# Patient Record
Sex: FEMALE | Race: WHITE | NOT HISPANIC OR LATINO | Employment: OTHER | URBAN - METROPOLITAN AREA
[De-identification: names, ages, dates, MRNs, and addresses within clinical notes are randomized per-mention and may not be internally consistent; named-entity substitution may affect disease eponyms.]

---

## 2017-04-11 ENCOUNTER — OFFICE VISIT (OUTPATIENT)
Dept: INTERNAL MEDICINE | Facility: CLINIC | Age: 72
End: 2017-04-11
Payer: COMMERCIAL

## 2017-04-11 VITALS
HEIGHT: 66 IN | RESPIRATION RATE: 16 BRPM | BODY MASS INDEX: 21 KG/M2 | OXYGEN SATURATION: 92 % | WEIGHT: 130.7 LBS | TEMPERATURE: 98.7 F | HEART RATE: 58 BPM | SYSTOLIC BLOOD PRESSURE: 122 MMHG | DIASTOLIC BLOOD PRESSURE: 92 MMHG

## 2017-04-11 DIAGNOSIS — M79.672 LEFT FOOT PAIN: Primary | ICD-10-CM

## 2017-04-11 PROCEDURE — 99213 OFFICE O/P EST LOW 20 MIN: CPT | Performed by: INTERNAL MEDICINE

## 2017-04-11 NOTE — NURSING NOTE
"Chief Complaint   Patient presents with     Musculoskeletal Problem     Bilateral feet c/o's- started with left foot. Arthrtis? shooting across toes, numbness,        Initial BP (!) 122/92  Pulse 58  Temp 98.7  F (37.1  C) (Oral)  Resp 16  Ht 5' 6\" (1.676 m)  Wt 130 lb 11.2 oz (59.3 kg)  SpO2 92%  BMI 21.1 kg/m2 Estimated body mass index is 21.1 kg/(m^2) as calculated from the following:    Height as of this encounter: 5' 6\" (1.676 m).    Weight as of this encounter: 130 lb 11.2 oz (59.3 kg).  Medication Reconciliation: complete      Adolph Urbina CMA      "

## 2017-04-11 NOTE — MR AVS SNAPSHOT
After Visit Summary   4/11/2017    Taty Aguila    MRN: [id number]           Patient Information     Date Of Birth          1945        Visit Information        Provider Department      4/11/2017 12:40 PM Anyi Olmos MD [facility]        Today's Diagnoses     Left foot pain    -  1       Follow-ups after your visit        Additional Services     ORTHO  REFERRAL       [facility] is referring you to the Orthopedic  Services at [facility].       The  Representative will assist you in the coordination of your Orthopedic and Musculoskeletal Care as prescribed by your physician.    The  Representative will call you within 1 business day to help schedule your appointment, or you may contact the  Representative at:    All areas ~ [phone]     Type of Referral : [facility]       Timeframe requested: 3 - 5 days    Coverage of these services is subject to the terms and limitations of your health insurance plan.  Please call member services at your health plan with any benefit or coverage questions.      If X-rays, CT or MRI's have been performed, please contact the facility where they were done to arrange for , prior to your scheduled appointment.  Please bring this referral request to your appointment and present it to your specialist.                  Your next 10 appointments already scheduled     Apr 25, 2017 10:00 AM CDT   Ech Complete with [email or web address]   [facility] ([facility])    [address] [address] [address] [phone]           1.  Please bring or wear a comfortable two-piece outfit. 2.  You may eat, drink and take your normal medicines. 3.  For any questions that cannot be answered, please contact the ordering physician ***Please check-in at the [facility] located  "in [address] in the [facility] building. When you are finished registering, please go to [address] and have a seat. The technician will call your name for the test.            May 02, 2017 10:15 AM CDT   Return Visit with Jesus Stone MD   [facility] [place] ([facility])    [address] [place] [address] [address]  [place] [id number] [phone]              Who to contact     If you have questions or need follow up information about today's clinic visit or your schedule please contact [facility] directly at [phone].  Normal or non-critical lab and imaging results will be communicated to you by [employer], letter or phone within 4 business days after the clinic has received the results. If you do not hear from us within 7 days, please contact the clinic through [employer] or phone. If you have a critical or abnormal lab result, we will notify you by phone as soon as possible.  Submit refill requests through [employer] or call your pharmacy and they will forward the refill request to us. Please allow 3 business days for your refill to be completed.          Additional Information About Your Visit        [employer] Information     [employer] gives you secure access to your electronic health record. If you see a primary care provider, you can also send messages to your care team and make appointments. If you have questions, please call your primary care clinic.  If you do not have a primary care provider, please call [phone] and they will assist you.        Care EveryWhere ID     This is your Care EveryWhere ID. This could be used by other organizations to access your [place] medical records  [id number]        Your Vitals Were     Pulse Temperature Respirations Height Pulse Oximetry BMI (Body Mass Index)    58 98.7  F (37.1  C) (Oral) 16 5' 6\" (1.676 m) 92% 21.1 kg/m2       Blood Pressure from Last 3 Encounters:   04/11/17 (!) 122/92   10/14/16 " 158/72   10/05/16 110/77    Weight from Last 3 Encounters:   04/11/17 130 lb 11.2 oz (59.3 kg)   10/14/16 128 lb (58.1 kg)   10/05/16 126 lb (57.2 kg)              We Performed the Following     ORTHO  REFERRAL        Primary Care Provider Office Phone # Fax #    Anyi Olmos [phone]       [facility] [address] [facility] [address]  [facility] [address]        Thank you!     Thank you for choosing [facility]  for your care. Our goal is always to provide you with excellent care. Hearing back from our patients is one way we can continue to improve our services. Please take a few minutes to complete the written survey that you may receive in the mail after your visit with us. Thank you!             Your Updated Medication List - Protect others around you: Learn how to safely use, store and throw away your medicines at www.disposemymeds.org.          This list is accurate as of: 4/11/17  1:22 PM.  Always use your most recent med list.                   Brand Name Dispense Instructions for use    aspirin 81 MG tablet     100    1 tab po QD (Once per day)       CALTRATE 600 + D 600-200 MG-IU Tabs      2 TABLETs DAILY       Glucosamine HCl--750 MG Tabs      Take 3,000 mg by mouth daily       loratadine 10 MG tablet    CLARITIN     Take 10 mg by mouth daily       MELATONIN PO          MULTIVITAMIN TABS   OR      daily       RANITIDINE 75 MG Tabs      2 tabs at hs prn

## 2017-04-11 NOTE — PROGRESS NOTES
SUBJECTIVE:                                                    Taty Aguila is a 72 year old female who presents to clinic today for the following health issues:    Left foot problems:   She first noted decreased sensation of the 4th toe about 6 months ago. The toe is deformed (not new). The numbness is stable. No pain.     More recently she has been having pain at the 2nd and 3rd toes. It is painful plantar foot with walking but also gets some very brief very sharp pains at rest that shoot across the distal foot. She notes tenderness at the plantar foot and has had a lump near the base of 2nd and 3rd toes that comes and goes. It is more noticeable at rest, decreased after walking on the foot.         She has had some decreased sensation 4th toe right foot also, seems milder. She has been having some soreness of metatarsals right foot lately.       Patient Active Problem List   Diagnosis     Allergic rhinitis     Mitral valve disorder     CARDIOVASCULAR SCREENING; LDL GOAL LESS THAN 160     Colitis     Mitral valve regurgitation 3+ per echo 8-2016     Current Outpatient Prescriptions   Medication Sig Dispense Refill     MELATONIN PO        Glucosamine HCl--750 MG TABS Take 3,000 mg by mouth daily       loratadine (CLARITIN) 10 MG tablet Take 10 mg by mouth daily       CALTRATE 600 + D 600-200 MG-IU OR TABS 2 TABLETs DAILY       RANITIDINE 75 MG OR TABS 2 tabs at hs prn       MULTIVITAMIN TABS   OR daily  0     ASPIRIN 81 MG OR TABS 1 tab po QD (Once per day) 100 3      Social History   Substance Use Topics     Smoking status: Never Smoker     Smokeless tobacco: Never Used     Alcohol use Yes      Comment: beer with dinner        Reviewed and updated as needed this visit by clinical staff  Tobacco  Allergies  Meds  Med Hx  Surg Hx  Fam Hx  Soc Hx      Reviewed and updated as needed this visit by Provider         ROS:  negative    OBJECTIVE:                                                    BP (!)  "122/92  Pulse 58  Temp 98.7  F (37.1  C) (Oral)  Resp 16  Ht 5' 6\" (1.676 m)  Wt 130 lb 11.2 oz (59.3 kg)  SpO2 92%  BMI 21.1 kg/m2  Body mass index is 21.1 kg/(m^2).    Left foot:   Mild decrease in light touch and pinprick plantar 4th toe.   There is significant tenderness of the 2nd metatarsal head. Minimal tenderness 3rd metatarsal head. There is tenderness with pressure on dorsal foot between 2nd and 3rd toes. No lump appreciated.   Right foot: normal sensation, minimal tenderness of the 2nd metatarsal head.          ASSESSMENT/PLAN:                                                            1. Left foot pain  May have neuroma and/or metatarsalgia. The lump could be a cyst. Recommend podiatry, for now ice foot, avoid shoes that bother her.   - ORTHO  REFERRAL        Anyi Olmos MD  [facility]    "

## 2017-04-18 ENCOUNTER — OFFICE VISIT (OUTPATIENT)
Dept: PODIATRY | Facility: CLINIC | Age: 72
End: 2017-04-18
Payer: COMMERCIAL

## 2017-04-18 VITALS — HEIGHT: 66 IN | HEART RATE: 68 BPM | BODY MASS INDEX: 20.89 KG/M2 | WEIGHT: 130 LBS

## 2017-04-18 DIAGNOSIS — M77.8 CAPSULITIS OF LEFT FOOT: Primary | ICD-10-CM

## 2017-04-18 DIAGNOSIS — M79.672 LEFT FOOT PAIN: ICD-10-CM

## 2017-04-18 PROCEDURE — 99203 OFFICE O/P NEW LOW 30 MIN: CPT | Performed by: PODIATRIST

## 2017-04-18 NOTE — PROGRESS NOTES
"Foot & Ankle Surgery  April 18, 2017    CC: L foot pain    I was asked to see Taty Aguila regarding the chief complaint by:  Dr. VICTOR M Olmos    HPI:  Pt is a 72 year old female who presents with above complaint.  6 mo history of L foot pain without injury.  Describes feeling a lump on the bottom of her foot.  Pain 6/10 daily, es with \"sitting to standing and moving\".  No treatment other than gel inserts.  Worse with hardwood floor ambulation, doesn't do much barefoot walking any more.  Thinks this may have started after increasing her aerobics exercises    ROS:   Pos for CC.  The patient denies current nausea, vomiting, chills, fevers, belly pain, calf pain, chest pain or SOB.  Complete remainder of ROS is otherwise neg.    VITALS:    Vitals:    04/18/17 0936   Pulse: 68   Weight: 130 lb (59 kg)   Height: 5' 6\" (1.676 m)       PMH:    Past Medical History:   Diagnosis Date     Allergic rhinitis, cause unspecified     dust mites, ragweed     Colitis      Mitral valve disorders 1984    myxomatous valve, mod MR, prolapse       SXHX:    Past Surgical History:   Procedure Laterality Date     COLONOSCOPY N/A 9/15/2015    Procedure: COLONOSCOPY;  Surgeon: Anson Llanos MD;  Location:  GI        MEDS:    Current Outpatient Prescriptions   Medication     MELATONIN PO     Glucosamine HCl--750 MG TABS     loratadine (CLARITIN) 10 MG tablet     CALTRATE 600 + D 600-200 MG-IU OR TABS     RANITIDINE 75 MG OR TABS     MULTIVITAMIN TABS   OR     ASPIRIN 81 MG OR TABS     No current facility-administered medications for this visit.        ALL:     Allergies   Allergen Reactions     No Known Drug Allergies        FMH:    Family History   Problem Relation Age of Onset     OSTEOPOROSIS Mother      Alzheimer Disease Mother      Family History Negative Father      HEART DISEASE Maternal Grandfather      Family History Negative Paternal Grandmother      Family History Negative Paternal Grandfather        SocHx:    Social " History     Social History     Marital status:      Spouse name: N/A     Number of children: 3     Years of education: N/A     Occupational History     Not on file.     Social History Main Topics     Smoking status: Never Smoker     Smokeless tobacco: Never Used     Alcohol use Yes      Comment: beer with dinner     Drug use: No     Sexual activity: Not Currently     Other Topics Concern     Caffeine Concern No     1 cup of coffee and 1 can diet coke per day     Sleep Concern Yes     takes Doxylamine succinate 1/2 tab every night     Stress Concern No     Weight Concern No     Special Diet No     healthy      Exercise Yes     walking 45min x7 a wk. Very active, [employer], Golf, Bowling, Cardio     Seat Belt Yes     Self-Exams Yes     Social History Narrative           EXAMINATION:  Gen:   No apparent distress  Neuro:   A&Ox3, no deficits  Psych:    Answering questions appropriately for age and situation with normal affect  Head:    NCAT  Eye:    Visual scanning without deficit  Ear:    Response to auditory stimuli wnl  Lung:    Non-labored breathing on RA noted  Abd:    NTND per patient report  Lymph:    Neg for pitting/non-pitting edema BLE  Vasc:    Pulses palpable, CFT minimally delayed  Neuro:    Light touch sensation intact to all sensory nerve distributions without paresthesias  Derm:    Neg for nodules, lesions or ulcerations  MSK:    Point tender plantar 2nd MPJ, aylin at base of proximal phalanx.  Limited MPJ plantarflexion but no tendon pain noted.  Lachman shows pain at base of phalanx  Calf:    Neg for redness, swelling or tenderness    Assessment:  72 year old female with L 2nd MPJ capsulitis      Plan:  Discussed etiologies and options  1.  L 2nd MPJ capsulitis  -supportive accommodative shoe gear; minimize shoeless ambulation  -OTC inserts, recommend metatarsal pad  -RICE/NSAID prn    Follow up:  3 weeks      Patient's medical history was reviewed today    Body mass index is 20.98 kg/(m^2).  Weight  management plan Patient was referred to their PCP to discuss a diet and exercise plan.        Terell Robert DPM   Podiatric Foot & Ankle Surgeon  [facility]  [phone]

## 2017-04-18 NOTE — NURSING NOTE
"Chief Complaint   Patient presents with     Foot Problems     Left forefoot pain x 6 months and is getting worse       Initial Pulse 68  Ht 5' 6\" (1.676 m)  Wt 130 lb (59 kg)  BMI 20.98 kg/m2 Estimated body mass index is 20.98 kg/(m^2) as calculated from the following:    Height as of this encounter: 5' 6\" (1.676 m).    Weight as of this encounter: 130 lb (59 kg).  Medication Reconciliation: complete    "

## 2017-04-18 NOTE — MR AVS SNAPSHOT
After Visit Summary   4/18/2017    Taty Aguila    MRN: [id number]           Patient Information     Date Of Birth          1945        Visit Information        Provider Department      4/18/2017 9:45 AM Terell Gonzalez DPM [facility] [place] [facility]        Care Instructions      DR. GONZALEZ'S CLINIC LOCATIONS:       MONDAY - [place] TUESDAY - [place]   [address] [facility] [address] [facility] [address]   [place] [address] [place], [place] [address] [phone]       WEDNESDAY - SURGERY THURSDAY AM - [place]   [phone] [address] [facility] [address]    [place], [place] [address] [phone]       THURSDAY PM - [place] FRIDAY AM - [place]   [address] [place] [address] [place] [address]   [phone]       APPT SCHEDULING: SEND FAXES TO:   [phone]     Follow Up: 3 wks    Over the Counter Inserts    Super Feet are the most common and easiest to find.    Locations include any Sarah iSoftStonees Store, [employer] Spinn[employer] in [place] on [place] [address] and in [place] on [address], Eat Your Kimchi in [facility] on [facility], [place] Running Room in [facility] on [facility], [facility] Running Room in [place] on [facility] [address], Recreational Equipment Inc in [place] on [place] [address] [address] and Aff[employer] Sport Shop in [facility] on [address] and in [place] on [facility].    Sofsole Fit can be found at [employer] in [place].  You can also find them online at Sofsole.com    Spenco can be found online and at RawFlow Shoe Shop in [facility] on [address], Run N' Fun in [place] on [address] [place], Gear Running Store in [place] on [facility], Eat Your Kimchi in [place] on [address] and South [employer] in [place] on [address].    Power Step can be a little harder to find.  Locations include Run N' Fun in [place] on [address] [place], [place] in [place], Stop-over Store in [place] on [address] and online    Walk-Fit - Target      [facility]ohan [facility]    **  A good high quality over the counter insert can cost around $40-$50.            Capsulitis/Metatarsalgia    All joints in the body are surrounded by a capsule, or a covering of soft tissue and ligaments. The capsule holds bones together and secretes joint fluid to help lubricate the joint.  If a joint capsule is exposed to excessive force, it can develop microscopic tears and become inflamed. This commonly occurs in the foot due to mild variation in anatomy. Hammertoes, bunions, irregular bone length, joint immobility, etc. can all lead to excessive force on the joint. Capsule injury can also occur due to repetitive stress from exercise, insufficient support from shoes, excessive bare foot walking and excessive weight.      Conservative treatments include ice, rest from the aggravating activity, weight loss, orthotic inserts, improving shoes and shoe modifications. Appropriate shoes will protect the inflamed tissue improving the chances of healing. Avoidance of standing or walking barefoot, including around the house, is necessary to allow healing. Casts are sometimes used for more aggressive protection.  NSAIDs such as Advil are also used to help with pain and decreasing inflammation. If pain continues over a period of weeks with continuous rest and icing, Corticosteroid injections can be a treatment option to try and help decrease inflammation.    Surgery is often necessary to correct the underlying structural problem. Surgery might include shortening an excessively long bone, repairing bunion or hammertoe, lengthening a tight Achilles  tendon, etc. These are same day surgeries that might be pursued if more conservative measures fail to provide relief.      The inflamed joint capsule has the potential to completely tear. This will allow the toe to drift off the ground, curving toward the other toes. The involved toe may under or overlap the adjacent toes  as drift continues. The pain may improve after the joint tears or this new position will be permanent. Surgery can address the toe alignment. Your goal of treating capsulitis is to avoid this scenario.            PRICE THERAPY    Many aches and pains throughout the foot and ankle can be helped with many simple treatments. This is usually described as PRICE Therapy.      P - Protection - often times, inflammation/pain in the lower extremity is not able to improve simply because the areas involved are never allowed to rest. Every step we take can bother the problematic area. Protecting those areas is an important step in the healing process. This may involve a walking cast boot, a special insert/orthotic device, an ankle brace, or simply avoiding barefoot walking.    R - Rest - in addition to protecting the foot/ankle, resting is an important, but often times difficult, treatment option. Getting off your feet when they bother you, and specifically avoiding activities that cause pain/discomfort, are very beneficial to prevent, and treat, foot/ankle pain.      I - Ice - icing regularly can help to decrease inflammation and swelling in the foot, thus decreasing pain. Using an ice pack or a bag of frozen veggies works very well. Ice for 20 minutes multiple times per day as needed.  Do not place the ice directly on the skin as this can cause tissue damage.    C - Compression - using a compression wrap or an ACE wrap can help to decrease swelling, which can help to decrease pain. Wearing the wraps is generally not needed at night, but they should be worn on a regular basis when you are going to be on your feet for prolonged periods as gravity tends to pull fluids down to your feet/ankles.    E - Elevation - elevating your lower extremities multiple times daily for 15-20 minutes can help to decrease swelling, which works well in decreasing pain levels.    NSAID/Tylenol - Anti-inflammatories like Aleve or ibuprofen, and/or a  pain medication, such as Tylenol, can help to improve pain levels and get the issue resolved sooner rather than later. Anyone with liver issues should be careful with Tylenol, and anyone with high blood pressure or heart, stomach or kidney issues should be careful with anti-inflammatories. Please ask if you have questions about these medications, including dosage.          BODY MASS INDEX (BMI)  Many things can cause foot and ankle problems. Foot structure, activity level, foot mechanics and injuries are common causes of pain.    One very important issue that often goes unmentioned, is body weight.  Extra weight can cause increased stress on muscles, ligaments, bones and tendons. Sometimes just a few extra pounds is all it takes to put one over her/his threshold. Without reducing that stress, it can be difficult to alleviate pain.      Some people are uncomfortable addressing this issue, but we feel it is important for you to think about it. As Foot & Ankle specialists, our job is addressing the lower extremity problem and possible causes.     Regarding extra body weight, we encourage patients to discuss diet and weight management plans with their primary care doctors. It is this team approach that gives you the best opportunity for pain relief and getting you back on your feet.                Follow-ups after your visit        Your next 10 appointments already scheduled     Apr 25, 2017 10:00 AM CDT   Ech Complete with [id number] ([facility])    [address] [facility] [address]  [facility] [address] [phone]           1.  Please bring or wear a comfortable two-piece outfit. 2.  You may eat, drink and take your normal medicines. 3.  For any questions that cannot be answered, please contact the ordering physician ***Please check-in at the [place] Registration Office located in [address] in the [facility] [place] building. When you are finished  "registering, please go to [address] and have a seat. The technician will call your name for the test.            May 02, 2017 10:15 AM CDT   Return Visit with Jesus Stone MD   [facility] ([facility])    [address] [place] [address] [address]  [place] [address] [phone]              Who to contact     If you have questions or need follow up information about today's clinic visit or your schedule please contact [facility] directly at [phone].  Normal or non-critical lab and imaging results will be communicated to you by Missionlyhart, letter or phone within 4 business days after the clinic has received the results. If you do not hear from us within 7 days, please contact the clinic through Ofelia Felizt or phone. If you have a critical or abnormal lab result, we will notify you by phone as soon as possible.  Submit refill requests through Quality Systems or call your pharmacy and they will forward the refill request to us. Please allow 3 business days for your refill to be completed.          Additional Information About Your Visit        MyChart Information     [employer] Systems gives you secure access to your electronic health record. If you see a primary care provider, you can also send messages to your care team and make appointments. If you have questions, please call your primary care clinic.  If you do not have a primary care provider, please call [phone] and they will assist you.        Care EveryWhere ID     This is your Care EveryWhere ID. This could be used by other organizations to access your [place] medical records  [id number]        Your Vitals Were     Pulse Height BMI (Body Mass Index)             68 5' 6\" (1.676 m) 20.98 kg/m2          Blood Pressure from Last 3 Encounters:   04/11/17 (!) 122/92   10/14/16 158/72   10/05/16 110/77    Weight from Last 3 Encounters:   04/18/17 130 lb (59 kg)   04/11/17 130 lb 11.2 oz (59.3 kg)   10/14/16 128 lb " (58.1 kg)              Today, you had the following     No orders found for display       Primary Care Provider Office Phone # Fax #    Anyi Olmos [phone]       [facility] [address] [facility] [address]  [facility] [address]        Thank you!     Thank you for choosing [facility]  for your care. Our goal is always to provide you with excellent care. Hearing back from our patients is one way we can continue to improve our services. Please take a few minutes to complete the written survey that you may receive in the mail after your visit with us. Thank you!             Your Updated Medication List - Protect others around you: Learn how to safely use, store and throw away your medicines at www.disposemymeds.org.          This list is accurate as of: 4/18/17  9:53 AM.  Always use your most recent med list.                   Brand Name Dispense Instructions for use    aspirin 81 MG tablet     100    1 tab po QD (Once per day)       CALTRATE 600 + D 600-200 MG-IU Tabs      2 TABLETs DAILY       Glucosamine HCl--750 MG Tabs      Take 3,000 mg by mouth daily       loratadine 10 MG tablet    CLARITIN     Take 10 mg by mouth daily       MELATONIN PO          MULTIVITAMIN TABS   OR      daily       RANITIDINE 75 MG Tabs      2 tabs at hs prn

## 2017-04-18 NOTE — Clinical Note
Good morning  I saw Taty today for L foot pain, consistent with 2nd MPJ capsulitis.  We started tier 1 therapies, and will see her back in 3 weeks for a recheck.  Thanks    Terell

## 2017-04-18 NOTE — PATIENT INSTRUCTIONS
DR. GONZALEZ'S CLINIC LOCATIONS:       MONDAY - [place] TUESDAY - [place]   [address]   [place] [address] [place] [address]   [phone]       WEDNESDAY - SURGERY THURSDAY AM - [place]   [phone] [address]    [place], [place] [address] [phone]       THURSDAY PM - [place] FRIDAY AM - [place]   [address] [place] [address] [place] [address]   [phone]       APPT SCHEDULING: SEND FAXES TO:   [phone]     Follow Up: 3 wks    Over the Counter Inserts    Super Feet are the most common and easiest to find.    Locations include any [employer] Store, Enlighted in [place] on [place] [address] and in [place] on [address], Reframe It in [facility] on [facility], [facility] Running Room in [facility] on [facility], [facility] Running Room in [place] on [facility] [address], Recreational Equipment [email or web address] in [place] on [address] and Access Point Sport Shop in [facility] on [place] and in [place] on [facility].    Sofsole Fit can be found at Enlighted in [place].  You can also find them online at Sofsole.com    Spenco can be found online and at "[employer]" Shop in [facility] on [address], Run N' Fun in [facility] on [place], Gear Running Store in [place] on [facility], Reframe It in [place] on [address] and South [employer] in [place] on [address].    Power Step can be a little harder to find.  Locations include Run N' Fun in [place] on [place], [place] in [facility], Stop-over Store in [place] on [place] and online    Walk-Fit - Target     [facility]    **  A good high quality over the counter insert can cost around $40-$50.            Capsulitis/Metatarsalgia    All joints in the body are surrounded by a capsule, or a covering of soft tissue and ligaments. The capsule holds bones together and  secretes joint fluid to help lubricate the joint.  If a joint capsule is exposed to excessive force, it can develop microscopic tears and become inflamed. This commonly occurs in the foot due to mild variation in anatomy. Hammertoes, bunions, irregular bone length, joint immobility, etc. can all lead to excessive force on the joint. Capsule injury can also occur due to repetitive stress from exercise, insufficient support from shoes, excessive bare foot walking and excessive weight.      Conservative treatments include ice, rest from the aggravating activity, weight loss, orthotic inserts, improving shoes and shoe modifications. Appropriate shoes will protect the inflamed tissue improving the chances of healing. Avoidance of standing or walking barefoot, including around the house, is necessary to allow healing. Casts are sometimes used for more aggressive protection.  NSAIDs such as Advil are also used to help with pain and decreasing inflammation. If pain continues over a period of weeks with continuous rest and icing, Corticosteroid injections can be a treatment option to try and help decrease inflammation.    Surgery is often necessary to correct the underlying structural problem. Surgery might include shortening an excessively long bone, repairing bunion or hammertoe, lengthening a tight Achilles  tendon, etc. These are same day surgeries that might be pursued if more conservative measures fail to provide relief.      The inflamed joint capsule has the potential to completely tear. This will allow the toe to drift off the ground, curving toward the other toes. The involved toe may under or overlap the adjacent toes as drift continues. The pain may improve after the joint tears or this new position will be permanent. Surgery can address the toe alignment. Your goal of treating capsulitis is to avoid this scenario.            PRICE THERAPY    Many aches and pains throughout the foot and ankle can be helped with  many simple treatments. This is usually described as PRICE Therapy.      P - Protection - often times, inflammation/pain in the lower extremity is not able to improve simply because the areas involved are never allowed to rest. Every step we take can bother the problematic area. Protecting those areas is an important step in the healing process. This may involve a walking cast boot, a special insert/orthotic device, an ankle brace, or simply avoiding barefoot walking.    R - Rest - in addition to protecting the foot/ankle, resting is an important, but often times difficult, treatment option. Getting off your feet when they bother you, and specifically avoiding activities that cause pain/discomfort, are very beneficial to prevent, and treat, foot/ankle pain.      I - Ice - icing regularly can help to decrease inflammation and swelling in the foot, thus decreasing pain. Using an ice pack or a bag of frozen veggies works very well. Ice for 20 minutes multiple times per day as needed.  Do not place the ice directly on the skin as this can cause tissue damage.    C - Compression - using a compression wrap or an ACE wrap can help to decrease swelling, which can help to decrease pain. Wearing the wraps is generally not needed at night, but they should be worn on a regular basis when you are going to be on your feet for prolonged periods as gravity tends to pull fluids down to your feet/ankles.    E - Elevation - elevating your lower extremities multiple times daily for 15-20 minutes can help to decrease swelling, which works well in decreasing pain levels.    NSAID/Tylenol - Anti-inflammatories like Aleve or ibuprofen, and/or a pain medication, such as Tylenol, can help to improve pain levels and get the issue resolved sooner rather than later. Anyone with liver issues should be careful with Tylenol, and anyone with high blood pressure or heart, stomach or kidney issues should be careful with anti-inflammatories. Please  ask if you have questions about these medications, including dosage.          BODY MASS INDEX (BMI)  Many things can cause foot and ankle problems. Foot structure, activity level, foot mechanics and injuries are common causes of pain.    One very important issue that often goes unmentioned, is body weight.  Extra weight can cause increased stress on muscles, ligaments, bones and tendons. Sometimes just a few extra pounds is all it takes to put one over her/his threshold. Without reducing that stress, it can be difficult to alleviate pain.      Some people are uncomfortable addressing this issue, but we feel it is important for you to think about it. As Foot & Ankle specialists, our job is addressing the lower extremity problem and possible causes.     Regarding extra body weight, we encourage patients to discuss diet and weight management plans with their primary care doctors. It is this team approach that gives you the best opportunity for pain relief and getting you back on your feet.

## 2017-04-25 ENCOUNTER — HOSPITAL ENCOUNTER (OUTPATIENT)
Dept: CARDIOLOGY | Facility: CLINIC | Age: 72
Discharge: HOME | End: 2017-04-25
Attending: INTERNAL MEDICINE | Admitting: INTERNAL MEDICINE
Payer: MEDICARE

## 2017-04-25 DIAGNOSIS — I34.0 MITRAL VALVE INSUFFICIENCY, UNSPECIFIED ETIOLOGY: ICD-10-CM

## 2017-04-25 DIAGNOSIS — R00.2 PALPITATIONS: ICD-10-CM

## 2017-04-25 PROCEDURE — 93306 TTE W/DOPPLER COMPLETE: CPT

## 2017-04-25 PROCEDURE — 93306 TTE W/DOPPLER COMPLETE: CPT | Mod: 26 | Performed by: INTERNAL MEDICINE

## 2017-05-02 ENCOUNTER — OFFICE VISIT (OUTPATIENT)
Dept: CARDIOLOGY | Facility: CLINIC | Age: 72
End: 2017-05-02
Attending: INTERNAL MEDICINE
Payer: COMMERCIAL

## 2017-05-02 VITALS
HEIGHT: 66 IN | SYSTOLIC BLOOD PRESSURE: 131 MMHG | WEIGHT: 133.1 LBS | DIASTOLIC BLOOD PRESSURE: 75 MMHG | BODY MASS INDEX: 21.39 KG/M2 | HEART RATE: 64 BPM

## 2017-05-02 DIAGNOSIS — R00.2 PALPITATIONS: ICD-10-CM

## 2017-05-02 DIAGNOSIS — I34.0 MITRAL VALVE INSUFFICIENCY, UNSPECIFIED ETIOLOGY: ICD-10-CM

## 2017-05-02 PROCEDURE — 99213 OFFICE O/P EST LOW 20 MIN: CPT | Performed by: INTERNAL MEDICINE

## 2017-05-02 NOTE — PROGRESS NOTES
HPI and Plan:   See dictation    Orders Placed This Encounter   Procedures     Follow-Up with Cardiologist     Echocardiogram     No orders of the defined types were placed in this encounter.    There are no discontinued medications.      Encounter Diagnoses   Name Primary?     Mitral valve insufficiency, unspecified etiology      Palpitations        CURRENT MEDICATIONS:  Current Outpatient Prescriptions   Medication Sig Dispense Refill     MELATONIN PO        Glucosamine HCl--750 MG TABS Take 3,000 mg by mouth daily       loratadine (CLARITIN) 10 MG tablet Take 10 mg by mouth daily       CALTRATE 600 + D 600-200 MG-IU OR TABS 2 TABLETs DAILY       RANITIDINE 75 MG OR TABS 2 tabs at hs prn       MULTIVITAMIN TABS   OR daily  0     ASPIRIN 81 MG OR TABS 1 tab po QD (Once per day) 100 3       ALLERGIES     Allergies   Allergen Reactions     No Known Drug Allergies        PAST MEDICAL HISTORY:  Past Medical History:   Diagnosis Date     Allergic rhinitis, cause unspecified     dust mites, ragweed     Colitis      Mitral regurgitation      Mitral valve disorders 1984    myxomatous valve, 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, prolapse     Mitral valve prolapse        PAST SURGICAL HISTORY:  Past Surgical History:   Procedure Laterality Date     COLONOSCOPY N/A 9/15/2015    Procedure: COLONOSCOPY;  Surgeon: Anson Llanos MD;  Location:  GI       FAMILY HISTORY:  Family History   Problem Relation Age of Onset     OSTEOPOROSIS Mother      Alzheimer Disease Mother      Family History Negative Father      HEART DISEASE Maternal Grandfather      Family History Negative Paternal Grandmother      Family History Negative Paternal Grandfather        SOCIAL HISTORY:  Social History     Social History     Marital status:      Spouse name: N/A     Number of children: 3     Years of education: N/A     Social History Main Topics     Smoking status: Never Smoker     Smokeless tobacco: Never Used     Alcohol use Yes      Comment: beer with dinner  "    Drug use: No     Sexual activity: Not Currently     Other Topics Concern     Caffeine Concern No     1 cup of coffee and 1 can diet coke per day     Sleep Concern Yes     takes Doxylamine succinate 1/2 tab every night     Stress Concern No     Weight Concern No     Special Diet No     healthy      Exercise Yes     walking 45min x7 a wk. Very active, [employer], Golf, Bowling, Cardio     Seat Belt Yes     Self-Exams Yes     Social History Narrative       Review of Systems:  Skin:  Negative     Eyes:  Positive for glasses  ENT:  Negative    Respiratory:  Positive for dyspnea on exertion  Cardiovascular:    Positive for;chest pain;lightheadedness;dizziness  Gastroenterology: Negative    Genitourinary:  Negative    Musculoskeletal:  Negative    Neurologic:  Negative    Psychiatric:  Negative    Heme/Lymph/Imm:  Negative    Endocrine:  Negative      Physical Exam:  Vitals: /75  Pulse 64  Ht 1.676 m (5' 6\")  Wt 60.4 kg (133 lb 1.6 oz)  BMI 21.48 kg/m2    Constitutional:           Skin:           Head:           Eyes:           ENT:           Neck:           Chest:           Cardiac:                    Abdomen:           Vascular:                                        Extremities and Back:           Neurological:             Recent Lab Results:  LIPID RESULTS:  Lab Results   Component Value Date    CHOL 202 (H) 07/23/2015    HDL 96 07/23/2015    LDL 83 07/23/2015    TRIG 116 07/23/2015    CHOLHDLRATIO 2.1 07/23/2015       LIVER ENZYME RESULTS:  Lab Results   Component Value Date    AST 25 10/05/2016    ALT 26 10/05/2016       CBC RESULTS:  Lab Results   Component Value Date    WBC 4.1 10/05/2016    RBC 4.25 10/05/2016    HGB 11.2 (L) 10/05/2016    HCT 40.1 10/05/2016    MCV 94 10/05/2016    MCH 32.0 10/05/2016    MCHC 33.9 10/05/2016    RDW 13.2 10/05/2016     10/05/2016       BMP RESULTS:  Lab Results   Component Value Date     10/05/2016    POTASSIUM 3.4 10/05/2016    CHLORIDE 106 10/05/2016    CO2 " 28 10/05/2016    ANIONGAP 5 10/05/2016    GLC 90 10/05/2016    BUN 12 10/05/2016    CR 0.78 10/05/2016    GFRESTIMATED 73 10/05/2016    GFRESTBLACK 88 10/05/2016    ARIELLE 9.2 10/05/2016        A1C RESULTS:  Lab Results   Component Value Date    A1C 5.5 09/30/2011       INR RESULTS:  Lab Results   Component Value Date    INR 0.97 10/05/2016           CC  Jesus Stone MD   [facility]  [address]  [place] [address]

## 2017-05-02 NOTE — LETTER
"5/2/2017    Anyi Olmos MD  [facility]   [address]  [facility] [address]    RE: Taty Aguila       Dear Colleague,    I had the pleasure of seeing Taty Aguila in the [facility].    Taty is a very delightful 72-year-old female who comes in with her  today.  She has known mitral valve prolapse and mitral regurgitation ranging from mildly severe to mild on echocardiograms.  She has multiple jets.  We are considering mitral valve surgery and she saw Dr. Andrea Hanna given her pulmonary pressure elevations.  However, at heart catheterization pulmonary pressures were normal.  Of course, this is under some anesthesia.  Her MR also did not appear severe.  Dr. Hernandez called it as being mild; however, I think it is probably more in the moderate range.      Taty is doing well.  She states that she may be \"slowing down a little bit\", but really has no shortness of breath, dyspnea, heart racing, palpitations, chest pain or edema.      Review of her echocardiogram most recently from 04/2017 shows actually mild mitral regurgitation, normal LV size and function and normal pulmonary pressures.     Outpatient Encounter Prescriptions as of 5/2/2017   Medication Sig Dispense Refill     MELATONIN PO        Glucosamine HCl--750 MG TABS Take 3,000 mg by mouth daily       loratadine (CLARITIN) 10 MG tablet Take 10 mg by mouth daily       CALTRATE 600 + D 600-200 MG-IU OR TABS 2 TABLETs DAILY       RANITIDINE 75 MG OR TABS 2 tabs at hs prn       MULTIVITAMIN TABS   OR daily  0     ASPIRIN 81 MG OR TABS 1 tab po QD (Once per day) 100 3     No facility-administered encounter medications on file as of 5/2/2017.       ASSESSMENT AND PLAN:  We had a long discussion regarding Taty's mitral regurgitation.  It certainly seems to vary quite a bit based on the modality measuring the MR as well as her blood pressure at the time.  Her blood pressure is borderline elevated " today.  I have asked her to call us back in 2 weeks' time after recording blood pressures at home and I would treat to achieve a blood pressure of less than 125/75.  We will follow up with her in 6 months' time with a repeat echocardiogram.  Probably the best assessment of her mitral regurgitation is her MR which appears moderately severe and we possibly may need to follow it by that modality as well.     Again, thank you for allowing me to participate in the care of your patient.      Sincerely,    GILLIAN CARDENAS MD     [facility]

## 2017-05-02 NOTE — MR AVS SNAPSHOT
After Visit Summary   5/2/2017    Taty Aguila    MRN: [id number]           Patient Information     Date Of Birth          1945        Visit Information        Provider Department      5/2/2017 9:15 AM Jesus Stone MD [facility] [place]        Today's Diagnoses     Mitral valve insufficiency, unspecified etiology        Palpitations           Follow-ups after your visit        Additional Services     Follow-Up with Cardiologist                 Your next 10 appointments already scheduled     May 16, 2017  2:15 PM CDT   Return Visit with Terell Robert DPM   [facility] ([facility])    [address] [facility] [address]  [address]  [facility] [address]   [phone]              Future tests that were ordered for you today     Open Future Orders        Priority Expected Expires Ordered    MRI Cardiac w/contrast Routine 5/3/2017 11/2/2018 5/2/2017    Follow-Up with Cardiologist Routine 10/29/2017 5/2/2018 5/2/2017            Who to contact     If you have questions or need follow up information about today's clinic visit or your schedule please contact [facility] [place] directly at [phone].  Normal or non-critical lab and imaging results will be communicated to you by MyChart, letter or phone within 4 business days after the clinic has received the results. If you do not hear from us within 7 days, please contact the clinic through Avenidahart or phone. If you have a critical or abnormal lab result, we will notify you by phone as soon as possible.  Submit refill requests through RIGID or call your pharmacy and they will forward the refill request to us. Please allow 3 business days for your refill to be completed.          Additional Information About Your Visit        MyChart Information     RIGID gives you secure access to your electronic health record. If you see a primary care provider,  "you can also send messages to your care team and make appointments. If you have questions, please call your primary care clinic.  If you do not have a primary care provider, please call [phone] and they will assist you.        Care EveryWhere ID     This is your Care EveryWhere ID. This could be used by other organizations to access your [place] medical records  [id number]        Your Vitals Were     Pulse Height BMI (Body Mass Index)             64 1.676 m (5' 6\") 21.48 kg/m2          Blood Pressure from Last 3 Encounters:   05/02/17 131/75   04/11/17 (!) 122/92   10/14/16 158/72    Weight from Last 3 Encounters:   05/02/17 60.4 kg (133 lb 1.6 oz)   04/18/17 59 kg (130 lb)   04/11/17 59.3 kg (130 lb 11.2 oz)              We Performed the Following     Follow-Up with Cardiologist        Primary Care Provider Office Phone # Fax #    Anyi Olmos [phone]       [facility] [address]  [facility] [address]        Thank you!     Thank you for choosing [facility] [place]  for your care. Our goal is always to provide you with excellent care. Hearing back from our patients is one way we can continue to improve our services. Please take a few minutes to complete the written survey that you may receive in the mail after your visit with us. Thank you!             Your Updated Medication List - Protect others around you: Learn how to safely use, store and throw away your medicines at www.disposemymeds.org.          This list is accurate as of: 5/2/17  9:59 AM.  Always use your most recent med list.                   Brand Name Dispense Instructions for use    aspirin 81 MG tablet     100    1 tab po QD (Once per day)       CALTRATE 600 + D 600-200 MG-IU Tabs      2 TABLETs DAILY       Glucosamine HCl--750 MG Tabs      Take 3,000 mg by mouth daily       loratadine 10 MG tablet    CLARITIN     Take 10 mg by mouth daily       MELATONIN PO "          MULTIVITAMIN TABS   OR      daily       RANITIDINE 75 MG Tabs      2 tabs at hs prn

## 2017-05-02 NOTE — PROGRESS NOTES
"HISTORY OF PRESENT ILLNESS:  Taty is a very delightful 72-year-old female who comes in with her  today.  She has known mitral valve prolapse and mitral regurgitation ranging from mildly severe to mild on echocardiograms.  She has multiple jets.  We are considering mitral valve surgery and she saw Dr. Andrea Hanna given her pulmonary pressure elevations.  However, at heart catheterization pulmonary pressures were normal.  Of course, this is under some anesthesia.  Her MR also did not appear severe.  Dr. Hernandez called it as being mild; however, I think it is probably more in the moderate range.      Taty is doing well.  She states that she may be \"slowing down a little bit\", but really has no shortness of breath, dyspnea, heart racing, palpitations, chest pain or edema.      Review of her echocardiogram most recently from 2017 shows actually mild mitral regurgitation, normal LV size and function and normal pulmonary pressures.      ASSESSMENT AND PLAN:  We had a long discussion regarding Taty's mitral regurgitation.  It certainly seems to vary quite a bit based on the modality measuring the MR as well as her blood pressure at the time.  Her blood pressure is borderline elevated today.  I have asked her to call us back in 2 weeks' time after recording blood pressures at home and I would treat to achieve a blood pressure of less than 125/75.  We will follow up with her in 6 months' time with a repeat echocardiogram.  Probably the best assessment of her mitral regurgitation is her MR which appears moderately severe and we possibly may need to follow it by that modality as well.         GILLIAN CARDENAS MD [facility]             D: 2017 09:57   T: 2017 12:53   MT: LOLIE      Name:     TATY PAYTON   MRN:      [id number]        Account:      [id number]   :      1945           Service Date: 2017      Document: [id number]    "

## 2017-05-16 ENCOUNTER — OFFICE VISIT (OUTPATIENT)
Dept: PODIATRY | Facility: CLINIC | Age: 72
End: 2017-05-16
Payer: COMMERCIAL

## 2017-05-16 VITALS — HEIGHT: 66 IN | WEIGHT: 133 LBS | BODY MASS INDEX: 21.38 KG/M2 | HEART RATE: 66 BPM | RESPIRATION RATE: 14 BRPM

## 2017-05-16 DIAGNOSIS — M77.52 CAPSULITIS OF METATARSOPHALANGEAL (MTP) JOINT OF LEFT FOOT: Primary | ICD-10-CM

## 2017-05-16 PROCEDURE — 99213 OFFICE O/P EST LOW 20 MIN: CPT | Performed by: PODIATRIST

## 2017-05-16 NOTE — PROGRESS NOTES
"Foot & Ankle Surgery   May 16, 2017    S:  Pt is seen today for evaluation of L 2nd MPJ pain. Shoes, inserts, RICE/NSAID, some improvement noted.    Vitals:    05/16/17 1401   Pulse: 66   Resp: 14   Weight: 133 lb (60.3 kg)   Height: 5' 6\" (1.676 m)   '      ROS - Pos for CC.  Patient denies current nausea, vomiting, chills, fevers, belly pain, calf pain, chest pain or SOB.  Complete remainder of ROS it otherwise neg.      PE:  Gen:   No apparent distress  Neuro:   A&Ox3, no deficits  Psych:    Answering questions appropriately for age and situation with normal affect  Head:    NCAT  Eye:    Visual scanning without deficit  Ear:    Response to auditory stimuli wnl  Lung:    Non-labored breathing on RA noted  Abd:    NTND per patient report  Lymph:    Neg for pitting/non-pitting edema BLE  Vasc:    Pulses palpable, CFT minimally delayed  Neuro:    Light touch sensation intact to all sensory nerve distributions without paresthesias  Derm:    Neg for nodules, lesions or ulcerations  MSK:    Plantar L 2nd MPJ and plantar base of toe tender but improved from last visit  Calf:    Neg for redness, swelling or tenderness      Assessment:  72 year old female with 2dn MPJ capsulitis left lower extremity       Plan:  Discussed etiologies/options  1.  L 2nd MPJ capsulitis  -reviewed initial therapies; conitnue shoes, inserts, no barefoot, and RICE/NSAID prn  -discussed orthotics, CAM, PO steroid, PT as next options; deferred today  -will call if she would like to proceed with any of the next options    Follow up:  Prn     Billing today is based on time > 15 minutes, more than 50% spent counseling and coordinating cares         Body mass index is 21.47 kg/(m^2).  Weight management plan Patient was referred to their PCP to discuss a diet and exercise plan.         Terell Robert DPM   Podiatric Foot & Ankle Surgeon  [facility]  [phone]  "

## 2017-05-16 NOTE — MR AVS SNAPSHOT
"              After Visit Summary   5/16/2017    Taty Aguila    MRN: [id number]           Patient Information     Date Of Birth          1945        Visit Information        Provider Department      5/16/2017 2:15 PM Terell Robert DPM [facility]        Today's Diagnoses     Capsulitis of metatarsophalangeal (MTP) joint of left foot    -  1       Follow-ups after your visit        Follow-up notes from your care team     Return if symptoms worsen or fail to improve.      Who to contact     If you have questions or need follow up information about today's clinic visit or your schedule please contact [facility] directly at [phone].  Normal or non-critical lab and imaging results will be communicated to you by Optics 1hart, letter or phone within 4 business days after the clinic has received the results. If you do not hear from us within 7 days, please contact the clinic through Optics 1hart or phone. If you have a critical or abnormal lab result, we will notify you by phone as soon as possible.  Submit refill requests through AIRTAME or call your pharmacy and they will forward the refill request to us. Please allow 3 business days for your refill to be completed.          Additional Information About Your Visit        MyChart Information     AIRTAME gives you secure access to your electronic health record. If you see a primary care provider, you can also send messages to your care team and make appointments. If you have questions, please call your primary care clinic.  If you do not have a primary care provider, please call [phone] and they will assist you.        Care EveryWhere ID     This is your Care EveryWhere ID. This could be used by other organizations to access your [place] medical records  [id number]        Your Vitals Were     Pulse Respirations Height BMI (Body Mass Index)          66 14 5' 6\" (1.676 m) 21.47 kg/m2         Blood Pressure from Last 3 Encounters: "   05/02/17 131/75   04/11/17 (!) 122/92   10/14/16 158/72    Weight from Last 3 Encounters:   05/16/17 133 lb (60.3 kg)   05/02/17 133 lb 1.6 oz (60.4 kg)   04/18/17 130 lb (59 kg)              Today, you had the following     No orders found for display       Primary Care Provider Office Phone # Fax #    Anyi Olmos [phone]       [facility] [address] [facility] [address]  [facility] [address]        Thank you!     Thank you for choosing [facility]  for your care. Our goal is always to provide you with excellent care. Hearing back from our patients is one way we can continue to improve our services. Please take a few minutes to complete the written survey that you may receive in the mail after your visit with us. Thank you!             Your Updated Medication List - Protect others around you: Learn how to safely use, store and throw away your medicines at www.disposemymeds.org.          This list is accurate as of: 5/16/17  3:02 PM.  Always use your most recent med list.                   Brand Name Dispense Instructions for use    aspirin 81 MG tablet     100    1 tab po QD (Once per day)       CALTRATE 600 + D 600-200 MG-IU Tabs      2 TABLETs DAILY       Glucosamine HCl--750 MG Tabs      Take 3,000 mg by mouth daily       loratadine 10 MG tablet    CLARITIN     Take 10 mg by mouth daily       MELATONIN PO          MULTIVITAMIN TABS   OR      daily       RANITIDINE 75 MG Tabs      2 tabs at hs prn

## 2017-05-16 NOTE — Clinical Note
Good afternoon  I saw Taty today for follow up 2nd MPJ pain.  She has seen improvement.  We discussed more aggressive options, and she has elected to continue with current therapies.  She'll follow up prn.  Thanks  Terell

## 2017-08-31 ENCOUNTER — HOSPITAL ENCOUNTER (OUTPATIENT)
Dept: MAMMOGRAPHY | Facility: CLINIC | Age: 72
Discharge: HOME | End: 2017-08-31
Attending: INTERNAL MEDICINE | Admitting: INTERNAL MEDICINE
Payer: MEDICARE

## 2017-08-31 DIAGNOSIS — Z12.31 VISIT FOR SCREENING MAMMOGRAM: ICD-10-CM

## 2017-08-31 PROCEDURE — G0202 SCR MAMMO BI INCL CAD: HCPCS

## 2017-10-05 DIAGNOSIS — I34.0 MITRAL REGURGITATION: Primary | ICD-10-CM

## 2017-10-12 ENCOUNTER — APPOINTMENT (OUTPATIENT)
Dept: CARDIOLOGY | Facility: CLINIC | Age: 72
End: 2017-10-12
Attending: PHYSICIAN ASSISTANT
Payer: MEDICARE

## 2017-10-12 ENCOUNTER — APPOINTMENT (OUTPATIENT)
Dept: GENERAL RADIOLOGY | Facility: CLINIC | Age: 72
End: 2017-10-12
Attending: EMERGENCY MEDICINE
Payer: MEDICARE

## 2017-10-12 ENCOUNTER — HOSPITAL ENCOUNTER (OUTPATIENT)
Facility: CLINIC | Age: 72
Setting detail: OBSERVATION
Discharge: HOME | End: 2017-10-13
Attending: EMERGENCY MEDICINE | Admitting: HOSPITALIST
Payer: MEDICARE

## 2017-10-12 DIAGNOSIS — R55 SYNCOPE, UNSPECIFIED SYNCOPE TYPE: ICD-10-CM

## 2017-10-12 LAB
ALBUMIN UR-MCNC: NEGATIVE MG/DL
ANION GAP SERPL CALCULATED.3IONS-SCNC: 5 MMOL/L (ref 3–14)
APPEARANCE UR: CLEAR
BASOPHILS # BLD AUTO: 0 10E9/L (ref 0–0.2)
BASOPHILS NFR BLD AUTO: 0.6 %
BILIRUB UR QL STRIP: NEGATIVE
BUN SERPL-MCNC: 9 MG/DL (ref 7–30)
CALCIUM SERPL-MCNC: 8.3 MG/DL (ref 8.5–10.1)
CHLORIDE SERPL-SCNC: 106 MMOL/L (ref 94–109)
CO2 SERPL-SCNC: 27 MMOL/L (ref 20–32)
COLOR UR AUTO: YELLOW
CREAT SERPL-MCNC: 0.9 MG/DL (ref 0.52–1.04)
DIFFERENTIAL METHOD BLD: ABNORMAL
EOSINOPHIL # BLD AUTO: 0.1 10E9/L (ref 0–0.7)
EOSINOPHIL NFR BLD AUTO: 1.9 %
ERYTHROCYTE [DISTWIDTH] IN BLOOD BY AUTOMATED COUNT: 13 % (ref 10–15)
FLUAV+FLUBV RNA SPEC QL NAA+PROBE: NEGATIVE
FLUAV+FLUBV RNA SPEC QL NAA+PROBE: NEGATIVE
GFR SERPL CREATININE-BSD FRML MDRD: 62 ML/MIN/1.7M2
GLUCOSE SERPL-MCNC: 109 MG/DL (ref 70–99)
GLUCOSE UR STRIP-MCNC: NEGATIVE MG/DL
HCT VFR BLD AUTO: 37.8 % (ref 35–47)
HGB BLD-MCNC: 12.2 G/DL (ref 11.7–15.7)
HGB UR QL STRIP: NEGATIVE
IMM GRANULOCYTES # BLD: 0 10E9/L (ref 0–0.4)
IMM GRANULOCYTES NFR BLD: 0 %
INTERPRETATION ECG - MUSE: NORMAL
KETONES UR STRIP-MCNC: 5 MG/DL
LEUKOCYTE ESTERASE UR QL STRIP: NEGATIVE
LYMPHOCYTES # BLD AUTO: 0.9 10E9/L (ref 0.8–5.3)
LYMPHOCYTES NFR BLD AUTO: 28.4 %
MCH RBC QN AUTO: 30.9 PG (ref 26.5–33)
MCHC RBC AUTO-ENTMCNC: 32.3 G/DL (ref 31.5–36.5)
MCV RBC AUTO: 96 FL (ref 78–100)
MONOCYTES # BLD AUTO: 0.5 10E9/L (ref 0–1.3)
MONOCYTES NFR BLD AUTO: 14.8 %
NEUTROPHILS # BLD AUTO: 1.7 10E9/L (ref 1.6–8.3)
NEUTROPHILS NFR BLD AUTO: 54.3 %
NITRATE UR QL: NEGATIVE
NRBC # BLD AUTO: 0 10*3/UL
NRBC BLD AUTO-RTO: 0 /100
PH UR STRIP: 7 PH (ref 5–7)
PLATELET # BLD AUTO: 201 10E9/L (ref 150–450)
POTASSIUM SERPL-SCNC: 4.1 MMOL/L (ref 3.4–5.3)
RBC # BLD AUTO: 3.95 10E12/L (ref 3.8–5.2)
RSV RNA SPEC NAA+PROBE: NEGATIVE
SODIUM SERPL-SCNC: 138 MMOL/L (ref 133–144)
SOURCE: ABNORMAL
SP GR UR STRIP: 1.01 (ref 1–1.03)
SPECIMEN SOURCE: NORMAL
TROPONIN I SERPL-MCNC: <0.015 UG/L (ref 0–0.04)
UROBILINOGEN UR STRIP-MCNC: 0 MG/DL (ref 0–2)
WBC # BLD AUTO: 3.2 10E9/L (ref 4–11)

## 2017-10-12 PROCEDURE — 80048 BASIC METABOLIC PNL TOTAL CA: CPT | Performed by: EMERGENCY MEDICINE

## 2017-10-12 PROCEDURE — 71020 XR CHEST 2 VW: CPT

## 2017-10-12 PROCEDURE — 96361 HYDRATE IV INFUSION ADD-ON: CPT

## 2017-10-12 PROCEDURE — 36415 COLL VENOUS BLD VENIPUNCTURE: CPT | Performed by: PHYSICIAN ASSISTANT

## 2017-10-12 PROCEDURE — 99285 EMERGENCY DEPT VISIT HI MDM: CPT | Mod: 25

## 2017-10-12 PROCEDURE — 99220 ZZC INITIAL OBSERVATION CARE,LEVL III: CPT | Performed by: PHYSICIAN ASSISTANT

## 2017-10-12 PROCEDURE — 84484 ASSAY OF TROPONIN QUANT: CPT | Performed by: EMERGENCY MEDICINE

## 2017-10-12 PROCEDURE — 84484 ASSAY OF TROPONIN QUANT: CPT | Mod: 91 | Performed by: PHYSICIAN ASSISTANT

## 2017-10-12 PROCEDURE — 93306 TTE W/DOPPLER COMPLETE: CPT | Mod: 26 | Performed by: INTERNAL MEDICINE

## 2017-10-12 PROCEDURE — 93306 TTE W/DOPPLER COMPLETE: CPT

## 2017-10-12 PROCEDURE — 85025 COMPLETE CBC W/AUTO DIFF WBC: CPT | Performed by: EMERGENCY MEDICINE

## 2017-10-12 PROCEDURE — 87631 RESP VIRUS 3-5 TARGETS: CPT | Performed by: HOSPITALIST

## 2017-10-12 PROCEDURE — 25000128 H RX IP 250 OP 636: Performed by: PHYSICIAN ASSISTANT

## 2017-10-12 PROCEDURE — 25000128 H RX IP 250 OP 636: Performed by: EMERGENCY MEDICINE

## 2017-10-12 PROCEDURE — G0378 HOSPITAL OBSERVATION PER HR: HCPCS

## 2017-10-12 PROCEDURE — 96360 HYDRATION IV INFUSION INIT: CPT

## 2017-10-12 PROCEDURE — 93005 ELECTROCARDIOGRAM TRACING: CPT

## 2017-10-12 PROCEDURE — 81003 URINALYSIS AUTO W/O SCOPE: CPT | Performed by: PHYSICIAN ASSISTANT

## 2017-10-12 PROCEDURE — 36415 COLL VENOUS BLD VENIPUNCTURE: CPT | Performed by: EMERGENCY MEDICINE

## 2017-10-12 RX ORDER — PROCHLORPERAZINE MALEATE 5 MG
5 TABLET ORAL EVERY 6 HOURS PRN
Status: DISCONTINUED | OUTPATIENT
Start: 2017-10-12 | End: 2017-10-13 | Stop reason: HOSPADM

## 2017-10-12 RX ORDER — SODIUM CHLORIDE 9 MG/ML
1000 INJECTION, SOLUTION INTRAVENOUS CONTINUOUS
Status: DISCONTINUED | OUTPATIENT
Start: 2017-10-12 | End: 2017-10-12

## 2017-10-12 RX ORDER — LIDOCAINE 40 MG/G
CREAM TOPICAL
Status: DISCONTINUED | OUTPATIENT
Start: 2017-10-12 | End: 2017-10-13 | Stop reason: HOSPADM

## 2017-10-12 RX ORDER — SODIUM CHLORIDE 9 MG/ML
INJECTION, SOLUTION INTRAVENOUS CONTINUOUS
Status: DISCONTINUED | OUTPATIENT
Start: 2017-10-12 | End: 2017-10-13 | Stop reason: HOSPADM

## 2017-10-12 RX ORDER — NITROGLYCERIN 0.4 MG/1
0.4 TABLET SUBLINGUAL EVERY 5 MIN PRN
Status: DISCONTINUED | OUTPATIENT
Start: 2017-10-12 | End: 2017-10-12

## 2017-10-12 RX ORDER — PROCHLORPERAZINE 25 MG
12.5 SUPPOSITORY, RECTAL RECTAL EVERY 12 HOURS PRN
Status: DISCONTINUED | OUTPATIENT
Start: 2017-10-12 | End: 2017-10-13 | Stop reason: HOSPADM

## 2017-10-12 RX ORDER — ONDANSETRON 2 MG/ML
4 INJECTION INTRAMUSCULAR; INTRAVENOUS EVERY 6 HOURS PRN
Status: DISCONTINUED | OUTPATIENT
Start: 2017-10-12 | End: 2017-10-13 | Stop reason: HOSPADM

## 2017-10-12 RX ORDER — LIDOCAINE 40 MG/G
CREAM TOPICAL
Status: DISCONTINUED | OUTPATIENT
Start: 2017-10-12 | End: 2017-10-12

## 2017-10-12 RX ORDER — ONDANSETRON 4 MG/1
4 TABLET, ORALLY DISINTEGRATING ORAL EVERY 6 HOURS PRN
Status: DISCONTINUED | OUTPATIENT
Start: 2017-10-12 | End: 2017-10-13 | Stop reason: HOSPADM

## 2017-10-12 RX ORDER — ACETAMINOPHEN 325 MG/1
650 TABLET ORAL EVERY 4 HOURS PRN
Status: DISCONTINUED | OUTPATIENT
Start: 2017-10-12 | End: 2017-10-13 | Stop reason: HOSPADM

## 2017-10-12 RX ORDER — NITROGLYCERIN 0.4 MG/1
0.4 TABLET SUBLINGUAL EVERY 5 MIN PRN
Status: DISCONTINUED | OUTPATIENT
Start: 2017-10-12 | End: 2017-10-13 | Stop reason: HOSPADM

## 2017-10-12 RX ORDER — MULTIVITAMIN,THERAPEUTIC
1 TABLET ORAL DAILY
COMMUNITY
End: 2017-10-31

## 2017-10-12 RX ADMIN — SODIUM CHLORIDE: 9 INJECTION, SOLUTION INTRAVENOUS at 13:23

## 2017-10-12 RX ADMIN — SODIUM CHLORIDE 1000 ML: 9 INJECTION, SOLUTION INTRAVENOUS at 09:58

## 2017-10-12 ASSESSMENT — ENCOUNTER SYMPTOMS
NECK PAIN: 0
APPETITE CHANGE: 0
BACK PAIN: 0
VOMITING: 0
HEADACHES: 0
LIGHT-HEADEDNESS: 1
COUGH: 0
NAUSEA: 0
ACTIVITY CHANGE: 0
WEAKNESS: 0
FEVER: 0
SHORTNESS OF BREATH: 0
DIARRHEA: 0
NUMBNESS: 0
CHILLS: 0

## 2017-10-12 NOTE — IP AVS SNAPSHOT
MRN:[id number]                      After Visit Summary   10/12/2017    Taty Aguila    MRN: [id number]           Thank you!     Thank you for choosing [facility] for your care. Our goal is always to provide you with excellent care. Hearing back from our patients is one way we can continue to improve our services. Please take a few minutes to complete the written survey that you may receive in the mail after you visit. If you would like to speak to someone directly about your visit please contact Patient Relations at [phone]. Thank you!          Patient Information     Date Of Birth          1945        Designated Caregiver       Most Recent Value    Caregiver    Will someone help with your care after discharge? yes    Name of designated caregiver Tiago Aguila -     Phone number of caregiver [phone]    Caregiver address Same as pt.       About your hospital stay     You were admitted on:  October 12, 2017 You last received care in the:  [facility] Observation Department    You were discharged on:  October 13, 2017        Reason for your hospital stay       Syncope episode in the setting of known mitral valve regurgitation and prolapse                  Who to Call     For medical emergencies, please call 911.  For non-urgent questions about your medical care, please call your primary care provider or clinic, [phone]          Attending Provider     Provider Specialty    Alessandra Mcnair MD Emergency Medicine    Hudson, Germán Contreras MD Internal Medicine       Primary Care Provider Office Phone # Fax #    Anyi Olmos [phone]      After Care Instructions     Activity           Diet                 Follow-up Appointments     Follow-up and recommended labs and tests        Follow up with primary Cardiologist as soon as possible for post hospitalization review.                             Pending Results     No orders found for last  "3 day(s).            Statement of Approval     Ordered          10/13/17 1017  I have reviewed and agree with all the recommendations and orders detailed in this document.  EFFECTIVE NOW     Approved and electronically signed by:  Alan Gama PA-C             Admission Information     Date & Time Provider Department Dept. Phone    10/12/2017 Germán Mckay MD [facility] Observation Department [phone]      Your Vitals Were     Blood Pressure Pulse Temperature Respirations Height Weight    129/73 (BP Location: Right arm) 82 98  F (36.7  C) (Oral) 20 1.676 m (5' 6\") 53.6 kg (118 lb 1.6 oz)    Pulse Oximetry BMI (Body Mass Index)                96% 19.06 kg/m2          MyChart Information     [employer] gives you secure access to your electronic health record. If you see a primary care provider, you can also send messages to your care team and make appointments. If you have questions, please call your primary care clinic.  If you do not have a primary care provider, please call [phone] and they will assist you.        Care EveryWhere ID     This is your Care EveryWhere ID. This could be used by other organizations to access your [place] medical records  [id number]        Equal Access to Services     JAYASHREE PASCAL AH: Janette Robertson, waelise rawls, qaqueenie kaalmada celina, radha colby. So [facility] [phone].    ATENCIÓN: Si habla español, tiene a lara disposición servicios gratuitos de asistencia lingüística. Llame al [phone].    We comply with applicable federal civil rights laws and [place] laws. We do not discriminate on the basis of race, color, national origin, age, disability, sex, sexual orientation, or gender identity.               Review of your medicines      CONTINUE these medicines which have NOT CHANGED        Dose / Directions    aspirin 81 MG tablet        1 tab po QD (Once per day)   Quantity:  100   Refills:  3       " CALCIUM 600+D 600-200 MG-UNIT Tabs   Generic drug:  calcium carbonate-vitamin D        Dose:  1 tablet   Take 1 tablet by mouth 2 times daily   Refills:  0       Glucosamine HCl--750 MG Tabs        Dose:  3000 mg   Take 3,000 mg by mouth daily   Refills:  0       loratadine 10 MG tablet   Commonly known as:  CLARITIN        Dose:  10 mg   Take 10 mg by mouth daily   Refills:  0       MELATONIN PO        Dose:  5 mg   Take 5 mg by mouth nightly as needed   Refills:  0       * multivitamin, therapeutic Tabs tablet        Dose:  1 tablet   Take 1 tablet by mouth daily   Refills:  0       * MULTIVITAMIN TABS   OR        daily   Refills:  0       NONFORMULARY        Dose:  1 tablet   Take 1 tablet by mouth once OTC med called CPM per patient.   Patient states that it is a decongest and antihistamine combo.   Refills:  0       RANITIDINE HCL PO        Dose:  150 mg   Take 150 mg by mouth nightly as needed for heartburn   Refills:  0       * Notice:  This list has 2 medication(s) that are the same as other medications prescribed for you. Read the directions carefully, and ask your doctor or other care provider to review them with you.             Protect others around you: Learn how to safely use, store and throw away your medicines at www.disposemymeds.org.             Medication List: This is a list of all your medications and when to take them. Check marks below indicate your daily home schedule. Keep this list as a reference.      Medications           Morning Afternoon Evening Bedtime As Needed    aspirin 81 MG tablet   1 tab po QD (Once per day)                                CALCIUM 600+D 600-200 MG-UNIT Tabs   Take 1 tablet by mouth 2 times daily   Generic drug:  calcium carbonate-vitamin D                                Glucosamine HCl--750 MG Tabs   Take 3,000 mg by mouth daily                                loratadine 10 MG tablet   Commonly known as:  CLARITIN   Take 10 mg by mouth daily                                 MELATONIN PO   Take 5 mg by mouth nightly as needed                                * multivitamin, therapeutic Tabs tablet   Take 1 tablet by mouth daily                                * MULTIVITAMIN TABS   OR   daily                                NONFORMULARY   Take 1 tablet by mouth once OTC med called CPM per patient.   Patient states that it is a decongest and antihistamine combo.                                RANITIDINE HCL PO   Take 150 mg by mouth nightly as needed for heartburn                                * Notice:  This list has 2 medication(s) that are the same as other medications prescribed for you. Read the directions carefully, and ask your doctor or other care provider to review them with you.

## 2017-10-12 NOTE — PLAN OF CARE
Problem: Patient Care Overview  Goal: Plan of Care/Patient Progress Review  Outcome: Improving  ROOM # 224        Living Situation (if not independent, order SW consult): Independent with .   Facility name: [facility]  : Tiago Aguila      Activity level at baseline: Independent  Activity level on admit: Independent.         Patient registered to observation; given Patient Bill of Rights; given the opportunity to ask questions about observation status and their plan of care.  Patient has been oriented to the observation room, bathroom and call light is in place.     Discussed discharge goals and expectations with patient/family.

## 2017-10-12 NOTE — H&P
[facility] Outpatient / Observation Unit  History and Physical Exam     Taty Aguila MRN# [id number]   YOB: 1945 Age: 72 year old      Date of Admission:  10/12/2017    Primary care provider: Anyi Olmos   Taty Aguila is a 72 year old female with a PMH significant for MVP with (mild to severe - varying degrees noted on ECHO vs cath, followed by Cardiology), who presented to the Emergency Room with an episode of syncope.     Work up in the ED reveals: stable vitals with BP of 120//66, pulse 80-85, oxygen sat % on RA, temp 97.6. BMP within normal limits. CBC also normal with hgb of 12.2. Troponin <0.015. 12 lead showed SR with VR of 64. CXR negative. The patient was given 1L IVF bolus and remained stable throughout her ER stay.     Patient will be registered to Observation for further work-up and evaluation.     1. Syncope - patient has had a cold (head congestion, cough, fatigue, chills) for the past 2-3 days. She woke this AM feeling weak and diaphoretic. She woke and went down to the kitchen for coffee and had a syncopal episode, witnessed by her . She felt lightheaded and like her heart was beating slow prior to the syncope. Was confused for a few minutes following this episode. No seizure activity noted by . She checked her BP after passing out and noted it was 90/40, this remained low even when EMS arrived and she was given 1L bolus with improvement if her BP, which has been stable since. Currently feels at baseline.   -- syncope possibly related to recent viral process. Will check a flu swab and UA. Continue gentle IVF.   -- in light of her MVP/MR will monitor on telemetry, obtain ECHO (she was due for her repeat ECHO last week). Consider Cardiology consult pending her course.     2. MVP/MR - monitored by Cardiology. Currently not affecting her activity - she exercises 3-5 days a week. BP controlled with diet and exercise, goal to keep BP </= 125/75.           Plan     1. Registered to Observation  2. Continue telemetry monitoring. UA and influenza still pending.   3. Orthostatic vitals on admission (if not performed already).   4. IV hydration with Normal saline, @, 50 ml/hr   5. Obtain ECHO to re-evaluate for valves and LV function  6. Consider physical therapy for gait and mobility assessment  7. Consider Cardiology consult - she is normally followed by Dr. Stone and Cyndi for her mitral valve  8. DVT prophylaxis: pt at low risk, encourage ambulation   9. FULL CODE  10. Expect discharge in 1-2 days pending course                Chief Complaint:   Near syncopal/syncopal episode         History of Present Illness:     Taty Aguila is a 72 year old female with a PMH significant for MVP with (mild to severe - varying degrees noted on ECHO vs cath, followed by Cardiology), who presented to the Emergency Room with an episode of syncope. Patient has had a cold (head congestion, cough, fatigue, chills) for the past 2-3 days. She woke this AM feeling weak and diaphoretic. After she woke she got up and went down to the kitchen for coffee and had a syncopal episode, witnessed by her . She felt lightheaded and like her heart was beating slow prior to the syncope. Was confused for a few minutes following this episode. No seizure activity noted by . She checked her BP after passing out and noted it was 90/40, this remained low even when EMS arrived and she was given 1L bolus with improvement if her BP, which has been stable since. Currently feels at baseline.     Work up in the ED reveals: stable vitals with BP of 120//66, pulse 80-85, oxygen sat % on RA, temp 97.6. BMP within normal limits. CBC also normal with hgb of 12.2. Troponin <0.015. 12 lead showed SR with VR of 64. CXR negative. The patient was given 1L IVF bolus and remained stable throughout her ER stay.              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 Showed normal coronary arteries and mild MR.           Social History:     Social History     Social History     Marital status:      Spouse name: N/A     Number of children: 3     Years of education: N/A     Occupational History     Not on file.     Social History Main Topics     Smoking status: Never Smoker     Smokeless tobacco: Never Used     Alcohol use Yes      Comment: beer with dinner     Drug use: No     Sexual activity: Not Currently     Other Topics Concern     Caffeine Concern No     1 cup of coffee and 1 can diet coke per day     Sleep Concern Yes     takes Doxylamine succinate 1/2 tab every night     Stress Concern No     Weight Concern No     Special Diet No     healthy      Exercise Yes     walking 45min x7 a wk. Very active, [employer], Golf, Bowling, Cardio     Seat Belt Yes     Self-Exams Yes     Social History Narrative               Family History:     Family History   Problem Relation Age of Onset     OSTEOPOROSIS Mother      Alzheimer Disease Mother      Family History Negative Father      HEART DISEASE Maternal Grandfather      Family History Negative Paternal Grandmother      Family History Negative Paternal Grandfather               Allergies:      Allergies   Allergen Reactions     No Known Drug Allergies                Medications:     Prior to Admission medications    Medication Sig Last Dose Taking? Auth Provider   MELATONIN PO   Yes Reported, Patient   Glucosamine HCl--750 MG TABS Take 3,000 mg by mouth daily  Yes Reported, Patient   loratadine  (CLARITIN) 10 MG tablet Take 10 mg by mouth daily  Yes Reported, Patient   CALTRATE 600 + D 600-200 MG-IU OR TABS 2 TABLETs DAILY  Yes Reported, Patient   RANITIDINE 75 MG OR TABS 2 tabs at hs prn  Yes Reported, Patient   MULTIVITAMIN TABS   OR daily  Yes Sarai Roy MD   ASPIRIN 81 MG OR TABS 1 tab po QD (Once per day)  Yes               Review of Systems:   A Comprehensive greater than 10 system review of systems was carried out.  Pertinent positives and negatives are noted above.  Otherwise negative for contributory information.     C: NEGATIVE for change in weight, has had chills and fatigue the past few days  E/M: NEGATIVE for mouth and throat problems, has had nasal congestion  R: NEGATIVE for significant SHORTNESS OF BREATH, has had a cough for the past 2-3 days  CV: NEGATIVE for chest pain, palpitations or peripheral edema, positive for syncope  GI: NEGATIVE for nausea, abdominal pain, heartburn, or change in bowel habits  MUSCULOSKELETAL: NEGATIVE for significant arthralgias, has had myalgias  NEURO: NEGATIVE for weakness, dizziness or paresthesias  ENDOCRINE: NEGATIVE for temperature intolerance, skin/hair changes  HEME/ALLERGY/IMMUNE: NEGATIVE for bleeding problems             Physical Exam:   Blood pressure 120/73, pulse 71, temperature 97.6  F (36.4  C), temperature source Oral, resp. rate 16, weight 54.4 kg (120 lb), SpO2 98 %.    GENERAL: healthy, alert and no distress  EYES: Eyes grossly normal to inspection, PERRL, conjunctivae and sclerae normal and lids and lashes normal  HENT: ear canals- normal; TMs- normal; Nose- normal; Mouth- no ulcers, no lesions. ORAL MUCOSA: mildly dry  NECK: no tenderness, no adenopathy, no asymmetry, no masses, no stiffness; thyroid- normal to palpation  RESP: lungs clear to auscultation - no rales, no rhonchi, no wheezes  CV: regular rates and rhythm, normal S1 S2, no S3 or S4 and peripheral pulses strong  ABDOMEN: soft, no tenderness, no  hepatosplenomegaly,  no masses, normal bowel sounds  MS: extremities- no gross deformities noted, no peripheral edema  SKIN: no suspicious lesions, no rashes  NEURO: Normal strength and tone, sensory exam grossly normal, mentation intact and speech normal  PSYCH: Alert and oriented times 3; coherent speech, normal rate and volume, able to articulate logical thoughts, able to abstract reason, no tangential thoughts, no hallucinations or delusions. Affect is normal.           Data:     EKG demonstrates:  appears normal, NSR, normal axis, normal intervals, no acute ST/T changes c/w ischemia, no LVH by voltage criteria, unchanged from previous tracings.      Recent Labs  Lab 10/12/17  1005   WBC 3.2*   HGB 12.2   HCT 37.8   MCV 96             Lab Results   Component Value Date     10/12/2017     10/05/2016     10/05/2016    Lab Results   Component Value Date    CHLORIDE 106 10/12/2017    CHLORIDE 106 10/05/2016    CHLORIDE 105 08/15/2016    Lab Results   Component Value Date    BUN 9 10/12/2017    BUN 12 10/05/2016    BUN 11 08/15/2016      Lab Results   Component Value Date    POTASSIUM 4.1 10/12/2017    POTASSIUM 3.4 10/05/2016    POTASSIUM 3.5 10/05/2016    Lab Results   Component Value Date    CO2 27 10/12/2017    CO2 28 10/05/2016    CO2 25 08/15/2016    Lab Results   Component Value Date    CR 0.90 10/12/2017    CR 0.78 10/05/2016    CR 0.75 08/15/2016        No results for input(s): CULT in the last 168 hours.    Recent Labs  Lab 10/12/17  1005      POTASSIUM 4.1   CHLORIDE 106   CO2 27   ANIONGAP 5   *   BUN 9   CR 0.90   GFRESTIMATED 62   GFRESTBLACK 75   ARIELLE 8.3*     No results for input(s): NTBNPI, NTBNP in the last 168 hours.  No results for input(s): DD in the last 168 hours.  No results for input(s): INR in the last 168 hours.  No results for input(s): LACT in the last 168 hours.  No results for input(s): LIPASE in the last 168 hours.  No results for input(s): TSH in the last 168  hours.    Recent Labs  Lab 10/12/17  1005   TROPI <0.015     No results for input(s): COLOR, APPEARANCE, URINEGLC, URINEBILI, URINEKETONE, SG, UBLD, URINEPH, PROTEIN, UROBILINOGEN, NITRITE, LEUKEST, RBCU, WBCU in the last 168 hours.      Recent Results (from the past 48 hour(s))   XR Chest 2 Views    Narrative    XR CHEST 2 VW 10/12/2017 10:46 AM    HISTORY: Symptoms of upper respiratory infection.    COMPARISON: 10/5/2016    FINDINGS: No airspace consolidation, pleural effusion or pneumothorax.  Stable heart size.      Impression    IMPRESSION: No evidence of pneumonia.    MD Lexie ACUNA PA-C

## 2017-10-12 NOTE — ED NOTES
Pt c/o feeling dizzy.  Pt was walking from kitchen to living room and had a syncopal episode.  Pt did hit head, (but not hard-per pt).   was there and called 911.pt still feels dizzy at this time.  Blood pressures in the 80's per EMS, however came up with fluids to 100's systolic.

## 2017-10-12 NOTE — ED PROVIDER NOTES
"  History     Chief Complaint:  Syncope    HPI   Taty Aguila is a 72 year old female who presents with loss of consciousness. This morning, she woke up and felt a little more tired and weak than normal, but was still getting ready to walk the dog, as she does every morning, when she suddenly felt lightheaded and felt like her heart \"was beating slow.\" She then experienced a syncopal episode. The patient believes she lost consciousness because she did not know where she was when she came to. She states she hit her head on a soft rug, but it did not hit hard and she does not have a headache following the episode. Her  reports she seemed confused after the fall, and called EMS who arrived on the scene and brought the patient to the ED for evaluation. He notes she was still confused when EMS arrived. Her blood pressure was low (80 systolic) in the ambulance, but is currently 120 systolic. The only other time she has experienced syncope was about 30 years ago, and a doctor told her it may have resulted from hypoglycemia. The patient denies any fever, chest pain, dyspnea, numbness, weakness, neck pain, back pain, headache, ear pain, sore throat, or diarrhea. She denies traveling in the past 3 months or change in activity. The patient reports having a cold the last few days with sore throat, cough and some congestion, but yesterday she ate three meals, drank plenty of fluids, and was feeling better. The patient sees Dr. Olmos for primary care at [facility]. She states she had an angiogram performed last October prior to a mitral valve replacement surgery. The surgery ended up being canceled since she is active with no symptoms.    CARDIAC RISK FACTORS:  Sex:    Female  Tobacco:   No  Hypertension:   No  Hyperlipidemia:  No  Diabetes:   No  Family History:  No    PE/DVT RISK FACTORS:  Sex:    Female  Hormones:   No  Tobacco:   No  Cancer:   No  Travel:   No  Surgery:   No  Other immobilization: No  Personal " history:  No  Family history:  No    Allergies:  No known drug allergies    Medications:    Melatonin  Glucosamine  Claritin  Caltrate  Ranitidine  Multivitamin  Aspirin    Past Medical History:    Allergic rhinitis  Colitis  Mitral regurgitation  Mitral valve prolapse    Past Surgical History:    Angiogram    Family History:    Osteoporosis  Alzheimer disease    Social History:  Smoking status: No  Alcohol use: No  Presents to ED via EMS  Marital Status:   [2]    Review of Systems   Constitutional: Negative for activity change, appetite change, chills and fever.   HENT: Negative for congestion.    Respiratory: Negative for cough and shortness of breath.    Cardiovascular: Negative for chest pain.   Gastrointestinal: Negative for diarrhea, nausea and vomiting.   Musculoskeletal: Negative for back pain and neck pain.   Neurological: Positive for syncope and light-headedness. Negative for weakness, numbness and headaches.   All other systems reviewed and are negative.      Physical Exam     Patient Vitals for the past 24 hrs:   BP Temp Temp src Pulse Heart Rate Resp SpO2 Height Weight   10/12/17 1206 - - - - - - 98 % - -   10/12/17 1205 - - - - - - 97 % - -   10/12/17 1058 120/73 - - - - - 99 % - -   10/12/17 1015 - - - - 80 - 96 % - -   10/12/17 1014 - - - - 85 - 97 % - -   10/12/17 0940 122/66 97.6  F (36.4  C) Oral 71 - 16 100 % - -   10/12/17 0937 - - - - - - 98 % - -   10/12/17 0936 122/66 - - - - - - - -   10/12/17 0934 - - - - - - - - 54.4 kg (120 lb)     Physical Exam  Constitutional:  Well developed, Well nourished, elderly female, comfortable appearing in no acute distress   HENT:  Bilateral external ears normal, Nose normal. Neck- Normal range of motion, Supple, dry mucous membranes, posterior pharynx is unremarkable, no contusions or tenderness over the scalp  Eyes: PERRL, EOMI, conjunctivae unremarkable  Respiratory:  Normal breath sounds, No respiratory distress, No wheezing,  Cardiovascular:   Normal heart rate, Normal rhythm, No murmurs,    GI:  Bowel sounds normal, Soft, No tenderness,   Musculoskeletal:  Intact distal pulses, No edema, grossly unremarkable range of motion, no calf tenderness, no C-spine tenderness or pain with range of motion, meets NEXUS criteria  Integument:  Warm, Dry   Neurological: Alert, attentive and oriented to person, place and time  Cranial nerves 2-12 intact;   5/5 strength throughout the upper and lower extremities;   Sensation intact to light touch throughout the upper and lower extremities;   Psychiatric:  Mood and affect normal.     Emergency Department Course   ECG (09:39:33):  Rate 64 bpm. MN interval 138. QRS duration 88. QT/QTc 432/445. P-R-T axes 27 82 19. Normal sinus rhythm. Normal ECG. Interpreted at 1015 by Alessandra Mcnair MD.    Imaging:  Radiographic findings were communicated with the patient who voiced understanding of the findings.    XR Chest 2 Views:  No evidence of pneumonia.  As read by Radiology.    Laboratory:  CBC: WBC 3.2 (L), o/w WNL (HGB 12.2, )   BMP: Glucose 109 (H), Calcium 8.3 (L), o/w WNL (Creatinine 0.90)  Troponin: <0.015    Interventions:  0958: NS 1L IV Bolus    Emergency Department Course:  The patient arrived in the emergency department via EMS.  Past medical records, nursing notes, and vitals reviewed.  0939: I performed an exam of the patient and obtained history, as documented above.  ECG performed, results above.  IV inserted and blood drawn.  The patient was sent for a chest x-ray while in the emergency department, findings above.    1156: I spoke to CHRISTIANO London for Dr. Mckay of the hospitalist service who accepts the patient for admission.     1201: I rechecked the patient. Explained findings to the patient and her .    Findings and plan explained to the patient who consents to admission.   Discussed the patient with CHRISTIANO London for Dr. Mckay, who will admit the patient to an observation bed for further monitoring,  evaluation, and treatment.     Impression & Plan      Medical Decision Making:  Taty Aguila is a 72 year old female who presents with a history and clinical exam consistent with syncope. There is not a clear etiology given the history of this patient, so I considered a broad differential for their syncope today including cardiac arrhythmia, ACS, aortic stenosis or other acute valvular dysfunction, HOCM, PE, orthostatic hypotension, drugs, medication side effect, situational, carotid hypersensitivity, seizure, TIA, stroke, cerebral insufficiency, vasovagal, etc. Patient has no signs at this point of an acute stroke, PE, dissection, acute coronary syndrome.  Given lack of a clear etiology, patients age, risk factors and subjective bradycardia would admit patient for further evaluation on telemetry to the medicine team.  Patient and her  agree with plan.    Diagnosis:    ICD-10-CM   1. Syncope, unspecified syncope type R55     Disposition: Admitted to observation    Sharda Maldonado  10/12/2017   [facility] EMERGENCY DEPARTMENT    I, Sharda Maldonado, am serving as a scribe at 9:39 AM on 10/12/2017 to document services personally performed by Alessandra Mcnair MD based on my observations and the provider's statements to me.        Alessandra Mcnair MD  10/12/17 1433

## 2017-10-12 NOTE — ED NOTES
Bed: ED24  Expected date: 10/12/17  Expected time: 9:23 AM  Means of arrival: Ambulance  Comments:  BV- 73y, syncope

## 2017-10-12 NOTE — PLAN OF CARE
Problem: Patient Care Overview  Goal: Plan of Care/Patient Progress Review  Outcome: No Change  PRIMARY DIAGNOSIS: SYNCOPE  OUTPATIENT/OBSERVATION GOALS TO BE MET BEFORE DISCHARGE:  1. Orthostatic performed: No, will do.     2. Diagnostic testing complete & at baseline neurologic testing: Yes, echo completed. Awaiting results.      3. Cleared by consultants (if involved): N/A     4. Interpretation of cardiac rhythm per telemetry tech: SR     5. Tolerating adequate PO diet and medications: Yes     6. Return to near baseline physical activity or neurologic status: Yes     VSS. Will complete orthos, Awaiting results of influenza swab. Pt states feels back to baseline.   Discharge Planner Nurse   Safe discharge environment identified: Yes  Barriers to discharge: No       Entered by: Vandana Johnson 10/12/2017 4:52 PM     Please review provider order for any additional goals.   Nurse to notify provider when observation goals have been met and patient is ready for discharge.

## 2017-10-12 NOTE — ED NOTES
[facility]  ED Nurse Handoff Report    Taty Aguila is a 72 year old female   ED Chief complaint: Loss of Consciousness  . ED Diagnosis:   Final diagnoses:   Syncope, unspecified syncope type     Allergies:   Allergies   Allergen Reactions     No Known Drug Allergies        Code Status: Full Code  Activity level - Baseline/Home:  Independent. Activity Level - Current:   Independent. Lift room needed: No. Bariatric: No   Needed: No   Isolation: No. Infection: Not Applicable.     Vital Signs:   Vitals:    10/12/17 1015 10/12/17 1058 10/12/17 1205 10/12/17 1206   BP:  120/73     Pulse:       Resp:       Temp:       TempSrc:       SpO2: 96% 99% 97% 98%   Weight:           Cardiac Rhythm:  ,   Cardiac  Cardiac Rhythm: Normal sinus rhythm  Pain level: 0-10 Pain Scale: 0  Patient confused: No. Patient Falls Risk: Yes.   Elimination Status: Unable to void   Patient Report - Initial Complaint: syncope Focused Assessment: This morning, she woke up and felt a little more tired and weak than normal, but was still getting ready to walk the dog, as she does every morning, when she suddenly felt lightheaded and felt like her heart was beating slow. She then experienced a syncopal episode. The patient believes she lost consciousness because she did not know where she was when she came to. She states she hit her head on a soft rug, but it did not hit hard and she does not have a headache following the episode. Her  reports she seemed confused after the fall, and called EMS who arrived on the scene and brought the patient to the ED for evaluation. He notes she was still confused when EMS arrived. Her blood pressure was low (80 systolic) in the ambulance, but is currently 120 systolic. The only other time she has experienced syncope was about 30 years ago, and a doctor told her it may have resulted from hypoglycemia. The patient denies any fever, chest pain, dyspnea, numbness, weakness, neck pain,  back pain, headache, ear pain, sore throat, or diarrhea. She denies traveling in the past 3 months or change in activity. The patient reports having a cold the last few days with sore throat, cough and some congestion, but yesterday she ate three meals, drank plenty of fluids, and was feeling better. The patient sees Dr. Olmos for primary care at [facility]. She states she had an angiogram performed last October prior to a mitral valve replacement surgery. The surgery ended up being canceled since she is active with no symptoms.  Cardiac Chest Pain Assessment - Rating (0-10): 0  Cardiac Monitoring - EKG Monitoring: Yes Cardiac Regularity: Regular Cardiac Rhythm: NSR     Neurological Cognitive/Perceptual/Neuro - LUE Sensation: no tingling; no numbness RUE Sensation: no numbness; no tingling LLE Sensation: no numbness; no tingling RLE Sensation: no numbness; no tingling  Evelyn Coma Scale - Best Eye Response: 4-->(E4) spontaneous Best Motor Response: 6-->(M6) obeys commands Best Verbal Response: 5-->(V5) oriented Evelyn Coma Scale Score: 15     Respiratory Respiratory - Respiratory WDL: WDL; all Lung Fields: All Fields Throughout All Lung Fields: Anterior:; clear     Tests Performed: labs, xray. Abnormal Results:    Labs Ordered and Resulted from Time of ED Arrival Up to the Time of Departure from the ED   BASIC METABOLIC PANEL - Abnormal; Notable for the following:        Result Value    Glucose 109 (*)     Calcium 8.3 (*)     All other components within normal limits   CBC WITH PLATELETS DIFFERENTIAL - Abnormal; Notable for the following:     WBC 3.2 (*)     All other components within normal limits   TROPONIN I   UA MACROSCOPIC WITH REFLEX TO MICRO AND CULTURE   CARDIAC CONTINUOUS MONITORING       Treatments provided: fluids Family Comments:  went home  OBS brochure/video discussed/provided to patient:  Yes  ED Medications:   Medications   0.9% sodium chloride BOLUS (0 mLs Intravenous Stopped 10/12/17  1206)     Followed by   0.9% sodium chloride infusion (not administered)     Drips infusing:  No  For the majority of the shift, the patient's behavior Green. Interventions performed were na.     Severe Sepsis OR Septic Shock Diagnosis Present: No      ED Nurse Name/Phone Number: Vianca Lopez,   12:07 PM    RECEIVING UNIT ED HANDOFF REVIEW    Above ED Nurse Handoff Report was reviewed: Yes  Reviewed by: Marisol Monae on October 12, 2017 at 12:41 PM

## 2017-10-12 NOTE — PHARMACY-ADMISSION MEDICATION HISTORY
Admission medication history interview status for this patient is complete. See [facility] admission navigator for allergy information, prior to admission medications and immunization status.     Medication history interview source(s):Patient  Medication history resources (including written lists, pill bottles, clinic record):None  Primary pharmacy:[facility]    Changes made to PTA medication list:  Added: CPM  Deleted:   Changed: Calcium, ranitidine, melatonin    Actions taken by pharmacist (provider contacted, etc):sticky note for MD     Additional medication history information:None    Medication reconciliation/reorder completed by provider prior to medication history? No    Do you take OTC medications (eg tylenol, ibuprofen, fish oil, eye/ear drops, etc)? Y(Y/N)    For patients on insulin therapy: N (Y/N)        Prior to Admission medications    Medication Sig Last Dose Taking? Auth Provider   calcium carbonate-vitamin D (CALCIUM 600+D) 600-200 MG-UNIT TABS Take 1 tablet by mouth 2 times daily 10/11/2017 at Unknown time Yes Unknown, Entered By History   multivitamin, therapeutic (THERA-VIT) TABS tablet Take 1 tablet by mouth daily 10/11/2017 at Unknown time Yes Unknown, Entered By History   RANITIDINE HCL PO Take 150 mg by mouth nightly as needed for heartburn Past Week at Unknown time Yes Unknown, Entered By History   NONFORMULARY Take 1 tablet by mouth once OTC med called CPM per patient.   Patient states that it is a decongest and antihistamine combo. 10/12/2017 at Unknown time Yes Unknown, Entered By History   MELATONIN PO Take 5 mg by mouth nightly as needed  10/11/2017 at Unknown time Yes Reported, Patient   Glucosamine HCl--750 MG TABS Take 3,000 mg by mouth daily 10/11/2017 at Unknown time Yes Reported, Patient   loratadine (CLARITIN) 10 MG tablet Take 10 mg by mouth daily 10/11/2017 at Unknown time Yes Reported, Patient   MULTIVITAMIN TABS   OR daily  Yes Sarai Roy MD   ASPIRIN 81 MG OR TABS 1  tab po QD (Once per day) 10/11/2017 at Unknown time Yes

## 2017-10-12 NOTE — IP AVS SNAPSHOT
[facility] Observation Department    [address]    [facility] [address]    Phone:  [phone]                                       After Visit Summary   10/12/2017    Taty Aguila    MRN: [id number]           After Visit Summary Signature Page     I have received my discharge instructions, and my questions have been answered. I have discussed any challenges I see with this plan with the nurse or doctor.    ..........................................................................................................................................  Patient/Patient Representative Signature      ..........................................................................................................................................  Patient Representative Print Name and Relationship to Patient    ..................................................               ................................................  Date                                            Time    ..........................................................................................................................................  Reviewed by Signature/Title    ...................................................              ..............................................  Date                                                            Time

## 2017-10-13 ENCOUNTER — CARE COORDINATION (OUTPATIENT)
Dept: CARDIOLOGY | Facility: CLINIC | Age: 72
End: 2017-10-13

## 2017-10-13 VITALS
HEART RATE: 82 BPM | DIASTOLIC BLOOD PRESSURE: 73 MMHG | OXYGEN SATURATION: 96 % | TEMPERATURE: 98 F | RESPIRATION RATE: 20 BRPM | SYSTOLIC BLOOD PRESSURE: 129 MMHG | HEIGHT: 66 IN | BODY MASS INDEX: 18.98 KG/M2 | WEIGHT: 118.1 LBS

## 2017-10-13 LAB — LACTATE BLD-SCNC: 0.6 MMOL/L (ref 0.7–2)

## 2017-10-13 PROCEDURE — 96361 HYDRATE IV INFUSION ADD-ON: CPT

## 2017-10-13 PROCEDURE — 36415 COLL VENOUS BLD VENIPUNCTURE: CPT | Performed by: HOSPITALIST

## 2017-10-13 PROCEDURE — 83605 ASSAY OF LACTIC ACID: CPT | Performed by: HOSPITALIST

## 2017-10-13 PROCEDURE — 99217 ZZC OBSERVATION CARE DISCHARGE: CPT | Performed by: PHYSICIAN ASSISTANT

## 2017-10-13 PROCEDURE — G0378 HOSPITAL OBSERVATION PER HR: HCPCS

## 2017-10-13 NOTE — PROGRESS NOTES
Pt asked if IVF could be stopped during night. Orthos negative, PT tolerating PO intake. PA stated okay to stop fluids.

## 2017-10-13 NOTE — PLAN OF CARE
Problem: Patient Care Overview  Goal: Plan of Care/Patient Progress Review  Outcome: Improving  PRIMARY DIAGNOSIS: SYNCOPE  OUTPATIENT/OBSERVATION GOALS TO BE MET BEFORE DISCHARGE:  1. Orthostatic performed: Yes, negative      2. Diagnostic testing complete & at baseline neurologic testing: Yes, echo completed. Awaiting results.       3. Cleared by consultants (if involved): N/A      4. Interpretation of cardiac rhythm per telemetry tech: SR (sinus tach while ambulating on floor)      5. Tolerating adequate PO diet and medications: Yes      6. Return to near baseline physical activity or neurologic status: Yes      VSS. Orthos negative, Influenza swab negative.  Echo resulted, some changes noted. PA aware. Pt states feels back to baseline.   Discharge Planner Nurse   Safe discharge environment identified: Yes  Barriers to discharge: No       Entered by: Vandana Johnson 10/12/2017 4:52 PM     Please review provider order for any additional goals.   Nurse to notify provider when observation goals have been met and patient is ready for discharge.

## 2017-10-13 NOTE — PROGRESS NOTES
Ambulated w/ Pt in hallway.  Pt is independent.  Pt had no complaints of SOB, lightheadedness, or dizziness.

## 2017-10-13 NOTE — DISCHARGE SUMMARY
[facility] Observation Unit Discharge Summary          Taty Aguila MRN# [id number]   Age: 72 year old YOB: 1945     Date of Admission:  10/12/2017  Date of Discharge::  10/13/2017  Admitting Physician:  Germán Mckay MD  Discharge Physician:  Alan Gama PA-C  Primary Physician: Anyi Olmos     Primary Discharge Diagnoses:   Syncope     Secondary Discharge Diagnoses:   Mitral valve prolapse and mitral valve regurgitation     Hospital Course:   For detail history, please refer to H & P from 10/12/2017. In brief, Mrs Aguila is a 72 year old woman with a PMH significant for MVP with (mild to severe - varying degrees noted on ECHO vs cath, followed by Cardiology), who presented to the Emergency Room with an episode of syncope.  Blood laboratory work ups were unremarkable and Chest X-ray was within normal limits.      An echocardiogram was performed and showed LV EF=55-60%,moderate to severe mitral regurgitation, and small pericardial effusion without cardiac tamponade physiology.  Telemetry monitor showed sinus rhythm.  Orthostatic test was negative.  She remained hemodynamically stable and oxygenating well on room air prior to dismissal.    She is to follow up with her Primary Cardiologist as soon as possible to review post hospitalization care plan.     Procedures/Imaging:     Results for orders placed or performed during the hospital encounter of 10/12/17   XR Chest 2 Views    Narrative    XR CHEST 2 VW 10/12/2017 10:46 AM    HISTORY: Symptoms of upper respiratory infection.    COMPARISON: 10/5/2016    FINDINGS: No airspace consolidation, pleural effusion or pneumothorax.  Stable heart size.      Impression    IMPRESSION: No evidence of pneumonia.    DARRIUS AMADOR MD         Consultations:   None    Code Status:   Full Code    Allergies:     Allergies   Allergen Reactions     No Known Drug Allergies         Subjective:   Feeling much better today.  No recurrent symptoms and no  "acute event overnight.  Telemetry monitor was unrevealing to her presenting symptoms.      Physical Exam:   /73 (BP Location: Left arm)  Pulse 82  Temp 95.7  F (35.4  C) (Oral)  Resp 16  Ht 1.676 m (5' 6\")  Wt 53.6 kg (118 lb 1.6 oz)  SpO2 95%  BMI 19.06 kg/m2  Temp (24hrs), Av.6  F (36.4  C), Min:95.7  F (35.4  C), Max:99  F (37.2  C)    Blood pressure 115/73, pulse 82, temperature 95.7  F (35.4  C), temperature source Oral, resp. rate 16, height 1.676 m (5' 6\"), weight 53.6 kg (118 lb 1.6 oz), SpO2 95 %.  General: Alert, interactive, NAD  HEENT: AT/NC, sclera anicteric, PERRL, EOMI, OP clear with MMM  Neck: Supple, no JVD or cervical LAD  Resp: clear to auscultation bilaterally, no crackles or wheezes  Cardiac: regular rate and rhythm. Systolic murmurs appreciated at the left sternal border and pronounced at apex.  Abdomen: Soft, nontender, nondistended. +BS.  No HSM or masses, no rebound or guarding.  Extremities: No LE edema  Skin: Warm and dry, no jaundice or rash  Neuro: Alert & oriented x 3, Cns 2-12 intact, moves all extremities equally     Discharge Medicatios:        Current Discharge Medication List      CONTINUE these medications which have NOT CHANGED    Details   calcium carbonate-vitamin D (CALCIUM 600+D) 600-200 MG-UNIT TABS Take 1 tablet by mouth 2 times daily      !! multivitamin, therapeutic (THERA-VIT) TABS tablet Take 1 tablet by mouth daily      RANITIDINE HCL PO Take 150 mg by mouth nightly as needed for heartburn      NONFORMULARY Take 1 tablet by mouth once OTC med called CPM per patient.   Patient states that it is a decongest and antihistamine combo.      MELATONIN PO Take 5 mg by mouth nightly as needed       Glucosamine HCl--750 MG TABS Take 3,000 mg by mouth daily      loratadine (CLARITIN) 10 MG tablet Take 10 mg by mouth daily      !! MULTIVITAMIN TABS   OR daily  Refills: 0      ASPIRIN 81 MG OR TABS 1 tab po QD (Once per day)  Qty: 100, Refills: 3       !! - " Potential duplicate medications found. Please discuss with provider.          Instructions Given to Patient as Discharge:   No discharge procedures on file.    Pending Tests at Discharge:   None    Discharge Disposition:   Discharge to home.     Alan aGma PA-C  Hospitalist  Pager: [phone]      45 minutes was spent in discharge planning, care coordination, physical examination and medication reconciliation on the date of discharge.

## 2017-10-13 NOTE — PROGRESS NOTES
Infection Prevention:    Droplet precautions discontinued, standard precautions sufficient. Please contact Infection Prevention with any questions/concerns at *[phone].    Jeanne Tracey, ICP

## 2017-10-13 NOTE — PLAN OF CARE
Problem: Patient Care Overview  Goal: Plan of Care/Patient Progress Review  Patient's After Visit Summary was reviewed with patient and    Patient verbalized understanding of After Visit Summary, recommended follow up and was given an opportunity to ask questions.   Discharge medications sent home with patient/family: Not applicable   Discharged with spouse     OBSERVATION patient END time: 1127

## 2017-10-13 NOTE — PLAN OF CARE
Problem: Patient Care Overview  Goal: Plan of Care/Patient Progress Review  OUTPATIENT/OBSERVATION GOALS TO BE MET BEFORE DISCHARGE:  1. Orthostatic performed: Yes, negative      2. Diagnostic testing complete & at baseline neurologic testing: Yes, echo completed. PA to go over results with pt in the morning      3. Cleared by consultants (if involved): N/A      4. Interpretation of cardiac rhythm per telemetry tech: SR w/ freq PVCs (HR 83) Trigem/Bigem/couplet      5. Tolerating adequate PO diet and medications: Yes      6. Return to near baseline physical activity or neurologic status: Yes   cares.  Discharge Planner Nurse   Safe discharge environment identified: Yes  Barriers to discharge: No          Please review provider order for any additional goals.         Nurse to notify provider when observation goals have been met and patient is ready for discharge.     Patient has denied dizziness overnight. Will do orthos and walk. Likely discharge today.

## 2017-10-13 NOTE — PLAN OF CARE
Problem: Patient Care Overview  Goal: Plan of Care/Patient Progress Review  Outcome: No Change  OUTPATIENT/OBSERVATION GOALS TO BE MET BEFORE DISCHARGE:  1. Orthostatic performed: Yes, negative      2. Diagnostic testing complete & at baseline neurologic testing: Yes, echo completed. PA to go over results with pt in the morning      3. Cleared by consultants (if involved): N/A      4. Interpretation of cardiac rhythm per telemetry tech: SR w/ freq PVCs (HR 83) Trigem/Bigem/couplet      5. Tolerating adequate PO diet and medications: Yes      6. Return to near baseline physical activity or neurologic status: Yes    VSS. Orthos negative, Influenza swab negative.  Echo resulted, some changes noted. PA aware and to go over w/ pt in a.m. Pt states feels back to baseline. Will continue to monitor and provide supportive cares.  Discharge Planner Nurse   Safe discharge environment identified: Yes  Barriers to discharge: No         Please review provider order for any additional goals.   Nurse to notify provider when observation goals have been met and patient is ready for discharge.

## 2017-10-13 NOTE — PROGRESS NOTES
patient advised to follow up with Dr. Stone regarding recent Echo findings - patient stayed overnight in the observation unit and had an echo completed - new changes noted on echo and patient was advised to follow up with Dr. Stone.. Able to get the patient in with Dr. Stone 10/31/2017 - patient satisfied with this date and agreed to contact our clinic in the interim if new symptoms or concerns arise. No other tasks to be done at this time. Goldie Jimenez

## 2017-10-13 NOTE — PROGRESS NOTES
OUTPATIENT/OBSERVATION GOALS TO BE MET BEFORE DISCHARGE:  1. Orthostatic performed: Yes, negative      2. Diagnostic testing complete & at baseline neurologic testing: Yes, echo completed. PA to go over results with pt in the morning      3. Cleared by consultants (if involved): N/A      4. Interpretation of cardiac rhythm per telemetry tech: SR w/ freq PVCs (HR 83) Trigem/Bigem/couplet      5. Tolerating adequate PO diet and medications: Yes      6. Return to near baseline physical activity or neurologic status: Yes      Temp 99.0, otherwise vitals stable. Orthos negative, Influenza swab negative.  Echo resulted, some changes noted. PA aware and to go over w/ pt in a.m. Pt states feels back to baseline.   Discharge Planner Nurse   Safe discharge environment identified: Yes  Barriers to discharge: No             Please review provider order for any additional goals.   Nurse to notify provider when observation goals have been met and patient is ready for discharge.

## 2017-10-25 ENCOUNTER — PRE VISIT (OUTPATIENT)
Dept: CARDIOLOGY | Facility: CLINIC | Age: 72
End: 2017-10-25

## 2017-10-31 ENCOUNTER — OFFICE VISIT (OUTPATIENT)
Dept: CARDIOLOGY | Facility: CLINIC | Age: 72
End: 2017-10-31
Payer: COMMERCIAL

## 2017-10-31 VITALS
SYSTOLIC BLOOD PRESSURE: 132 MMHG | DIASTOLIC BLOOD PRESSURE: 80 MMHG | HEIGHT: 66 IN | WEIGHT: 119.6 LBS | HEART RATE: 89 BPM | BODY MASS INDEX: 19.22 KG/M2

## 2017-10-31 DIAGNOSIS — I34.0 MITRAL VALVE INSUFFICIENCY, UNSPECIFIED ETIOLOGY: Primary | ICD-10-CM

## 2017-10-31 DIAGNOSIS — R00.2 PALPITATIONS: ICD-10-CM

## 2017-10-31 DIAGNOSIS — I34.0 MITRAL REGURGITATION: ICD-10-CM

## 2017-10-31 DIAGNOSIS — R55 SYNCOPE, UNSPECIFIED SYNCOPE TYPE: ICD-10-CM

## 2017-10-31 PROCEDURE — 99213 OFFICE O/P EST LOW 20 MIN: CPT | Performed by: INTERNAL MEDICINE

## 2017-10-31 NOTE — PROGRESS NOTES
HISTORY OF PRESENT ILLNESS:  Geronimo is a delightful 72-year-old who comes in with her  following a syncopal episode.  She was in observation overnight with no etiology found.  She had no dysrhythmia.  She did have a cold at the time and was on cold medications and is wondering if it is due to that.  She had an echocardiogram which showed severe left atrial enlargement, 1 to 2+ pulmonic and 2+ tricuspid regurgitation.  There was 2-3+ mitral regurgitation.  She has been known to have myxomatous degeneration of the mitral valve.  We have talked about possible mitral valve surgery in the past; however, she has wanted to wait.  She has been amazingly active, exercising, working out, playing golf, etc. with really no difficulty.  He has had no heart failure admissions.  She has had no PND, orthopnea or pedal edema.      ASSESSMENT:  Reviewing of her echocardiogram shows really no change.  Her mitral regurgitation has varied from 3-4+ to 2-3+ and even as low as 1+ which I think was an error this spring.  She has on any account probably a 3+ mitral regurgitation, but is really asymptomatic.  It is hard to believe that her syncopal episode was due to mitral regurgitation.  It is probably a combination of dehydration, cold and cold medications.  Because of this, and she has been so incredibly active with no difficulty, we are going to watch her for 6 more months and repeat an echocardiogram.  She is to notify us with any changes in her symptoms.  She and her  are in agreement with this and totally understand.         GILLIAN CARDENAS MD [facility]             D: 10/31/2017 13:21   T: 10/31/2017 14:29   MT: OLLIE      Name:     GERONIMO PAYTON   MRN:      [id number]        Account:      [id number]   :      1945           Service Date: 10/31/2017      Document: [id number]

## 2017-10-31 NOTE — PROGRESS NOTES
HPI and Plan:   See dictation    No orders of the defined types were placed in this encounter.    No orders of the defined types were placed in this encounter.    Medications Discontinued During This Encounter   Medication Reason     multivitamin, therapeutic (THERA-VIT) TABS tablet Medication Reconciliation Clean Up         No diagnosis found.    CURRENT MEDICATIONS:  Current Outpatient Prescriptions   Medication Sig Dispense Refill     calcium carbonate-vitamin D (CALCIUM 600+D) 600-200 MG-UNIT TABS Take 1 tablet by mouth 2 times daily       RANITIDINE HCL PO Take 150 mg by mouth nightly as needed for heartburn       NONFORMULARY Take 1 tablet by mouth once OTC med called CPM per patient.   Patient states that it is a decongest and antihistamine combo.       MELATONIN PO Take 5 mg by mouth nightly as needed        Glucosamine HCl--750 MG TABS Take 3,000 mg by mouth daily       loratadine (CLARITIN) 10 MG tablet Take 10 mg by mouth daily       MULTIVITAMIN TABS   OR daily  0     ASPIRIN 81 MG OR TABS 1 tab po QD (Once per day) 100 3       ALLERGIES     Allergies   Allergen Reactions     No Known Drug Allergies        PAST MEDICAL HISTORY:  Past Medical History:   Diagnosis Date     Allergic rhinitis, cause unspecified     dust mites, ragweed     Colitis      Mitral regurgitation      Mitral valve disorders(424.0) 1984    myxomatous valve, mod MR, prolapse     Mitral valve prolapse        PAST SURGICAL HISTORY:  Past Surgical History:   Procedure Laterality Date     COLONOSCOPY N/A 9/15/2015    Procedure: COLONOSCOPY;  Surgeon: Anson Llanos MD;  Location:  GI       FAMILY HISTORY:  Family History   Problem Relation Age of Onset     OSTEOPOROSIS Mother      Alzheimer Disease Mother      Family History Negative Father      HEART DISEASE Maternal Grandfather      Family History Negative Paternal Grandmother      Family History Negative Paternal Grandfather        SOCIAL HISTORY:  Social History  "    Social History     Marital status:      Spouse name: N/A     Number of children: 3     Years of education: N/A     Social History Main Topics     Smoking status: Never Smoker     Smokeless tobacco: Never Used     Alcohol use Yes      Comment: beer with dinner     Drug use: No     Sexual activity: Not Currently     Other Topics Concern     Caffeine Concern No     1 cup of coffee and 1 can diet coke per day     Sleep Concern Yes     takes Doxylamine succinate 1/2 tab every night     Stress Concern No     Weight Concern No     Special Diet No     healthy      Exercise Yes     walking 45min x7 a wk. Very active, [employer], Golf, Bowling, Cardio     Seat Belt Yes     Self-Exams Yes     Parent/Sibling W/ Cabg, Mi Or Angioplasty Before 65f 55m? No     Social History Narrative       Review of Systems:  Skin:  Negative     Eyes:  Positive for glasses  ENT:  Positive for nasal congestion;postnasal drainage  Respiratory:  Positive for dyspnea on exertion  Cardiovascular:    Positive for;chest pain;lightheadedness;dizziness  Gastroenterology: Negative    Genitourinary:  Negative    Musculoskeletal:  Positive for joint pain  Neurologic:  Negative    Psychiatric:  Negative    Heme/Lymph/Imm:  Positive for allergies  Endocrine:  Negative      Physical Exam:  Vitals: /80  Pulse 89  Ht 1.676 m (5' 6\")  Wt 54.3 kg (119 lb 9.6 oz)  BMI 19.3 kg/m2    Constitutional:           Skin:             Head:           Eyes:           Lymph:      ENT:           Neck:           Respiratory:            Cardiac:                                                           GI:           Extremities and Muscular Skeletal:                 Neurological:           Psych:           Recent Lab Results:  LIPID RESULTS:  Lab Results   Component Value Date    CHOL 202 (H) 07/23/2015    HDL 96 07/23/2015    LDL 83 07/23/2015    TRIG 116 07/23/2015    CHOLHDLRATIO 2.1 07/23/2015       LIVER ENZYME RESULTS:  Lab Results   Component Value Date    " AST 25 10/05/2016    ALT 26 10/05/2016       CBC RESULTS:  Lab Results   Component Value Date    WBC 3.2 (L) 10/12/2017    RBC 3.95 10/12/2017    HGB 12.2 10/12/2017    HCT 37.8 10/12/2017    MCV 96 10/12/2017    MCH 30.9 10/12/2017    MCHC 32.3 10/12/2017    RDW 13.0 10/12/2017     10/12/2017       BMP RESULTS:  Lab Results   Component Value Date     10/12/2017    POTASSIUM 4.1 10/12/2017    CHLORIDE 106 10/12/2017    CO2 27 10/12/2017    ANIONGAP 5 10/12/2017     (H) 10/12/2017    BUN 9 10/12/2017    CR 0.90 10/12/2017    GFRESTIMATED 62 10/12/2017    GFRESTBLACK 75 10/12/2017    ARIELLE 8.3 (L) 10/12/2017        A1C RESULTS:  Lab Results   Component Value Date    A1C 5.5 09/30/2011       INR RESULTS:  Lab Results   Component Value Date    INR 0.97 10/05/2016           CC  Anyi Olmos MD  [address] [facility] [address]  [place] [address]

## 2017-10-31 NOTE — MR AVS SNAPSHOT
After Visit Summary   10/31/2017    Taty Aguila    MRN: [id number]           Patient Information     Date Of Birth          1945        Visit Information        Provider Department      10/31/2017 12:45 PM Jesus Stone MD [facility]        Today's Diagnoses     Mitral valve insufficiency, unspecified etiology    -  1    Palpitations        Syncope, unspecified syncope type        Mitral regurgitation           Follow-ups after your visit        Additional Services     Follow-Up with Cardiologist                 Who to contact     If you have questions or need follow up information about today's clinic visit or your schedule please contact [facility] directly at [phone].  Normal or non-critical lab and imaging results will be communicated to you by MyChart, letter or phone within 4 business days after the clinic has received the results. If you do not hear from us within 7 days, please contact the clinic through Filter Squadhart or phone. If you have a critical or abnormal lab result, we will notify you by phone as soon as possible.  Submit refill requests through Iizuu or call your pharmacy and they will forward the refill request to us. Please allow 3 business days for your refill to be completed.          Additional Information About Your Visit        MyChart Information     Iizuu gives you secure access to your electronic health record. If you see a primary care provider, you can also send messages to your care team and make appointments. If you have questions, please call your primary care clinic.  If you do not have a primary care provider, please call [phone] and they will assist you.        Care EveryWhere ID     This is your Care EveryWhere ID. This could be used by other organizations to access your [place] medical records  [id number]        Your Vitals Were     Pulse Height BMI (Body Mass  "Index)             89 1.676 m (5' 6\") 19.3 kg/m2          Blood Pressure from Last 3 Encounters:   No data found for BP    Weight from Last 3 Encounters:   No data found for Wt              Today, you had the following     No orders found for display       Primary Care Provider Office Phone # Fax #    Anyi Olmos [phone]       [address] [facility] [address]  [facility] [address]        Equal Access to Services     ESEQUIEL PASCAL : Hadii aad ku hadasho Soomaali, waaxda luqadaha, qaybta kaalmada adeegyada, waxay idiin hayaan adeeg kharash lahoward . So [facility] [phone].    ATENCIÓN: Si alicela espmally, tiene a lara disposición servicios gratuitos de asistencia lingüística. Llame al [phone].    We comply with applicable federal civil rights laws and [place] laws. We do not discriminate on the basis of race, color, national origin, age, disability, sex, sexual orientation, or gender identity.            Thank you!     Thank you for choosing [facility]  for your care. Our goal is always to provide you with excellent care. Hearing back from our patients is one way we can continue to improve our services. Please take a few minutes to complete the written survey that you may receive in the mail after your visit with us. Thank you!             Your Updated Medication List - Protect others around you: Learn how to safely use, store and throw away your medicines at www.disposemymeds.org.          This list is accurate as of: 10/31/17 11:59 PM.  Always use your most recent med list.                   Brand Name Dispense Instructions for use Diagnosis    aspirin 81 MG tablet     100    1 tab po QD (Once per day)        CALCIUM 600+D 600-200 MG-UNIT Tabs   Generic drug:  calcium carbonate-vitamin D      Take 1 tablet by mouth 2 times daily        Glucosamine HCl--750 MG Tabs      Take 3,000 mg by mouth daily        loratadine 10 MG tablet    CLARITIN     Take 10 mg by " mouth daily        MELATONIN PO      Take 5 mg by mouth nightly as needed        MULTIVITAMIN TABS   OR      daily        NONFORMULARY      Take 1 tablet by mouth once OTC med called CPM per patient.   Patient states that it is a decongest and antihistamine combo.        RANITIDINE HCL PO      Take 150 mg by mouth nightly as needed for heartburn

## 2017-10-31 NOTE — LETTER
10/31/2017    Anyi Olmos MD  [address] [place] [address]    RE: Taty Aguila       Dear Colleague,    I had the pleasure of seeing Taty Aguila in the [facility].    HISTORY OF PRESENT ILLNESS:  Taty is a delightful 72-year-old who comes in with her  following a syncopal episode.  She was in observation overnight with no etiology found.  She had no dysrhythmia.  She did have a cold at the time and was on cold medications and is wondering if it is due to that.  She had an echocardiogram which showed severe left atrial enlargement, 1 to 2+ pulmonic and 2+ tricuspid regurgitation.  There was 2-3+ mitral regurgitation.  She has been known to have myxomatous degeneration of the mitral valve.  We have talked about possible mitral valve surgery in the past; however, she has wanted to wait.  She has been amazingly active, exercising, working out, playing golf, etc. with really no difficulty.  He has had no heart failure admissions.  She has had no PND, orthopnea or pedal edema.     Outpatient Encounter Prescriptions as of 10/31/2017   Medication Sig Dispense Refill     calcium carbonate-vitamin D (CALCIUM 600+D) 600-200 MG-UNIT TABS Take 1 tablet by mouth 2 times daily       RANITIDINE HCL PO Take 150 mg by mouth nightly as needed for heartburn       NONFORMULARY Take 1 tablet by mouth once OTC med called CPM per patient.   Patient states that it is a decongest and antihistamine combo.       MELATONIN PO Take 5 mg by mouth nightly as needed        Glucosamine HCl--750 MG TABS Take 3,000 mg by mouth daily       loratadine (CLARITIN) 10 MG tablet Take 10 mg by mouth daily       MULTIVITAMIN TABS   OR daily  0     ASPIRIN 81 MG OR TABS 1 tab po QD (Once per day) 100 3     [DISCONTINUED] multivitamin, therapeutic (THERA-VIT) TABS tablet Take 1 tablet by mouth daily       No facility-administered encounter medications on file as of 10/31/2017.       ASSESSMENT:   Reviewing of her echocardiogram shows really no change.  Her mitral regurgitation has varied from 3-4+ to 2-3+ and even as low as 1+ which I think was an error this spring.  She has on any account probably a 3+ mitral regurgitation, but is really asymptomatic.  It is hard to believe that her syncopal episode was due to mitral regurgitation.  It is probably a combination of dehydration, cold and cold medications.  Because of this, and she has been so incredibly active with no difficulty, we are going to watch her for 6 more months and repeat an echocardiogram.  She is to notify us with any changes in her symptoms.  She and her  are in agreement with this and totally understand.     Again, thank you for allowing me to participate in the care of your patient.      Sincerely,    GILLIAN CARDENAS MD     [facility]

## 2018-08-06 ENCOUNTER — HOSPITAL ENCOUNTER (OUTPATIENT)
Dept: CARDIOLOGY | Facility: CLINIC | Age: 73
Discharge: HOME | End: 2018-08-06
Attending: INTERNAL MEDICINE | Admitting: INTERNAL MEDICINE
Payer: MEDICARE

## 2018-08-06 DIAGNOSIS — R55 SYNCOPE, UNSPECIFIED SYNCOPE TYPE: ICD-10-CM

## 2018-08-06 DIAGNOSIS — R00.2 PALPITATIONS: ICD-10-CM

## 2018-08-06 DIAGNOSIS — I34.0 MITRAL VALVE INSUFFICIENCY, UNSPECIFIED ETIOLOGY: ICD-10-CM

## 2018-08-06 PROCEDURE — 93306 TTE W/DOPPLER COMPLETE: CPT

## 2018-08-06 PROCEDURE — 93306 TTE W/DOPPLER COMPLETE: CPT | Mod: 26 | Performed by: INTERNAL MEDICINE

## 2018-08-08 ENCOUNTER — OFFICE VISIT (OUTPATIENT)
Dept: CARDIOLOGY | Facility: CLINIC | Age: 73
End: 2018-08-08
Attending: INTERNAL MEDICINE
Payer: COMMERCIAL

## 2018-08-08 VITALS
BODY MASS INDEX: 20.09 KG/M2 | DIASTOLIC BLOOD PRESSURE: 77 MMHG | WEIGHT: 125 LBS | HEART RATE: 72 BPM | SYSTOLIC BLOOD PRESSURE: 131 MMHG | HEIGHT: 66 IN

## 2018-08-08 DIAGNOSIS — R00.2 PALPITATIONS: ICD-10-CM

## 2018-08-08 DIAGNOSIS — I34.0 MITRAL VALVE INSUFFICIENCY, UNSPECIFIED ETIOLOGY: ICD-10-CM

## 2018-08-08 PROCEDURE — 99214 OFFICE O/P EST MOD 30 MIN: CPT | Performed by: INTERNAL MEDICINE

## 2018-08-08 NOTE — LETTER
2018      Anyi Olmos MD  [address] [facility] [address]  [facility] [address]      RE: Taty Payton       Dear Colleague,    I had the pleasure of seeing Taty Payton in the [facility].    Service Date: 2018      HISTORY OF PRESENT ILLNESS:  Taty is a delightful 73-year-old who comes in the clinic for followup with her  today.  She has known mitral valve prolapse, mitral regurgitation.  The severity of the mitral valve regurgitation has varied anywhere from 3-4+ to 1+.  She did have a syncopal episode last year that was felt likely due to cold medications.  Currently, she continues to be active, exercising, going for walks, doing her 10,000 steps per day with really no difficulties or limitations.  She denies any shortness of breath, PND, orthopnea, pedal edema, recurrent syncope, chest pain.        Review of her echocardiogram from 2018 shows normal ejection fraction at 60-65%.  Normal right ventricular function.  Her left atrium is severely dilated and this was noted previously.  She does have myxomatous thickening of the mitral valve leaflets and Dr. Coombs read as 1-2+ mitral regurgitation with pulmonary pressures within normal limits at 19+ CVP.      ASSESSMENT AND PLAN:  Overall, Taty appears to be stable from a symptomatic standpoint.  She continues to be active, exercising without limitations.  My personal review of the echocardiogram is consistent with probably 2+ moderate mitral regurgitation.  Given normal LV size and function, normal pulmonary pressures and stable symptoms we will continue to monitor this.  I will follow up with Taty in 1 year's time with a repeat echocardiogram at that time unless symptoms require sooner intervention.         GILLIAN CARDENAS MD [facility]             D: 2018   T: 2018   MT: OLLIE      Name:     TATY PAYTON   MRN:      [id number]        Account:      [id number]   :      1945           Service Date:  08/08/2018      Document: [id number]         Outpatient Encounter Prescriptions as of 8/8/2018   Medication Sig Dispense Refill     ASPIRIN 81 MG OR TABS 1 tab po QD (Once per day) 100 3     calcium carbonate-vitamin D (CALCIUM 600+D) 600-200 MG-UNIT TABS Take 1 tablet by mouth 2 times daily       Glucosamine HCl--750 MG TABS Take 3,000 mg by mouth daily       loratadine (CLARITIN) 10 MG tablet Take 10 mg by mouth daily       MELATONIN PO Take 5 mg by mouth nightly as needed        MULTIVITAMIN TABS   OR daily  0     NONFORMULARY Take 1 tablet by mouth once OTC med called CPM per patient.   Patient states that it is a decongest and antihistamine combo.       RANITIDINE HCL PO Take 150 mg by mouth nightly as needed for heartburn       No facility-administered encounter medications on file as of 8/8/2018.        Again, thank you for allowing me to participate in the care of your patient.      Sincerely,    GILLIAN CARDENAS MD     [facility]

## 2018-08-08 NOTE — PROGRESS NOTES
Service Date: 2018      HISTORY OF PRESENT ILLNESS:  Geronimo is a delightful 73-year-old who comes in the clinic for followup with her  today.  She has known mitral valve prolapse, mitral regurgitation.  The severity of the mitral valve regurgitation has varied anywhere from 3-4+ to 1+.  She did have a syncopal episode last year that was felt likely due to cold medications.  Currently, she continues to be active, exercising, going for walks, doing her 10,000 steps per day with really no difficulties or limitations.  She denies any shortness of breath, PND, orthopnea, pedal edema, recurrent syncope, chest pain.        Review of her echocardiogram from 2018 shows normal ejection fraction at 60-65%.  Normal right ventricular function.  Her left atrium is severely dilated and this was noted previously.  She does have myxomatous thickening of the mitral valve leaflets and Dr. Coombs read as 1-2+ mitral regurgitation with pulmonary pressures within normal limits at 19+ CVP.      ASSESSMENT AND PLAN:  Overall, Geronimo appears to be stable from a symptomatic standpoint.  She continues to be active, exercising without limitations.  My personal review of the echocardiogram is consistent with probably 2+ moderate mitral regurgitation.  Given normal LV size and function, normal pulmonary pressures and stable symptoms we will continue to monitor this.  I will follow up with Geronimo in 1 year's time with a repeat echocardiogram at that time unless symptoms require sooner intervention.         GILLIAN CARDENAS MD [facility]             D: 2018   T: 2018   MT: OLLIE      Name:     GERONIMO PAYTON   MRN:      [id number]        Account:      [id number]   :      1945           Service Date: 2018      Document: [id number]

## 2018-08-08 NOTE — MR AVS SNAPSHOT
After Visit Summary   8/8/2018    Taty Aguila    MRN: [id number]           Patient Information     Date Of Birth          1945        Visit Information        Provider Department      8/8/2018 10:45 AM Jesus Stone MD [facility]        Today's Diagnoses     Mitral valve insufficiency, unspecified etiology        Palpitations           Follow-ups after your visit        Additional Services     Follow-Up with Cardiologist                 Future tests that were ordered for you today     Open Future Orders        Priority Expected Expires Ordered    Follow-Up with Cardiologist Routine 8/8/2019 8/9/2019 8/8/2018    Echocardiogram Routine 8/8/2019 8/9/2019 8/8/2018            Who to contact     If you have questions or need follow up information about today's clinic visit or your schedule please contact [facility] directly at [phone].  Normal or non-critical lab and imaging results will be communicated to you by MyChart, letter or phone within 4 business days after the clinic has received the results. If you do not hear from us within 7 days, please contact the clinic through Mama[employer]ear Apphart or phone. If you have a critical or abnormal lab result, we will notify you by phone as soon as possible.  Submit refill requests through ThoughtBuzz or call your pharmacy and they will forward the refill request to us. Please allow 3 business days for your refill to be completed.          Additional Information About Your Visit        MyChart Information     ThoughtBuzz gives you secure access to your electronic health record. If you see a primary care provider, you can also send messages to your care team and make appointments. If you have questions, please call your primary care clinic.  If you do not have a primary care provider, please call [phone] and they will assist you.        Care EveryWhere ID     This is your Care  "EveryWhere ID. This could be used by other organizations to access your [place] medical records  [id number]        Your Vitals Were     Pulse Height BMI (Body Mass Index)             72 1.676 m (5' 6\") 20.18 kg/m2          Blood Pressure from Last 3 Encounters:   08/08/18 131/77   10/31/17 132/80   10/13/17 129/73    Weight from Last 3 Encounters:   08/08/18 56.7 kg (125 lb)   10/31/17 54.3 kg (119 lb 9.6 oz)   10/13/17 53.6 kg (118 lb 1.6 oz)              We Performed the Following     Follow-Up with Cardiologist        Primary Care Provider Office Phone # Fax #    Anyi Olmos [phone]       [address] [place] [id number]        Equal Access to Services     ESEQUIEL [facility] : Hadii aad ku hadasho Soomaali, waaxda luqadaha, qaybta kaalmada adeegyada, radha roper haysidney stuatr . So [facility] [phone].    ATENCIÓN: Si habla español, tiene a lara disposición servicios gratuitos de asistencia lingüística. Brandin al [phone].    We comply with applicable federal civil rights laws and [place] laws. We do not discriminate on the basis of race, color, national origin, age, disability, sex, sexual orientation, or gender identity.            Thank you!     Thank you for choosing [facility]  for your care. Our goal is always to provide you with excellent care. Hearing back from our patients is one way we can continue to improve our services. Please take a few minutes to complete the written survey that you may receive in the mail after your visit with us. Thank you!             Your Updated Medication List - Protect others around you: Learn how to safely use, store and throw away your medicines at www.disposemymeds.org.          This list is accurate as of 8/8/18 10:55 AM.  Always use your most recent med list.                   Brand Name Dispense Instructions for use Diagnosis    aspirin 81 MG tablet     100    1 tab po QD (Once per " day)        CALCIUM 600+D 600-200 MG-UNIT Tabs   Generic drug:  calcium carbonate-vitamin D      Take 1 tablet by mouth 2 times daily        Glucosamine HCl--750 MG Tabs      Take 3,000 mg by mouth daily        loratadine 10 MG tablet    CLARITIN     Take 10 mg by mouth daily        MELATONIN PO      Take 5 mg by mouth nightly as needed        MULTIVITAMIN TABS   OR      daily        NONFORMULARY      Take 1 tablet by mouth once OTC med called CPM per patient.   Patient states that it is a decongest and antihistamine combo.        RANITIDINE HCL PO      Take 150 mg by mouth nightly as needed for heartburn

## 2018-08-08 NOTE — PROGRESS NOTES
HPI and Plan:   See dictation    No orders of the defined types were placed in this encounter.    No orders of the defined types were placed in this encounter.    There are no discontinued medications.      Encounter Diagnoses   Name Primary?     Mitral valve insufficiency, unspecified etiology      Palpitations        CURRENT MEDICATIONS:  Current Outpatient Prescriptions   Medication Sig Dispense Refill     ASPIRIN 81 MG OR TABS 1 tab po QD (Once per day) 100 3     calcium carbonate-vitamin D (CALCIUM 600+D) 600-200 MG-UNIT TABS Take 1 tablet by mouth 2 times daily       Glucosamine HCl--750 MG TABS Take 3,000 mg by mouth daily       loratadine (CLARITIN) 10 MG tablet Take 10 mg by mouth daily       MELATONIN PO Take 5 mg by mouth nightly as needed        MULTIVITAMIN TABS   OR daily  0     NONFORMULARY Take 1 tablet by mouth once OTC med called CPM per patient.   Patient states that it is a decongest and antihistamine combo.       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, prolapse     Mitral valve prolapse        PAST SURGICAL HISTORY:  Past Surgical History:   Procedure Laterality Date     COLONOSCOPY N/A 9/15/2015    Procedure: COLONOSCOPY;  Surgeon: Anson Llanos MD;  Location:  GI       FAMILY HISTORY:  Family History   Problem Relation Age of Onset     Osteoperosis Mother      Alzheimer Disease Mother      Family History Negative Father      HEART DISEASE Maternal Grandfather      Family History Negative Paternal Grandmother      Family History Negative Paternal Grandfather        SOCIAL HISTORY:  Social History     Social History     Marital status:  "     Spouse name: N/A     Number of children: 3     Years of education: N/A     Social History Main Topics     Smoking status: Never Smoker     Smokeless tobacco: Never Used     Alcohol use Yes      Comment: beer with dinner     Drug use: No     Sexual activity: Not Currently     Other Topics Concern     Caffeine Concern No     1 cup of coffee and 1 can diet coke per day     Sleep Concern Yes     takes Doxylamine succinate 1/2 tab every night     Stress Concern No     Weight Concern No     Special Diet No     healthy      Exercise Yes     walking 45min x7 a wk. Very active, [employer], Golf, Bowling, Cardio     Seat Belt Yes     Self-Exams Yes     Parent/Sibling W/ Cabg, Mi Or Angioplasty Before 65f 55m? No     Social History Narrative       Review of Systems:  Skin:  Negative     Eyes:  Positive for glasses  ENT:  Positive for nasal congestion;postnasal drainage  Respiratory:  Positive for dyspnea on exertion  Cardiovascular:    Positive for;chest pain;lightheadedness;dizziness  Gastroenterology: Negative    Genitourinary:  Negative    Musculoskeletal:  Positive for joint pain  Neurologic:  Negative    Psychiatric:  Negative    Heme/Lymph/Imm:  Positive for allergies  Endocrine:  Negative      Physical Exam:  Vitals: /77  Pulse 72  Ht 1.676 m (5' 6\")  Wt 56.7 kg (125 lb)  BMI 20.18 kg/m2    Constitutional:           Skin:             Head:           Eyes:           Lymph:      ENT:           Neck:           Respiratory:            Cardiac:                                                           GI:           Extremities and Muscular Skeletal:                 Neurological:           Psych:           Recent Lab Results:  LIPID RESULTS:  Lab Results   Component Value Date    CHOL 202 (H) 07/23/2015    HDL 96 07/23/2015    LDL 83 07/23/2015    TRIG 116 07/23/2015    CHOLHDLRATIO 2.1 07/23/2015       LIVER ENZYME RESULTS:  Lab Results   Component Value Date    AST 25 10/05/2016    ALT 26 10/05/2016 "       CBC RESULTS:  Lab Results   Component Value Date    WBC 3.2 (L) 10/12/2017    RBC 3.95 10/12/2017    HGB 12.2 10/12/2017    HCT 37.8 10/12/2017    MCV 96 10/12/2017    MCH 30.9 10/12/2017    MCHC 32.3 10/12/2017    RDW 13.0 10/12/2017     10/12/2017       BMP RESULTS:  Lab Results   Component Value Date     10/12/2017    POTASSIUM 4.1 10/12/2017    CHLORIDE 106 10/12/2017    CO2 27 10/12/2017    ANIONGAP 5 10/12/2017     (H) 10/12/2017    BUN 9 10/12/2017    CR 0.90 10/12/2017    GFRESTIMATED 62 10/12/2017    GFRESTBLACK 75 10/12/2017    ARIELLE 8.3 (L) 10/12/2017        A1C RESULTS:  Lab Results   Component Value Date    A1C 5.5 09/30/2011       INR RESULTS:  Lab Results   Component Value Date    INR 0.97 10/05/2016           CC  Jesus Stone MD  [address]  [place] [id number]

## 2018-08-08 NOTE — LETTER
8/8/2018    Anyi Olmos MD  [address]  [facility] [address]    RE: Taty Aguila       Dear Colleague,    I had the pleasure of seeing Taty Aguila in the [facility].    HPI and Plan:   See dictation    No orders of the defined types were placed in this encounter.    No orders of the defined types were placed in this encounter.    There are no discontinued medications.      Encounter Diagnoses   Name Primary?     Mitral valve insufficiency, unspecified etiology      Palpitations        CURRENT MEDICATIONS:  Current Outpatient Prescriptions   Medication Sig Dispense Refill     ASPIRIN 81 MG OR TABS 1 tab po QD (Once per day) 100 3     calcium carbonate-vitamin D (CALCIUM 600+D) 600-200 MG-UNIT TABS Take 1 tablet by mouth 2 times daily       Glucosamine HCl--750 MG TABS Take 3,000 mg by mouth daily       loratadine (CLARITIN) 10 MG tablet Take 10 mg by mouth daily       MELATONIN PO Take 5 mg by mouth nightly as needed        MULTIVITAMIN TABS   OR daily  0     NONFORMULARY Take 1 tablet by mouth once OTC med called CPM per patient.   Patient states that it is a decongest and antihistamine combo.       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, prolapse     Mitral valve prolapse        PAST SURGICAL HISTORY:  Past Surgical History:   Procedure Laterality Date     COLONOSCOPY N/A 9/15/2015    Procedure: COLONOSCOPY;  Surgeon: Anson Llanos MD;  Location:  GI       FAMILY HISTORY:  Family History   Problem Relation Age of Onset     Osteoperosis Mother      Alzheimer Disease Mother      Family History Negative  "Father      HEART DISEASE Maternal Grandfather      Family History Negative Paternal Grandmother      Family History Negative Paternal Grandfather        SOCIAL HISTORY:  Social History     Social History     Marital status:      Spouse name: N/A     Number of children: 3     Years of education: N/A     Social History Main Topics     Smoking status: Never Smoker     Smokeless tobacco: Never Used     Alcohol use Yes      Comment: beer with dinner     Drug use: No     Sexual activity: Not Currently     Other Topics Concern     Caffeine Concern No     1 cup of coffee and 1 can diet coke per day     Sleep Concern Yes     takes Doxylamine succinate 1/2 tab every night     Stress Concern No     Weight Concern No     Special Diet No     healthy      Exercise Yes     walking 45min x7 a wk. Very active, [employer], Golf, Bowling, Cardio     Seat Belt Yes     Self-Exams Yes     Parent/Sibling W/ Cabg, Mi Or Angioplasty Before 65f 55m? No     Social History Narrative       Review of Systems:  Skin:  Negative     Eyes:  Positive for glasses  ENT:  Positive for nasal congestion;postnasal drainage  Respiratory:  Positive for dyspnea on exertion  Cardiovascular:    Positive for;chest pain;lightheadedness;dizziness  Gastroenterology: Negative    Genitourinary:  Negative    Musculoskeletal:  Positive for joint pain  Neurologic:  Negative    Psychiatric:  Negative    Heme/Lymph/Imm:  Positive for allergies  Endocrine:  Negative      Physical Exam:  Vitals: /77  Pulse 72  Ht 1.676 m (5' 6\")  Wt 56.7 kg (125 lb)  BMI 20.18 kg/m2    Constitutional:           Skin:             Head:           Eyes:           Lymph:      ENT:           Neck:           Respiratory:            Cardiac:                                                           GI:           Extremities and Muscular Skeletal:                 Neurological:           Psych:           Recent Lab Results:  LIPID RESULTS:  Lab Results   Component Value Date    CHOL 202 " (H) 07/23/2015    HDL 96 07/23/2015    LDL 83 07/23/2015    TRIG 116 07/23/2015    CHOLHDLRATIO 2.1 07/23/2015       LIVER ENZYME RESULTS:  Lab Results   Component Value Date    AST 25 10/05/2016    ALT 26 10/05/2016       CBC RESULTS:  Lab Results   Component Value Date    WBC 3.2 (L) 10/12/2017    RBC 3.95 10/12/2017    HGB 12.2 10/12/2017    HCT 37.8 10/12/2017    MCV 96 10/12/2017    MCH 30.9 10/12/2017    MCHC 32.3 10/12/2017    RDW 13.0 10/12/2017     10/12/2017       BMP RESULTS:  Lab Results   Component Value Date     10/12/2017    POTASSIUM 4.1 10/12/2017    CHLORIDE 106 10/12/2017    CO2 27 10/12/2017    ANIONGAP 5 10/12/2017     (H) 10/12/2017    BUN 9 10/12/2017    CR 0.90 10/12/2017    GFRESTIMATED 62 10/12/2017    GFRESTBLACK 75 10/12/2017    ARIELLE 8.3 (L) 10/12/2017        A1C RESULTS:  Lab Results   Component Value Date    A1C 5.5 09/30/2011       INR RESULTS:  Lab Results   Component Value Date    INR 0.97 10/05/2016           CC  Jesus Cardenas MD  [address]  [place] [address]                  Thank you for allowing me to participate in the care of your patient.      Sincerely,     JESUS CARDENAS MD     [facility]    cc:   Jesus Cardenas MD  [address]  [place] [address]

## 2018-10-16 ENCOUNTER — HOSPITAL ENCOUNTER (INPATIENT)
Facility: CLINIC | Age: 73
LOS: 2 days | Discharge: HOME | DRG: 310 | End: 2018-10-18
Attending: EMERGENCY MEDICINE | Admitting: INTERNAL MEDICINE
Payer: MEDICARE

## 2018-10-16 ENCOUNTER — APPOINTMENT (OUTPATIENT)
Dept: GENERAL RADIOLOGY | Facility: CLINIC | Age: 73
DRG: 310 | End: 2018-10-16
Attending: EMERGENCY MEDICINE
Payer: MEDICARE

## 2018-10-16 DIAGNOSIS — Z79.01 CHRONIC ANTICOAGULATION: Primary | ICD-10-CM

## 2018-10-16 DIAGNOSIS — I48.91 ATRIAL FIBRILLATION WITH RVR (H): ICD-10-CM

## 2018-10-16 LAB
ANION GAP SERPL CALCULATED.3IONS-SCNC: 10 MMOL/L (ref 3–14)
BASOPHILS # BLD AUTO: 0 10E9/L (ref 0–0.2)
BASOPHILS NFR BLD AUTO: 0.4 %
BUN SERPL-MCNC: 11 MG/DL (ref 7–30)
CALCIUM SERPL-MCNC: 9.3 MG/DL (ref 8.5–10.1)
CHLORIDE SERPL-SCNC: 101 MMOL/L (ref 94–109)
CO2 SERPL-SCNC: 24 MMOL/L (ref 20–32)
CREAT SERPL-MCNC: 0.84 MG/DL (ref 0.52–1.04)
DIFFERENTIAL METHOD BLD: NORMAL
EOSINOPHIL # BLD AUTO: 0.1 10E9/L (ref 0–0.7)
EOSINOPHIL NFR BLD AUTO: 1.4 %
ERYTHROCYTE [DISTWIDTH] IN BLOOD BY AUTOMATED COUNT: 13.1 % (ref 10–15)
ERYTHROCYTE [DISTWIDTH] IN BLOOD BY AUTOMATED COUNT: 13.1 % (ref 10–15)
GFR SERPL CREATININE-BSD FRML MDRD: 66 ML/MIN/1.7M2
GLUCOSE SERPL-MCNC: 156 MG/DL (ref 70–99)
HCT VFR BLD AUTO: 41.2 % (ref 35–47)
HCT VFR BLD AUTO: 42.6 % (ref 35–47)
HGB BLD-MCNC: 13.8 G/DL (ref 11.7–15.7)
HGB BLD-MCNC: 14.1 G/DL (ref 11.7–15.7)
IMM GRANULOCYTES # BLD: 0 10E9/L (ref 0–0.4)
IMM GRANULOCYTES NFR BLD: 0.3 %
INR PPP: 0.94 (ref 0.86–1.14)
INTERPRETATION ECG - MUSE: NORMAL
LYMPHOCYTES # BLD AUTO: 1.4 10E9/L (ref 0.8–5.3)
LYMPHOCYTES NFR BLD AUTO: 20.1 %
MCH RBC QN AUTO: 31.9 PG (ref 26.5–33)
MCH RBC QN AUTO: 32.6 PG (ref 26.5–33)
MCHC RBC AUTO-ENTMCNC: 33.1 G/DL (ref 31.5–36.5)
MCHC RBC AUTO-ENTMCNC: 33.5 G/DL (ref 31.5–36.5)
MCV RBC AUTO: 96 FL (ref 78–100)
MCV RBC AUTO: 97 FL (ref 78–100)
MONOCYTES # BLD AUTO: 0.6 10E9/L (ref 0–1.3)
MONOCYTES NFR BLD AUTO: 8.3 %
NEUTROPHILS # BLD AUTO: 4.8 10E9/L (ref 1.6–8.3)
NEUTROPHILS NFR BLD AUTO: 69.5 %
NRBC # BLD AUTO: 0 10*3/UL
NRBC BLD AUTO-RTO: 0 /100
PLATELET # BLD AUTO: 248 10E9/L (ref 150–450)
PLATELET # BLD AUTO: 260 10E9/L (ref 150–450)
POTASSIUM SERPL-SCNC: 3.4 MMOL/L (ref 3.4–5.3)
RBC # BLD AUTO: 4.23 10E12/L (ref 3.8–5.2)
RBC # BLD AUTO: 4.42 10E12/L (ref 3.8–5.2)
SODIUM SERPL-SCNC: 135 MMOL/L (ref 133–144)
TROPONIN I BLD-MCNC: 0.01 UG/L (ref 0–0.08)
TSH SERPL DL<=0.005 MIU/L-ACNC: 1.96 MU/L (ref 0.4–4)
WBC # BLD AUTO: 6.9 10E9/L (ref 4–11)
WBC # BLD AUTO: 8.9 10E9/L (ref 4–11)

## 2018-10-16 PROCEDURE — 96366 THER/PROPH/DIAG IV INF ADDON: CPT

## 2018-10-16 PROCEDURE — 96368 THER/DIAG CONCURRENT INF: CPT

## 2018-10-16 PROCEDURE — 25000132 ZZH RX MED GY IP 250 OP 250 PS 637: Mod: GY | Performed by: EMERGENCY MEDICINE

## 2018-10-16 PROCEDURE — 84443 ASSAY THYROID STIM HORMONE: CPT | Performed by: EMERGENCY MEDICINE

## 2018-10-16 PROCEDURE — 12000007 ZZH R&B INTERMEDIATE

## 2018-10-16 PROCEDURE — 36415 COLL VENOUS BLD VENIPUNCTURE: CPT | Performed by: INTERNAL MEDICINE

## 2018-10-16 PROCEDURE — 85610 PROTHROMBIN TIME: CPT | Performed by: EMERGENCY MEDICINE

## 2018-10-16 PROCEDURE — 96365 THER/PROPH/DIAG IV INF INIT: CPT

## 2018-10-16 PROCEDURE — 99223 1ST HOSP IP/OBS HIGH 75: CPT | Mod: AI | Performed by: INTERNAL MEDICINE

## 2018-10-16 PROCEDURE — 25000128 H RX IP 250 OP 636: Performed by: INTERNAL MEDICINE

## 2018-10-16 PROCEDURE — 71046 X-RAY EXAM CHEST 2 VIEWS: CPT

## 2018-10-16 PROCEDURE — 84484 ASSAY OF TROPONIN QUANT: CPT

## 2018-10-16 PROCEDURE — A9270 NON-COVERED ITEM OR SERVICE: HCPCS | Mod: GY | Performed by: EMERGENCY MEDICINE

## 2018-10-16 PROCEDURE — 96376 TX/PRO/DX INJ SAME DRUG ADON: CPT

## 2018-10-16 PROCEDURE — 80048 BASIC METABOLIC PNL TOTAL CA: CPT | Performed by: EMERGENCY MEDICINE

## 2018-10-16 PROCEDURE — 93005 ELECTROCARDIOGRAM TRACING: CPT | Mod: 76

## 2018-10-16 PROCEDURE — 25000125 ZZHC RX 250: Performed by: EMERGENCY MEDICINE

## 2018-10-16 PROCEDURE — 99285 EMERGENCY DEPT VISIT HI MDM: CPT | Mod: 25

## 2018-10-16 PROCEDURE — 85025 COMPLETE CBC W/AUTO DIFF WBC: CPT | Performed by: EMERGENCY MEDICINE

## 2018-10-16 PROCEDURE — 25000128 H RX IP 250 OP 636: Performed by: EMERGENCY MEDICINE

## 2018-10-16 PROCEDURE — 85027 COMPLETE CBC AUTOMATED: CPT | Performed by: INTERNAL MEDICINE

## 2018-10-16 PROCEDURE — 93005 ELECTROCARDIOGRAM TRACING: CPT

## 2018-10-16 RX ORDER — NALOXONE HYDROCHLORIDE 0.4 MG/ML
.1-.4 INJECTION, SOLUTION INTRAMUSCULAR; INTRAVENOUS; SUBCUTANEOUS
Status: DISCONTINUED | OUTPATIENT
Start: 2018-10-16 | End: 2018-10-18 | Stop reason: HOSPADM

## 2018-10-16 RX ORDER — BISACODYL 5 MG
5 TABLET, DELAYED RELEASE (ENTERIC COATED) ORAL DAILY PRN
Status: DISCONTINUED | OUTPATIENT
Start: 2018-10-16 | End: 2018-10-18 | Stop reason: HOSPADM

## 2018-10-16 RX ORDER — LIDOCAINE 40 MG/G
CREAM TOPICAL
Status: DISCONTINUED | OUTPATIENT
Start: 2018-10-16 | End: 2018-10-18 | Stop reason: HOSPADM

## 2018-10-16 RX ORDER — ONDANSETRON 2 MG/ML
4 INJECTION INTRAMUSCULAR; INTRAVENOUS EVERY 6 HOURS PRN
Status: DISCONTINUED | OUTPATIENT
Start: 2018-10-16 | End: 2018-10-18 | Stop reason: HOSPADM

## 2018-10-16 RX ORDER — ACETAMINOPHEN 325 MG/1
650 TABLET ORAL EVERY 4 HOURS PRN
Status: DISCONTINUED | OUTPATIENT
Start: 2018-10-16 | End: 2018-10-18 | Stop reason: HOSPADM

## 2018-10-16 RX ORDER — BISACODYL 5 MG
10 TABLET, DELAYED RELEASE (ENTERIC COATED) ORAL DAILY PRN
Status: DISCONTINUED | OUTPATIENT
Start: 2018-10-16 | End: 2018-10-18 | Stop reason: HOSPADM

## 2018-10-16 RX ORDER — BISACODYL 10 MG
10 SUPPOSITORY, RECTAL RECTAL DAILY PRN
Status: DISCONTINUED | OUTPATIENT
Start: 2018-10-16 | End: 2018-10-18 | Stop reason: HOSPADM

## 2018-10-16 RX ORDER — DILTIAZEM HYDROCHLORIDE 5 MG/ML
10 INJECTION INTRAVENOUS ONCE
Status: COMPLETED | OUTPATIENT
Start: 2018-10-16 | End: 2018-10-16

## 2018-10-16 RX ORDER — ONDANSETRON 4 MG/1
4 TABLET, ORALLY DISINTEGRATING ORAL EVERY 6 HOURS PRN
Status: DISCONTINUED | OUTPATIENT
Start: 2018-10-16 | End: 2018-10-18 | Stop reason: HOSPADM

## 2018-10-16 RX ORDER — ASPIRIN 81 MG/1
243 TABLET, CHEWABLE ORAL ONCE
Status: COMPLETED | OUTPATIENT
Start: 2018-10-16 | End: 2018-10-16

## 2018-10-16 RX ORDER — ELECTROLYTES/DEXTROSE
1 SOLUTION, ORAL ORAL DAILY
COMMUNITY

## 2018-10-16 RX ORDER — BISACODYL 5 MG
15 TABLET, DELAYED RELEASE (ENTERIC COATED) ORAL DAILY PRN
Status: DISCONTINUED | OUTPATIENT
Start: 2018-10-16 | End: 2018-10-18 | Stop reason: HOSPADM

## 2018-10-16 RX ORDER — SODIUM CHLORIDE 9 MG/ML
1000 INJECTION, SOLUTION INTRAVENOUS CONTINUOUS
Status: DISCONTINUED | OUTPATIENT
Start: 2018-10-16 | End: 2018-10-16

## 2018-10-16 RX ADMIN — SODIUM CHLORIDE 1000 ML: 9 INJECTION, SOLUTION INTRAVENOUS at 13:24

## 2018-10-16 RX ADMIN — DILTIAZEM HYDROCHLORIDE 10 MG: 5 INJECTION INTRAVENOUS at 13:24

## 2018-10-16 RX ADMIN — Medication 3400 UNITS: at 18:48

## 2018-10-16 RX ADMIN — Medication 2 G: at 13:42

## 2018-10-16 RX ADMIN — HEPARIN SODIUM 700 UNITS/HR: 10000 INJECTION, SOLUTION INTRAVENOUS at 18:02

## 2018-10-16 RX ADMIN — DILTIAZEM HYDROCHLORIDE 15 MG/HR: 5 INJECTION INTRAVENOUS at 17:23

## 2018-10-16 RX ADMIN — DILTIAZEM HYDROCHLORIDE 5 MG/HR: 5 INJECTION INTRAVENOUS at 14:27

## 2018-10-16 RX ADMIN — ASPIRIN 81 MG 243 MG: 81 TABLET ORAL at 15:07

## 2018-10-16 ASSESSMENT — ENCOUNTER SYMPTOMS
DIZZINESS: 0
SHORTNESS OF BREATH: 0
LIGHT-HEADEDNESS: 0
PALPITATIONS: 1

## 2018-10-16 ASSESSMENT — ACTIVITIES OF DAILY LIVING (ADL): ADLS_ACUITY_SCORE: 9

## 2018-10-16 NOTE — ED TRIAGE NOTES
Pt was working out at the [facility] and was feeling palpitations and like her heart was beating fast. EMS called, found to be in Afib, which is new for the patient. Denies CP, dizziness or lightheadedness. Rate 120-160s for EMS.

## 2018-10-16 NOTE — ED NOTES
[facility]  ED Nurse Handoff Report    Taty Aguila is a 73 year old female   ED Chief complaint: Atrial Fib  . ED Diagnosis:   Final diagnoses:   Atrial fibrillation with RVR (H)     Allergies:   Allergies   Allergen Reactions     No Known Drug Allergies        Code Status: Full Code  Activity level - Baseline/Home:  Independent. Activity Level - Current:   Stand with Assist. Lift room needed: No. Bariatric: No   Needed: No   Isolation: No. Infection: Not Applicable.     Vital Signs:   Vitals:    10/16/18 1327 10/16/18 1330 10/16/18 1345 10/16/18 1440   BP: 130/73 (!) 121/104 (!) 120/103 129/87   Pulse:       Resp: 14   13   Temp:       TempSrc:       SpO2: 96% 94% 93% 95%   Weight:       Height:           Cardiac Rhythm:  ,   Cardiac  Cardiac Rhythm: Atrial fibrillation  Pain level:    Patient confused: No. Patient Falls Risk: Yes.   Elimination Status: Has voided   Patient Report - Initial Complaint: Palpitations. Focused Assessment: A&O. Up with sba. C/O palpitations/erratic heart beat while exercising today. Palpiations improved after Cardizem. Placed on Cardizem drip. Initally denied, chest pain, SOB or dizziness. Began c/o Chest tightness around 1430, EKG repeated and trop drawn (0.01)  Tests Performed: CXR & labs. Abnormal Results:   XR Chest 2 Views   Final Result   IMPRESSION: No radiographic evidence of acute chest abnormality.       NESS MADERA MD        Labs Ordered and Resulted from Time of ED Arrival Up to the Time of Departure from the ED   BASIC METABOLIC PANEL - Abnormal; Notable for the following:        Result Value    Glucose 156 (*)     All other components within normal limits   CBC WITH PLATELETS DIFFERENTIAL   INR   TSH   ISTAT TROPONIN NURSING POCT     .   Treatments provided: Magnesium, IVF, Cardizem drip,   Family Comments:  at bedside  OBS brochure/video discussed/provided to patient:  N/A  ED Medications:   Medications   0.9% sodium chloride BOLUS  (1,000 mLs Intravenous New Bag 10/16/18 1324)     Followed by   sodium chloride 0.9% infusion (not administered)   diltiazem (CARDIZEM) 125 mg in sodium chloride 0.9 % 125 mL infusion (5 mg/hr Intravenous New Bag 10/16/18 1427)   magnesium sulfate 2 g in NS intermittent infusion (PharMEDium or [facility]V Cmpd) (2 g Intravenous New Bag 10/16/18 1342)   diltiazem (CARDIZEM) injection 10 mg (10 mg Intravenous Given 10/16/18 1324)     Drips infusing:  No  For the majority of the shift, the patient's behavior Green. Interventions performed were NA.     Severe Sepsis OR Septic Shock Diagnosis Present: No      ED Nurse Name/Phone Number: Salud James,   2:43 PM  RECEIVING UNIT ED HANDOFF REVIEW    Above ED Nurse Handoff Report was reviewed: Yes  Reviewed by: Jovana Meng on October 16, 2018 at 3:31 PM

## 2018-10-16 NOTE — ED PROVIDER NOTES
History     Chief Complaint:  Palpitations      HPI   Taty Aguila is a 73 year old female with a history of mitral valve prolapse and regurgitation who presents to the ED for the evaluation of palpitations. The patient reports that she was jogging in place today at the [facility], when she suddenly developed the onset of palpitations. She sat down, but her symptoms continued to persist, and so EMS was called. EMS found the patient to be in atrial fibrillation with a heart rate of 120-160, which is reportedly new for her. Here, the patient states that her palpitations have persisted, but she denies any chest pain, shortness of breath, dizziness, lightheadedness, or any other symptoms. The patient reports that she follows up with her cardiologist, Dr. Stone, and receives an echocardiogram every 6 months. She last had an echocardiogram on 8/6, which showed a normal ejection fraction at 60-65%. The patient reports that her cardiologist told her at her last visit on 8/8 that her mitral valve regurgitation was improving, and that she did not need to follow up with him for another year. She denies any history of hypertension, diabetes, CHF, MI, peripheral artery disease, or stroke. She has never been diagnosed with atrial fibrillation and is not anticoagulated aside from daily aspirin.    Allergies:  NKDA    Medications:    Allegra  Aspirin  Melatonin  Glucosamine  Ranitidine    Past Medical History:    Allergic rhinitis  Colitis  Mitral regurgitation 3+  Mitral valve prolapse  Syncope    Past Surgical History:    Colonoscopy    Family History:    Osteoporosis  Alzheimer disease    Social History:  Negative for tobacco use.  Alcohol use: yes  Marital Status:       Review of Systems   Respiratory: Negative for shortness of breath.    Cardiovascular: Positive for palpitations. Negative for chest pain.   Neurological: Negative for dizziness and light-headedness.   All other systems reviewed and are  "negative.      Physical Exam     Patient Vitals for the past 24 hrs:   BP Temp Temp src Pulse Heart Rate Resp SpO2 Height Weight   10/16/18 1540 144/87 - - - - - - - -   10/16/18 1526 - - - - 120 16 95 % - -   10/16/18 1520 125/90 - - - - - - - -   10/16/18 1511 - - - - 134 11 96 % - -   10/16/18 1510 131/85 - - - - - - - -   10/16/18 1500 129/82 - - - 117 13 94 % - -   10/16/18 1450 (!) 142/100 - - - 114 13 95 % - -   10/16/18 1440 129/87 - - - 110 13 95 % - -   10/16/18 1400 (!) 137/97 - - - 121 - 94 % - -   10/16/18 1345 (!) 120/103 - - - 129 - 93 % - -   10/16/18 1330 (!) 121/104 - - - 146 - 94 % - -   10/16/18 1327 130/73 - - - 151 14 96 % - -   10/16/18 1315 (!) 141/94 - - - 149 15 98 % - -   10/16/18 1308 172/87 97.9  F (36.6  C) Oral 135 135 18 98 % 1.676 m (5' 6\") 56.7 kg (125 lb)       Physical Exam  VS: Reviewed per above  HENT: Mucous membranes moist  EYES: sclera anicteric  CV: Rate as noted, irregular rhythm.   RESP: Effort normal. Breath sounds are normal bilaterally.  GI: no tenderness, not distended.  NEURO: Alert, moving all extremities  MSK: No deformity of the extremities  SKIN: Warm and dry      Emergency Department Course   ECG:  Indication: Palpitations  Time: 1310  Vent. Rate 148 bpm. OK interval *. QRS duration 90. QT/QTc 304/477. P-R-T axis * 82 -52.  Atrial fibrillation with rapid ventricular response with premature ventricular or aberrantly conducted complexes. ST and T wave abnormality, consider inferior ischemia. Atrial fibrillation as compared to EKG dated 6/12/14. Read time: 1320    Time: 1434  Vent. Rate 110 bpm. OK interval *. QRS duration 90. QT/QTc 338/457. P-R-T axis * 70 -33.  Atrial fibrillation with rapid ventricular response with premature ventricular or aberrantly conducted complexes. Nonspecific ST and T wave abnormality. Abnormal ECG. Read time: 1438    Imaging:  Radiographic findings were communicated with the patient who voiced understanding of the findings.  XR Chest 2 " Views:   No radiographic evidence of acute chest abnormality, as per radiology.     Laboratory:  CBC: WBC: 6.9, HGB: 13.8, PLT: 260  BMP: Glucose 156 (H), o/w WNL (Creatinine: 0.84)    INR: 0.94    TSH: 1.96    1439 Troponin POCT: 0.01     Interventions:  1324 NS 1L IV   Cardizem 10 mg IV  1342 Pharmedium 2 g IV  1427 Cardizem infusion 5 mg/hr  1507 Aspirin 243 mg PO  1508 Cardizem infusion 10 mg/hr  1520 NS infusion IV  Please see MAR for full list of medications administered in the ED.     Emergency Department Course:  Nursing notes and vitals reviewed. (1313) I performed an exam of the patient as documented above.     IV inserted. Medicine administered as documented above. Blood drawn. This was sent to the lab for further testing, results above.    (1325) I consulted with Dr. Coombs, Cardiology, regarding the patient's history and presentation here in the emergency department. We discussed the potential for a cardioversion at this time.     (1334) I rechecked the patient and discussed the results of her workup thus far. We also discussed the possible options available for anticoagulants.    (1358) I reevaluated the patient and provided an update in regards to her ED course.      The patient was sent for a chest x-ray while in the emergency department, findings above.     (1431) Upon returning from x-ray, the patient developed some medial chest tightness.    Repeat EKG obtained in the ED, see results above.      (1446)  I consulted with Dr. Escamilla of the hospitalist services. They are in agreement to accept the patient for admission.    Findings and plan explained to the Patient who consents to admission. Discussed the patient with Dr. Escamilla, who will admit the patient [facility] [place] [facility] [place] for further monitoring, evaluation, and treatment.    Impression & Plan    Medical Decision Making:  Patient presents the ER with palpitations.  Initial vital signs notable for heart rate vacillating between 130s and 170s beats per  minute.  EKG revealed A. fib with RVR without specific signs of ischemia.  A single troponin is negative.  Blood pressure was within normal limits and oxygen saturation was within normal limits.  She had no evidence of angina or clinical signs of heart failure.  There are no indications for emergent cardioversion.  She was given IV fluids and magnesium.  After discussing with the patient, it sounds as though she had an acute onset of palpitations just prior to arrival but does endorse intermittent irregular heartbeats from time to time.  I discussed the case with on-call cardiology and given my concern that the patient may have had occult A. fib without RVR prior to this, I was hesitant to perform cardioversion in this setting.  The patient was rate controlled with diltiazem bolus and infusion.  She was admitted to the hospital for further management.    Diagnosis:    ICD-10-CM    1. Atrial fibrillation with RVR (H) I48.91 Basic metabolic panel     TSH     TSH     Troponin POCT     Troponin POCT     CANCELED: TSH       Disposition:  Admitted to Dr. Escamilla.    Scribe Disclosure:  I, Salud Huang, am serving as a scribe on 10/16/2018 at 1:13 PM to personally document services performed by Tang Solorzano MD based on my observations and the provider's statements to me.      Salud Huang  10/16/2018   [facility] EMERGENCY DEPARTMENT       Tang Solorzano MD  10/16/18 1090

## 2018-10-16 NOTE — IP AVS SNAPSHOT
[place] [facility] [address] [facility]    [address]    [facility] [place] [address]    Phone:  [phone]    Fax:  [phone]                                       After Visit Summary   10/16/2018    Taty Aguila    MRN: [id number]           After Visit Summary Signature Page     I have received my discharge instructions, and my questions have been answered. I have discussed any challenges I see with this plan with the nurse or doctor.    ..........................................................................................................................................  Patient/Patient Representative Signature      ..........................................................................................................................................  Patient Representative Print Name and Relationship to Patient    ..................................................               ................................................  Date                                   Time    ..........................................................................................................................................  Reviewed by Signature/Title    ...................................................              ..............................................  Date                                               Time          22EPIC Rev 08/18

## 2018-10-16 NOTE — ED NOTES
Bed: ED33  Expected date: 10/16/18  Expected time: 12:51 PM  Means of arrival: Ambulance  Comments:  BV3  74yo F afib 180's

## 2018-10-16 NOTE — PHARMACY-ADMISSION MEDICATION HISTORY
Admission medication history interview status for this patient is complete. See [facility] admission navigator for allergy information, prior to admission medications and immunization status.     Medication history interview source(s):Patient  Medication history resources (including written lists, pill bottles, clinic record):None    Changes made to PTA medication list:  Added: none  Deleted: none  Changed: glucosamine dose to 2 tabs after lunch, sig to fexofenadine (OTC dose unknown)    Actions taken by pharmacist (provider contacted, etc):None     Additional medication history information:None    Medication reconciliation/reorder completed by provider prior to medication history? No    Prior to Admission medications    Medication Sig Last Dose Taking? Auth Provider   ASPIRIN 81 MG OR TABS 1 tab po QD (Once per day) 10/16/2018 Yes    calcium carbonate-vitamin D (CALCIUM 600+D) 600-200 MG-UNIT TABS Take 1 tablet by mouth 2 times daily 10/16/2018 at am Yes Unknown, Entered By History   Fexofenadine HCl (ALLEGRA PO) Take 1 tablet by mouth daily Dose unknown 10/16/2018 Yes Reported, Patient   Glucosamine HCl--750 MG TABS Take 2 tablets by mouth daily After lunch 10/16/2018 Yes Reported, Patient   MELATONIN PO Take 5 mg by mouth nightly as needed   Yes Reported, Patient   Multiple Vitamins-Minerals (MULTIVITAMIN ADULT) TABS Take 1 tablet by mouth daily 10/16/2018 Yes Reported, Patient   RANITIDINE HCL PO Take 150 mg by mouth nightly as needed for heartburn  Yes Unknown, Entered By History   NONFORMULARY Take 1 tablet by mouth once OTC med called CPM per patient.   Patient states that it is a decongest and antihistamine combo.   Unknown, Entered By History

## 2018-10-16 NOTE — IP AVS SNAPSHOT
MRN:[id number]                      After Visit Summary   10/16/2018    Taty Aguila    MRN: [id number]           Thank you!     Thank you for choosing [facility] for your care. Our goal is always to provide you with excellent care. Hearing back from our patients is one way we can continue to improve our services. Please take a few minutes to complete the written survey that you may receive in the mail after you visit. If you would like to speak to someone directly about your visit please contact Patient Relations at [phone]. Thank you!          Patient Information     Date Of Birth          1945        Designated Caregiver       Most Recent Value    Caregiver    Will someone help with your care after discharge? yes    Name of designated caregiver Satish ()    Phone number of caregiver see facesheet    Caregiver address same as pts      About your hospital stay     You were admitted on:  October 16, 2018 You last received care in the:  [facility]    You were discharged on:  October 18, 2018        Reason for your hospital stay       Atrial fibrillation                  Who to Call     For medical emergencies, please call 911.  For non-urgent questions about your medical care, please call your primary care provider or clinic, [phone]          Attending Provider     Provider Specialty    Tang Solorzano MD Emergency Medicine    AndiNoa iverson MD Internal Medicine       Primary Care Provider Office Phone # Fax #    Anyi Olmos [phone]      After Care Instructions     Activity       Your activity upon discharge: activity as tolerated            Diet       Follow this diet upon discharge: Orders Placed This Encounter      Combination Diet Regular Diet Adult                  Follow-up Appointments     Follow-up and recommended labs and tests        Follow up with primary care provider, Anyi Olmos, within 7 days for  "hospital follow- up.  No follow up labs or test are needed.  Follow up with cardiology in a few weeks  Out patient sleep evaluation for DONNELL                  Additional Services     Follow-Up with Cardiac Advanced Practice Provider                 Pending Results     Date and Time Order Name Status Description    10/17/2018 0027 EKG 12-lead, complete Preliminary             Statement of Approval     Ordered          10/18/18 0915  I have reviewed and agree with all the recommendations and orders detailed in this document.  EFFECTIVE NOW     Approved and electronically signed by:  Noam Lancaster MD             Admission Information     Date & Time Provider Department Dept. Phone    10/16/2018 Noa Escamilla MD [place] [address] Medical Surgical [phone]      Your Vitals Were     Blood Pressure Pulse Temperature Respirations Height Weight    115/63 (BP Location: Left arm) 135 98.1  F (36.7  C) (Oral) 16 1.689 m (5' 6.5\") 56.5 kg (124 lb 9.6 oz)    Pulse Oximetry BMI (Body Mass Index)                95% 19.81 kg/m2          [employer] Information     [employer] gives you secure access to your electronic health record. If you see a primary care provider, you can also send messages to your care team and make appointments. If you have questions, please call your primary care clinic.  If you do not have a primary care provider, please call [phone] and they will assist you.        Care EveryWhere ID     This is your Care EveryWhere ID. This could be used by other organizations to access your [facility] medical records  [id number]        Equal Access to Services     JAYASHREE PASCAL AH: Hadii gaby Robertson, waaxda luqadaha, qaybta kaalmada ozielyada, radha colby. [facility] [phone].    ATENCIÓN: Si habla español, tiene a lara disposición servicios gratuitos de asistencia lingüística. Llame al [phone].    We comply with applicable federal civil rights laws and [place] " laws. We do not discriminate on the basis of race, color, national origin, age, disability, sex, sexual orientation, or gender identity.               Review of your medicines      START taking        Dose / Directions    metoprolol succinate 25 MG 24 hr tablet   Commonly known as:  TOPROL-XL        Dose:  12.5 mg   Take 0.5 tablets (12.5 mg) by mouth daily   Quantity:  30 tablet   Refills:  3       omeprazole 10 MG CR capsule   Commonly known as:  priLOSEC   Used for:  Chronic anticoagulation        Dose:  10 mg   Take 1 capsule (10 mg) by mouth every morning (before breakfast)   Quantity:  60 capsule   Refills:  3       rivaroxaban ANTICOAGULANT 20 MG Tabs tablet   Commonly known as:  XARELTO        Dose:  20 mg   Take 1 tablet (20 mg) by mouth daily (with dinner)   Quantity:  30 tablet   Refills:  3         CONTINUE these medicines which have NOT CHANGED        Dose / Directions    ALLEGRA PO        Take by mouth daily Dose unknown   Refills:  0       CALCIUM 600+D 600-200 MG-UNIT Tabs   Generic drug:  calcium carbonate-vitamin D        Dose:  1 tablet   Take 1 tablet by mouth 2 times daily   Refills:  0       Glucosamine HCl--750 MG Tabs        Dose:  2 tablet   Take 2 tablets by mouth daily After lunch   Refills:  0       MELATONIN PO        Dose:  5 mg   Take 5 mg by mouth nightly as needed   Refills:  0       MULTIVITAMIN ADULT Tabs        Dose:  1 tablet   Take 1 tablet by mouth daily   Refills:  0       NONFORMULARY        Dose:  1 tablet   Take 1 tablet by mouth once OTC med called CPM per patient.   Patient states that it is a decongest and antihistamine combo.   Refills:  0         STOP taking     aspirin 81 MG tablet           RANITIDINE HCL PO                Where to get your medicines      These medications were sent to [id number] - [place] - [address] [address]  [address] [facility] [address]     Phone:  [phone]     metoprolol succinate 25 MG 24 hr tablet     omeprazole 10 MG CR capsule    rivaroxaban ANTICOAGULANT 20 MG Tabs tablet                Protect others around you: Learn how to safely use, store and throw away your medicines at www.disposemymeds.org.             Medication List: This is a list of all your medications and when to take them. Check marks below indicate your daily home schedule. Keep this list as a reference.      Medications           Morning Afternoon Evening Bedtime As Needed    ALLEGRA PO   Take by mouth daily Dose unknown   Next Dose Due:  DUE TOMORROW AM (10/19)                                   CALCIUM 600+D 600-200 MG-UNIT Tabs   Take 1 tablet by mouth 2 times daily   Generic drug:  calcium carbonate-vitamin D   Next Dose Due:  DUE THIS EVENING (10/18)                                      Glucosamine HCl--750 MG Tabs   Take 2 tablets by mouth daily After lunch   Next Dose Due:  DUE TODAY AFTER LUNCH (10/18)                                   MELATONIN PO   Take 5 mg by mouth nightly as needed   Next Dose Due:  AT BEDTIME IF NEEDED                                     metoprolol succinate 25 MG 24 hr tablet   Commonly known as:  TOPROL-XL   Take 0.5 tablets (12.5 mg) by mouth daily   Last time this was given:  12.5 mg on 10/18/2018  9:23 AM   Next Dose Due:  DUE TOMORROW AM (10/19)                                   MULTIVITAMIN ADULT Tabs   Take 1 tablet by mouth daily   Next Dose Due:  DUE TOMORROW AM (10/19)                                   NONFORMULARY   Take 1 tablet by mouth once OTC med called CPM per patient.   Patient states that it is a decongest and antihistamine combo.                                omeprazole 10 MG CR capsule   Commonly known as:  priLOSEC   Take 1 capsule (10 mg) by mouth every morning (before breakfast)   Last time this was given:  10 mg on 10/18/2018  9:23 AM   Next Dose Due:  DUE TOMORROW AM (10/19)                                   rivaroxaban ANTICOAGULANT 20 MG Tabs tablet   Commonly known as:   XARELTO   Take 1 tablet (20 mg) by mouth daily (with dinner)   Last time this was given:  20 mg on 10/17/2018  4:37 PM   Next Dose Due:  DUE THIS EVENING WITH SUPPER (10/18)                                             More Information        Metoprolol extended-release tablets  Brand Names: toprol, Toprol XL  What is this medicine?  METOPROLOL (me TOE proe lole) is a beta-blocker. Beta-blockers reduce the workload on the heart and help it to beat more regularly. This medicine is used to treat high blood pressure and to prevent chest pain. It is also used to after a heart attack and to prevent an additional heart attack from occurring.  How should I use this medicine?  Take this medicine by mouth with a glass of water. Follow the directions on the prescription label. Do not crush or chew. Take this medicine with or immediately after meals. Take your doses at regular intervals. Do not take more medicine than directed. Do not stop taking this medicine suddenly. This could lead to serious heart-related effects.  Talk to your pediatrician regarding the use of this medicine in children. While this drug may be prescribed for children as young as 6 years for selected conditions, precautions do apply.  What side effects may I notice from receiving this medicine?  Side effects that you should report to your doctor or health care professional as soon as possible:    allergic reactions like skin rash, itching or hives    cold or numb hands or feet    depression    difficulty breathing    faint    fever with sore throat    irregular heartbeat, chest pain    rapid weight gain    swollen legs or ankles  Side effects that usually do not require medical attention (report to your doctor or health care professional if they continue or are bothersome):    anxiety or nervousness    change in sex drive or performance    dry skin    headache    nightmares or trouble sleeping    short term memory loss    stomach upset or  diarrhea    unusually tired  What may interact with this medicine?  This medicine may interact with the following medications:    certain medicines for blood pressure, heart disease, irregular heart beat    certain medicines for depression, like monoamine oxidase (MAO) inhibitors, fluoxetine, or paroxetine    clonidine    dobutamine    epinephrine    isoproterenol    reserpine  What if I miss a dose?  If you miss a dose, take it as soon as you can. If it is almost time for your next dose, take only that dose. Do not take double or extra doses.  Where should I keep my medicine?  Keep out of the reach of children.  Store at room temperature between 15 and 30 degrees C (59 and 86 degrees F). Throw away any unused medicine after the expiration date.  What should I tell my health care provider before I take this medicine?  They need to know if you have any of these conditions:    diabetes    heart or vessel disease like slow heart rate, worsening heart failure, heart block, sick sinus syndrome or Raynaud's disease    kidney disease    liver disease    lung or breathing disease, like asthma or emphysema    pheochromocytoma    thyroid disease    an unusual or allergic reaction to metoprolol, other beta-blockers, medicines, foods, dyes, or preservatives    pregnant or trying to get pregnant    breast-feeding  What should I watch for while using this medicine?  Visit your doctor or health care professional for regular check ups. Contact your doctor right away if your symptoms worsen. Check your blood pressure and pulse rate regularly. Ask your health care professional what your blood pressure and pulse rate should be, and when you should contact them.  You may get drowsy or dizzy. Do not drive, use machinery, or do anything that needs mental alertness until you know how this medicine affects you. Do not sit or stand up quickly, especially if you are an older patient. This reduces the risk of dizzy or fainting spells. Contact  your doctor if these symptoms continue. Alcohol may interfere with the effect of this medicine. Avoid alcoholic drinks.  NOTE:This sheet is a summary. It may not cover all possible information. If you have questions about this medicine, talk to your doctor, pharmacist, or health care provider. Copyright  2018 [employer]                Patient Education    Rivaroxaban Oral tablet    Rivaroxaban Oral tablet, Rivaroxaban Oral tablet  Rivaroxaban Oral tablet  What is this medicine?  RIVAROXABAN (ri va CONNIE a ban) is an anticoagulant (blood thinner). It is used to treat blood clots in the lungs or in the veins. It is also used after knee or hip surgeries to prevent blood clots. It is also used to lower the chance of stroke in people with a medical condition called atrial fibrillation.  This medicine may be used for other purposes; ask your health care provider or pharmacist if you have questions.  What should I tell my health care provider before I take this medicine?  They need to know if you have any of these conditions:    bleeding disorders    bleeding in the brain    blood in your stools (black or tarry stools) or if you have blood in your vomit    history of stomach bleeding    kidney disease    liver disease    low blood counts, like low white cell, platelet, or red cell counts    recent or planned spinal or epidural procedure    take medicines that treat or prevent blood clots    an unusual or allergic reaction to rivaroxaban, other medicines, foods, dyes, or preservatives    pregnant or trying to get pregnant    breast-feeding  How should I use this medicine?  Take this medicine by mouth with a glass of water. Follow the directions on the prescription label. Take your medicine at regular intervals. Do not take it more often than directed. Do not stop taking except on your doctor's advice. Stopping this medicine may increase your risk of a blood clot. Be sure to refill your prescription before you run out of  medicine.  If you are taking this medicine after hip or knee replacement surgery, take it with or without food. If you are taking this medicine for atrial fibrillation, take it with your evening meal. If you are taking this medicine to treat blood clots, take it with food at the same time each day. If you are unable to swallow your tablet, you may crush the tablet and mix it in applesauce. Then, immediately eat the applesauce. You should eat more food right after you eat the applesauce containing the crushed tablet.  Talk to your pediatrician regarding the use of this medicine in children. Special care may be needed.  Overdosage: If you think you have taken too much of this medicine contact a poison control center or emergency room at once.  NOTE: This medicine is only for you. Do not share this medicine with others.  What if I miss a dose?  If you take your medicine once a day and miss a dose, take the missed dose as soon as you remember. If you take your medicine twice a day and miss a dose, take the missed dose immediately. In this instance, 2 tablets may be taken at the same time. The next day you should take 1 tablet twice a day as directed.  What may interact with this medicine?    aspirin and aspirin-like medicines    certain antibiotics like erythromycin, azithromycin, and clarithromycin    certain medicines for fungal infections like ketoconazole and itraconazole    certain medicines for irregular heart beat like amiodarone, quinidine, dronedarone    certain medicines for seizures like carbamazepine, phenytoin    certain medicines that treat or prevent blood clots like warfarin, enoxaparin, and dalteparin    conivaptan    diltiazem    felodipine    indinavir    lopinavir; ritonavir    NSAIDS, medicines for pain and inflammation, like ibuprofen or naproxen    ranolazine    rifampin    ritonavir    [place]'s wort    verapamil  This list may not describe all possible interactions. Give your health care  provider a list of all the medicines, herbs, non-prescription drugs, or dietary supplements you use. Also tell them if you smoke, drink alcohol, or use illegal drugs. Some items may interact with your medicine.  What should I watch for while using this medicine?  Visit your doctor or health care professional for regular checks on your progress. Your condition will be monitored carefully while you are receiving this medicine.  Notify your doctor or health care professional and seek emergency treatment if you develop breathing problems; changes in vision; chest pain; severe, sudden headache; pain, swelling, warmth in the leg; trouble speaking; sudden numbness or weakness of the face, arm, or leg. These can be signs that your condition has gotten worse.  If you are going to have surgery, tell your doctor or health care professional that you are taking this medicine.  Tell your health care professional that you use this medicine before you have a spinal or epidural procedure. Sometimes people who take this medicine have bleeding problems around the spine when they have a spinal or epidural procedure. This bleeding is very rare. If you have a spinal or epidural procedure while on this medicine, call your health care professional immediately if you have back pain, numbness or tingling (especially in your legs and feet), muscle weakness, paralysis, or loss of bladder or bowel control.  Avoid sports and activities that might cause injury while you are using this medicine. Severe falls or injuries can cause unseen bleeding. Be careful when using sharp tools or knives. Consider using an electric razor. Take special care brushing or flossing your teeth. Report any injuries, bruising, or red spots on the skin to your doctor or health care professional.  What side effects may I notice from receiving this medicine?  Side effects that you should report to your doctor or health care professional as soon as possible:    allergic  reactions like skin rash, itching or hives, swelling of the face, lips, or tongue    back pain    redness, blistering, peeling or loosening of the skin, including inside the mouth    signs and symptoms of bleeding such as bloody or black, tarry stools; red or dark-brown urine; spitting up blood or brown material that looks like coffee grounds; red spots on the skin; unusual bruising or bleeding from the eye, gums, or nose  Side effects that usually do not require medical attention (Report these to your doctor or health care professional if they continue or are bothersome.):    dizziness    muscle pain  This list may not describe all possible side effects. Call your doctor for medical advice about side effects. You may report side effects to FDA at 1-733-FDA-1759.  Where should I keep my medicine?  Keep out of the reach of children.  Store at room temperature between 15 and 30 degrees C (59 and 86 degrees F). Throw away any unused medicine after the expiration date.  NOTE: This sheet is a summary. It may not cover all possible information. If you have questions about this medicine, talk to your doctor, pharmacist, or health care provider.  NOTE:This sheet is a summary. It may not cover all possible information. If you have questions about this medicine, talk to your doctor, pharmacist, or health care provider. Copyright  2016 [employer]

## 2018-10-16 NOTE — H&P
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  Afib with RVR:  She has severe LAE by echo, and what sounds like hx of periodic palpitations.  She has No e/o CAD by cath in 2016.  I suspect she has at least PAF.  She has a [id number] score of 2 so warrants anticoagulation.  Will place on heparin for now, and will defer to cards re: DOAC vs warfarin.    Of note, TSH wnl.  I've asked the pharmacy liaison to check on insurance coverage for DOAC.  Cont dilt overnight-then would transition to beta blocker if converted or controlled. Discussed the case with Dr Coombs who agreed with this plan.  She should also get zio patch or other OP monitor to determine frequency of AF    2.  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  She sat down to rest but it persisted, one of the workers at the  was called and stated that yes her HR was fast, EMS came by and documented that her HR was in the 180's and so brought her into the ER for evaluation.     During the episode she denied any cp or sob, no dizziness or lightheadedness.  She states that she will occasionally note that her heart skips a beat for < 30 seconds but states this has never happened  to her before    On arrival to the ER she was at about 150 bpm.  ER course notable for stable BPs, normal oxygenation.  BMP wnl, TSH wnl, cbc wnl.  Glucose is moderately elevated in the non-fasting state at 156.    She was placed on diltiazem gtt with HR now in the 100-110 range and states she currently is without symptoms.  Makes me quite suspicious for PAF especially in setting of MR with LAE.     We discussed the role of AC in one with a [id number]  Of two and she would prefer DOAC if covered by her insurance      The history is obtained in discussion with the ER provider Dr. Solorzano, and the patient with excellent reliability.        Past Medical History:     Past Medical History:   Diagnosis Date     Allergic rhinitis, cause unspecified     dust mites, ragweed     Colitis      Mitral regurgitation      Mitral valve disorders(424.0) 1984    myxomatous valve, mod MR, prolapse     Mitral valve prolapse              Past Surgical History:     Past Surgical History:   Procedure Laterality Date     COLONOSCOPY N/A 9/15/2015    Procedure: COLONOSCOPY;  Surgeon: Anson Llanos MD;  Location:  GI             Social History:     Social History   Substance Use Topics     Smoking status: Never Smoker     Smokeless tobacco: Never Used     Alcohol use Yes      Comment: beer with dinner   l[facility] with her .  [facility] and [facility] 5 miles per day.  Code status is Full but whe would not want to be hooked up to life sustaining treatment without a chance of a meaningful recovery.  She reports she's discussed this at length with her dtr who knows her preferences          Family History:     Family History   Problem Relation Age of Onset     Osteoporosis Mother      Alzheimer Disease Mother      Family History Negative Father      HEART DISEASE Maternal Grandfather      Family History Negative Paternal Grandmother      Family History Negative Paternal Grandfather             Allergies:     Allergies  "  Allergen Reactions     No Known Drug Allergies              Medications:     Prior to Admission medications    Medication Sig Last Dose Taking? Auth Provider   ASPIRIN 81 MG OR TABS 1 tab po QD (Once per day) 10/16/2018 Yes    calcium carbonate-vitamin D (CALCIUM 600+D) 600-200 MG-UNIT TABS Take 1 tablet by mouth 2 times daily 10/16/2018 at am Yes Unknown, Entered By History   Fexofenadine HCl (ALLEGRA PO) Take by mouth daily Dose unknown 10/16/2018 Yes Reported, Patient   Glucosamine HCl--750 MG TABS Take 2 tablets by mouth daily After lunch 10/16/2018 Yes Reported, Patient   MELATONIN PO Take 5 mg by mouth nightly as needed   Yes Reported, Patient   Multiple Vitamins-Minerals (MULTIVITAMIN ADULT) TABS Take 1 tablet by mouth daily 10/16/2018 Yes Reported, Patient   RANITIDINE HCL PO Take 150 mg by mouth nightly as needed for heartburn  Yes Unknown, Entered By History   NONFORMULARY Take 1 tablet by mouth once OTC med called CPM per patient.   Patient states that it is a decongest and antihistamine combo.   Unknown, Entered By History                 Review of Systems:   A Comprehensive greater than 10 system review of systems was carried out.  Pertinent positives and negatives are noted above.  Otherwise negative for contributory information.           Physical Exam:   Blood pressure 129/87, pulse 135, temperature 97.9  F (36.6  C), temperature source Oral, resp. rate 13, height 1.676 m (5' 6\"), weight 56.7 kg (125 lb), SpO2 95 %.  Exam:    General:  Pleasant nad looks  < stated age  HEENT:  Head nc/at sclera clear PERRL O/P:  Moist mucus membranes no posterior pharyngeal erythema or exudate.  Neck is supple  Lungs: cta b nl effort   CV:  IIR no m/r/g no le edema  Abd:  S/nt/nd no r/g  Neuro:  Cn 2-12 grossly intact and strength is intact in b ue and le  Alert and oriented affect appropriate   Skin:  W/d no c/c               Data:          Lab Results   Component Value Date     10/16/2018    Lab " Results   Component Value Date    CHLORIDE 101 10/16/2018    Lab Results   Component Value Date    BUN 11 10/16/2018      Lab Results   Component Value Date    POTASSIUM 3.4 10/16/2018    Lab Results   Component Value Date    CO2 24 10/16/2018    Lab Results   Component Value Date    CR 0.84 10/16/2018        Lab Results   Component Value Date    WBC 6.9 10/16/2018    HGB 13.8 10/16/2018    HCT 41.2 10/16/2018    MCV 97 10/16/2018     10/16/2018     Lab Results   Component Value Date     (H) 10/16/2018     Lab Results   Component Value Date    INR 0.94 10/16/2018     Lab Results   Component Value Date    TSH 1.96 10/16/2018            Imaging:     Recent Results (from the past 24 hour(s))   XR Chest 2 Views    Narrative    CHEST TWO VIEWS  10/16/2018 2:15 PM     HISTORY: A-fib.     COMPARISON: 10/12/2017    FINDINGS: Heart size upper normal but similar to prior. Pulmonary  vascularity within normal limits. The lungs are clear. No pneumothorax  or pleural effusion.       Impression    IMPRESSION: No radiographic evidence of acute chest abnormality.     NESS MADERA MD   CXR to my personal read:  wnl    EKGs:  To my personal read:      EKG:  AF with rvr at 150 - with PVCs and nonspecific ST-T changes diffusely, also with what looks like an incomplete bbb but QRS measured only at 90    EKG:  Repear:  AF with  range, with PVCs, diffuse ST-T changes mostly resolved although still with what looks like an incomplete bbb    Repeat EKG

## 2018-10-17 LAB
ANION GAP SERPL CALCULATED.3IONS-SCNC: 8 MMOL/L (ref 3–14)
BUN SERPL-MCNC: 10 MG/DL (ref 7–30)
CALCIUM SERPL-MCNC: 8.5 MG/DL (ref 8.5–10.1)
CHLORIDE SERPL-SCNC: 109 MMOL/L (ref 94–109)
CO2 SERPL-SCNC: 24 MMOL/L (ref 20–32)
CREAT SERPL-MCNC: 0.73 MG/DL (ref 0.52–1.04)
ERYTHROCYTE [DISTWIDTH] IN BLOOD BY AUTOMATED COUNT: 13.5 % (ref 10–15)
GFR SERPL CREATININE-BSD FRML MDRD: 79 ML/MIN/1.7M2
GLUCOSE SERPL-MCNC: 102 MG/DL (ref 70–99)
HCT VFR BLD AUTO: 40.4 % (ref 35–47)
HGB BLD-MCNC: 13.3 G/DL (ref 11.7–15.7)
INTERPRETATION ECG - MUSE: NORMAL
LMWH PPP CHRO-ACNC: 0.2 IU/ML
LMWH PPP CHRO-ACNC: 0.43 IU/ML
MCH RBC QN AUTO: 32 PG (ref 26.5–33)
MCHC RBC AUTO-ENTMCNC: 32.9 G/DL (ref 31.5–36.5)
MCV RBC AUTO: 97 FL (ref 78–100)
PLATELET # BLD AUTO: 227 10E9/L (ref 150–450)
POTASSIUM SERPL-SCNC: 3.8 MMOL/L (ref 3.4–5.3)
RBC # BLD AUTO: 4.16 10E12/L (ref 3.8–5.2)
SODIUM SERPL-SCNC: 141 MMOL/L (ref 133–144)
WBC # BLD AUTO: 5.3 10E9/L (ref 4–11)

## 2018-10-17 PROCEDURE — A9270 NON-COVERED ITEM OR SERVICE: HCPCS | Mod: GY | Performed by: INTERNAL MEDICINE

## 2018-10-17 PROCEDURE — 85027 COMPLETE CBC AUTOMATED: CPT | Performed by: INTERNAL MEDICINE

## 2018-10-17 PROCEDURE — 25000128 H RX IP 250 OP 636: Performed by: INTERNAL MEDICINE

## 2018-10-17 PROCEDURE — 85520 HEPARIN ASSAY: CPT | Performed by: INTERNAL MEDICINE

## 2018-10-17 PROCEDURE — 90662 IIV NO PRSV INCREASED AG IM: CPT | Performed by: INTERNAL MEDICINE

## 2018-10-17 PROCEDURE — 25000132 ZZH RX MED GY IP 250 OP 250 PS 637: Mod: GY | Performed by: INTERNAL MEDICINE

## 2018-10-17 PROCEDURE — 25000125 ZZHC RX 250: Performed by: EMERGENCY MEDICINE

## 2018-10-17 PROCEDURE — 93010 ELECTROCARDIOGRAM REPORT: CPT | Performed by: INTERNAL MEDICINE

## 2018-10-17 PROCEDURE — 99232 SBSQ HOSP IP/OBS MODERATE 35: CPT | Performed by: INTERNAL MEDICINE

## 2018-10-17 PROCEDURE — 25000128 H RX IP 250 OP 636: Performed by: EMERGENCY MEDICINE

## 2018-10-17 PROCEDURE — 93005 ELECTROCARDIOGRAM TRACING: CPT

## 2018-10-17 PROCEDURE — 12000007 ZZH R&B INTERMEDIATE

## 2018-10-17 PROCEDURE — 80048 BASIC METABOLIC PNL TOTAL CA: CPT | Performed by: INTERNAL MEDICINE

## 2018-10-17 PROCEDURE — 36415 COLL VENOUS BLD VENIPUNCTURE: CPT | Performed by: INTERNAL MEDICINE

## 2018-10-17 PROCEDURE — 99221 1ST HOSP IP/OBS SF/LOW 40: CPT | Performed by: INTERNAL MEDICINE

## 2018-10-17 RX ORDER — NITROGLYCERIN 0.4 MG/1
0.4 TABLET SUBLINGUAL EVERY 5 MIN PRN
Status: DISCONTINUED | OUTPATIENT
Start: 2018-10-17 | End: 2018-10-17 | Stop reason: CLARIF

## 2018-10-17 RX ORDER — OMEPRAZOLE 10 MG/1
10 CAPSULE, DELAYED RELEASE ORAL
Status: DISCONTINUED | OUTPATIENT
Start: 2018-10-18 | End: 2018-10-18 | Stop reason: HOSPADM

## 2018-10-17 RX ORDER — LIDOCAINE 40 MG/G
CREAM TOPICAL
Status: DISCONTINUED | OUTPATIENT
Start: 2018-10-17 | End: 2018-10-17 | Stop reason: CLARIF

## 2018-10-17 RX ADMIN — DILTIAZEM HYDROCHLORIDE 5 MG/HR: 5 INJECTION INTRAVENOUS at 06:49

## 2018-10-17 RX ADMIN — RIVAROXABAN 20 MG: 20 TABLET, FILM COATED ORAL at 16:37

## 2018-10-17 RX ADMIN — INFLUENZA A VIRUS A/MICHIGAN/45/2015 X-275 (H1N1) ANTIGEN (FORMALDEHYDE INACTIVATED), INFLUENZA A VIRUS A/SINGAPORE/INFIMH-16-0019/2016 IVR-186 (H3N2) ANTIGEN (FORMALDEHYDE INACTIVATED), AND INFLUENZA B VIRUS B/MARYLAND/15/2016 BX-69A (A B/COLORADO/6/2017-LIKE VIRUS) ANTIGEN (FORMALDEHYDE INACTIVATED) 0.5 ML: 60; 60; 60 INJECTION, SUSPENSION INTRAMUSCULAR at 12:40

## 2018-10-17 RX ADMIN — Medication 12.5 MG: at 11:09

## 2018-10-17 ASSESSMENT — ACTIVITIES OF DAILY LIVING (ADL)
ADLS_ACUITY_SCORE: 9

## 2018-10-17 NOTE — PROGRESS NOTES
SPIRITUAL HEALTH SERVICES Progress Note  [facility]    Met with pt to provide information regarding health care directive.   Pt is attending a AHD workshop next week and requested resources for preparation.   Provided education and appropriate documents.    Plan: Spiritual Health Services remains available for additional emotional/spiritual support.    Ken Wiggins MA  Staff   Pager: [phone]  Phone: [phone]

## 2018-10-17 NOTE — PHARMACY
Anticoagulation coverage check.  Patient has Medicare D through [employer] with $260 (of $260) unmet deductible.    The patient's plan will not be available in 2019 so she will have to pick a new Medicare D provider.  As such, I am unable to provide pricing beyond Dec 2018.      Xarelto/Eliquis  October:  Upon receipt of RX, [facility] can provide 1 month free.  November:  $287  December: $27    Pradaxa is non-formulary and more expensive.    Jantoven  $2/mo      -CATHERINE Longoria, Pharmacy Technician/Liaison, [facility] [phone]

## 2018-10-17 NOTE — PLAN OF CARE
Problem: Patient Care Overview  Goal: Plan of Care/Patient Progress Review  Outcome: Improving  VSS except for low blood pressure denies chest pain alert/orientes X4 ambulates independently. Continues on the dilt gtt at 5ml/hr and heparin gtt at 600. Converted to sinus rhythm from afib at 0030 EKG to confirm MD jennings. Cardiology consulting tele SR.

## 2018-10-17 NOTE — PROGRESS NOTES
[facility]    Hospitalist Progress Note    Assessment & Plan   Taty Aguila is a 73 year old female who was admitted on 10/16/2018. 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  She has No e/o CAD by cath in 2016.  I suspect she has at least PAF.  She has a [id number] score of 2 so warrants anticoagulation.       heparin for now and plan to transition to DOAC today   Of note, TSH wnl.     pharmacy liaison to check on insurance coverage for DOAC.      Will get her off Diltiazem and transition to beta blocker low dose and adjust today as needed based on HR and BP    She should also get zio patch or other OP monitor to determine frequency of AF and follow up with cardiology     2.  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     DVT Prophylaxis: oral anticoagulant today  Code Status: Full Code    Disposition: Expected discharge tomorow.    Noam Lancaster MD  [phone] (P)  Text Page  (7am to 6pm)      Interval History   Back in sinus. No palpation or SOB    -Data reviewed today: I reviewed all new labs and imaging results over the last 24 hours. I personally reviewed the EKG tracing showing sinus rythm.    Physical Exam   Temp: 97.6  F (36.4  C) Temp src: Oral BP: 115/62 Pulse: 135 Heart Rate: 74 Resp: 20 SpO2: 97 % O2 Device: None (Room air)    Vitals:    10/16/18 1308 10/16/18 1700   Weight: 56.7 kg (125 lb) 56.5 kg (124 lb 9.6 oz)     Vital Signs with Ranges  Temp:  [97.6  F (36.4  C)-98.4  F (36.9  C)] 97.6  F (36.4  C)  Pulse:  [135] 135  Heart Rate:  [] 74  Resp:  [11-20] 20  BP: ()/() 115/62  SpO2:  [93 %-98 %] 97 %  I/O last 3 completed shifts:  In: 409 [P.O.:200; I.V.:209]  Out: -        Constitutional: Nutritional status: normal. No acute distress   Neuro:   Alert:: Yes   Grossly non focal: Yes   HENT:   Pupils equal and reactive to light : Yes   Neck No obvious mass or swelling  CV:   Neck vein: non distended  RRR, S1S2 no gallop,  No rub, 2/6 Systolic murmur  LE Edema: Nlo  Pulm:  No respiratory distress  Bilateral chest symmetrical chest expansion on inspection  Auscultation: clear   Abdomen:   Soft, non tender, Bowel sound present   Uro-genital:   Dugan present: No  Extremities:   Joints: No gross deformities or swelling  Skin:   Warm, normal color and capillary refill.  Other:      Medications     diltiazem (CARDIZEM) infusion ADULT 5 mg/hr (10/17/18 0800)     HEParin 600 Units/hr (10/17/18 0800)       influenza Vac Split High-Dose  0.5 mL Intramuscular Prior to discharge     sodium chloride (PF)  3 mL Intracatheter Q8H     sodium chloride (PF)  3 mL Intracatheter Q8H       Data     Recent Labs  Lab 10/17/18  0736 10/16/18  1723 10/16/18  1439 10/16/18  1315   WBC 5.3 8.9  --  6.9   HGB 13.3 14.1  --  13.8   MCV 97 96  --  97    248  --  260   INR  --   --   --  0.94     --   --  135   POTASSIUM 3.8  --   --  3.4   CHLORIDE 109  --   --  101   CO2 24  --   --  24   BUN 10  --   --  11   CR 0.73  --   --  0.84   ANIONGAP 8  --   --  10   ARIELLE 8.5  --   --  9.3   *  --   --  156*   TROPONIN  --   --  0.01  --        Imaging:  Recent Results (from the past 24 hour(s))   XR Chest 2 Views    Narrative    CHEST TWO VIEWS  10/16/2018 2:15 PM     HISTORY: A-fib.     COMPARISON: 10/12/2017    FINDINGS: Heart size upper normal but similar to prior. Pulmonary  vascularity within normal limits. The lungs are clear. No pneumothorax  or pleural effusion.       Impression    IMPRESSION: No radiographic evidence of acute chest abnormality.     NESS MADERA MD

## 2018-10-17 NOTE — PLAN OF CARE
Problem: Patient Care Overview  Goal: Plan of Care/Patient Progress Review  Outcome: Improving  VSS. IMC status. Denies chest pain/discomfort. Denies SOB. Pt converted from Afib to SR with PVC's approx 00:30-dilt gtt continued at 5mg/hr until cardiology consult performed. Consult performed-pt started on low dose Metoprolol, dilt gtt dc'd. IMC status continued until 15:00. Continues on heparin gtt at 600 units/hr-Heparin gtt to be dc'd at 17:00 at which time pt will start on Xarelto. Per MD-monitor pt and telemetry overnight with anticipation of discharge tomorrow. Tele: SR with PVC's. Up ind in room.

## 2018-10-17 NOTE — CONSULTS
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  The patient has reverted back to sinus rhythm.  She has a chads vasc score of 2 suggesting that she should be anticoagulated.  At this time I will start Xarelto 20 mg daily and add low-dose Prilosec to avoid the increased risk of a GI bleed.  The patient's blood pressure makes it difficult to add a higher dose of an AV gabrielle blocking agent however I will start metoprolol succinate 12.5 mg daily.  The patient will be scheduled with 1 of our advanced practice providers in the next several weeks.  She can be discharged from a cardiovascular standpoint at this time.    Norberto Coombs MD    Primary Care Physician   Anyi Olmos    Reason for Consult   Reason for consult: I was asked by internal medicine to evaluate this patient for atrial fibrillation.    History of Present Illness   Taty Aguila is a 73 year old female who presents with a less than 1 day history of palpitations.  She presented to the emergency department and was found to be in new onset atrial fibrillation with a rapid ventricular response.  The patient has subsequently converted back to sinus rhythm early this morning and is back to her baseline health.  She denies any other cardiovascular symptoms such as chest pain, chest pressure, shortness of breath, orthopnea, or paroxysmal nocturnal dyspnea.  The patient does carry a history of a myxomatous mitral valve with at least moderate mitral regurgitation and a resulting dilated left atrium.  Her most recent transthoracic echocardiogram shows 1-2+ mitral regurgitation with a moderately dilated left atrium but preserved systolic function.    Patient Active Problem List   Diagnosis      Allergic rhinitis     Mitral valve disorder     CARDIOVASCULAR SCREENING; LDL GOAL LESS THAN 160     Colitis     Mitral valve regurgitation 3+ per echo      Syncope     Atrial fibrillation with RVR (H)       Past Medical History   I have reviewed this patient's medical history and updated it with pertinent information if needed.   Past Medical History:   Diagnosis Date     Allergic rhinitis, cause unspecified     dust mites, ragweed     Colitis      Mitral regurgitation      Mitral valve disorders(424.0) 1984    myxomatous valve, mod MR, prolapse     Mitral valve prolapse        Past Surgical History   I have reviewed this patient's surgical history and updated it with pertinent information if needed.  Past Surgical History:   Procedure Laterality Date     COLONOSCOPY N/A 9/15/2015    Procedure: COLONOSCOPY;  Surgeon: Anson Llanos MD;  Location:  GI       Prior to Admission Medications   Prior to Admission Medications   Prescriptions Last Dose Informant Patient Reported? Taking?   ASPIRIN 81 MG OR TABS 10/16/2018  No Yes   Si tab po QD (Once per day)   Fexofenadine HCl (ALLEGRA PO) 10/16/2018  Yes Yes   Sig: Take by mouth daily Dose unknown   Glucosamine HCl--750 MG TABS 10/16/2018  Yes Yes   Sig: Take 2 tablets by mouth daily After lunch   MELATONIN PO   Yes Yes   Sig: Take 5 mg by mouth nightly as needed    Multiple Vitamins-Minerals (MULTIVITAMIN ADULT) TABS 10/16/2018  Yes Yes   Sig: Take 1 tablet by mouth daily   NONFORMULARY   Yes No   Sig: Take 1 tablet by mouth once OTC med called CPM per patient.   Patient states that it is a decongest and antihistamine combo.   RANITIDINE HCL PO   Yes Yes   Sig: Take 150 mg by mouth nightly as needed for heartburn   calcium carbonate-vitamin D (CALCIUM 600+D) 600-200 MG-UNIT TABS 10/16/2018 at am  Yes Yes   Sig: Take 1 tablet by mouth 2 times daily      Facility-Administered Medications: None     Current Facility-Administered Medications  "  Medication Dose Route Frequency     influenza Vac Split High-Dose  0.5 mL Intramuscular Prior to discharge     metoprolol succinate  12.5 mg Oral Daily     [START ON 10/18/2018] omeprazole  10 mg Oral QAM AC     rivaroxaban ANTICOAGULANT  20 mg Oral Daily with supper     sodium chloride (PF)  3 mL Intracatheter Q8H     sodium chloride (PF)  3 mL Intracatheter Q8H     Current Facility-Administered Medications   Medication Last Rate     Allergies   Allergies   Allergen Reactions     No Known Drug Allergies        Social History    reports that she has never smoked. She has never used smokeless tobacco. She reports that she drinks alcohol. She reports that she does not use illicit drugs.    Family History   Family History   Problem Relation Age of Onset     Osteoporosis Mother      Alzheimer Disease Mother      Family History Negative Father      HEART DISEASE Maternal Grandfather      Family History Negative Paternal Grandmother      Family History Negative Paternal Grandfather        Review of Systems   The comprehensive 10 point Review of Systems is negative other than noted in the HPI or here.     Physical Exam   Vital Signs with Ranges  Temp:  [97.6  F (36.4  C)-98.4  F (36.9  C)] 97.6  F (36.4  C)  Pulse:  [135] 135  Heart Rate:  [] 74  Resp:  [11-20] 20  BP: ()/() 115/62  SpO2:  [93 %-98 %] 97 %  Wt Readings from Last 4 Encounters:   10/16/18 56.5 kg (124 lb 9.6 oz)   08/08/18 56.7 kg (125 lb)   10/31/17 54.3 kg (119 lb 9.6 oz)   10/13/17 53.6 kg (118 lb 1.6 oz)     I/O last 3 completed shifts:  In: 409 [P.O.:200; I.V.:209]  Out: -       Vitals: /62 (BP Location: Left arm)  Pulse 135  Temp 97.6  F (36.4  C) (Oral)  Resp 20  Ht 1.689 m (5' 6.5\")  Wt 56.5 kg (124 lb 9.6 oz)  SpO2 97%  BMI 19.81 kg/m2    No acute distress  No JVD  Irregular heart rate with a systolic murmur  Lungs are clear  Abdomen is soft and nontender  Lower extremities show no edema  She is alert and oriented " x3  Her affect is normal  Musculoskeletal exam does not reveal any gross abnormalities of her major joints  Dermatologic exam does not reveal any rashes or ecchymoses on exposed areas of skin    No lab results found in last 7 days.    Invalid input(s): TROPONINIES      Recent Labs  Lab 10/17/18  0736 10/16/18  1723 10/16/18  1315   WBC 5.3 8.9 6.9   HGB 13.3 14.1 13.8   MCV 97 96 97    248 260   INR  --   --  0.94     --  135   POTASSIUM 3.8  --  3.4   CHLORIDE 109  --  101   CO2 24  --  24   BUN 10  --  11   CR 0.73  --  0.84   GFRESTIMATED 79  --  66   GFRESTBLACK >90  --  80   ANIONGAP 8  --  10   ARIELLE 8.5  --  9.3   *  --  156*     Recent Labs   Lab Test  07/23/15   1024  07/21/14   1020   CHOL  202*  225*   HDL  96  97   LDL  83  96   TRIG  116  155*   CHOLHDLRATIO  2.1  2.3       Recent Labs  Lab 10/17/18  0736 10/16/18  1723 10/16/18  1315   WBC 5.3 8.9 6.9   HGB 13.3 14.1 13.8   HCT 40.4 42.6 41.2   MCV 97 96 97    248 260     No results for input(s): PH, PHV, PO2, PO2V, SAT, PCO2, PCO2V, HCO3, HCO3V in the last 168 hours.  No results for input(s): NTBNPI, NTBNP in the last 168 hours.  No results for input(s): DD in the last 168 hours.  No results for input(s): SED, CRP in the last 168 hours.    Recent Labs  Lab 10/17/18  0736 10/16/18  1723 10/16/18  1315    248 260       Recent Labs  Lab 10/16/18  1315   TSH 1.96     No results for input(s): COLOR, APPEARANCE, URINEGLC, URINEBILI, URINEKETONE, SG, UBLD, URINEPH, PROTEIN, UROBILINOGEN, NITRITE, LEUKEST, RBCU, WBCU in the last 168 hours.    Imaging:  Recent Results (from the past 48 hour(s))   XR Chest 2 Views    Narrative    CHEST TWO VIEWS  10/16/2018 2:15 PM     HISTORY: A-fib.     COMPARISON: 10/12/2017    FINDINGS: Heart size upper normal but similar to prior. Pulmonary  vascularity within normal limits. The lungs are clear. No pneumothorax  or pleural effusion.       Impression    IMPRESSION: No radiographic evidence of  acute chest abnormality.     NESS MADERA MD       Echo:  Recent Results (from the past 4320 hour(s))   Echocardiogram    Narrative    [id number]  [id number]  [id number]  [id number]^RONALD^GILLIAN^AKHIL           [facility]  Echocardiography Laboratory  [address] [place] [address]        Name: GERONIMO PAYTON  MRN: [id number]  : 1945  Study Date: 2018 11:01 AM  Age: 73 yrs  Gender: Female  [place] [facility] [place]  Reason For Study: , Mitral valve insufficiency, unspecified etiology,  Palpitations  Ordering Physician: GILLIAN CARDENAS  Referring Physician: GILLIAN CARDENAS  Performed By: Aj Motley RDCS     BSA: 1.6 m2  Height: 66 in  Weight: 125 lb  HR: 102  BP: 129/80 mmHg  _____________________________________________________________________________  __        Procedure  Complete Echo Adult.  _____________________________________________________________________________  __        Interpretation Summary     The visual ejection fraction is estimated at 60-65%.  The right ventricle is normal in size and function.  The left atrium is severely dilated.  The mitral valve leaflets appear myxomatous.  There is mild to moderate (1-2+) mitral regurgitation.  in comparison with the previous study the degree of MR is improved.  _____________________________________________________________________________  __        Left Ventricle  The left ventricle is normal in size. There is normal left ventricular wall  thickness. Grade II or moderate diastolic dysfunction. The visual ejection  fraction is estimated at 60-65%. No regional wall motion abnormalities noted.     Right Ventricle  The right ventricle is normal in size and function.     Atria  The left atrium is severely dilated. The right atrium is moderately dilated.  There is no atrial shunt seen.     Mitral Valve  The mitral valve leaflets appear myxomatous. There is mild to moderate (1-2+)  mitral regurgitation.        Tricuspid  Valve  The tricuspid valve is normal in structure and function. The right ventricular  systolic pressure is approximated at 19.0 mmHg plus the right atrial pressure.  There is mild to moderate (1-2+) tricuspid regurgitation.     Aortic Valve  The aortic valve is trileaflet. No aortic regurgitation is present. No  hemodynamically significant valvular aortic stenosis.     Pulmonic Valve  The pulmonic valve is not well seen, but is grossly normal. There is trace  pulmonic valvular regurgitation.     Vessels  Borderline aortic root dilatation. The ascending aorta is Borderline dilated.  The inferior vena cava is not dilated.     Pericardium  Trivial pericardial effusion.        Rhythm  Sinus rhythm was noted.  _____________________________________________________________________________  __  MMode/2D Measurements & Calculations  IVSd: 0.88 cm     LVIDd: 5.2 cm  LVIDs: 3.4 cm  LVPWd: 0.85 cm  FS: 35.4 %  LV mass(C)d: 161.0 grams  LV mass(C)dI: 98.3 grams/m2  Ao root diam: 3.7 cm  LA dimension: 5.3 cm  asc Aorta Diam: 3.7 cm  LA/Ao: 1.4  LVOT diam: 2.0 cm  LVOT area: 3.0 cm2  LA Volume (BP): 107.0 ml  LA Volume Index (BP): 65.2 ml/m2  RWT: 0.33           Doppler Measurements & Calculations  MV E max simi: 92.2 cm/sec  MV A max simi: 52.1 cm/sec  MV E/A: 1.8  MV max P.0 mmHg  MV mean P.1 mmHg  MV V2 VTI: 24.5 cm  MVA(VTI): 1.7 cm2  MV dec time: 0.15 sec  LV V1 max P.4 mmHg  LV V1 max: 77.5 cm/sec  LV V1 VTI: 14.1 cm  SV(LVOT): 42.6 ml  SI(LVOT): 26.0 ml/m2  PA acc time: 0.09 sec  PI end-d simi: 133.0 cm/sec  TR max simi: 218.0 cm/sec  TR max P.0 mmHg  E/E' av.1  Lateral E/e': 11.4  Medial E/e': 24.9              _____________________________________________________________________________  __        Report approved by: Norberto Pathak 2018 12:45 PM

## 2018-10-17 NOTE — PROVIDER NOTIFICATION
"MD paged: \"Pt admitted with palpitations-no telemetry orders and also no IMC orders. Can you please order both. Thank you.\"    Addendum: telemetry order received. Verbal given to place IMC order set per MD.   "

## 2018-10-17 NOTE — PLAN OF CARE
Problem: Patient Care Overview  Goal: Plan of Care/Patient Progress Review  Outcome: Improving  Patient admitted with afib on Dilt gtt 10 mg/h. Denies chest pain or discomfort, BP stable. At 2005 Dilt gtt decreased to 5 mg/h due to HR  . Patient on Heparin gtt. Up to BR independently. Continue to monitor.

## 2018-10-18 VITALS
RESPIRATION RATE: 16 BRPM | OXYGEN SATURATION: 95 % | WEIGHT: 124.6 LBS | HEART RATE: 135 BPM | BODY MASS INDEX: 19.56 KG/M2 | HEIGHT: 67 IN | DIASTOLIC BLOOD PRESSURE: 63 MMHG | SYSTOLIC BLOOD PRESSURE: 115 MMHG | TEMPERATURE: 98.1 F

## 2018-10-18 LAB — INTERPRETATION ECG - MUSE: NORMAL

## 2018-10-18 PROCEDURE — 25000132 ZZH RX MED GY IP 250 OP 250 PS 637: Mod: GY | Performed by: INTERNAL MEDICINE

## 2018-10-18 PROCEDURE — 99239 HOSP IP/OBS DSCHRG MGMT >30: CPT | Performed by: INTERNAL MEDICINE

## 2018-10-18 PROCEDURE — A9270 NON-COVERED ITEM OR SERVICE: HCPCS | Mod: GY | Performed by: INTERNAL MEDICINE

## 2018-10-18 RX ORDER — METOPROLOL SUCCINATE 25 MG/1
12.5 TABLET, EXTENDED RELEASE ORAL DAILY
Qty: 30 TABLET | Refills: 3 | Status: SHIPPED | OUTPATIENT
Start: 2018-10-18 | End: 2018-11-08

## 2018-10-18 RX ORDER — OMEPRAZOLE 10 MG/1
10 CAPSULE, DELAYED RELEASE ORAL
Qty: 60 CAPSULE | Refills: 3 | Status: SHIPPED | OUTPATIENT
Start: 2018-10-18 | End: 2018-11-08

## 2018-10-18 RX ADMIN — OMEPRAZOLE 10 MG: 10 CAPSULE, DELAYED RELEASE ORAL at 09:23

## 2018-10-18 RX ADMIN — Medication 12.5 MG: at 09:23

## 2018-10-18 ASSESSMENT — ACTIVITIES OF DAILY LIVING (ADL)
ADLS_ACUITY_SCORE: 9

## 2018-10-18 NOTE — DISCHARGE SUMMARY
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  The following problems were addressed during her hospitalization:    Active Problems:    Atrial fibrillation with RVR (H)    The patient was anticoagulated with heparin and her rate was controlled with iv diltiazem and she spontaneously converted to sinus rhythm. She was taken off the iv drip of          diltiazem and switched to po low of metoprolol.  Low-dose was dictated by low baseline   blood pressure.  She was seen by cardiology opted for Xarelto anticoagulate her for       atrial fibrillation.  He will follow-up with a cardiology NP in a few weeks.  The time of      discharge she has no sign of current atrial fibrillation or heart failure.  PPI was added by   cardiology to decrease the risk of GI bleed on the Xarelto.  Her TSH was checked was found to be normal.  Her cholesterol is elevated and lifestyle modification is advised that we will defer to cardiology and her primary to determine if she would benefit from statin as an outpatient. Given her atrial fibrillation and history of snoring she should be evaluated for DONNELL as an outpatient.         Noam Lancaster MD    Significant Results and Procedures   See below    Pending Results   These results will be followed up by not applicable  Unresulted Labs Ordered in the Past 30 Days of this Admission     No orders found  from 8/17/2018 to 10/17/2018.          Code Status   Full Code       Primary Care Physician   Anyi Olmos    Physical Exam   Temp: 98.1  F (36.7  C) Temp src: Oral BP: 115/63   Heart Rate: 82 Resp: 16 SpO2: 95 % O2 Device: None (Room air)    Vitals:    10/16/18 1308 10/16/18 1700   Weight: 56.7 kg (125 lb) 56.5 kg (124 lb 9.6 oz)     Vital Signs with Ranges  Temp:  [96.2  F (35.7  C)-98.9  F (37.2  C)] 98.1  F (36.7  C)  Heart Rate:  [63-87] 82  Resp:  [16-20] 16  BP: (115-123)/(60-71) 115/63  SpO2:  [91 %-98 %] 95 %  I/O last 3 completed shifts:  In: 483 [P.O.:480; I.V.:3]  Out: -     Constitutional: Nutritional status: normal. No acute distress  Neuro:   Alert:: Yes   Grossly non focal: Yes   HENT:   Pupils equal and reactive to light : Yes   Neck No obvious mass or swelling  CV:   RRR:   Neck vein: non distended  S1S2 no gallop,  No rub, 2/6 MR murmur  LE Edema: NO  Pulm:  No respiratory distress  Bilateral chest symmetrical chest expansion on inspection  Auscultation: clear  Abdomen:   Soft, non tender, Bowel sound present   Extremities:   Joints: No gross deformities or swelling  Skin:   Warm, normal color and capillary refill.        Discharge Disposition   Discharged to home  Condition at discharge: Good    Consultations This Hospital Stay   CARDIOLOGY IP CONSULT  PHARMACY TO DOSE HEPARIN  PHARMACY LIAISON FOR MEDICATION COVERAGE CONSULT  ADVANCE DIRECTIVE IP CONSULT    Time Spent on this Encounter   INoam, personally saw the patient today and spent greater than 30 minutes discharging this patient.    Discharge Orders     Follow-Up with Cardiac Advanced Practice Provider       Discharge Medications   Current Discharge Medication List      START taking these medications    Details   metoprolol succinate (TOPROL-XL) 25 MG 24 hr tablet Take 0.5 tablets (12.5 mg) by mouth daily  Qty: 30 tablet, Refills: 3    Associated Diagnoses: Atrial fibrillation with RVR (H)      omeprazole (PRILOSEC) 10  MG CR capsule Take 1 capsule (10 mg) by mouth every morning (before breakfast)  Qty: 60 capsule, Refills: 3    Associated Diagnoses: Chronic anticoagulation      rivaroxaban ANTICOAGULANT (XARELTO) 20 MG TABS tablet Take 1 tablet (20 mg) by mouth daily (with dinner)  Qty: 30 tablet, Refills: 3    Associated Diagnoses: Atrial fibrillation with RVR (H)         CONTINUE these medications which have NOT CHANGED    Details   calcium carbonate-vitamin D (CALCIUM 600+D) 600-200 MG-UNIT TABS Take 1 tablet by mouth 2 times daily      Fexofenadine HCl (ALLEGRA PO) Take by mouth daily Dose unknown      Glucosamine HCl--750 MG TABS Take 2 tablets by mouth daily After lunch      MELATONIN PO Take 5 mg by mouth nightly as needed       Multiple Vitamins-Minerals (MULTIVITAMIN ADULT) TABS Take 1 tablet by mouth daily      NONFORMULARY Take 1 tablet by mouth once OTC med called CPM per patient.   Patient states that it is a decongest and antihistamine combo.         STOP taking these medications       ASPIRIN 81 MG OR TABS Comments:   Reason for Stopping:         RANITIDINE HCL PO Comments:   Reason for Stopping:             Allergies   Allergies   Allergen Reactions     No Known Drug Allergies      Data   Most Recent 3 CBC's:  Recent Labs   Lab Test  10/17/18   0736  10/16/18   1723  10/16/18   1315   WBC  5.3  8.9  6.9   HGB  13.3  14.1  13.8   MCV  97  96  97   PLT  227  248  260      Most Recent 3 BMP's:  Recent Labs   Lab Test  10/17/18   0736  10/16/18   1315  10/12/17   1005   NA  141  135  138   POTASSIUM  3.8  3.4  4.1   CHLORIDE  109  101  106   CO2  24  24  27   BUN  10  11  9   CR  0.73  0.84  0.90   ANIONGAP  8  10  5   ARIELLE  8.5  9.3  8.3*   GLC  102*  156*  109*     Most Recent 2 LFT's:  Recent Labs   Lab Test  10/05/16   0758  07/21/14   1021   AST  25  30   ALT  26  30   ALKPHOS  46  49   BILITOTAL  0.7  0.6     Most Recent INR's and Anticoagulation Dosing History:  Anticoagulation Dose History     Recent  Dosing and Labs Latest Ref Rng & Units 10/5/2016 10/16/2018    INR 0.86 - 1.14 0.97 0.94        Most Recent 3 Troponin's:  Recent Labs   Lab Test  10/16/18   1439  10/12/17   1900  10/12/17   1435  10/12/17   1005   TROPI   --   <0.015  <0.015  <0.015   TROPONIN  0.01   --    --    --      Most Recent Cholesterol Panel:  Recent Labs   Lab Test  07/23/15   1024   CHOL  202*   LDL  83   HDL  96   TRIG  116     Most Recent 6 Bacteria Isolates From Any Culture (See EPIC Reports for Culture Details):  Recent Labs   Lab Test  10/05/16   1539   CULT  Canceled, Test credited  Canceled:  Specimen improperly labeled.  SWAB WAS . CALLED TO RN IN CARE SUITES       Most Recent TSH, T4 and A1c Labs:  Recent Labs   Lab Test  10/16/18   1315  11   0907   TSH  1.96   --    A1C   --   5.5     Results for orders placed or performed during the hospital encounter of 10/16/18   XR Chest 2 Views    Narrative    CHEST TWO VIEWS  10/16/2018 2:15 PM     HISTORY: A-fib.     COMPARISON: 10/12/2017    FINDINGS: Heart size upper normal but similar to prior. Pulmonary  vascularity within normal limits. The lungs are clear. No pneumothorax  or pleural effusion.       Impression    IMPRESSION: No radiographic evidence of acute chest abnormality.     NESS MADERA MD       Status:  Edited Result - FINAL   Visible to patient:  Yes (MyChart) Next appt:  None Dx:  Palpitations; Syncope, unspecified sy... Order: [id number]     Notes Recorded by Brenda Vinson RN on 2018 at 5:54 AM  Results were reviewed with patient at recent office visit.       Details      Reading Physician Reading Date Result Priority     Norberto Coombs MD 2018          Narrative           [id number]  [id number]  [id number]  [id number]^RONALD^GILLIAN^[facility]  Echocardiography Laboratory  [address] [place] [address]        Name: GERONIMO PAYTON  MRN: [id number]  : 1945  Study Date: 2018 11:01 AM  Age:  73 yrs  Gender: Female  Patient Location: [facility] [place]  Reason For Study: , Mitral valve insufficiency, unspecified etiology,  Palpitations  Ordering Physician: GILLIAN CARDENAS  Referring Physician: GILLIAN CARDENAS  Performed By: Aj Motley RDCS     BSA: 1.6 m2  Height: 66 in  Weight: 125 lb  HR: 102  BP: 129/80 mmHg  _____________________________________________________________________________  __        Procedure  Complete Echo Adult.  _____________________________________________________________________________  __        Interpretation Summary     The visual ejection fraction is estimated at 60-65%.  The right ventricle is normal in size and function.  The left atrium is severely dilated.  The mitral valve leaflets appear myxomatous.  There is mild to moderate (1-2+) mitral regurgitation.  in comparison with the previous study the degree of MR is improved.  _____________________________________________________________________________  __        Left Ventricle  The left ventricle is normal in size. There is normal left ventricular wall  thickness. Grade II or moderate diastolic dysfunction. The visual ejection  fraction is estimated at 60-65%. No regional wall motion abnormalities noted.     Right Ventricle  The right ventricle is normal in size and function.     Atria  The left atrium is severely dilated. The right atrium is moderately dilated.  There is no atrial shunt seen.     Mitral Valve  The mitral valve leaflets appear myxomatous. There is mild to moderate (1-2+)  mitral regurgitation.        Tricuspid Valve  The tricuspid valve is normal in structure and function. The right ventricular  systolic pressure is approximated at 19.0 mmHg plus the right atrial pressure.  There is mild to moderate (1-2+) tricuspid regurgitation.     Aortic Valve  The aortic valve is trileaflet. No aortic regurgitation is present. No  hemodynamically significant valvular aortic stenosis.     Pulmonic Valve  The pulmonic valve  is not well seen, but is grossly normal. There is trace  pulmonic valvular regurgitation.     Vessels  Borderline aortic root dilatation. The ascending aorta is Borderline dilated.  The inferior vena cava is not dilated.     Pericardium  Trivial pericardial effusion.        Rhythm  Sinus rhythm was noted.  _____________________________________________________________________________  __  MMode/2D Measurements & Calculations  IVSd: 0.88 cm     LVIDd: 5.2 cm  LVIDs: 3.4 cm  LVPWd: 0.85 cm  FS: 35.4 %  LV mass(C)d: 161.0 grams  LV mass(C)dI: 98.3 grams/m2  Ao root diam: 3.7 cm  LA dimension: 5.3 cm  asc Aorta Diam: 3.7 cm  LA/Ao: 1.4  LVOT diam: 2.0 cm  LVOT area: 3.0 cm2  LA Volume (BP): 107.0 ml  LA Volume Index (BP): 65.2 ml/m2  RWT: 0.33           Doppler Measurements & Calculations  MV E max simi: 92.2 cm/sec  MV A max simi: 52.1 cm/sec  MV E/A: 1.8  MV max P.0 mmHg  MV mean P.1 mmHg  MV V2 VTI: 24.5 cm  MVA(VTI): 1.7 cm2  MV dec time: 0.15 sec  LV V1 max P.4 mmHg  LV V1 max: 77.5 cm/sec  LV V1 VTI: 14.1 cm  SV(LVOT): 42.6 ml  SI(LVOT): 26.0 ml/m2  PA acc time: 0.09 sec  PI end-d simi: 133.0 cm/sec  TR max simi: 218.0 cm/sec  TR max P.0 mmHg  E/E' av.1  Lateral E/e': 11.4  Medial E/e': 24.9              _____________________________________________________________________________  __        Report approved by: Norberto Pathak 2018 12:45 PM

## 2018-10-18 NOTE — PROGRESS NOTES
Patient is ready to discharge to home. Discharge orders for OP Sleep Study. CM met with pt to schedule her initial appointment. She would like to schedule this herself when she has her calendar available. Provided her with information about [facility]. Updated her bedside RN.     CM will continue to follow patient until discharge for any additional needs.     Ester Schuster RN, BSN, CTS  [facility]  [phone]

## 2018-10-18 NOTE — PLAN OF CARE
Problem: Patient Care Overview  Goal: Plan of Care/Patient Progress Review  Outcome: Improving  VS stable, up independently. Denies pain or discomfort. SR. Continue to monitor.

## 2018-10-18 NOTE — PROGRESS NOTES
Cardiology chart check    Remains in SR with rare ectopy  Continue Toprol XL 12.5 mg  Continue Xarelto 20 mg  PPI started    Follow up order is placed. Our office will contact her to schedule appt    Ata James PA-C 10/18/2018 9:01 AM

## 2018-10-18 NOTE — PROGRESS NOTES
Pt discharged to home with spouse as transport. Discharge instructions reviewed with pt-no questions or concerns-copy given. Discharge meds filled by pt's pharmacy for pt to  upon discharge.

## 2018-10-19 ENCOUNTER — TELEPHONE (OUTPATIENT)
Dept: CARDIOLOGY | Facility: CLINIC | Age: 73
End: 2018-10-19

## 2018-10-19 NOTE — TELEPHONE ENCOUNTER
"Called pt - she is home feeling well.  Aware of need for OV in the \"next few weeks\" per Dr. Coombs for recent afib w/RVR  Hx of MR - pt of Dr. Stone.  New metoprolol - 12.5mg daily  Pt feeling tired but well.  Call prn  "

## 2018-11-06 ENCOUNTER — OFFICE VISIT (OUTPATIENT)
Dept: INTERNAL MEDICINE | Facility: CLINIC | Age: 73
End: 2018-11-06
Payer: COMMERCIAL

## 2018-11-06 VITALS
SYSTOLIC BLOOD PRESSURE: 118 MMHG | OXYGEN SATURATION: 98 % | RESPIRATION RATE: 24 BRPM | TEMPERATURE: 97.9 F | BODY MASS INDEX: 19.89 KG/M2 | WEIGHT: 126.7 LBS | DIASTOLIC BLOOD PRESSURE: 78 MMHG | HEIGHT: 67 IN | HEART RATE: 68 BPM

## 2018-11-06 DIAGNOSIS — I48.0 PAROXYSMAL ATRIAL FIBRILLATION (H): Primary | ICD-10-CM

## 2018-11-06 DIAGNOSIS — I34.0 MITRAL VALVE INSUFFICIENCY, UNSPECIFIED ETIOLOGY: ICD-10-CM

## 2018-11-06 PROCEDURE — 99214 OFFICE O/P EST MOD 30 MIN: CPT | Performed by: INTERNAL MEDICINE

## 2018-11-06 NOTE — MR AVS SNAPSHOT
"              After Visit Summary   11/6/2018    Taty Aguila    MRN: [id number]           Patient Information     Date Of Birth          1945        Visit Information        Provider Department      11/6/2018 4:00 PM Anyi Olmos MD [facility]        Today's Diagnoses     Paroxysmal atrial fibrillation (H)    -  1    Mitral valve insufficiency, unspecified etiology           Follow-ups after your visit        Who to contact     If you have questions or need follow up information about today's clinic visit or your schedule please contact [facility] directly at [phone].  Normal or non-critical lab and imaging results will be communicated to you by [employer], letter or phone within 4 business days after the clinic has received the results. If you do not hear from us within 7 days, please contact the clinic through [employer] or phone. If you have a critical or abnormal lab result, we will notify you by phone as soon as possible.  Submit refill requests through [employer] or call your pharmacy and they will forward the refill request to us. Please allow 3 business days for your refill to be completed.          Additional Information About Your Visit        MyChart Information     [employer] gives you secure access to your electronic health record. If you see a primary care provider, you can also send messages to your care team and make appointments. If you have questions, please call your primary care clinic.  If you do not have a primary care provider, please call [phone] and they will assist you.        Care EveryWhere ID     This is your Care EveryWhere ID. This could be used by other organizations to access your [place] medical records  [id number]        Your Vitals Were     Pulse Temperature Respirations Height Pulse Oximetry BMI (Body Mass Index)    68 97.9  F (36.6  C) (Oral) 24 5' 6.5\" (1.689 m) 98% 20.14 kg/m2       Blood Pressure from Last 3 Encounters:   11/08/18 " 130/78   11/06/18 118/78   10/18/18 115/63    Weight from Last 3 Encounters:   11/08/18 127 lb 6.4 oz (57.8 kg)   11/06/18 126 lb 11.2 oz (57.5 kg)   10/16/18 124 lb 9.6 oz (56.5 kg)              Today, you had the following     No orders found for display         Today's Medication Changes          These changes are accurate as of 11/6/18 11:59 PM.  If you have any questions, ask your nurse or doctor.               Stop taking these medicines if you haven't already. Please contact your care team if you have questions.     NONFORMULARY   Stopped by:  Anyi Olmos MD                    Primary Care Provider Office Phone # Fax #    Anyi Olmos [phone]       [address] [place] [id number]        Equal Access to Services     [facility]: Hadii gaby manjarrezo Sodmitry, waaxda luqadaha, qaybta kaalmada ozielyaleodan, radha stuart . So [facility] [phone].    ATENCIÓN: Si habla español, tiene a lara disposición servicios gratuitos de asistencia lingüística. Llame al [phone].    We comply with applicable federal civil rights laws and [place] laws. We do not discriminate on the basis of race, color, national origin, age, disability, sex, sexual orientation, or gender identity.            Thank you!     Thank you for choosing [facility]  for your care. Our goal is always to provide you with excellent care. Hearing back from our patients is one way we can continue to improve our services. Please take a few minutes to complete the written survey that you may receive in the mail after your visit with us. Thank you!             Your Updated Medication List - Protect others around you: Learn how to safely use, store and throw away your medicines at www.disposemymeds.org.          This list is accurate as of 11/6/18 11:59 PM.  Always use your most recent med list.                   Brand Name Dispense Instructions for use Diagnosis    ALLEGRA PO       Take by mouth daily Dose unknown        CALCIUM 600+D 600-200 MG-UNIT Tabs   Generic drug:  calcium carbonate-vitamin D      Take 1 tablet by mouth 2 times daily        Glucosamine HCl--750 MG Tabs      Take 2 tablets by mouth daily After lunch        MELATONIN PO      Take 5 mg by mouth nightly as needed        MULTIVITAMIN ADULT Tabs      Take 1 tablet by mouth daily

## 2018-11-06 NOTE — PROGRESS NOTES
SUBJECTIVE:   Taty Aguila is a 73 year old female who presents to clinic today for the following health issues:      Hospital Follow-up Visit:    Hospital/Nursing Home/IP Rehab Facility: [facility]  Date of Admission: 10/16/2018  Date of Discharge: 10/18/2018  Reason(s) for Admission: Afib with RVR           Problems taking medications regularly:  None       Medication changes since discharge: None       Problems adhering to non-medication therapy:  None    Summary of hospitalization:  [facility] discharge summary reviewed  Diagnostic Tests/Treatments reviewed.  Follow up needed: none  Other Healthcare Providers Involved in Patient s Care:         Specialist appointment - cardiology  Update since discharge: improved.   She reports she gets some episodes of feeling like her heart is beating a little harder but it does not seem to be rapid, no associated lightheadedness or shortness of breath with it.    She has some questions about whether this is related to her valve issue, likelihood of continued atrial fibrillation. she has some questions about her echocardiogram.  She was put on Xarelto and is comfortable with this.  She reports that the hospitalist who discharged her was concerned about her cholesterol total level.  He was pushing her to start medication and she did not wish to start something without discussing with me since we had talked about her good LDL in the past and she understood her HDL is very good.    She reports no side effects of medication.    Patient Active Problem List   Diagnosis     Allergic rhinitis     CARDIOVASCULAR SCREENING; LDL GOAL LESS THAN 160     Colitis     Mitral valve regurgitation     Paroxysmal atrial fibrillation (H)     Current Outpatient Prescriptions   Medication Sig Dispense Refill     calcium carbonate-vitamin D (CALCIUM 600+D) 600-200 MG-UNIT TABS Take 1 tablet by mouth 2 times daily       Fexofenadine HCl (ALLEGRA PO) Take by mouth daily Dose  "unknown       Glucosamine HCl--750 MG TABS Take 2 tablets by mouth daily After lunch       MELATONIN PO Take 5 mg by mouth nightly as needed        Multiple Vitamins-Minerals (MULTIVITAMIN ADULT) TABS Take 1 tablet by mouth daily       metoprolol succinate (TOPROL-XL) 25 MG 24 hr tablet Take 0.5 tablets (12.5 mg) by mouth daily 45 tablet 3     omeprazole (PRILOSEC) 10 MG CR capsule Take 1 capsule (10 mg) by mouth every morning (before breakfast) 90 capsule 3     rivaroxaban ANTICOAGULANT (XARELTO) 20 MG TABS tablet Take 1 tablet (20 mg) by mouth daily (with dinner) 90 tablet 3      ROS:   No chest pain, syncope, dyspnea    Objective:  Patient alert in NAD  /78  Pulse 68  Temp 97.9  F (36.6  C) (Oral)  Resp 24  Ht 5' 6.5\" (1.689 m)  Wt 126 lb 11.2 oz (57.5 kg)  SpO2 98%  BMI 20.14 kg/m2     CV: Regular S1, S2 with holosystolic murmur    Lab Results   Component Value Date    HDL 96 07/23/2015    LDL 83 07/23/2015    CHOL 202 07/23/2015    TRIG 116 07/23/2015        ASSESSMENT:   (I48.0) Paroxysmal atrial fibrillation (H)  (primary encounter diagnosis)  Comment: Currently seems to be in normal rhythm, may be in and out of A. fib the rate seems to be better controlled  Plan: She sees cardiology in a few days, will defer to them whether to order monitoring    (I34.0) Mitral valve insufficiency, unspecified etiology  Comment: I reviewed her echo results with her, advised on how atrial enlargement may be associated with atrial fibrillation and likely this will be chronic, recurring issue at this time.    Plan: Follow-up cardiology as planned.     Lipid issues: She does not have very high risk factors for coronary artery disease but will defer to cardiology to review this, if consideration is going to be made for a valve replacement in the future, they may be doing evaluation of her arteries and that may give more definitive information whether to start a statin or not.  She is comfortable with waiting " plan.      Post Discharge Medication Reconciliation: discharge medications reconciled, continue medications without change.  Plan of care communicated with patient     Coding guidelines for this visit:  Type of Medical   Decision Making Face-to-Face Visit       within 7 Days of discharge Face-to-Face Visit        within 14 days of discharge   Moderate Complexity [id number]   High Complexity [id number]        Anyi Olmos MD  [facility]

## 2018-11-06 NOTE — NURSING NOTE
"/78  Pulse 68  Temp 97.9  F (36.6  C) (Oral)  Resp 24  Ht 5' 6.5\" (1.689 m)  Wt 126 lb 11.2 oz (57.5 kg)  SpO2 98%  BMI 20.14 kg/m2    Reviewed health maintenance- pt due for Mammogram.     Patient will discuss with provider.    "

## 2018-11-08 ENCOUNTER — OFFICE VISIT (OUTPATIENT)
Dept: CARDIOLOGY | Facility: CLINIC | Age: 73
End: 2018-11-08
Attending: INTERNAL MEDICINE
Payer: COMMERCIAL

## 2018-11-08 VITALS
SYSTOLIC BLOOD PRESSURE: 130 MMHG | DIASTOLIC BLOOD PRESSURE: 78 MMHG | BODY MASS INDEX: 20.48 KG/M2 | HEIGHT: 66 IN | WEIGHT: 127.4 LBS | HEART RATE: 64 BPM

## 2018-11-08 DIAGNOSIS — I48.0 PAROXYSMAL ATRIAL FIBRILLATION (H): Primary | ICD-10-CM

## 2018-11-08 DIAGNOSIS — I34.0 MITRAL VALVE INSUFFICIENCY, UNSPECIFIED ETIOLOGY: ICD-10-CM

## 2018-11-08 PROCEDURE — 99214 OFFICE O/P EST MOD 30 MIN: CPT | Performed by: PHYSICIAN ASSISTANT

## 2018-11-08 RX ORDER — METOPROLOL SUCCINATE 25 MG/1
12.5 TABLET, EXTENDED RELEASE ORAL DAILY
Qty: 45 TABLET | Refills: 3 | Status: SHIPPED | OUTPATIENT
Start: 2018-11-08 | End: 2018-12-13

## 2018-11-08 RX ORDER — OMEPRAZOLE 10 MG/1
10 CAPSULE, DELAYED RELEASE ORAL
Qty: 90 CAPSULE | Refills: 3 | Status: SHIPPED | OUTPATIENT
Start: 2018-11-08 | End: 2019-11-13

## 2018-11-08 NOTE — PROGRESS NOTES
Please see separate dictation for HPI, PHYSICAL EXAM AND IMPRESSION/PLAN.    CURRENT MEDICATIONS:  Current Outpatient Prescriptions   Medication Sig Dispense Refill     calcium carbonate-vitamin D (CALCIUM 600+D) 600-200 MG-UNIT TABS Take 1 tablet by mouth 2 times daily       Fexofenadine HCl (ALLEGRA PO) Take by mouth daily Dose unknown       Glucosamine HCl--750 MG TABS Take 2 tablets by mouth daily After lunch       MELATONIN PO Take 5 mg by mouth nightly as needed        metoprolol succinate (TOPROL-XL) 25 MG 24 hr tablet Take 0.5 tablets (12.5 mg) by mouth daily 30 tablet 3     Multiple Vitamins-Minerals (MULTIVITAMIN ADULT) TABS Take 1 tablet by mouth daily       omeprazole (PRILOSEC) 10 MG CR capsule Take 1 capsule (10 mg) by mouth every morning (before breakfast) 60 capsule 3     rivaroxaban ANTICOAGULANT (XARELTO) 20 MG TABS tablet Take 1 tablet (20 mg) by mouth daily (with dinner) 30 tablet 3       ALLERGIES:     Allergies   Allergen Reactions     No Known Drug Allergies        PAST MEDICAL HISTORY:  Past Medical History:   Diagnosis Date     Allergic rhinitis, cause unspecified     dust mites, ragweed     Colitis      Mitral regurgitation      Mitral valve disorders(424.0) 1984    myxomatous valve, mod MR, prolapse     Mitral valve prolapse        PAST SURGICAL HISTORY:  Past Surgical History:   Procedure Laterality Date     COLONOSCOPY N/A 9/15/2015    Procedure: COLONOSCOPY;  Surgeon: Anson Llanos MD;  Location:  GI       SOCIAL HISTORY:  Social History     Social History     Marital status:      Spouse name: N/A     Number of children: 3     Years of education: N/A     Social History Main Topics     Smoking status: Never Smoker     Smokeless tobacco: Never Used     Alcohol use No     Drug use: No     Sexual activity: Not Currently     Other Topics Concern     Caffeine Concern No     1 cup of coffee and 1 can diet coke per day     Sleep Concern Yes     takes Doxylamine succinate  1/2 tab every night     Stress Concern No     Weight Concern No     Special Diet No     healthy      Exercise Yes     walking 45min x7 a wk. Very active, [employer], Golf, Bowling, Cardio     Seat Belt Yes     Self-Exams Yes     Parent/Sibling W/ Cabg, Mi Or Angioplasty Before 65f 55m? No     Social History Narrative       FAMILY HISTORY:  Family History   Problem Relation Age of Onset     Osteoporosis Mother      Alzheimer Disease Mother      Family History Negative Father      HEART DISEASE Maternal Grandfather      Family History Negative Paternal Grandmother      Family History Negative Paternal Grandfather        Review of Systems:  Skin:  Negative       Eyes:  Positive for glasses    ENT:  Positive for nasal congestion;postnasal drainage pt reports seasonal allergies; all year long  Respiratory:  Positive for dyspnea on exertion     Cardiovascular:    Positive for;palpitations;exercise intolerance    Gastroenterology: Negative      Genitourinary:  Negative      Musculoskeletal:  Positive for joint pain in the hands  Neurologic:  Negative      Psychiatric:  Positive for sleep disturbances    Heme/Lymph/Imm:  Positive for allergies seasonal  Endocrine:  Negative         Reviewed. Remainder of the note dictated.    Marifer Mohamud PA-C

## 2018-11-08 NOTE — LETTER
11/8/2018    Anyi Olmos MD  [address]  [facility] [address]    RE: Taty Aguila       Dear Colleague,    I had the pleasure of seeing Taty Aguila in the [facility].    Please see separate dictation for HPI, PHYSICAL EXAM AND IMPRESSION/PLAN.    CURRENT MEDICATIONS:  Current Outpatient Prescriptions   Medication Sig Dispense Refill     calcium carbonate-vitamin D (CALCIUM 600+D) 600-200 MG-UNIT TABS Take 1 tablet by mouth 2 times daily       Fexofenadine HCl (ALLEGRA PO) Take by mouth daily Dose unknown       Glucosamine HCl--750 MG TABS Take 2 tablets by mouth daily After lunch       MELATONIN PO Take 5 mg by mouth nightly as needed        metoprolol succinate (TOPROL-XL) 25 MG 24 hr tablet Take 0.5 tablets (12.5 mg) by mouth daily 30 tablet 3     Multiple Vitamins-Minerals (MULTIVITAMIN ADULT) TABS Take 1 tablet by mouth daily       omeprazole (PRILOSEC) 10 MG CR capsule Take 1 capsule (10 mg) by mouth every morning (before breakfast) 60 capsule 3     rivaroxaban ANTICOAGULANT (XARELTO) 20 MG TABS tablet Take 1 tablet (20 mg) by mouth daily (with dinner) 30 tablet 3       ALLERGIES:     Allergies   Allergen Reactions     No Known Drug Allergies        PAST MEDICAL HISTORY:  Past Medical History:   Diagnosis Date     Allergic rhinitis, cause unspecified     dust mites, ragweed     Colitis      Mitral regurgitation      Mitral valve disorders(424.0) 1984    myxomatous valve, mod MR, prolapse     Mitral valve prolapse        PAST SURGICAL HISTORY:  Past Surgical History:   Procedure Laterality Date     COLONOSCOPY N/A 9/15/2015    Procedure: COLONOSCOPY;  Surgeon: Anson Llanos MD;  Location:  GI       SOCIAL HISTORY:  Social History     Social History     Marital status:      Spouse name: N/A     Number of children: 3     Years of education: N/A     Social History Main Topics     Smoking status: Never Smoker     Smokeless tobacco: Never Used      Alcohol use No     Drug use: No     Sexual activity: Not Currently     Other Topics Concern     Caffeine Concern No     1 cup of coffee and 1 can diet coke per day     Sleep Concern Yes     takes Doxylamine succinate 1/2 tab every night     Stress Concern No     Weight Concern No     Special Diet No     healthy      Exercise Yes     walking 45min x7 a wk. Very active, [employer], Golf, Bowling, Cardio     Seat Belt Yes     Self-Exams Yes     Parent/Sibling W/ Cabg, Mi Or Angioplasty Before 65f 55m? No     Social History Narrative       FAMILY HISTORY:  Family History   Problem Relation Age of Onset     Osteoporosis Mother      Alzheimer Disease Mother      Family History Negative Father      HEART DISEASE Maternal Grandfather      Family History Negative Paternal Grandmother      Family History Negative Paternal Grandfather        Review of Systems:  Skin:  Negative       Eyes:  Positive for glasses    ENT:  Positive for nasal congestion;postnasal drainage pt reports seasonal allergies; all year long  Respiratory:  Positive for dyspnea on exertion     Cardiovascular:    Positive for;palpitations;exercise intolerance    Gastroenterology: Negative      Genitourinary:  Negative      Musculoskeletal:  Positive for joint pain in the hands  Neurologic:  Negative      Psychiatric:  Positive for sleep disturbances    Heme/Lymph/Imm:  Positive for allergies seasonal  Endocrine:  Negative         Reviewed. Remainder of the note dictated.    Marifer Mohamud PA-C        Service Date: 11/08/2018      HISTORY OF PRESENT ILLNESS:  Taty is a pleasant 73-year-old female who presents to the office today for a recent hospitalization at [facility] secondary to AFib with RVR.      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  She recently was participating in an exercise class at the [facility] when she noted that her heart was pounding.  She sat to rest, but the symptoms  continued.  Her instructor noted that her pulse was quite elevated and therefore she was transported to the Emergency Department by EMS.  She was found to be in AFib with RVR.  I believe she was treated with a diltiazem drip.  She converted back to sinus on her own.  Due to her elevated CHADS-VASc score and in the setting of mitral valve disease, it was recommended that she be anticoagulated.  She was started on metoprolol and Xarelto 20 mg daily.  She was also started on a low dose of Prilosec to avoid the increased risk of GI bleed.      At this point, she states she is overall doing okay.  She has a longstanding history of palpitations and has continued to experience these intermittently.  She thinks there may have been a couple of times where her symptoms were a bit more prolonged, but she has not had any recurrent symptoms similar to what she experienced at the time of her presentation to the hospital.  She is tolerating her medication without difficulty.      CURRENT CARDIAC MEDICATIONS:   1.  Toprol-XL 25 mg half tablet daily.   2.  Xarelto 20 mg daily.      The remainder of her medications, allergies and review of systems were reviewed and as are documented separately.      PHYSICAL EXAMINATION:   GENERAL:  The patient is a pleasant 73-year-old female who appears her stated age.  She is in no apparent distress.   VITAL SIGNS:  Her blood pressure is 130/78, pulse is 64, weight is 127 pounds which is stable.  Breathing is nonlabored.   LUNGS:  Clear to auscultation.   CARDIAC:  Reveals a regular rate and rhythm.  She does have a 1/6 systolic murmur heard best at the apex.   EXTREMITIES:  Lower extremities show no evidence of edema.   NEUROLOGIC:  Alert and oriented.      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  Overall, she is doing  well.  She has longstanding history of palpitations and she continues to have some palpitations, but does not have any prolonged episodes or symptoms similar to what she felt with atrial fibrillation.  For the time being, I recommend that we continue her current  medication regimen unchanged.  I did provide refills of the metoprolol and Xarelto as well as her omeprazole.      She will follow up in the Cardiology Clinic as previously directed with Dr. Stone in 2019 with a repeat echocardiogram prior to that.  Of course, I encouraged her to contact us sooner with questions, concerns or recurrent symptoms of atrial fibrillation.         MARIFER AMADOR PA-C             D: 2018   T: 2018   MT: OLLIE      Name:     GERONIMO PAYTON   MRN:      [id number]        Account:      [id number]   :      1945           Service Date: 2018      Document: [id number]        Thank you for allowing me to participate in the care of your patient.      Sincerely,     Marifer Amador PA-C     [facility]    cc:   Norberto Coombs MD  [address]  [place] [address]

## 2018-11-08 NOTE — PATIENT INSTRUCTIONS
Thank you for your [facility] visit today. Your provider has recommended the following:  Medication Changes:  No changes at this time  I sent in refills  Recommendations:  Nothing new at this time  Follow-up:  See Dr Stone for cardiology follow up in August 2019.    Reminder:  Please bring in your current medication list or your medication, over the counter supplements and vitamin bottles as we will review these at each office visit.

## 2018-11-08 NOTE — LETTER
11/8/2018      Anyi Olmos MD  [address] [facility] [address]  [facility] [address]      RE: Taty MCALLISTER Beatrizlaratalon       Dear Colleague,    I had the pleasure of seeing Taty Aguila in the [facility].    Service Date: 11/08/2018      HISTORY OF PRESENT ILLNESS:  Taty is a pleasant 73-year-old female who presents to the office today for a recent hospitalization at [facility] secondary to AFib with RVR.      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  She recently was participating in an exercise class at the [facility] when she noted that her heart was pounding.  She sat to rest, but the symptoms continued.  Her instructor noted that her pulse was quite elevated and therefore she was transported to the Emergency Department by EMS.  She was found to be in AFib with RVR.  I believe she was treated with a diltiazem drip.  She converted back to sinus on her own.  Due to her elevated CHADS-VASc score and in the setting of mitral valve disease, it was recommended that she be anticoagulated.  She was started on metoprolol and Xarelto 20 mg daily.  She was also started on a low dose of Prilosec to avoid the increased risk of GI bleed.      At this point, she states she is overall doing okay.  She has a longstanding history of palpitations and has continued to experience these intermittently.  She thinks there may have been a couple of times where her symptoms were a bit more prolonged, but she has not had any recurrent symptoms similar to what she experienced at the time of her presentation to the hospital.  She is tolerating her medication without difficulty.      CURRENT CARDIAC MEDICATIONS:   1.  Toprol-XL 25 mg half tablet daily.   2.  Xarelto 20 mg daily.      The remainder of her medications, allergies and review of systems were reviewed and as are documented separately.      PHYSICAL EXAMINATION:   GENERAL:  The patient is a pleasant  73-year-old female who appears her stated age.  She is in no apparent distress.   VITAL SIGNS:  Her blood pressure is 130/78, pulse is 64, weight is 127 pounds which is stable.  Breathing is nonlabored.   LUNGS:  Clear to auscultation.   CARDIAC:  Reveals a regular rate and rhythm.  She does have a 1/6 systolic murmur heard best at the apex.   EXTREMITIES:  Lower extremities show no evidence of edema.   NEUROLOGIC:  Alert and oriented.      ASSESSMENT AND PLAN:  Geronimo is a pleasant 73-year-old female with a history of myxomatous mitral valve with moderate mitral regurgitation and a recent diagnosis of paroxysmal atrial fibrillation who returns for followup.  Overall, she is doing well.  She has longstanding history of palpitations and she continues to have some palpitations, but does not have any prolonged episodes or symptoms similar to what she felt with atrial fibrillation.  For the time being, I recommend that we continue her current  medication regimen unchanged.  I did provide refills of the metoprolol and Xarelto as well as her omeprazole.      She will follow up in the Cardiology Clinic as previously directed with Dr. Stone in 2019 with a repeat echocardiogram prior to that.  Of course, I encouraged her to contact us sooner with questions, concerns or recurrent symptoms of atrial fibrillation.         SANDRA AMADOR PA-C             D: 2018   T: 2018   MT: OLLIE      Name:     GERONIMO PAYTON   MRN:      [id number]        Account:      [id number]   :      1945           Service Date: 2018      Document: [id number]           Outpatient Encounter Prescriptions as of 2018   Medication Sig Dispense Refill     calcium carbonate-vitamin D (CALCIUM 600+D) 600-200 MG-UNIT TABS Take 1 tablet by mouth 2 times daily       Fexofenadine HCl (ALLEGRA PO) Take by mouth daily Dose unknown       Glucosamine HCl--750 MG TABS Take 2 tablets by mouth daily After lunch        MELATONIN PO Take 5 mg by mouth nightly as needed        metoprolol succinate (TOPROL-XL) 25 MG 24 hr tablet Take 0.5 tablets (12.5 mg) by mouth daily 45 tablet 3     Multiple Vitamins-Minerals (MULTIVITAMIN ADULT) TABS Take 1 tablet by mouth daily       omeprazole (PRILOSEC) 10 MG CR capsule Take 1 capsule (10 mg) by mouth every morning (before breakfast) 90 capsule 3     rivaroxaban ANTICOAGULANT (XARELTO) 20 MG TABS tablet Take 1 tablet (20 mg) by mouth daily (with dinner) 90 tablet 3     [DISCONTINUED] metoprolol succinate (TOPROL-XL) 25 MG 24 hr tablet Take 0.5 tablets (12.5 mg) by mouth daily 30 tablet 3     [DISCONTINUED] omeprazole (PRILOSEC) 10 MG CR capsule Take 1 capsule (10 mg) by mouth every morning (before breakfast) 60 capsule 3     [DISCONTINUED] rivaroxaban ANTICOAGULANT (XARELTO) 20 MG TABS tablet Take 1 tablet (20 mg) by mouth daily (with dinner) 30 tablet 3     No facility-administered encounter medications on file as of 11/8/2018.        Again, thank you for allowing me to participate in the care of your patient.      Sincerely,    Marifer Mohamud PA-C     [facility]

## 2018-11-08 NOTE — PROGRESS NOTES
Service Date: 11/08/2018      HISTORY OF PRESENT ILLNESS:  Taty is a pleasant 73-year-old female who presents to the office today for a recent hospitalization at [facility] secondary to AFib with RVR.      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  She recently was participating in an exercise class at the [facility] when she noted that her heart was pounding.  She sat to rest, but the symptoms continued.  Her instructor noted that her pulse was quite elevated and therefore she was transported to the Emergency Department by EMS.  She was found to be in AFib with RVR.  I believe she was treated with a diltiazem drip.  She converted back to sinus on her own.  Due to her elevated CHADS-VASc score and in the setting of mitral valve disease, it was recommended that she be anticoagulated.  She was started on metoprolol and Xarelto 20 mg daily.  She was also started on a low dose of Prilosec to avoid the increased risk of GI bleed.      At this point, she states she is overall doing okay.  She has a longstanding history of palpitations and has continued to experience these intermittently.  She thinks there may have been a couple of times where her symptoms were a bit more prolonged, but she has not had any recurrent symptoms similar to what she experienced at the time of her presentation to the hospital.  She is tolerating her medication without difficulty.      CURRENT CARDIAC MEDICATIONS:   1.  Toprol-XL 25 mg half tablet daily.   2.  Xarelto 20 mg daily.      The remainder of her medications, allergies and review of systems were reviewed and as are documented separately.      PHYSICAL EXAMINATION:   GENERAL:  The patient is a pleasant 73-year-old female who appears her stated age.  She is in no apparent distress.   VITAL SIGNS:  Her blood pressure is 130/78, pulse is 64, weight is 127 pounds which is stable.  Breathing is nonlabored.   LUNGS:  Clear to  auscultation.   CARDIAC:  Reveals a regular rate and rhythm.  She does have a 1/6 systolic murmur heard best at the apex.   EXTREMITIES:  Lower extremities show no evidence of edema.   NEUROLOGIC:  Alert and oriented.      ASSESSMENT AND PLAN:  Geronimo is a pleasant 73-year-old female with a history of myxomatous mitral valve with moderate mitral regurgitation and a recent diagnosis of paroxysmal atrial fibrillation who returns for followup.  Overall, she is doing well.  She has longstanding history of palpitations and she continues to have some palpitations, but does not have any prolonged episodes or symptoms similar to what she felt with atrial fibrillation.  For the time being, I recommend that we continue her current  medication regimen unchanged.  I did provide refills of the metoprolol and Xarelto as well as her omeprazole.      She will follow up in the Cardiology Clinic as previously directed with Dr. Stone in 2019 with a repeat echocardiogram prior to that.  Of course, I encouraged her to contact us sooner with questions, concerns or recurrent symptoms of atrial fibrillation.         SANDRA AMADOR PA-C             D: 2018   T: 2018   MT: OLLIE      Name:     GERONIMO PAYTON   MRN:      [id number]        Account:      [id number]   :      1945           Service Date: 2018      Document: [id number]

## 2018-11-08 NOTE — MR AVS SNAPSHOT
After Visit Summary   11/8/2018    Taty Aguila    MRN: [id number]           Patient Information     Date Of Birth          1945        Visit Information        Provider Department      11/8/2018 1:50 PM Marifer Mohamud PA-C [facility]        Today's Diagnoses     Paroxysmal atrial fibrillation (H)    -  1      Care Instructions    Thank you for your  Heart Care visit today. Your provider has recommended the following:  Medication Changes:  No changes at this time  I sent in refills  Recommendations:  Nothing new at this time  Follow-up:  See Dr Stone for cardiology follow up in August 2019.    Reminder:  Please bring in your current medication list or your medication, over the counter supplements and vitamin bottles as we will review these at each office visit.                  Follow-ups after your visit        Who to contact     If you have questions or need follow up information about today's clinic visit or your schedule please contact [facility] directly at [phone].  Normal or non-critical lab and imaging results will be communicated to you by [employer], letter or phone within 4 business days after the clinic has received the results. If you do not hear from us within 7 days, please contact the clinic through [employer] or phone. If you have a critical or abnormal lab result, we will notify you by phone as soon as possible.  Submit refill requests through [employer] or call your pharmacy and they will forward the refill request to us. Please allow 3 business days for your refill to be completed.          Additional Information About Your Visit        MyChart Information     [employer] gives you secure access to your electronic health record. If you see a primary care provider, you can also send messages to your care team and make appointments. If you have questions, please call your primary care  "clinic.  If you do not have a primary care provider, please call [phone] and they will assist you.        Care EveryWhere ID     This is your Care EveryWhere ID. This could be used by other organizations to access your [place] medical records  [id number]        Your Vitals Were     Pulse Height BMI (Body Mass Index)             64 1.676 m (5' 6\") 20.56 kg/m2          Blood Pressure from Last 3 Encounters:   11/08/18 130/78   11/06/18 118/78   10/18/18 115/63    Weight from Last 3 Encounters:   11/08/18 57.8 kg (127 lb 6.4 oz)   11/06/18 57.5 kg (126 lb 11.2 oz)   10/16/18 56.5 kg (124 lb 9.6 oz)              We Performed the Following     Follow-Up with Cardiac Advanced Practice Provider          Where to get your medicines      These medications were sent to [facility] [address] AT [address] [place] [address] [facility] [address]     Phone:  [phone]     metoprolol succinate 25 MG 24 hr tablet    omeprazole 10 MG CR capsule    rivaroxaban ANTICOAGULANT 20 MG Tabs tablet          Primary Care Provider Office Phone # Fax #    Anyi Olmos [phone]       [address] [place] [id number]        Equal Access to Services     [facility]: Hadii gaby zepeda hadasho Sodmitry, waaxda luqadaha, qaybta kaalmaleodan patrick, radha stuart . So [facility] [phone].    ATENCIÓN: Si habla español, tiene a lara disposición servicios gratuitos de asistencia lingüística. Llame al [phone].    We comply with applicable federal civil rights laws and [place] laws. We do not discriminate on the basis of race, color, national origin, age, disability, sex, sexual orientation, or gender identity.            Thank you!     Thank you for choosing [facility]  for your care. Our goal is always to provide you with excellent care. Hearing back from our patients " is one way we can continue to improve our services. Please take a few minutes to complete the written survey that you may receive in the mail after your visit with us. Thank you!             Your Updated Medication List - Protect others around you: Learn how to safely use, store and throw away your medicines at www.disposemymeds.org.          This list is accurate as of 11/8/18  2:26 PM.  Always use your most recent med list.                   Brand Name Dispense Instructions for use Diagnosis    ALLEGRA PO      Take by mouth daily Dose unknown        CALCIUM 600+D 600-200 MG-UNIT Tabs   Generic drug:  calcium carbonate-vitamin D      Take 1 tablet by mouth 2 times daily        Glucosamine HCl--750 MG Tabs      Take 2 tablets by mouth daily After lunch        MELATONIN PO      Take 5 mg by mouth nightly as needed        metoprolol succinate 25 MG 24 hr tablet    TOPROL-XL    45 tablet    Take 0.5 tablets (12.5 mg) by mouth daily    Paroxysmal atrial fibrillation (H)       MULTIVITAMIN ADULT Tabs      Take 1 tablet by mouth daily        omeprazole 10 MG CR capsule    priLOSEC    90 capsule    Take 1 capsule (10 mg) by mouth every morning (before breakfast)        rivaroxaban ANTICOAGULANT 20 MG Tabs tablet    XARELTO    90 tablet    Take 1 tablet (20 mg) by mouth daily (with dinner)    Paroxysmal atrial fibrillation (H)

## 2018-11-10 PROBLEM — I48.91 ATRIAL FIBRILLATION WITH RVR (H): Status: RESOLVED | Noted: 2018-10-16 | Resolved: 2018-11-10

## 2018-11-10 PROBLEM — R55 SYNCOPE: Status: RESOLVED | Noted: 2017-10-12 | Resolved: 2018-11-10

## 2018-11-11 ENCOUNTER — HEALTH MAINTENANCE LETTER (OUTPATIENT)
Age: 73
End: 2018-11-11

## 2018-12-11 ENCOUNTER — TELEPHONE (OUTPATIENT)
Dept: CARDIOLOGY | Facility: CLINIC | Age: 73
End: 2018-12-11

## 2018-12-11 ENCOUNTER — OFFICE VISIT (OUTPATIENT)
Dept: CARDIOLOGY | Facility: CLINIC | Age: 73
End: 2018-12-11
Payer: COMMERCIAL

## 2018-12-11 VITALS
WEIGHT: 128 LBS | HEART RATE: 98 BPM | HEIGHT: 66 IN | BODY MASS INDEX: 20.57 KG/M2 | SYSTOLIC BLOOD PRESSURE: 152 MMHG | DIASTOLIC BLOOD PRESSURE: 70 MMHG

## 2018-12-11 DIAGNOSIS — I48.91 A-FIB (H): Primary | ICD-10-CM

## 2018-12-11 DIAGNOSIS — I48.0 PAROXYSMAL ATRIAL FIBRILLATION (H): ICD-10-CM

## 2018-12-11 PROCEDURE — 99214 OFFICE O/P EST MOD 30 MIN: CPT | Performed by: PHYSICIAN ASSISTANT

## 2018-12-11 PROCEDURE — 93000 ELECTROCARDIOGRAM COMPLETE: CPT | Performed by: PHYSICIAN ASSISTANT

## 2018-12-11 ASSESSMENT — MIFFLIN-ST. JEOR: SCORE: 1112.35

## 2018-12-11 NOTE — PROGRESS NOTES
Please see separate dictation for HPI, PHYSICAL EXAM AND IMPRESSION/PLAN.    CURRENT MEDICATIONS:  Current Outpatient Medications   Medication Sig Dispense Refill     calcium carbonate-vitamin D (CALCIUM 600+D) 600-200 MG-UNIT TABS Take 1 tablet by mouth 2 times daily       Fexofenadine HCl (ALLEGRA PO) Take by mouth daily Dose unknown       Glucosamine HCl--750 MG TABS Take 2 tablets by mouth daily After lunch       MELATONIN PO Take 5 mg by mouth nightly as needed        metoprolol succinate (TOPROL-XL) 25 MG 24 hr tablet Take 0.5 tablets (12.5 mg) by mouth daily 45 tablet 3     Multiple Vitamins-Minerals (MULTIVITAMIN ADULT) TABS Take 1 tablet by mouth daily       omeprazole (PRILOSEC) 10 MG CR capsule Take 1 capsule (10 mg) by mouth every morning (before breakfast) 90 capsule 3     rivaroxaban ANTICOAGULANT (XARELTO) 20 MG TABS tablet Take 1 tablet (20 mg) by mouth daily (with dinner) 90 tablet 3       ALLERGIES:     Allergies   Allergen Reactions     No Known Drug Allergies        PAST MEDICAL HISTORY:  Past Medical History:   Diagnosis Date     Allergic rhinitis, cause unspecified     dust mites, ragweed     Colitis      Mitral regurgitation      Mitral valve disorders(424.0) 1984    myxomatous valve, mod MR, prolapse     Mitral valve prolapse        PAST SURGICAL HISTORY:  Past Surgical History:   Procedure Laterality Date     COLONOSCOPY N/A 9/15/2015    Procedure: COLONOSCOPY;  Surgeon: Anson Llanos MD;  Location:  GI       SOCIAL HISTORY:  Social History     Socioeconomic History     Marital status:      Spouse name: None     Number of children: 3     Years of education: None     Highest education level: None   Social Needs     Financial resource strain: None     Food insecurity - worry: None     Food insecurity - inability: None     Transportation needs - medical: None     Transportation needs - non-medical: None   Occupational History     None   Tobacco Use     Smoking status: Never  Smoker     Smokeless tobacco: Never Used   Substance and Sexual Activity     Alcohol use: No     Drug use: No     Sexual activity: Not Currently   Other Topics Concern      Service Not Asked     Blood Transfusions Not Asked     Caffeine Concern No     Comment: 1 cup of coffee and 1 can diet coke per day     Occupational Exposure Not Asked     Hobby Hazards Not Asked     Sleep Concern Yes     Comment: takes Doxylamine succinate 1/2 tab every night     Stress Concern No     Weight Concern No     Special Diet No     Comment: healthy      Back Care Not Asked     Exercise Yes     Comment: walking 45min x7 a wk. Very active, [employer], Golf, Bowling, Cardio     Bike Helmet Not Asked     Seat Belt Yes     Self-Exams Yes     Parent/sibling w/ CABG, MI or angioplasty before 65F 55M? No   Social History Narrative     None       FAMILY HISTORY:  Family History   Problem Relation Age of Onset     Osteoporosis Mother      Alzheimer Disease Mother      Family History Negative Father      Heart Disease Maternal Grandfather      Family History Negative Paternal Grandmother      Family History Negative Paternal Grandfather        Review of Systems:  Skin:  Negative       Eyes:  Positive for glasses    ENT:  Positive for nasal congestion;postnasal drainage pt reports seasonal allergies; all year long  Respiratory:  Positive for       Cardiovascular:    Positive for;palpitations;lightheadedness;fatigue    Gastroenterology: Negative      Genitourinary:  Negative      Musculoskeletal:  Positive for joint pain;arthritis in the hands  Neurologic:  Positive for      Psychiatric:  Positive for sleep disturbances periodically  Heme/Lymph/Imm:  Positive for allergies seasonal  Endocrine:  Negative         Reviewed. Remainder of the note dictated.    Marifer Mohamud PA-C

## 2018-12-11 NOTE — LETTER
12/11/2018    Anyi Olmos MD  [address]  [facility] [address]    RE: Taty Aguila       Dear Colleague,    I had the pleasure of seeing Taty Aguila in the [facility].    Please see separate dictation for HPI, PHYSICAL EXAM AND IMPRESSION/PLAN.    CURRENT MEDICATIONS:  Current Outpatient Medications   Medication Sig Dispense Refill     calcium carbonate-vitamin D (CALCIUM 600+D) 600-200 MG-UNIT TABS Take 1 tablet by mouth 2 times daily       Fexofenadine HCl (ALLEGRA PO) Take by mouth daily Dose unknown       Glucosamine HCl--750 MG TABS Take 2 tablets by mouth daily After lunch       MELATONIN PO Take 5 mg by mouth nightly as needed        metoprolol succinate (TOPROL-XL) 25 MG 24 hr tablet Take 0.5 tablets (12.5 mg) by mouth daily 45 tablet 3     Multiple Vitamins-Minerals (MULTIVITAMIN ADULT) TABS Take 1 tablet by mouth daily       omeprazole (PRILOSEC) 10 MG CR capsule Take 1 capsule (10 mg) by mouth every morning (before breakfast) 90 capsule 3     rivaroxaban ANTICOAGULANT (XARELTO) 20 MG TABS tablet Take 1 tablet (20 mg) by mouth daily (with dinner) 90 tablet 3       ALLERGIES:     Allergies   Allergen Reactions     No Known Drug Allergies        PAST MEDICAL HISTORY:  Past Medical History:   Diagnosis Date     Allergic rhinitis, cause unspecified     dust mites, ragweed     Colitis      Mitral regurgitation      Mitral valve disorders(424.0) 1984    myxomatous valve, mod MR, prolapse     Mitral valve prolapse        PAST SURGICAL HISTORY:  Past Surgical History:   Procedure Laterality Date     COLONOSCOPY N/A 9/15/2015    Procedure: COLONOSCOPY;  Surgeon: Anson Llanos MD;  Location:  GI       SOCIAL HISTORY:  Social History     Socioeconomic History     Marital status:      Spouse name: None     Number of children: 3     Years of education: None     Highest education level: None   Social Needs     Financial resource strain: None     Food  insecurity - worry: None     Food insecurity - inability: None     Transportation needs - medical: None     Transportation needs - non-medical: None   Occupational History     None   Tobacco Use     Smoking status: Never Smoker     Smokeless tobacco: Never Used   Substance and Sexual Activity     Alcohol use: No     Drug use: No     Sexual activity: Not Currently   Other Topics Concern      Service Not Asked     Blood Transfusions Not Asked     Caffeine Concern No     Comment: 1 cup of coffee and 1 can diet coke per day     Occupational Exposure Not Asked     Hobby Hazards Not Asked     Sleep Concern Yes     Comment: takes Doxylamine succinate 1/2 tab every night     Stress Concern No     Weight Concern No     Special Diet No     Comment: healthy      Back Care Not Asked     Exercise Yes     Comment: walking 45min x7 a wk. Very active, [employer], Golf, Bowling, Cardio     Bike Helmet Not Asked     Seat Belt Yes     Self-Exams Yes     Parent/sibling w/ CABG, MI or angioplasty before 65F 55M? No   Social History Narrative     None       FAMILY HISTORY:  Family History   Problem Relation Age of Onset     Osteoporosis Mother      Alzheimer Disease Mother      Family History Negative Father      Heart Disease Maternal Grandfather      Family History Negative Paternal Grandmother      Family History Negative Paternal Grandfather        Review of Systems:  Skin:  Negative       Eyes:  Positive for glasses    ENT:  Positive for nasal congestion;postnasal drainage pt reports seasonal allergies; all year long  Respiratory:  Positive for       Cardiovascular:    Positive for;palpitations;lightheadedness;fatigue    Gastroenterology: Negative      Genitourinary:  Negative      Musculoskeletal:  Positive for joint pain;arthritis in the hands  Neurologic:  Positive for      Psychiatric:  Positive for sleep disturbances periodically  Heme/Lymph/Imm:  Positive for allergies seasonal  Endocrine:  Negative         Reviewed.  "Remainder of the note dictated.    Marifer Mohamud PA-C        Service Date: 12/11/2018      REASON FOR VISIT:  Taty is a pleasant 73-year-old female who called into the office today, with symptoms of recurrent atrial fibrillation with RVR.      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  About 5-6 weeks ago she was participating in an exercise class at the [facility] when she noted that her heart was pounding.  Despite rest, this persisted.  Eventually EMS was called and recommended transport to the Emergency Department here at [facility].  She was found to be in atrial fibrillation with RVR.  She was started on a diltiazem drip and converted back to sinus on her own.  Due to her elevated CHADS-VASc score and in the setting of mitral valve disease, it was recommended that she be anticoagulated.  She was started on Xarelto 20 mg daily.  After being back in sinus rhythm, the diltiazem was discontinued and she was started on a low dose of metoprolol.        She followed up in my office about a month ago and at that time overall was doing okay.  She does have a longstanding history of palpitations and she did continue to experience these intermittently but she did not have any sustained elevated heart rates.      Today, she was sitting eating lunch when she developed rapid heartbeat.  Again, this was more sustained.  She states she felt a bit \"jittery.\"  She did not have any lightheadedness, presyncope or syncope.  She is not having any shortness of breath or chest discomfort.  She called in to the office to find out what to do and was added on to my schedule today.      Her EKG in the office does confirm atrial fibrillation.  Her ventricular response is mildly elevated with a rate of 118.  She is noted to have occasional PVCs.  She has nonspecific T-wave abnormality in the inferior leads and V5 and V6 which is not new " for her.      CURRENT CARDIAC MEDICATIONS:     1.  Toprol-XL 12.5 mg a day.   2.  Xarelto 20 mg daily.      The remainder of her medications, allergies and review of systems are reviewed and as are documented separately.      PHYSICAL EXAMINATION:     GENERAL:  The patient is a pleasant 73-year-old female.  She is a bit anxious today.     VITAL SIGNS:  Her blood pressure is 152/70, pulse is 98, weight is 128 pounds, which is stable.     PULMONOLOGY:  Breathing is nonlabored.  Lungs are clear to auscultation.     CARDIOVASCULAR:  Cardiac examination reveals an irregular rate and rhythm.  She does have a 1/6 holosystolic murmur heard best at the apex.   EXTREMITIES:  Show no evidence of edema.   NEUROLOGIC:  Alert and oriented.      ASSESSMENT AND PLAN:  Geronimo is a pleasant 73-year-old female with a history of myxomatous mitral valve and resultant moderate mitral regurgitation and a recent diagnosis of paroxysmal atrial fibrillation who presents to the office today with recurrent atrial fibrillation.  At this point, she notices that her heartbeat is irregular and a bit fast, but otherwise is not having any other associated symptoms.  Her EKG today does confirm as slight elevation in her heart rate.  For the time being, I have recommended that she take an extra 25 mg of Toprol XL today.  She will then increase her daily dose to 25 mg.  I plan to see her back in the office in 48 hours with a repeat EKG at that time.  She will remain on Xarelto for anticoagulation.  Of course, I encouraged the patient to seek immediate evaluation in the Emergency Department should she develop any presyncope, syncope, shortness of breath or chest discomfort.      Thank you for allowing me to participate in the care of this pleasant patient.         SANDRA AMADOR PA-C             D: 2018   T: 2018   MT: CASSIE      Name:     GERONIMO PAYTON   MRN:      [id number]        Account:      [id number]   :      1945            Service Date: 12/11/2018      Document: [id number]       Thank you for allowing me to participate in the care of your patient.      Sincerely,     Marifer Mohamud PA-C     [facility]    cc:   No referring provider defined for this encounter.

## 2018-12-11 NOTE — LETTER
"12/11/2018      Anyi Olmos MD  [address] [facility] [address]  [facility] [address]      RE: Taty MCALLISTER Ayla       Dear Colleague,    I had the pleasure of seeing Taty Aguila in the [facility].    Service Date: 12/11/2018      REASON FOR VISIT:  Taty is a pleasant 73-year-old female who called into the office today, with symptoms of recurrent atrial fibrillation with RVR.      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  About 5-6 weeks ago she was participating in an exercise class at the [facility] when she noted that her heart was pounding.  Despite rest, this persisted.  Eventually EMS was called and recommended transport to the Emergency Department here at [facility].  She was found to be in atrial fibrillation with RVR.  She was started on a diltiazem drip and converted back to sinus on her own.  Due to her elevated CHADS-VASc score and in the setting of mitral valve disease, it was recommended that she be anticoagulated.  She was started on Xarelto 20 mg daily.  After being back in sinus rhythm, the diltiazem was discontinued and she was started on a low dose of metoprolol.        She followed up in my office about a month ago and at that time overall was doing okay.  She does have a longstanding history of palpitations and she did continue to experience these intermittently but she did not have any sustained elevated heart rates.      Today, she was sitting eating lunch when she developed rapid heartbeat.  Again, this was more sustained.  She states she felt a bit \"jittery.\"  She did not have any lightheadedness, presyncope or syncope.  She is not having any shortness of breath or chest discomfort.  She called in to the office to find out what to do and was added on to my schedule today.      Her EKG in the office does confirm atrial fibrillation.  Her ventricular response is mildly " elevated with a rate of 118.  She is noted to have occasional PVCs.  She has nonspecific T-wave abnormality in the inferior leads and V5 and V6 which is not new for her.      CURRENT CARDIAC MEDICATIONS:     1.  Toprol-XL 12.5 mg a day.   2.  Xarelto 20 mg daily.      The remainder of her medications, allergies and review of systems are reviewed and as are documented separately.      PHYSICAL EXAMINATION:     GENERAL:  The patient is a pleasant 73-year-old female.  She is a bit anxious today.     VITAL SIGNS:  Her blood pressure is 152/70, pulse is 98, weight is 128 pounds, which is stable.     PULMONOLOGY:  Breathing is nonlabored.  Lungs are clear to auscultation.     CARDIOVASCULAR:  Cardiac examination reveals an irregular rate and rhythm.  She does have a 1/6 holosystolic murmur heard best at the apex.   EXTREMITIES:  Show no evidence of edema.   NEUROLOGIC:  Alert and oriented.      ASSESSMENT AND PLAN:  Taty is a pleasant 73-year-old female with a history of myxomatous mitral valve and resultant moderate mitral regurgitation and a recent diagnosis of paroxysmal atrial fibrillation who presents to the office today with recurrent atrial fibrillation.  At this point, she notices that her heartbeat is irregular and a bit fast, but otherwise is not having any other associated symptoms.  Her EKG today does confirm as slight elevation in her heart rate.  For the time being, I have recommended that she take an extra 25 mg of Toprol XL today.  She will then increase her daily dose to 25 mg.  I plan to see her back in the office in 48 hours with a repeat EKG at that time.  She will remain on Xarelto for anticoagulation.  Of course, I encouraged the patient to seek immediate evaluation in the Emergency Department should she develop any presyncope, syncope, shortness of breath or chest discomfort.      Thank you for allowing me to participate in the care of this pleasant patient.         SANDRA AMADOR PA-C              D: 2018   T: 2018   MT: CASSIE      Name:     GERONIMO PAYTON   MRN:      [id number]        Account:      [id number]   :      1945           Service Date: 2018      Document: [id number]         Outpatient Encounter Medications as of 2018   Medication Sig Dispense Refill     calcium carbonate-vitamin D (CALCIUM 600+D) 600-200 MG-UNIT TABS Take 1 tablet by mouth 2 times daily       Fexofenadine HCl (ALLEGRA PO) Take by mouth daily Dose unknown       Glucosamine HCl--750 MG TABS Take 2 tablets by mouth daily After lunch       MELATONIN PO Take 5 mg by mouth nightly as needed        Multiple Vitamins-Minerals (MULTIVITAMIN ADULT) TABS Take 1 tablet by mouth daily       omeprazole (PRILOSEC) 10 MG CR capsule Take 1 capsule (10 mg) by mouth every morning (before breakfast) 90 capsule 3     rivaroxaban ANTICOAGULANT (XARELTO) 20 MG TABS tablet Take 1 tablet (20 mg) by mouth daily (with dinner) 90 tablet 3     [DISCONTINUED] metoprolol succinate (TOPROL-XL) 25 MG 24 hr tablet Take 0.5 tablets (12.5 mg) by mouth daily 45 tablet 3     No facility-administered encounter medications on file as of 2018.        Again, thank you for allowing me to participate in the care of your patient.      Sincerely,    SAMMI TafoyaC     [facility]

## 2018-12-11 NOTE — PROGRESS NOTES
"Service Date: 12/11/2018      REASON FOR VISIT:  Taty is a pleasant 73-year-old female who called into the office today, with symptoms of recurrent atrial fibrillation with RVR.      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  About 5-6 weeks ago she was participating in an exercise class at the [facility] when she noted that her heart was pounding.  Despite rest, this persisted.  Eventually EMS was called and recommended transport to the Emergency Department here at [facility].  She was found to be in atrial fibrillation with RVR.  She was started on a diltiazem drip and converted back to sinus on her own.  Due to her elevated CHADS-VASc score and in the setting of mitral valve disease, it was recommended that she be anticoagulated.  She was started on Xarelto 20 mg daily.  After being back in sinus rhythm, the diltiazem was discontinued and she was started on a low dose of metoprolol.        She followed up in my office about a month ago and at that time overall was doing okay.  She does have a longstanding history of palpitations and she did continue to experience these intermittently but she did not have any sustained elevated heart rates.      Today, she was sitting eating lunch when she developed rapid heartbeat.  Again, this was more sustained.  She states she felt a bit \"jittery.\"  She did not have any lightheadedness, presyncope or syncope.  She is not having any shortness of breath or chest discomfort.  She called in to the office to find out what to do and was added on to my schedule today.      Her EKG in the office does confirm atrial fibrillation.  Her ventricular response is mildly elevated with a rate of 118.  She is noted to have occasional PVCs.  She has nonspecific T-wave abnormality in the inferior leads and V5 and V6 which is not new for her.      CURRENT CARDIAC MEDICATIONS:     1.  Toprol-XL 12.5 mg a " day.   2.  Xarelto 20 mg daily.      The remainder of her medications, allergies and review of systems are reviewed and as are documented separately.      PHYSICAL EXAMINATION:     GENERAL:  The patient is a pleasant 73-year-old female.  She is a bit anxious today.     VITAL SIGNS:  Her blood pressure is 152/70, pulse is 98, weight is 128 pounds, which is stable.     PULMONOLOGY:  Breathing is nonlabored.  Lungs are clear to auscultation.     CARDIOVASCULAR:  Cardiac examination reveals an irregular rate and rhythm.  She does have a 1/6 holosystolic murmur heard best at the apex.   EXTREMITIES:  Show no evidence of edema.   NEUROLOGIC:  Alert and oriented.      ASSESSMENT AND PLAN:  Geronimo is a pleasant 73-year-old female with a history of myxomatous mitral valve and resultant moderate mitral regurgitation and a recent diagnosis of paroxysmal atrial fibrillation who presents to the office today with recurrent atrial fibrillation.  At this point, she notices that her heartbeat is irregular and a bit fast, but otherwise is not having any other associated symptoms.  Her EKG today does confirm as slight elevation in her heart rate.  For the time being, I have recommended that she take an extra 25 mg of Toprol XL today.  She will then increase her daily dose to 25 mg.  I plan to see her back in the office in 48 hours with a repeat EKG at that time.  She will remain on Xarelto for anticoagulation.  Of course, I encouraged the patient to seek immediate evaluation in the Emergency Department should she develop any presyncope, syncope, shortness of breath or chest discomfort.      Thank you for allowing me to participate in the care of this pleasant patient.         SANDRA AMADOR PA-C             D: 2018   T: 2018   MT: CASSIE      Name:     GERONIMO PAYTON   MRN:      [id number]        Account:      [id number]   :      1945           Service Date: 2018      Document: [id number]

## 2018-12-11 NOTE — PATIENT INSTRUCTIONS
"Thank you for your [facility] visit today. Your provider has recommended the following:  Medication Changes:  INCREASE the metoprolol to 25mg once a day. Go ahead and take another dose today-1 full tab, then start 1 full tab daily tomorrow.   Recommendations:  Go to the ER if things get \"bad\"  Follow-up:  See Marifer ULRICH for cardiology follow up in 2 days with an EKG prior.    Reminder:  Please bring in your current medication list or your medication, over the counter supplements and vitamin bottles as we will review these at each office visit.          "

## 2018-12-11 NOTE — TELEPHONE ENCOUNTER
Patient called in today feeling she is back in afib. Pt is extremely anxious. Is on Xarelto 20 mg daily and Metoprolol 12.5 mg daily. Discussed situation with Marifer ALVARADO. Will add pt today and get OV w/ EKG. Orders placed. CHADD Felix

## 2018-12-13 ENCOUNTER — OFFICE VISIT (OUTPATIENT)
Dept: CARDIOLOGY | Facility: CLINIC | Age: 73
End: 2018-12-13
Attending: PHYSICIAN ASSISTANT
Payer: COMMERCIAL

## 2018-12-13 VITALS
DIASTOLIC BLOOD PRESSURE: 78 MMHG | SYSTOLIC BLOOD PRESSURE: 112 MMHG | BODY MASS INDEX: 20.6 KG/M2 | HEART RATE: 102 BPM | HEIGHT: 66 IN | WEIGHT: 128.2 LBS

## 2018-12-13 DIAGNOSIS — I48.19 PERSISTENT ATRIAL FIBRILLATION (H): Primary | ICD-10-CM

## 2018-12-13 DIAGNOSIS — I34.0 MITRAL VALVE INSUFFICIENCY, UNSPECIFIED ETIOLOGY: ICD-10-CM

## 2018-12-13 DIAGNOSIS — I48.0 PAROXYSMAL ATRIAL FIBRILLATION (H): ICD-10-CM

## 2018-12-13 LAB
ALBUMIN SERPL-MCNC: 3.9 G/DL (ref 3.4–5)
ALP SERPL-CCNC: 74 U/L (ref 40–150)
ALT SERPL W P-5'-P-CCNC: 107 U/L (ref 0–50)
ANION GAP SERPL CALCULATED.3IONS-SCNC: 5 MMOL/L (ref 3–14)
AST SERPL W P-5'-P-CCNC: 69 U/L (ref 0–45)
BILIRUB SERPL-MCNC: 0.4 MG/DL (ref 0.2–1.3)
BUN SERPL-MCNC: 16 MG/DL (ref 7–30)
CALCIUM SERPL-MCNC: 8.9 MG/DL (ref 8.5–10.1)
CHLORIDE SERPL-SCNC: 107 MMOL/L (ref 94–109)
CO2 SERPL-SCNC: 27 MMOL/L (ref 20–32)
CREAT SERPL-MCNC: 0.74 MG/DL (ref 0.52–1.04)
GFR SERPL CREATININE-BSD FRML MDRD: 77 ML/MIN/1.7M2
GLUCOSE SERPL-MCNC: 96 MG/DL (ref 70–99)
POTASSIUM SERPL-SCNC: 4.2 MMOL/L (ref 3.4–5.3)
PROT SERPL-MCNC: 7.4 G/DL (ref 6.8–8.8)
SODIUM SERPL-SCNC: 139 MMOL/L (ref 133–144)

## 2018-12-13 PROCEDURE — 36415 COLL VENOUS BLD VENIPUNCTURE: CPT | Performed by: PHYSICIAN ASSISTANT

## 2018-12-13 PROCEDURE — 93000 ELECTROCARDIOGRAM COMPLETE: CPT | Performed by: PHYSICIAN ASSISTANT

## 2018-12-13 PROCEDURE — 99214 OFFICE O/P EST MOD 30 MIN: CPT | Performed by: PHYSICIAN ASSISTANT

## 2018-12-13 PROCEDURE — 80053 COMPREHEN METABOLIC PANEL: CPT | Performed by: PHYSICIAN ASSISTANT

## 2018-12-13 RX ORDER — AMIODARONE HYDROCHLORIDE 200 MG/1
TABLET ORAL
Qty: 90 TABLET | Refills: 3 | Status: ON HOLD | OUTPATIENT
Start: 2018-12-13 | End: 2019-02-23

## 2018-12-13 RX ORDER — METOPROLOL SUCCINATE 25 MG/1
25 TABLET, EXTENDED RELEASE ORAL DAILY
Qty: 90 TABLET | Refills: 3 | Status: ON HOLD | OUTPATIENT
Start: 2018-12-13 | End: 2019-02-23

## 2018-12-13 ASSESSMENT — MIFFLIN-ST. JEOR: SCORE: 1113.01

## 2018-12-13 NOTE — LETTER
12/13/2018      Anyi Olmos MD  [address] [facility] [address]  [facility] [address]      RE: Taty Montgomerytalon       Dear Colleague,    I had the pleasure of seeing Taty Aguila in the [facility].    Service Date: 12/13/2018      HISTORY OF PRESENT ILLNESS:  Taty is an incredibly pleasant 73-year-old female who presents today for a 2-day followup after recently developing a recurrent episode of atrial fibrillation with RVR.      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  She has been evaluated by Dr. Stone and Dr. Hanna, mitral valve surgery was considered at one point, but after an additional workup, it was decided to continue to monitor her.  She was last in to see Dr. Stone in August of this year.  Just prior to that visit, she had a repeat echocardiogram which showed normal LV size with an EF of 60%-65%.  Mitral regurgitation was felt to be 1 to 2+.  It was noted that she had a severely dilated left atrium.  Her RVSP was 19 mmHg plus right atrial pressure.  The plan was to have her have an echocardiogram and follow up in a year.      Subsequently, in mid October she was participating in an exercise class at the [facility] when she noted that her heart was pounding.  Despite rest, this persisted and eventually EMS was called and she was transported to the Emergency Department at [facility].  She was noted to be in atrial fibrillation with RVR.  She was started on a diltiazem drip and converted back to sinus.  Due to her elevated CHADS-VASc score and in the setting of mitral valve disease, it was recommended that she be anticoagulated.  She was started on Xarelto 20 mg daily.  After converting back to sinus rhythm, the diltiazem was discontinued and she was started on a low dose of metoprolol.      Taty did well until earlier  "this week on 12/11 when she was sitting eating lunch.  She again noted the sudden onset of \"pounding\" of her heartbeat.  This made her feel quite anxious; however, she did not have any other symptoms such as shortness of breath, lightheadedness, dizziness, presyncope or syncope.  She was not having any chest discomfort.  She called our office, and I was able to see her that afternoon.  We did an EKG which confirmed she was in atrial fibrillation with RVR.  I asked her to take an extra 25mg of metoprolol XL that afternoon, then begin 25 mg daily.  We set up today's office visit for followup.      Today Taty states that she has noted a bit of irregularity to her heartbeat, but what she notices more is that she has developed dyspnea on exertion.  She denies any shortness of breath at rest.  She is not having any orthopnea, PND or lower extremity edema.  She states that when trying to participate in her typical bowling league last night, she was quite short of breath even carrying the bowling ball around.  She also did some walking earlier today and had to take a break a few times, which is unusual for her.  She is tolerating the increased dose of metoprolol without difficulty.      We did again repeat an EKG in the office today.  This again shows atrial fibrillation.  Her ventricular response rate was 111 beats per minute.      CURRENT CARDIAC MEDICATIONS:   1.  Toprol-XL.  I have currently been having her take 25 mg once a day.   2.  Xarelto 20 mg daily.      The remainder of her medications, allergies and review of systems were reviewed and as are documented separately.      PHYSICAL EXAMINATION:   GENERAL:  Taty is a pleasant 73-year-old female who appears her stated age.  She is in no apparent distress.   VITAL SIGNS:  Her blood pressure is 112/78, pulse is 102, weight is 128 pounds, which is stable.   PULMONARY:  Breathing is nonlabored.   LUNGS:  Clear to auscultation.   CARDIAC:  Examination reveals an irregular " rapid heartbeat.  She does have a 1/6 holosystolic murmur heard best at the apex.   EXTREMITIES:  Lower extremities show no evidence of edema.   NEUROLOGIC:  Alert and oriented.      ASSESSMENT AND PLAN:  I did call and discuss her case with Dr. Stone.      Again, Taty is a pleasant 73-year-old female with a history of a myxomatous mitral valve resulting in mitral regurgitation.  Various imaging modalities have shown the degree of regurgitation to be anywhere from mild to moderately severe.  Fortunately, her EF has remained >60%.  Earlier this fall she did have a very brief episode of atrial fibrillation RVR that reverted back to SR; however, this week she has developed recurrent atrial fibrillation.  Fortunately, she is already anticoagulated.  Unfortunately, she does appear to be quite symptomatic with this.  Due to this, Dr. Stone and I have recommended that we make an attempt at restoration of sinus rhythm.  Since she already has severe left atrial dilatation, we did recommend that she start amiodarone as an antiarrhythmic to increase our chances of successful restoration and maintenance of sinus rhythm.  The indications for, along with the possible side effects of amiodarone were discussed with the patient.  Her TSH was recently checked and was WNL. I will get a CMP today. She does tell me that she has had an intermittent idiopathic elevations in her AST/ALT in the past.  We discussed that this likely would be a somewhat temporary medication, not something that we will be using for years.  She was agreeable to starting the amiodarone as well as undergoing a cardioversion.  The risks and the benefits of the procedure were discussed with the patient.      Additionally, Dr. Stone recommended that Taty meet with Dr. Hanna again, since she has developed atrial fibrillation in the context of chronic mitral regurgitation, to reevaluate the possible need for valve intervention.  Taty was agreeable to this.  She  did mention that she would be highly interested in minimally invasive surgery if this were an option for her.      Again, at this time she will start amiodarone.  I recommended that she take 200 mg twice a day for the first 10 days, then she can transition to 200 mg once a day.  I am not going to increase her beta blocker, as the amiodarone should provide some rate control.  Additionally, if she converts back to sinus rhythm, she typically does have a lower heart rate and I don't want to risk making her too bradycardic.  We will plan to have her get a cardioversion in the next week or so.  I did place a referral for her to follow up with Dr. Hanna at his next available as well.      Of course, I encouraged the patient to contact me with any questions or concerns.  She is aware that she should present to the Emergency Department for any significant symptoms that would be related to atrial fibrillation or heart failure, and we reviewed what these may be.      Thank you for allowing me to participate in the care of this very pleasant patient.         SANDRA AMADOR PA-C             D: 2018   T: 2018   MT: CHRISTOPH      Name:     GERONIMO PAYTON   MRN:      [id number]        Account:      [id number]   :      1945           Service Date: 2018      Document: [id number]           Outpatient Encounter Medications as of 2018   Medication Sig Dispense Refill     amiodarone (PACERONE/CODARONE) 200 MG tablet Take 1 tab twice a day for 10 days, then decrease the dose to 1 tab once a day 90 tablet 3     metoprolol succinate ER (TOPROL-XL) 25 MG 24 hr tablet Take 1 tablet (25 mg) by mouth daily 90 tablet 3     calcium carbonate-vitamin D (CALCIUM 600+D) 600-200 MG-UNIT TABS Take 1 tablet by mouth 2 times daily       Fexofenadine HCl (ALLEGRA PO) Take by mouth daily Dose unknown       Glucosamine HCl--750 MG TABS Take 2 tablets by mouth daily After lunch       MELATONIN PO Take 5 mg by  mouth nightly as needed        Multiple Vitamins-Minerals (MULTIVITAMIN ADULT) TABS Take 1 tablet by mouth daily       omeprazole (PRILOSEC) 10 MG CR capsule Take 1 capsule (10 mg) by mouth every morning (before breakfast) 90 capsule 3     rivaroxaban ANTICOAGULANT (XARELTO) 20 MG TABS tablet Take 1 tablet (20 mg) by mouth daily (with dinner) 90 tablet 3     [DISCONTINUED] metoprolol succinate (TOPROL-XL) 25 MG 24 hr tablet Take 0.5 tablets (12.5 mg) by mouth daily 45 tablet 3     No facility-administered encounter medications on file as of 12/13/2018.        Again, thank you for allowing me to participate in the care of your patient.      Sincerely,    Marifer Mohamud PA-C     [facility]

## 2018-12-13 NOTE — PATIENT INSTRUCTIONS
Thank you for your [facility] visit today. Your provider has recommended the following:  Medication Changes:  Start amiodarone 200mg TWICE a day for 10 days, then decrease the dose to 200mg once a day  CONTINUE the Xarelto  Recommendations:  Cardioversion next week  Revisit with Dr Hanna  Follow-up:  See Marifer ULRICH for cardiology follow up after the cardioversion.      Reminder:  Please bring in your current medication list or your medication, over the counter supplements and vitamin bottles as we will review these at each office visit.

## 2018-12-13 NOTE — PROGRESS NOTES
Please see separate dictation for HPI, PHYSICAL EXAM AND IMPRESSION/PLAN.    CURRENT MEDICATIONS:  Current Outpatient Medications   Medication Sig Dispense Refill     calcium carbonate-vitamin D (CALCIUM 600+D) 600-200 MG-UNIT TABS Take 1 tablet by mouth 2 times daily       Fexofenadine HCl (ALLEGRA PO) Take by mouth daily Dose unknown       Glucosamine HCl--750 MG TABS Take 2 tablets by mouth daily After lunch       MELATONIN PO Take 5 mg by mouth nightly as needed        metoprolol succinate (TOPROL-XL) 25 MG 24 hr tablet Take 0.5 tablets (12.5 mg) by mouth daily 45 tablet 3     Multiple Vitamins-Minerals (MULTIVITAMIN ADULT) TABS Take 1 tablet by mouth daily       omeprazole (PRILOSEC) 10 MG CR capsule Take 1 capsule (10 mg) by mouth every morning (before breakfast) 90 capsule 3     rivaroxaban ANTICOAGULANT (XARELTO) 20 MG TABS tablet Take 1 tablet (20 mg) by mouth daily (with dinner) 90 tablet 3       ALLERGIES:     Allergies   Allergen Reactions     No Known Drug Allergies        PAST MEDICAL HISTORY:  Past Medical History:   Diagnosis Date     Allergic rhinitis, cause unspecified     dust mites, ragweed     Colitis      Mitral regurgitation      Mitral valve disorders(424.0) 1984    myxomatous valve, mod MR, prolapse     Mitral valve prolapse        PAST SURGICAL HISTORY:  Past Surgical History:   Procedure Laterality Date     COLONOSCOPY N/A 9/15/2015    Procedure: COLONOSCOPY;  Surgeon: Anson Llanos MD;  Location:  GI       SOCIAL HISTORY:  Social History     Socioeconomic History     Marital status:      Spouse name: None     Number of children: 3     Years of education: None     Highest education level: None   Social Needs     Financial resource strain: None     Food insecurity - worry: None     Food insecurity - inability: None     Transportation needs - medical: None     Transportation needs - non-medical: None   Occupational History     None   Tobacco Use     Smoking status: Never  Smoker     Smokeless tobacco: Never Used   Substance and Sexual Activity     Alcohol use: No     Drug use: No     Sexual activity: Not Currently   Other Topics Concern      Service Not Asked     Blood Transfusions Not Asked     Caffeine Concern No     Comment: 1 cup of coffee and 1 can diet coke per day     Occupational Exposure Not Asked     Hobby Hazards Not Asked     Sleep Concern Yes     Comment: takes Doxylamine succinate 1/2 tab every night     Stress Concern No     Weight Concern No     Special Diet No     Comment: healthy      Back Care Not Asked     Exercise Yes     Comment: walking 45min x7 a wk. Very active, [employer], Golf, Bowling, Cardio     Bike Helmet Not Asked     Seat Belt Yes     Self-Exams Yes     Parent/sibling w/ CABG, MI or angioplasty before 65F 55M? No   Social History Narrative     None       FAMILY HISTORY:  Family History   Problem Relation Age of Onset     Osteoporosis Mother      Alzheimer Disease Mother      Family History Negative Father      Heart Disease Maternal Grandfather      Family History Negative Paternal Grandmother      Family History Negative Paternal Grandfather        Review of Systems:  Skin:          Eyes:  Positive for glasses    ENT:  Negative      Respiratory:  Positive for shortness of breath     Cardiovascular:  chest pain;lightheadedness;dizziness palpitations;fatigue    Gastroenterology: Negative      Genitourinary:  Negative      Musculoskeletal:  Positive for arthritis hands   Neurologic:  Negative      Psychiatric:  Negative      Heme/Lymph/Imm:  Positive for allergies    Endocrine:  Negative         Reviewed. Remainder of the note dictated.    Marifer Mohamud PA-C

## 2018-12-13 NOTE — LETTER
12/13/2018    Anyi Olmos MD  [address]  [facility] [address]    RE: Taty Aguila       Dear Colleague,    I had the pleasure of seeing Taty Aguila in the [facility].    Please see separate dictation for HPI, PHYSICAL EXAM AND IMPRESSION/PLAN.    CURRENT MEDICATIONS:  Current Outpatient Medications   Medication Sig Dispense Refill     calcium carbonate-vitamin D (CALCIUM 600+D) 600-200 MG-UNIT TABS Take 1 tablet by mouth 2 times daily       Fexofenadine HCl (ALLEGRA PO) Take by mouth daily Dose unknown       Glucosamine HCl--750 MG TABS Take 2 tablets by mouth daily After lunch       MELATONIN PO Take 5 mg by mouth nightly as needed        metoprolol succinate (TOPROL-XL) 25 MG 24 hr tablet Take 0.5 tablets (12.5 mg) by mouth daily 45 tablet 3     Multiple Vitamins-Minerals (MULTIVITAMIN ADULT) TABS Take 1 tablet by mouth daily       omeprazole (PRILOSEC) 10 MG CR capsule Take 1 capsule (10 mg) by mouth every morning (before breakfast) 90 capsule 3     rivaroxaban ANTICOAGULANT (XARELTO) 20 MG TABS tablet Take 1 tablet (20 mg) by mouth daily (with dinner) 90 tablet 3       ALLERGIES:     Allergies   Allergen Reactions     No Known Drug Allergies        PAST MEDICAL HISTORY:  Past Medical History:   Diagnosis Date     Allergic rhinitis, cause unspecified     dust mites, ragweed     Colitis      Mitral regurgitation      Mitral valve disorders(424.0) 1984    myxomatous valve, mod MR, prolapse     Mitral valve prolapse        PAST SURGICAL HISTORY:  Past Surgical History:   Procedure Laterality Date     COLONOSCOPY N/A 9/15/2015    Procedure: COLONOSCOPY;  Surgeon: Anson Llanos MD;  Location:  GI       SOCIAL HISTORY:  Social History     Socioeconomic History     Marital status:      Spouse name: None     Number of children: 3     Years of education: None     Highest education level: None   Social Needs     Financial resource strain: None     Food  insecurity - worry: None     Food insecurity - inability: None     Transportation needs - medical: None     Transportation needs - non-medical: None   Occupational History     None   Tobacco Use     Smoking status: Never Smoker     Smokeless tobacco: Never Used   Substance and Sexual Activity     Alcohol use: No     Drug use: No     Sexual activity: Not Currently   Other Topics Concern      Service Not Asked     Blood Transfusions Not Asked     Caffeine Concern No     Comment: 1 cup of coffee and 1 can diet coke per day     Occupational Exposure Not Asked     Hobby Hazards Not Asked     Sleep Concern Yes     Comment: takes Doxylamine succinate 1/2 tab every night     Stress Concern No     Weight Concern No     Special Diet No     Comment: healthy      Back Care Not Asked     Exercise Yes     Comment: walking 45min x7 a wk. Very active, [employer], Golf, Bowling, Cardio     Bike Helmet Not Asked     Seat Belt Yes     Self-Exams Yes     Parent/sibling w/ CABG, MI or angioplasty before 65F 55M? No   Social History Narrative     None       FAMILY HISTORY:  Family History   Problem Relation Age of Onset     Osteoporosis Mother      Alzheimer Disease Mother      Family History Negative Father      Heart Disease Maternal Grandfather      Family History Negative Paternal Grandmother      Family History Negative Paternal Grandfather        Review of Systems:  Skin:          Eyes:  Positive for glasses    ENT:  Negative      Respiratory:  Positive for shortness of breath     Cardiovascular:  chest pain;lightheadedness;dizziness palpitations;fatigue    Gastroenterology: Negative      Genitourinary:  Negative      Musculoskeletal:  Positive for arthritis hands   Neurologic:  Negative      Psychiatric:  Negative      Heme/Lymph/Imm:  Positive for allergies    Endocrine:  Negative         Reviewed. Remainder of the note dictated.    Marifer Mohamud PA-C        Service Date: 12/13/2018      HISTORY OF PRESENT ILLNESS:   "Taty is an incredibly pleasant 73-year-old female who presents today for a 2-day followup after recently developing a recurrent episode of atrial fibrillation with RVR.      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  She has been evaluated by Dr. Stone and Dr. Hanna, mitral valve surgery was considered at one point, but after an additional workup, it was decided to continue to monitor her.  She was last in to see Dr. Stone in August of this year.  Just prior to that visit, she had a repeat echocardiogram which showed normal LV size with an EF of 60%-65%.  Mitral regurgitation was felt to be 1 to 2+.  It was noted that she had a severely dilated left atrium.  Her RVSP was 19 mmHg plus right atrial pressure.  The plan was to have her have an echocardiogram and follow up in a year.      Subsequently, in mid October she was participating in an exercise class at the [facility] when she noted that her heart was pounding.  Despite rest, this persisted and eventually EMS was called and she was transported to the Emergency Department at [facility].  She was noted to be in atrial fibrillation with RVR.  She was started on a diltiazem drip and converted back to sinus.  Due to her elevated CHADS-VASc score and in the setting of mitral valve disease, it was recommended that she be anticoagulated.  She was started on Xarelto 20 mg daily.  After converting back to sinus rhythm, the diltiazem was discontinued and she was started on a low dose of metoprolol.      Taty did well until earlier this week on 12/11 when she was sitting eating lunch.  She again noted the sudden onset of \"pounding\" of her heartbeat.  This made her feel quite anxious; however, she did not have any other symptoms such as shortness of breath, lightheadedness, dizziness, presyncope or syncope.  She " was not having any chest discomfort.  She called our office, and I was able to see her that afternoon.  We did an EKG which confirmed she was in atrial fibrillation with RVR.  I asked her to take an extra 25mg of metoprolol XL that afternoon, then begin 25 mg daily.  We set up today's office visit for followup.      Today Taty states that she has noted a bit of irregularity to her heartbeat, but what she notices more is that she has developed dyspnea on exertion.  She denies any shortness of breath at rest.  She is not having any orthopnea, PND or lower extremity edema.  She states that when trying to participate in her typical bowling league last night, she was quite short of breath even carrying the bowling ball around.  She also did some walking earlier today and had to take a break a few times, which is unusual for her.  She is tolerating the increased dose of metoprolol without difficulty.      We did again repeat an EKG in the office today.  This again shows atrial fibrillation.  Her ventricular response rate was 111 beats per minute.      CURRENT CARDIAC MEDICATIONS:   1.  Toprol-XL.  I have currently been having her take 25 mg once a day.   2.  Xarelto 20 mg daily.      The remainder of her medications, allergies and review of systems were reviewed and as are documented separately.      PHYSICAL EXAMINATION:   GENERAL:  Taty is a pleasant 73-year-old female who appears her stated age.  She is in no apparent distress.   VITAL SIGNS:  Her blood pressure is 112/78, pulse is 102, weight is 128 pounds, which is stable.   PULMONARY:  Breathing is nonlabored.   LUNGS:  Clear to auscultation.   CARDIAC:  Examination reveals an irregular rapid heartbeat.  She does have a 1/6 holosystolic murmur heard best at the apex.   EXTREMITIES:  Lower extremities show no evidence of edema.   NEUROLOGIC:  Alert and oriented.      ASSESSMENT AND PLAN:  I did call and discuss her case with Dr. Stone.      Again, Taty is a pleasant  73-year-old female with a history of a myxomatous mitral valve resulting in mitral regurgitation.  Various imaging modalities have shown the degree of regurgitation to be anywhere from mild to moderately severe.  Fortunately, her EF has remained >60%.  Earlier this fall she did have a very brief episode of atrial fibrillation RVR that reverted back to SR; however, this week she has developed recurrent atrial fibrillation.  Fortunately, she is already anticoagulated.  Unfortunately, she does appear to be quite symptomatic with this.  Due to this, Dr. Stone and I have recommended that we make an attempt at restoration of sinus rhythm.  Since she already has severe left atrial dilatation, we did recommend that she start amiodarone as an antiarrhythmic to increase our chances of successful restoration and maintenance of sinus rhythm.  The indications for, along with the possible side effects of amiodarone were discussed with the patient.  Her TSH was recently checked and was WNL. I will get a CMP today. She does tell me that she has had an intermittent idiopathic elevations in her AST/ALT in the past.  We discussed that this likely would be a somewhat temporary medication, not something that we will be using for years.  She was agreeable to starting the amiodarone as well as undergoing a cardioversion.  The risks and the benefits of the procedure were discussed with the patient.      Additionally, Dr. Stone recommended that Taty meet with Dr. Hanna again, since she has developed atrial fibrillation in the context of chronic mitral regurgitation, to reevaluate the possible need for valve intervention.  Taty was agreeable to this.  She did mention that she would be highly interested in minimally invasive surgery if this were an option for her.      Again, at this time she will start amiodarone.  I recommended that she take 200 mg twice a day for the first 10 days, then she can transition to 200 mg once a day.  I am  not going to increase her beta blocker, as the amiodarone should provide some rate control.  Additionally, if she converts back to sinus rhythm, she typically does have a lower heart rate and I don't want to risk making her too bradycardic.  We will plan to have her get a cardioversion in the next week or so.  I did place a referral for her to follow up with Dr. Hanna at his next available as well.      Of course, I encouraged the patient to contact me with any questions or concerns.  She is aware that she should present to the Emergency Department for any significant symptoms that would be related to atrial fibrillation or heart failure, and we reviewed what these may be.      Thank you for allowing me to participate in the care of this very pleasant patient.         SANDRA AMADOR PA-C             D: 2018   T: 2018   MT: CHRISTOPH      Name:     GERONIMO PAYTON   MRN:      [id number]        Account:      [id number]   :      1945           Service Date: 2018      Document: [id number]        Thank you for allowing me to participate in the care of your patient.      Sincerely,     Sandra Amador PA-C     [facility]    cc:   Sandra Amador PA-C  [address] [place] [address]

## 2018-12-14 NOTE — PROGRESS NOTES
"Service Date: 12/13/2018      HISTORY OF PRESENT ILLNESS:  Taty is an incredibly pleasant 73-year-old female who presents today for a 2-day followup after recently developing a recurrent episode of atrial fibrillation with RVR.      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  She has been evaluated by Dr. Stone and Dr. Hanna, mitral valve surgery was considered at one point, but after an additional workup, it was decided to continue to monitor her.  She was last in to see Dr. Stone in August of this year.  Just prior to that visit, she had a repeat echocardiogram which showed normal LV size with an EF of 60%-65%.  Mitral regurgitation was felt to be 1 to 2+.  It was noted that she had a severely dilated left atrium.  Her RVSP was 19 mmHg plus right atrial pressure.  The plan was to have her have an echocardiogram and follow up in a year.      Subsequently, in mid October she was participating in an exercise class at the [facility] when she noted that her heart was pounding.  Despite rest, this persisted and eventually EMS was called and she was transported to the Emergency Department at [facility].  She was noted to be in atrial fibrillation with RVR.  She was started on a diltiazem drip and converted back to sinus.  Due to her elevated CHADS-VASc score and in the setting of mitral valve disease, it was recommended that she be anticoagulated.  She was started on Xarelto 20 mg daily.  After converting back to sinus rhythm, the diltiazem was discontinued and she was started on a low dose of metoprolol.      Taty did well until earlier this week on 12/11 when she was sitting eating lunch.  She again noted the sudden onset of \"pounding\" of her heartbeat.  This made her feel quite anxious; however, she did not have any other symptoms such as shortness of breath, " lightheadedness, dizziness, presyncope or syncope.  She was not having any chest discomfort.  She called our office, and I was able to see her that afternoon.  We did an EKG which confirmed she was in atrial fibrillation with RVR.  I asked her to take an extra 25mg of metoprolol XL that afternoon, then begin 25 mg daily.  We set up today's office visit for followup.      Today Taty states that she has noted a bit of irregularity to her heartbeat, but what she notices more is that she has developed dyspnea on exertion.  She denies any shortness of breath at rest.  She is not having any orthopnea, PND or lower extremity edema.  She states that when trying to participate in her typical bowling league last night, she was quite short of breath even carrying the bowling ball around.  She also did some walking earlier today and had to take a break a few times, which is unusual for her.  She is tolerating the increased dose of metoprolol without difficulty.      We did again repeat an EKG in the office today.  This again shows atrial fibrillation.  Her ventricular response rate was 111 beats per minute.      CURRENT CARDIAC MEDICATIONS:   1.  Toprol-XL.  I have currently been having her take 25 mg once a day.   2.  Xarelto 20 mg daily.      The remainder of her medications, allergies and review of systems were reviewed and as are documented separately.      PHYSICAL EXAMINATION:   GENERAL:  Taty is a pleasant 73-year-old female who appears her stated age.  She is in no apparent distress.   VITAL SIGNS:  Her blood pressure is 112/78, pulse is 102, weight is 128 pounds, which is stable.   PULMONARY:  Breathing is nonlabored.   LUNGS:  Clear to auscultation.   CARDIAC:  Examination reveals an irregular rapid heartbeat.  She does have a 1/6 holosystolic murmur heard best at the apex.   EXTREMITIES:  Lower extremities show no evidence of edema.   NEUROLOGIC:  Alert and oriented.      ASSESSMENT AND PLAN:  I did call and discuss  her case with Dr. Stone.      Again, Taty is a pleasant 73-year-old female with a history of a myxomatous mitral valve resulting in mitral regurgitation.  Various imaging modalities have shown the degree of regurgitation to be anywhere from mild to moderately severe.  Fortunately, her EF has remained >60%.  Earlier this fall she did have a very brief episode of atrial fibrillation RVR that reverted back to SR; however, this week she has developed recurrent atrial fibrillation.  Fortunately, she is already anticoagulated.  Unfortunately, she does appear to be quite symptomatic with this.  Due to this, Dr. Stone and I have recommended that we make an attempt at restoration of sinus rhythm.  Since she already has severe left atrial dilatation, we did recommend that she start amiodarone as an antiarrhythmic to increase our chances of successful restoration and maintenance of sinus rhythm.  The indications for, along with the possible side effects of amiodarone were discussed with the patient.  Her TSH was recently checked and was WNL. I will get a CMP today. She does tell me that she has had an intermittent idiopathic elevations in her AST/ALT in the past.  We discussed that this likely would be a somewhat temporary medication, not something that we will be using for years.  She was agreeable to starting the amiodarone as well as undergoing a cardioversion.  The risks and the benefits of the procedure were discussed with the patient.      Additionally, Dr. Stone recommended that Taty meet with Dr. Hanna again, since she has developed atrial fibrillation in the context of chronic mitral regurgitation, to reevaluate the possible need for valve intervention.  Taty was agreeable to this.  She did mention that she would be highly interested in minimally invasive surgery if this were an option for her.      Again, at this time she will start amiodarone.  I recommended that she take 200 mg twice a day for the first 10  days, then she can transition to 200 mg once a day.  I am not going to increase her beta blocker, as the amiodarone should provide some rate control.  Additionally, if she converts back to sinus rhythm, she typically does have a lower heart rate and I don't want to risk making her too bradycardic.  We will plan to have her get a cardioversion in the next week or so.  I did place a referral for her to follow up with Dr. Hanna at his next available as well.      Of course, I encouraged the patient to contact me with any questions or concerns.  She is aware that she should present to the Emergency Department for any significant symptoms that would be related to atrial fibrillation or heart failure, and we reviewed what these may be.      Thank you for allowing me to participate in the care of this very pleasant patient.         SANDRA AMADOR PA-C             D: 2018   T: 2018   MT: CHRISTOPH      Name:     GERONIMO PAYTON   MRN:      [id number]        Account:      [id number]   :      1945           Service Date: 2018      Document: [id number]

## 2018-12-18 ENCOUNTER — TELEPHONE (OUTPATIENT)
Dept: CARDIOLOGY | Facility: CLINIC | Age: 73
End: 2018-12-18

## 2018-12-18 NOTE — TELEPHONE ENCOUNTER
Cardioversion Instructions    Patient is [facility], check-in time 0930, procedure time 1130.     Patient should have no food or drinks after midnight the night before the procedure.     Patient is not taking a diuretic, digoxin or insulin/diabetic medication.      Patient is taking Xarelto and has been taking daily uninterrupted for at least 4 weeks.      Patient can take her daily medications the morning of the procedure with small sips of water.     Patient has someone to drive them home after the procedure and stay with them for 24 hours after the procedure.     Confirmed follow-up appt scheduled 1/3/2019 w/ Marifer Mohamud PA-C.    Pt asked what happens if she goes into procedure and her rhythm is not Afib and is normal. Advised the cardiologist doing the procedure would provide some recommendations. Pt understood, will call back if she has any other recommendations.       Milvia FLORENTINO  [facility]

## 2018-12-20 ENCOUNTER — HOSPITAL ENCOUNTER (OUTPATIENT)
Facility: CLINIC | Age: 73
Discharge: HOME | End: 2018-12-20
Attending: INTERNAL MEDICINE | Admitting: INTERNAL MEDICINE
Payer: COMMERCIAL

## 2018-12-20 PROCEDURE — 93010 ELECTROCARDIOGRAM REPORT: CPT | Performed by: INTERNAL MEDICINE

## 2018-12-20 NOTE — TELEPHONE ENCOUNTER
Pt w/ elevated AST and ALT 12/13/18. Pt needs repeat labs on 1/3/19 when she sees Marifer ALVARADO. Orders placed. Called pt to review lab results and recommendations. No answer. Left  for callback. CHADD Felix

## 2018-12-27 ENCOUNTER — TELEPHONE (OUTPATIENT)
Dept: CARDIOLOGY | Facility: CLINIC | Age: 73
End: 2018-12-27

## 2018-12-27 NOTE — TELEPHONE ENCOUNTER
Called pt - she was scheduled for DCCV 12/20/2018 - when presented was in sinus junaid and procedure cancelled.  Pt is on amiodarone - call pt that she had decreased to 200mg once a day as recommended - she has decreased it.  Pt is seeing Dr. Hanna to discuss possible minimally invasive Mitral Valve repair- Pt has questions regarding this.  Called Jhon FLORENTINO for Dr. Hanna and left message asking if she could please call pt to discuss.

## 2018-12-28 ENCOUNTER — TELEPHONE (OUTPATIENT)
Dept: OTHER | Facility: CLINIC | Age: 73
End: 2018-12-28

## 2018-12-28 NOTE — TELEPHONE ENCOUNTER
Patient called with numerous questions regarding MVR/R consult on 12/31. Addressed what I could and will refer the rest to Dr. Hanna at consultation.

## 2018-12-31 ENCOUNTER — OFFICE VISIT (OUTPATIENT)
Dept: CARDIOLOGY | Facility: CLINIC | Age: 73
End: 2018-12-31
Attending: PHYSICIAN ASSISTANT
Payer: COMMERCIAL

## 2018-12-31 VITALS
HEIGHT: 66 IN | HEART RATE: 80 BPM | BODY MASS INDEX: 20.25 KG/M2 | DIASTOLIC BLOOD PRESSURE: 74 MMHG | WEIGHT: 126 LBS | SYSTOLIC BLOOD PRESSURE: 152 MMHG

## 2018-12-31 DIAGNOSIS — I34.0 MITRAL VALVE INSUFFICIENCY, UNSPECIFIED ETIOLOGY: ICD-10-CM

## 2018-12-31 DIAGNOSIS — I34.0 MITRAL VALVE INSUFFICIENCY, UNSPECIFIED ETIOLOGY: Primary | ICD-10-CM

## 2018-12-31 ASSESSMENT — MIFFLIN-ST. JEOR: SCORE: 1102.96

## 2019-01-02 ENCOUNTER — HOSPITAL ENCOUNTER (INPATIENT)
Facility: CLINIC | Age: 74
Setting detail: SURGERY ADMIT
End: 2019-01-02
Attending: SURGERY | Admitting: SURGERY
Payer: COMMERCIAL

## 2019-01-02 ENCOUNTER — TELEPHONE (OUTPATIENT)
Dept: CARDIOLOGY | Facility: CLINIC | Age: 74
End: 2019-01-02

## 2019-01-02 NOTE — PROGRESS NOTES
Service Date: 12/31/2018      REFERRING CARDIOLOGIST:  Jesus Stone MD       REASON FOR CONSULTATION:  Evaluation for severe mitral valve regurgitation and new onset paroxysmal atrial fibrillation.      HISTORY OF PRESENT ILLNESS:  Ms. Aguila is a very pleasant 73-year-old female, overall healthy, with known mitral valve regurgitation.  In fact, I met her 2 years ago for evaluation for mitral valve repair then.  Given the fact that she was asymptomatic at that time with preserved LV function, the decision was made to medically treat her at that time.  She has done well over the last 2 years.  However, this fall she developed new onset paroxysmal atrial fibrillation and was placed on amiodarone and Xarelto.  For this reason, she was referred back to me for evaluation for mitral valve surgery.  Of note, besides when she went into rapid AFib with RVR, she is still virtually asymptomatic and is fairly active, walking up to a couple miles a day with her dog without any problems.  She denies any chest pain, chest tightness or any swelling in her legs.      PAST MEDICAL HISTORY:     1.  Significant for a history of colitis   2.  Mitral valve prolapse/regurgitation   3.  Allergic rhinitis.      PAST SURGICAL HISTORY:  Unremarkable.      ALLERGIES:  No known drug allergies.      CURRENT MEDICATIONS:  Calcium and vitamin D supplements, Allegra, glucosamine, melatonin, metoprolol XL 12.5 mg p.o. daily, multivitamin, Prilosec and Xarelto 20 mg p.o. daily.      FAMILY HISTORY:  Noncontributory.      SOCIAL HISTORY:  She is , retired, has 3 children.  She has never been a smoker.  She does not drink alcohol.      REVIEW OF SYSTEMS:  As per HPI.  All other 10-point review of systems is completed and was otherwise negative unless stated above.      PHYSICAL EXAMINATION:   VITAL SIGNS:  Her blood pressure today is 152/74, pulse is 80.  She is 57 kg, 5 feet 6 inches.   GENERAL:  She appears well, in no acute distress.    HEENT:  Within normal limits.   NECK:  Supple, no lymphadenopathy.   CARDIOVASCULAR:  Regular rate and rhythm, normal S1, S2.  Grade 3/6 systolic murmur at the apex.   LUNGS:  Clear bilaterally.   ABDOMEN:  Soft, nontender, nondistended.   EXTREMITIES:  Negative for edema, cyanosis or clubbing.   NEUROLOGIC:  She is A&O x3, no focal deficits.      LABORATORY DATA:  Most recent transthoracic echo from 08/06/2018 demonstrated a normal LV systolic size and function with EF of 60%-65%, mild to moderate mitral valve regurgitation.  Myxomatous appearing valve, severely dilated left atrium, mild to moderate tricuspid valve regurgitation.  Normal aortic valve.      IMPRESSION AND PLAN:  Ms. Aguila is a very pleasant 73-year-old previously healthy lady with known mitral valve prolapse and mitral valve regurgitation who I saw 2 years ago for evaluation for possible mitral valve surgery, but at that time, the decision was made to medically manage her.  This fall she developed new onset paroxysmal atrial fibrillation.  For this reason, the patient was referred back to us for evaluation for mitral valve surgery.  Besides her atrial fibrillation episodes, she is virtually still asymptomatic.  Her most recent transthoracic echo from August demonstrates only mild to moderate mitral valve regurgitation.  I do think that she probably does have severe mitral valve regurgitation, and in the setting of new onset AFib, we should consider her for mitral valve repair.  I will definitely like to get a transesophageal echo at this point, however, to further look at her mitral valve and also the tricuspid valve which had moderate regurgitation 2 years ago as well.  We also entertained the idea of adding pulmonary vein isolation to the procedure, although her left atrium is severely dilated.  We will start off by getting a TIA and then we will obtain a left heart catheterization and a right heart catheterization and then I will discuss with  her our definitive surgical plan, which would most likely include a mitral valve repair.  I will wait for the TIA and the left and right heart catheterization to decide whether she would be an appropriate minimally invasive mitral valve surgery candidate versus a standard sternotomy approach.  I think, overall, she is an excellent surgical candidate and I quoted an operative mortality risk of under 1%, similar to 2 years ago.  I also went over the risks and benefits of the operation and potential complications associated with surgery.  It was a pleasure to see Ms. Payton again today and we will follow up with her after all the preoperative studies are completed.         LEE STANLEY MD             D: 2018   T: 2019   MT: diallo      Name:     GERONIMO PAYTON   MRN:      [id number]        Account:      [id number]   :      1945           Service Date: 2018      Document: [id number]

## 2019-01-03 ENCOUNTER — OFFICE VISIT (OUTPATIENT)
Dept: CARDIOLOGY | Facility: CLINIC | Age: 74
End: 2019-01-03
Attending: PHYSICIAN ASSISTANT
Payer: COMMERCIAL

## 2019-01-03 VITALS
HEIGHT: 66 IN | WEIGHT: 126.8 LBS | BODY MASS INDEX: 20.38 KG/M2 | DIASTOLIC BLOOD PRESSURE: 68 MMHG | SYSTOLIC BLOOD PRESSURE: 112 MMHG | HEART RATE: 60 BPM

## 2019-01-03 DIAGNOSIS — I34.0 MITRAL VALVE INSUFFICIENCY, UNSPECIFIED ETIOLOGY: Primary | ICD-10-CM

## 2019-01-03 DIAGNOSIS — I48.0 PAROXYSMAL ATRIAL FIBRILLATION (H): ICD-10-CM

## 2019-01-03 DIAGNOSIS — I48.91 A-FIB (H): ICD-10-CM

## 2019-01-03 LAB
ALT SERPL W P-5'-P-CCNC: 38 U/L (ref 0–50)
AST SERPL W P-5'-P-CCNC: 22 U/L (ref 0–45)

## 2019-01-03 PROCEDURE — 99214 OFFICE O/P EST MOD 30 MIN: CPT | Performed by: PHYSICIAN ASSISTANT

## 2019-01-03 PROCEDURE — 84450 TRANSFERASE (AST) (SGOT): CPT | Performed by: PHYSICIAN ASSISTANT

## 2019-01-03 PROCEDURE — 36415 COLL VENOUS BLD VENIPUNCTURE: CPT | Performed by: PHYSICIAN ASSISTANT

## 2019-01-03 PROCEDURE — 84460 ALANINE AMINO (ALT) (SGPT): CPT | Performed by: PHYSICIAN ASSISTANT

## 2019-01-03 ASSESSMENT — MIFFLIN-ST. JEOR: SCORE: 1106.91

## 2019-01-03 NOTE — PROGRESS NOTES
Please see separate dictation for HPI, PHYSICAL EXAM AND IMPRESSION/PLAN.    CURRENT MEDICATIONS:  Current Outpatient Medications   Medication Sig Dispense Refill     amiodarone (PACERONE/CODARONE) 200 MG tablet Take 1 tab twice a day for 10 days, then decrease the dose to 1 tab once a day (Patient taking differently: Take 200 mg by mouth daily ) 90 tablet 3     calcium carbonate-vitamin D (CALCIUM 600+D) 600-200 MG-UNIT TABS Take 1 tablet by mouth 2 times daily       Fexofenadine HCl (ALLEGRA PO) Take by mouth daily Dose unknown       Glucosamine HCl--750 MG TABS Take 2 tablets by mouth daily After lunch       MELATONIN PO Take 5 mg by mouth nightly as needed        metoprolol succinate ER (TOPROL-XL) 25 MG 24 hr tablet Take 1 tablet (25 mg) by mouth daily 90 tablet 3     Multiple Vitamins-Minerals (MULTIVITAMIN ADULT) TABS Take 1 tablet by mouth daily       omeprazole (PRILOSEC) 10 MG CR capsule Take 1 capsule (10 mg) by mouth every morning (before breakfast) 90 capsule 3     rivaroxaban ANTICOAGULANT (XARELTO) 20 MG TABS tablet Take 1 tablet (20 mg) by mouth daily (with dinner) 90 tablet 3       ALLERGIES:     Allergies   Allergen Reactions     No Known Drug Allergies        PAST MEDICAL HISTORY:  Past Medical History:   Diagnosis Date     Allergic rhinitis, cause unspecified     dust mites, ragweed     Colitis      Mitral regurgitation      Mitral valve disorders(424.0) 1984    myxomatous valve, mod MR, prolapse     Mitral valve prolapse        PAST SURGICAL HISTORY:  Past Surgical History:   Procedure Laterality Date     COLONOSCOPY N/A 9/15/2015    Procedure: COLONOSCOPY;  Surgeon: Anson Llanos MD;  Location:  GI       SOCIAL HISTORY:  Social History     Socioeconomic History     Marital status:      Spouse name: None     Number of children: 3     Years of education: None     Highest education level: None   Social Needs     Financial resource strain: None     Food insecurity - worry: None      Food insecurity - inability: None     Transportation needs - medical: None     Transportation needs - non-medical: None   Occupational History     None   Tobacco Use     Smoking status: Never Smoker     Smokeless tobacco: Never Used   Substance and Sexual Activity     Alcohol use: No     Drug use: No     Sexual activity: Not Currently   Other Topics Concern      Service Not Asked     Blood Transfusions Not Asked     Caffeine Concern No     Comment: 1 cup of coffee and 1 can diet coke per day     Occupational Exposure Not Asked     Hobby Hazards Not Asked     Sleep Concern Yes     Comment: takes Doxylamine succinate 1/2 tab every night     Stress Concern No     Weight Concern No     Special Diet No     Comment: healthy      Back Care Not Asked     Exercise Yes     Comment: walking 45min x7 a wk. Very active, [employer], Golf, Bowling, Cardio     Bike Helmet Not Asked     Seat Belt Yes     Self-Exams Yes     Parent/sibling w/ CABG, MI or angioplasty before 65F 55M? No   Social History Narrative     None       FAMILY HISTORY:  Family History   Problem Relation Age of Onset     Osteoporosis Mother      Alzheimer Disease Mother      Family History Negative Father      Heart Disease Maternal Grandfather      Family History Negative Paternal Grandmother      Family History Negative Paternal Grandfather        Review of Systems:  Skin:  Negative       Eyes:  Positive for glasses    ENT:  Positive for sinus trouble;nasal congestion due to allergies per pt  Respiratory:  Negative       Cardiovascular:  Negative      Gastroenterology: Negative      Genitourinary:  Negative      Musculoskeletal:  Positive for arthritis hands   Neurologic:  Negative      Psychiatric:  Negative      Heme/Lymph/Imm:  Positive for allergies seasonal  Endocrine:  Positive for   cold hands and feet     Reviewed. Remainder of the note dictated.    Marifer Mohamud PA-C

## 2019-01-03 NOTE — LETTER
1/3/2019      Anyi Olmos MD  [address] [facility] at   [facility] [address]      RE: Taty Aguila       Dear Colleague,    I had the pleasure of seeing Taty Aguila in the [facility].    Service Date: 01/03/2019      HISTORY OF PRESENT ILLNESS:  Taty is a very pleasant 73-year-old female who presents to the office today for followup on her mitral valve regurgitation and atrial fibrillation with RVR.      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  She previously has been evaluated by Dr. Stone and Dr. Hanna.  Mitral valve surgery was considered, but after an additional workup, it was decided to just continue to monitor her.  In mid October she developed an episode of pounding in her chest.  Upon presentation to the [facility] Emergency Department, she was noted to be in atrial fibrillation with RVR.  She was started on a diltiazem drip and converted back to sinus.  Due to her elevated CHADS-VASc score in the setting of mitral valve disease, she was started on Xarelto for anticoagulation.  She did well until 12/11 when she again noted pounding in her chest.  She called my office, and I was able to see her that day.  We did an EKG which confirmed atrial fibrillation with RVR.  I asked her to increase her metoprolol XL and we set up a followup 2 days later.  At that point she continued to be in atrial fibrillation.  Her ventricular response rate was still somewhat elevated.  I did discuss her case with her primary cardiologist, Dr. Stone.  We recommended that she start amiodarone, and I arranged a cardioversion.  Additionally, due to the development of atrial fibrillation, Dr. Stone recommended that she see Dr. Hanna again to consider intervention on her valve.      The patient tolerated the addition of amiodarone.  She  presented for her cardioversion but was noted to be in sinus rhythm that day.  She did undergo consultation with Dr. Hanna earlier this week.  He felt that she does indeed have severe mitral regurgitation, and in the setting of new onset atrial fibrillation he did feel she is a candidate for mitral valve repair.  Additionally, he noted that she had some tricuspid regurgitation.  He wanted to evaluate this as well as her mitral regurgitation further with a TIA.  Additionally, he recommended that she undergo a right and left heart catheterization.  Both of these tests have been set up for next week.  Due to the atrial fibrillation she will also be considered for a pulmonary vein isolation, but again he will see what her upcoming tests show before making a definitive surgical plan.      At this point, Taty states that she is overall doing well.  She does have some anxiety about the upcoming surgery but is not having any cardiovascular symptoms or concerns today.      CURRENT CARDIAC MEDICATIONS:   1.  Amiodarone 200 mg daily.   2.  Toprol-XL 25 mg daily.   3.  Xarelto 20 mg daily.      The remainder of her medications, allergies and review of systems were reviewed and as are documented separately.      PHYSICAL EXAMINATION:    GENERAL:  Taty is a pleasant 73-year-old female who appears her stated age.  She is in no apparent distress.   VITAL SIGNS:  Her blood pressure is 112/68, pulse 60, weight is 126 pounds, which is stable.    RESPIRATORY:  Breathing is nonlabored.  Lungs are clear to auscultation.   CARDIAC:  Examination reveals a regular rate and rhythm.  She does have a 2/6 holosystolic murmur heard best at the apex.   EXTREMITIES:  Lower extremities show no evidence of edema.   NEUROLOGIC:  Alert and oriented.      We did repeat an ALT and AST today, as these were mildly elevated before starting the amiodarone (the patient has a history of mild idiopathic elevated transaminases).  Her ALT and AST today were  within normal limits.      ASSESSMENT AND PLAN:  Geronimo is a pleasant 73-year-old female with a history of myxomatous mitral valve resulting in mitral regurgitation.  She recently has developed a couple of episodes of atrial fibrillation with RVR.  Fortunately with the initiation of amiodarone she reverted back to sinus rhythm and has maintained sinus rhythm.  I recommended that she continue her amiodarone, Toprol-XL and Xarelto unchanged for the time being.  She was recently evaluated by Dr. Hanna and will be undergoing additional workup for likely mitral valve repair later this month.  Part of her workup will include a right and left heart catheterization.  I did give her her recommendations to discontinue her Xarelto 2 days prior to her cardiac catheterization. We did review the procedure and the risks and benefits/rationale.     Thank you for allowing me to participate in the care of this very pleasant patient.  I will plan to see her back in the Cardiology Clinic as directed by the CV Surgery team after their evaluation is complete.         SANDRA AMADOR PA-C             D: 2019   T: 2019   MT: CHRISTOPH      Name:     GERONIMO PAYTON   MRN:      [id number]        Account:      [id number]   :      1945           Service Date: 2019      Document: [id number]           Outpatient Encounter Medications as of 1/3/2019   Medication Sig Dispense Refill     amiodarone (PACERONE/CODARONE) 200 MG tablet Take 1 tab twice a day for 10 days, then decrease the dose to 1 tab once a day (Patient taking differently: Take 200 mg by mouth daily ) 90 tablet 3     calcium carbonate-vitamin D (CALCIUM 600+D) 600-200 MG-UNIT TABS Take 1 tablet by mouth 2 times daily       Fexofenadine HCl (ALLEGRA PO) Take by mouth daily Dose unknown       Glucosamine HCl--750 MG TABS Take 2 tablets by mouth daily After lunch       MELATONIN PO Take 5 mg by mouth nightly as needed        metoprolol succinate ER  (TOPROL-XL) 25 MG 24 hr tablet Take 1 tablet (25 mg) by mouth daily 90 tablet 3     Multiple Vitamins-Minerals (MULTIVITAMIN ADULT) TABS Take 1 tablet by mouth daily       omeprazole (PRILOSEC) 10 MG CR capsule Take 1 capsule (10 mg) by mouth every morning (before breakfast) 90 capsule 3     rivaroxaban ANTICOAGULANT (XARELTO) 20 MG TABS tablet Take 1 tablet (20 mg) by mouth daily (with dinner) 90 tablet 3     No facility-administered encounter medications on file as of 1/3/2019.        Again, thank you for allowing me to participate in the care of your patient.      Sincerely,    Marifer Mohamud PA-C     [facility]

## 2019-01-03 NOTE — LETTER
1/3/2019    Anyi Olmos MD  [address]  [facility] [address]    RE: Taty Aguila       Dear Colleague,    I had the pleasure of seeing Taty Aguila in the [facility].    Please see separate dictation for HPI, PHYSICAL EXAM AND IMPRESSION/PLAN.    CURRENT MEDICATIONS:  Current Outpatient Medications   Medication Sig Dispense Refill     amiodarone (PACERONE/CODARONE) 200 MG tablet Take 1 tab twice a day for 10 days, then decrease the dose to 1 tab once a day (Patient taking differently: Take 200 mg by mouth daily ) 90 tablet 3     calcium carbonate-vitamin D (CALCIUM 600+D) 600-200 MG-UNIT TABS Take 1 tablet by mouth 2 times daily       Fexofenadine HCl (ALLEGRA PO) Take by mouth daily Dose unknown       Glucosamine HCl--750 MG TABS Take 2 tablets by mouth daily After lunch       MELATONIN PO Take 5 mg by mouth nightly as needed        metoprolol succinate ER (TOPROL-XL) 25 MG 24 hr tablet Take 1 tablet (25 mg) by mouth daily 90 tablet 3     Multiple Vitamins-Minerals (MULTIVITAMIN ADULT) TABS Take 1 tablet by mouth daily       omeprazole (PRILOSEC) 10 MG CR capsule Take 1 capsule (10 mg) by mouth every morning (before breakfast) 90 capsule 3     rivaroxaban ANTICOAGULANT (XARELTO) 20 MG TABS tablet Take 1 tablet (20 mg) by mouth daily (with dinner) 90 tablet 3       ALLERGIES:     Allergies   Allergen Reactions     No Known Drug Allergies        PAST MEDICAL HISTORY:  Past Medical History:   Diagnosis Date     Allergic rhinitis, cause unspecified     dust mites, ragweed     Colitis      Mitral regurgitation      Mitral valve disorders(424.0) 1984    myxomatous valve, mod MR, prolapse     Mitral valve prolapse        PAST SURGICAL HISTORY:  Past Surgical History:   Procedure Laterality Date     COLONOSCOPY N/A 9/15/2015    Procedure: COLONOSCOPY;  Surgeon: Anson Llanos MD;  Location:  GI       SOCIAL HISTORY:  Social History     Socioeconomic History      Marital status:      Spouse name: None     Number of children: 3     Years of education: None     Highest education level: None   Social Needs     Financial resource strain: None     Food insecurity - worry: None     Food insecurity - inability: None     Transportation needs - medical: None     Transportation needs - non-medical: None   Occupational History     None   Tobacco Use     Smoking status: Never Smoker     Smokeless tobacco: Never Used   Substance and Sexual Activity     Alcohol use: No     Drug use: No     Sexual activity: Not Currently   Other Topics Concern      Service Not Asked     Blood Transfusions Not Asked     Caffeine Concern No     Comment: 1 cup of coffee and 1 can diet coke per day     Occupational Exposure Not Asked     Hobby Hazards Not Asked     Sleep Concern Yes     Comment: takes Doxylamine succinate 1/2 tab every night     Stress Concern No     Weight Concern No     Special Diet No     Comment: healthy      Back Care Not Asked     Exercise Yes     Comment: walking 45min x7 a wk. Very active, [employer], Golf, Bowling, Cardio     Bike Helmet Not Asked     Seat Belt Yes     Self-Exams Yes     Parent/sibling w/ CABG, MI or angioplasty before 65F 55M? No   Social History Narrative     None       FAMILY HISTORY:  Family History   Problem Relation Age of Onset     Osteoporosis Mother      Alzheimer Disease Mother      Family History Negative Father      Heart Disease Maternal Grandfather      Family History Negative Paternal Grandmother      Family History Negative Paternal Grandfather        Review of Systems:  Skin:  Negative       Eyes:  Positive for glasses    ENT:  Positive for sinus trouble;nasal congestion due to allergies per pt  Respiratory:  Negative       Cardiovascular:  Negative      Gastroenterology: Negative      Genitourinary:  Negative      Musculoskeletal:  Positive for arthritis hands   Neurologic:  Negative      Psychiatric:  Negative      Heme/Lymph/Imm:  Positive  for allergies seasonal  Endocrine:  Positive for   cold hands and feet     Reviewed. Remainder of the note dictated.    Marifer Moahmud PA-C        Service Date: 01/03/2019      HISTORY OF PRESENT ILLNESS:  Taty is a very pleasant 73-year-old female who presents to the office today for followup on her mitral valve regurgitation and atrial fibrillation with RVR.      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  She previously has been evaluated by Dr. Stone and Dr. Hanna.  Mitral valve surgery was considered, but after an additional workup, it was decided to just continue to monitor her.  In mid October she developed an episode of pounding in her chest.  Upon presentation to the [facility] Emergency Department, she was noted to be in atrial fibrillation with RVR.  She was started on a diltiazem drip and converted back to sinus.  Due to her elevated CHADS-VASc score in the setting of mitral valve disease, she was started on Xarelto for anticoagulation.  She did well until 12/11 when she again noted pounding in her chest.  She called my office, and I was able to see her that day.  We did an EKG which confirmed atrial fibrillation with RVR.  I asked her to increase her metoprolol XL and we set up a followup 2 days later.  At that point she continued to be in atrial fibrillation.  Her ventricular response rate was still somewhat elevated.  I did discuss her case with her primary cardiologist, Dr. Stone.  We recommended that she start amiodarone, and I arranged a cardioversion.  Additionally, due to the development of atrial fibrillation, Dr. Stone recommended that she see Dr. Hanna again to consider intervention on her valve.      The patient tolerated the addition of amiodarone.  She presented for her cardioversion but was noted to be in sinus rhythm that  day.  She did undergo consultation with Dr. Hanna earlier this week.  He felt that she does indeed have severe mitral regurgitation, and in the setting of new onset atrial fibrillation he did feel she is a candidate for mitral valve repair.  Additionally, he noted that she had some tricuspid regurgitation.  He wanted to evaluate this as well as her mitral regurgitation further with a TIA.  Additionally, he recommended that she undergo a right and left heart catheterization.  Both of these tests have been set up for next week.  Due to the atrial fibrillation she will also be considered for a pulmonary vein isolation, but again he will see what her upcoming tests show before making a definitive surgical plan.      At this point, Taty states that she is overall doing well.  She does have some anxiety about the upcoming surgery but is not having any cardiovascular symptoms or concerns today.      CURRENT CARDIAC MEDICATIONS:   1.  Amiodarone 200 mg daily.   2.  Toprol-XL 25 mg daily.   3.  Xarelto 20 mg daily.      The remainder of her medications, allergies and review of systems were reviewed and as are documented separately.      PHYSICAL EXAMINATION:    GENERAL:  Taty is a pleasant 73-year-old female who appears her stated age.  She is in no apparent distress.   VITAL SIGNS:  Her blood pressure is 112/68, pulse 60, weight is 126 pounds, which is stable.    RESPIRATORY:  Breathing is nonlabored.  Lungs are clear to auscultation.   CARDIAC:  Examination reveals a regular rate and rhythm.  She does have a 2/6 holosystolic murmur heard best at the apex.   EXTREMITIES:  Lower extremities show no evidence of edema.   NEUROLOGIC:  Alert and oriented.      We did repeat an ALT and AST today, as these were mildly elevated before starting the amiodarone (the patient has a history of mild idiopathic elevated transaminases).  Her ALT and AST today were within normal limits.      ASSESSMENT AND PLAN:  Taty is a pleasant  73-year-old female with a history of myxomatous mitral valve resulting in mitral regurgitation.  She recently has developed a couple of episodes of atrial fibrillation with RVR.  Fortunately with the initiation of amiodarone she reverted back to sinus rhythm and has maintained sinus rhythm.  I recommended that she continue her amiodarone, Toprol-XL and Xarelto unchanged for the time being.  She was recently evaluated by Dr. Hanna and will be undergoing additional workup for likely mitral valve repair later this month.  Part of her workup will include a right and left heart catheterization.  I did give her her recommendations to discontinue her Xarelto 2 days prior to her cardiac catheterization. We did review the procedure and the risks and benefits/rationale.     Thank you for allowing me to participate in the care of this very pleasant patient.  I will plan to see her back in the Cardiology Clinic as directed by the CV Surgery team after their evaluation is complete.         SANDRA AMADOR PA-C             D: 2019   T: 2019   MT: CHRISTOPH      Name:     GERONIMO PAYTON   MRN:      [id number]        Account:      [id number]   :      1945           Service Date: 2019      Document: [id number]        Thank you for allowing me to participate in the care of your patient.      Sincerely,     Sandra Amador PA-C     [facility]    cc:   Sandra Amador PA-C  [address] [place] [address]

## 2019-01-03 NOTE — PATIENT INSTRUCTIONS
Thank you for your [facility] visit today. Your provider has recommended the following:  Medication Changes:  No changes  Recommendations:  As planned  Follow-up:  As directed by CV surgery    Reminder:  Please bring in your current medication list or your medication, over the counter supplements and vitamin bottles as we will review these at each office visit.

## 2019-01-04 NOTE — PROGRESS NOTES
Service Date: 01/03/2019      HISTORY OF PRESENT ILLNESS:  Taty is a very pleasant 73-year-old female who presents to the office today for followup on her mitral valve regurgitation and atrial fibrillation with RVR.      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  She previously has been evaluated by Dr. Stone and Dr. Hanna.  Mitral valve surgery was considered, but after an additional workup, it was decided to just continue to monitor her.  In mid October she developed an episode of pounding in her chest.  Upon presentation to the [facility] Emergency Department, she was noted to be in atrial fibrillation with RVR.  She was started on a diltiazem drip and converted back to sinus.  Due to her elevated CHADS-VASc score in the setting of mitral valve disease, she was started on Xarelto for anticoagulation.  She did well until 12/11 when she again noted pounding in her chest.  She called my office, and I was able to see her that day.  We did an EKG which confirmed atrial fibrillation with RVR.  I asked her to increase her metoprolol XL and we set up a followup 2 days later.  At that point she continued to be in atrial fibrillation.  Her ventricular response rate was still somewhat elevated.  I did discuss her case with her primary cardiologist, Dr. Stone.  We recommended that she start amiodarone, and I arranged a cardioversion.  Additionally, due to the development of atrial fibrillation, Dr. Stone recommended that she see Dr. Hanna again to consider intervention on her valve.      The patient tolerated the addition of amiodarone.  She presented for her cardioversion but was noted to be in sinus rhythm that day.  She did undergo consultation with Dr. Hanna earlier this week.  He felt that she does indeed have severe mitral regurgitation, and in the setting of  new onset atrial fibrillation he did feel she is a candidate for mitral valve repair.  Additionally, he noted that she had some tricuspid regurgitation.  He wanted to evaluate this as well as her mitral regurgitation further with a TIA.  Additionally, he recommended that she undergo a right and left heart catheterization.  Both of these tests have been set up for next week.  Due to the atrial fibrillation she will also be considered for a pulmonary vein isolation, but again he will see what her upcoming tests show before making a definitive surgical plan.      At this point, Taty states that she is overall doing well.  She does have some anxiety about the upcoming surgery but is not having any cardiovascular symptoms or concerns today.      CURRENT CARDIAC MEDICATIONS:   1.  Amiodarone 200 mg daily.   2.  Toprol-XL 25 mg daily.   3.  Xarelto 20 mg daily.      The remainder of her medications, allergies and review of systems were reviewed and as are documented separately.      PHYSICAL EXAMINATION:    GENERAL:  Taty is a pleasant 73-year-old female who appears her stated age.  She is in no apparent distress.   VITAL SIGNS:  Her blood pressure is 112/68, pulse 60, weight is 126 pounds, which is stable.    RESPIRATORY:  Breathing is nonlabored.  Lungs are clear to auscultation.   CARDIAC:  Examination reveals a regular rate and rhythm.  She does have a 2/6 holosystolic murmur heard best at the apex.   EXTREMITIES:  Lower extremities show no evidence of edema.   NEUROLOGIC:  Alert and oriented.      We did repeat an ALT and AST today, as these were mildly elevated before starting the amiodarone (the patient has a history of mild idiopathic elevated transaminases).  Her ALT and AST today were within normal limits.      ASSESSMENT AND PLAN:  Taty is a pleasant 73-year-old female with a history of myxomatous mitral valve resulting in mitral regurgitation.  She recently has developed a couple of episodes of atrial  fibrillation with RVR.  Fortunately with the initiation of amiodarone she reverted back to sinus rhythm and has maintained sinus rhythm.  I recommended that she continue her amiodarone, Toprol-XL and Xarelto unchanged for the time being.  She was recently evaluated by Dr. Hanna and will be undergoing additional workup for likely mitral valve repair later this month.  Part of her workup will include a right and left heart catheterization.  I did give her her recommendations to discontinue her Xarelto 2 days prior to her cardiac catheterization. We did review the procedure and the risks and benefits/rationale.     Thank you for allowing me to participate in the care of this very pleasant patient.  I will plan to see her back in the Cardiology Clinic as directed by the CV Surgery team after their evaluation is complete.         SANDRA AMADOR PA-C             D: 2019   T: 2019   MT: CHRISTOPH      Name:     GERONIMO PAYTON   MRN:      [id number]        Account:      [id number]   :      1945           Service Date: 2019      Document: [id number]

## 2019-01-07 RX ORDER — SODIUM CHLORIDE 9 MG/ML
INJECTION, SOLUTION INTRAVENOUS CONTINUOUS
Status: CANCELLED | OUTPATIENT
Start: 2019-01-07

## 2019-01-07 RX ORDER — LIDOCAINE 40 MG/G
CREAM TOPICAL
Status: CANCELLED | OUTPATIENT
Start: 2019-01-07

## 2019-01-07 RX ORDER — POTASSIUM CHLORIDE 1500 MG/1
20 TABLET, EXTENDED RELEASE ORAL
Status: CANCELLED | OUTPATIENT
Start: 2019-01-07

## 2019-01-07 NOTE — PROGRESS NOTES
Called patient with Pre cath instructions:      Contrast allergy: no  Anticoagulation: yes, Xarelto - (hold  3 days prior to procedure)   Metformin: no  Oral DM meds: no  Insulin: no   Diuretic: no  Use of phosphodiesterase type 5 inhibitor: no  Aspirin: yes, started 81 mg after stopping Xarelto. will give 325mg morning of.  Pt informed to be NPO at midnight  Renal issues: no   Pt has transportation and 24 hours post procedure monitoring set up.   Pt aware of no driving for 24 hours post procedure.      Pt aware of arrival time and location. Pt verbalized understanding of instructions.

## 2019-01-09 ENCOUNTER — APPOINTMENT (OUTPATIENT)
Dept: GENERAL RADIOLOGY | Facility: CLINIC | Age: 74
End: 2019-01-09
Payer: COMMERCIAL

## 2019-01-09 ENCOUNTER — HOSPITAL ENCOUNTER (OUTPATIENT)
Dept: CARDIOLOGY | Facility: CLINIC | Age: 74
End: 2019-01-09
Attending: REGISTERED NURSE
Payer: COMMERCIAL

## 2019-01-09 ENCOUNTER — HOSPITAL ENCOUNTER (OUTPATIENT)
Facility: CLINIC | Age: 74
Discharge: HOME | End: 2019-01-09
Admitting: INTERNAL MEDICINE
Payer: COMMERCIAL

## 2019-01-09 VITALS
BODY MASS INDEX: 19.86 KG/M2 | HEIGHT: 66 IN | OXYGEN SATURATION: 97 % | RESPIRATION RATE: 16 BRPM | WEIGHT: 123.6 LBS | SYSTOLIC BLOOD PRESSURE: 121 MMHG | TEMPERATURE: 98.2 F | DIASTOLIC BLOOD PRESSURE: 67 MMHG | HEART RATE: 65 BPM

## 2019-01-09 DIAGNOSIS — Z98.890 STATUS POST CORONARY ANGIOGRAM: Primary | ICD-10-CM

## 2019-01-09 DIAGNOSIS — I34.0 MITRAL VALVE INSUFFICIENCY, UNSPECIFIED ETIOLOGY: Primary | ICD-10-CM

## 2019-01-09 DIAGNOSIS — I34.0 MITRAL VALVE INSUFFICIENCY, UNSPECIFIED ETIOLOGY: ICD-10-CM

## 2019-01-09 LAB
ALBUMIN SERPL-MCNC: 3.7 G/DL (ref 3.4–5)
ALBUMIN UR-MCNC: NEGATIVE MG/DL
ALP SERPL-CCNC: 49 U/L (ref 40–150)
ALT SERPL W P-5'-P-CCNC: 33 U/L (ref 0–50)
ANION GAP SERPL CALCULATED.3IONS-SCNC: 5 MMOL/L (ref 3–14)
APPEARANCE UR: CLEAR
APTT PPP: 27 SEC (ref 22–37)
AST SERPL W P-5'-P-CCNC: 18 U/L (ref 0–45)
BILIRUB DIRECT SERPL-MCNC: 0.1 MG/DL (ref 0–0.2)
BILIRUB SERPL-MCNC: 0.4 MG/DL (ref 0.2–1.3)
BILIRUB UR QL STRIP: NEGATIVE
BUN SERPL-MCNC: 11 MG/DL (ref 7–30)
CALCIUM SERPL-MCNC: 9.1 MG/DL (ref 8.5–10.1)
CHLORIDE SERPL-SCNC: 107 MMOL/L (ref 94–109)
CO2 SERPL-SCNC: 28 MMOL/L (ref 20–32)
COLOR UR AUTO: ABNORMAL
CREAT SERPL-MCNC: 0.82 MG/DL (ref 0.52–1.04)
ERYTHROCYTE [DISTWIDTH] IN BLOOD BY AUTOMATED COUNT: 13.2 % (ref 10–15)
GFR SERPL CREATININE-BSD FRML MDRD: 71 ML/MIN/{1.73_M2}
GLUCOSE SERPL-MCNC: 94 MG/DL (ref 70–99)
GLUCOSE UR STRIP-MCNC: NEGATIVE MG/DL
HBA1C MFR BLD: 5.9 % (ref 0–5.6)
HCT VFR BLD AUTO: 38.4 % (ref 35–47)
HGB BLD-MCNC: 12.7 G/DL (ref 11.7–15.7)
HGB UR QL STRIP: ABNORMAL
INR PPP: 0.96 (ref 0.86–1.14)
KETONES UR STRIP-MCNC: NEGATIVE MG/DL
LEUKOCYTE ESTERASE UR QL STRIP: NEGATIVE
MCH RBC QN AUTO: 31.4 PG (ref 26.5–33)
MCHC RBC AUTO-ENTMCNC: 33.1 G/DL (ref 31.5–36.5)
MCV RBC AUTO: 95 FL (ref 78–100)
NITRATE UR QL: NEGATIVE
PH UR STRIP: 7.5 PH (ref 5–7)
PLATELET # BLD AUTO: 215 10E9/L (ref 150–450)
POTASSIUM SERPL-SCNC: 4 MMOL/L (ref 3.4–5.3)
PROT SERPL-MCNC: 7.3 G/DL (ref 6.8–8.8)
RBC # BLD AUTO: 4.05 10E12/L (ref 3.8–5.2)
RBC #/AREA URNS AUTO: <1 /HPF (ref 0–2)
SODIUM SERPL-SCNC: 140 MMOL/L (ref 133–144)
SOURCE: ABNORMAL
SP GR UR STRIP: 1.05 (ref 1–1.03)
UROBILINOGEN UR STRIP-MCNC: NORMAL MG/DL (ref 0–2)
WBC # BLD AUTO: 6.5 10E9/L (ref 4–11)
WBC #/AREA URNS AUTO: <1 /HPF (ref 0–5)

## 2019-01-09 PROCEDURE — 25000125 ZZHC RX 250

## 2019-01-09 PROCEDURE — 93312 ECHO TRANSESOPHAGEAL: CPT | Mod: 26 | Performed by: INTERNAL MEDICINE

## 2019-01-09 PROCEDURE — 36415 COLL VENOUS BLD VENIPUNCTURE: CPT | Performed by: SURGERY

## 2019-01-09 PROCEDURE — 99153 MOD SED SAME PHYS/QHP EA: CPT | Performed by: INTERNAL MEDICINE

## 2019-01-09 PROCEDURE — 25000128 H RX IP 250 OP 636

## 2019-01-09 PROCEDURE — 99152 MOD SED SAME PHYS/QHP 5/>YRS: CPT | Performed by: INTERNAL MEDICINE

## 2019-01-09 PROCEDURE — 93005 ELECTROCARDIOGRAM TRACING: CPT

## 2019-01-09 PROCEDURE — 25000132 ZZH RX MED GY IP 250 OP 250 PS 637: Performed by: INTERNAL MEDICINE

## 2019-01-09 PROCEDURE — 86850 RBC ANTIBODY SCREEN: CPT | Performed by: SURGERY

## 2019-01-09 PROCEDURE — 81001 URINALYSIS AUTO W/SCOPE: CPT | Performed by: SURGERY

## 2019-01-09 PROCEDURE — 40000852 ZZH STATISTIC HEART CATH LAB OR EP LAB

## 2019-01-09 PROCEDURE — 40000065 ZZH STATISTIC EKG NON-CHARGEABLE

## 2019-01-09 PROCEDURE — 27210794 ZZH OR GENERAL SUPPLY STERILE: Performed by: INTERNAL MEDICINE

## 2019-01-09 PROCEDURE — 83036 HEMOGLOBIN GLYCOSYLATED A1C: CPT

## 2019-01-09 PROCEDURE — 40000857 ZZH STATISTIC TEE INCLUDES SEDATION

## 2019-01-09 PROCEDURE — 25000128 H RX IP 250 OP 636: Performed by: INTERNAL MEDICINE

## 2019-01-09 PROCEDURE — 71046 X-RAY EXAM CHEST 2 VIEWS: CPT

## 2019-01-09 PROCEDURE — 76376 3D RENDER W/INTRP POSTPROCES: CPT

## 2019-01-09 PROCEDURE — 80048 BASIC METABOLIC PNL TOTAL CA: CPT

## 2019-01-09 PROCEDURE — 93010 ELECTROCARDIOGRAM REPORT: CPT | Performed by: INTERNAL MEDICINE

## 2019-01-09 PROCEDURE — 93460 R&L HRT ART/VENTRICLE ANGIO: CPT | Performed by: INTERNAL MEDICINE

## 2019-01-09 PROCEDURE — 93325 DOPPLER ECHO COLOR FLOW MAPG: CPT | Mod: 26 | Performed by: INTERNAL MEDICINE

## 2019-01-09 PROCEDURE — 86900 BLOOD TYPING SEROLOGIC ABO: CPT | Performed by: SURGERY

## 2019-01-09 PROCEDURE — 85610 PROTHROMBIN TIME: CPT

## 2019-01-09 PROCEDURE — 40000235 ZZH STATISTIC TELEMETRY

## 2019-01-09 PROCEDURE — 25000125 ZZHC RX 250: Performed by: INTERNAL MEDICINE

## 2019-01-09 PROCEDURE — 86901 BLOOD TYPING SEROLOGIC RH(D): CPT | Performed by: SURGERY

## 2019-01-09 PROCEDURE — 93320 DOPPLER ECHO COMPLETE: CPT | Mod: 26 | Performed by: INTERNAL MEDICINE

## 2019-01-09 PROCEDURE — 27210946 ZZH KIT HC TOTES DISP CR8: Performed by: INTERNAL MEDICINE

## 2019-01-09 PROCEDURE — 85027 COMPLETE CBC AUTOMATED: CPT

## 2019-01-09 PROCEDURE — 85730 THROMBOPLASTIN TIME PARTIAL: CPT

## 2019-01-09 PROCEDURE — 80076 HEPATIC FUNCTION PANEL: CPT

## 2019-01-09 RX ORDER — HYDROCODONE BITARTRATE AND ACETAMINOPHEN 5; 325 MG/1; MG/1
1-2 TABLET ORAL EVERY 4 HOURS PRN
Status: DISCONTINUED | OUTPATIENT
Start: 2019-01-09 | End: 2019-01-09 | Stop reason: HOSPADM

## 2019-01-09 RX ORDER — PROTAMINE SULFATE 10 MG/ML
1-5 INJECTION, SOLUTION INTRAVENOUS
Status: DISCONTINUED | OUTPATIENT
Start: 2019-01-09 | End: 2019-01-09 | Stop reason: HOSPADM

## 2019-01-09 RX ORDER — SODIUM CHLORIDE 9 MG/ML
INJECTION, SOLUTION INTRAVENOUS CONTINUOUS
Status: DISCONTINUED | OUTPATIENT
Start: 2019-01-09 | End: 2019-01-09 | Stop reason: HOSPADM

## 2019-01-09 RX ORDER — PROPOFOL 10 MG/ML
10-20 INJECTION, EMULSION INTRAVENOUS EVERY 30 MIN PRN
Status: DISCONTINUED | OUTPATIENT
Start: 2019-01-09 | End: 2019-01-09 | Stop reason: HOSPADM

## 2019-01-09 RX ORDER — DIPHENHYDRAMINE HYDROCHLORIDE 50 MG/ML
25-50 INJECTION INTRAMUSCULAR; INTRAVENOUS
Status: DISCONTINUED | OUTPATIENT
Start: 2019-01-09 | End: 2019-01-09 | Stop reason: HOSPADM

## 2019-01-09 RX ORDER — NITROGLYCERIN 5 MG/ML
100-200 VIAL (ML) INTRAVENOUS
Status: DISCONTINUED | OUTPATIENT
Start: 2019-01-09 | End: 2019-01-09 | Stop reason: HOSPADM

## 2019-01-09 RX ORDER — LIDOCAINE HYDROCHLORIDE 10 MG/ML
30 INJECTION, SOLUTION EPIDURAL; INFILTRATION; INTRACAUDAL; PERINEURAL
Status: DISCONTINUED | OUTPATIENT
Start: 2019-01-09 | End: 2019-01-09 | Stop reason: HOSPADM

## 2019-01-09 RX ORDER — FLUMAZENIL 0.1 MG/ML
0.2 INJECTION, SOLUTION INTRAVENOUS
Status: DISCONTINUED | OUTPATIENT
Start: 2019-01-09 | End: 2019-01-09 | Stop reason: HOSPADM

## 2019-01-09 RX ORDER — NITROGLYCERIN 0.4 MG/1
0.4 TABLET SUBLINGUAL EVERY 5 MIN PRN
Status: DISCONTINUED | OUTPATIENT
Start: 2019-01-09 | End: 2019-01-09 | Stop reason: HOSPADM

## 2019-01-09 RX ORDER — NALOXONE HYDROCHLORIDE 0.4 MG/ML
.2-.4 INJECTION, SOLUTION INTRAMUSCULAR; INTRAVENOUS; SUBCUTANEOUS
Status: DISCONTINUED | OUTPATIENT
Start: 2019-01-09 | End: 2019-01-09 | Stop reason: HOSPADM

## 2019-01-09 RX ORDER — NITROGLYCERIN 5 MG/ML
100-500 VIAL (ML) INTRAVENOUS
Status: DISCONTINUED | OUTPATIENT
Start: 2019-01-09 | End: 2019-01-09 | Stop reason: HOSPADM

## 2019-01-09 RX ORDER — GLYCOPYRROLATE 0.2 MG/ML
0.1 INJECTION, SOLUTION INTRAMUSCULAR; INTRAVENOUS ONCE
Status: COMPLETED | OUTPATIENT
Start: 2019-01-09 | End: 2019-01-09

## 2019-01-09 RX ORDER — MORPHINE SULFATE 2 MG/ML
1-2 INJECTION, SOLUTION INTRAMUSCULAR; INTRAVENOUS EVERY 5 MIN PRN
Status: DISCONTINUED | OUTPATIENT
Start: 2019-01-09 | End: 2019-01-09 | Stop reason: HOSPADM

## 2019-01-09 RX ORDER — LIDOCAINE HYDROCHLORIDE 10 MG/ML
1-10 INJECTION, SOLUTION EPIDURAL; INFILTRATION; INTRACAUDAL; PERINEURAL
Status: DISCONTINUED | OUTPATIENT
Start: 2019-01-09 | End: 2019-01-09 | Stop reason: HOSPADM

## 2019-01-09 RX ORDER — METOPROLOL TARTRATE 1 MG/ML
5 INJECTION, SOLUTION INTRAVENOUS EVERY 5 MIN PRN
Status: DISCONTINUED | OUTPATIENT
Start: 2019-01-09 | End: 2019-01-09 | Stop reason: HOSPADM

## 2019-01-09 RX ORDER — LIDOCAINE HYDROCHLORIDE 40 MG/ML
1.5 SOLUTION TOPICAL ONCE
Status: COMPLETED | OUTPATIENT
Start: 2019-01-09 | End: 2019-01-09

## 2019-01-09 RX ORDER — VANCOMYCIN HYDROCHLORIDE 1 G/200ML
1000 INJECTION, SOLUTION INTRAVENOUS
Status: CANCELLED | OUTPATIENT
Start: 2019-01-09

## 2019-01-09 RX ORDER — ONDANSETRON 2 MG/ML
4 INJECTION INTRAMUSCULAR; INTRAVENOUS EVERY 4 HOURS PRN
Status: DISCONTINUED | OUTPATIENT
Start: 2019-01-09 | End: 2019-01-09 | Stop reason: HOSPADM

## 2019-01-09 RX ORDER — CLOPIDOGREL BISULFATE 75 MG/1
75 TABLET ORAL
Status: DISCONTINUED | OUTPATIENT
Start: 2019-01-09 | End: 2019-01-09 | Stop reason: HOSPADM

## 2019-01-09 RX ORDER — PROTAMINE SULFATE 10 MG/ML
25-100 INJECTION, SOLUTION INTRAVENOUS EVERY 5 MIN PRN
Status: DISCONTINUED | OUTPATIENT
Start: 2019-01-09 | End: 2019-01-09 | Stop reason: HOSPADM

## 2019-01-09 RX ORDER — NALOXONE HYDROCHLORIDE 0.4 MG/ML
.1-.4 INJECTION, SOLUTION INTRAMUSCULAR; INTRAVENOUS; SUBCUTANEOUS
Status: DISCONTINUED | OUTPATIENT
Start: 2019-01-09 | End: 2019-01-09 | Stop reason: HOSPADM

## 2019-01-09 RX ORDER — BUPIVACAINE HYDROCHLORIDE 2.5 MG/ML
1-10 INJECTION, SOLUTION EPIDURAL; INFILTRATION; INTRACAUDAL
Status: DISCONTINUED | OUTPATIENT
Start: 2019-01-09 | End: 2019-01-09 | Stop reason: HOSPADM

## 2019-01-09 RX ORDER — ASPIRIN 325 MG
325 TABLET ORAL
Status: DISCONTINUED | OUTPATIENT
Start: 2019-01-09 | End: 2019-01-09 | Stop reason: HOSPADM

## 2019-01-09 RX ORDER — EPINEPHRINE 1 MG/ML
0.3 INJECTION, SOLUTION, CONCENTRATE INTRAVENOUS
Status: DISCONTINUED | OUTPATIENT
Start: 2019-01-09 | End: 2019-01-09 | Stop reason: HOSPADM

## 2019-01-09 RX ORDER — ATROPINE SULFATE 0.1 MG/ML
.5-1 INJECTION INTRAVENOUS
Status: DISCONTINUED | OUTPATIENT
Start: 2019-01-09 | End: 2019-01-09 | Stop reason: HOSPADM

## 2019-01-09 RX ORDER — FENTANYL CITRATE 50 UG/ML
25-50 INJECTION, SOLUTION INTRAMUSCULAR; INTRAVENOUS
Status: DISCONTINUED | OUTPATIENT
Start: 2019-01-09 | End: 2019-01-09 | Stop reason: HOSPADM

## 2019-01-09 RX ORDER — NICARDIPINE HYDROCHLORIDE 2.5 MG/ML
100 INJECTION INTRAVENOUS
Status: DISCONTINUED | OUTPATIENT
Start: 2019-01-09 | End: 2019-01-09 | Stop reason: HOSPADM

## 2019-01-09 RX ORDER — SODIUM NITROPRUSSIDE 25 MG/ML
100-200 INJECTION INTRAVENOUS
Status: DISCONTINUED | OUTPATIENT
Start: 2019-01-09 | End: 2019-01-09 | Stop reason: HOSPADM

## 2019-01-09 RX ORDER — HYDRALAZINE HYDROCHLORIDE 20 MG/ML
10-20 INJECTION INTRAMUSCULAR; INTRAVENOUS
Status: DISCONTINUED | OUTPATIENT
Start: 2019-01-09 | End: 2019-01-09 | Stop reason: HOSPADM

## 2019-01-09 RX ORDER — METHYLPREDNISOLONE SODIUM SUCCINATE 125 MG/2ML
125 INJECTION, POWDER, LYOPHILIZED, FOR SOLUTION INTRAMUSCULAR; INTRAVENOUS
Status: DISCONTINUED | OUTPATIENT
Start: 2019-01-09 | End: 2019-01-09 | Stop reason: HOSPADM

## 2019-01-09 RX ORDER — LIDOCAINE 40 MG/G
CREAM TOPICAL
Status: DISCONTINUED | OUTPATIENT
Start: 2019-01-09 | End: 2019-01-09 | Stop reason: HOSPADM

## 2019-01-09 RX ORDER — VERAPAMIL HYDROCHLORIDE 2.5 MG/ML
1-2.5 INJECTION, SOLUTION INTRAVENOUS
Status: DISCONTINUED | OUTPATIENT
Start: 2019-01-09 | End: 2019-01-09 | Stop reason: HOSPADM

## 2019-01-09 RX ORDER — POTASSIUM CHLORIDE 29.8 MG/ML
20 INJECTION INTRAVENOUS
Status: DISCONTINUED | OUTPATIENT
Start: 2019-01-09 | End: 2019-01-09 | Stop reason: HOSPADM

## 2019-01-09 RX ORDER — NITROGLYCERIN 20 MG/100ML
.07-2 INJECTION INTRAVENOUS CONTINUOUS PRN
Status: DISCONTINUED | OUTPATIENT
Start: 2019-01-09 | End: 2019-01-09 | Stop reason: HOSPADM

## 2019-01-09 RX ORDER — POTASSIUM CHLORIDE 7.45 MG/ML
10 INJECTION INTRAVENOUS
Status: DISCONTINUED | OUTPATIENT
Start: 2019-01-09 | End: 2019-01-09 | Stop reason: HOSPADM

## 2019-01-09 RX ORDER — CEFAZOLIN SODIUM 1 G/3ML
1 INJECTION, POWDER, FOR SOLUTION INTRAMUSCULAR; INTRAVENOUS SEE ADMIN INSTRUCTIONS
Status: CANCELLED | OUTPATIENT
Start: 2019-01-09

## 2019-01-09 RX ORDER — ENALAPRILAT 1.25 MG/ML
1.25-2.5 INJECTION INTRAVENOUS
Status: DISCONTINUED | OUTPATIENT
Start: 2019-01-09 | End: 2019-01-09 | Stop reason: HOSPADM

## 2019-01-09 RX ORDER — DOBUTAMINE HYDROCHLORIDE 200 MG/100ML
2-20 INJECTION INTRAVENOUS CONTINUOUS PRN
Status: DISCONTINUED | OUTPATIENT
Start: 2019-01-09 | End: 2019-01-09 | Stop reason: HOSPADM

## 2019-01-09 RX ORDER — PHENYLEPHRINE HCL IN 0.9% NACL 1 MG/10 ML
20-100 SYRINGE (ML) INTRAVENOUS
Status: DISCONTINUED | OUTPATIENT
Start: 2019-01-09 | End: 2019-01-09 | Stop reason: HOSPADM

## 2019-01-09 RX ORDER — ATROPINE SULFATE 0.1 MG/ML
0.5 INJECTION INTRAVENOUS EVERY 5 MIN PRN
Status: DISCONTINUED | OUTPATIENT
Start: 2019-01-09 | End: 2019-01-09 | Stop reason: HOSPADM

## 2019-01-09 RX ORDER — DOPAMINE HYDROCHLORIDE 160 MG/100ML
2-20 INJECTION, SOLUTION INTRAVENOUS CONTINUOUS PRN
Status: DISCONTINUED | OUTPATIENT
Start: 2019-01-09 | End: 2019-01-09 | Stop reason: HOSPADM

## 2019-01-09 RX ORDER — CLOPIDOGREL 300 MG/1
300-600 TABLET, FILM COATED ORAL
Status: DISCONTINUED | OUTPATIENT
Start: 2019-01-09 | End: 2019-01-09 | Stop reason: HOSPADM

## 2019-01-09 RX ORDER — ADENOSINE 3 MG/ML
12-12000 INJECTION, SOLUTION INTRAVENOUS
Status: DISCONTINUED | OUTPATIENT
Start: 2019-01-09 | End: 2019-01-09 | Stop reason: HOSPADM

## 2019-01-09 RX ORDER — POTASSIUM CHLORIDE 1500 MG/1
20 TABLET, EXTENDED RELEASE ORAL
Status: DISCONTINUED | OUTPATIENT
Start: 2019-01-09 | End: 2019-01-09 | Stop reason: HOSPADM

## 2019-01-09 RX ORDER — ASPIRIN 81 MG/1
81-324 TABLET, CHEWABLE ORAL
Status: DISCONTINUED | OUTPATIENT
Start: 2019-01-09 | End: 2019-01-09 | Stop reason: HOSPADM

## 2019-01-09 RX ORDER — LIDOCAINE HYDROCHLORIDE 10 MG/ML
1-10 INJECTION, SOLUTION EPIDURAL; INFILTRATION; INTRACAUDAL; PERINEURAL
Status: COMPLETED | OUTPATIENT
Start: 2019-01-09 | End: 2019-01-09

## 2019-01-09 RX ORDER — CEFAZOLIN SODIUM 2 G/100ML
2 INJECTION, SOLUTION INTRAVENOUS
Status: CANCELLED | OUTPATIENT
Start: 2019-01-09

## 2019-01-09 RX ORDER — HEPARIN SODIUM 1000 [USP'U]/ML
1000-10000 INJECTION, SOLUTION INTRAVENOUS; SUBCUTANEOUS EVERY 5 MIN PRN
Status: DISCONTINUED | OUTPATIENT
Start: 2019-01-09 | End: 2019-01-09 | Stop reason: HOSPADM

## 2019-01-09 RX ORDER — DEXTROSE MONOHYDRATE 25 G/50ML
9.5 INJECTION, SOLUTION INTRAVENOUS
Status: DISCONTINUED | OUTPATIENT
Start: 2019-01-09 | End: 2019-01-09 | Stop reason: HOSPADM

## 2019-01-09 RX ORDER — FUROSEMIDE 10 MG/ML
20-100 INJECTION INTRAMUSCULAR; INTRAVENOUS
Status: DISCONTINUED | OUTPATIENT
Start: 2019-01-09 | End: 2019-01-09 | Stop reason: HOSPADM

## 2019-01-09 RX ORDER — PRASUGREL 10 MG/1
10-60 TABLET, FILM COATED ORAL
Status: DISCONTINUED | OUTPATIENT
Start: 2019-01-09 | End: 2019-01-09 | Stop reason: HOSPADM

## 2019-01-09 RX ORDER — FENTANYL CITRATE 50 UG/ML
25-50 INJECTION, SOLUTION INTRAMUSCULAR; INTRAVENOUS
Status: COMPLETED | OUTPATIENT
Start: 2019-01-09 | End: 2019-01-09

## 2019-01-09 RX ORDER — PROPOFOL 10 MG/ML
5-75 INJECTION, EMULSION INTRAVENOUS CONTINUOUS
Status: DISCONTINUED | OUTPATIENT
Start: 2019-01-09 | End: 2019-01-09 | Stop reason: HOSPADM

## 2019-01-09 RX ORDER — DEXTROSE MONOHYDRATE 25 G/50ML
12.5-5 INJECTION, SOLUTION INTRAVENOUS EVERY 30 MIN PRN
Status: DISCONTINUED | OUTPATIENT
Start: 2019-01-09 | End: 2019-01-09 | Stop reason: HOSPADM

## 2019-01-09 RX ORDER — VANCOMYCIN HYDROCHLORIDE 1 G/200ML
1000 INJECTION, SOLUTION INTRAVENOUS SEE ADMIN INSTRUCTIONS
Status: CANCELLED | OUTPATIENT
Start: 2019-01-09

## 2019-01-09 RX ORDER — MUPIROCIN 20 MG/G
OINTMENT TOPICAL 2 TIMES DAILY
Status: CANCELLED | OUTPATIENT
Start: 2019-01-09 | End: 2019-01-10

## 2019-01-09 RX ORDER — SODIUM CHLORIDE 9 MG/ML
INJECTION, SOLUTION INTRAVENOUS CONTINUOUS PRN
Status: DISCONTINUED | OUTPATIENT
Start: 2019-01-09 | End: 2019-01-09 | Stop reason: HOSPADM

## 2019-01-09 RX ORDER — LORAZEPAM 2 MG/ML
.5-2 INJECTION INTRAMUSCULAR EVERY 4 HOURS PRN
Status: DISCONTINUED | OUTPATIENT
Start: 2019-01-09 | End: 2019-01-09 | Stop reason: HOSPADM

## 2019-01-09 RX ORDER — NIFEDIPINE 10 MG/1
10 CAPSULE ORAL
Status: DISCONTINUED | OUTPATIENT
Start: 2019-01-09 | End: 2019-01-09 | Stop reason: HOSPADM

## 2019-01-09 RX ORDER — NALOXONE HYDROCHLORIDE 0.4 MG/ML
0.4 INJECTION, SOLUTION INTRAMUSCULAR; INTRAVENOUS; SUBCUTANEOUS EVERY 5 MIN PRN
Status: DISCONTINUED | OUTPATIENT
Start: 2019-01-09 | End: 2019-01-09 | Stop reason: HOSPADM

## 2019-01-09 RX ORDER — ACETAMINOPHEN 325 MG/1
325-650 TABLET ORAL EVERY 4 HOURS PRN
Status: DISCONTINUED | OUTPATIENT
Start: 2019-01-09 | End: 2019-01-09 | Stop reason: HOSPADM

## 2019-01-09 RX ORDER — FENTANYL CITRATE 50 UG/ML
25 INJECTION, SOLUTION INTRAMUSCULAR; INTRAVENOUS
Status: DISCONTINUED | OUTPATIENT
Start: 2019-01-09 | End: 2019-01-09 | Stop reason: HOSPADM

## 2019-01-09 RX ORDER — METOPROLOL TARTRATE 25 MG/1
25 TABLET, FILM COATED ORAL
Status: CANCELLED | OUTPATIENT
Start: 2019-01-09

## 2019-01-09 RX ADMIN — ASPIRIN 325 MG: 325 TABLET, DELAYED RELEASE ORAL at 08:05

## 2019-01-09 RX ADMIN — FENTANYL CITRATE 50 MCG: 50 INJECTION, SOLUTION INTRAMUSCULAR; INTRAVENOUS at 08:44

## 2019-01-09 RX ADMIN — MIDAZOLAM 0.5 MG: 1 INJECTION INTRAMUSCULAR; INTRAVENOUS at 09:56

## 2019-01-09 RX ADMIN — SODIUM CHLORIDE: 9 INJECTION, SOLUTION INTRAVENOUS at 07:41

## 2019-01-09 RX ADMIN — ACETAMINOPHEN 650 MG: 325 TABLET, FILM COATED ORAL at 12:49

## 2019-01-09 RX ADMIN — LIDOCAINE HYDROCHLORIDE 1.5 ML: 40 SOLUTION TOPICAL at 08:18

## 2019-01-09 RX ADMIN — MIDAZOLAM HYDROCHLORIDE 1 MG: 1 INJECTION, SOLUTION INTRAMUSCULAR; INTRAVENOUS at 08:47

## 2019-01-09 RX ADMIN — MIDAZOLAM HYDROCHLORIDE 1 MG: 1 INJECTION, SOLUTION INTRAMUSCULAR; INTRAVENOUS at 08:49

## 2019-01-09 RX ADMIN — FENTANYL CITRATE 25 MCG: 50 INJECTION, SOLUTION INTRAMUSCULAR; INTRAVENOUS at 08:46

## 2019-01-09 RX ADMIN — MIDAZOLAM HYDROCHLORIDE 2 MG: 1 INJECTION, SOLUTION INTRAMUSCULAR; INTRAVENOUS at 08:42

## 2019-01-09 RX ADMIN — TOPICAL ANESTHETIC 0.5 ML: 200 SPRAY DENTAL; PERIODONTAL at 08:41

## 2019-01-09 RX ADMIN — LIDOCAINE HYDROCHLORIDE 8 ML: 10 INJECTION, SOLUTION EPIDURAL; INFILTRATION; INTRACAUDAL; PERINEURAL at 09:56

## 2019-01-09 RX ADMIN — GLYCOPYRROLATE 0.1 MG: 0.2 INJECTION, SOLUTION INTRAMUSCULAR; INTRAVENOUS at 08:17

## 2019-01-09 ASSESSMENT — MIFFLIN-ST. JEOR: SCORE: 1092.4

## 2019-01-09 NOTE — PROGRESS NOTES
Pre procedure plan of care reviewed with pt and spouse prior to sedation.  All questions answered and pt appears to accept and understand.

## 2019-01-09 NOTE — PROGRESS NOTES
Met with patient and her . Pre op imaging and labs ordered. Will review. All questions addressed. Pre op instructions reviewed. Plan MVR/R 1/28.

## 2019-01-09 NOTE — PROCEDURES
TIA Note    Indication: MR    Informed consent was signed by the patient.  A timeout was taken.    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    Corona Burks MD  Cardiology - [facility]  Pager: [phone]  Text Page  January 9, 2019

## 2019-01-09 NOTE — IP AVS SNAPSHOT
MRN:[id number]                      After Visit Summary   1/9/2019    Taty Aguila    MRN: [id number]           Visit Information        Department      1/9/2019  6:24 AM [facility]          Review of your medicines      CONTINUE these medicines which may have CHANGED, or have new prescriptions. If we are uncertain of the size of tablets/capsules you have at home, strength may be listed as something that might have changed.       Dose / Directions   amiodarone 200 MG tablet  Commonly known as:  PACERONE/CODARONE  This may have changed:    how much to take  how to take this  when to take this  additional instructions  Used for:  Persistent atrial fibrillation (H)      Take 1 tab twice a day for 10 days, then decrease the dose to 1 tab once a day  Quantity:  90 tablet  Refills:  3        CONTINUE these medicines which have NOT CHANGED       Dose / Directions   ALLEGRA PO      Take by mouth daily Dose unknown  Refills:  0     CALCIUM 600+D 600-200 MG-UNIT Tabs  Generic drug:  calcium carbonate-vitamin D      Dose:  1 tablet  Take 1 tablet by mouth 2 times daily  Refills:  0     Glucosamine HCl--750 MG Tabs      Dose:  2 tablet  Take 2 tablets by mouth daily After lunch  Refills:  0     MELATONIN PO      Dose:  5 mg  Take 5 mg by mouth nightly as needed  Refills:  0     metoprolol succinate ER 25 MG 24 hr tablet  Commonly known as:  TOPROL-XL  Used for:  Persistent atrial fibrillation (H)      Dose:  25 mg  Take 1 tablet (25 mg) by mouth daily  Quantity:  90 tablet  Refills:  3     MULTIVITAMIN ADULT Tabs      Dose:  1 tablet  Take 1 tablet by mouth daily  Refills:  0     omeprazole 10 MG DR capsule  Commonly known as:  priLOSEC      Dose:  10 mg  Take 1 capsule (10 mg) by mouth every morning (before breakfast)  Quantity:  90 capsule  Refills:  3     rivaroxaban ANTICOAGULANT 20 MG Tabs tablet  Commonly known as:  XARELTO  Used for:  Paroxysmal atrial fibrillation (H)       Dose:  20 mg  Take 1 tablet (20 mg) by mouth daily (with dinner)  Quantity:  90 tablet  Refills:  3              Protect others around you: Learn how to safely use, store and throw away your medicines at www.disposemymeds.org.       Follow-ups after your visit       Your next 10 appointments already scheduled    Jan 28, 2019  Procedure with Andrea Hanna MD  [facility] (--) [address]  [facility] [id number] [phone]      Care Instructions       After Care Instructions     Discharge Instructions - IF on Metformin (Glucophage or Glucovance) or Metformin containing medications      IF on Metformin (Glucophage or Glucovance) or Metformin containing medications , schedule a Basic Metabolic Panel at [facility] or Primary Clinic in 48 - 72 hours post procedure and PRIOR TO resuming the Metformin or Metformin containing medications.  Hold Metformin (Glucophage or Glucovance) or Metformin containing medications until after the Basic Metabolic Panel on the 2nd or 3rd day following the procedure.  May resume after blood draw is complete.         Discharge Instructions - IF on Metformin (Glucophage or Glucovance) or Metformin containing medications      IF on Metformin (Glucophage or Glucovance) or Metformin containing medications , schedule a Basic Metabolic Panel at [facility] or Primary Clinic in 48 - 72 hours post procedure and PRIOR TO resuming the Metformin or Metformin containing medications.  Hold Metformin (Glucophage or Glucovance) or Metformin containing medications until after the Basic Metabolic Panel on the 2nd or 3rd day following the procedure.  May resume after blood draw is complete.           Further instructions from your care team       TIA  (Transesophageal Echocardiogram)  Discharge Instructions    After you go home:      Have an adult stay with you for 6 hours.       For 24 hours - due to the sedation you received:    Relax and take it easy.    Do NOT  make any important or legal decisions.    Do NOT drive or operate machines at home or at work.    Do NOT drink alcohol.    Diet:    You may resume your normal diet, but no scratchy foods for two days.    If your throat is sore, eat cold, bland or soft foods.    You may have heartburn if the tube used in the exam entered your stomach.  If so:   - Do not eat acidic and spicy foods.   - Do not eat three hours before bedtime. Clear liquids are okay.   - When lying down, use two pillows to raise your head.    Medicines:      Take your medications, including blood thinners, unless your provider tells you not to.    If you have stopped any medicines, check with your provider about when to restart them.    You may take Tylenol (Acetaminophen) if your throat is sore.    You may take antacids if you have heartburn.      Follow Up Appointments:      Follow up with your cardiologist at [facility] of patient preference as instructed.    Follow up with your primary care provider as needed.    Call the clinic if:      You have heartburn that is severe or lasts more than 72 hours.    You have a sore throat that feels worse after 72 hours.    You have shortness of breath, neck pain, chest pain, fever, chills, coughing up blood, or other unusual signs.    Questions or concerns      [facility] [place]:    [phone] [facility] (7 days a week)  Cardiac Angiogram Discharge Instructions - Femoral    After you go home:      Have an adult stay with you until tomorrow.    Drink extra fluids for 2 days.    You may resume your normal diet.    No smoking       For 24 hours - due to the sedation you received:    Relax and take it easy.    Do NOT make any important or legal decisions.    Do NOT drive or operate machines at home or at work.    Do NOT drink alcohol.    Care of Groin Puncture Site:      For the first 24 hrs - check the puncture site every 1-2 hours while awake.    For 2 days, when you cough,  sneeze, laugh or move your bowels, hold your hand over the puncture site and press firmly.    Remove the bandaid after 24 hours. If there is minor oozing, apply another bandaid and remove it after 12 hours.    It is normal to have a small bruise or pea size lump at the site.    You may shower tomorrow. Do NOT take a bath, or use a hot tub or pool for at least 3 days. Do NOT scrub the site. Do not use lotion or powder near the puncture site.    Activity:            For 2 days:    No stooping or squatting    Do NOT do any heavy activity such as exercise, lifting, or straining.     No housework, yard work or any activity that make you sweat    Do NOT lift more than 10 pounds    Bleeding:      If you start bleeding from the site in your groin, lie down flat and press firmly on/above the site for 10 minutes.     Once bleeding stops, lay flat for 2 hours.     Call [facility] as soon as you can.       Call 911 right away if you have heavy bleeding or bleeding that does not stop.      Medicines:      If you are taking an antiplatelet medication such as Plavix, Brilinta or Effient, do not stop taking it until you talk to your cardiologist.      If you are on Metformin (Glucophage), do not restart it until you have blood tests (within 2 to 3 days after discharge).  After you have your blood drawn, you may restart the Metformin.     Take your medications, including blood thinners, unless your provider tells you not to.  If you take Coumadin (Warfarin), have your INR checked by your provider in  3-5 days. Call your clinic to schedule this.    If you have stopped any medicines, check with your provider about when to restart them.    Follow Up Appointments:      Follow up with [facility] Nurse Practitioner at [facility] Clinic of patient preference in 7-10 days.    Call the clinic if:      You have increased pain or a large or growing hard lump around the site.    The site is red, swollen, hot or tender.    Blood or fluid is  "draining from the site.    You have chills or a fever greater than 101 F (38 C).    Your leg feels numb, cool or changes color.    You have hives, a rash or unusual itching.    New pain in the back or belly that you cannot control with Tylenol.    Any questions or concerns.          [facility]:    [phone] [facility] (7 days a week)            Additional Information About Your Visit       [facility] Information    [employer] gives you secure access to your electronic health record. If you see a primary care provider, you can also send messages to your care team and make appointments. If you have questions, please call your primary care clinic.  If you do not have a primary care provider, please call [phone] and they will assist you.       Care EveryWhere ID    This is your Care EveryWhere ID. This could be used by other organizations to access your [facility] medical records  [id number]       Your Vitals Were     Blood Pressure   119/65          Pulse   65          Temperature   98.2  F (36.8  C) (Oral)          Respirations   16          Height   1.676 m (5' 6\")             Weight   56.1 kg (123 lb 9.6 oz)    Pulse Oximetry   97%    BMI (Body Mass Index)   19.95 kg/m           Primary Care Provider Office Phone # Fax #    Anyi Olmos [phone]      Equal Access to Services    JAYASHREE PASCAL : Hadii aad ku hadasho Soomaali, waaxda luqadaha, qaybta kaalmada adeegyada, waxay idiin haysidney stuart . So [facility] [phone].    ATENCIÓN: Si habla español, tiene a lara disposición servicios gratuitos de asistencia lingüística. Llame al [phone].    We comply with applicable federal civil rights laws and [place] laws. We do not discriminate on the basis of race, color, national origin, age, disability, sex, sexual orientation, or gender identity.           Thank you!    Thank you for choosing [facility] for your care. Our goal is always to provide you " with excellent care. Hearing back from our patients is one way we can continue to improve our services. Please take a few minutes to complete the written survey that you may receive in the mail after you visit with us. Thank you!            Medication List      Medications       Morning Afternoon Evening Bedtime As Needed   ALLEGRA PO  INSTRUCTIONS:  Take by mouth daily Dose unknown                    amiodarone 200 MG tablet  Also known as:  PACERONE/CODARONE  INSTRUCTIONS:  Take 1 tab twice a day for 10 days, then decrease the dose to 1 tab once a day                    CALCIUM 600+D 600-200 MG-UNIT Tabs  INSTRUCTIONS:  Take 1 tablet by mouth 2 times daily  Generic drug:  calcium carbonate-vitamin D                    Glucosamine HCl--750 MG Tabs  INSTRUCTIONS:  Take 2 tablets by mouth daily After lunch                    MELATONIN PO  INSTRUCTIONS:  Take 5 mg by mouth nightly as needed                    metoprolol succinate ER 25 MG 24 hr tablet  Also known as:  TOPROL-XL  INSTRUCTIONS:  Take 1 tablet (25 mg) by mouth daily  Doctor's comments:  Increased dose. Hold on file.                    MULTIVITAMIN ADULT Tabs  INSTRUCTIONS:  Take 1 tablet by mouth daily                    omeprazole 10 MG DR capsule  Also known as:  priLOSEC  INSTRUCTIONS:  Take 1 capsule (10 mg) by mouth every morning (before breakfast)                    rivaroxaban ANTICOAGULANT 20 MG Tabs tablet  Also known as:  XARELTO  INSTRUCTIONS:  Take 1 tablet (20 mg) by mouth daily (with dinner)

## 2019-01-09 NOTE — PROGRESS NOTES
Tylenol given for sore throat. VSS SB. Right groin remains d/i. Up to bathroom to void. Tolerated light lunch. To CXR.    1330Back from xray. Discharge instructions done with pt and . States understanding. IV DCD. Right groin remains D/I.

## 2019-01-09 NOTE — DISCHARGE INSTRUCTIONS
TIA  (Transesophageal Echocardiogram)  Discharge Instructions    After you go home:      Have an adult stay with you for 6 hours.       For 24 hours - due to the sedation you received:    Relax and take it easy.    Do NOT make any important or legal decisions.    Do NOT drive or operate machines at home or at work.    Do NOT drink alcohol.    Diet:    You may resume your normal diet, but no scratchy foods for two days.    If your throat is sore, eat cold, bland or soft foods.    You may have heartburn if the tube used in the exam entered your stomach.  If so:   - Do not eat acidic and spicy foods.   - Do not eat three hours before bedtime. Clear liquids are okay.   - When lying down, use two pillows to raise your head.    Medicines:      Take your medications, including blood thinners, unless your provider tells you not to.    If you have stopped any medicines, check with your provider about when to restart them.    You may take Tylenol (Acetaminophen) if your throat is sore.    You may take antacids if you have heartburn.      Follow Up Appointments:      Follow up with your cardiologist at [facility] of patient preference as instructed.    Follow up with your primary care provider as needed.    Call the clinic if:      You have heartburn that is severe or lasts more than 72 hours.    You have a sore throat that feels worse after 72 hours.    You have shortness of breath, neck pain, chest pain, fever, chills, coughing up blood, or other unusual signs.    Questions or concerns      [facility] at [place]:    [phone] [facility] (7 days a week)  Cardiac Angiogram Discharge Instructions - Femoral    After you go home:      Have an adult stay with you until tomorrow.    Drink extra fluids for 2 days.    You may resume your normal diet.    No smoking       For 24 hours - due to the sedation you received:    Relax and take it easy.    Do NOT make any important or legal decisions.    Do  NOT drive or operate machines at home or at work.    Do NOT drink alcohol.    Care of Groin Puncture Site:      For the first 24 hrs - check the puncture site every 1-2 hours while awake.    For 2 days, when you cough, sneeze, laugh or move your bowels, hold your hand over the puncture site and press firmly.    Remove the bandaid after 24 hours. If there is minor oozing, apply another bandaid and remove it after 12 hours.    It is normal to have a small bruise or pea size lump at the site.    You may shower tomorrow. Do NOT take a bath, or use a hot tub or pool for at least 3 days. Do NOT scrub the site. Do not use lotion or powder near the puncture site.    Activity:            For 2 days:    No stooping or squatting    Do NOT do any heavy activity such as exercise, lifting, or straining.     No housework, yard work or any activity that make you sweat    Do NOT lift more than 10 pounds    Bleeding:      If you start bleeding from the site in your groin, lie down flat and press firmly on/above the site for 10 minutes.     Once bleeding stops, lay flat for 2 hours.     Call [facility] as soon as you can.       Call 911 right away if you have heavy bleeding or bleeding that does not stop.      Medicines:      If you are taking an antiplatelet medication such as Plavix, Brilinta or Effient, do not stop taking it until you talk to your cardiologist.      If you are on Metformin (Glucophage), do not restart it until you have blood tests (within 2 to 3 days after discharge).  After you have your blood drawn, you may restart the Metformin.     Take your medications, including blood thinners, unless your provider tells you not to.  If you take Coumadin (Warfarin), have your INR checked by your provider in  3-5 days. Call your clinic to schedule this.    If you have stopped any medicines, check with your provider about when to restart them.    Follow Up Appointments:      Follow up with [facility] Nurse Practitioner at [facility]  [facility] Clinic of patient preference in 7-10 days.    Call the clinic if:      You have increased pain or a large or growing hard lump around the site.    The site is red, swollen, hot or tender.    Blood or fluid is draining from the site.    You have chills or a fever greater than 101 F (38 C).    Your leg feels numb, cool or changes color.    You have hives, a rash or unusual itching.    New pain in the back or belly that you cannot control with Tylenol.    Any questions or concerns.          [facility] at [place]:    [phone] [facility] (7 days a week)

## 2019-01-09 NOTE — PROGRESS NOTES
Back to room post procedure VSS. Right groin D/I. No bleeding/swelling at site. Instructed on activity restrictions.  at bedside. Denies c/o.

## 2019-01-09 NOTE — PROGRESS NOTES
TIA and Right and left heart cath results reviewed with Dr Hanna. CT non contrast ordered and scheduled. Plan Mitral Valve Repair/Replacement, Tricuspid Valve Repair. Left Atrial Appendage clipping. Patient updated. All questions addressed. patient states understanding.

## 2019-01-09 NOTE — IP AVS SNAPSHOT
[address] [place] [address]  Phone:  [phone]                                    After Visit Summary   1/9/2019    Taty Aguila    MRN: [id number]           After Visit Summary Signature Page    I have received my discharge instructions, and my questions have been answered. I have discussed any challenges I see with this plan with the nurse or doctor.    ..........................................................................................................................................  Patient/Patient Representative Signature      ..........................................................................................................................................  Patient Representative Print Name and Relationship to Patient    ..................................................               ................................................  Date                                   Time    ..........................................................................................................................................  Reviewed by Signature/Title    ...................................................              ..............................................  Date                                               Time          22EPIC Rev 08/18

## 2019-01-11 LAB — INTERPRETATION ECG - MUSE: NORMAL

## 2019-01-15 ENCOUNTER — HOSPITAL ENCOUNTER (OUTPATIENT)
Dept: CT IMAGING | Facility: CLINIC | Age: 74
Discharge: HOME | End: 2019-01-15
Attending: SURGERY | Admitting: SURGERY
Payer: COMMERCIAL

## 2019-01-15 DIAGNOSIS — I34.0 MITRAL VALVE INSUFFICIENCY, UNSPECIFIED ETIOLOGY: ICD-10-CM

## 2019-01-15 PROCEDURE — 71250 CT THORAX DX C-: CPT

## 2019-01-16 LAB
ABO + RH BLD: NORMAL
ABO + RH BLD: NORMAL
BLD GP AB SCN SERPL QL: NORMAL
BLOOD BANK CMNT PATIENT-IMP: NORMAL
BLOOD BANK CMNT PATIENT-IMP: NORMAL
SPECIMEN EXP DATE BLD: NORMAL

## 2019-01-30 NOTE — TELEPHONE ENCOUNTER
Per task, pt's surgery was cancelled due to pt having a cold. Pt ok to r/s. Talked to pt to r/s surgery. Offered pt 2/6 or 2/8. Pt declined and requested something later. Offered her 2/13 or 2/15. Pt picked 2/15. Scheduled surgery. Will call if anything changes

## 2019-02-07 ENCOUNTER — OFFICE VISIT (OUTPATIENT)
Dept: INTERNAL MEDICINE | Facility: CLINIC | Age: 74
End: 2019-02-07
Payer: COMMERCIAL

## 2019-02-07 VITALS
DIASTOLIC BLOOD PRESSURE: 60 MMHG | RESPIRATION RATE: 16 BRPM | TEMPERATURE: 98 F | HEIGHT: 66 IN | OXYGEN SATURATION: 98 % | HEART RATE: 66 BPM | WEIGHT: 128.5 LBS | SYSTOLIC BLOOD PRESSURE: 92 MMHG | BODY MASS INDEX: 20.65 KG/M2

## 2019-02-07 DIAGNOSIS — I34.0 MITRAL VALVE INSUFFICIENCY, UNSPECIFIED ETIOLOGY: ICD-10-CM

## 2019-02-07 DIAGNOSIS — I77.810 THORACIC AORTIC ECTASIA (H): ICD-10-CM

## 2019-02-07 DIAGNOSIS — Z01.818 PREOP GENERAL PHYSICAL EXAM: Primary | ICD-10-CM

## 2019-02-07 DIAGNOSIS — I36.1 NON-RHEUMATIC TRICUSPID VALVE INSUFFICIENCY: ICD-10-CM

## 2019-02-07 PROCEDURE — 99214 OFFICE O/P EST MOD 30 MIN: CPT | Performed by: INTERNAL MEDICINE

## 2019-02-07 ASSESSMENT — MIFFLIN-ST. JEOR: SCORE: 1110.65

## 2019-02-07 NOTE — PROGRESS NOTES
[place] [address]  [facility] [address]  [phone]  Dept: [phone]    PRE-OP EVALUATION:  Today's date: 2019    Taty Aguila (: 1945) presents for pre-operative evaluation assessment as requested by Dr. Andrea Arora.  She requires evaluation and anesthesia risk assessment prior to undergoing surgery/procedure for treatment of mitral valve regurgitation, tricuspid regurgitation.    Proposed Surgery/ Procedure: Noninvasive mitral valve replacement, possible tricuspid placement  Date of Surgery/ Procedure: 02/15/2019  Time of Surgery/ Procedure: 7:20 am  Hospital/Surgical Facility: [facility]  Fax number for surgical facility:   Primary Physician: Anyi Olmos  Type of Anesthesia Anticipated: to be determined    Patient has a Health Care Directive or Living Will:  NO    1. NO - Do you have a history of heart attack, stroke, stent, bypass or surgery on an artery in the head, neck, heart or legs?  2. NO - Do you ever have any pain or discomfort in your chest?  3. NO - Do you have a history of  Heart Failure?  4. YES - ARE YOUR TROUBLED BY SHORTNESS OF BREATH WHEN WALKING ON THE LEVEL, UP A SLIGHT HILL OR AT NIGHT?  She has dyspnea on exertion with hills  5. NO - Do you currently have a cold, bronchitis or other respiratory infection?  6. NO - Do you have a cough, shortness of breath or wheezing?  7. NO - Do you sometimes get pains in the calves of your legs when you walk?  8. NO - Do you or anyone in your family have previous history of blood clots?  9. NO - Do you or does anyone in your family have a serious bleeding problem such as prolonged bleeding following surgeries or cuts?  10. NO - Have you ever had problems with anemia or been told to take iron pills?  11. NO - Have you had any abnormal blood loss such as black, tarry or bloody stools, or abnormal vaginal bleeding?  12. NO - Have you ever had a blood transfusion?  13. NO - Have you or any of your  relatives ever had problems with anesthesia?  14. NO - Do you have sleep apnea, excessive snoring or daytime drowsiness?  15. NO - Do you have any prosthetic heart valves?  16. NO - Do you have prosthetic joints?  17. NO - Is there any chance that you may be pregnant?            HPI:     HPI related to upcoming procedure: She has known mitral valve regurgitation, present for a number of years.  She was going to undergo replacement 2.5 years ago but catheterization showed no significant pulmonary pressure elevation, the degree of regurgitation seemed less than noted on echo so the replacement was delayed.  She has been having gradually worsening regurgitation on echo and some symptoms including new onset atrial fibrillation so will undergo replacement.  She has noted tricuspid regurgitation and consideration may be made to replacing that at the procedure.  She has preserved left ejection fraction though has some thoracic aortic      See problem list for active medical problems.  Problems all longstanding and stable, except as noted/documented.  See ROS for pertinent symptoms related to these conditions.                                                                                                                                                          .    MEDICAL HISTORY:     Patient Active Problem List    Diagnosis Date Noted     Status post coronary angiogram 01/09/2019     Priority: Medium     Mitral valve insufficiency, unspecified etiology 12/31/2018     Priority: Medium     Added automatically from request for surgery [id number]       Paroxysmal atrial fibrillation (H) 11/10/2018     Priority: Medium     Mitral valve regurgitation 09/14/2016     Priority: Medium     3+       Colitis      Priority: Medium     CARDIOVASCULAR SCREENING; LDL GOAL LESS THAN 160 02/10/2010     Priority: Medium     Allergic rhinitis 01/23/2004     Priority: Medium     Problem list name updated by automated process. Provider to review         Past Medical History:   Diagnosis Date     Allergic rhinitis, cause unspecified     dust mites, ragweed     Colitis      Mitral regurgitation      Mitral valve disorders(424.0) 1984    myxomatous valve, mod MR, prolapse     Mitral valve prolapse      Past Surgical History:   Procedure Laterality Date     COLONOSCOPY N/A 9/15/2015    Procedure: COLONOSCOPY;  Surgeon: Anson Llanos MD;  Location:  GI     CV HEART CATHETERIZATION WITH POSSIBLE INTERVENTION N/A 1/9/2019    Procedure: Heart Catheterization with Possible Intervention;  Surgeon: Ritesh Whittaker MD;  Location:  HEART CARDIAC CATH LAB     CV RIGHT AND LEFT HEART CATH N/A 1/9/2019    Procedure: Right and Left Heart Catherization;  Surgeon: Ritesh Whittaker MD;  Location:  HEART CARDIAC CATH LAB     Current Outpatient Medications   Medication Sig Dispense Refill     amiodarone (PACERONE/CODARONE) 200 MG tablet Take 1 tab twice a day for 10 days, then decrease the dose to 1 tab once a day (Patient taking differently: Take 200 mg by mouth daily ) 90 tablet 3     calcium carbonate-vitamin D (CALCIUM 600+D) 600-200 MG-UNIT TABS Take 1 tablet by mouth 2 times daily       Fexofenadine HCl (ALLEGRA PO) Take by mouth daily Dose unknown       Glucosamine HCl--750 MG TABS Take 2 tablets by mouth daily After lunch       MELATONIN PO Take 5 mg by mouth nightly as needed        metoprolol succinate ER (TOPROL-XL) 25 MG 24 hr tablet Take 1 tablet (25 mg) by mouth daily 90 tablet 3     Multiple Vitamins-Minerals (MULTIVITAMIN ADULT) TABS Take 1 tablet by mouth daily       omeprazole (PRILOSEC) 10 MG CR capsule Take 1 capsule (10 mg) by mouth every morning (before breakfast) 90 capsule 3     rivaroxaban ANTICOAGULANT (XARELTO) 20 MG TABS tablet Take 1 tablet (20 mg) by mouth daily (with dinner) 90 tablet 3     OTC products:   None  Xarelto to be stopped prior to procedure per cardiologist recommendation    Allergies   Allergen Reactions      "Chlorpheniramine Other (See Comments)     LOC and fainting      No Known Drug Allergies      Phenylephrine Other (See Comments)     fainting      Latex Allergy: NO    Social History     Tobacco Use     Smoking status: Never Smoker     Smokeless tobacco: Never Used   Substance Use Topics     Alcohol use: No     History   Drug Use No       REVIEW OF SYSTEMS:   GENERAL: negative for, fever, chills, weight loss, weight gain  EYES: negative  ENT: negative  RESPIRATORY: Dyspnea on exertion  CARDIOVASCULAR: negative for, palpitations, tachycardia, irregular heart beat and chest pain, she has not had an episode of atrial fibrillation in several weeks.  GI: negative for, nausea, vomiting, abdominal pain, melena and hematochezia  : negative  MUSCULOSKELETAL: negative  NEUROLOGIC: negative for, headaches, seizures, local weakness, numbness or tingling of hands and numbness or tingling of feet  SKIN: negative  ENDOCRINE: negative       EXAM:     Patient is alert, oriented, cooperative in no acute distress.  BP 92/60   Pulse 66   Temp 98  F (36.7  C) (Oral)   Resp 16   Ht 1.67 m (5' 5.75\")   Wt 58.3 kg (128 lb 8 oz)   SpO2 98%   BMI 20.90 kg/m      HEENT: PERRL, EOMI, TM's are normal. Oropharynx is clear.  NECK: No lymphadenopathy or thyromegaly. Carotid pulses full without bruits.  LUNGS: clear  CV: normal S1, S2 with 2/6 holosystolic murmur, no S3 or S4 present. Pulses are 2/2 throughout. No JVD.  ABDOMEN: Bowel sounds present, nontender without hepatosplenomegaly. Liver is normal size to percussion.  EXTREMITIES: no edema present, unremarkable joints  NEUROLOGIC: Cranial nerves II-XII intact, reflexes 2/4 throughout, strength 5/5, sensation grossly intact, gait normal.  SKIN: without rashes or significant lesions     DIAGNOSTICS:   EK19:  sinus bradycardia, LVH, normal axis, normal intervals, no acute ST/T changes c/w ischemia, unchanged from previous tracings    Recent Labs   Lab Test 19  0720 " 12/13/18  1619 10/17/18  0736  10/16/18  1315  09/30/11  0907   HGB 12.7  --  13.3   < > 13.8   < >  --      --  227   < > 260   < >  --    INR 0.96  --   --   --  0.94   < >  --     139 141  --  135   < >  --    POTASSIUM 4.0 4.2 3.8  --  3.4   < >  --    CR 0.82 0.74 0.73  --  0.84   < >  --    A1C 5.9*  --   --   --   --   --  5.5    < > = values in this interval not displayed.        IMPRESSION:   Reason for surgery/procedure: Mitral valve regurgitation, tricuspid regurgitation  Diagnosis/reason for consult: Preop    The proposed surgical procedure is considered INTERMEDIATE risk.    REVISED CARDIAC RISK INDEX  The patient has the following serious cardiovascular risks for perioperative complications such as (MI, PE, VFib and 3  AV Block):  No serious cardiac risks  INTERPRETATION: 0 risks: Class I (very low risk - 0.4% complication rate)    The patient has the following additional risks for perioperative complications:  No identified additional risks      ICD-10-CM    1. Preop general physical exam Z01.818    2. Mitral valve insufficiency, unspecified etiology I34.0    3. Thoracic aortic ectasia (H) I77.810    4. Non-rheumatic tricuspid valve insufficiency I36.1        RECOMMENDATIONS:         --Patient is to take all scheduled medications on the day of surgery EXCEPT for modifications listed below.    Anticoagulant or Antiplatelet Medication Use  XARELTO: Medication to be discontinued prior to the procedure per the cardiologist's recommendation    Advised to take a beta-blocker the morning of surgery with sips of water.  She normally takes her amiodarone later in the day so take the day before as scheduled.        APPROVAL GIVEN to proceed with proposed procedure, without further diagnostic evaluation       Signed Electronically by: Anyi Olmos MD    Copy of this evaluation report is provided to requesting physician.    [facility] Preop Guidelines    Revised Cardiac Risk Index

## 2019-02-07 NOTE — H&P (VIEW-ONLY)
[place] [address]  [facility] [address]  [phone]  Dept: [phone]    PRE-OP EVALUATION:  Today's date: 2019    Taty Aguila (: 1945) presents for pre-operative evaluation assessment as requested by Dr. Andrea Arora.  She requires evaluation and anesthesia risk assessment prior to undergoing surgery/procedure for treatment of mitral valve regurgitation, tricuspid regurgitation.    Proposed Surgery/ Procedure: Noninvasive mitral valve replacement, possible tricuspid placement  Date of Surgery/ Procedure: 02/15/2019  Time of Surgery/ Procedure: 7:20 am  Hospital/Surgical Facility: [facility]  Fax number for surgical facility:   Primary Physician: Anyi Olmos  Type of Anesthesia Anticipated: to be determined    Patient has a Health Care Directive or Living Will:  NO    1. NO - Do you have a history of heart attack, stroke, stent, bypass or surgery on an artery in the head, neck, heart or legs?  2. NO - Do you ever have any pain or discomfort in your chest?  3. NO - Do you have a history of  Heart Failure?  4. YES - ARE YOUR TROUBLED BY SHORTNESS OF BREATH WHEN WALKING ON THE LEVEL, UP A SLIGHT HILL OR AT NIGHT?  She has dyspnea on exertion with hills  5. NO - Do you currently have a cold, bronchitis or other respiratory infection?  6. NO - Do you have a cough, shortness of breath or wheezing?  7. NO - Do you sometimes get pains in the calves of your legs when you walk?  8. NO - Do you or anyone in your family have previous history of blood clots?  9. NO - Do you or does anyone in your family have a serious bleeding problem such as prolonged bleeding following surgeries or cuts?  10. NO - Have you ever had problems with anemia or been told to take iron pills?  11. NO - Have you had any abnormal blood loss such as black, tarry or bloody stools, or abnormal vaginal bleeding?  12. NO - Have you ever had a blood transfusion?  13. NO - Have you or any of your  relatives ever had problems with anesthesia?  14. NO - Do you have sleep apnea, excessive snoring or daytime drowsiness?  15. NO - Do you have any prosthetic heart valves?  16. NO - Do you have prosthetic joints?  17. NO - Is there any chance that you may be pregnant?            HPI:     HPI related to upcoming procedure: She has known mitral valve regurgitation, present for a number of years.  She was going to undergo replacement 2.5 years ago but catheterization showed no significant pulmonary pressure elevation, the degree of regurgitation seemed less than noted on echo so the replacement was delayed.  She has been having gradually worsening regurgitation on echo and some symptoms including new onset atrial fibrillation so will undergo replacement.  She has noted tricuspid regurgitation and consideration may be made to replacing that at the procedure.  She has preserved left ejection fraction though has some thoracic aortic      See problem list for active medical problems.  Problems all longstanding and stable, except as noted/documented.  See ROS for pertinent symptoms related to these conditions.                                                                                                                                                          .    MEDICAL HISTORY:     Patient Active Problem List    Diagnosis Date Noted     Status post coronary angiogram 01/09/2019     Priority: Medium     Mitral valve insufficiency, unspecified etiology 12/31/2018     Priority: Medium     Added automatically from request for surgery [id number]       Paroxysmal atrial fibrillation (H) 11/10/2018     Priority: Medium     Mitral valve regurgitation 09/14/2016     Priority: Medium     3+       Colitis      Priority: Medium     CARDIOVASCULAR SCREENING; LDL GOAL LESS THAN 160 02/10/2010     Priority: Medium     Allergic rhinitis 01/23/2004     Priority: Medium     Problem list name updated by automated process. Provider to review         Past Medical History:   Diagnosis Date     Allergic rhinitis, cause unspecified     dust mites, ragweed     Colitis      Mitral regurgitation      Mitral valve disorders(424.0) 1984    myxomatous valve, mod MR, prolapse     Mitral valve prolapse      Past Surgical History:   Procedure Laterality Date     COLONOSCOPY N/A 9/15/2015    Procedure: COLONOSCOPY;  Surgeon: Anson Llanos MD;  Location:  GI     CV HEART CATHETERIZATION WITH POSSIBLE INTERVENTION N/A 1/9/2019    Procedure: Heart Catheterization with Possible Intervention;  Surgeon: Ritesh Whittaker MD;  Location:  HEART CARDIAC CATH LAB     CV RIGHT AND LEFT HEART CATH N/A 1/9/2019    Procedure: Right and Left Heart Catherization;  Surgeon: Ritesh Whittaker MD;  Location:  HEART CARDIAC CATH LAB     Current Outpatient Medications   Medication Sig Dispense Refill     amiodarone (PACERONE/CODARONE) 200 MG tablet Take 1 tab twice a day for 10 days, then decrease the dose to 1 tab once a day (Patient taking differently: Take 200 mg by mouth daily ) 90 tablet 3     calcium carbonate-vitamin D (CALCIUM 600+D) 600-200 MG-UNIT TABS Take 1 tablet by mouth 2 times daily       Fexofenadine HCl (ALLEGRA PO) Take by mouth daily Dose unknown       Glucosamine HCl--750 MG TABS Take 2 tablets by mouth daily After lunch       MELATONIN PO Take 5 mg by mouth nightly as needed        metoprolol succinate ER (TOPROL-XL) 25 MG 24 hr tablet Take 1 tablet (25 mg) by mouth daily 90 tablet 3     Multiple Vitamins-Minerals (MULTIVITAMIN ADULT) TABS Take 1 tablet by mouth daily       omeprazole (PRILOSEC) 10 MG CR capsule Take 1 capsule (10 mg) by mouth every morning (before breakfast) 90 capsule 3     rivaroxaban ANTICOAGULANT (XARELTO) 20 MG TABS tablet Take 1 tablet (20 mg) by mouth daily (with dinner) 90 tablet 3     OTC products:   None  Xarelto to be stopped prior to procedure per cardiologist recommendation    Allergies   Allergen Reactions      "Chlorpheniramine Other (See Comments)     LOC and fainting      No Known Drug Allergies      Phenylephrine Other (See Comments)     fainting      Latex Allergy: NO    Social History     Tobacco Use     Smoking status: Never Smoker     Smokeless tobacco: Never Used   Substance Use Topics     Alcohol use: No     History   Drug Use No       REVIEW OF SYSTEMS:   GENERAL: negative for, fever, chills, weight loss, weight gain  EYES: negative  ENT: negative  RESPIRATORY: Dyspnea on exertion  CARDIOVASCULAR: negative for, palpitations, tachycardia, irregular heart beat and chest pain, she has not had an episode of atrial fibrillation in several weeks.  GI: negative for, nausea, vomiting, abdominal pain, melena and hematochezia  : negative  MUSCULOSKELETAL: negative  NEUROLOGIC: negative for, headaches, seizures, local weakness, numbness or tingling of hands and numbness or tingling of feet  SKIN: negative  ENDOCRINE: negative       EXAM:     Patient is alert, oriented, cooperative in no acute distress.  BP 92/60   Pulse 66   Temp 98  F (36.7  C) (Oral)   Resp 16   Ht 1.67 m (5' 5.75\")   Wt 58.3 kg (128 lb 8 oz)   SpO2 98%   BMI 20.90 kg/m      HEENT: PERRL, EOMI, TM's are normal. Oropharynx is clear.  NECK: No lymphadenopathy or thyromegaly. Carotid pulses full without bruits.  LUNGS: clear  CV: normal S1, S2 with 2/6 holosystolic murmur, no S3 or S4 present. Pulses are 2/2 throughout. No JVD.  ABDOMEN: Bowel sounds present, nontender without hepatosplenomegaly. Liver is normal size to percussion.  EXTREMITIES: no edema present, unremarkable joints  NEUROLOGIC: Cranial nerves II-XII intact, reflexes 2/4 throughout, strength 5/5, sensation grossly intact, gait normal.  SKIN: without rashes or significant lesions     DIAGNOSTICS:   EK19:  sinus bradycardia, LVH, normal axis, normal intervals, no acute ST/T changes c/w ischemia, unchanged from previous tracings    Recent Labs   Lab Test 19  0720 " 12/13/18  1619 10/17/18  0736  10/16/18  1315  09/30/11  0907   HGB 12.7  --  13.3   < > 13.8   < >  --      --  227   < > 260   < >  --    INR 0.96  --   --   --  0.94   < >  --     139 141  --  135   < >  --    POTASSIUM 4.0 4.2 3.8  --  3.4   < >  --    CR 0.82 0.74 0.73  --  0.84   < >  --    A1C 5.9*  --   --   --   --   --  5.5    < > = values in this interval not displayed.        IMPRESSION:   Reason for surgery/procedure: Mitral valve regurgitation, tricuspid regurgitation  Diagnosis/reason for consult: Preop    The proposed surgical procedure is considered INTERMEDIATE risk.    REVISED CARDIAC RISK INDEX  The patient has the following serious cardiovascular risks for perioperative complications such as (MI, PE, VFib and 3  AV Block):  No serious cardiac risks  INTERPRETATION: 0 risks: Class I (very low risk - 0.4% complication rate)    The patient has the following additional risks for perioperative complications:  No identified additional risks      ICD-10-CM    1. Preop general physical exam Z01.818    2. Mitral valve insufficiency, unspecified etiology I34.0    3. Thoracic aortic ectasia (H) I77.810    4. Non-rheumatic tricuspid valve insufficiency I36.1        RECOMMENDATIONS:         --Patient is to take all scheduled medications on the day of surgery EXCEPT for modifications listed below.    Anticoagulant or Antiplatelet Medication Use  XARELTO: Medication to be discontinued prior to the procedure per the cardiologist's recommendation    Advised to take a beta-blocker the morning of surgery with sips of water.  She normally takes her amiodarone later in the day so take the day before as scheduled.        APPROVAL GIVEN to proceed with proposed procedure, without further diagnostic evaluation       Signed Electronically by: Anyi Olmos MD    Copy of this evaluation report is provided to requesting physician.    [facility] Preop Guidelines    Revised Cardiac Risk Index

## 2019-02-07 NOTE — NURSING NOTE
"BP 92/60   Pulse 66   Temp 98  F (36.7  C) (Oral)   Resp 16   Ht 1.67 m (5' 5.75\")   Wt 58.3 kg (128 lb 8 oz)   SpO2 98%   BMI 20.90 kg/m        Reviewed health maintenance- pt due for Mammogram.    Patient agreed to schedule mammogram this year. Order have been place and information given to patient.       " Area M Indication Text: Tumors in this location are included in Area M (cheek, forehead, scalp, neck, jawline and pretibial skin).  Mohs surgery is indicated for tumors in these anatomic locations.

## 2019-02-08 PROBLEM — Z98.890 STATUS POST CORONARY ANGIOGRAM: Status: RESOLVED | Noted: 2019-01-09 | Resolved: 2019-02-08

## 2019-02-08 PROBLEM — I34.0 MITRAL VALVE INSUFFICIENCY, UNSPECIFIED ETIOLOGY: Status: RESOLVED | Noted: 2018-12-31 | Resolved: 2019-02-08

## 2019-02-08 PROBLEM — I36.1 NON-RHEUMATIC TRICUSPID VALVE INSUFFICIENCY: Status: ACTIVE | Noted: 2019-02-08

## 2019-02-08 PROBLEM — I77.810 THORACIC AORTIC ECTASIA (H): Status: ACTIVE | Noted: 2019-02-08

## 2019-02-13 ENCOUNTER — ANESTHESIA EVENT (OUTPATIENT)
Dept: SURGERY | Facility: CLINIC | Age: 74
DRG: 219 | End: 2019-02-13
Payer: COMMERCIAL

## 2019-02-15 ENCOUNTER — APPOINTMENT (OUTPATIENT)
Dept: CT IMAGING | Facility: CLINIC | Age: 74
DRG: 219 | End: 2019-02-15
Attending: STUDENT IN AN ORGANIZED HEALTH CARE EDUCATION/TRAINING PROGRAM
Payer: COMMERCIAL

## 2019-02-15 ENCOUNTER — ANESTHESIA (OUTPATIENT)
Dept: SURGERY | Facility: CLINIC | Age: 74
DRG: 219 | End: 2019-02-15
Payer: COMMERCIAL

## 2019-02-15 ENCOUNTER — HOSPITAL ENCOUNTER (INPATIENT)
Facility: CLINIC | Age: 74
LOS: 8 days | Discharge: HOME | DRG: 219 | End: 2019-02-23
Attending: SURGERY | Admitting: SURGERY
Payer: COMMERCIAL

## 2019-02-15 ENCOUNTER — APPOINTMENT (OUTPATIENT)
Dept: CARDIOLOGY | Facility: CLINIC | Age: 74
DRG: 219 | End: 2019-02-15
Attending: SURGERY
Payer: COMMERCIAL

## 2019-02-15 ENCOUNTER — APPOINTMENT (OUTPATIENT)
Dept: GENERAL RADIOLOGY | Facility: CLINIC | Age: 74
DRG: 219 | End: 2019-02-15
Attending: STUDENT IN AN ORGANIZED HEALTH CARE EDUCATION/TRAINING PROGRAM
Payer: COMMERCIAL

## 2019-02-15 DIAGNOSIS — I34.0 MITRAL VALVE INSUFFICIENCY, UNSPECIFIED ETIOLOGY: ICD-10-CM

## 2019-02-15 DIAGNOSIS — Z95.2 S/P MVR (MITRAL VALVE REPLACEMENT): Primary | ICD-10-CM

## 2019-02-15 PROBLEM — I34.1 MITRAL VALVE PROLAPSE: Status: ACTIVE | Noted: 2019-02-15

## 2019-02-15 LAB
ABO + RH BLD: NORMAL
ABO + RH BLD: NORMAL
ALBUMIN SERPL-MCNC: 2.3 G/DL (ref 3.4–5)
ALBUMIN SERPL-MCNC: 2.6 G/DL (ref 3.4–5)
ALBUMIN SERPL-MCNC: 3.6 G/DL (ref 3.4–5)
ALP SERPL-CCNC: 29 U/L (ref 40–150)
ALP SERPL-CCNC: 32 U/L (ref 40–150)
ALP SERPL-CCNC: 47 U/L (ref 40–150)
ALT SERPL W P-5'-P-CCNC: 20 U/L (ref 0–50)
ALT SERPL W P-5'-P-CCNC: 27 U/L (ref 0–50)
ALT SERPL W P-5'-P-CCNC: 43 U/L (ref 0–50)
ANION GAP SERPL CALCULATED.3IONS-SCNC: 6 MMOL/L (ref 3–14)
ANION GAP SERPL CALCULATED.3IONS-SCNC: 8 MMOL/L (ref 3–14)
ANION GAP SERPL CALCULATED.3IONS-SCNC: 9 MMOL/L (ref 3–14)
APTT PPP: 40 SEC (ref 22–37)
APTT PPP: 57 SEC (ref 22–37)
AST SERPL W P-5'-P-CCNC: 151 U/L (ref 0–45)
AST SERPL W P-5'-P-CCNC: 16 U/L (ref 0–45)
AST SERPL W P-5'-P-CCNC: 271 U/L (ref 0–45)
BASE DEFICIT BLDA-SCNC: 3.4 MMOL/L
BASOPHILS # BLD AUTO: 0 10E9/L (ref 0–0.2)
BASOPHILS NFR BLD AUTO: 0.1 %
BILIRUB SERPL-MCNC: 0.2 MG/DL (ref 0.2–1.3)
BILIRUB SERPL-MCNC: 0.4 MG/DL (ref 0.2–1.3)
BILIRUB SERPL-MCNC: 0.4 MG/DL (ref 0.2–1.3)
BLD GP AB SCN SERPL QL: NORMAL
BLD PROD TYP BPU: NORMAL
BLD UNIT ID BPU: 0
BLD UNIT ID BPU: 0
BLOOD BANK CMNT PATIENT-IMP: NORMAL
BLOOD PRODUCT CODE: NORMAL
BLOOD PRODUCT CODE: NORMAL
BPU ID: NORMAL
BPU ID: NORMAL
BUN SERPL-MCNC: 10 MG/DL (ref 7–30)
BUN SERPL-MCNC: 10 MG/DL (ref 7–30)
BUN SERPL-MCNC: 14 MG/DL (ref 7–30)
CA-I BLD-MCNC: 4.6 MG/DL (ref 4.4–5.2)
CALCIUM SERPL-MCNC: 7.9 MG/DL (ref 8.5–10.1)
CALCIUM SERPL-MCNC: 8.5 MG/DL (ref 8.5–10.1)
CALCIUM SERPL-MCNC: 9 MG/DL (ref 8.5–10.1)
CHLORIDE SERPL-SCNC: 103 MMOL/L (ref 94–109)
CHLORIDE SERPL-SCNC: 109 MMOL/L (ref 94–109)
CHLORIDE SERPL-SCNC: 111 MMOL/L (ref 94–109)
CO2 SERPL-SCNC: 23 MMOL/L (ref 20–32)
CO2 SERPL-SCNC: 25 MMOL/L (ref 20–32)
CO2 SERPL-SCNC: 28 MMOL/L (ref 20–32)
CREAT SERPL-MCNC: 0.85 MG/DL (ref 0.52–1.04)
CREAT SERPL-MCNC: 0.87 MG/DL (ref 0.52–1.04)
CREAT SERPL-MCNC: 0.93 MG/DL (ref 0.52–1.04)
DIFFERENTIAL METHOD BLD: ABNORMAL
EOSINOPHIL # BLD AUTO: 0 10E9/L (ref 0–0.7)
EOSINOPHIL NFR BLD AUTO: 0.3 %
ERYTHROCYTE [DISTWIDTH] IN BLOOD BY AUTOMATED COUNT: 13.3 % (ref 10–15)
ERYTHROCYTE [DISTWIDTH] IN BLOOD BY AUTOMATED COUNT: 13.3 % (ref 10–15)
ERYTHROCYTE [DISTWIDTH] IN BLOOD BY AUTOMATED COUNT: 13.4 % (ref 10–15)
FIBRINOGEN PPP-MCNC: 177 MG/DL (ref 200–420)
GFR SERPL CREATININE-BSD FRML MDRD: 61 ML/MIN/{1.73_M2}
GFR SERPL CREATININE-BSD FRML MDRD: 66 ML/MIN/{1.73_M2}
GFR SERPL CREATININE-BSD FRML MDRD: 68 ML/MIN/{1.73_M2}
GLUCOSE BLDC GLUCOMTR-MCNC: 120 MG/DL (ref 70–99)
GLUCOSE BLDC GLUCOMTR-MCNC: 152 MG/DL (ref 70–99)
GLUCOSE BLDC GLUCOMTR-MCNC: 154 MG/DL (ref 70–99)
GLUCOSE BLDC GLUCOMTR-MCNC: 163 MG/DL (ref 70–99)
GLUCOSE BLDC GLUCOMTR-MCNC: 188 MG/DL (ref 70–99)
GLUCOSE BLDC GLUCOMTR-MCNC: 189 MG/DL (ref 70–99)
GLUCOSE SERPL-MCNC: 100 MG/DL (ref 70–99)
GLUCOSE SERPL-MCNC: 144 MG/DL (ref 70–99)
GLUCOSE SERPL-MCNC: 186 MG/DL (ref 70–99)
HCO3 BLD-SCNC: 23 MMOL/L (ref 21–28)
HCT VFR BLD AUTO: 24.8 % (ref 35–47)
HCT VFR BLD AUTO: 30.2 % (ref 35–47)
HCT VFR BLD AUTO: 35.8 % (ref 35–47)
HGB BLD-MCNC: 10.4 G/DL (ref 11.7–15.7)
HGB BLD-MCNC: 12.2 G/DL (ref 11.7–15.7)
HGB BLD-MCNC: 8.4 G/DL (ref 11.7–15.7)
IMM GRANULOCYTES # BLD: 0.1 10E9/L (ref 0–0.4)
IMM GRANULOCYTES NFR BLD: 0.4 %
INR PPP: 1.35 (ref 0.86–1.14)
INR PPP: 1.74 (ref 0.86–1.14)
LACTATE BLD-SCNC: 1.1 MMOL/L (ref 0.7–2)
LYMPHOCYTES # BLD AUTO: 1.1 10E9/L (ref 0.8–5.3)
LYMPHOCYTES NFR BLD AUTO: 8.4 %
MAGNESIUM SERPL-MCNC: 2.9 MG/DL (ref 1.6–2.3)
MCH RBC QN AUTO: 31.4 PG (ref 26.5–33)
MCH RBC QN AUTO: 31.7 PG (ref 26.5–33)
MCH RBC QN AUTO: 32 PG (ref 26.5–33)
MCHC RBC AUTO-ENTMCNC: 33.9 G/DL (ref 31.5–36.5)
MCHC RBC AUTO-ENTMCNC: 34.1 G/DL (ref 31.5–36.5)
MCHC RBC AUTO-ENTMCNC: 34.4 G/DL (ref 31.5–36.5)
MCV RBC AUTO: 92 FL (ref 78–100)
MCV RBC AUTO: 93 FL (ref 78–100)
MCV RBC AUTO: 94 FL (ref 78–100)
MONOCYTES # BLD AUTO: 0.4 10E9/L (ref 0–1.3)
MONOCYTES NFR BLD AUTO: 3.2 %
MRSA DNA SPEC QL NAA+PROBE: NEGATIVE
NEUTROPHILS # BLD AUTO: 11.6 10E9/L (ref 1.6–8.3)
NEUTROPHILS NFR BLD AUTO: 87.6 %
NRBC # BLD AUTO: 0 10*3/UL
NRBC BLD AUTO-RTO: 0 /100
NUM BPU REQUESTED: 1
NUM BPU REQUESTED: 2
NUM BPU REQUESTED: 4
OXYHGB MFR BLD: 98 % (ref 92–100)
PCO2 BLD: 43 MM HG (ref 35–45)
PH BLD: 7.33 PH (ref 7.35–7.45)
PHOSPHATE SERPL-MCNC: 4.1 MG/DL (ref 2.5–4.5)
PLATELET # BLD AUTO: 120 10E9/L (ref 150–450)
PLATELET # BLD AUTO: 125 10E9/L (ref 150–450)
PLATELET # BLD AUTO: 211 10E9/L (ref 150–450)
PO2 BLD: 165 MM HG (ref 80–105)
POTASSIUM SERPL-SCNC: 3.9 MMOL/L (ref 3.4–5.3)
POTASSIUM SERPL-SCNC: 3.9 MMOL/L (ref 3.4–5.3)
POTASSIUM SERPL-SCNC: 4.3 MMOL/L (ref 3.4–5.3)
PROT SERPL-MCNC: 4.5 G/DL (ref 6.8–8.8)
PROT SERPL-MCNC: 4.9 G/DL (ref 6.8–8.8)
PROT SERPL-MCNC: 7.1 G/DL (ref 6.8–8.8)
RBC # BLD AUTO: 2.65 10E12/L (ref 3.8–5.2)
RBC # BLD AUTO: 3.25 10E12/L (ref 3.8–5.2)
RBC # BLD AUTO: 3.88 10E12/L (ref 3.8–5.2)
SODIUM SERPL-SCNC: 137 MMOL/L (ref 133–144)
SODIUM SERPL-SCNC: 142 MMOL/L (ref 133–144)
SODIUM SERPL-SCNC: 143 MMOL/L (ref 133–144)
SPECIMEN EXP DATE BLD: NORMAL
SPECIMEN SOURCE: NORMAL
TRANSFUSION STATUS PATIENT QL: NORMAL
WBC # BLD AUTO: 13.2 10E9/L (ref 4–11)
WBC # BLD AUTO: 13.9 10E9/L (ref 4–11)
WBC # BLD AUTO: 7.6 10E9/L (ref 4–11)

## 2019-02-15 PROCEDURE — 80053 COMPREHEN METABOLIC PANEL: CPT | Performed by: STUDENT IN AN ORGANIZED HEALTH CARE EDUCATION/TRAINING PROGRAM

## 2019-02-15 PROCEDURE — 37000009 ZZH ANESTHESIA TECHNICAL FEE, EACH ADDTL 15 MIN: Performed by: SURGERY

## 2019-02-15 PROCEDURE — 88305 TISSUE EXAM BY PATHOLOGIST: CPT | Mod: 26 | Performed by: SURGERY

## 2019-02-15 PROCEDURE — 0042T CT HEAD PERFUSION WITH CONTRAST: CPT

## 2019-02-15 PROCEDURE — 84132 ASSAY OF SERUM POTASSIUM: CPT

## 2019-02-15 PROCEDURE — 40000275 ZZH STATISTIC RCP TIME EA 10 MIN

## 2019-02-15 PROCEDURE — P9041 ALBUMIN (HUMAN),5%, 50ML: HCPCS

## 2019-02-15 PROCEDURE — 99291 CRITICAL CARE FIRST HOUR: CPT | Performed by: NURSE PRACTITIONER

## 2019-02-15 PROCEDURE — 40000344 ZZHCL STATISTIC THAWING COMPONENT: Performed by: SURGERY

## 2019-02-15 PROCEDURE — 25000128 H RX IP 250 OP 636

## 2019-02-15 PROCEDURE — 86923 COMPATIBILITY TEST ELECTRIC: CPT | Performed by: SURGERY

## 2019-02-15 PROCEDURE — 85027 COMPLETE CBC AUTOMATED: CPT | Performed by: SURGERY

## 2019-02-15 PROCEDURE — 87641 MR-STAPH DNA AMP PROBE: CPT | Performed by: SURGERY

## 2019-02-15 PROCEDURE — 02UJ0JZ SUPPLEMENT TRICUSPID VALVE WITH SYNTHETIC SUBSTITUTE, OPEN APPROACH: ICD-10-PCS | Performed by: SURGERY

## 2019-02-15 PROCEDURE — 25000128 H RX IP 250 OP 636: Performed by: SURGERY

## 2019-02-15 PROCEDURE — 86900 BLOOD TYPING SEROLOGIC ABO: CPT | Performed by: SURGERY

## 2019-02-15 PROCEDURE — 02RG08Z REPLACEMENT OF MITRAL VALVE WITH ZOOPLASTIC TISSUE, OPEN APPROACH: ICD-10-PCS | Performed by: SURGERY

## 2019-02-15 PROCEDURE — P9012 CRYOPRECIPITATE EACH UNIT: HCPCS | Performed by: SURGERY

## 2019-02-15 PROCEDURE — 41000022 ZZH PER-PERFUSION, SH ONLY,  1ST 30 MIN: Performed by: SURGERY

## 2019-02-15 PROCEDURE — 25800030 ZZH RX IP 258 OP 636: Performed by: NURSE ANESTHETIST, CERTIFIED REGISTERED

## 2019-02-15 PROCEDURE — 86850 RBC ANTIBODY SCREEN: CPT | Performed by: SURGERY

## 2019-02-15 PROCEDURE — 25800030 ZZH RX IP 258 OP 636: Performed by: STUDENT IN AN ORGANIZED HEALTH CARE EDUCATION/TRAINING PROGRAM

## 2019-02-15 PROCEDURE — 82805 BLOOD GASES W/O2 SATURATION: CPT | Performed by: STUDENT IN AN ORGANIZED HEALTH CARE EDUCATION/TRAINING PROGRAM

## 2019-02-15 PROCEDURE — 85027 COMPLETE CBC AUTOMATED: CPT | Performed by: STUDENT IN AN ORGANIZED HEALTH CARE EDUCATION/TRAINING PROGRAM

## 2019-02-15 PROCEDURE — 83735 ASSAY OF MAGNESIUM: CPT | Performed by: STUDENT IN AN ORGANIZED HEALTH CARE EDUCATION/TRAINING PROGRAM

## 2019-02-15 PROCEDURE — 83605 ASSAY OF LACTIC ACID: CPT

## 2019-02-15 PROCEDURE — 5A1935Z RESPIRATORY VENTILATION, LESS THAN 24 CONSECUTIVE HOURS: ICD-10-PCS | Performed by: SURGERY

## 2019-02-15 PROCEDURE — 20000003 ZZH R&B ICU

## 2019-02-15 PROCEDURE — 25000125 ZZHC RX 250: Performed by: ANESTHESIOLOGY

## 2019-02-15 PROCEDURE — 88305 TISSUE EXAM BY PATHOLOGIST: CPT | Performed by: SURGERY

## 2019-02-15 PROCEDURE — 5A1221Z PERFORMANCE OF CARDIAC OUTPUT, CONTINUOUS: ICD-10-PCS | Performed by: SURGERY

## 2019-02-15 PROCEDURE — 80053 COMPREHEN METABOLIC PANEL: CPT | Performed by: SURGERY

## 2019-02-15 PROCEDURE — 25800030 ZZH RX IP 258 OP 636: Performed by: SURGERY

## 2019-02-15 PROCEDURE — 85014 HEMATOCRIT: CPT

## 2019-02-15 PROCEDURE — 85610 PROTHROMBIN TIME: CPT | Performed by: SURGERY

## 2019-02-15 PROCEDURE — 25000128 H RX IP 250 OP 636: Performed by: NURSE ANESTHETIST, CERTIFIED REGISTERED

## 2019-02-15 PROCEDURE — 85730 THROMBOPLASTIN TIME PARTIAL: CPT | Performed by: STUDENT IN AN ORGANIZED HEALTH CARE EDUCATION/TRAINING PROGRAM

## 2019-02-15 PROCEDURE — 70498 CT ANGIOGRAPHY NECK: CPT

## 2019-02-15 PROCEDURE — 25000125 ZZHC RX 250: Performed by: STUDENT IN AN ORGANIZED HEALTH CARE EDUCATION/TRAINING PROGRAM

## 2019-02-15 PROCEDURE — 25000565 ZZH ISOFLURANE, EA 15 MIN: Performed by: SURGERY

## 2019-02-15 PROCEDURE — 36000075 ZZH SURGERY LEVEL 6 EA 15 ADDTL MIN: Performed by: SURGERY

## 2019-02-15 PROCEDURE — 70450 CT HEAD/BRAIN W/O DYE: CPT

## 2019-02-15 PROCEDURE — 25000128 H RX IP 250 OP 636: Performed by: STUDENT IN AN ORGANIZED HEALTH CARE EDUCATION/TRAINING PROGRAM

## 2019-02-15 PROCEDURE — 93005 ELECTROCARDIOGRAM TRACING: CPT

## 2019-02-15 PROCEDURE — 36000073 ZZH SURGERY LEVEL 6 1ST 30 MIN: Performed by: SURGERY

## 2019-02-15 PROCEDURE — 82803 BLOOD GASES ANY COMBINATION: CPT

## 2019-02-15 PROCEDURE — 27810169 ZZH OR IMPLANT GENERAL: Performed by: SURGERY

## 2019-02-15 PROCEDURE — 82330 ASSAY OF CALCIUM: CPT | Performed by: STUDENT IN AN ORGANIZED HEALTH CARE EDUCATION/TRAINING PROGRAM

## 2019-02-15 PROCEDURE — 99207 ZZC NON-BILLABLE SERV PER CHARTING: CPT | Performed by: NURSE PRACTITIONER

## 2019-02-15 PROCEDURE — C1894 INTRO/SHEATH, NON-LASER: HCPCS | Performed by: SURGERY

## 2019-02-15 PROCEDURE — 25800030 ZZH RX IP 258 OP 636: Performed by: ANESTHESIOLOGY

## 2019-02-15 PROCEDURE — 85610 PROTHROMBIN TIME: CPT | Performed by: STUDENT IN AN ORGANIZED HEALTH CARE EDUCATION/TRAINING PROGRAM

## 2019-02-15 PROCEDURE — 86901 BLOOD TYPING SEROLOGIC RH(D): CPT | Performed by: SURGERY

## 2019-02-15 PROCEDURE — 85025 COMPLETE CBC W/AUTO DIFF WBC: CPT | Performed by: SURGERY

## 2019-02-15 PROCEDURE — 25000125 ZZHC RX 250: Performed by: SURGERY

## 2019-02-15 PROCEDURE — 83605 ASSAY OF LACTIC ACID: CPT | Performed by: STUDENT IN AN ORGANIZED HEALTH CARE EDUCATION/TRAINING PROGRAM

## 2019-02-15 PROCEDURE — 25000128 H RX IP 250 OP 636: Performed by: ANESTHESIOLOGY

## 2019-02-15 PROCEDURE — 85384 FIBRINOGEN ACTIVITY: CPT | Performed by: SURGERY

## 2019-02-15 PROCEDURE — 40000171 ZZH STATISTIC PRE-PROCEDURE ASSESSMENT III: Performed by: SURGERY

## 2019-02-15 PROCEDURE — 85730 THROMBOPLASTIN TIME PARTIAL: CPT | Performed by: SURGERY

## 2019-02-15 PROCEDURE — 25000125 ZZHC RX 250: Performed by: NURSE ANESTHETIST, CERTIFIED REGISTERED

## 2019-02-15 PROCEDURE — 27210794 ZZH OR GENERAL SUPPLY STERILE: Performed by: SURGERY

## 2019-02-15 PROCEDURE — 37000008 ZZH ANESTHESIA TECHNICAL FEE, 1ST 30 MIN: Performed by: SURGERY

## 2019-02-15 PROCEDURE — 00000146 ZZHCL STATISTIC GLUCOSE BY METER IP

## 2019-02-15 PROCEDURE — 41000023 ZZH PERA-PERFUSION, SH ONLY,  EACH ADDTL 15 MIN: Performed by: SURGERY

## 2019-02-15 PROCEDURE — 25000131 ZZH RX MED GY IP 250 OP 636 PS 637: Performed by: STUDENT IN AN ORGANIZED HEALTH CARE EDUCATION/TRAINING PROGRAM

## 2019-02-15 PROCEDURE — 36415 COLL VENOUS BLD VENIPUNCTURE: CPT | Performed by: SURGERY

## 2019-02-15 PROCEDURE — 84295 ASSAY OF SERUM SODIUM: CPT

## 2019-02-15 PROCEDURE — P9059 PLASMA, FRZ BETWEEN 8-24HOUR: HCPCS | Performed by: SURGERY

## 2019-02-15 PROCEDURE — 93010 ELECTROCARDIOGRAM REPORT: CPT | Performed by: INTERNAL MEDICINE

## 2019-02-15 PROCEDURE — 40000986 XR CHEST PORT 1 VW

## 2019-02-15 PROCEDURE — C9113 INJ PANTOPRAZOLE SODIUM, VIA: HCPCS | Performed by: STUDENT IN AN ORGANIZED HEALTH CARE EDUCATION/TRAINING PROGRAM

## 2019-02-15 PROCEDURE — 87640 STAPH A DNA AMP PROBE: CPT | Performed by: SURGERY

## 2019-02-15 PROCEDURE — P9016 RBC LEUKOCYTES REDUCED: HCPCS | Performed by: SURGERY

## 2019-02-15 PROCEDURE — 82330 ASSAY OF CALCIUM: CPT

## 2019-02-15 PROCEDURE — 25000132 ZZH RX MED GY IP 250 OP 250 PS 637: Performed by: STUDENT IN AN ORGANIZED HEALTH CARE EDUCATION/TRAINING PROGRAM

## 2019-02-15 PROCEDURE — 27211024 ZZHC OR SUPPLY OTHER OPNP: Performed by: SURGERY

## 2019-02-15 PROCEDURE — 84100 ASSAY OF PHOSPHORUS: CPT | Performed by: STUDENT IN AN ORGANIZED HEALTH CARE EDUCATION/TRAINING PROGRAM

## 2019-02-15 DEVICE — VALVE MITRAL EPIC STENTED PORCINE 31MM E100-31M-00: Type: IMPLANTABLE DEVICE | Site: HEART | Status: FUNCTIONAL

## 2019-02-15 DEVICE — ANNULOPLASTY RING MC3 TRICUSPID 32MM 4900T32: Type: IMPLANTABLE DEVICE | Site: HEART | Status: FUNCTIONAL

## 2019-02-15 RX ORDER — MUPIROCIN 20 MG/G
0.5 OINTMENT TOPICAL 2 TIMES DAILY
Status: DISCONTINUED | OUTPATIENT
Start: 2019-02-15 | End: 2019-02-15 | Stop reason: CLARIF

## 2019-02-15 RX ORDER — POTASSIUM CL/LIDO/0.9 % NACL 10MEQ/0.1L
10 INTRAVENOUS SOLUTION, PIGGYBACK (ML) INTRAVENOUS
Status: DISCONTINUED | OUTPATIENT
Start: 2019-02-15 | End: 2019-02-23 | Stop reason: HOSPADM

## 2019-02-15 RX ORDER — CEFAZOLIN SODIUM 1 G/3ML
INJECTION, POWDER, FOR SOLUTION INTRAMUSCULAR; INTRAVENOUS PRN
Status: DISCONTINUED | OUTPATIENT
Start: 2019-02-15 | End: 2019-02-15

## 2019-02-15 RX ORDER — ACETAMINOPHEN 500 MG
1000 TABLET ORAL DAILY PRN
Status: ON HOLD | COMMUNITY
End: 2019-02-23

## 2019-02-15 RX ORDER — FENTANYL CITRATE 50 UG/ML
INJECTION, SOLUTION INTRAMUSCULAR; INTRAVENOUS PRN
Status: DISCONTINUED | OUTPATIENT
Start: 2019-02-15 | End: 2019-02-15

## 2019-02-15 RX ORDER — MEPERIDINE HYDROCHLORIDE 25 MG/ML
12.5-25 INJECTION INTRAMUSCULAR; INTRAVENOUS; SUBCUTANEOUS
Status: DISCONTINUED | OUTPATIENT
Start: 2019-02-15 | End: 2019-02-18

## 2019-02-15 RX ORDER — MAGNESIUM SULFATE HEPTAHYDRATE 40 MG/ML
2 INJECTION, SOLUTION INTRAVENOUS DAILY PRN
Status: DISCONTINUED | OUTPATIENT
Start: 2019-02-15 | End: 2019-02-23 | Stop reason: HOSPADM

## 2019-02-15 RX ORDER — VECURONIUM BROMIDE 1 MG/ML
INJECTION, POWDER, LYOPHILIZED, FOR SOLUTION INTRAVENOUS PRN
Status: DISCONTINUED | OUTPATIENT
Start: 2019-02-15 | End: 2019-02-15

## 2019-02-15 RX ORDER — POTASSIUM CHLORIDE 1.5 G/1.58G
20-40 POWDER, FOR SOLUTION ORAL
Status: DISCONTINUED | OUTPATIENT
Start: 2019-02-15 | End: 2019-02-23 | Stop reason: HOSPADM

## 2019-02-15 RX ORDER — POTASSIUM CHLORIDE 29.8 MG/ML
20 INJECTION INTRAVENOUS
Status: DISCONTINUED | OUTPATIENT
Start: 2019-02-15 | End: 2019-02-23 | Stop reason: HOSPADM

## 2019-02-15 RX ORDER — HEPARIN SODIUM 1000 [USP'U]/ML
INJECTION, SOLUTION INTRAVENOUS; SUBCUTANEOUS PRN
Status: DISCONTINUED | OUTPATIENT
Start: 2019-02-15 | End: 2019-02-15

## 2019-02-15 RX ORDER — FENTANYL CITRATE 50 UG/ML
50-100 INJECTION, SOLUTION INTRAMUSCULAR; INTRAVENOUS
Status: DISCONTINUED | OUTPATIENT
Start: 2019-02-15 | End: 2019-02-16

## 2019-02-15 RX ORDER — LIDOCAINE HYDROCHLORIDE 20 MG/ML
INJECTION, SOLUTION INFILTRATION; PERINEURAL PRN
Status: DISCONTINUED | OUTPATIENT
Start: 2019-02-15 | End: 2019-02-15

## 2019-02-15 RX ORDER — ONDANSETRON 2 MG/ML
4 INJECTION INTRAMUSCULAR; INTRAVENOUS EVERY 6 HOURS PRN
Status: DISCONTINUED | OUTPATIENT
Start: 2019-02-15 | End: 2019-02-23 | Stop reason: HOSPADM

## 2019-02-15 RX ORDER — NALOXONE HYDROCHLORIDE 0.4 MG/ML
.1-.4 INJECTION, SOLUTION INTRAMUSCULAR; INTRAVENOUS; SUBCUTANEOUS
Status: DISCONTINUED | OUTPATIENT
Start: 2019-02-15 | End: 2019-02-15

## 2019-02-15 RX ORDER — ALBUMIN, HUMAN INJ 5% 5 %
SOLUTION INTRAVENOUS
Status: DISCONTINUED
Start: 2019-02-15 | End: 2019-02-16 | Stop reason: HOSPADM

## 2019-02-15 RX ORDER — ALBUMIN, HUMAN INJ 5% 5 %
SOLUTION INTRAVENOUS
Status: COMPLETED
Start: 2019-02-15 | End: 2019-02-15

## 2019-02-15 RX ORDER — VANCOMYCIN HYDROCHLORIDE 1 G/200ML
1000 INJECTION, SOLUTION INTRAVENOUS SEE ADMIN INSTRUCTIONS
Status: DISCONTINUED | OUTPATIENT
Start: 2019-02-15 | End: 2019-02-15 | Stop reason: HOSPADM

## 2019-02-15 RX ORDER — VANCOMYCIN HYDROCHLORIDE 1 G/200ML
1000 INJECTION, SOLUTION INTRAVENOUS
Status: COMPLETED | OUTPATIENT
Start: 2019-02-15 | End: 2019-02-15

## 2019-02-15 RX ORDER — EPHEDRINE SULFATE 50 MG/ML
INJECTION, SOLUTION INTRAMUSCULAR; INTRAVENOUS; SUBCUTANEOUS PRN
Status: DISCONTINUED | OUTPATIENT
Start: 2019-02-15 | End: 2019-02-15

## 2019-02-15 RX ORDER — POTASSIUM CHLORIDE 1500 MG/1
20-40 TABLET, EXTENDED RELEASE ORAL
Status: DISCONTINUED | OUTPATIENT
Start: 2019-02-15 | End: 2019-02-23 | Stop reason: HOSPADM

## 2019-02-15 RX ORDER — ONDANSETRON 4 MG/1
4 TABLET, ORALLY DISINTEGRATING ORAL EVERY 6 HOURS PRN
Status: DISCONTINUED | OUTPATIENT
Start: 2019-02-15 | End: 2019-02-23 | Stop reason: HOSPADM

## 2019-02-15 RX ORDER — OXYCODONE HYDROCHLORIDE 5 MG/1
5-10 TABLET ORAL EVERY 4 HOURS PRN
Status: DISCONTINUED | OUTPATIENT
Start: 2019-02-15 | End: 2019-02-23 | Stop reason: HOSPADM

## 2019-02-15 RX ORDER — MAGNESIUM SULFATE HEPTAHYDRATE 40 MG/ML
4 INJECTION, SOLUTION INTRAVENOUS EVERY 4 HOURS PRN
Status: DISCONTINUED | OUTPATIENT
Start: 2019-02-15 | End: 2019-02-23 | Stop reason: HOSPADM

## 2019-02-15 RX ORDER — VANCOMYCIN HYDROCHLORIDE 1 G/200ML
1000 INJECTION, SOLUTION INTRAVENOUS EVERY 12 HOURS
Status: COMPLETED | OUTPATIENT
Start: 2019-02-15 | End: 2019-02-16

## 2019-02-15 RX ORDER — MUPIROCIN 20 MG/G
OINTMENT TOPICAL 2 TIMES DAILY
Status: DISCONTINUED | OUTPATIENT
Start: 2019-02-15 | End: 2019-02-15 | Stop reason: HOSPADM

## 2019-02-15 RX ORDER — CEFAZOLIN SODIUM 2 G/100ML
2 INJECTION, SOLUTION INTRAVENOUS
Status: COMPLETED | OUTPATIENT
Start: 2019-02-15 | End: 2019-02-15

## 2019-02-15 RX ORDER — FEXOFENADINE HCL 180 MG/1
180 TABLET ORAL DAILY
COMMUNITY
End: 2019-03-07

## 2019-02-15 RX ORDER — POTASSIUM CHLORIDE 7.45 MG/ML
10 INJECTION INTRAVENOUS
Status: DISCONTINUED | OUTPATIENT
Start: 2019-02-15 | End: 2019-02-23 | Stop reason: HOSPADM

## 2019-02-15 RX ORDER — AMOXICILLIN 250 MG
2 CAPSULE ORAL 2 TIMES DAILY
Status: DISCONTINUED | OUTPATIENT
Start: 2019-02-15 | End: 2019-02-23 | Stop reason: HOSPADM

## 2019-02-15 RX ORDER — ASPIRIN 81 MG/1
81 TABLET ORAL EVERY EVENING
COMMUNITY

## 2019-02-15 RX ORDER — ACETAMINOPHEN 325 MG/1
975 TABLET ORAL EVERY 8 HOURS
Status: DISCONTINUED | OUTPATIENT
Start: 2019-02-15 | End: 2019-02-17 | Stop reason: CLARIF

## 2019-02-15 RX ORDER — METOPROLOL TARTRATE 25 MG/1
25 TABLET, FILM COATED ORAL EVERY 12 HOURS
Status: DISCONTINUED | OUTPATIENT
Start: 2019-02-16 | End: 2019-02-16

## 2019-02-15 RX ORDER — HYDRALAZINE HYDROCHLORIDE 20 MG/ML
10 INJECTION INTRAMUSCULAR; INTRAVENOUS EVERY 30 MIN PRN
Status: DISCONTINUED | OUTPATIENT
Start: 2019-02-15 | End: 2019-02-23 | Stop reason: HOSPADM

## 2019-02-15 RX ORDER — METOPROLOL TARTRATE 25 MG/1
25 TABLET, FILM COATED ORAL
Status: DISCONTINUED | OUTPATIENT
Start: 2019-02-15 | End: 2019-02-15 | Stop reason: HOSPADM

## 2019-02-15 RX ORDER — NALOXONE HYDROCHLORIDE 0.4 MG/ML
.1-.4 INJECTION, SOLUTION INTRAMUSCULAR; INTRAVENOUS; SUBCUTANEOUS
Status: DISCONTINUED | OUTPATIENT
Start: 2019-02-15 | End: 2019-02-18

## 2019-02-15 RX ORDER — CEFAZOLIN SODIUM 1 G/3ML
1 INJECTION, POWDER, FOR SOLUTION INTRAMUSCULAR; INTRAVENOUS EVERY 8 HOURS
Status: COMPLETED | OUTPATIENT
Start: 2019-02-15 | End: 2019-02-16

## 2019-02-15 RX ORDER — LIDOCAINE 40 MG/G
CREAM TOPICAL
Status: DISCONTINUED | OUTPATIENT
Start: 2019-02-15 | End: 2019-02-18

## 2019-02-15 RX ORDER — ATORVASTATIN CALCIUM 40 MG/1
40 TABLET, FILM COATED ORAL AT BEDTIME
Status: DISCONTINUED | OUTPATIENT
Start: 2019-02-15 | End: 2019-02-23 | Stop reason: HOSPADM

## 2019-02-15 RX ORDER — HEPARIN SODIUM 5000 [USP'U]/.5ML
5000 INJECTION, SOLUTION INTRAVENOUS; SUBCUTANEOUS EVERY 8 HOURS
Status: DISCONTINUED | OUTPATIENT
Start: 2019-02-16 | End: 2019-02-16

## 2019-02-15 RX ORDER — PROPOFOL 10 MG/ML
INJECTION, EMULSION INTRAVENOUS PRN
Status: DISCONTINUED | OUTPATIENT
Start: 2019-02-15 | End: 2019-02-15

## 2019-02-15 RX ORDER — CEFAZOLIN SODIUM 1 G/3ML
1 INJECTION, POWDER, FOR SOLUTION INTRAMUSCULAR; INTRAVENOUS SEE ADMIN INSTRUCTIONS
Status: DISCONTINUED | OUTPATIENT
Start: 2019-02-15 | End: 2019-02-15 | Stop reason: HOSPADM

## 2019-02-15 RX ORDER — PROTAMINE SULFATE 10 MG/ML
INJECTION, SOLUTION INTRAVENOUS PRN
Status: DISCONTINUED | OUTPATIENT
Start: 2019-02-15 | End: 2019-02-15

## 2019-02-15 RX ORDER — SODIUM CHLORIDE, SODIUM LACTATE, POTASSIUM CHLORIDE, CALCIUM CHLORIDE 600; 310; 30; 20 MG/100ML; MG/100ML; MG/100ML; MG/100ML
INJECTION, SOLUTION INTRAVENOUS CONTINUOUS
Status: DISCONTINUED | OUTPATIENT
Start: 2019-02-15 | End: 2019-02-15 | Stop reason: HOSPADM

## 2019-02-15 RX ORDER — ACETAMINOPHEN 325 MG/1
650 TABLET ORAL EVERY 4 HOURS PRN
Status: DISCONTINUED | OUTPATIENT
Start: 2019-02-18 | End: 2019-02-17

## 2019-02-15 RX ORDER — ALBUMIN, HUMAN INJ 5% 5 %
500-1000 SOLUTION INTRAVENOUS
Status: COMPLETED | OUTPATIENT
Start: 2019-02-15 | End: 2019-02-16

## 2019-02-15 RX ORDER — DEXTROSE MONOHYDRATE 25 G/50ML
25-50 INJECTION, SOLUTION INTRAVENOUS
Status: DISCONTINUED | OUTPATIENT
Start: 2019-02-15 | End: 2019-02-23 | Stop reason: HOSPADM

## 2019-02-15 RX ORDER — SODIUM CHLORIDE, SODIUM LACTATE, POTASSIUM CHLORIDE, CALCIUM CHLORIDE 600; 310; 30; 20 MG/100ML; MG/100ML; MG/100ML; MG/100ML
INJECTION, SOLUTION INTRAVENOUS CONTINUOUS PRN
Status: DISCONTINUED | OUTPATIENT
Start: 2019-02-15 | End: 2019-02-15

## 2019-02-15 RX ORDER — AMOXICILLIN 250 MG
1 CAPSULE ORAL 2 TIMES DAILY
Status: DISCONTINUED | OUTPATIENT
Start: 2019-02-15 | End: 2019-02-23 | Stop reason: HOSPADM

## 2019-02-15 RX ORDER — SODIUM CHLORIDE 9 MG/ML
INJECTION, SOLUTION INTRAVENOUS CONTINUOUS PRN
Status: DISCONTINUED | OUTPATIENT
Start: 2019-02-15 | End: 2019-02-15

## 2019-02-15 RX ORDER — IOPAMIDOL 755 MG/ML
120 INJECTION, SOLUTION INTRAVASCULAR ONCE
Status: COMPLETED | OUTPATIENT
Start: 2019-02-15 | End: 2019-02-15

## 2019-02-15 RX ORDER — NICOTINE POLACRILEX 4 MG
15-30 LOZENGE BUCCAL
Status: DISCONTINUED | OUTPATIENT
Start: 2019-02-15 | End: 2019-02-23 | Stop reason: HOSPADM

## 2019-02-15 RX ORDER — CHLORHEXIDINE GLUCONATE ORAL RINSE 1.2 MG/ML
15 SOLUTION DENTAL EVERY 12 HOURS
Status: DISCONTINUED | OUTPATIENT
Start: 2019-02-15 | End: 2019-02-15 | Stop reason: CLARIF

## 2019-02-15 RX ORDER — NITROGLYCERIN 20 MG/100ML
.07-2 INJECTION INTRAVENOUS CONTINUOUS
Status: DISCONTINUED | OUTPATIENT
Start: 2019-02-15 | End: 2019-02-18

## 2019-02-15 RX ORDER — GABAPENTIN 100 MG/1
100 CAPSULE ORAL 3 TIMES DAILY
Status: COMPLETED | OUTPATIENT
Start: 2019-02-15 | End: 2019-02-18

## 2019-02-15 RX ORDER — METHOCARBAMOL 500 MG/1
500 TABLET, FILM COATED ORAL 4 TIMES DAILY PRN
Status: DISCONTINUED | OUTPATIENT
Start: 2019-02-15 | End: 2019-02-23 | Stop reason: HOSPADM

## 2019-02-15 RX ORDER — DEXTROSE MONOHYDRATE, SODIUM CHLORIDE, AND POTASSIUM CHLORIDE 50; 1.49; 4.5 G/1000ML; G/1000ML; G/1000ML
INJECTION, SOLUTION INTRAVENOUS CONTINUOUS
Status: DISCONTINUED | OUTPATIENT
Start: 2019-02-15 | End: 2019-02-18 | Stop reason: CLARIF

## 2019-02-15 RX ORDER — ASPIRIN 81 MG/1
81 TABLET, CHEWABLE ORAL DAILY
Status: DISCONTINUED | OUTPATIENT
Start: 2019-02-16 | End: 2019-02-23 | Stop reason: HOSPADM

## 2019-02-15 RX ORDER — MAGNESIUM HYDROXIDE 1200 MG/15ML
LIQUID ORAL PRN
Status: DISCONTINUED | OUTPATIENT
Start: 2019-02-15 | End: 2019-02-15 | Stop reason: HOSPADM

## 2019-02-15 RX ADMIN — HEPARIN SODIUM 23000 UNITS: 1000 INJECTION, SOLUTION INTRAVENOUS; SUBCUTANEOUS at 08:34

## 2019-02-15 RX ADMIN — VECURONIUM BROMIDE 5 MG: 1 INJECTION, POWDER, LYOPHILIZED, FOR SOLUTION INTRAVENOUS at 10:32

## 2019-02-15 RX ADMIN — PHENYLEPHRINE HYDROCHLORIDE 100 MCG: 10 INJECTION, SOLUTION INTRAMUSCULAR; INTRAVENOUS; SUBCUTANEOUS at 13:08

## 2019-02-15 RX ADMIN — ATORVASTATIN CALCIUM 40 MG: 40 TABLET, FILM COATED ORAL at 21:07

## 2019-02-15 RX ADMIN — LIDOCAINE HYDROCHLORIDE 2 ML: 10 INJECTION, SOLUTION EPIDURAL; INFILTRATION; INTRACAUDAL; PERINEURAL at 06:35

## 2019-02-15 RX ADMIN — SENNOSIDES AND DOCUSATE SODIUM 1 TABLET: 8.6; 5 TABLET ORAL at 21:07

## 2019-02-15 RX ADMIN — Medication 5 MG: at 08:07

## 2019-02-15 RX ADMIN — DEXMEDETOMIDINE 0.5 MCG/KG/HR: 100 INJECTION, SOLUTION, CONCENTRATE INTRAVENOUS at 09:22

## 2019-02-15 RX ADMIN — SODIUM CHLORIDE, POTASSIUM CHLORIDE, SODIUM LACTATE AND CALCIUM CHLORIDE: 600; 310; 30; 20 INJECTION, SOLUTION INTRAVENOUS at 07:36

## 2019-02-15 RX ADMIN — Medication 5 MG: at 08:06

## 2019-02-15 RX ADMIN — EPINEPHRINE 0.03 MCG/KG/MIN: 1 INJECTION INTRAMUSCULAR; INTRAVENOUS; SUBCUTANEOUS at 11:28

## 2019-02-15 RX ADMIN — IOPAMIDOL 120 ML: 755 INJECTION, SOLUTION INTRAVENOUS at 18:36

## 2019-02-15 RX ADMIN — PHENYLEPHRINE HYDROCHLORIDE 200 MCG: 10 INJECTION, SOLUTION INTRAMUSCULAR; INTRAVENOUS; SUBCUTANEOUS at 12:04

## 2019-02-15 RX ADMIN — PHENYLEPHRINE HYDROCHLORIDE 100 MCG: 10 INJECTION, SOLUTION INTRAMUSCULAR; INTRAVENOUS; SUBCUTANEOUS at 13:42

## 2019-02-15 RX ADMIN — ACETAMINOPHEN 975 MG: 325 TABLET, FILM COATED ORAL at 14:57

## 2019-02-15 RX ADMIN — OXYCODONE HYDROCHLORIDE 5 MG: 5 TABLET ORAL at 14:56

## 2019-02-15 RX ADMIN — VANCOMYCIN HYDROCHLORIDE 1000 MG: 1 INJECTION, SOLUTION INTRAVENOUS at 19:44

## 2019-02-15 RX ADMIN — MIDAZOLAM HYDROCHLORIDE 2 MG: 1 INJECTION, SOLUTION INTRAMUSCULAR; INTRAVENOUS at 12:20

## 2019-02-15 RX ADMIN — ALBUMIN HUMAN 250 ML: 0.05 INJECTION, SOLUTION INTRAVENOUS at 14:00

## 2019-02-15 RX ADMIN — Medication 5 MG: at 08:18

## 2019-02-15 RX ADMIN — AMINOCAPROIC ACID 5 G/HR: 250 INJECTION, SOLUTION INTRAVENOUS at 07:48

## 2019-02-15 RX ADMIN — FENTANYL CITRATE 400 MCG: 50 INJECTION, SOLUTION INTRAMUSCULAR; INTRAVENOUS at 07:40

## 2019-02-15 RX ADMIN — Medication 5 MG: at 08:54

## 2019-02-15 RX ADMIN — SODIUM CHLORIDE 100 ML: 9 INJECTION, SOLUTION INTRAVENOUS at 18:36

## 2019-02-15 RX ADMIN — ROCURONIUM BROMIDE 40 MG: 10 INJECTION INTRAVENOUS at 07:40

## 2019-02-15 RX ADMIN — VECURONIUM BROMIDE 5 MG: 1 INJECTION, POWDER, LYOPHILIZED, FOR SOLUTION INTRAVENOUS at 09:29

## 2019-02-15 RX ADMIN — FENTANYL CITRATE 100 MCG: 50 INJECTION, SOLUTION INTRAMUSCULAR; INTRAVENOUS at 08:27

## 2019-02-15 RX ADMIN — Medication 5 MG: at 07:46

## 2019-02-15 RX ADMIN — MIDAZOLAM HYDROCHLORIDE 2 MG: 1 INJECTION, SOLUTION INTRAMUSCULAR; INTRAVENOUS at 07:27

## 2019-02-15 RX ADMIN — ACETAMINOPHEN 975 MG: 325 TABLET, FILM COATED ORAL at 21:09

## 2019-02-15 RX ADMIN — MIDAZOLAM HYDROCHLORIDE 4 MG: 1 INJECTION, SOLUTION INTRAMUSCULAR; INTRAVENOUS at 07:40

## 2019-02-15 RX ADMIN — SODIUM CHLORIDE: 9 INJECTION, SOLUTION INTRAVENOUS at 07:36

## 2019-02-15 RX ADMIN — ALBUMIN HUMAN 250 ML: 50 SOLUTION INTRAVENOUS at 14:00

## 2019-02-15 RX ADMIN — Medication 5 MG: at 08:56

## 2019-02-15 RX ADMIN — PROTAMINE SULFATE 240 MG: 10 INJECTION, SOLUTION INTRAVENOUS at 12:10

## 2019-02-15 RX ADMIN — VECURONIUM BROMIDE 3 MG: 1 INJECTION, POWDER, LYOPHILIZED, FOR SOLUTION INTRAVENOUS at 12:00

## 2019-02-15 RX ADMIN — PANTOPRAZOLE SODIUM 40 MG: 40 INJECTION, POWDER, FOR SOLUTION INTRAVENOUS at 14:57

## 2019-02-15 RX ADMIN — SODIUM CHLORIDE, POTASSIUM CHLORIDE, SODIUM LACTATE AND CALCIUM CHLORIDE: 600; 310; 30; 20 INJECTION, SOLUTION INTRAVENOUS at 11:17

## 2019-02-15 RX ADMIN — FENTANYL CITRATE 100 MCG: 50 INJECTION, SOLUTION INTRAMUSCULAR; INTRAVENOUS at 13:19

## 2019-02-15 RX ADMIN — VANCOMYCIN HYDROCHLORIDE 1000 MG: 1 INJECTION, SOLUTION INTRAVENOUS at 07:26

## 2019-02-15 RX ADMIN — PHENYLEPHRINE HYDROCHLORIDE 100 MCG: 10 INJECTION, SOLUTION INTRAMUSCULAR; INTRAVENOUS; SUBCUTANEOUS at 12:17

## 2019-02-15 RX ADMIN — PHENYLEPHRINE HYDROCHLORIDE 0.3 MCG/KG/MIN: 10 INJECTION, SOLUTION INTRAMUSCULAR; INTRAVENOUS; SUBCUTANEOUS at 13:07

## 2019-02-15 RX ADMIN — ONDANSETRON 4 MG: 2 INJECTION INTRAMUSCULAR; INTRAVENOUS at 18:07

## 2019-02-15 RX ADMIN — CEFAZOLIN 1 G: 1 INJECTION, POWDER, FOR SOLUTION INTRAMUSCULAR; INTRAVENOUS at 12:01

## 2019-02-15 RX ADMIN — Medication 2.5 MG: at 08:37

## 2019-02-15 RX ADMIN — MUPIROCIN: 20 OINTMENT TOPICAL at 06:00

## 2019-02-15 RX ADMIN — Medication 5 MG: at 08:35

## 2019-02-15 RX ADMIN — GABAPENTIN 100 MG: 100 CAPSULE ORAL at 21:07

## 2019-02-15 RX ADMIN — FENTANYL CITRATE 50 MCG: 50 INJECTION, SOLUTION INTRAMUSCULAR; INTRAVENOUS at 19:45

## 2019-02-15 RX ADMIN — VECURONIUM BROMIDE 5 MG: 1 INJECTION, POWDER, LYOPHILIZED, FOR SOLUTION INTRAVENOUS at 08:23

## 2019-02-15 RX ADMIN — FENTANYL CITRATE 150 MCG: 50 INJECTION, SOLUTION INTRAMUSCULAR; INTRAVENOUS at 08:25

## 2019-02-15 RX ADMIN — POTASSIUM CHLORIDE, DEXTROSE MONOHYDRATE AND SODIUM CHLORIDE 30 ML: 150; 5; 450 INJECTION, SOLUTION INTRAVENOUS at 15:37

## 2019-02-15 RX ADMIN — Medication 5 MG: at 07:57

## 2019-02-15 RX ADMIN — CEFAZOLIN 1 G: 1 INJECTION, POWDER, FOR SOLUTION INTRAMUSCULAR; INTRAVENOUS at 09:55

## 2019-02-15 RX ADMIN — CEFAZOLIN 1 G: 1 INJECTION, POWDER, FOR SOLUTION INTRAMUSCULAR; INTRAVENOUS at 21:09

## 2019-02-15 RX ADMIN — PROPOFOL 40 MG: 10 INJECTION, EMULSION INTRAVENOUS at 07:40

## 2019-02-15 RX ADMIN — FENTANYL CITRATE 100 MCG: 50 INJECTION, SOLUTION INTRAMUSCULAR; INTRAVENOUS at 12:43

## 2019-02-15 RX ADMIN — Medication 5 MG: at 07:44

## 2019-02-15 RX ADMIN — SODIUM CHLORIDE: 9 INJECTION, SOLUTION INTRAVENOUS at 12:50

## 2019-02-15 RX ADMIN — GABAPENTIN 100 MG: 100 CAPSULE ORAL at 15:42

## 2019-02-15 RX ADMIN — VECURONIUM BROMIDE 5 MG: 1 INJECTION, POWDER, LYOPHILIZED, FOR SOLUTION INTRAVENOUS at 07:57

## 2019-02-15 RX ADMIN — SODIUM CHLORIDE 1.5 UNITS/HR: 9 INJECTION, SOLUTION INTRAVENOUS at 15:48

## 2019-02-15 RX ADMIN — SODIUM CHLORIDE, POTASSIUM CHLORIDE, SODIUM LACTATE AND CALCIUM CHLORIDE: 600; 310; 30; 20 INJECTION, SOLUTION INTRAVENOUS at 06:36

## 2019-02-15 RX ADMIN — MIDAZOLAM HYDROCHLORIDE 2 MG: 1 INJECTION, SOLUTION INTRAMUSCULAR; INTRAVENOUS at 08:49

## 2019-02-15 RX ADMIN — CEFAZOLIN SODIUM 2 G: 2 INJECTION, SOLUTION INTRAVENOUS at 07:58

## 2019-02-15 RX ADMIN — PHENYLEPHRINE HYDROCHLORIDE 100 MCG: 10 INJECTION, SOLUTION INTRAMUSCULAR; INTRAVENOUS; SUBCUTANEOUS at 12:20

## 2019-02-15 RX ADMIN — LIDOCAINE HYDROCHLORIDE 60 MG: 20 INJECTION, SOLUTION INFILTRATION; PERINEURAL at 07:40

## 2019-02-15 ASSESSMENT — ACTIVITIES OF DAILY LIVING (ADL)
ADLS_ACUITY_SCORE: 13
ADLS_ACUITY_SCORE: 14

## 2019-02-15 ASSESSMENT — MIFFLIN-ST. JEOR: SCORE: 1077.88

## 2019-02-15 ASSESSMENT — ENCOUNTER SYMPTOMS: DYSRHYTHMIAS: 1

## 2019-02-15 NOTE — ANESTHESIA POSTPROCEDURE EVALUATION
Patient: Taty [place]    Procedure(s):  MINIMALLY INVASIVE MITRAL VALVE REPLACEMENT WITH EPIC ST KEYUR 31MM VALVE; ON PUMP WITH TIA.  CARDIOLOGIST DR CARDENAS  MINIMALLY INVASIVE TRICUSPID VALVE REPAIR WITH 32MM SULLIVAN RING    Diagnosis:MITRAL VALVE PROLAPSE AND TRICUSPID VALVE REGURGITATION  Diagnosis Additional Information: No value filed.    Anesthesia Type:  General, ETT    Note:  Anesthesia Post Evaluation    Patient location during evaluation: ICU  Patient participation: Unable to evaluate secondary to administered sedation  Airway patency: patent  Cardiovascular status: acceptable  Respiratory status: acceptable  Hydration status: acceptable       Comments: Taken directly to ICU; intubated and sedated; phenylephrine gtt for BP management; precedex gtt for sedation; VSS;         Last vitals:  Vitals:    02/15/19 0603 02/15/19 0704   BP:  135/85   Resp:  16   Temp:  36.7  C (98.1  F)   SpO2: 98% 98%         Electronically Signed By: PRAVEENA HUA MD  February 15, 2019  3:35 PM

## 2019-02-15 NOTE — PROGRESS NOTES
Admission medication history interview status for the 2/15/2019  admission is complete. See EPIC admission navigator for prior to admission medications     Medication history source reliability:Moderate    Medication history interview source(s):Patient    Medication history resources (including written lists, pill bottles, clinic record):None    Primary pharmacy.[facility]    Additional medication history information not noted on PTA med list :  Verified with patient- taking Amiodarone 200 mg - 1 tablet daily.    Patient began taking Aspirin 81 mg every day when she put her Xarelto on hold 5 days ago.        Time spent in this activity: 45 minutes    Prior to Admission medications    Medication Sig Last Dose Taking? Auth Provider   acetaminophen (TYLENOL) 500 MG tablet Take 1,000 mg by mouth daily as needed for mild pain Past Week at PRN Yes Reported, Patient   amiodarone (PACERONE/CODARONE) 200 MG tablet Take 1 tab twice a day for 10 days, then decrease the dose to 1 tab once a day  Patient taking differently: Take 200 mg by mouth daily  2/14/2019 at Noon Yes Marifer Mohamud PA-C   aspirin 81 MG EC tablet Take 81 mg by mouth every evening 2/14/2019 at PM Yes Reported, Patient   calcium carbonate-vitamin D (CALCIUM 600+D) 600-200 MG-UNIT TABS Take 1 tablet by mouth 2 times daily 2/14/2019 at AM Yes Unknown, Entered By History   fexofenadine (ALLEGRA) 180 MG tablet Take 180 mg by mouth daily 2/14/2019 at AM Yes Reported, Patient   metoprolol succinate ER (TOPROL-XL) 25 MG 24 hr tablet Take 1 tablet (25 mg) by mouth daily 2/15/2019 at 0400 Yes Marifer Mohamud PA-C   Multiple Vitamins-Minerals (MULTIVITAMIN ADULT) TABS Take 1 tablet by mouth daily 2/5/2019 at AM Yes Reported, Patient   omeprazole (PRILOSEC) 10 MG CR capsule Take 1 capsule (10 mg) by mouth every morning (before breakfast) 2/15/2019 at 0400 Yes Marifer Mohamud PA-C   rivaroxaban ANTICOAGULANT (XARELTO) 20 MG TABS tablet  Take 1 tablet (20 mg) by mouth daily (with dinner) 2/10/2019 at PM Yes Marifer Mohamud PA-C

## 2019-02-15 NOTE — PROGRESS NOTES
Patient suctioned and electively extubated at 17:00 per physician order. Placed on 40% 10 LPM aerosol mask  Breath sounds: No stridor noted.  Patient tolerated procedure well without any immediate complications.  Rt will continue to monitor and follow.    Annette Thorne, RT  2/15/2019 5:05 PM

## 2019-02-15 NOTE — ANESTHESIA PROCEDURE NOTES
CENTRAL LINE INSERTION PROCEDURE NOTE:   Pre-Procedure  Performed by Frank Aguirre MD  Location: pre-op      Pre-Anesthestic Checklist: patient identified, IV checked, risks and benefits discussed, informed consent, monitors and equipment checked and pre-op evaluation    Timeout  Correct Patient: Yes   Correct Procedure: Yes   Correct Site: Yes   Correct Laterality: N/A   Correct Position: Yes   Site Marked: N/A   .   Procedure Documentation   Procedure: central line  Position: Trendelenburg  Patient Prep;all elements of maximal sterile barrier technique followed, mask, hat, sterile gown, sterile gloves, draped, hand hygiene, chlorhexidine gluconate and isopropyl alcohol, patient draped      Insertion Site:right, internal jugular  Using U/S with sterile probe cover and sterile gel, vein evaluated for patency/adequacy of cortis insertion is adequate, and using realtime U/S imaging the ben was punctured, and needle was observed entering vein on U/S. A permanent image is entered into the patient's record.   Skin infiltrated with mL of 1% lidocaine.  Catheter: 9 Namibian, 10 cm (sheath introducer), PA Catheter  Assessment/Narrative         Secured by suture  Tegaderm and Biopatch dressing used.  blood aspirated from all lumens  All lumens flushed: Yes    Comments:  PA catheter placed in sterile manner to approximately 45cm with good wave form.  All ports flushed.  No complications.

## 2019-02-15 NOTE — ANESTHESIA CARE TRANSFER NOTE
Patient: Taty Aguila    Procedure(s):  MINIMALLY INVASIVE MITRAL VALVE REPLACEMENT WITH EPIC ST KEYUR 31MM VALVE; ON PUMP WITH TIA.  CARDIOLOGIST DR CARDENAS  MINIMALLY INVASIVE TRICUSPID VALVE REPAIR WITH 32MM SULLIVAN RING    Diagnosis: MITRAL VALVE PROLAPSE AND TRICUSPID VALVE REGURGITATION  Diagnosis Additional Information: No value filed.    Anesthesia Type:   General, ETT     Note:  Airway :ETT  Patient transferred to:ICU  Comments: Patient connected to SpO2, EKG, and arterial blood pressure transport monitors and accompanied by CRNA, anesthesiologist, circulating RN, surgeon (fellow) to ICU room. Patient ventilated by anesthesiologist with ambu via ETT with O2 at 15 liters per minute during transport.     On arrival to ICU, endotracheal tube position unchanged, equal, bilateral breath sounds auscultated in ICU room, patient placed on ICU ventilator by respiratory therapist, teeth and oral mucosa intact and unchanged at handoff of care. At anesthesia handoff of care, clinical monitors and alarms on and functioning, report on patient's clinical status given to ICU RN, report on patient's clinical status given to intensivist, ICU staff questions answered.  VSS throughout.  RN comfortable at bedside.ICU Handoff: Call for PAUSE to initiate/utilize ICU HANDOFF, Identified Patient, Identified Responsible Provider, Reviewed the Pertinent Medical History, Discussed Surgical Course, Reviewed Intra-OP Anesthesia Management and Issues during Anesthesia, Set Expectations for Post Procedure Period and Allowed Opportunity for Questions and Acknowledgement of Understanding      Vitals: (Last set prior to Anesthesia Care Transfer)    CRNA VITALS  2/15/2019 1305 - 2/15/2019 1343      2/15/2019             Resp Rate (set):  10                Electronically Signed By: ROBINA Galeano CRNA  February 15, 2019  1:43 PM

## 2019-02-15 NOTE — BRIEF OP NOTE
[facility]    Brief Operative Note    Pre-operative diagnosis: MITRAL VALVE PROLAPSE AND TRICUSPID VALVE REGURGITATION  Post-operative diagnosis Same    Procedure: Procedure(s):  MINIMALLY INVASIVE MITRAL VALVE REPLACEMENT WITH EPIC ST KEYUR 31MM VALVE; ON PUMP WITH TIA.  CARDIOLOGIST DR CARDENAS  MINIMALLY INVASIVE TRICUSPID VALVE REPAIR WITH 32MM SULLIVAN RING     Surgeon: Surgeon(s) and Role:     * Andrea Hanna MD - Primary     * Sivakumar Monteiro MD - Assisting  Anesthesia: General     Estimated blood loss: 785 mL  Drains: R chest tubes x 2    Specimens:   ID Type Source Tests Collected by Time Destination   A : MITRAL VALVE AND LEAFLETS Tissue Heart SURGICAL PATHOLOGY EXAM Andrea Hanna MD 2/15/2019  9:59 AM      Findings: Markedly calcified mitral valve not amenable to repair.    Complications: None.  Implants:31 mm Epic valve in mitral position, 32 mm sullivan ring in tricuspid position

## 2019-02-15 NOTE — ANESTHESIA PROCEDURE NOTES
ARTERIAL LINE PROCEDURE NOTE:   Pre-Procedure  Performed by Frank Aguirre MD  Location: pre-op      Pre-Anesthestic Checklist: patient identified, IV checked, risks and benefits discussed, informed consent, monitors and equipment checked and pre-op evaluation    Timeout  Correct Patient: Yes   Correct Procedure: Yes   Correct Site: Yes   Correct Laterality: N/A   Correct Position: Yes   Site Marked: N/A   .   Procedure Documentation  Procedure: arterial line    Supine  Insertion Site:left, radial.Skin infiltrated with mL of 1% lidocaine. Injection technique: Seldinger Technique  .  .  Patient Prep;chlorhexidine gluconate and isopropyl alcohol, patient draped    Assessment/Narrative    Catheter: 20 gauge, 12 cm     Secured by suture  Tegaderm dressing used.        Comments:  No complications.

## 2019-02-15 NOTE — CONSULTS
[facility]  History and Physical/ Consult  Critical Care Service  Date of Admission: 2/15/2019  Date of Service (when I saw the patient): 02/15/19  Assessment & Plan   Taty Aguila is a 74 year old female with PMH mitral valve prolapse and regurgitation who presents to the ICU after a minimally invasive mitral valve replacement (St Rno 31mm valve) and tricuspid valve repair (32mm Oquendo ring).     Neuro  Dx: Post-operative pain and sedation needs  - Dexmedetomidine infusion to achieve RASS goal 0 to -1  - Hydromorphone and oxycodone available prn  - Scheduled acetaminophen and gapabentin    CV  Dx: Mitral valve prolapse and regurgitation s/p MVR  Dx: Tricuspid valve regurgitation s/p valve repair   Dx: Paroxysmal atrial fibrillation  Dx: Vasoplegia post  - Wean vasopressors and inotropes as able per CV surgery  - Continue ASA and statin  - Epicardial V pacing wire in place, currently not paced  - Hold home metoprolol    Resp:  Dx: Post-operative ventilator management  - Assist control 500/16/+5/50%  - SBT when awake, extubate this afternoon    GI/Nutrition  Dx: Nutrition  - NPO until extubated    Renal  Dx: No acute issues, baseline creatinine 0.8-0.9  - Monitor function and electrolytes as needed with replacement per ICU protocols.   - Avoid nephrotoxic agents such as NSAID, IV contrast unless specifically required  - Adjust medications as needed for renal clearance  - Dugan for strict I/O in post-operative period    ID  Dx: No acute issues  - Complete filiberto-operative antibiotics (ancef and vancomycin)  - Monitor fevers and leukocytosis consistent with stress response from procedure, no cultures for fevers in POD 1-2    Endocrine  Dx: Stress induced hyperglycemia  - POC glucose every 2 hours for the first 4 hours and then 4 hours for 48 hours, goal to maintain less than 150  - Insulin drip to maintain blood glucose <150    Heme:  Dx: Acute blood loss anemia, stable  Dx: Thrombocytopenia  related to bypass, stable  - Monitor chest tube output  - Transfuse for hemoglobin <7    General cares:  DVT Prophylaxis: Defer to primary service  GI Prophylaxis: PPI per primary service  Restraints: Restraints for medical healing needed: NO  Family update by me today: No  Current lines are required for patient management  Access:  - Right internal jugular  - Arterial line  - ETT  - OG  - Dugan  - Chest tube x2    Pat NICK Silva    Time Spent on this Encounter   Billing:  I spent 35 minutes bedside and on the inpatient unit today managing the critical care of Taty Aguila in relation to the issues listed in this note.    Code Status   Full Code    Primary Care Physician   Anyi Olmos    Chief Complaint   Post-op MVR and tricuspid valve repair  History is obtained from the chart. The patient is unable to participate in interview secondary to intubation and sedation.     History of Present Illness   Taty Aguila is a 74 year old female with PMH mitral valve regurgitation, new onset atrial fibrillation who presents to the ICU after a minimally invasive MVR and tricuspid valve repair.     The procedure was uncomplicated. CPB was 187 minutes. Cross clamp time was 55 minutes. She received 2.7 L crystalloid and 324 cell saver. EBL was 785 and . She arrived to the ICU on phenylephrine infusion. CV surgery had ordered cryoprecipitate and FFP.     She had previously been evaluated by CV surgery for mitral valve regurgitation, but after additional work-up it was decided to continue monitoring instead of pursuing surgery. In October of last year she presented to the ED having noticed her heart pounding. She was found to be in atrial fibrillation with RVR. She converted back to NSR after starting on a diltiazem drip and xarelto for anticoagulation given an elevated CHADS-VASC score. She was discharged on low-dose metoprolol. She had another episode of RVR in early December with associated RAYO, and her  metoprolol dose was increased. She was placed on amiodarone in anticipation of cardioversion, but she had converted to NSR prior to her appointment. She was referred to CV surgery. As part of pre-operative planning she had a TIA (showing severe MR and 2-3+ TR) and left and right heart cath (showing normal coronary arteries, LVEF 45-50%, and normal right heart pressures).    Past Medical History    PMH is obtained from the chart. The patient is unable to participate in interview secondary to intubation and sedation.  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 The patient is unable to participate in interview secondary to intubation and sedation.  Past Surgical History:   Procedure Laterality Date     COLONOSCOPY N/A 9/15/2015    Procedure: COLONOSCOPY;  Surgeon: Anson Llanos MD;  Location:  GI     CV HEART CATHETERIZATION WITH POSSIBLE INTERVENTION N/A 1/9/2019    Procedure: Heart Catheterization with Possible Intervention;  Surgeon: Ritesh Whittaker MD;  Location:  HEART CARDIAC CATH LAB     CV RIGHT AND LEFT HEART CATH N/A 1/9/2019    Procedure: Right and Left Heart Catherization;  Surgeon: Ritesh Whittaker MD;  Location:  HEART CARDIAC CATH LAB     ENT SURGERY      T&A       Prior to Admission Medications   Prior to Admission Medications   Prescriptions Last Dose Informant Patient Reported? Taking?   Multiple Vitamins-Minerals (MULTIVITAMIN ADULT) TABS 2/5/2019 at AM Self Yes Yes   Sig: Take 1 tablet by mouth daily   acetaminophen (TYLENOL) 500 MG tablet Past Week at PRN Self Yes Yes   Sig: Take 1,000 mg by mouth daily as needed for mild pain   amiodarone (PACERONE/CODARONE) 200 MG tablet 2/14/2019 at Noon Self No Yes   Sig:  Take 1 tab twice a day for 10 days, then decrease the dose to 1 tab once a day   Patient taking differently: Take 200 mg by mouth daily    aspirin 81 MG EC tablet 2/14/2019 at PM Self Yes Yes   Sig: Take 81 mg by mouth every evening   calcium carbonate-vitamin D (CALCIUM 600+D) 600-200 MG-UNIT TABS 2/14/2019 at AM Self Yes Yes   Sig: Take 1 tablet by mouth 2 times daily   fexofenadine (ALLEGRA) 180 MG tablet 2/14/2019 at AM Self Yes Yes   Sig: Take 180 mg by mouth daily   metoprolol succinate ER (TOPROL-XL) 25 MG 24 hr tablet 2/15/2019 at 0400 Self No Yes   Sig: Take 1 tablet (25 mg) by mouth daily   omeprazole (PRILOSEC) 10 MG CR capsule 2/15/2019 at 0400 Self No Yes   Sig: Take 1 capsule (10 mg) by mouth every morning (before breakfast)   rivaroxaban ANTICOAGULANT (XARELTO) 20 MG TABS tablet 2/10/2019 at PM Self No Yes   Sig: Take 1 tablet (20 mg) by mouth daily (with dinner)      Facility-Administered Medications: None     Allergies   Allergies   Allergen Reactions     Chlorpheniramine Other (See Comments)     LOC and fainting      No Known Drug Allergies      Phenylephrine Other (See Comments)     fainting       Social History   Social history is obtained from the chart. The patient is unable to participate in interview secondary to intubation and sedation.   reports that  has never smoked. she has never used smokeless tobacco. She reports that she does not drink alcohol or use drugs.    Family History   Family history is obtained from the chart. The patient is unable to participate in interview secondary to intubation and sedation.  Family History   Problem Relation Age of Onset     Osteoporosis Mother      Alzheimer Disease Mother      Family History Negative Father      Heart Disease Maternal Grandfather      Family History Negative Paternal Grandmother      Family History Negative Paternal Grandfather        Review of Systems   Unable to perform complete review of systems secondary to intubation and  sedation.    Physical Exam   Temp: 98.1  F (36.7  C) Temp src: Temporal Temp  Min: 98.1  F (36.7  C)  Max: 98.1  F (36.7  C) BP: 135/85   Heart Rate: 63 Resp: 16 SpO2: 98 % O2 Device: None (Room air)    Vital Signs with Ranges  Temp:  [98.1  F (36.7  C)] 98.1  F (36.7  C)  Heart Rate:  [63] 63  Resp:  [16] 16  BP: (135)/(85) 135/85  SpO2:  [98 %] 98 %  125 lbs 0 oz    GEN: Sedated  EYES: No icterus  HEENT:  Normocephalic, trachea midline, ETT secure  CV: RRR, no gallops, rubs, or murmurs; pedal pulses 1+; nopedal edema  PULM/CHEST: Clear breath sounds bilaterally without rhonchi, crackles or wheeze, symmetric chest rise  GI: Bowel sounds present, abdomen soft and non-tender, no masses  : Dugan catheter in place, urine yellow and clear  NEURO: Unable to assess due to sedation and NMB. Pupils 3 mm.  SKIN: No rashes noted. Incision covered with dressing, clean/dry/intact. Chest tube insertion sites without drainage.    Imaging personally reviewed: ECG (sinus rhythm) and CXR (possible trace apical pneumothorax - await radiology, no oral access)    Data   Results for orders placed or performed during the hospital encounter of 02/15/19 (from the past 24 hour(s))   Methicillin Resistant Staph Aureus PCR   Result Value Ref Range    Specimen Description Nares     Methicillin Resist/Sens S. aureus PCR Negative NEG^Negative   CBC with platelets   Result Value Ref Range    WBC 7.6 4.0 - 11.0 10e9/L    RBC Count 3.88 3.8 - 5.2 10e12/L    Hemoglobin 12.2 11.7 - 15.7 g/dL    Hematocrit 35.8 35.0 - 47.0 %    MCV 92 78 - 100 fl    MCH 31.4 26.5 - 33.0 pg    MCHC 34.1 31.5 - 36.5 g/dL    RDW 13.3 10.0 - 15.0 %    Platelet Count 211 150 - 450 10e9/L   Comprehensive metabolic panel   Result Value Ref Range    Sodium 137 133 - 144 mmol/L    Potassium 3.9 3.4 - 5.3 mmol/L    Chloride 103 94 - 109 mmol/L    Carbon Dioxide 28 20 - 32 mmol/L    Anion Gap 6 3 - 14 mmol/L    Glucose 100 (H) 70 - 99 mg/dL    Urea Nitrogen 14 7 - 30 mg/dL     Creatinine 0.87 0.52 - 1.04 mg/dL    GFR Estimate 66 >60 mL/min/[1.73_m2]    GFR Estimate If Black 77 >60 mL/min/[1.73_m2]    Calcium 9.0 8.5 - 10.1 mg/dL    Bilirubin Total 0.4 0.2 - 1.3 mg/dL    Albumin 3.6 3.4 - 5.0 g/dL    Protein Total 7.1 6.8 - 8.8 g/dL    Alkaline Phosphatase 47 40 - 150 U/L    ALT 20 0 - 50 U/L    AST 16 0 - 45 U/L   ABO/Rh type and screen   Result Value Ref Range    Units Ordered 4     ABO A     RH(D) Pos     Antibody Screen Neg     Test Valid Only At [facility]        Specimen Expires 02/18/2019     Crossmatch Red Blood Cells    Blood component   Result Value Ref Range    Unit Number [id number]     Blood Component Type Red Blood Cells Leukocyte Reduced     Division Number 00     Status of Unit Ready for patient 02/15/2019 0647     Blood Product Code [id number]     Unit Status SANDIP    Blood component   Result Value Ref Range    Unit Number [id number]     Blood Component Type Red Blood Cells Leukocyte Reduced     Division Number 00     Status of Unit Ready for patient 02/15/2019 0647     Blood Product Code L0860Q89     Unit Status SANDIP    Blood component   Result Value Ref Range    Unit Number [id number]     Blood Component Type Red Blood Cells Leukocyte Reduced     Division Number 00     Status of Unit Released to care unit 02/15/2019 0719     Blood Product Code [id number]     Unit Status ISS    Blood component   Result Value Ref Range    Unit Number [id number]     Blood Component Type Red Blood Cells Leukocyte Reduced     Division Number 00     Status of Unit Released to care unit 02/15/2019 0719     Blood Product Code [id number]     Unit Status ISS    Glucose by meter   Result Value Ref Range    Glucose 163 (H) 70 - 99 mg/dL

## 2019-02-15 NOTE — CONSULTS
CARDIAC SURGERY NUTRITION CONSULT    Received standing order to assess and educate patient.    Will follow and complete assessment once patient is extubated and/or is transferred to medical unit.    Patient will receive nutrition education during the Outpatient Cardiac Rehab Program (nutrition classes/dietitian counseling).

## 2019-02-15 NOTE — PROGRESS NOTES
Wean trial x2, patient follows commands, off sedation however still too drowsy. Will re attempt when appropriate.

## 2019-02-15 NOTE — ANESTHESIA PREPROCEDURE EVALUATION
Anesthesia Pre-Procedure Evaluation    Patient: Taty Aguila   MRN: [id number] : 1945          Preoperative Diagnosis: MITRAL VALVE PROLAPSE AND TRICUSPID VALVE REGURGITATION    Procedure(s):  MINIMALLY INVASIVE MITRAL VALVE REPAIR, POSSIBLE REPLACE MITRAL VALVE AND POSSIBLE REPAIR VALVE TRICUSPID (ON PUMP , WITH TIA, NO BALLOOM ) RONALD  MINIMALLY INVASIVE REPAIR VALVE TRICUSPID ; LEFT ATRIAL APPENDAGE CLIPPING    Past Medical History:   Diagnosis Date     Allergic rhinitis, cause unspecified     dust mites, ragweed     Colitis      Mitral regurgitation      Mitral valve disorders(424.0) 1984    myxomatous valve, mod MR, prolapse     Mitral valve prolapse      Past Surgical History:   Procedure Laterality Date     COLONOSCOPY N/A 9/15/2015    Procedure: COLONOSCOPY;  Surgeon: Anson Llanos MD;  Location:  GI     CV HEART CATHETERIZATION WITH POSSIBLE INTERVENTION N/A 2019    Procedure: Heart Catheterization with Possible Intervention;  Surgeon: Ritesh Whittaker MD;  Location:  HEART CARDIAC CATH LAB     CV RIGHT AND LEFT HEART CATH N/A 2019    Procedure: Right and Left Heart Catherization;  Surgeon: Ritesh Whittaker MD;  Location:  HEART CARDIAC CATH LAB       Anesthesia Evaluation     .             ROS/MED HX    ENT/Pulmonary:     (+)allergic rhinitis, , . .   (-) sleep apnea   Neurologic:       Cardiovascular:     (+) ----. : . . RAYO, . :. dysrhythmias a-fib, valvular problems/murmurs type: MR . Previous cardiac testing Echodate:2019results:Interpretation Summary     1. The left ventricle is normal in structure, function and size. The visual  ejection fraction is estimated at 65%.  2. The right ventricle is normal in structure, function and size.  3. There is mod-severe to severe (3-4+) mitral regurgitation. 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 Moderate  bileaflet prolapse, hard to localized segments, likely most of each  leaflet is  prolapsing to some degree. One large and one smaller MR jet. RV of 36ml and  ERO 0.28 cm2. Pulmonary veins have S blunting, but no flow reversal.  4. There is moderately severe (3+) tricuspid regurgitation. TV appears mildly  myxomatous and hypermobile.     Echo from 8/2018 showed 1-2+ MR, 1-2+ TR; the degree of MR was probably  underestimated.     TIA from 9/2016 showed 3-4+ MR; when directly compared there has been some  progression of the myxomatous changes.date: results: date: results:Cath date: 1/2019 results:1. Normal coronary arteries.   2. Rotated left ventricle with abnormal LV shape suggesting some  remodeling. The ejection fraction is reduced at approximately 45-50%.  Given what we are told is severe MR, this would represent significant  systolic dysfunction.   3. Clearly marked mitral valve prolapse with the peculiar  calcification as I noted above.  4. Although on our LV gram the dye is not dramatic going from the LV  to LA, I believe it is directed mostly anterior and is hidden behind  the aortic root and swirling in the left atrium, probably because of  large left atrial enlargement. This degree of mitral insufficiency  should be confirmed on the final report of the transesophageal echo.  5. Normal right heart pressures.  6. This report was texted to Dr. Andrea Hanna to alert him about the  calcification in the mitral valve since it may affect repair versus  replacement.          METS/Exercise Tolerance:     Hematologic:         Musculoskeletal:         GI/Hepatic:     (+) GERD Asymptomatic on medication, Other GI/Hepatic Hx colitis;      Renal/Genitourinary:         Endo:         Psychiatric:         Infectious Disease:         Malignancy:         Other:                          Physical Exam      Airway   Mallampati: II  TM distance: >3 FB    Dental   (+) lower retainer and upper retainer    Cardiovascular   Rhythm and rate: regular and normal      Pulmonary    breath sounds clear to  "auscultation            Lab Results   Component Value Date    WBC 7.6 02/15/2019    HGB 12.2 02/15/2019    HCT 35.8 02/15/2019     02/15/2019    SED 8 04/12/2005     01/09/2019    POTASSIUM 4.0 01/09/2019    CHLORIDE 107 01/09/2019    CO2 28 01/09/2019    BUN 11 01/09/2019    CR 0.82 01/09/2019    GLC 94 01/09/2019    ARIELLE 9.1 01/09/2019    ALBUMIN 3.7 01/09/2019    PROTTOTAL 7.3 01/09/2019    ALT 33 01/09/2019    AST 18 01/09/2019    ALKPHOS 49 01/09/2019    BILITOTAL 0.4 01/09/2019    PTT 27 01/09/2019    INR 0.96 01/09/2019    TSH 1.96 10/16/2018       Preop Vitals  BP Readings from Last 3 Encounters:   02/07/19 92/60   01/09/19 121/67   01/03/19 112/68    Pulse Readings from Last 3 Encounters:   02/07/19 66   01/09/19 65   01/03/19 60      Resp Readings from Last 3 Encounters:   02/07/19 16   01/09/19 16   11/06/18 24    SpO2 Readings from Last 3 Encounters:   02/07/19 98%   01/09/19 97%   11/06/18 98%      Temp Readings from Last 1 Encounters:   02/07/19 36.7  C (98  F) (Oral)    Ht Readings from Last 1 Encounters:   02/15/19 1.651 m (5' 5\")      Wt Readings from Last 1 Encounters:   02/15/19 56.7 kg (125 lb)    Estimated body mass index is 20.8 kg/m  as calculated from the following:    Height as of this encounter: 1.651 m (5' 5\").    Weight as of this encounter: 56.7 kg (125 lb).       Anesthesia Plan      History & Physical Review  History and physical reviewed and following examination; no interval change.    ASA Status:  3 .    NPO Status:  > 8 hours    Plan for General and ETT with Intravenous induction. Maintenance will be Balanced.      Additional equipment: Arterial Line, Central Line, CVP, PA Catheter and TIA Precedex gtt;      Postoperative Care      Consents  Anesthetic plan, risks, benefits and alternatives discussed with:  Patient..                 PRAVEENA HUA MD  "

## 2019-02-16 ENCOUNTER — APPOINTMENT (OUTPATIENT)
Dept: GENERAL RADIOLOGY | Facility: CLINIC | Age: 74
DRG: 219 | End: 2019-02-16
Attending: STUDENT IN AN ORGANIZED HEALTH CARE EDUCATION/TRAINING PROGRAM
Payer: COMMERCIAL

## 2019-02-16 ENCOUNTER — APPOINTMENT (OUTPATIENT)
Dept: PHYSICAL THERAPY | Facility: CLINIC | Age: 74
DRG: 219 | End: 2019-02-16
Attending: STUDENT IN AN ORGANIZED HEALTH CARE EDUCATION/TRAINING PROGRAM
Payer: COMMERCIAL

## 2019-02-16 LAB
ALBUMIN SERPL-MCNC: 3.5 G/DL (ref 3.4–5)
ALP SERPL-CCNC: 31 U/L (ref 40–150)
ALT SERPL W P-5'-P-CCNC: 40 U/L (ref 0–50)
ANION GAP SERPL CALCULATED.3IONS-SCNC: 8 MMOL/L (ref 3–14)
APTT PPP: 32 SEC (ref 22–37)
AST SERPL W P-5'-P-CCNC: 211 U/L (ref 0–45)
BILIRUB SERPL-MCNC: 0.6 MG/DL (ref 0.2–1.3)
BUN SERPL-MCNC: 13 MG/DL (ref 7–30)
CA-I BLD-MCNC: 4.5 MG/DL (ref 4.4–5.2)
CALCIUM SERPL-MCNC: 7.7 MG/DL (ref 8.5–10.1)
CHLORIDE SERPL-SCNC: 110 MMOL/L (ref 94–109)
CO2 SERPL-SCNC: 23 MMOL/L (ref 20–32)
CREAT SERPL-MCNC: 0.95 MG/DL (ref 0.52–1.04)
ERYTHROCYTE [DISTWIDTH] IN BLOOD BY AUTOMATED COUNT: 13.9 % (ref 10–15)
GFR SERPL CREATININE-BSD FRML MDRD: 60 ML/MIN/{1.73_M2}
GLUCOSE BLDC GLUCOMTR-MCNC: 104 MG/DL (ref 70–99)
GLUCOSE BLDC GLUCOMTR-MCNC: 107 MG/DL (ref 70–99)
GLUCOSE BLDC GLUCOMTR-MCNC: 115 MG/DL (ref 70–99)
GLUCOSE BLDC GLUCOMTR-MCNC: 117 MG/DL (ref 70–99)
GLUCOSE BLDC GLUCOMTR-MCNC: 130 MG/DL (ref 70–99)
GLUCOSE BLDC GLUCOMTR-MCNC: 136 MG/DL (ref 70–99)
GLUCOSE BLDC GLUCOMTR-MCNC: 143 MG/DL (ref 70–99)
GLUCOSE BLDC GLUCOMTR-MCNC: 144 MG/DL (ref 70–99)
GLUCOSE BLDC GLUCOMTR-MCNC: 144 MG/DL (ref 70–99)
GLUCOSE SERPL-MCNC: 110 MG/DL (ref 70–99)
HCT VFR BLD AUTO: 18.3 % (ref 35–47)
HGB BLD-MCNC: 6.3 G/DL (ref 11.7–15.7)
HGB BLD-MCNC: 9.4 G/DL (ref 11.7–15.7)
INR PPP: 1.24 (ref 0.86–1.14)
MAGNESIUM SERPL-MCNC: 2.5 MG/DL (ref 1.6–2.3)
MCH RBC QN AUTO: 31.7 PG (ref 26.5–33)
MCHC RBC AUTO-ENTMCNC: 34.4 G/DL (ref 31.5–36.5)
MCV RBC AUTO: 92 FL (ref 78–100)
PHOSPHATE SERPL-MCNC: 4.1 MG/DL (ref 2.5–4.5)
PLATELET # BLD AUTO: 117 10E9/L (ref 150–450)
POTASSIUM SERPL-SCNC: 3.7 MMOL/L (ref 3.4–5.3)
POTASSIUM SERPL-SCNC: 3.8 MMOL/L (ref 3.4–5.3)
PROT SERPL-MCNC: 5.6 G/DL (ref 6.8–8.8)
RBC # BLD AUTO: 1.99 10E12/L (ref 3.8–5.2)
SODIUM SERPL-SCNC: 141 MMOL/L (ref 133–144)
WBC # BLD AUTO: 11 10E9/L (ref 4–11)

## 2019-02-16 PROCEDURE — 25000128 H RX IP 250 OP 636: Performed by: PHYSICIAN ASSISTANT

## 2019-02-16 PROCEDURE — 93010 ELECTROCARDIOGRAM REPORT: CPT | Performed by: INTERNAL MEDICINE

## 2019-02-16 PROCEDURE — P9016 RBC LEUKOCYTES REDUCED: HCPCS | Performed by: SURGERY

## 2019-02-16 PROCEDURE — 83735 ASSAY OF MAGNESIUM: CPT | Performed by: STUDENT IN AN ORGANIZED HEALTH CARE EDUCATION/TRAINING PROGRAM

## 2019-02-16 PROCEDURE — 40000275 ZZH STATISTIC RCP TIME EA 10 MIN

## 2019-02-16 PROCEDURE — 94660 CPAP INITIATION&MGMT: CPT

## 2019-02-16 PROCEDURE — P9041 ALBUMIN (HUMAN),5%, 50ML: HCPCS

## 2019-02-16 PROCEDURE — 97161 PT EVAL LOW COMPLEX 20 MIN: CPT | Mod: GP

## 2019-02-16 PROCEDURE — 25000128 H RX IP 250 OP 636

## 2019-02-16 PROCEDURE — 99291 CRITICAL CARE FIRST HOUR: CPT | Performed by: NURSE PRACTITIONER

## 2019-02-16 PROCEDURE — 84132 ASSAY OF SERUM POTASSIUM: CPT | Performed by: STUDENT IN AN ORGANIZED HEALTH CARE EDUCATION/TRAINING PROGRAM

## 2019-02-16 PROCEDURE — 25000128 H RX IP 250 OP 636: Performed by: NURSE PRACTITIONER

## 2019-02-16 PROCEDURE — 99222 1ST HOSP IP/OBS MODERATE 55: CPT | Performed by: PSYCHIATRY & NEUROLOGY

## 2019-02-16 PROCEDURE — 25000131 ZZH RX MED GY IP 250 OP 636 PS 637: Performed by: PHYSICIAN ASSISTANT

## 2019-02-16 PROCEDURE — 85018 HEMOGLOBIN: CPT | Performed by: STUDENT IN AN ORGANIZED HEALTH CARE EDUCATION/TRAINING PROGRAM

## 2019-02-16 PROCEDURE — 71045 X-RAY EXAM CHEST 1 VIEW: CPT

## 2019-02-16 PROCEDURE — 80053 COMPREHEN METABOLIC PANEL: CPT | Performed by: STUDENT IN AN ORGANIZED HEALTH CARE EDUCATION/TRAINING PROGRAM

## 2019-02-16 PROCEDURE — 99222 1ST HOSP IP/OBS MODERATE 55: CPT | Performed by: INTERNAL MEDICINE

## 2019-02-16 PROCEDURE — 25000132 ZZH RX MED GY IP 250 OP 250 PS 637: Performed by: STUDENT IN AN ORGANIZED HEALTH CARE EDUCATION/TRAINING PROGRAM

## 2019-02-16 PROCEDURE — 97530 THERAPEUTIC ACTIVITIES: CPT | Mod: GP

## 2019-02-16 PROCEDURE — 93005 ELECTROCARDIOGRAM TRACING: CPT

## 2019-02-16 PROCEDURE — 00000146 ZZHCL STATISTIC GLUCOSE BY METER IP

## 2019-02-16 PROCEDURE — 85610 PROTHROMBIN TIME: CPT | Performed by: STUDENT IN AN ORGANIZED HEALTH CARE EDUCATION/TRAINING PROGRAM

## 2019-02-16 PROCEDURE — 85027 COMPLETE CBC AUTOMATED: CPT | Performed by: STUDENT IN AN ORGANIZED HEALTH CARE EDUCATION/TRAINING PROGRAM

## 2019-02-16 PROCEDURE — 20000003 ZZH R&B ICU

## 2019-02-16 PROCEDURE — 3E033XZ INTRODUCTION OF VASOPRESSOR INTO PERIPHERAL VEIN, PERCUTANEOUS APPROACH: ICD-10-PCS | Performed by: NURSE PRACTITIONER

## 2019-02-16 PROCEDURE — 25000128 H RX IP 250 OP 636: Performed by: STUDENT IN AN ORGANIZED HEALTH CARE EDUCATION/TRAINING PROGRAM

## 2019-02-16 PROCEDURE — 25000132 ZZH RX MED GY IP 250 OP 250 PS 637: Performed by: PHYSICIAN ASSISTANT

## 2019-02-16 PROCEDURE — 84100 ASSAY OF PHOSPHORUS: CPT | Performed by: STUDENT IN AN ORGANIZED HEALTH CARE EDUCATION/TRAINING PROGRAM

## 2019-02-16 PROCEDURE — 85730 THROMBOPLASTIN TIME PARTIAL: CPT | Performed by: STUDENT IN AN ORGANIZED HEALTH CARE EDUCATION/TRAINING PROGRAM

## 2019-02-16 PROCEDURE — 82330 ASSAY OF CALCIUM: CPT | Performed by: STUDENT IN AN ORGANIZED HEALTH CARE EDUCATION/TRAINING PROGRAM

## 2019-02-16 RX ORDER — HEPARIN SODIUM 5000 [USP'U]/.5ML
5000 INJECTION, SOLUTION INTRAVENOUS; SUBCUTANEOUS EVERY 8 HOURS
Status: DISCONTINUED | OUTPATIENT
Start: 2019-02-17 | End: 2019-02-18

## 2019-02-16 RX ORDER — PANTOPRAZOLE SODIUM 40 MG/1
40 TABLET, DELAYED RELEASE ORAL
Status: DISCONTINUED | OUTPATIENT
Start: 2019-02-16 | End: 2019-02-20

## 2019-02-16 RX ORDER — DOPAMINE HYDROCHLORIDE 160 MG/100ML
2-20 INJECTION, SOLUTION INTRAVENOUS CONTINUOUS
Status: DISCONTINUED | OUTPATIENT
Start: 2019-02-16 | End: 2019-02-18

## 2019-02-16 RX ORDER — ALBUMIN, HUMAN INJ 5% 5 %
SOLUTION INTRAVENOUS
Status: COMPLETED
Start: 2019-02-16 | End: 2019-02-16

## 2019-02-16 RX ORDER — SODIUM CHLORIDE 9 MG/ML
INJECTION, SOLUTION INTRAVENOUS CONTINUOUS
Status: DISCONTINUED | OUTPATIENT
Start: 2019-02-16 | End: 2019-02-18 | Stop reason: CLARIF

## 2019-02-16 RX ORDER — FENTANYL CITRATE 50 UG/ML
25-50 INJECTION, SOLUTION INTRAMUSCULAR; INTRAVENOUS
Status: DISCONTINUED | OUTPATIENT
Start: 2019-02-16 | End: 2019-02-18

## 2019-02-16 RX ORDER — ATROPINE SULFATE 0.1 MG/ML
INJECTION INTRAVENOUS
Status: DISCONTINUED
Start: 2019-02-16 | End: 2019-02-16 | Stop reason: HOSPADM

## 2019-02-16 RX ORDER — POLYETHYLENE GLYCOL 3350 17 G/17G
17 POWDER, FOR SOLUTION ORAL DAILY
Status: DISCONTINUED | OUTPATIENT
Start: 2019-02-16 | End: 2019-02-23 | Stop reason: HOSPADM

## 2019-02-16 RX ORDER — DOPAMINE HYDROCHLORIDE 160 MG/100ML
INJECTION, SOLUTION INTRAVENOUS
Status: COMPLETED
Start: 2019-02-16 | End: 2019-02-16

## 2019-02-16 RX ADMIN — INSULIN ASPART 1 UNITS: 100 INJECTION, SOLUTION INTRAVENOUS; SUBCUTANEOUS at 12:20

## 2019-02-16 RX ADMIN — GABAPENTIN 100 MG: 100 CAPSULE ORAL at 08:21

## 2019-02-16 RX ADMIN — DOPAMINE HYDROCHLORIDE IN DEXTROSE 2 MCG: 1.6 INJECTION, SOLUTION INTRAVENOUS at 09:45

## 2019-02-16 RX ADMIN — VANCOMYCIN HYDROCHLORIDE 1000 MG: 1 INJECTION, SOLUTION INTRAVENOUS at 08:11

## 2019-02-16 RX ADMIN — ACETAMINOPHEN 975 MG: 325 TABLET, FILM COATED ORAL at 05:08

## 2019-02-16 RX ADMIN — ACETAMINOPHEN 975 MG: 325 TABLET, FILM COATED ORAL at 13:05

## 2019-02-16 RX ADMIN — SENNOSIDES AND DOCUSATE SODIUM 1 TABLET: 8.6; 5 TABLET ORAL at 08:21

## 2019-02-16 RX ADMIN — ASPIRIN 81 MG 81 MG: 81 TABLET ORAL at 08:21

## 2019-02-16 RX ADMIN — SENNOSIDES AND DOCUSATE SODIUM 2 TABLET: 8.6; 5 TABLET ORAL at 20:00

## 2019-02-16 RX ADMIN — ACETAMINOPHEN 975 MG: 325 TABLET, FILM COATED ORAL at 21:36

## 2019-02-16 RX ADMIN — POTASSIUM CHLORIDE 20 MEQ: 29.8 INJECTION, SOLUTION INTRAVENOUS at 13:36

## 2019-02-16 RX ADMIN — ATORVASTATIN CALCIUM 40 MG: 40 TABLET, FILM COATED ORAL at 21:36

## 2019-02-16 RX ADMIN — PANTOPRAZOLE SODIUM 40 MG: 40 TABLET, DELAYED RELEASE ORAL at 08:21

## 2019-02-16 RX ADMIN — ALBUMIN HUMAN 500 ML: 50 SOLUTION INTRAVENOUS at 02:16

## 2019-02-16 RX ADMIN — FENTANYL CITRATE 25 MCG: 50 INJECTION, SOLUTION INTRAMUSCULAR; INTRAVENOUS at 13:32

## 2019-02-16 RX ADMIN — GABAPENTIN 100 MG: 100 CAPSULE ORAL at 21:36

## 2019-02-16 RX ADMIN — GABAPENTIN 100 MG: 100 CAPSULE ORAL at 16:41

## 2019-02-16 RX ADMIN — CEFAZOLIN 1 G: 1 INJECTION, POWDER, FOR SOLUTION INTRAMUSCULAR; INTRAVENOUS at 12:23

## 2019-02-16 RX ADMIN — ALBUMIN HUMAN 500 ML: 0.05 INJECTION, SOLUTION INTRAVENOUS at 02:16

## 2019-02-16 RX ADMIN — CEFAZOLIN 1 G: 1 INJECTION, POWDER, FOR SOLUTION INTRAMUSCULAR; INTRAVENOUS at 04:51

## 2019-02-16 RX ADMIN — POTASSIUM CHLORIDE 20 MEQ: 1.5 POWDER, FOR SOLUTION ORAL at 06:09

## 2019-02-16 RX ADMIN — HEPARIN SODIUM 5000 UNITS: 5000 INJECTION, SOLUTION INTRAVENOUS; SUBCUTANEOUS at 23:27

## 2019-02-16 ASSESSMENT — ACTIVITIES OF DAILY LIVING (ADL)
ADLS_ACUITY_SCORE: 13
ADLS_ACUITY_SCORE: 12
ADLS_ACUITY_SCORE: 12
ADLS_ACUITY_SCORE: 13

## 2019-02-16 ASSESSMENT — MIFFLIN-ST. JEOR: SCORE: 1111.88

## 2019-02-16 NOTE — PROGRESS NOTES
[facility]  Critical Care Service  Progress Note  Date of Service (when I saw the patient): 02/16/2019     Main Plans for Today    CV surgery to transition off insuln drip  Keep michelle drip today  Presently bradycardic-CV surgery adding epi drip      Assessment & Plan   Taty Aguila is a 74 year old female with PMH mitral valve prolapse and regurgitation who presents to the ICU after a minimally invasive mitral valve replacement (St Ron 31mm valve) and tricuspid valve repair (32mm Oquendo ring) done on 2/15/19    1. Acute postoperative hypoxic respiratory failure  Extubated yesterday   2. Severe symptomatic mitral valve regurgitation and mod-severe TV regurg    POD# 1 mini right thoracotomy, MV replacement, TV repair  3. Postop Bradycardia, symptomatic     4. Chronic afib   5. Stress Hyperglycemia     Neuro  1. Encephalopathy, drug induced  2. Now extubated, sedation off  Plan:  -- Scheduled acetaminophen,   -- Delirium prevention as able    CV  1. S/p MV Replcement , tricuspid repair  2. Symptomatic postop bradycardia  3. Chronic afib  Plan:  -- CV surgery adding epi to michelle  -- Cardiac meds per CV surgery    Resp:  1. Acute hypoxic respiratory failure  Plan:  -- now extubated  -- titrate fio2 to keep sats>92%  --pulm toilet-IS CDB    GI/Nutrition  1. No prior hx  Plan:  -- NPO  Until junaid resolved    Renal  1. No prior hx.  Baseline creatinine appears to be 0.9  Plan:  --monitor function and electrolytes as needed with replacement per ICU protocols. - generally avoid nephrotoxic agents such as NSAID, IV contrast unless specifically required  -- adjust medications as needed for renal clearance  -- follow I/O's as appropriate.  -- maintain euvolemia    ID  Dx: No acute issues  - Complete filiberto-operative antibiotics (ancef and vancomycin)  - Monitor fevers and leukocytosis consistent with stress response from procedure, no cultures for fevers in POD 1-2    Endocrine  1. Stress  Hyperglycemia  Plan:  --  Insulin gtt per protocol, transition to SSI  -- Keep BG  <180 for optimal healing    Heme:  1. Acute blood loss anemia  2. Coagulopathy   Plan:  -- Monitor hemoglobin.  -- Transfuse to keep > 7.0    General cares:  DVT Prophylaxis: Pneumatic Compression Devices  GI Prophylaxis: PPI  Restraints: Restraints for medical healing needed: NO  Family update by me today: No  Current lines are required for patient management  Access:  Catie Gonzalez  Time Spent on this Encounter   Billing:  I spent 40 minutes bedside and on the inpatient unit today managing the critical care of Taty Aguila in relation to the issues listed in this note.    Interval History   Extubated yesterday, now with symptomatic junaid    Physical Exam   Temp: 98.8  F (37.1  C) Temp src: Bladder Temp  Min: 90.32  F (32.4  C)  Max: 98.8  F (37.1  C) BP: 100/59 Pulse: 71 Heart Rate: 70 Resp: 19 SpO2: 95 % O2 Device: None (Room air) Oxygen Delivery: 2 LPM  Vitals:    02/15/19 0611 02/16/19 0455   Weight: 56.7 kg (125 lb) 60.1 kg (132 lb 7.9 oz)     I/O last 3 completed shifts:  In: 4476.62 [I.V.:3227.62; Other:324]  Out: 4040 [Urine:1905; Emesis/NG output:200; Blood:785; Chest Tube:1150]    GEN: in bed, laying flat, pale  C/o light headed   EYES: PERRL, Anicteric sclera.   HEENT:  Normocephalic, atraumatic, trachea midline  CV: RRR-HR 42  PULM/CHEST: CTA non labored  GI: normal bowel sounds, soft, non-tender, no rebound tenderness or guarding, no masses  : peña catheter in place, urine yellow and clear  EXTREMITIES: no peripheral edema, moving all extremities, peripheral pulses intact  NEURO: Cranial nerves II-XII grossly intact, no motor-sensory deficits noted  SKIN: No rashes, sores or ulcerations  PSYCH:  Appropriate   Imaging personally reviewed:  ECG  CXR    Data   Recent Labs   Lab 02/16/19  0400 02/15/19  1353 02/15/19  1225   WBC 11.0 13.9* 13.2*   HGB 6.3* 10.4* 8.4*   MCV 92 93 94   * 125* 120*   INR 1.24*  1.35* 1.74*    143 142   POTASSIUM 3.7 3.9 4.3   CHLORIDE 110* 111* 109   CO2 23 23 25   BUN 13 10 10   CR 0.95 0.93 0.85   ANIONGAP 8 9 8   ARIELLE 7.7* 7.9* 8.5   * 144* 186*   ALBUMIN 3.5 2.6* 2.3*   PROTTOTAL 5.6* 4.9* 4.5*   BILITOTAL 0.6 0.4 0.2   ALKPHOS 31* 32* 29*   ALT 40 43 27   * 271* 151*

## 2019-02-16 NOTE — PROGRESS NOTES
1800- Call to Sivakumar Monteiro to discuss immobility of R hand. Patient has no tashia deficits ex is unable to extend fingers or make fist. Dr. Monteiro asks for a code stroke to be called. CT ordered and Dr. Salas (neuro) updated via phone.     1845- Dr. Salas states code stroke can be deescalated, nothing seen on CT, he will round on patient in am.     Writer updated Dr. Monteiro with results- potentially a surgical positioning issue.      also updated reguarding events.

## 2019-02-16 NOTE — PROGRESS NOTES
[facility]  Cardiovascular and Thoracic Surgery Daily Note          Assessment and Plan:   POD#1 s/p Mini right thoracotomy, mitral valve replacement with a 31 mm St. Ron Epic (porcine bioprosthetic) valve, tricuspid valve repair with a 32 mm Oquendo MC3 annuloplasty ring, right femoral artery and venous cannulation for cardiopulmonary bypass, intraoperative transesophageal echocardiogram by Dr. Andrea Hanna  -CVS: HR: 70s-80s. SBP: 80s-110s. Remains on low dose michelle. Wean as able. ASA. BB on hold d/t pressor need. Hx of PAF. Amiodarone and xarelto on hold for now. Will resume close to discharge/as needed. Chest tubes to suction. Pacer wires capped.   -Resp: Extubated within protocol. Saturating well on 2L. Continue to encourage IS, cough, deep breathing, ambulation.   -Neuro: Grossly intact. Pain controlled. Concern of neuro deficits in right hand yesterday. Code stroke called. CT head negative. Neurology to see this am.   -Renal: good UO, up about 4kg from preoperative weight. Will hold off on diuresis today.   Creatinine   Date Value Ref Range Status   2019 0.95 0.52 - 1.04 mg/dL Final   ]  -GI:  Tolerating clears. No BM. Continue bowel regimen  -:  Dugan in place d/t limited mobility and need for accurate I&Os.   -Endo: pre op A1c: 5.9. Minimal insulin gtt requirements. Will transition to sliding scale insulin.  -FEN: replete lytes as needed, ADAT  -ID: Temp (24hrs), Av.6  F (35.9  C), Min:90.32  F (32.4  C), Max:98.4  F (36.9  C)   Completing perioperative abx.   WBC   Date Value Ref Range Status   2019 11.0 4.0 - 11.0 10e9/L Final   ]  -Heme: plt 117. Receiving 2u PRBCs this am. Hold further sub q heparin today  Hemoglobin   Date Value Ref Range Status   2019 6.3 (LL) 11.7 - 15.7 g/dL Final     Comment:     This result has been called to ANEESH ALEX IN ICU by Malia PULIDO on 2019 at   6922, and has been read back.      ],   -Proph: PCD, ASA, PPI  -Dispo: Continue  "ICU cares today. Wean Femi as able. Initiate therapies. Continue to encourage IS, cough, deep breathing, ambulation          Interval History:   Extubated. Remains on low dose Femi. Saturating well on 2L. Pain controlled. Tolerating diet. No BM.          Medications:       acetaminophen  975 mg Oral Q8H     aspirin  81 mg Oral or NG Tube Daily     atorvastatin  40 mg Oral At Bedtime     ceFAZolin  1 g Intravenous Q8H     gabapentin  100 mg Oral TID     heparin  5,000 Units Subcutaneous Q8H     insulin aspart   Subcutaneous TID w/meals     metoprolol tartrate  25 mg Oral Q12H    Or     metoprolol  25 mg Per NG tube Q12H     pantoprazole (PROTONIX) IV  40 mg Intravenous Daily     senna-docusate  1 tablet Oral BID    Or     senna-docusate  2 tablet Oral BID     sodium chloride (PF)  3 mL Intracatheter Q8H     vancomycin (VANCOCIN) IV  1,000 mg Intravenous Q12H     [START ON 2/18/2019] acetaminophen, IV fluid REPLACEMENT ONLY, glucose **OR** dextrose **OR** glucagon, fentaNYL, hydrALAZINE, insulin aspart, lidocaine 4%, lidocaine (buffered or not buffered), magnesium sulfate, magnesium sulfate, meperidine, methocarbamol, naloxone, ondansetron **OR** ondansetron, oxyCODONE IR, potassium chloride, potassium chloride with lidocaine, potassium chloride, potassium chloride, potassium chloride, sodium chloride (PF), sodium phosphate, sodium phosphate, sodium phosphate, sodium phosphate          Physical Exam:   Vitals were reviewed  Blood pressure 102/73, pulse 75, temperature 98.2  F (36.8  C), resp. rate 18, height 1.651 m (5' 5\"), weight 60.1 kg (132 lb 7.9 oz), SpO2 98 %.  Rhythm: NSR    Lungs: diminished bases    Cardiovascular: RRR normal s1 and s2    Abdomen: soft NTND    Extremeties: minimal edema    Incision: CDI    CT: to suction    Weight:   Vitals:    02/15/19 0611 02/16/19 0455   Weight: 56.7 kg (125 lb) 60.1 kg (132 lb 7.9 oz)            Data:   Labs:   Lab Results   Component Value Date    WBC 11.0 02/16/2019 "     Lab Results   Component Value Date    RBC 1.99 02/16/2019     Lab Results   Component Value Date    HGB 6.3 02/16/2019     Lab Results   Component Value Date    HCT 18.3 02/16/2019     No components found for: MCT  Lab Results   Component Value Date    MCV 92 02/16/2019     Lab Results   Component Value Date    MCH 31.7 02/16/2019     Lab Results   Component Value Date    MCHC 34.4 02/16/2019     Lab Results   Component Value Date    RDW 13.9 02/16/2019     Lab Results   Component Value Date     02/16/2019       Last Basic Metabolic Panel:  Lab Results   Component Value Date     02/16/2019      Lab Results   Component Value Date    POTASSIUM 3.7 02/16/2019     Lab Results   Component Value Date    CHLORIDE 110 02/16/2019     Lab Results   Component Value Date    ARIELLE 7.7 02/16/2019     Lab Results   Component Value Date    CO2 23 02/16/2019     Lab Results   Component Value Date    BUN 13 02/16/2019     Lab Results   Component Value Date    CR 0.95 02/16/2019     Lab Results   Component Value Date     02/16/2019       CXR: final read pending  Clinical read: stable post operative changes, possible small left pleural effusion vs. Atelectasis.     Loli Schuster PA-C  CV Surgery  Pager #[phone]

## 2019-02-16 NOTE — CONSULTS
[facility]    Cardiology Consultation     Date of Admission:  2/15/2019    Assessment & Plan   Taty Aguila is a 74 year old female who was admitted on 2/15/2019. She is status post 31 mm St. Ron Epic porcine mitral valve replacement and 32 mm Oquendo MC3 tricuspid annuloplasty on 2/15/19.  I was asked to see the patient for bradycardia 40s and hypotension with systolics in the 80s to 90s.  with systolics in the 80s-90s. Epicardial pacing wires failed to capture this episode.     1. Symptomatic bradycardia  - In the setting of recent mitral valve replacement and tricuspid annuloplasty. Denies significant pain or other reason for vagotonia during the episode.  - Rhythm strips appear to show AV dissociation with ventricular rate in the 40s but P waves are very low amplitude and therefore difficult to fully appreciate. Does not appear to be a post conversion pause.   - Her most recent ECG from 10 AM today shows a ventricular rate of 49 BMP with what appears to be intermittent complete heart block and narrow complex junctional escape rhythm.  - Epicardial pacing wires not capturing during event and at bedside with me this afternoon  - HR and BP have since improved on dopamine  - LVEF 65% on TIA from 1/9    Plan:  - Continue on dopamine tonight, continue monitoring on telemetry  - Leave trans-cutaneous pacing pads in situ  - Have atropine at bedside, can give 0.5 mg IV once if further episodes of bradycardia not responsive to dopamine up-titration.  - Obtain repeat ECG tonight  - Attempt to wean off dobutamine in the AM to see what her underlying heart rhythm is  - If required, will plan to proceed with transvenous pacing  - Please keep patient NPO for possible transvenous pacemaker insertion tomorrow    2. Status post 31 mm St. Ron Epic porcine mitral valve replacement and 32 mm Oquendo MC3 tricuspid annuloplasty on 2/15/19.  - Will require pre-dismissal TTE    Jose C Dasilva MD    Code  Status    Full Code    Reason for Consult   Reason for consult: I was asked by the ICU team to evaluate this patient for bradycardia and hypotension.    Primary Care Physician   Anyi Olmos    Chief Complaint   Bradycardia and hypotension    History of Present Illness   Taty Aguila is a 74 year old female with a history of  bi-leaflet mitral valve prolapse, mitral regurgitation, tricuspid regurgitation, and atrial fibrillation who was admitted on 1/15 for valve surgery. She presented to the ICU after a minimally invasive MVR and tricuspid valve repair. She is now status post 31 mm St. Ron Epic porcine mitral valve replacement and 32 mm Oquendo MC3 tricuspid annuloplasty on 2/15/19.  I was asked to see the patient for bradycardia in the 40s and hypotension with systolics in the 80s to 90s. Epicardial pacing wires failed to capture during this episode. An ECG from 2/15 also shows pacemaker spikes without corresponding QRS complexes. Her most recent ECG from 10 AM today shows a ventricular rate of 49 BMP with what appears to be intermittent complete heart block and narrow complex junctional escape rhythm. P waves are low amplitude and difficult to fully appreciate.     Past Medical History   I have reviewed this patient's medical history and updated it with pertinent information if needed.   Past Medical History:   Diagnosis Date     Allergic rhinitis, cause unspecified     dust mites, ragweed     Colitis      Mitral regurgitation      Mitral valve disorders(424.0) 1984    myxomatous valve, mod MR, prolapse     Mitral valve prolapse        Past Surgical History   I have reviewed this patient's surgical history and updated it with pertinent information if needed.  Past Surgical History:   Procedure Laterality Date     COLONOSCOPY N/A 9/15/2015    Procedure: COLONOSCOPY;  Surgeon: Anson Llanos MD;  Location:  GI     CV HEART CATHETERIZATION WITH POSSIBLE INTERVENTION N/A 1/9/2019    Procedure: Heart  Catheterization with Possible Intervention;  Surgeon: Ritesh Whittaker MD;  Location:  HEART CARDIAC CATH LAB     CV RIGHT AND LEFT HEART CATH N/A 1/9/2019    Procedure: Right and Left Heart Catherization;  Surgeon: Ritesh Whittaker MD;  Location:  HEART CARDIAC CATH LAB     ENT SURGERY      T&A       Prior to Admission Medications   Prior to Admission Medications   Prescriptions Last Dose Informant Patient Reported? Taking?   Multiple Vitamins-Minerals (MULTIVITAMIN ADULT) TABS 2/5/2019 at AM Self Yes Yes   Sig: Take 1 tablet by mouth daily   acetaminophen (TYLENOL) 500 MG tablet Past Week at PRN Self Yes Yes   Sig: Take 1,000 mg by mouth daily as needed for mild pain   amiodarone (PACERONE/CODARONE) 200 MG tablet 2/14/2019 at Noon Self No Yes   Sig: Take 1 tab twice a day for 10 days, then decrease the dose to 1 tab once a day   Patient taking differently: Take 200 mg by mouth daily    aspirin 81 MG EC tablet 2/14/2019 at PM Self Yes Yes   Sig: Take 81 mg by mouth every evening   calcium carbonate-vitamin D (CALCIUM 600+D) 600-200 MG-UNIT TABS 2/14/2019 at AM Self Yes Yes   Sig: Take 1 tablet by mouth 2 times daily   fexofenadine (ALLEGRA) 180 MG tablet 2/14/2019 at AM Self Yes Yes   Sig: Take 180 mg by mouth daily   metoprolol succinate ER (TOPROL-XL) 25 MG 24 hr tablet 2/15/2019 at 0400 Self No Yes   Sig: Take 1 tablet (25 mg) by mouth daily   omeprazole (PRILOSEC) 10 MG CR capsule 2/15/2019 at 0400 Self No Yes   Sig: Take 1 capsule (10 mg) by mouth every morning (before breakfast)   rivaroxaban ANTICOAGULANT (XARELTO) 20 MG TABS tablet 2/10/2019 at PM Self No Yes   Sig: Take 1 tablet (20 mg) by mouth daily (with dinner)      Facility-Administered Medications: None     Allergies   Allergies   Allergen Reactions     Chlorpheniramine Other (See Comments)     LOC and fainting      No Known Drug Allergies      Phenylephrine Other (See Comments)     fainting       Social History   I have reviewed this  patient's social history and updated it with pertinent information if needed. Taty Aguila  reports that  has never smoked. she has never used smokeless tobacco. She reports that she does not drink alcohol or use drugs.    Family History   I have reviewed this patient's family history and updated it with pertinent information if needed.   Family History   Problem Relation Age of Onset     Osteoporosis Mother      Alzheimer Disease Mother      Family History Negative Father      Heart Disease Maternal Grandfather      Family History Negative Paternal Grandmother      Family History Negative Paternal Grandfather        Review of Systems   The 10 point Review of Systems is negative other than noted in the HPI or here.     Physical Exam   Temp: 99.5  F (37.5  C) Temp src: Bladder BP: 118/76 Pulse: 80 Heart Rate: 82 Resp: 14 SpO2: 99 % O2 Device: BiPAP/CPAP Oxygen Delivery: 15 LPM  Vital Signs with Ranges  Temp:  [95.2  F (35.1  C)-99.9  F (37.7  C)] 99.5  F (37.5  C)  Pulse:  [44-99] 80  Heart Rate:  [42-99] 82  Resp:  [0-122] 14  BP: ()/(33-83) 118/76  MAP:  [56 mmHg-88 mmHg] 67 mmHg  Arterial Line BP: ()/(39-71) 100/50  FiO2 (%):  [30 %-60 %] 60 %  SpO2:  [85 %-100 %] 99 %  132 lbs 7.94 oz    Constitutional: No apparent distress, wearing CPAP  Eyes: No xanthelasma or conjunctivitis  HEENT: Moist oral mucosa  Respiratory: Clear to auscultation bilaterally. No crackles or wheezes.  Cardiovascular: Regular rate and rhythm. Normal S1 and S2. 3/6 systolic murmur at the apex. No extra heart sounds. No JVD.   Lymph/Hematologic: No purpura or petechiae.  Skin: No stasis dermatitis. No major rashes.  Extremities: No peripheral edema.  Neurologic: Moving all extremities. No facial assymmetry.  Psychiatric: Alert and oriented. Answers questions appropriately.    Data   Results for orders placed or performed during the hospital encounter of 02/15/19 (from the past 24 hour(s))   Glucose by meter   Result Value  Ref Range    Glucose 189 (H) 70 - 99 mg/dL   CT Head w/o Contrast    Narrative    CT SCAN OF THE HEAD WITHOUT CONTRAST February 15, 2019 6:30 PM     HISTORY: Code stroke.    TECHNIQUE: Axial images of the head and coronal reformations without  IV contrast material. Radiation dose for this scan was reduced using  automated exposure control, adjustment of the mA and/or kV according  to patient size, or iterative reconstruction technique.    COMPARISON: None.    FINDINGS: There is no evidence of intracranial hemorrhage, mass, or  anomaly. The ventricles are normal in size, shape and configuration.  The brain parenchyma and subarachnoid spaces are normal.     The visualized portions of the sinuses and mastoids appear normal. The  bony calvarium and bones of the skull base appear intact.       Impression    IMPRESSION: No evidence of acute intracranial hemorrhage, mass, or  herniation.    RAMONA HILTON MD   CTA Head Neck with Contrast    Narrative    CT ANGIOGRAM OF THE HEAD AND NECK WITH CONTRAST  CT HEAD PERFUSION WITH CONTRAST February 15, 2019 6:36 PM     HISTORY: Code stroke     TECHNIQUE: CT angiography with an injection of 70 mL Isovue-370  (accession [id number]), 50 mL Isovue-370 (accession [id number]) IV with  scans through the head and neck. Images were transferred to a separate  3-D workstation where multiplanar reformations and 3-D images were  created. Estimates of carotid stenoses are made relative to the distal  internal carotid artery diameters except as noted. Radiation dose for  this scan was reduced using automated exposure control, adjustment of  the mA and/or kV according to patient size, or iterative  reconstruction technique.     Perfusion scans were performed at three levels with injection of  additional IV nonionic contrast and saline flush.  These images were  processed on a separate 3-D workstation.     COMPARISON: None.     CT HEAD FINDINGS: No contrast enhancing lesions. CT perfusion images  of  the head are unremarkable.     CT ANGIOGRAM HEAD FINDINGS: The major intracranial arteries including  the proximal branches of the anterior cerebral, middle cerebral, and  posterior cerebral arteries appear patent without vascular cutoff. No  aneurysm identified. No significant stenosis. Venous circulation is  unremarkable.     CT ANGIOGRAM NECK FINDINGS:   Normal origin of the great vessels from the aortic arch.     Right carotid artery: The right common and internal carotid arteries  are patent. Mild atherosclerotic disease at the carotid bifurcation  without stenosis.     Left carotid artery: The left common and internal carotid arteries are  patent. No significant stenosis or atherosclerotic disease in the  carotid artery.     Vertebral arteries: Vertebral arteries are patent without evidence of  dissection. No significant stenosis.     Other findings: Partially visualized right pleural effusion with right  apical chest tube. There is subcutaneous emphysema over the right  chest wall that is partially visualized. Right IJ central venous  catheter partially visualized.      Impression    IMPRESSION:   1. Patent arteries in the head and neck without vascular cutoff. No  evidence of dissection. No aneurysm identified. No significant  stenosis.  2. Unremarkable CT perfusion images of the head.    Results discussed with Sivakumar Monteiro at 6:51 PM on 2/15/2018.     RAMONA HILTON MD   Glucose by meter   Result Value Ref Range    Glucose 120 (H) 70 - 99 mg/dL   CT Head Perfusion w Contrast    Narrative    CT ANGIOGRAM OF THE HEAD AND NECK WITH CONTRAST  CT HEAD PERFUSION WITH CONTRAST February 15, 2019 6:36 PM     HISTORY: Code stroke     TECHNIQUE: CT angiography with an injection of 70 mL Isovue-370  (accession [id number]), 50 mL Isovue-370 (accession [id number]) IV with  scans through the head and neck. Images were transferred to a separate  3-D workstation where multiplanar reformations and 3-D images were  created.  Estimates of carotid stenoses are made relative to the distal  internal carotid artery diameters except as noted. Radiation dose for  this scan was reduced using automated exposure control, adjustment of  the mA and/or kV according to patient size, or iterative  reconstruction technique.     Perfusion scans were performed at three levels with injection of  additional IV nonionic contrast and saline flush.  These images were  processed on a separate 3-D workstation.     COMPARISON: None.     CT HEAD FINDINGS: No contrast enhancing lesions. CT perfusion images  of the head are unremarkable.     CT ANGIOGRAM HEAD FINDINGS: The major intracranial arteries including  the proximal branches of the anterior cerebral, middle cerebral, and  posterior cerebral arteries appear patent without vascular cutoff. No  aneurysm identified. No significant stenosis. Venous circulation is  unremarkable.     CT ANGIOGRAM NECK FINDINGS:   Normal origin of the great vessels from the aortic arch.     Right carotid artery: The right common and internal carotid arteries  are patent. Mild atherosclerotic disease at the carotid bifurcation  without stenosis.     Left carotid artery: The left common and internal carotid arteries are  patent. No significant stenosis or atherosclerotic disease in the  carotid artery.     Vertebral arteries: Vertebral arteries are patent without evidence of  dissection. No significant stenosis.     Other findings: Partially visualized right pleural effusion with right  apical chest tube. There is subcutaneous emphysema over the right  chest wall that is partially visualized. Right IJ central venous  catheter partially visualized.      Impression    IMPRESSION:   1. Patent arteries in the head and neck without vascular cutoff. No  evidence of dissection. No aneurysm identified. No significant  stenosis.  2. Unremarkable CT perfusion images of the head.    Results discussed with Sivakumar Monteiro at 6:51 PM on 2/15/2018.      RAMONA HILTON MD   Glucose by meter   Result Value Ref Range    Glucose 188 (H) 70 - 99 mg/dL   Glucose by meter   Result Value Ref Range    Glucose 154 (H) 70 - 99 mg/dL   Glucose by meter   Result Value Ref Range    Glucose 107 (H) 70 - 99 mg/dL   CBC with platelets   Result Value Ref Range    WBC 11.0 4.0 - 11.0 10e9/L    RBC Count 1.99 (L) 3.8 - 5.2 10e12/L    Hemoglobin 6.3 (LL) 11.7 - 15.7 g/dL    Hematocrit 18.3 (L) 35.0 - 47.0 %    MCV 92 78 - 100 fl    MCH 31.7 26.5 - 33.0 pg    MCHC 34.4 31.5 - 36.5 g/dL    RDW 13.9 10.0 - 15.0 %    Platelet Count 117 (L) 150 - 450 10e9/L   Comprehensive metabolic panel   Result Value Ref Range    Sodium 141 133 - 144 mmol/L    Potassium 3.7 3.4 - 5.3 mmol/L    Chloride 110 (H) 94 - 109 mmol/L    Carbon Dioxide 23 20 - 32 mmol/L    Anion Gap 8 3 - 14 mmol/L    Glucose 110 (H) 70 - 99 mg/dL    Urea Nitrogen 13 7 - 30 mg/dL    Creatinine 0.95 0.52 - 1.04 mg/dL    GFR Estimate 60 (L) >60 mL/min/[1.73_m2]    GFR Estimate If Black 70 >60 mL/min/[1.73_m2]    Calcium 7.7 (L) 8.5 - 10.1 mg/dL    Bilirubin Total 0.6 0.2 - 1.3 mg/dL    Albumin 3.5 3.4 - 5.0 g/dL    Protein Total 5.6 (L) 6.8 - 8.8 g/dL    Alkaline Phosphatase 31 (L) 40 - 150 U/L    ALT 40 0 - 50 U/L     (H) 0 - 45 U/L   Magnesium   Result Value Ref Range    Magnesium 2.5 (H) 1.6 - 2.3 mg/dL   Calcium ionized whole blood   Result Value Ref Range    Calcium Ionized Whole Blood 4.5 4.4 - 5.2 mg/dL   Phosphorus   Result Value Ref Range    Phosphorus 4.1 2.5 - 4.5 mg/dL   INR   Result Value Ref Range    INR 1.24 (H) 0.86 - 1.14   PTT (AM Draw)   Result Value Ref Range    PTT 32 22 - 37 sec   Glucose by meter   Result Value Ref Range    Glucose 115 (H) 70 - 99 mg/dL   XR Chest Port 1 View    Narrative    XR CHEST PORT 1 VW  2/16/2019 5:20 AM     HISTORY:  post op MVR TVR    COMPARISON: Film dated 2/15/2019    FINDINGS:  The ET tube and NG tube have been removed. Patient is  status post cardiac surgery. Right  chest tubes are in place. No  pneumothorax is identified. Left basilar opacity compatible with  infiltrate or atelectasis has increased slightly. There is also been  an increase in the right basilar opacity compatible with infiltrate or  atelectasis.      Impression    IMPRESSION:  1. ET tube and NG tube removed.  2. Increased basilar opacities compatible with infiltrate or  atelectasis.  3. No pneumothorax is identified.    DEWAYNE ROSAS MD   Glucose by meter   Result Value Ref Range    Glucose 104 (H) 70 - 99 mg/dL   EKG 12-lead, tracing only   Result Value Ref Range    Interpretation ECG Click View Image link to view waveform and result    Glucose by meter   Result Value Ref Range    Glucose 117 (H) 70 - 99 mg/dL   EKG 12-lead, tracing only   Result Value Ref Range    Interpretation ECG Click View Image link to view waveform and result    EKG 12-lead, tracing only   Result Value Ref Range    Interpretation ECG Click View Image link to view waveform and result    Glucose by meter   Result Value Ref Range    Glucose 136 (H) 70 - 99 mg/dL   Neurology IP Consult: Stroke (potential or actual); Patient to be seen: Routine - within 24 hours; tongue deviation?; Consultant may enter orders: Yes    Mai Perdomo MD     2/16/2019  3:39 PM  [facility], [facility]    Vascular Neurology Consult    Name: Taty Aguila  YOB: 1945  MRN: [id number]  Today's date: 2/16/2019  Source of information: Patient and chart review    Reason for consult:  New tongue deviation, possible stroke    Chief Complaint:  Admitted on 2/15/19 for mitral valve replacement    History of Present Illness:    Taty Aguila is a 74 year old female with a PMH significant   for myxomatous mitral valve with moderate mitral regurgitation,   paroxysmal atrial fibrillation, allergic rhinitis who is admitted   for elective MV replacement.     Patient has known MV regurgitation and on  recent ECHO was also   noted to have tricuspid regurgitation. She was admitted on   2/15/19 and underwent minimally invasive mitral valve replacement   with St Ron valve and tricuspid valve repair. Post procedure on   2/15/19 she had an episode of right hand numbness, weakness and   ataxia and stroke code was called. CTH did not show any evidence   of acute abnormality, CT angiogram head and neck showed patent   arteries in head and neck without significant stenosis and CT   perfusion was unremarkable.     On 2/16/2019, around 11 am was noted to have tongue deviation to   right with asymmetric swelling of tongue with L>R. Denies any   aphasia or focal neurological symptoms. Prior to this she was   anemic with H&H 6.3 and transfused blood and also had an episode   of bradycardia and started on dopamine infusion. Right chest tube   in place.    The patient's medical, surgical, social, and family history were   personally reviewed with the patient.  Past Medical History:   Diagnosis Date     Allergic rhinitis, cause unspecified     dust mites, ragweed     Colitis      Mitral regurgitation      Mitral valve disorders(424.0) 1984    myxomatous valve, mod MR, prolapse     Mitral valve prolapse       Past Surgical History:   Procedure Laterality Date     COLONOSCOPY N/A 9/15/2015    Procedure: COLONOSCOPY;  Surgeon: Anson Llanos MD;    Location:  GI     CV HEART CATHETERIZATION WITH POSSIBLE INTERVENTION N/A   1/9/2019    Procedure: Heart Catheterization with Possible Intervention;    Surgeon: Ritesh Whittaker MD;  Location:  HEART CARDIAC CATH   LAB     CV RIGHT AND LEFT HEART CATH N/A 1/9/2019    Procedure: Right and Left Heart Catherization;  Surgeon:   Ritesh Whittaker MD;  Location:  HEART CARDIAC CATH LAB     ENT SURGERY      T&A     Social History     Socioeconomic History     Marital status:      Spouse name: Not on file     Number of children: 3     Years of education: Not on file      Highest education level: Not on file   Social Needs     Financial resource strain: Not on file     Food insecurity - worry: Not on file     Food insecurity - inability: Not on file     Transportation needs - medical: Not on file     Transportation needs - non-medical: Not on file   Occupational History     Not on file   Tobacco Use     Smoking status: Never Smoker     Smokeless tobacco: Never Used   Substance and Sexual Activity     Alcohol use: No     Drug use: No     Sexual activity: Not Currently   Other Topics Concern      Service Not Asked     Blood Transfusions Not Asked     Caffeine Concern No     Comment: 1 cup of coffee and 1 can diet coke per day     Occupational Exposure Not Asked     Hobby Hazards Not Asked     Sleep Concern Yes     Comment: takes Doxylamine succinate 1/2 tab every night     Stress Concern No     Weight Concern No     Special Diet No     Comment: healthy      Back Care Not Asked     Exercise Yes     Comment: walking 45min x7 a wk. Very active, [employer], Golf,   [employer], Cardio     Bike Helmet Not Asked     Seat Belt Yes     Self-Exams Yes     Parent/sibling w/ CABG, MI or angioplasty before 65F 55M? No   Social History Narrative     Not on file     Family History   Problem Relation Age of Onset     Osteoporosis Mother      Alzheimer Disease Mother      Family History Negative Father      Heart Disease Maternal Grandfather      Family History Negative Paternal Grandmother      Family History Negative Paternal Grandfather      Current Facility-Administered Medications   Medication     [START ON 2/18/2019] acetaminophen (TYLENOL) tablet 650 mg     acetaminophen (TYLENOL) tablet 975 mg     aspirin (ASA) chewable tablet 81 mg     atorvastatin (LIPITOR) tablet 40 mg     atropine 1 MG/10ML injection     dextrose 10 % 1,000 mL infusion     dextrose 5% and 0.45% NaCl + KCl 20 mEq/L infusion     glucose gel 15-30 g    Or     dextrose 50 % injection 25-50 mL    Or     glucagon injection 1 mg      DOPamine 400 mg in dextrose 5% 250 mL (adult std) - premix     EPINEPHrine (ADRENALIN) 5 mg in sodium chloride 0.9 % 250 mL   infusion     EPINEPHrine PF (ADRENALIN) 1 MG/10ML injection     fentaNYL (PF) (SUBLIMAZE) injection 25-50 mcg     gabapentin (NEURONTIN) capsule 100 mg     [START ON 2/17/2019] heparin sodium injection 5,000 Units     hydrALAZINE (APRESOLINE) injection 10 mg     insulin aspart (NovoLOG) inj (RAPID ACTING)     insulin aspart (NovoLOG) inj (RAPID ACTING)     lidocaine (LMX4) cream     lidocaine 1 % 0.1-1 mL     magnesium sulfate 2 g in water intermittent infusion     magnesium sulfate 4 g in 100 mL sterile water (premade)     meperidine (DEMEROL) injection 12.5-25 mg     methocarbamol (ROBAXIN) tablet 500 mg     naloxone (NARCAN) injection 0.1-0.4 mg     nitroGLYcerin 50 mg in D5W 250 mL (adult std) infusion     ondansetron (ZOFRAN-ODT) ODT tab 4 mg    Or     ondansetron (ZOFRAN) injection 4 mg     oxyCODONE (ROXICODONE) tablet 5-10 mg     pantoprazole (PROTONIX) EC tablet 40 mg     phenylephrine (BASILIA-SYNEPHRINE) 50 mg in sodium chloride 0.9 %   250 mL infusion     polyethylene glycol (MIRALAX/GLYCOLAX) Packet 17 g     potassium chloride (KLOR-CON) Packet 20-40 mEq     potassium chloride 10 mEq in 100 mL intermittent infusion with   10 mg lidocaine     potassium chloride 10 mEq in 100 mL sterile water intermittent   infusion (premix)     potassium chloride 20 mEq in 50 mL intermittent infusion     potassium chloride ER (K-DUR/KLOR-CON M) CR tablet 20-40 mEq     senna-docusate (SENOKOT-S/PERICOLACE) 8.6-50 MG per tablet 1   tablet    Or     senna-docusate (SENOKOT-S/PERICOLACE) 8.6-50 MG per tablet 2   tablet     sodium chloride (PF) 0.9% PF flush 3 mL     sodium chloride (PF) 0.9% PF flush 3 mL     sodium chloride 0.9% infusion     sodium phosphate 10 mmol in D5W intermittent infusion     sodium phosphate 15 mmol in D5W intermittent infusion     sodium phosphate 20 mmol in D5W intermittent  "infusion     sodium phosphate 25 mmol in D5W intermittent infusion     Allergies   Allergen Reactions     Chlorpheniramine Other (See Comments)     LOC and fainting      No Known Drug Allergies      Phenylephrine Other (See Comments)     fainting     Review of Systems:  14-point review of systems was completed. The pertinent positives   and negatives are in the HPI, and the remainder was negative   unless otherwise mentioned.    Physical Exam:  /68   Pulse 80   Temp 98.8  F (37.1  C)   Resp 30   Ht   1.651 m (5' 5\")   Wt 60.1 kg (132 lb 7.9 oz)   SpO2 96%   BMI   22.05 kg/m     General: Pleasant. Resting comfortably in bed.   Head: Normocephalic, atraumatic  Eyes: No conjunctival injection, no scleral icterus.   Respiratory: Non-labored breathing. Right chest tube in place.  Cardiovascular: Peripheral pulses intact.  Abdomen: Soft  Extremities: Warm, dry without peripheral edema  Neurologic:    Mental Status Exam: Alert, awake. Fully oriented. Provides a   thorough history. Speech of normal fluency and no aphasia noted   however speech was thick and muffled  Cranial Nerves: EOMs intact, no nystagmus, facial movements   symmetric, facial sensation intact to light touch, hearing intact   to conversation, palate and uvula rise symmetrically, on tongue   protrusion mild deviation to the right noted with asymmetric   swelling of tongue with L>R, symmetric shoulder shrug, tongue   midline and fully mobile.   Motor:  Normal tone in all four extremities, no atrophy or   fasciculations were seen. 5/5 strength bilaterally in shoulder   abduction, elbow extensors and flexors, wrist extension, finger   abduction, hip flexors, knee extensors and flexors. No tremors.  Sensory: Sensation intact to light touch on arms and legs   bilaterally.  Coordination: Finger-nose-finger without dysmetria bilaterally.   Finger tapping regular and rhythmic bilaterally.   Reflexes: Not tested  Gait: Deferred    NIHSS:  Exam Interval: " Baseline   Score    Level of consciousness: (0)   Alert, keenly responsive    LOC questions: (0)   Answers both questions correctly    LOC commands: (0)   Performs both tasks correctly    Best gaze: (0)   Normal    Visual: (0)   No visual loss    Facial palsy: (0)   Normal symmetrical movements    Motor arm (left): (0)   No drift    Motor arm (right): (0)   No drift    Motor leg (left): (0)   No drift    Motor leg (right): (0)   No drift    Limb ataxia: (0)   Absent    Sensory: (0)   Normal- no sensory loss    Best language: (0)   Normal- no aphasia    Dysarthria: (0)   Normal    Extinction and inattention: (0)   No abnormality        Total Score:  0     Laboratory:  All laboratory data reviewed  Lab Results   Component Value Date    WBC 11.0 02/16/2019    HGB 9.4 (L) 02/16/2019    HCT 18.3 (L) 02/16/2019     (L) 02/16/2019     02/16/2019    POTASSIUM 3.8 02/16/2019    CHLORIDE 110 (H) 02/16/2019    CO2 23 02/16/2019    BUN 13 02/16/2019    CR 0.95 02/16/2019     (H) 02/16/2019    SED 8 04/12/2005    TROPONIN 0.01 10/16/2018    TROPI <0.015 10/12/2017     (H) 02/16/2019    ALT 40 02/16/2019    ALKPHOS 31 (L) 02/16/2019    BILITOTAL 0.6 02/16/2019    INR 1.24 (H) 02/16/2019     Imaging:  CTH: no acute abnormality  CTA head and neck: no vascular occlusion or stenosis  CTP: unremarkable    Assessment/Recommendations:  Taty Aguila is a 74 year old female with a PMH significant   for 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   Post op she had an episode of R sided weakness which resolved and   a stroke code was called with negative imaging. On 2/16/19 she   was noted to have new tongue deviation and swelling. Etiology is   possible small punctuate stroke 2/2 cardiac surgery    #Episode of R sided weakness and tongue deviation/ swelling  -  Possible secondary to small stroke  - Neurochecks Q 4 hours  - Goal normotension   - Euthermia, Euglycemia  - Continue Aspirin 81 mg daily  - Continue Lipitor 40 mg daily  - MRI brain - stroke limited when stable from a surgery   standpoint  - Please check stat CT head if she develops any new neurological   symptoms  - PT/OT/SLP  - PM&R  - Stroke Education    DVT ppx: Heparin sub cu  Diet: Regular  Code status: Full code  Disposition: Pending evals    We will continue to follow along, please call us with any   questions or concerns, case was seen and discussed with Dr. Maritza Yanez MD  Vascular Neurology fellow  Pager # [phone]     Hemoglobin   Result Value Ref Range    Hemoglobin 9.4 (L) 11.7 - 15.7 g/dL   Potassium   Result Value Ref Range    Potassium 3.8 3.4 - 5.3 mmol/L   Glucose by meter   Result Value Ref Range    Glucose 143 (H) 70 - 99 mg/dL

## 2019-02-16 NOTE — SIGNIFICANT EVENT
Bradycardia    Background:  Patient up to chair for 45 mins when she asked to get back into bed because she was so sleepy (also complaining of some nausea and just generally not feeling well while up in chair but HR 60's-70's and BP's good on 0.5-1 of michelle).     0930 Patient sleeping on left side and junaid'd down as low as 34 but sustained in the 40's.  V-wires hooked up to pacer box (despite reports that they weren't working last night) and unable to get wires to sense or capture on max Ma's and lowest sensitivity.  SBP's in 70's with bradycardia and patient reports feeling awful.  CHRISTIANO Ennis paged to bedside and she and Dr. Monteiro at bedside shortly.  Dopamine hung, pacing pads placed and hooked up to Zoll at bedside.  Multiple 12 lead EKG's obtained during incident.  Also of note, patient went from room air to 4L NC during this period and patient sleepy during whole incident but able to answer questions, etc.     0955  HR up to 80's/90's.  SBP's in 130's.   EP consult placed per Loli before they left unit.

## 2019-02-16 NOTE — PROGRESS NOTES
CV surgery update    Called to bedside for symptomatic bradycardia to high 30s- mid 40s with SBP down to 70s  Temporary pacer wires do not capture despite multiple adjustments  EKGs obtained demonstrating slow atrial fibrillation, junctional rhythm etc.   Added dopamine with improvement of HR and pressure improved  Continue dopamine  Consult placed to EP   Dr. Monteiro present  Discussed plan with Dr. Christen Schuster PA-C  CV surgery   Pager [phone]

## 2019-02-16 NOTE — PLAN OF CARE
PT:  Discharge Planner PT   Patient plan for discharge: Return home with OP CR  Current status: Orders received, eval completed, treatment initiated. Pt admitted for MVR, mini R thoracotomy, TV repair with bradycardia and acute hypoxic respiratory failure post-op. Pt currently in ICU on bipap. Prior to admit pt was living with spouse in a 4 story house, no AD use and is very active at baseline. Currently requires SBA for bed mobility, CGA sit to stand and transfer bed to chair with assist for lines/tubes only needed. Provided heart surgery packet of information and went over it with pt.  Pt demonstrates dec activity tolerance, pain, SOB and difficulty ambulating and transferring and would benefit from skilled PT services in order to improve this.  Barriers to return to prior living situation: Needs assist with mobility, falls risk  Recommendations for discharge: Return home with OP phase 2 CR and spouse  Rationale for recommendations: Pt would benefit from continued PT/CR for return to PLOF, increased knowledge of risk factors, aerobic exercise and lifestyle modifications.       Entered by: Oma Eid 02/16/2019 2:29 PM

## 2019-02-16 NOTE — PROGRESS NOTES
Addendum    Called by nursing  Pt with left sided tongue swelling, ? Deviation  Seen by neurology-they feel no stroke code needed- I will update CV surgery service    o2 needs  increased to 5  Liters oxi cannula-will add CPAP to aide with WOB

## 2019-02-16 NOTE — PROVIDER NOTIFICATION
MD Notification    Notified Person: MD    Notified Person Name: Sivakumar Monteiro    Notification Date/Time: 2/16/19 0415    Purpose of Notification: Hgb 6.3    Orders Received: 2 units    Comments:

## 2019-02-16 NOTE — PROGRESS NOTES
[facility] brief note:    Was contacted by ICU staff as Dr. Monteiro requesting to activate CODE STROKE for reported R hand numbness/weakness/ataxia; no other focal neuro deficits noted.  Last known normal pre-operatively.  Transferred with pt to stat imaging; while present, stroke de-escalation noted.  Pt to transfer back to ICU with flying squad to continue with current plan of care.      Travis Rey, ROBINA, CNP  [facility]    No charge.

## 2019-02-16 NOTE — PROGRESS NOTES
02/16/19 1340   Quick Adds   Type of Visit Initial PT Evaluation   Living Environment   Lives With spouse   Living Arrangements house  (4 story house)   Home Accessibility stairs to enter home;stairs within home   Number of Stairs, Within Home, Primary ten   Stair Railings, Within Home, Primary railing on right side (ascending)   Self-Care   Usual Activity Tolerance excellent   Current Activity Tolerance good   Regular Exercise Yes   Activity/Exercise Type walking;other (see comments);strength training  (deidre chi)   Exercise Amount/Frequency 3-5 times/wk   Equipment Currently Used at Home none   Functional Level Prior   Ambulation 0-->independent   Transferring 0-->independent   Fall history within last six months no   Which of the above functional risks had a recent onset or change? none   General Information   Onset of Illness/Injury or Date of Surgery - Date 02/15/19   Referring Physician Sivakumar Monteiro MD   Patient/Family Goals Statement Return home with OP CR   Pertinent History of Current Problem (include personal factors and/or comorbidities that impact the POC) Admitted for MVR, mini R thoracotomy, TV repair with bradycardia and acute hypoxic respiratory failure post-op. PMH: afib   Precautions/Limitations fall precautions   Weight-Bearing Status - LLE full weight-bearing   Weight-Bearing Status - RLE full weight-bearing   Cognitive Status Examination   Orientation orientation to person, place and time   Level of Consciousness alert   Follows Commands and Answers Questions 100% of the time;able to follow multistep instructions   Personal Safety and Judgment intact   Memory intact   Pain Assessment   Patient Currently in Pain Yes, see Vital Sign flowsheet  (site for thoracotomy)   Posture    Posture Forward head position;Protracted shoulders   Range of Motion (ROM)   ROM Quick Adds No deficits were identified   ROM Comment B LEs   Strength   Strength Comments B hip flex NT due to incision in groin on R, knee  "ext 5/5, DF 5/5   Bed Mobility   Bed Mobility Comments Supine to sit with SBA   Transfer Skills   Transfer Comments Sit to stand with CGA   Gait   Gait Comments Pt amb 1 ft bed to chair with CGA   Balance   Balance Comments Balance steady   Sensory Examination   Sensory Perception Comments Denies numbness or tingling   General Therapy Interventions   Planned Therapy Interventions bed mobility training;strengthening;transfer training;risk factor education;home program guidelines;progressive activity/exercise   Clinical Impression   Criteria for Skilled Therapeutic Intervention yes, treatment indicated   PT Diagnosis Dec activity tolerance   Influenced by the following impairments Dec activity tolerance, pain   Functional limitations due to impairments diffiuculty ambulating and transferring   Clinical Presentation Evolving/Changing   Clinical Presentation Rationale medically decompensated   Clinical Decision Making (Complexity) Low complexity   Therapy Frequency` daily   Predicted Duration of Therapy Intervention (days/wks) 7 days   Anticipated Discharge Disposition Home with Outpatient Therapy   Risk & Benefits of therapy have been explained Yes   Patient, Family & other staff in agreement with plan of care Yes   [facility] [employer] TM \"6 Clicks\"   2016, Trustees of [facility], under license to [employer].  All rights reserved.   6 Clicks Short Forms Basic Mobility Inpatient Short Form   [facility] AM-PAC  \"6 Clicks\" V.2 Basic Mobility Inpatient Short Form   1. Turning from your back to your side while in a flat bed without using bedrails? 3 - A Little   2. Moving from lying on your back to sitting on the side of a flat bed without using bedrails? 3 - A Little   3. Moving to and from a bed to a chair (including a wheelchair)? 3 - A Little   4. Standing up from a chair using your arms (e.g., wheelchair, or bedside chair)? 3 - A Little   5. To walk in hospital room? 3 - A Little   6. Climbing 3-5 " steps with a railing? 3 - A Little   Basic Mobility Raw Score (Score out of 24.Lower scores equate to lower levels of function) 18   Total Evaluation Time   Total Evaluation Time (Minutes) 10

## 2019-02-16 NOTE — PROVIDER NOTIFICATION
Pt was noted to have slurred speech tounge deviation.  Loli Schuster PA-c called and updated with pt condition neuro consult was requested and Dr Carter contacted.      Update:  Neuro down to bedside to assess patient.  They feel as though speech is a bit thick but tongue is swollen which makes the tongue extension not midline but feels as though it's not deviated.  No other neuro deficits noted.  Family at bedside.

## 2019-02-16 NOTE — OP NOTE
Procedure Date: 02/15/2019      REFERRING CARDIOLOGIST:   Jesus Stone MD [facility] and Marifer Mohamud PA-C      PREOPERATIVE DIAGNOSES:  Severe symptomatic mitral valve regurgitation, moderate-to-severe tricuspid valve regurgitation, chronic atrial fibrillation.      POSTOPERATIVE DIAGNOSES:  Severe symptomatic mitral valve regurgitation, moderate-to-severe tricuspid valve regurgitation, chronic atrial fibrillation.      SURGEON:  Andrea Hanna MD       ASSISTANT:  Salud Daniels MD        NAME OF OPERATION:  Mini right thoracotomy, mitral valve replacement with a 31 mm [place] Epic (porcine bioprosthetic) valve, tricuspid valve repair with a 32 mm [place] MC3 annuloplasty ring, right femoral artery and venous cannulation for cardiopulmonary bypass, intraoperative transesophageal echocardiogram.        ANESTHESIA:  General orotracheal.      INDICATIONS FOR PROCEDURE:  Ms. Aguila a very pleasant 74-year-old female who was referred to me in the past for mitral valve insufficiency.  My first encounter with her was about 2 years ago but at that time, the decision was made to manage her medically as there is some question of the degree of her insufficiency and that she was fairly asymptomatic at that time.  I saw her again in clinic recently for followup at the end of December.  She had developed atrial fibrillation along with some symptoms.  She was also noted to have moderate the moderate to severe tricuspid valve insufficiency on a recent echocardiogram.  She was placed on Eliquis for atrial fibrillation.  For these reasons, the patient was taken to the operating today for possible mitral valve repair/replacement and tricuspid valve repair.  Based on her preoperative TIA and also her LV gram and a coronary angiogram, it was noted that the posterior leaflet was quite significantly calcified, particularly the P2 segment, which would make a repair extremely difficult.  This was discussed with the patient told  that most likely the valve would have to be replaced if indeed the patient had significant calcification of her posterior leaflet, especially if it encroaches the base and the annulus.  She understood.      OPERATIVE FINDINGS:  The patient had a low normal LV systolic function.  She had a very large mitral valve annulus.  The anterior leaflet was very thickened but was not calcified and it only prolapsed mildly.  The posterior leaflet, however, it was heavily calcified, particularly the P2 segment, very bulky and the calcification went all the way down to its base and part of the posterior annulus was also calcified.  This precluded us from attempting to repair this valve.  In fact, we needed to debride part of the posterior annulus to put the annular sutures to ensure good seating of the valve.  Part of the posterior leaflets that were not calcified were also significantly thickened.  Her tricuspid annulus was also significantly dilated and her RV systolic function was mildly diminished.  The tricuspid valve leaflets themselves appeared normal and this was purely functional regurgitation.  For this reason, we performed the reduction annuloplasty to repair the tricuspid valve.      DESCRIPTION OF PROCEDURE:  After informed consent was obtained, the patient was brought down to the operating room, was placed on the OR table in a supine position.  Intravenous and intra-arterial lines were begun.  While monitoring her blood pressure and EKG tracing, she was anesthetized and intubated using a single lumen endotracheal tube.  Her entire chest, abdomen, both groins and legs were prepped down to the knees using multiple layers of DuraPrep.  She was draped in a sterile field.  A right mini thoracotomy incision was made, and the fourth intercostal space was entered.  We then made a 1-1/2-inch incision 1 cm above and parallel to the right inguinal crease and the right common femoral artery and vein were dissected out.  The  patient was fully heparinized.  Then, 5-0 pursestring sutures were used to cannulate the right common femoral artery and femoral vein using Seldinger technique under direct TIA guidance.  A 15-Salvadorean femoral arterial cannula and a 25-[place]vadorean multistage venous cannulae were used to go on cardiopulmonary bypass after appropriate ACT level was achieved.  We had excellent drainage of the heart.        Attention was turned back to the right mini thoracotomy incision.  An Gurwinder soft tissue wound retractor and the right mini thoracotomy retractor was used to gain access to the mediastinum.  The phrenic nerve was identified and protected.  The pericardium was opened anteriorly and parallel and away from the phrenic nerve.  The SVC and IVC were encircled using umbilical tapes so we could snare the cavae down when the right atrium was opened.  An antegrade needle/aortic root vent was placed in the ascending aorta.  Carbon dioxide was flooded into the right pleural space to prevent air embolism.  The aorta was then crossclamped and a liter of antegrade [place] cardioplegia was given to fully arrest the heart.  The patient went into good diastolic arrest without any LV distention.      The interatrial groove was dissected out and a standard left atriotomy was made.  Using the USB left atrial lift system, the left atrium was exposed and the mitral valve apparatus was examined.  We had excellent exposure.  Findings are noted above.  The posterior leaflet was heavily calcified and we began by resecting this portion of the posterior leaflet, but it was quite evident that this was not a repairable valve as the calcification went all the way down to the base of the leaflet and actually onto the annulus as well.  We therefore had to resect a good portion of the posterior leaflet by preserving the tertiary cords and the calcified posterior leaflet annulus was also debrided just enough so we could easily see a valve.  The anterior  leaflet was excised.  The annulus was then sized to a 31 mm Epic valve.  Annular sutures were placed using horizontal mattress sutures of 2-0 Ethibond with supra-annular pledgets.  The valve was then brought to the field and all sutures were brought to the sewing ring and the valve was seated down without difficulty.  All sutures were tied down securely using the Cor-Knot device.  The left atrium was then closed after de-airing it in 2 layers of running 4-0 Prolene.        Next, the SVC and IVC tapes were snared down to go on total cardiopulmonary bypass and a right vertical atriotomy was made.  Tricuspid valve was exposed.  Findings are noted above.  We decided to perform a reduction annuloplasty with a 32 mm MC3 ring.  Annular sutures were placed using interrupted 2-0 Ethibond sutures along the annulus but preserving the septal annulus and the AV node.  The ring was then brought to the field and all sutures were brought through the sewing ring and the ring was seated down without difficulty.  All sutures were tied down securely using the Cor-Knot device.  The right atriotomy was then closed in 2 layers of running 4-0 Prolene.  A liter of warm blood hot shot was given an antegrade fashion and the aortic crossclamp was removed.  Aortic crossclamp time was 115 minutes.  The ascending aorta was continuously vented to deair the heart.  She was eventually weaned off cardiopulmonary bypass with low dose epinephrine support.  Total cardiopulmonary bypass time was 187 minutes.        Once the patient remained stable, the femoral venous cannula was removed and protamine was administered.  The femoral arterial cannula was subsequently removed as well.  Both arteriotomy and venotomy sites were reinforced using interrupted 5-0 Prolene suture.  The right groin incision was irrigated out and hemostasis was confirmed.  The incision was closed in layers of running Vicryl suture.  Skin was closed using 3-0 Vicryl and was sealed  using Dermabond.  Next, attention was turned back toward the mini thoracotomy incision.  Temporary ventricular pacing wires placed in the RV muscle.  A 24-[place] Mo drain was placed in the right pleural space.  A 28-[place] straight chest tube was placed in mediastinum as well.  These were all brought out percutaneously below the mini thoracotomy incision and secured to the skin using 2-0 Ethibond.  We confirmed hemostasis by checking all cardiotomy sites.  The right lung was reinflated nicely.  The mediastinum was irrigated out.  The rib spaces were reapproximated using two interrupted #5 Ethibond sutures.  The mini thoracotomy incision was then closed in layers of running Vicryl suture.  Skin was closed using 3-0 Vicryl and was sealed using Dermabond.      There were no intraoperative complications and the patient tolerated the operation well.  No blood products given intraoperatively.  All sponge counts, needle counts, and instrument counts were correct x2 at the end the operation.      ESTIMATED BLOOD LOSS:  Unknown.      SPECIMEN REMOVED:  Mitral valve leaflets.      The patient was brought to the ICU and medical stable condition and remained intubated.         LEE STANLEY MD             D: 02/15/2019   T: 02/15/2019   MT: CHRISTOPH      Name:     GERONIMO PAYTON   MRN:      [id number]        Account:        [id number]   :      1945           Procedure Date: 02/15/2019      Document: [id number]

## 2019-02-16 NOTE — CONSULTS
[facility], 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     Patient has known MV regurgitation and on recent ECHO was also noted to have tricuspid regurgitation. She was admitted on 2/15/19 and underwent minimally invasive mitral valve replacement with St Ron valve and tricuspid valve repair. Post procedure on 2/15/19 she had an episode of right hand numbness, weakness and ataxia and stroke code was called. CTH did not show any evidence of acute abnormality, CT angiogram head and neck showed patent arteries in head and neck without significant stenosis and CT perfusion was unremarkable.     On 2/16/2019, around 11 am was noted to have tongue deviation to right with asymmetric swelling of tongue with L>R. Denies any aphasia or focal neurological symptoms. Prior to this she was anemic with H&H 6.3 and transfused blood and also had an episode of bradycardia and started on dopamine infusion. Right chest tube in place.    The patient's medical, surgical, social, and family history were personally reviewed with the patient.  Past Medical History:   Diagnosis Date     Allergic rhinitis, cause unspecified     dust mites, ragweed     Colitis      Mitral regurgitation      Mitral valve disorders(424.0) 1984    myxomatous valve, mod MR, prolapse     Mitral valve prolapse       Past Surgical History:   Procedure Laterality Date     COLONOSCOPY N/A 9/15/2015     Procedure: COLONOSCOPY;  Surgeon: Anson Llanos MD;  Location:  GI     CV HEART CATHETERIZATION WITH POSSIBLE INTERVENTION N/A 1/9/2019    Procedure: Heart Catheterization with Possible Intervention;  Surgeon: Ritesh Whittaker MD;  Location:  HEART CARDIAC CATH LAB     CV RIGHT AND LEFT HEART CATH N/A 1/9/2019    Procedure: Right and Left Heart Catherization;  Surgeon: Ritesh Whittaker MD;  Location:  HEART CARDIAC CATH LAB     ENT SURGERY      T&A     Social History     Socioeconomic History     Marital status:      Spouse name: Not on file     Number of children: 3     Years of education: Not on file     Highest education level: Not on file   Social Needs     Financial resource strain: Not on file     Food insecurity - worry: Not on file     Food insecurity - inability: Not on file     Transportation needs - medical: Not on file     Transportation needs - non-medical: Not on file   Occupational History     Not on file   Tobacco Use     Smoking status: Never Smoker     Smokeless tobacco: Never Used   Substance and Sexual Activity     Alcohol use: No     Drug use: No     Sexual activity: Not Currently   Other Topics Concern      Service Not Asked     Blood Transfusions Not Asked     Caffeine Concern No     Comment: 1 cup of coffee and 1 can diet coke per day     Occupational Exposure Not Asked     Hobby Hazards Not Asked     Sleep Concern Yes     Comment: takes Doxylamine succinate 1/2 tab every night     Stress Concern No     Weight Concern No     Special Diet No     Comment: healthy      Back Care Not Asked     Exercise Yes     Comment: walking 45min x7 a wk. Very active, [employer], Golf, Bowling, Cardio     Bike Helmet Not Asked     Seat Belt Yes     Self-Exams Yes     Parent/sibling w/ CABG, MI or angioplasty before 65F 55M? No   Social History Narrative     Not on file     Family History   Problem Relation Age of Onset     Osteoporosis Mother      Alzheimer Disease Mother      Family  History Negative Father      Heart Disease Maternal Grandfather      Family History Negative Paternal Grandmother      Family History Negative Paternal Grandfather      Current Facility-Administered Medications   Medication     [START ON 2/18/2019] acetaminophen (TYLENOL) tablet 650 mg     acetaminophen (TYLENOL) tablet 975 mg     aspirin (ASA) chewable tablet 81 mg     atorvastatin (LIPITOR) tablet 40 mg     atropine 1 MG/10ML injection     dextrose 10 % 1,000 mL infusion     dextrose 5% and 0.45% NaCl + KCl 20 mEq/L infusion     glucose gel 15-30 g    Or     dextrose 50 % injection 25-50 mL    Or     glucagon injection 1 mg     DOPamine 400 mg in dextrose 5% 250 mL (adult std) - premix     EPINEPHrine (ADRENALIN) 5 mg in sodium chloride 0.9 % 250 mL infusion     EPINEPHrine PF (ADRENALIN) 1 MG/10ML injection     fentaNYL (PF) (SUBLIMAZE) injection 25-50 mcg     gabapentin (NEURONTIN) capsule 100 mg     [START ON 2/17/2019] heparin sodium injection 5,000 Units     hydrALAZINE (APRESOLINE) injection 10 mg     insulin aspart (NovoLOG) inj (RAPID ACTING)     insulin aspart (NovoLOG) inj (RAPID ACTING)     lidocaine (LMX4) cream     lidocaine 1 % 0.1-1 mL     magnesium sulfate 2 g in water intermittent infusion     magnesium sulfate 4 g in 100 mL sterile water (premade)     meperidine (DEMEROL) injection 12.5-25 mg     methocarbamol (ROBAXIN) tablet 500 mg     naloxone (NARCAN) injection 0.1-0.4 mg     nitroGLYcerin 50 mg in D5W 250 mL (adult std) infusion     ondansetron (ZOFRAN-ODT) ODT tab 4 mg    Or     ondansetron (ZOFRAN) injection 4 mg     oxyCODONE (ROXICODONE) tablet 5-10 mg     pantoprazole (PROTONIX) EC tablet 40 mg     phenylephrine (BASILIA-SYNEPHRINE) 50 mg in sodium chloride 0.9 % 250 mL infusion     polyethylene glycol (MIRALAX/GLYCOLAX) Packet 17 g     potassium chloride (KLOR-CON) Packet 20-40 mEq     potassium chloride 10 mEq in 100 mL intermittent infusion with 10 mg lidocaine     potassium chloride 10  "mEq in 100 mL sterile water intermittent infusion (premix)     potassium chloride 20 mEq in 50 mL intermittent infusion     potassium chloride ER (K-DUR/KLOR-CON M) CR tablet 20-40 mEq     senna-docusate (SENOKOT-S/PERICOLACE) 8.6-50 MG per tablet 1 tablet    Or     senna-docusate (SENOKOT-S/PERICOLACE) 8.6-50 MG per tablet 2 tablet     sodium chloride (PF) 0.9% PF flush 3 mL     sodium chloride (PF) 0.9% PF flush 3 mL     sodium chloride 0.9% infusion     sodium phosphate 10 mmol in D5W intermittent infusion     sodium phosphate 15 mmol in D5W intermittent infusion     sodium phosphate 20 mmol in D5W intermittent infusion     sodium phosphate 25 mmol in D5W intermittent infusion     Allergies   Allergen Reactions     Chlorpheniramine Other (See Comments)     LOC and fainting      No Known Drug Allergies      Phenylephrine Other (See Comments)     fainting     Review of Systems:  14-point review of systems was completed. The pertinent positives and negatives are in the HPI, and the remainder was negative unless otherwise mentioned.    Physical Exam:  /68   Pulse 80   Temp 98.8  F (37.1  C)   Resp 30   Ht 1.651 m (5' 5\")   Wt 60.1 kg (132 lb 7.9 oz)   SpO2 96%   BMI 22.05 kg/m     General: Pleasant. Resting comfortably in bed.   Head: Normocephalic, atraumatic  Eyes: No conjunctival injection, no scleral icterus.   Respiratory: Non-labored breathing. Right chest tube in place.  Cardiovascular: Peripheral pulses intact.  Abdomen: Soft  Extremities: Warm, dry without peripheral edema  Neurologic:    Mental Status Exam: Alert, awake. Fully oriented. Provides a thorough history. Speech of normal fluency and no aphasia noted however speech was thick and muffled  Cranial Nerves: EOMs intact, no nystagmus, facial movements symmetric, facial sensation intact to light touch, hearing intact to conversation, palate and uvula rise symmetrically, on tongue protrusion mild deviation to the right noted with asymmetric " swelling of tongue with L>R, symmetric shoulder shrug, tongue midline and fully mobile.   Motor:  Normal tone in all four extremities, no atrophy or fasciculations were seen. 5/5 strength bilaterally in shoulder abduction, elbow extensors and flexors, wrist extension, finger abduction, hip flexors, knee extensors and flexors. No tremors.  Sensory: Sensation intact to light touch on arms and legs bilaterally.  Coordination: Finger-nose-finger without dysmetria bilaterally. Finger tapping regular and rhythmic bilaterally.   Reflexes: Not tested  Gait: Deferred    NIHSS:  Exam Interval: Baseline   Score    Level of consciousness: (0)   Alert, keenly responsive    LOC questions: (0)   Answers both questions correctly    LOC commands: (0)   Performs both tasks correctly    Best gaze: (0)   Normal    Visual: (0)   No visual loss    Facial palsy: (0)   Normal symmetrical movements    Motor arm (left): (0)   No drift    Motor arm (right): (0)   No drift    Motor leg (left): (0)   No drift    Motor leg (right): (0)   No drift    Limb ataxia: (0)   Absent    Sensory: (0)   Normal- no sensory loss    Best language: (0)   Normal- no aphasia    Dysarthria: (0)   Normal    Extinction and inattention: (0)   No abnormality        Total Score:  0     Laboratory:  All laboratory data reviewed  Lab Results   Component Value Date    WBC 11.0 02/16/2019    HGB 9.4 (L) 02/16/2019    HCT 18.3 (L) 02/16/2019     (L) 02/16/2019     02/16/2019    POTASSIUM 3.8 02/16/2019    CHLORIDE 110 (H) 02/16/2019    CO2 23 02/16/2019    BUN 13 02/16/2019    CR 0.95 02/16/2019     (H) 02/16/2019    SED 8 04/12/2005    TROPONIN 0.01 10/16/2018    TROPI <0.015 10/12/2017     (H) 02/16/2019    ALT 40 02/16/2019    ALKPHOS 31 (L) 02/16/2019    BILITOTAL 0.6 02/16/2019    INR 1.24 (H) 02/16/2019     Imaging:  CTH: no acute abnormality  CTA head and neck: no vascular occlusion or stenosis  CTP:  unremarkable    Assessment/Recommendations:  Taty Aguila is a 74 year old female with a PMH significant for 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 Post op she had an episode of R sided weakness which resolved and a stroke code was called with negative imaging. On 2/16/19 she was noted to have new tongue deviation and swelling. Etiology is possible small punctuate stroke 2/2 cardiac surgery    #Episode of R sided weakness and tongue deviation/ swelling  - Possible secondary to small stroke  - Neurochecks Q 4 hours  - Goal normotension   - Euthermia, Euglycemia  - Continue Aspirin 81 mg daily  - Continue Lipitor 40 mg daily  - MRI brain - stroke limited when stable from a surgery standpoint  - Please check stat CT head if she develops any new neurological symptoms  - PT/OT/SLP  - PM&R  - Stroke Education    DVT ppx: Heparin sub cu  Diet: Regular  Code status: Full code  Disposition: Pending evals    We will continue to follow along, please call us with any questions or concerns, case was seen and discussed with Dr. Maritza Yanez MD  Vascular Neurology fellow  Pager # [phone]

## 2019-02-17 ENCOUNTER — APPOINTMENT (OUTPATIENT)
Dept: GENERAL RADIOLOGY | Facility: CLINIC | Age: 74
DRG: 219 | End: 2019-02-17
Attending: INTERNAL MEDICINE
Payer: COMMERCIAL

## 2019-02-17 ENCOUNTER — APPOINTMENT (OUTPATIENT)
Dept: PHYSICAL THERAPY | Facility: CLINIC | Age: 74
DRG: 219 | End: 2019-02-17
Attending: SURGERY
Payer: COMMERCIAL

## 2019-02-17 LAB
ANION GAP SERPL CALCULATED.3IONS-SCNC: 8 MMOL/L (ref 3–14)
BLD PROD TYP BPU: NORMAL
BLD UNIT ID BPU: 0
BLOOD PRODUCT CODE: NORMAL
BPU ID: NORMAL
BUN SERPL-MCNC: 18 MG/DL (ref 7–30)
CALCIUM SERPL-MCNC: 8 MG/DL (ref 8.5–10.1)
CHLORIDE SERPL-SCNC: 104 MMOL/L (ref 94–109)
CO2 SERPL-SCNC: 24 MMOL/L (ref 20–32)
CREAT SERPL-MCNC: 0.92 MG/DL (ref 0.52–1.04)
ERYTHROCYTE [DISTWIDTH] IN BLOOD BY AUTOMATED COUNT: 16.1 % (ref 10–15)
GFR SERPL CREATININE-BSD FRML MDRD: 63 ML/MIN/{1.73_M2}
GLUCOSE BLDC GLUCOMTR-MCNC: 113 MG/DL (ref 70–99)
GLUCOSE BLDC GLUCOMTR-MCNC: 123 MG/DL (ref 70–99)
GLUCOSE BLDC GLUCOMTR-MCNC: 129 MG/DL (ref 70–99)
GLUCOSE BLDC GLUCOMTR-MCNC: 132 MG/DL (ref 70–99)
GLUCOSE BLDC GLUCOMTR-MCNC: 133 MG/DL (ref 70–99)
GLUCOSE SERPL-MCNC: 126 MG/DL (ref 70–99)
HCT VFR BLD AUTO: 24.9 % (ref 35–47)
HGB BLD-MCNC: 8.7 G/DL (ref 11.7–15.7)
MCH RBC QN AUTO: 31 PG (ref 26.5–33)
MCHC RBC AUTO-ENTMCNC: 34.9 G/DL (ref 31.5–36.5)
MCV RBC AUTO: 89 FL (ref 78–100)
PLATELET # BLD AUTO: 102 10E9/L (ref 150–450)
POTASSIUM SERPL-SCNC: 4.3 MMOL/L (ref 3.4–5.3)
RBC # BLD AUTO: 2.81 10E12/L (ref 3.8–5.2)
SODIUM SERPL-SCNC: 136 MMOL/L (ref 133–144)
TRANSFUSION STATUS PATIENT QL: NORMAL
WBC # BLD AUTO: 14.8 10E9/L (ref 4–11)

## 2019-02-17 PROCEDURE — 71045 X-RAY EXAM CHEST 1 VIEW: CPT

## 2019-02-17 PROCEDURE — 97110 THERAPEUTIC EXERCISES: CPT | Mod: GP

## 2019-02-17 PROCEDURE — 00000146 ZZHCL STATISTIC GLUCOSE BY METER IP

## 2019-02-17 PROCEDURE — 25000132 ZZH RX MED GY IP 250 OP 250 PS 637: Performed by: STUDENT IN AN ORGANIZED HEALTH CARE EDUCATION/TRAINING PROGRAM

## 2019-02-17 PROCEDURE — 93005 ELECTROCARDIOGRAM TRACING: CPT

## 2019-02-17 PROCEDURE — 25000128 H RX IP 250 OP 636: Performed by: NURSE PRACTITIONER

## 2019-02-17 PROCEDURE — 93010 ELECTROCARDIOGRAM REPORT: CPT | Performed by: INTERNAL MEDICINE

## 2019-02-17 PROCEDURE — 25000128 H RX IP 250 OP 636: Performed by: INTERNAL MEDICINE

## 2019-02-17 PROCEDURE — 99291 CRITICAL CARE FIRST HOUR: CPT | Performed by: INTERNAL MEDICINE

## 2019-02-17 PROCEDURE — 20000003 ZZH R&B ICU

## 2019-02-17 PROCEDURE — 25800030 ZZH RX IP 258 OP 636: Performed by: SURGERY

## 2019-02-17 PROCEDURE — 25000128 H RX IP 250 OP 636: Performed by: PHYSICIAN ASSISTANT

## 2019-02-17 PROCEDURE — 25000128 H RX IP 250 OP 636: Performed by: STUDENT IN AN ORGANIZED HEALTH CARE EDUCATION/TRAINING PROGRAM

## 2019-02-17 PROCEDURE — 85027 COMPLETE CBC AUTOMATED: CPT | Performed by: PHYSICIAN ASSISTANT

## 2019-02-17 PROCEDURE — 25000132 ZZH RX MED GY IP 250 OP 250 PS 637: Performed by: PHYSICIAN ASSISTANT

## 2019-02-17 PROCEDURE — 25800030 ZZH RX IP 258 OP 636: Performed by: STUDENT IN AN ORGANIZED HEALTH CARE EDUCATION/TRAINING PROGRAM

## 2019-02-17 PROCEDURE — 80048 BASIC METABOLIC PNL TOTAL CA: CPT | Performed by: PHYSICIAN ASSISTANT

## 2019-02-17 PROCEDURE — 94660 CPAP INITIATION&MGMT: CPT

## 2019-02-17 PROCEDURE — 40000275 ZZH STATISTIC RCP TIME EA 10 MIN

## 2019-02-17 PROCEDURE — 99232 SBSQ HOSP IP/OBS MODERATE 35: CPT | Performed by: INTERNAL MEDICINE

## 2019-02-17 RX ORDER — FUROSEMIDE 10 MG/ML
20 INJECTION INTRAMUSCULAR; INTRAVENOUS ONCE
Status: COMPLETED | OUTPATIENT
Start: 2019-02-17 | End: 2019-02-17

## 2019-02-17 RX ORDER — ACETAMINOPHEN 325 MG/1
650 TABLET ORAL EVERY 4 HOURS PRN
Status: DISCONTINUED | OUTPATIENT
Start: 2019-02-17 | End: 2019-02-18

## 2019-02-17 RX ADMIN — FUROSEMIDE 20 MG: 10 INJECTION, SOLUTION INTRAVENOUS at 14:51

## 2019-02-17 RX ADMIN — SODIUM CHLORIDE: 9 INJECTION, SOLUTION INTRAVENOUS at 14:51

## 2019-02-17 RX ADMIN — GABAPENTIN 100 MG: 100 CAPSULE ORAL at 21:36

## 2019-02-17 RX ADMIN — ASPIRIN 81 MG 81 MG: 81 TABLET ORAL at 08:53

## 2019-02-17 RX ADMIN — ACETAMINOPHEN 975 MG: 325 TABLET, FILM COATED ORAL at 06:23

## 2019-02-17 RX ADMIN — SENNOSIDES AND DOCUSATE SODIUM 2 TABLET: 8.6; 5 TABLET ORAL at 08:54

## 2019-02-17 RX ADMIN — DOPAMINE HYDROCHLORIDE IN DEXTROSE 5 MCG/KG/MIN: 1.6 INJECTION, SOLUTION INTRAVENOUS at 15:04

## 2019-02-17 RX ADMIN — SENNOSIDES AND DOCUSATE SODIUM 2 TABLET: 8.6; 5 TABLET ORAL at 21:36

## 2019-02-17 RX ADMIN — ATORVASTATIN CALCIUM 40 MG: 40 TABLET, FILM COATED ORAL at 21:36

## 2019-02-17 RX ADMIN — PANTOPRAZOLE SODIUM 40 MG: 40 TABLET, DELAYED RELEASE ORAL at 08:53

## 2019-02-17 RX ADMIN — HEPARIN SODIUM 5000 UNITS: 5000 INJECTION, SOLUTION INTRAVENOUS; SUBCUTANEOUS at 16:38

## 2019-02-17 RX ADMIN — GABAPENTIN 100 MG: 100 CAPSULE ORAL at 08:53

## 2019-02-17 RX ADMIN — POTASSIUM CHLORIDE, DEXTROSE MONOHYDRATE AND SODIUM CHLORIDE: 150; 5; 450 INJECTION, SOLUTION INTRAVENOUS at 14:50

## 2019-02-17 RX ADMIN — FENTANYL CITRATE 25 MCG: 50 INJECTION, SOLUTION INTRAMUSCULAR; INTRAVENOUS at 21:41

## 2019-02-17 RX ADMIN — GABAPENTIN 100 MG: 100 CAPSULE ORAL at 16:41

## 2019-02-17 RX ADMIN — HEPARIN SODIUM 5000 UNITS: 5000 INJECTION, SOLUTION INTRAVENOUS; SUBCUTANEOUS at 08:19

## 2019-02-17 RX ADMIN — POLYETHYLENE GLYCOL 3350 17 G: 17 POWDER, FOR SOLUTION ORAL at 08:16

## 2019-02-17 ASSESSMENT — ACTIVITIES OF DAILY LIVING (ADL)
ADLS_ACUITY_SCORE: 11

## 2019-02-17 ASSESSMENT — MIFFLIN-ST. JEOR: SCORE: 1128.88

## 2019-02-17 NOTE — PROGRESS NOTES
[facility]  Cardiovascular and Thoracic Surgery Daily Note          Assessment and Plan:   POD#2 s/p Mini right thoracotomy, mitral valve replacement with a 31 mm St. Ron Epic (porcine bioprosthetic) valve, tricuspid valve repair with a 32 mm Oquendo MC3 annuloplasty ring, right femoral artery and venous cannulation for cardiopulmonary bypass, intraoperative transesophageal echocardiogram by Dr. Andrea Hanna  -CVS: HR: 50s-80s. SBP: 90s-110s. Symptomatic bradycardia on POD#1- started on dopamine, temporary pacer wires do not capture. Cardiology consulted-- recommend weaning off dopamine to assess underlying rhythm- discussed with Dr. Velásquez with HR 50s on Dopamine, did not feel that it was safe to wean off dopamine without cardiology immediately available to intervene, will elect to postpone trial of weaning off dopamine until tomorrow. Femi weaned off. Remains on Dopamine to maintain adequate HR and SBP. ASA. BB on hold d/t pressor need. Hx of PAF. Amiodarone and xarelto on hold for now. Will resume close to discharge as needed. Chest tubes with too much output to pull.   -Resp: Extubated within protocol. Saturating well on 4L. On bipap overnight. Continue to encourage IS, cough, deep breathing, ambulation  -Neuro:  Grossly intact. Pain controlled. Concern of neuro deficits in right hand on POD#0. Code stroke called. CT head negative. Neurology to see this am.   -Renal: good UOP. Up about 5kg from preoperative weight. Will hold diuresis today d/t pressor need.  -GI:  Tolerating clears. No BM. Continue bowel regimen.   -:  Dugan in place d/t limited mobility and need for accurate I&Os  -Endo: pre op A1c: 5.9. Will transition to sliding scale insulin  -FEN: replace electrolytes as needed. Na: 136. K: 4.3  Orders Placed This Encounter      Regular Diet Adult    -ID: Temp (24hrs), Av.4  F (37.4  C), Min:98.4  F (36.9  C), Max:100.2  F (37.9  C)  WBC: 8.9. Completed perioperative abx.   -Heme:  "Hgb: 8.7. Plt: 102. Received 2u PRBC yesterday with good result. Acute blood loss anemia and thrombocytopenia related to surgery. Continue to monitor.   -Proph: PCD, ASA, PP, sub q heparin  -Dispo: Continue ICU cares today. Continue dopamine to maintain HR and SBP. Continue therapies. Continue to encourage IS, cough, deep breathing, ambulation          Interval History:   No acute events overnight. Saturating well on 4L, on Bipap overnight. Pain controlled. Tolerating diet. No BM.          Medications:       acetaminophen  975 mg Oral Q8H     aspirin  81 mg Oral or NG Tube Daily     atorvastatin  40 mg Oral At Bedtime     gabapentin  100 mg Oral TID     heparin  5,000 Units Subcutaneous Q8H     insulin aspart  1-7 Units Subcutaneous Q4H     pantoprazole  40 mg Oral QAM AC     polyethylene glycol  17 g Oral Daily     senna-docusate  1 tablet Oral BID    Or     senna-docusate  2 tablet Oral BID     sodium chloride (PF)  3 mL Intracatheter Q8H     [START ON 2/18/2019] acetaminophen, IV fluid REPLACEMENT ONLY, glucose **OR** dextrose **OR** glucagon, fentaNYL, hydrALAZINE, lidocaine 4%, lidocaine (buffered or not buffered), magnesium sulfate, magnesium sulfate, meperidine, methocarbamol, naloxone, ondansetron **OR** ondansetron, oxyCODONE IR, potassium chloride, potassium chloride with lidocaine, potassium chloride, potassium chloride, potassium chloride, sodium chloride (PF), sodium phosphate, sodium phosphate, sodium phosphate, sodium phosphate          Physical Exam:   Vitals were reviewed  Blood pressure 92/56, pulse 79, temperature 99  F (37.2  C), resp. rate 22, height 1.651 m (5' 5\"), weight 61.8 kg (136 lb 3.9 oz), SpO2 96 %.  Rhythm: First degree AV block    Lungs: diminished bases    Cardiovascular: First degree AV block    Abdomen: soft NTND    Extremeties: minimal edema    Incision: CDI     CT: to suction    Weight:   Vitals:    02/15/19 0611 02/16/19 0455 02/17/19 0629   Weight: 56.7 kg (125 lb) 60.1 kg " (132 lb 7.9 oz) 61.8 kg (136 lb 3.9 oz)            Data:   Labs:   Lab Results   Component Value Date    WBC 14.8 02/17/2019     Lab Results   Component Value Date    RBC 2.81 02/17/2019     Lab Results   Component Value Date    HGB 8.7 02/17/2019     Lab Results   Component Value Date    HCT 24.9 02/17/2019     No components found for: MCT  Lab Results   Component Value Date    MCV 89 02/17/2019     Lab Results   Component Value Date    MCH 31.0 02/17/2019     Lab Results   Component Value Date    MCHC 34.9 02/17/2019     Lab Results   Component Value Date    RDW 16.1 02/17/2019     Lab Results   Component Value Date     02/17/2019       Last Basic Metabolic Panel:  Lab Results   Component Value Date     02/17/2019      Lab Results   Component Value Date    POTASSIUM 4.3 02/17/2019     Lab Results   Component Value Date    CHLORIDE 104 02/17/2019     Lab Results   Component Value Date    ARIELLE 8.0 02/17/2019     Lab Results   Component Value Date    CO2 24 02/17/2019     Lab Results   Component Value Date    BUN 18 02/17/2019     Lab Results   Component Value Date    CR 0.92 02/17/2019     Lab Results   Component Value Date     02/17/2019       CXR: 2/16/19    FINDINGS:  The ET tube and NG tube have been removed. Patient is  status post cardiac surgery. Right chest tubes are in place. No  pneumothorax is identified. Left basilar opacity compatible with  infiltrate or atelectasis has increased slightly. There is also been  an increase in the right basilar opacity compatible with infiltrate or  atelectasis.                                                                      IMPRESSION:  1. ET tube and NG tube removed.  2. Increased basilar opacities compatible with infiltrate or  atelectasis.  3. No pneumothorax is identified.    Loli Schuster PA-C  CV Surgery  Pager # [phone]

## 2019-02-17 NOTE — PROGRESS NOTES
[facility]    Cardiology Progress Note     Assessment & Plan   Taty Aguila is a 74 year old female who was admitted on 2/15/2019. She is status post 31 mm St. Ron Epic porcine mitral valve replacement and 32 mm Oquendo MC3 tricuspid annuloplasty on 2/15/19.  I was asked to see the patient for bradycardia 40s and hypotension with systolics in the 80s to 90s.  with systolics in the 80s-90s. Epicardial pacing wires failed to capture this episode.      1. Symptomatic bradycardia  - In the setting of recent mitral valve replacement and tricuspid annuloplasty. Denies significant pain or other reason for vagotonia during the episode.  - Rhythm strips appear to show AV dissociation with ventricular rate in the 40s but P waves are very low amplitude and therefore difficult to fully appreciate. Does not appear to be a post conversion pause.   - Her most recent ECG from this AM with Mobitz II block with junctional escapes  - Epicardial pacing wires not capturing  - HR and BP have since improved on dopamine  - LVEF 65% on TIA from 1/9     Plan:  - Continue on dopamine per CV surgery preference, attempt to wean tomorrow with full complement of staff, continue monitoring on telemetry  - Will require EP consult in tomorrow     2. Status post 31 mm St. Ron Epic porcine mitral valve replacement and 32 mm Oquendo MC3 tricuspid annuloplasty on 2/15/19.  Plan:  - Will require pre-dismissal TTE    3. Shortness of breath  - CVP 12, JVP elevated, CXR with mild pulmonary congestion  - Adequate heart rates on dopamine    Plan:  - Gentle diuresis with 20 mg IV lasix  - Can get repeat TTE sooner, e.g. Tomorrow AM if not improving    Jose C Dasilva MD    Interval History   No significant events overnight, Phenylephrine weaned off, continued on dopamine    Physical Exam   Temp: 99.5  F (37.5  C) Temp src: Bladder BP: 104/54 Pulse: 54 Heart Rate: 55 Resp: 17 SpO2: 93 % O2 Device: Nasal cannula with humidification  Oxygen Delivery: 3 LPM  Vitals:    02/15/19 0611 02/16/19 0455 02/17/19 0629   Weight: 56.7 kg (125 lb) 60.1 kg (132 lb 7.9 oz) 61.8 kg (136 lb 3.9 oz)     Vital Signs with Ranges  Temp:  [98.6  F (37  C)-100.2  F (37.9  C)] 99.5  F (37.5  C)  Pulse:  [54-84] 54  Heart Rate:  [50-85] 55  Resp:  [0-113] 17  BP: ()/(52-78) 104/54  FiO2 (%):  [50 %] 50 %  SpO2:  [83 %-99 %] 93 %  I/O last 3 completed shifts:  In: 2234.58 [P.O.:840; I.V.:1394.58]  Out: 2455 [Urine:1865; Chest Tube:590]    Constitutional: No apparent distress, wearing CPAP  Eyes: No xanthelasma or conjunctivitis  HEENT: Moist oral mucosa  Respiratory: Clear to auscultation bilaterally. No crackles or wheezes.  Cardiovascular: Regular rate and rhythm. Normal S1 and S2. 3/6 systolic murmur at the apex. No extra heart sounds.  JVD present  Lymph/Hematologic: No purpura or petechiae.  Skin: No stasis dermatitis. No major rashes.  Extremities: No peripheral edema.  Neurologic: Moving all extremities. No facial assymmetry.  Psychiatric: Alert and oriented. Answers questions appropriately.        Medications     IV fluid REPLACEMENT ONLY       dextrose 5% and 0.45% NaCl + KCl 20 mEq/L 10 mL/hr at 02/17/19 0700     DOPamine 4 mcg/kg/min (02/17/19 0800)     EPINEPHrine IV infusion ADULT       nitroGLYcerin       phenylephrine IV infusion ADULT Stopped (02/16/19 1210)     sodium chloride 10 mL/hr at 02/17/19 0700       acetaminophen  975 mg Oral Q8H     aspirin  81 mg Oral or NG Tube Daily     atorvastatin  40 mg Oral At Bedtime     gabapentin  100 mg Oral TID     heparin  5,000 Units Subcutaneous Q8H     insulin aspart  1-7 Units Subcutaneous Q4H     pantoprazole  40 mg Oral QAM AC     polyethylene glycol  17 g Oral Daily     senna-docusate  1 tablet Oral BID    Or     senna-docusate  2 tablet Oral BID     sodium chloride (PF)  3 mL Intracatheter Q8H       Data   Results for orders placed or performed during the hospital encounter of 02/15/19 (from the past  24 hour(s))   EKG 12-lead, tracing only   Result Value Ref Range    Interpretation ECG Click View Image link to view waveform and result    Glucose by meter   Result Value Ref Range    Glucose 130 (H) 70 - 99 mg/dL   Glucose by meter   Result Value Ref Range    Glucose 144 (H) 70 - 99 mg/dL   Glucose by meter   Result Value Ref Range    Glucose 144 (H) 70 - 99 mg/dL   Glucose by meter   Result Value Ref Range    Glucose 132 (H) 70 - 99 mg/dL   Basic metabolic panel (AM Draw)   Result Value Ref Range    Sodium 136 133 - 144 mmol/L    Potassium 4.3 3.4 - 5.3 mmol/L    Chloride 104 94 - 109 mmol/L    Carbon Dioxide 24 20 - 32 mmol/L    Anion Gap 8 3 - 14 mmol/L    Glucose 126 (H) 70 - 99 mg/dL    Urea Nitrogen 18 7 - 30 mg/dL    Creatinine 0.92 0.52 - 1.04 mg/dL    GFR Estimate 63 >60 mL/min/[1.73_m2]    GFR Estimate If Black 73 >60 mL/min/[1.73_m2]    Calcium 8.0 (L) 8.5 - 10.1 mg/dL   CBC with platelets   Result Value Ref Range    WBC 14.8 (H) 4.0 - 11.0 10e9/L    RBC Count 2.81 (L) 3.8 - 5.2 10e12/L    Hemoglobin 8.7 (L) 11.7 - 15.7 g/dL    Hematocrit 24.9 (L) 35.0 - 47.0 %    MCV 89 78 - 100 fl    MCH 31.0 26.5 - 33.0 pg    MCHC 34.9 31.5 - 36.5 g/dL    RDW 16.1 (H) 10.0 - 15.0 %    Platelet Count 102 (L) 150 - 450 10e9/L   EKG 12-lead, tracing only   Result Value Ref Range    Interpretation ECG Click View Image link to view waveform and result    Glucose by meter   Result Value Ref Range    Glucose 113 (H) 70 - 99 mg/dL

## 2019-02-17 NOTE — PROGRESS NOTES
Pt was placed on BiPAP 10/5 60% @1204 today and remained on the BiPAP. SpO2 98%, RT will continue to follow the pt.     Markus Guzmán RT.

## 2019-02-17 NOTE — PROGRESS NOTES
Pt stable on Bipap majority of shift. No issues overnight. Alarm level set at 10. Will continue to follow.     Chung Clemente RT

## 2019-02-17 NOTE — PLAN OF CARE
PT:  Discharge Planner PT   Patient plan for discharge: Return home with spouse and OP phase 2 CR.  Current status: Pt just returned to bed with nursing and is very tired but agreeable to in bed calisthenics and performed these well. Encouraged pt to perform these in bed later today as well for activity.  Barriers to return to prior living situation: None anticipated  Recommendations for discharge: Return home with spouse and OP phase 2 CR   Rationale for recommendations: Pt would benefit from continued PT/CR for return to PLOF, increased knowledge of risk factors, aerobic exercise and lifestyle modifications.       Entered by: Oma Eid 02/17/2019 11:06 AM

## 2019-02-17 NOTE — PLAN OF CARE
Neuro: No changes overnight. A/Ox4  CV: Goal to keep MAP>65. Dopamine gtt  Pulmonary: Lung sounds clear. Bipap overnight.  GI/: Urine output adequate.  Bowel sounds active  Skin: See epic flowsheet  Drips: Dopamine, maintenance fluid  Lines: CVC, peripheral x 2  Update:  NPO after midnight.  Possible temp pacer

## 2019-02-17 NOTE — SIGNIFICANT EVENT
Patient complaining of chest pressure in mid chest (away from incision).  Rating pain 6/10.  New type of pain.  Feels like someone is sitting on chest.  Stat 12 lead ordered.  No change from prior EKG.  Cardiology on unit and updated.  Pain resolving.

## 2019-02-17 NOTE — PLAN OF CARE
Patient had period of bradycardia this morning in the 40's that last about 30 minutes (see event note).  Now on dopamine at and HR in 80's.  Period of chest pain this evening (see event note).  12 lead EKG unchanged.  Patient was on room air this morning and then required 4L NC during junaid episode and then O2 needs continued to increase throughout the morning until she was on 15L oxymask and then placed on BiPAP.  This evening, patient is tolerating breaks from BiPAP on oxymizer at 5-7 liters.  Needs lots of encouragment to work on IS.  Up to chair x2 today. Does not tolerate being up for long.  Great UOP after dopamine gtt started.  CT output within normal limits and has lightened to serosang with no air leak.  Insulin gtt off and transitioned to sliding scale.  Patient will be NPO at midnight and in morning will wean dopamine off and see if she will need a transvenous pacer as her pacing wires don't sense or capture.  This evening patient seems a little mixed up intermitently (wants to go make a salad in the kitchen) but when questioned knows shes in the hospital and oriented to time and situation.  Family updated at bedside.

## 2019-02-17 NOTE — PROGRESS NOTES
[facility]  Critical Care Service  Progress Note  Date of Service (when I saw the patient): 02/17/2019     Main Plans for Today    -Continue dopamine today; attempt to wean in am  -EP consult tomorrow       Assessment & Plan   Taty Aguila is a 74 year old female with PMH mitral valve prolapse and regurgitation who presents to the ICU after a minimally invasive mitral valve replacement (St Ron 31mm valve) and tricuspid valve repair (32mm Oquendo ring) done on 2/15/19    1. Acute postoperative hypoxic respiratory failure  Extubated 2/15 doing well on NC  2. Severe symptomatic mitral valve regurgitation and mod-severe TV regurg    POD# 2 mini right thoracotomy, MV replacement, TV repair  3. Postop Bradycardia, symptomatic     4. Chronic afib   5. Stress Hyperglycemia     Neuro  1. Encephalopathy, drug induced  2. Now extubated, sedation off  Plan:  -- PRN acetaminophen,   -- Delirium prevention as able    CV  1. S/p MV Replcement , tricuspid repair  2. Symptomatic postop bradycardia  3. Chronic afib  Plan:  -- On dopamine per Cards/CVTS  -- Additional Cardiac meds per CV surgery  -- EP consult in am     Resp:  1. Acute hypoxic respiratory failure   Plan:  -- extubated per pathway 2/15  -- titrate fio2 to keep sats>92%  -- pulm toilet-IS CDB    GI/Nutrition  1. No prior hx  Plan:  -- regular diet; NPO at midnight    Renal  1. No prior hx.  Baseline creatinine appears to be 0.9  Plan:  -- monitor function and electrolytes as needed with replacement per ICU protocols. - generally avoid nephrotoxic agents such as NSAID, IV contrast unless specifically required  -- adjust medications as needed for renal clearance  -- follow I/O's as appropriate.  -- maintain euvolemia    ID  Dx: No acute issues  - Complete filiberto-operative antibiotics (ancef and vancomycin)  - Afebrile, monitoring mild persistent leukocytosis     Endocrine  1. Stress Hyperglycemia  Plan:  -- Insulin gtt per protocol, transition to  SSI  -- Keep BG  <180 for optimal healing    Heme:  1. Acute blood loss anemia  2. Coagulopathy   Plan:  -- Monitor hemoglobin.  -- Transfuse to keep > 7.0    General cares:  DVT Prophylaxis: Heparin SQ  GI Prophylaxis: PPI  Restraints: Restraints for medical healing needed: NO  Family update by me today: No  Current lines are required for patient management  Access:      Kristi Mason MD  Pulmonary and Critical Care Medicine        Time Spent on this Encounter   Billing:  I spent 35 minutes bedside and on the inpatient unit today managing the critical care of Taty Aguila in relation to the issues listed in this note.    Interval History   Symptomatic bradycardia persists; she is on dopamine at 4 with heart rates in the mid-50s. Feels overall lethargy/malaise this morning but denies pain or other specific complaints.    Physical Exam   Temp: 99.5  F (37.5  C) Temp src: Bladder Temp  Min: 98.6  F (37  C)  Max: 100.2  F (37.9  C) BP: 108/55 Pulse: 55 Heart Rate: 53 Resp: 29 SpO2: 93 % O2 Device: Nasal cannula with humidification Oxygen Delivery: 3 LPM  Vitals:    02/15/19 0611 02/16/19 0455 02/17/19 0629   Weight: 56.7 kg (125 lb) 60.1 kg (132 lb 7.9 oz) 61.8 kg (136 lb 3.9 oz)     I/O last 3 completed shifts:  In: 2234.58 [P.O.:840; I.V.:1394.58]  Out: 2455 [Urine:1865; Chest Tube:590]    GEN: in bed, comfortable appearing, in NAD.  EYES: PERRL, Anicteric sclera.   HEENT:  Normocephalic, atraumatic, trachea midline  CV: RRR-HR mid 50s  PULM/CHEST: CTA non labored  GI: normal bowel sounds, soft, non-tender, no rebound tenderness or guarding, no masses  : peña catheter in place, urine yellow and clear  EXTREMITIES: no peripheral edema, moving all extremities, peripheral pulses intact  NEURO: no focal deficits identified  SKIN: No rashes, sores or ulcerations  PSYCH:  Appropriate       Data   Recent Labs   Lab 02/17/19  0423 02/16/19  1156 02/16/19  0400 02/15/19  1353 02/15/19  1225   WBC 14.8*  --  11.0  13.9* 13.2*   HGB 8.7* 9.4* 6.3* 10.4* 8.4*   MCV 89  --  92 93 94   *  --  117* 125* 120*   INR  --   --  1.24* 1.35* 1.74*     --  141 143 142   POTASSIUM 4.3 3.8 3.7 3.9 4.3   CHLORIDE 104  --  110* 111* 109   CO2 24  --  23 23 25   BUN 18  --  13 10 10   CR 0.92  --  0.95 0.93 0.85   ANIONGAP 8  --  8 9 8   ARIELLE 8.0*  --  7.7* 7.9* 8.5   *  --  110* 144* 186*   ALBUMIN  --   --  3.5 2.6* 2.3*   PROTTOTAL  --   --  5.6* 4.9* 4.5*   BILITOTAL  --   --  0.6 0.4 0.2   ALKPHOS  --   --  31* 32* 29*   ALT  --   --  40 43 27   AST  --   --  211* 271* 151*

## 2019-02-18 ENCOUNTER — SURGERY (OUTPATIENT)
Age: 74
End: 2019-02-18
Payer: COMMERCIAL

## 2019-02-18 ENCOUNTER — APPOINTMENT (OUTPATIENT)
Dept: PHYSICAL THERAPY | Facility: CLINIC | Age: 74
DRG: 219 | End: 2019-02-18
Attending: SURGERY
Payer: COMMERCIAL

## 2019-02-18 PROBLEM — I36.1 NON-RHEUMATIC TRICUSPID VALVE INSUFFICIENCY: Status: RESOLVED | Noted: 2019-02-08 | Resolved: 2019-02-18

## 2019-02-18 LAB
ANION GAP SERPL CALCULATED.3IONS-SCNC: 8 MMOL/L (ref 3–14)
BUN SERPL-MCNC: 22 MG/DL (ref 7–30)
CA-I BLD-SCNC: 4.8 MG/DL (ref 4.4–5.2)
CA-I BLD-SCNC: 5.4 MG/DL (ref 4.4–5.2)
CALCIUM SERPL-MCNC: 8.1 MG/DL (ref 8.5–10.1)
CHLORIDE SERPL-SCNC: 102 MMOL/L (ref 94–109)
CO2 BLD-SCNC: 24 MMOL/L (ref 21–28)
CO2 BLD-SCNC: 25 MMOL/L (ref 21–28)
CO2 BLD-SCNC: 27 MMOL/L (ref 21–28)
CO2 BLD-SCNC: 27 MMOL/L (ref 21–28)
CO2 BLD-SCNC: 28 MMOL/L (ref 21–28)
CO2 BLD-SCNC: 29 MMOL/L (ref 21–28)
CO2 BLD-SCNC: 30 MMOL/L (ref 21–28)
CO2 BLDCOV-SCNC: 26 MMOL/L (ref 21–28)
CO2 SERPL-SCNC: 23 MMOL/L (ref 20–32)
COPATH REPORT: NORMAL
CPB APPLIED: ABNORMAL
CREAT SERPL-MCNC: 0.77 MG/DL (ref 0.52–1.04)
ERYTHROCYTE [DISTWIDTH] IN BLOOD BY AUTOMATED COUNT: 15.5 % (ref 10–15)
GFR SERPL CREATININE-BSD FRML MDRD: 78 ML/MIN/{1.73_M2}
GLUCOSE BLDC GLUCOMTR-MCNC: 111 MG/DL (ref 70–99)
GLUCOSE BLDC GLUCOMTR-MCNC: 120 MG/DL (ref 70–99)
GLUCOSE BLDC GLUCOMTR-MCNC: 96 MG/DL (ref 70–99)
GLUCOSE SERPL-MCNC: 125 MG/DL (ref 70–99)
HCT VFR BLD AUTO: 26 % (ref 35–47)
HCT VFR BLD CALC: 25 %PCV (ref 35–47)
HCT VFR BLD CALC: 26 %PCV (ref 35–47)
HCT VFR BLD CALC: 26 %PCV (ref 35–47)
HCT VFR BLD CALC: 28 %PCV (ref 35–47)
HCT VFR BLD CALC: 28 %PCV (ref 35–47)
HCT VFR BLD CALC: 32 %PCV (ref 35–47)
HGB BLD CALC-MCNC: 10.9 G/DL (ref 11.7–15.7)
HGB BLD CALC-MCNC: 8.5 G/DL (ref 11.7–15.7)
HGB BLD CALC-MCNC: 8.8 G/DL (ref 11.7–15.7)
HGB BLD CALC-MCNC: 8.8 G/DL (ref 11.7–15.7)
HGB BLD CALC-MCNC: 9.5 G/DL (ref 11.7–15.7)
HGB BLD CALC-MCNC: 9.5 G/DL (ref 11.7–15.7)
HGB BLD-MCNC: 9 G/DL (ref 11.7–15.7)
INR PPP: 1.21 (ref 0.86–1.14)
INTERPRETATION ECG - MUSE: NORMAL
INTERPRETATION ECG - MUSE: NORMAL
LACTATE BLD-SCNC: 0.7 MMOL/L (ref 0.7–2.1)
LACTATE BLD-SCNC: 0.8 MMOL/L (ref 0.7–2.1)
LACTATE BLD-SCNC: 0.8 MMOL/L (ref 0.7–2.1)
LACTATE BLD-SCNC: 0.9 MMOL/L (ref 0.7–2.1)
LACTATE BLD-SCNC: 1 MMOL/L (ref 0.7–2.1)
LACTATE BLD-SCNC: 1.1 MMOL/L (ref 0.7–2.1)
LACTATE BLD-SCNC: 1.5 MMOL/L (ref 0.7–2.1)
LACTATE BLD-SCNC: 1.6 MMOL/L (ref 0.7–2.1)
MCH RBC QN AUTO: 30.6 PG (ref 26.5–33)
MCHC RBC AUTO-ENTMCNC: 34.6 G/DL (ref 31.5–36.5)
MCV RBC AUTO: 88 FL (ref 78–100)
PCO2 BLD: 39 MM HG (ref 35–45)
PCO2 BLD: 40 MM HG (ref 35–45)
PCO2 BLD: 48 MM HG (ref 35–45)
PCO2 BLD: 52 MM HG (ref 35–45)
PCO2 BLD: 52 MM HG (ref 35–45)
PCO2 BLD: 55 MM HG (ref 35–45)
PCO2 BLD: 55 MM HG (ref 35–45)
PCO2 BLD: 57 MM HG (ref 35–45)
PCO2 BLD: 64 MM HG (ref 35–45)
PCO2 BLDV: 48 MM HG (ref 40–50)
PH BLD: 7.28 PH (ref 7.35–7.45)
PH BLD: 7.29 PH (ref 7.35–7.45)
PH BLD: 7.31 PH (ref 7.35–7.45)
PH BLD: 7.33 PH (ref 7.35–7.45)
PH BLD: 7.36 PH (ref 7.35–7.45)
PH BLD: 7.4 PH (ref 7.35–7.45)
PH BLD: 7.4 PH (ref 7.35–7.45)
PH BLDV: 7.34 PH (ref 7.32–7.43)
PLATELET # BLD AUTO: 119 10E9/L (ref 150–450)
PO2 BLD: 101 MM HG (ref 80–105)
PO2 BLD: 218 MM HG (ref 80–105)
PO2 BLD: 221 MM HG (ref 80–105)
PO2 BLD: 365 MM HG (ref 80–105)
PO2 BLD: 372 MM HG (ref 80–105)
PO2 BLD: 377 MM HG (ref 80–105)
PO2 BLD: 383 MM HG (ref 80–105)
PO2 BLD: 408 MM HG (ref 80–105)
PO2 BLD: 93 MM HG (ref 80–105)
PO2 BLDV: 45 MM HG (ref 25–47)
POTASSIUM BLD-SCNC: 3.7 MMOL/L (ref 3.4–5.3)
POTASSIUM BLD-SCNC: 3.8 MMOL/L (ref 3.4–5.3)
POTASSIUM BLD-SCNC: 4.2 MMOL/L (ref 3.4–5.3)
POTASSIUM BLD-SCNC: 4.4 MMOL/L (ref 3.4–5.3)
POTASSIUM BLD-SCNC: 4.7 MMOL/L (ref 3.4–5.3)
POTASSIUM BLD-SCNC: 4.8 MMOL/L (ref 3.4–5.3)
POTASSIUM SERPL-SCNC: 3.8 MMOL/L (ref 3.4–5.3)
RBC # BLD AUTO: 2.94 10E12/L (ref 3.8–5.2)
SAO2 % BLDA FROM PO2: 100 % (ref 92–100)
SAO2 % BLDA FROM PO2: 96 % (ref 92–100)
SAO2 % BLDA FROM PO2: 97 % (ref 92–100)
SAO2 % BLDV FROM PO2: 77 %
SODIUM BLD-SCNC: 135 MMOL/L (ref 133–144)
SODIUM BLD-SCNC: 137 MMOL/L (ref 133–144)
SODIUM BLD-SCNC: 138 MMOL/L (ref 133–144)
SODIUM BLD-SCNC: 139 MMOL/L (ref 133–144)
SODIUM SERPL-SCNC: 133 MMOL/L (ref 133–144)
WBC # BLD AUTO: 16.5 10E9/L (ref 4–11)

## 2019-02-18 PROCEDURE — 27210794 ZZH OR GENERAL SUPPLY STERILE: Performed by: INTERNAL MEDICINE

## 2019-02-18 PROCEDURE — C1898 LEAD, PMKR, OTHER THAN TRANS: HCPCS | Performed by: INTERNAL MEDICINE

## 2019-02-18 PROCEDURE — 80048 BASIC METABOLIC PNL TOTAL CA: CPT | Performed by: PHYSICIAN ASSISTANT

## 2019-02-18 PROCEDURE — 94799 UNLISTED PULMONARY SVC/PX: CPT

## 2019-02-18 PROCEDURE — 99153 MOD SED SAME PHYS/QHP EA: CPT | Performed by: INTERNAL MEDICINE

## 2019-02-18 PROCEDURE — 02HK3JZ INSERTION OF PACEMAKER LEAD INTO RIGHT VENTRICLE, PERCUTANEOUS APPROACH: ICD-10-PCS | Performed by: INTERNAL MEDICINE

## 2019-02-18 PROCEDURE — 33208 INSRT HEART PM ATRIAL & VENT: CPT | Mod: KX | Performed by: INTERNAL MEDICINE

## 2019-02-18 PROCEDURE — 99152 MOD SED SAME PHYS/QHP 5/>YRS: CPT | Performed by: INTERNAL MEDICINE

## 2019-02-18 PROCEDURE — 97530 THERAPEUTIC ACTIVITIES: CPT | Mod: GP | Performed by: PHYSICAL THERAPIST

## 2019-02-18 PROCEDURE — 93005 ELECTROCARDIOGRAM TRACING: CPT

## 2019-02-18 PROCEDURE — 99232 SBSQ HOSP IP/OBS MODERATE 35: CPT | Mod: 57 | Performed by: INTERNAL MEDICINE

## 2019-02-18 PROCEDURE — 25000125 ZZHC RX 250: Performed by: INTERNAL MEDICINE

## 2019-02-18 PROCEDURE — 25000128 H RX IP 250 OP 636: Performed by: PHYSICIAN ASSISTANT

## 2019-02-18 PROCEDURE — 0JH606Z INSERTION OF PACEMAKER, DUAL CHAMBER INTO CHEST SUBCUTANEOUS TISSUE AND FASCIA, OPEN APPROACH: ICD-10-PCS | Performed by: INTERNAL MEDICINE

## 2019-02-18 PROCEDURE — 25000132 ZZH RX MED GY IP 250 OP 250 PS 637: Performed by: INTERNAL MEDICINE

## 2019-02-18 PROCEDURE — 25000132 ZZH RX MED GY IP 250 OP 250 PS 637: Performed by: PHYSICIAN ASSISTANT

## 2019-02-18 PROCEDURE — 25000132 ZZH RX MED GY IP 250 OP 250 PS 637: Performed by: STUDENT IN AN ORGANIZED HEALTH CARE EDUCATION/TRAINING PROGRAM

## 2019-02-18 PROCEDURE — 12000000 ZZH R&B MED SURG/OB

## 2019-02-18 PROCEDURE — 25000128 H RX IP 250 OP 636: Performed by: STUDENT IN AN ORGANIZED HEALTH CARE EDUCATION/TRAINING PROGRAM

## 2019-02-18 PROCEDURE — 85610 PROTHROMBIN TIME: CPT | Performed by: INTERNAL MEDICINE

## 2019-02-18 PROCEDURE — 02H63JZ INSERTION OF PACEMAKER LEAD INTO RIGHT ATRIUM, PERCUTANEOUS APPROACH: ICD-10-PCS | Performed by: INTERNAL MEDICINE

## 2019-02-18 PROCEDURE — C1785 PMKR, DUAL, RATE-RESP: HCPCS | Performed by: INTERNAL MEDICINE

## 2019-02-18 PROCEDURE — 99291 CRITICAL CARE FIRST HOUR: CPT | Performed by: NURSE PRACTITIONER

## 2019-02-18 PROCEDURE — 40000275 ZZH STATISTIC RCP TIME EA 10 MIN

## 2019-02-18 PROCEDURE — 93010 ELECTROCARDIOGRAM REPORT: CPT | Performed by: INTERNAL MEDICINE

## 2019-02-18 PROCEDURE — 85027 COMPLETE CBC AUTOMATED: CPT | Performed by: PHYSICIAN ASSISTANT

## 2019-02-18 PROCEDURE — 25000128 H RX IP 250 OP 636: Performed by: INTERNAL MEDICINE

## 2019-02-18 PROCEDURE — C1894 INTRO/SHEATH, NON-LASER: HCPCS | Performed by: INTERNAL MEDICINE

## 2019-02-18 PROCEDURE — 00000146 ZZHCL STATISTIC GLUCOSE BY METER IP

## 2019-02-18 DEVICE — IMPLANTABLE DEVICE: Type: IMPLANTABLE DEVICE | Status: FUNCTIONAL

## 2019-02-18 DEVICE — PCMKR CARD ACCOLADE MRI EL DR
Type: IMPLANTABLE DEVICE | Status: NON-FUNCTIONAL
Removed: 2020-08-14

## 2019-02-18 RX ORDER — FENTANYL CITRATE 50 UG/ML
INJECTION, SOLUTION INTRAMUSCULAR; INTRAVENOUS
Status: DISCONTINUED | OUTPATIENT
Start: 2019-02-18 | End: 2019-02-18 | Stop reason: HOSPADM

## 2019-02-18 RX ORDER — FENTANYL CITRATE 50 UG/ML
25-50 INJECTION, SOLUTION INTRAMUSCULAR; INTRAVENOUS
Status: DISCONTINUED | OUTPATIENT
Start: 2019-02-18 | End: 2019-02-18 | Stop reason: HOSPADM

## 2019-02-18 RX ORDER — PROTAMINE SULFATE 10 MG/ML
1-5 INJECTION, SOLUTION INTRAVENOUS
Status: DISCONTINUED | OUTPATIENT
Start: 2019-02-18 | End: 2019-02-18 | Stop reason: HOSPADM

## 2019-02-18 RX ORDER — FLUMAZENIL 0.1 MG/ML
0.2 INJECTION, SOLUTION INTRAVENOUS
Status: DISCONTINUED | OUTPATIENT
Start: 2019-02-18 | End: 2019-02-18 | Stop reason: HOSPADM

## 2019-02-18 RX ORDER — LIDOCAINE HYDROCHLORIDE AND EPINEPHRINE 10; 10 MG/ML; UG/ML
10-30 INJECTION, SOLUTION INFILTRATION; PERINEURAL
Status: DISCONTINUED | OUTPATIENT
Start: 2019-02-18 | End: 2019-02-18 | Stop reason: HOSPADM

## 2019-02-18 RX ORDER — OXYCODONE AND ACETAMINOPHEN 5; 325 MG/1; MG/1
1 TABLET ORAL EVERY 4 HOURS PRN
Status: DISCONTINUED | OUTPATIENT
Start: 2019-02-18 | End: 2019-02-18

## 2019-02-18 RX ORDER — PROTAMINE SULFATE 10 MG/ML
5-40 INJECTION, SOLUTION INTRAVENOUS EVERY 10 MIN PRN
Status: DISCONTINUED | OUTPATIENT
Start: 2019-02-18 | End: 2019-02-18 | Stop reason: HOSPADM

## 2019-02-18 RX ORDER — CEFAZOLIN SODIUM 2 G/100ML
2 INJECTION, SOLUTION INTRAVENOUS
Status: COMPLETED | OUTPATIENT
Start: 2019-02-18 | End: 2019-02-18

## 2019-02-18 RX ORDER — LIDOCAINE 40 MG/G
CREAM TOPICAL
Status: DISCONTINUED | OUTPATIENT
Start: 2019-02-18 | End: 2019-02-18 | Stop reason: HOSPADM

## 2019-02-18 RX ORDER — ATROPINE SULFATE 0.1 MG/ML
.5-1 INJECTION INTRAVENOUS
Status: DISCONTINUED | OUTPATIENT
Start: 2019-02-18 | End: 2019-02-18 | Stop reason: HOSPADM

## 2019-02-18 RX ORDER — KETOROLAC TROMETHAMINE 30 MG/ML
15 INJECTION, SOLUTION INTRAMUSCULAR; INTRAVENOUS
Status: DISCONTINUED | OUTPATIENT
Start: 2019-02-18 | End: 2019-02-18 | Stop reason: HOSPADM

## 2019-02-18 RX ORDER — BISACODYL 10 MG
10 SUPPOSITORY, RECTAL RECTAL DAILY PRN
Status: DISCONTINUED | OUTPATIENT
Start: 2019-02-18 | End: 2019-02-23 | Stop reason: HOSPADM

## 2019-02-18 RX ORDER — FUROSEMIDE 10 MG/ML
20 INJECTION INTRAMUSCULAR; INTRAVENOUS ONCE
Status: COMPLETED | OUTPATIENT
Start: 2019-02-18 | End: 2019-02-18

## 2019-02-18 RX ORDER — BUPIVACAINE HYDROCHLORIDE 2.5 MG/ML
10-30 INJECTION, SOLUTION EPIDURAL; INFILTRATION; INTRACAUDAL
Status: COMPLETED | OUTPATIENT
Start: 2019-02-18 | End: 2019-02-18

## 2019-02-18 RX ORDER — DIPHENHYDRAMINE HYDROCHLORIDE 50 MG/ML
25-50 INJECTION INTRAMUSCULAR; INTRAVENOUS
Status: DISCONTINUED | OUTPATIENT
Start: 2019-02-18 | End: 2019-02-18 | Stop reason: HOSPADM

## 2019-02-18 RX ORDER — FUROSEMIDE 10 MG/ML
20-100 INJECTION INTRAMUSCULAR; INTRAVENOUS
Status: DISCONTINUED | OUTPATIENT
Start: 2019-02-18 | End: 2019-02-18 | Stop reason: HOSPADM

## 2019-02-18 RX ORDER — BUPIVACAINE HYDROCHLORIDE 2.5 MG/ML
10-30 INJECTION, SOLUTION EPIDURAL; INFILTRATION; INTRACAUDAL
Status: DISCONTINUED | OUTPATIENT
Start: 2019-02-18 | End: 2019-02-18 | Stop reason: HOSPADM

## 2019-02-18 RX ORDER — LIDOCAINE 40 MG/G
CREAM TOPICAL
Status: DISCONTINUED | OUTPATIENT
Start: 2019-02-18 | End: 2019-02-23 | Stop reason: HOSPADM

## 2019-02-18 RX ORDER — NALOXONE HYDROCHLORIDE 0.4 MG/ML
.1-.4 INJECTION, SOLUTION INTRAMUSCULAR; INTRAVENOUS; SUBCUTANEOUS
Status: DISCONTINUED | OUTPATIENT
Start: 2019-02-18 | End: 2019-02-23 | Stop reason: HOSPADM

## 2019-02-18 RX ORDER — ONDANSETRON 2 MG/ML
4 INJECTION INTRAMUSCULAR; INTRAVENOUS EVERY 4 HOURS PRN
Status: DISCONTINUED | OUTPATIENT
Start: 2019-02-18 | End: 2019-02-18 | Stop reason: HOSPADM

## 2019-02-18 RX ORDER — ADENOSINE 3 MG/ML
6-12 INJECTION, SOLUTION INTRAVENOUS EVERY 5 MIN PRN
Status: DISCONTINUED | OUTPATIENT
Start: 2019-02-18 | End: 2019-02-18 | Stop reason: HOSPADM

## 2019-02-18 RX ORDER — NALOXONE HYDROCHLORIDE 0.4 MG/ML
0.4 INJECTION, SOLUTION INTRAMUSCULAR; INTRAVENOUS; SUBCUTANEOUS EVERY 5 MIN PRN
Status: DISCONTINUED | OUTPATIENT
Start: 2019-02-18 | End: 2019-02-18 | Stop reason: HOSPADM

## 2019-02-18 RX ORDER — LORAZEPAM 2 MG/ML
.5-2 INJECTION INTRAMUSCULAR EVERY 10 MIN PRN
Status: DISCONTINUED | OUTPATIENT
Start: 2019-02-18 | End: 2019-02-18 | Stop reason: HOSPADM

## 2019-02-18 RX ORDER — LIDOCAINE HYDROCHLORIDE 10 MG/ML
10-30 INJECTION, SOLUTION EPIDURAL; INFILTRATION; INTRACAUDAL; PERINEURAL
Status: DISCONTINUED | OUTPATIENT
Start: 2019-02-18 | End: 2019-02-18 | Stop reason: HOSPADM

## 2019-02-18 RX ORDER — SODIUM CHLORIDE 450 MG/100ML
INJECTION, SOLUTION INTRAVENOUS CONTINUOUS
Status: DISCONTINUED | OUTPATIENT
Start: 2019-02-18 | End: 2019-02-18 | Stop reason: HOSPADM

## 2019-02-18 RX ORDER — LIDOCAINE HYDROCHLORIDE 10 MG/ML
10-30 INJECTION, SOLUTION EPIDURAL; INFILTRATION; INTRACAUDAL; PERINEURAL
Status: COMPLETED | OUTPATIENT
Start: 2019-02-18 | End: 2019-02-18

## 2019-02-18 RX ORDER — CEFAZOLIN SODIUM 1 G/3ML
1 INJECTION, POWDER, FOR SOLUTION INTRAMUSCULAR; INTRAVENOUS ONCE
Status: COMPLETED | OUTPATIENT
Start: 2019-02-18 | End: 2019-02-18

## 2019-02-18 RX ORDER — ACETAMINOPHEN 325 MG/1
650 TABLET ORAL EVERY 4 HOURS PRN
Status: DISCONTINUED | OUTPATIENT
Start: 2019-02-18 | End: 2019-02-23 | Stop reason: HOSPADM

## 2019-02-18 RX ORDER — MORPHINE SULFATE 2 MG/ML
1-2 INJECTION, SOLUTION INTRAMUSCULAR; INTRAVENOUS EVERY 5 MIN PRN
Status: DISCONTINUED | OUTPATIENT
Start: 2019-02-18 | End: 2019-02-18 | Stop reason: HOSPADM

## 2019-02-18 RX ORDER — HEPARIN SODIUM 1000 [USP'U]/ML
1000-10000 INJECTION, SOLUTION INTRAVENOUS; SUBCUTANEOUS EVERY 5 MIN PRN
Status: DISCONTINUED | OUTPATIENT
Start: 2019-02-18 | End: 2019-02-18 | Stop reason: HOSPADM

## 2019-02-18 RX ADMIN — MIDAZOLAM 0.5 MG: 1 INJECTION INTRAMUSCULAR; INTRAVENOUS at 10:41

## 2019-02-18 RX ADMIN — CEFAZOLIN 1 G: 1 INJECTION, POWDER, FOR SOLUTION INTRAMUSCULAR; INTRAVENOUS at 18:10

## 2019-02-18 RX ADMIN — ATORVASTATIN CALCIUM 40 MG: 40 TABLET, FILM COATED ORAL at 22:30

## 2019-02-18 RX ADMIN — HEPARIN SODIUM 5000 UNITS: 5000 INJECTION, SOLUTION INTRAVENOUS; SUBCUTANEOUS at 00:17

## 2019-02-18 RX ADMIN — SENNOSIDES AND DOCUSATE SODIUM 2 TABLET: 8.6; 5 TABLET ORAL at 08:04

## 2019-02-18 RX ADMIN — ASPIRIN 81 MG 81 MG: 81 TABLET ORAL at 08:04

## 2019-02-18 RX ADMIN — HEPARIN SODIUM 5000 UNITS: 5000 INJECTION, SOLUTION INTRAVENOUS; SUBCUTANEOUS at 08:22

## 2019-02-18 RX ADMIN — GABAPENTIN 100 MG: 100 CAPSULE ORAL at 08:22

## 2019-02-18 RX ADMIN — LIDOCAINE HYDROCHLORIDE 5 ML: 10 INJECTION, SOLUTION EPIDURAL; INFILTRATION; INTRACAUDAL; PERINEURAL at 10:48

## 2019-02-18 RX ADMIN — SENNOSIDES AND DOCUSATE SODIUM 2 TABLET: 8.6; 5 TABLET ORAL at 22:25

## 2019-02-18 RX ADMIN — BUPIVACAINE HYDROCHLORIDE 5 ML: 2.5 INJECTION, SOLUTION EPIDURAL; INFILTRATION; INTRACAUDAL at 10:48

## 2019-02-18 RX ADMIN — FENTANYL CITRATE 25 MCG: 50 INJECTION, SOLUTION INTRAMUSCULAR; INTRAVENOUS at 10:41

## 2019-02-18 RX ADMIN — ACETAMINOPHEN 650 MG: 325 TABLET, FILM COATED ORAL at 22:25

## 2019-02-18 RX ADMIN — POTASSIUM CHLORIDE 20 MEQ: 29.8 INJECTION, SOLUTION INTRAVENOUS at 07:17

## 2019-02-18 RX ADMIN — Medication 10 MG: at 08:22

## 2019-02-18 RX ADMIN — PANTOPRAZOLE SODIUM 40 MG: 40 TABLET, DELAYED RELEASE ORAL at 08:04

## 2019-02-18 RX ADMIN — Medication 25000 UNITS: at 11:06

## 2019-02-18 RX ADMIN — FUROSEMIDE 20 MG: 10 INJECTION, SOLUTION INTRAVENOUS at 11:53

## 2019-02-18 RX ADMIN — CEFAZOLIN SODIUM 2 G: 2 INJECTION, SOLUTION INTRAVENOUS at 10:34

## 2019-02-18 ASSESSMENT — MIFFLIN-ST. JEOR: SCORE: 1131.88

## 2019-02-18 ASSESSMENT — ACTIVITIES OF DAILY LIVING (ADL)
ADLS_ACUITY_SCORE: 11

## 2019-02-18 NOTE — PROGRESS NOTES
Dictated.    Successful dual-chamber pacemaker implantation ([place] [employer]).    Programmed DDD 60/130 ppm.  No apparent complication.    EBL = 15 cc.      Plan:  - CXR and device interrogation in the am  - back to ICU  - warfarin is OK, but no heparin for at least 24 hrs (preferably 48 hrs)

## 2019-02-18 NOTE — PROGRESS NOTES
Pt stable on Bipap majority of shift, no issues overnight. Bipap alarm level set at 10. Will continue to follow.     Chung Clemente RT

## 2019-02-18 NOTE — PLAN OF CARE
Pt alert and oriented x4.  Pt denies pain during shift.  PT denies dizziness or nausea.  Pt endorses fatigue and general weakness.  Pt on bipap 1/2 of night, and then was on nasal cannula 3 liters/min with 02 sats 92-94%.  Pt using IS to 1750 with encouragment.  PT with diminished lung sounds, fine crackles in L bases.  Good urine output.  Pt slept fair during the night.  Pt in a-fib rate 50s during the night.  Pt continued on dopamine 5 mcg/kg/min.

## 2019-02-18 NOTE — PLAN OF CARE
"Discharge Planner PT   Patient plan for discharge: Home with spouse and OP CR phase II  Current status: Pt had dual chamber pacer placed this am. Reviewed precautions. Stood at the EOB and completed a few standing exercises, admits to a bit of \"light headedness\" and \"fatigue.\" States she is \"so tired of sleeping all the time.\" Sup>sit Min A, sit<>stand CGA. Bedside gait CGA. No acute LOB. Needs max cues/reminders to avoid using the R arm post pacer placement. BP slightly soft 90s/60s, HR 80s throughout.   Barriers to return to prior living situation: None anticipated  Recommendations for discharge: Return home with spouse and OP phase 2 CR   Rationale for recommendations: Pt would benefit from continued PT/CR for return to PLOF, increased knowledge of risk factors, aerobic exercise and lifestyle modifications.         Entered by: Jazzmine Summers 02/18/2019 4:29 PM       "

## 2019-02-18 NOTE — PROGRESS NOTES
[facility]  Cardiovascular and Thoracic Surgery Daily Note          Assessment and Plan:   POD#3 s/p Mini right thoracotomy, mitral valve replacement with a 31 mm St[place] Ron Epic (porcine bioprosthetic) valve, tricuspid valve repair with a 32 mm Oquendo MC3 annuloplasty ring, right femoral artery and venous cannulation for cardiopulmonary bypass, intraoperative transesophageal echocardiogram by Dr. Andrea Hanna  -CVS: HR: 50s-60s. SBP: 90s-110s. Symptomatic bradycardia on POD#1- placed on dopamine, temporary pacer wires do not capture. Remains on dopamine to maintain SBP and HR. EP saw this am- recommend pacemaker placement- planned placement today. ASA, BB on hold d/t bradycardia. Hx of PAF. Amiodarone and xarelto on hold for now. Will resume xarelto closer to discharge. Chest tubes with too much output to pull.   -Resp: Extubated within protocol. Saturating well on 3L. On bipap overnight. Continue to encourage IS, cough, deep breathing, ambulation  -Neuro: Grossly intact. Pain controlled. Concern of neuro deficits in right hand on POD#0. Code stroke called. CT head negative. Neurology saw- will need to go back on xarelto at discharge.  -Renal: good UOP. Up about 5kg from preoperative weight. Will give 20mg IV lasix today.  -GI:  Tolerating diet. No BM. Continue bowel regimen  -:  Dugan in place d/t limited mobility and need for accurate I&Os.   -Endo: pre op a1c: 5.9. Will transition to sliding scale insulin  -FEN: replace electrolytes as needed. Na: 133. K: 3.8.  Orders Placed This Encounter      Regular Diet Adult      NPO for Medical/Clinical Reasons Except for: Other; Specify: May take medications with a drink of water prior to Electrophysiology study    -ID: Temp (24hrs), Av.6  F (37.6  C), Min:98.8  F (37.1  C), Max:100.6  F (38.1  C)  WBC: 16.5<14.8<11. Completed perioperative abx. Will contin  -Heme: Hgb: 9.0. Plt: 119. Received 2u PRBC, POD#1. Acute blood loss anemia and  "thrombocytopenia related to surgery. Continue to monitor.   -Proph: PCD, ASA, PPI, sub q heparin  -Dispo: Continue ICU cares. Plan for pacemaker placement today. Hopeful we can wean dopamine following placement and transfer to the floor. Continue therapies. Continue to encourage IS, cough, deep breathing, ambulation.           Interval History:   No acute events overnight. Remains on dopamine. Saturating well on 2L. Pain controlled. Tolerating diet. No BM.          Medications:       aspirin  81 mg Oral or NG Tube Daily     atorvastatin  40 mg Oral At Bedtime     ceFAZolin  2 g Intravenous Pre-Op/Pre-procedure x 1 dose     heparin  5,000 Units Subcutaneous Q8H     insulin aspart  1-7 Units Subcutaneous TID AC     insulin aspart  1-5 Units Subcutaneous At Bedtime     pantoprazole  40 mg Oral QAM AC     polyethylene glycol  17 g Oral Daily     senna-docusate  1 tablet Oral BID    Or     senna-docusate  2 tablet Oral BID     sodium chloride (PF)  3 mL Intracatheter Q8H     sodium chloride (PF)  3 mL Intracatheter Q8H     acetaminophen, bisacodyl, IV fluid REPLACEMENT ONLY, glucose **OR** dextrose **OR** glucagon, fentaNYL, hydrALAZINE, lidocaine 4%, lidocaine 4%, lidocaine (buffered or not buffered), lidocaine (buffered or not buffered), magnesium sulfate, magnesium sulfate, methocarbamol, naloxone, ondansetron **OR** ondansetron, oxyCODONE IR, potassium chloride, potassium chloride with lidocaine, potassium chloride, potassium chloride, potassium chloride, sodium chloride (PF), sodium chloride (PF), sodium phosphate, sodium phosphate, sodium phosphate, sodium phosphate          Physical Exam:   Vitals were reviewed  Blood pressure 90/50, pulse (!) 45, temperature 99.7  F (37.6  C), temperature source Bladder, resp. rate 27, height 1.651 m (5' 5\"), weight 62.1 kg (136 lb 14.5 oz), SpO2 100 %.  Rhythm: bradycardia    Lungs: diminished bases    Cardiovascular: junaid    Abdomen: soft NTND     Extremeties: minimal " edema    Incision: CDI    CT: to suction    Weight:   Vitals:    02/15/19 0611 02/16/19 0455 02/17/19 0629 02/18/19 0400   Weight: 56.7 kg (125 lb) 60.1 kg (132 lb 7.9 oz) 61.8 kg (136 lb 3.9 oz) 62.1 kg (136 lb 14.5 oz)            Data:   Labs:   Lab Results   Component Value Date    WBC 16.5 02/18/2019     Lab Results   Component Value Date    RBC 2.94 02/18/2019     Lab Results   Component Value Date    HGB 9.0 02/18/2019     Lab Results   Component Value Date    HCT 26.0 02/18/2019     No components found for: MCT  Lab Results   Component Value Date    MCV 88 02/18/2019     Lab Results   Component Value Date    MCH 30.6 02/18/2019     Lab Results   Component Value Date    MCHC 34.6 02/18/2019     Lab Results   Component Value Date    RDW 15.5 02/18/2019     Lab Results   Component Value Date     02/18/2019       Last Basic Metabolic Panel:  Lab Results   Component Value Date     02/18/2019      Lab Results   Component Value Date    POTASSIUM 3.8 02/18/2019     Lab Results   Component Value Date    CHLORIDE 102 02/18/2019     Lab Results   Component Value Date    ARIELLE 8.1 02/18/2019     Lab Results   Component Value Date    CO2 23 02/18/2019     Lab Results   Component Value Date    BUN 22 02/18/2019     Lab Results   Component Value Date    CR 0.77 02/18/2019     Lab Results   Component Value Date     02/18/2019       CXR: 2/17/19    FINDINGS:  Patient is status post cardiac surgery. Right chest tubes  are in place. No pneumothorax is seen. Again noted is blunting of the  costophrenic angles compatible with pleural fluid. Left basilar  opacity is again noted compatible with infiltrate or atelectasis.                                                                      IMPRESSION: Stable, no significant change since 2/16/2019. No  pneumothorax is identified.    Loli Schuster PA-C  CV Surgery  Pager # [phone]

## 2019-02-18 NOTE — PLAN OF CARE
Patient remains in ICU on dopamine gtt for bradycardia.  Gtt increased today from 3 to 5 for junaid as low as 48/49 and generally feeling 'crummy' with her low heart rate.  Up to chair x2.  Started eating a little bit today.  Reports feeling depressed with the situation and that she didn't expect it to go this way.  Plans for EP to see tomorrow morning and possible pacer (transvenous vs perm?).  O2 weaned to 2L NC.  Patient complaining of some shortness of breath starting early this afternoon.  RR in upper 20's, shallow breaths but no distress noted.  Chest xray, lasix and an hour and half of BiPAP seemed to help.  Very minimal pain.  Scheduled gabapentin and no prn's given today.  Tylenol moved to prn d/t her elevated AST.  Good amount from chest tubes still but drainage is serosang.  updated over phone as patient told family not to come in today.  Seems to be in better spirits this evening after getting washed up and talking with nurse.

## 2019-02-18 NOTE — PROGRESS NOTES
[facility]  Critical Care Service  Progress Note  Date of Service (when I saw the patient): 02/18/2019  Main Plans for Today - Permanent pacemaker planned for today  - Recommend additional dose of furosemide  Assessment & Plan   Taty Aguila is a 74 year old female with PMH mitral valve prolapse and regurgitation who was admitted on 2/15/2019 after a minimally invasive mitral valve replacement (St Ron 31 mm valve) and tricuspid valve repair (32 mm Oquendo ring). Her hospitalization has been complicated by symptomatic bradycardia requiring dopamine infusion.    Neuro:  Dx: Acute pain  - Acetaminophen, oxycodone, and fentanyl available prn  - Completed 3 days of scheduled gabapentin    Dx: Concern for neuro changes and/or small stroke. CT head and CTA head/neck unremarkable.  - Neurology following  - Continue aspirin and atorvastatin  - Resume xarelto when safe from CV surgery standpoint  - MRI brain, stroke limited when stable    CV:  Dx: Mitral valve replacement and tricuspid valve repair  Dx: Symptomatic bradycardia, epicardial pacing wires not capturing  Dx: PAF  - Continue dopamine infusion  - Atropine at the bedside  - EP consulted, planning on permanent pacemaker today  - Other cardiac meds per CV surgery    Resp:  Dx: Acute hypoxic respiratory failure secondary to effusions, atelectasis, possible infiltrate. Extubated 2/15 and had increasing oxygen needs with bradycardia. Received a dose of furosemide yesterday with good UOP response. Bedside lung ultrasound with anterior A-line pattern bilaterally but with B-lines in left lower fields and posteriorly with small effusion. Unable to assess the right lower fields with dressings in place.  - Wean oxygen as able to maintain SpO2 >92%  - Encourage pulmonary hygiene with IS (able to achieve 1750), OOB  - BPAP as needed for increased work of breathing  - Recommend additional dose of furosemide, discussed with CV surgery    GI/Nutrition:  Dx:  Nutrition  - Currently NPO for procedure  - Resume regular diet post-procedure  - Bowel regimen: scheduled senna, miralax; prn bisacodyl suppository    Renal:  Dx: No acute issues, baseline creatinine 0.8-0.9  - Monitor function and electrolytes  - Avoid nephrotoxic agents  - Dugan catheter in place, could be removed today    ID:  Dx: No acute issues.  - Cefazolin ordered for pre-procedure antibiotic  - Monitor fevers and white count    Endocrine:  Dx: Stress-induced hyperglycemia  - POC glucose q4 while NPO  - Goal blood glucose <150  - Sliding scale insulin available    Heme:  Dx: Acute blood loss anemia, stable  Dx: Thrombocytopenia related to bypass, stable  - Continue to monitor chest tube output  - Transfuse for hemoglobin <7    General cares:  DVT Prophylaxis: Defer to primary service, subQ heparin  GI Prophylaxis: PPI per primary service  Restraints: Restraints for medical healing needed: NO  Family update by me today: No  Current lines are required for patient management:  - Right internal jugular CVC  - Chest tube x2  - Dugan catheter - recommend removal post-procedure    AYESHA Chicas  Time Spent on this Encounter   Billing: I spent 35 minutes bedside and on the inpatient unit today managing the critical care of Taty Aguila in relation to the issues listed in this note.  Interval History   Sitting up in the chair. Denies pain, nausea. Having mild shortness of breath that is unchanged with activity. No abdominal pain, gas pains, flatus, or BM yet. No numbness/tingling anywhere.  Physical Exam   Temp: 99.5  F (37.5  C) Temp src: Bladder Temp  Min: 98.8  F (37.1  C)  Max: 100.6  F (38.1  C) BP: 101/56 Pulse: 56 Heart Rate: 58 Resp: 18 SpO2: 93 % O2 Device: Nasal cannula with humidification Oxygen Delivery: 2 LPM  Vitals:    02/16/19 0455 02/17/19 0629 02/18/19 0400   Weight: 60.1 kg (132 lb 7.9 oz) 61.8 kg (136 lb 3.9 oz) 62.1 kg (136 lb 14.5 oz)     I/O last 3 completed shifts:  In: 1419.65  [P.O.:500; I.V.:919.65]  Out: 2475 [Urine:1765; Chest Tube:710]    GEN: Awake and alert  EYES: No icterus   HEENT: Normocephalic, trachea midline; mucous membranes moist  CV: RRR, bradycardic; no gallops, rubs, or murmurs; pedal pulses 2+; no pedal edema  PULM/CHEST: Clear breath sounds bilaterally except left base with few crackles  GI: Abdomen flat; bowel sounds present; soft and non-tender to palpation  : Dugan catheter in place, urine yellow and clear  NEURO: No focal deficits; extremity strength 5/5; sensation equal and intact bilaterally   SKIN: Chest tube insertion sites covered without drainage    Imaging personally reviewed: CXR from yesterday with blunting of bilateral costophrenic angles, left basilar opacities    Data   Recent Labs   Lab 02/18/19  0545 02/17/19  0423 02/16/19  1156 02/16/19  0400 02/15/19  1353  02/15/19  1225   WBC 16.5* 14.8*  --  11.0 13.9*  --  13.2*   HGB 9.0* 8.7* 9.4* 6.3* 10.4*   < > 8.4*   MCV 88 89  --  92 93  --  94   * 102*  --  117* 125*  --  120*   INR  --   --   --  1.24* 1.35*  --  1.74*    136  --  141 143   < > 142   POTASSIUM 3.8 4.3 3.8 3.7 3.9   < > 4.3   CHLORIDE 102 104  --  110* 111*  --  109   CO2 23 24  --  23 23  --  25   BUN 22 18  --  13 10  --  10   CR 0.77 0.92  --  0.95 0.93  --  0.85   ANIONGAP 8 8  --  8 9  --  8   ARIELLE 8.1* 8.0*  --  7.7* 7.9*  --  8.5   * 126*  --  110* 144*  --  186*   ALBUMIN  --   --   --  3.5 2.6*  --  2.3*   PROTTOTAL  --   --   --  5.6* 4.9*  --  4.5*   BILITOTAL  --   --   --  0.6 0.4  --  0.2   ALKPHOS  --   --   --  31* 32*  --  29*   ALT  --   --   --  40 43  --  27   AST  --   --   --  211* 271*  --  151*    < > = values in this interval not displayed.

## 2019-02-18 NOTE — PROGRESS NOTES
"  Cardiology Progress Note          Assessment and Plan:   Post mitral replacement and tricuspid repair. Pt has developed complete AV block with low BP that requires iv dopamine. Reports fatigue.    History of mitral valve prolapse with severe MR. Bioprosthetic mitral valve replacement and tricuspid repair on 2-. Preop cath showed no apparent coronary artery disease. Echo EF 65%.Pt denies past history of other medical problems.    Permanent pacemaker implantation is indicated. Procedure risk and benefit explained, scheduled for today.    Jw Motley MD  Cardiology   [phone]                Diagnosis:     Patient Active Problem List   Diagnosis     Allergic rhinitis     CARDIOVASCULAR SCREENING; LDL GOAL LESS THAN 160     Colitis     Mitral valve regurgitation     Paroxysmal atrial fibrillation (H)     Non-rheumatic tricuspid valve insufficiency     Thoracic aortic ectasia (H)     Mitral valve prolapse     History of tricuspid valve repair     Complete AV block (H)                Physical Exam:   Blood pressure 101/56, pulse 56, temperature 99.5  F (37.5  C), temperature source Bladder, resp. rate 18, height 1.651 m (5' 5\"), weight 62.1 kg (136 lb 14.5 oz), SpO2 93 %.  Wt Readings from Last 4 Encounters:   02/18/19 62.1 kg (136 lb 14.5 oz)   02/07/19 58.3 kg (128 lb 8 oz)   01/09/19 56.1 kg (123 lb 9.6 oz)   01/03/19 57.5 kg (126 lb 12.8 oz)      I/O last 3 completed shifts:  In: 1419.65 [P.O.:500; I.V.:919.65]  Out: 2475 [Urine:1765; Chest Tube:710]    Constitutional: Alert, no apparent distress,    Lungs: No crackles or wheezing,    Cardiovascular: Regular rate and rhythm, normal S1 and S2, and no murmur,    Lymphm node  Neck  ENT  Neurologic  Abdomen: No enlargement  No jugular vein extension or carotid bruit  No apparent abnormality  No focal deficit  Normal bowel sounds, soft, no distension, no tender   Skin: No rashes, no cyanosis   Extremity: No edema          Medications:     Current " Facility-Administered Medications:      acetaminophen (TYLENOL) tablet 650 mg, 650 mg, Oral, Q4H PRN, Rosalinda Mason MD     aspirin (ASA) chewable tablet 81 mg, 81 mg, Oral or NG Tube, Daily, Sivakumar Monteiro MD, 81 mg at 02/18/19 0804     atorvastatin (LIPITOR) tablet 40 mg, 40 mg, Oral, At Bedtime, Sivakumar Monteiro MD, 40 mg at 02/17/19 2136     bisacodyl (DULCOLAX) Suppository 10 mg, 10 mg, Rectal, Daily PRN, Carlita Kerr MD, 10 mg at 02/18/19 0822     dextrose 10 % 1,000 mL infusion, , Intravenous, Continuous PRN, Sivakumar Monteiro MD     dextrose 5% and 0.45% NaCl + KCl 20 mEq/L infusion, , Intravenous, Continuous, Sivakumar Monteiro MD, Last Rate: 30 mL/hr at 02/18/19 0705     glucose gel 15-30 g, 15-30 g, Oral, Q15 Min PRN **OR** dextrose 50 % injection 25-50 mL, 25-50 mL, Intravenous, Q15 Min PRN **OR** glucagon injection 1 mg, 1 mg, Subcutaneous, Q15 Min PRN, Sivakumar Monteiro MD     DOPamine 400 mg in dextrose 5% 250 mL (adult std) - premix, 2-20 mcg/kg/min, Intravenous, Continuous, Sivakumar Monteiro MD, Last Rate: 11.3 mL/hr at 02/18/19 0705, 5 mcg/kg/min at 02/18/19 0705     EPINEPHrine (ADRENALIN) 5 mg in sodium chloride 0.9 % 250 mL infusion, 0.01-0.1 mcg/kg/min, Intravenous, Continuous, Sivakumar Monteiro MD     fentaNYL (PF) (SUBLIMAZE) injection 25-50 mcg, 25-50 mcg, Intravenous, Q1H PRN, Catie Gonzalez, ROBINA CNP, 25 mcg at 02/17/19 2141     heparin sodium injection 5,000 Units, 5,000 Units, Subcutaneous, Q8H, Loli Schuster PA-C, 5,000 Units at 02/18/19 0822     hydrALAZINE (APRESOLINE) injection 10 mg, 10 mg, Intravenous, Q30 Min PRN, Sivakumar Monteiro MD     insulin aspart (NovoLOG) inj (RAPID ACTING), 1-7 Units, Subcutaneous, TID AC, Andrea Hanna MD     insulin aspart (NovoLOG) inj (RAPID ACTING), 1-5 Units, Subcutaneous, At Bedtime, Andrea Hanna MD     lidocaine (LMX4) cream, , Topical, Q1H PRN, Sivakumar Monteiro MD     lidocaine 1 % 0.1-1 mL, 0.1-1 mL, Other, Q1H PRN, Thania  MD Sivakumar     magnesium sulfate 2 g in water intermittent infusion, 2 g, Intravenous, Daily PRN, Sivakumar Monteiro MD     magnesium sulfate 4 g in 100 mL sterile water (premade), 4 g, Intravenous, Q4H PRN, Sivakumar Monteiro MD     methocarbamol (ROBAXIN) tablet 500 mg, 500 mg, Oral, 4x Daily PRN, Sivakumar Monteiro MD     naloxone (NARCAN) injection 0.1-0.4 mg, 0.1-0.4 mg, Intravenous, Q2 Min PRN, Sivakumar Monteiro MD     nitroGLYcerin 50 mg in D5W 250 mL (adult std) infusion, 0.07-2 mcg/kg/min, Intravenous, Continuous, Sivakumar Monteiro MD     ondansetron (ZOFRAN-ODT) ODT tab 4 mg, 4 mg, Oral, Q6H PRN **OR** ondansetron (ZOFRAN) injection 4 mg, 4 mg, Intravenous, Q6H PRN, Sivakumar Monteiro MD, 4 mg at 02/15/19 1807     oxyCODONE (ROXICODONE) tablet 5-10 mg, 5-10 mg, Oral, Q4H PRN, Sivakumar Monteiro MD, 5 mg at 02/15/19 1456     pantoprazole (PROTONIX) EC tablet 40 mg, 40 mg, Oral, QAM AC, Loli Schuster PA-C, 40 mg at 02/18/19 0804     phenylephrine (BASILIA-SYNEPHRINE) 50 mg in sodium chloride 0.9 % 250 mL infusion, 0.5-2 mcg/kg/min, Intravenous, Continuous, Sivakumar Monteiro MD, Stopped at 02/16/19 1210     polyethylene glycol (MIRALAX/GLYCOLAX) Packet 17 g, 17 g, Oral, Daily, Loli Schuster PA-C, 17 g at 02/17/19 0816     potassium chloride (KLOR-CON) Packet 20-40 mEq, 20-40 mEq, Oral or Feeding Tube, Q2H PRN, Sivakumar Monteiro MD, 20 mEq at 02/16/19 0609     potassium chloride 10 mEq in 100 mL intermittent infusion with 10 mg lidocaine, 10 mEq, Intravenous, Q1H PRN, Sivakumar Monteiro MD     potassium chloride 10 mEq in 100 mL sterile water intermittent infusion (premix), 10 mEq, Intravenous, Q1H PRN, Sivakumar Monteiro MD     potassium chloride 20 mEq in 50 mL intermittent infusion, 20 mEq, Intravenous, Q2H PRN, Sivakumar Monteiro MD, Last Rate: 25 mL/hr at 02/18/19 0717, 20 mEq at 02/18/19 0717     potassium chloride ER (K-DUR/KLOR-CON M) CR tablet 20-40 mEq, 20-40 mEq, Oral, Q2H PRN, Sivakumar Monteiro MD     senna-docusate (SENOKOT-S/PERICOLACE) 8.6-50 MG per  tablet 1 tablet, 1 tablet, Oral, BID, 1 tablet at 02/16/19 0821 **OR** senna-docusate (SENOKOT-S/PERICOLACE) 8.6-50 MG per tablet 2 tablet, 2 tablet, Oral, BID, Sivakumar Monteiro MD, 2 tablet at 02/18/19 0804     sodium chloride (PF) 0.9% PF flush 3 mL, 3 mL, Intracatheter, q1 min prn, Sivakumar Monteiro MD     sodium chloride (PF) 0.9% PF flush 3 mL, 3 mL, Intracatheter, Q8H, Sivakumar Monteiro MD, 3 mL at 02/17/19 2143     sodium chloride 0.9% infusion, , Intravenous, Continuous, Andrea Hanna MD, Last Rate: 10 mL/hr at 02/18/19 0706     sodium phosphate 10 mmol in D5W intermittent infusion, 10 mmol, Intravenous, Daily PRN, Sivakumar Monteiro MD     sodium phosphate 15 mmol in D5W intermittent infusion, 15 mmol, Intravenous, Daily PRN, Sivakumar Monteiro MD     sodium phosphate 20 mmol in D5W intermittent infusion, 20 mmol, Intravenous, Q6H PRN, Sivakumar Monteiro MD     sodium phosphate 25 mmol in D5W intermittent infusion, 25 mmol, Intravenous, Q8H PRN, Sivakumar Monteiro MD           Lab results:        Recent Labs   Lab Test 02/18/19  0545 02/17/19  0423 02/16/19  1156 02/16/19  0400    136  --  141   POTASSIUM 3.8 4.3 3.8 3.7   CHLORIDE 102 104  --  110*   ARIELLE 8.1* 8.0*  --  7.7*   CO2 23 24  --  23   BUN 22 18  --  13   CR 0.77 0.92  --  0.95   * 126*  --  110*     Recent Labs   Lab Test 02/18/19  0545 02/17/19  0423 02/16/19  1156 02/16/19  0400   HGB 9.0* 8.7* 9.4* 6.3*   WBC 16.5* 14.8*  --  11.0   * 102*  --  117*     Recent Labs   Lab Test 02/16/19  0400 02/15/19  1353 02/15/19  1225   INR 1.24* 1.35* 1.74*     Recent Labs   Lab Test 10/16/18  1439 10/12/17  1900 10/12/17  1435 10/12/17  1005   TROPONIN 0.01  --   --   --    TROPI  --  <0.015 <0.015 <0.015

## 2019-02-19 ENCOUNTER — APPOINTMENT (OUTPATIENT)
Dept: PHYSICAL THERAPY | Facility: CLINIC | Age: 74
DRG: 219 | End: 2019-02-19
Attending: SURGERY
Payer: COMMERCIAL

## 2019-02-19 ENCOUNTER — APPOINTMENT (OUTPATIENT)
Dept: GENERAL RADIOLOGY | Facility: CLINIC | Age: 74
DRG: 219 | End: 2019-02-19
Attending: PHYSICIAN ASSISTANT
Payer: COMMERCIAL

## 2019-02-19 DIAGNOSIS — I44.2 CHB (COMPLETE HEART BLOCK) (H): Primary | ICD-10-CM

## 2019-02-19 LAB
ANION GAP SERPL CALCULATED.3IONS-SCNC: 9 MMOL/L (ref 3–14)
BUN SERPL-MCNC: 20 MG/DL (ref 7–30)
CALCIUM SERPL-MCNC: 7.9 MG/DL (ref 8.5–10.1)
CHLORIDE SERPL-SCNC: 102 MMOL/L (ref 94–109)
CO2 SERPL-SCNC: 23 MMOL/L (ref 20–32)
CREAT SERPL-MCNC: 0.8 MG/DL (ref 0.52–1.04)
ERYTHROCYTE [DISTWIDTH] IN BLOOD BY AUTOMATED COUNT: 15.2 % (ref 10–15)
GFR SERPL CREATININE-BSD FRML MDRD: 74 ML/MIN/{1.73_M2}
GLUCOSE BLDC GLUCOMTR-MCNC: 104 MG/DL (ref 70–99)
GLUCOSE BLDC GLUCOMTR-MCNC: 129 MG/DL (ref 70–99)
GLUCOSE BLDC GLUCOMTR-MCNC: 140 MG/DL (ref 70–99)
GLUCOSE SERPL-MCNC: 104 MG/DL (ref 70–99)
HCT VFR BLD AUTO: 25.9 % (ref 35–47)
HGB BLD-MCNC: 9 G/DL (ref 11.7–15.7)
INTERPRETATION ECG - MUSE: NORMAL
LACTATE BLD-SCNC: 1.2 MMOL/L (ref 0.7–2)
MCH RBC QN AUTO: 30.7 PG (ref 26.5–33)
MCHC RBC AUTO-ENTMCNC: 34.7 G/DL (ref 31.5–36.5)
MCV RBC AUTO: 88 FL (ref 78–100)
PLATELET # BLD AUTO: 139 10E9/L (ref 150–450)
POTASSIUM SERPL-SCNC: 3.7 MMOL/L (ref 3.4–5.3)
RBC # BLD AUTO: 2.93 10E12/L (ref 3.8–5.2)
SODIUM SERPL-SCNC: 134 MMOL/L (ref 133–144)
WBC # BLD AUTO: 13.4 10E9/L (ref 4–11)

## 2019-02-19 PROCEDURE — 80048 BASIC METABOLIC PNL TOTAL CA: CPT | Performed by: PHYSICIAN ASSISTANT

## 2019-02-19 PROCEDURE — 12000000 ZZH R&B MED SURG/OB

## 2019-02-19 PROCEDURE — 97110 THERAPEUTIC EXERCISES: CPT | Mod: GP | Performed by: PHYSICAL THERAPIST

## 2019-02-19 PROCEDURE — 36415 COLL VENOUS BLD VENIPUNCTURE: CPT | Performed by: PHYSICIAN ASSISTANT

## 2019-02-19 PROCEDURE — 97530 THERAPEUTIC ACTIVITIES: CPT | Mod: GP | Performed by: PHYSICAL THERAPIST

## 2019-02-19 PROCEDURE — 83605 ASSAY OF LACTIC ACID: CPT | Performed by: SURGERY

## 2019-02-19 PROCEDURE — 36415 COLL VENOUS BLD VENIPUNCTURE: CPT | Performed by: SURGERY

## 2019-02-19 PROCEDURE — 93010 ELECTROCARDIOGRAM REPORT: CPT | Performed by: INTERNAL MEDICINE

## 2019-02-19 PROCEDURE — 85027 COMPLETE CBC AUTOMATED: CPT | Performed by: PHYSICIAN ASSISTANT

## 2019-02-19 PROCEDURE — 93005 ELECTROCARDIOGRAM TRACING: CPT

## 2019-02-19 PROCEDURE — 40000986 XR CHEST 2 VW

## 2019-02-19 PROCEDURE — 25000132 ZZH RX MED GY IP 250 OP 250 PS 637: Performed by: STUDENT IN AN ORGANIZED HEALTH CARE EDUCATION/TRAINING PROGRAM

## 2019-02-19 PROCEDURE — 99232 SBSQ HOSP IP/OBS MODERATE 35: CPT | Performed by: INTERNAL MEDICINE

## 2019-02-19 PROCEDURE — 00000146 ZZHCL STATISTIC GLUCOSE BY METER IP

## 2019-02-19 PROCEDURE — 25000132 ZZH RX MED GY IP 250 OP 250 PS 637: Performed by: PHYSICIAN ASSISTANT

## 2019-02-19 RX ADMIN — ATORVASTATIN CALCIUM 40 MG: 40 TABLET, FILM COATED ORAL at 21:19

## 2019-02-19 RX ADMIN — POTASSIUM CHLORIDE 20 MEQ: 1500 TABLET, EXTENDED RELEASE ORAL at 10:57

## 2019-02-19 RX ADMIN — PANTOPRAZOLE SODIUM 40 MG: 40 TABLET, DELAYED RELEASE ORAL at 06:40

## 2019-02-19 RX ADMIN — Medication 25 MG: at 22:04

## 2019-02-19 RX ADMIN — ASPIRIN 81 MG 81 MG: 81 TABLET ORAL at 08:00

## 2019-02-19 ASSESSMENT — ACTIVITIES OF DAILY LIVING (ADL)
ADLS_ACUITY_SCORE: 11
ADLS_ACUITY_SCORE: 15
ADLS_ACUITY_SCORE: 11
ADLS_ACUITY_SCORE: 13
ADLS_ACUITY_SCORE: 11
ADLS_ACUITY_SCORE: 13

## 2019-02-19 ASSESSMENT — MIFFLIN-ST. JEOR: SCORE: 1122.33

## 2019-02-19 NOTE — PROGRESS NOTES
"NUTRITION ASSESSMENT      REASON FOR ASSESSMENT:  Cardiac Surgery Nutrition Consult    CURRENT DIET / INTAKE:  Diet: Regular    Visited with pt this morning  Pt reports a fair appetite  Asked me to order her some breakfast - cereal, milk, blueberry muffin, OJ    States that she follows a regular diet at home  \"My  does the cooking\"  Eats fruits and veggies at meals      ANTHROPOMETRICS:   Ht: 5'5\"  Wt: (2/15 - admit) 56.7 kg  BMI: 20.8  IBW: 56.8 kg  Weight Status: Normal BMI  %IBW: 100%    MALNUTRITION:  Patient does not meet two of the following criteria necessary for diagnosing malnutrition:  significant weight loss, reduced intake, subcutaneous fat loss, muscle loss or fluid retention    NUTRITION DIAGNOSIS:   Increased nutrient needs (protein) R/t higher demand for healing AEB pt is s/p MVR 2/15/19    INTERVENTIONS:    Nutrition Prescription:  Regular diet    Implementation:  Nutrition Education (Content):  Discussed the importance of adequate nutrition post-op for healing and energy, emphasizing protein foods, and encouraged patient to order a protein food at each meal.    Goals:  Patient to consume ~75% at meals in the next 3 - 5 days    Follow Up/Monitoring (InPatient):  Food and Fluid intake -  Monitor for adequacy    Follow Up/Monitoring (OutPatient):  Patient will participate in out-patient cardiac rehab and attend nutrition classes during the program    "

## 2019-02-19 NOTE — PLAN OF CARE
VSS but hypotensive in the 85-90's systolic (asymptomatic). Lung sounds diminished. Right chest tube to water seal. No air leak or crepitus. Bowel sounds active, passing gas. Pain 2/10- declines pain meds. Ambulates with assist of 1- gait belt and walker. RAYO. Permanent pacemaker. Tolerating regular diet.

## 2019-02-19 NOTE — PROGRESS NOTES
"[facility]    EP Progress Note    Date of Service (when I saw the patient): 02/19/2019     Assessment & Plan   Reviewed with rupinder Mckinney seen and examined.  Post pacemaker implantation for complete AV block. No complications. No complaints.  Post mitral replacement and tricuspid repair. Pt has developed complete AV block with low BP that required iv dopamine.    History of mitral valve prolapse with severe MR. Bioprosthetic mitral valve replacement and tricuspid repair on 2-. Preop cath showed no apparent coronary artery disease. Echo EF 65%.Pt denies past history of other medical problems.  History of paroxsymal atrial fib before mitral surgery. No recurrent atrial fib so far. Agree for not restarting Xarelto and amiodarone. May consider flecainide if she has recurrent atrial fib.  May continue cardiology follow up with Dr. Stone.  Sign off  JOSE C Motley MD     Taty Aguila is a 74 year old female with h/o bi-leaflet MV prolapse, MR, TR and paroxysmal atrial fibrillation first dx'd 10/2018 who was admitted on 2/15/2019 for CV Surgery.  She had CHB with low BP requiring IV dopamine. EF normal. EP consulted (Dr. Motley 2/18) and recommended dual chamber PPM implantation.    1. Post-operative CHB (symptomatic) -   * Now s/p dual chamber [employer] PPM 2/18    * Device interrogation done (Braxton) showing <1% AP and 100%   * CXR PENDING  * Device teaching PENDING    * EKG with  90 bpm     PLAN:   * Await CXR    2. Valvular disease -   * Now s/p 31 mm St. Ron Epic porcine mitral valve replacement and 32 mm Oquendo MC3 tricuspid annuloplasty ring 2/15/2019 via Mini R thoracotomy with Dr. Hanna    * Pre-op TIA 1/2019 showed normal EF 65%  * Diuresing per CV Surgery     PLAN:   * Per CV Surgery   * OK to start BB therapy once BP can tolerate    3. Paroxysmal AFib -   * First noted 120/2018 requiring ER visit after heart was \"pounding\" at the gym  * Echo with severely dilated LA   * Had been on " "low dose BB and Xarelto for CHADSVASc 2 (age, sex), and due to recurrent AFib, started on amiodarone in preparation for a DCCV 12/2018. Procedure cancelled b/c was back in SR/SB. She was continued on amiodarone 200 mg daily, Metoprolol XL 25 mg daily and Xarelto prior to admission.    * Has not been back on Amiodarone or Toprol XL  * Xarelto has not been restarted      PLAN:   * No AFib seen here. Would restart Amiodarone therapy if this occurs   * Would expect AC to be restarted given hx and CHADSVASc 2      Jo-Ann Neal PA-C    Interval History   Feels better after PPM, like breathing is \"less shallow\"    Physical Exam   Temp: 97.8  F (36.6  C) Temp src: Oral BP: (!) 85/52 Pulse: 85 Heart Rate: 84 Resp: 18 SpO2: 94 % O2 Device: Nasal cannula Oxygen Delivery: 2 LPM  Vitals:    02/17/19 0629 02/18/19 0400 02/19/19 0600   Weight: 61.8 kg (136 lb 3.9 oz) 62.1 kg (136 lb 14.5 oz) 61.1 kg (134 lb 12.8 oz)     Vital Signs with Ranges  Temp:  [97.2  F (36.2  C)-98.7  F (37.1  C)] 97.8  F (36.6  C)  Pulse:  [45-99] 85  Heart Rate:  [44-99] 84  Resp:  [9-33] 18  BP: ()/(47-81) 85/52  SpO2:  [93 %-100 %] 94 %  I/O last 3 completed shifts:  In: 930.81 [P.O.:500; I.V.:430.81]  Out: 2040 [Urine:1320; Chest Tube:720]    Telemetry:  60 bpm    Constitutional: awake, alert, cooperative, no apparent distress, and appears stated age  Respiratory: R CT still in and draining. Otherwise clear  Cardiovascular: Regular. No MRG  Musculoskeletal: No edema noted    Medications     IV fluid REPLACEMENT ONLY         aspirin  81 mg Oral or NG Tube Daily     atorvastatin  40 mg Oral At Bedtime     insulin aspart  1-7 Units Subcutaneous TID AC     insulin aspart  1-5 Units Subcutaneous At Bedtime     pantoprazole  40 mg Oral QAM AC     polyethylene glycol  17 g Oral Daily     senna-docusate  1 tablet Oral BID    Or     senna-docusate  2 tablet Oral BID     sodium chloride (PF)  3 mL Intracatheter Q8H       Data   I personally " reviewed the EKG tracing showing AS and  89 bpm.  Results for orders placed or performed during the hospital encounter of 02/15/19 (from the past 24 hour(s))   INR   Result Value Ref Range    INR 1.21 (H) 0.86 - 1.14   EP Device    Narrative    PROCEDURES PERFORMED:  1. Implantation of dual-chamber permanent pacemaker.  2. Cardiac fluoroscopy (1.6 minutes).  3. Conscious sedation, mild-moderate level.  Versed 0.5 mg, fentanyl 25   mcg.  Total sedation time was 25 minutes.     CLINICAL HISTORY:  75 yo female who underwent MVR and TV repair last week.  The procedure was   complicated by complete AV block.  Pacemaker implantation has been   recommended.  The risks and benefits of the procedure were discussed in   detail; the patient expressed understanding and provided consent.     PROCEDURE:  The patient was brought to the cardiac electrophysiology laboratory at   [facility] on 2/18/2019. I determined this patient to be   an appropriate candidate for the planned sedation and procedure and have   reassessed the patient immediately prior to sedation and procedure.The   patient was in the fasting nonsedated state. Informed consent had been   obtained. The patient was placed on the procedure table and the anterior   chest was prepped and draped in the usual sterile fashion.  We   continuously monitored vital signs, oxygenation and level of sedation.    Antibiotic prophylaxis was administered.  Intravenous sedation was given   to achieve patient comfort.    Using a fluoroscopic guided technique the left subclavian vein was   cannulated without difficulty.  Two separate guidewires were introduced   and retained.  Under local anesthesia an incision was created in the left   pectoral region. The incision was taken down to the pectoralis muscle and   fascia and a pocket was created for the pacemaker generator.    Using a peel-away introducer sheath the right ventricular lead was   initially introduced.  The  tip of the lead was initially brought in the   high and mid septum but R sensing was consistently too low.  The lead tip   was then brought at the low RV.  Good sensing and pacing parameters were   confirmed. 10 V pacing did not stimulate the diaphragm. The lead was   secured using interrupted Ethibond sutures.    Using another peel-away introducer sheath the right atrial lead was   introduced.  The tip of the lead was brought at the superior RA.  Good   sensing and pacing parameters were confirmed. 10 V pacing did not   stimulate the diaphragm. The lead was secured using interrupted Ethibond   sutures.    The device pocket was then irrigated with antibiotic solution. The leads   were then connected to the pacemaker generator which was placed in the   pocket. The generator was secured using silk.  The wound was closed in 2   layers using 2.0 and 4.0 Vicryl.  Steri-Strips, sterile gauze, and a   pressure dressing were placed over the wound.     There were no apparent complications. The patient was brought back to the   hospital room in stable condition.    Estimated blood loss was 15 ml.        RESULTS:  A.  Implanted leads:  (i) RV lead: [employer] lead model 7741, serial [id number].  R-wave   sensing 8.3 mV.  Pacing threshold  0.6 V at 0.5 msec.  Pacing impedance   919 Ohms.    (ii) RA lead:  [employer] lead model 7740, serial [id number].  P-wave   sensing 1.9 mV.  Pacing threshold 0.6 V at 0.5 msec.  Pacing impedance 519   Ohms.      B.  Pulse generator.  [employer] model L331, Accolade MRI DR,   serial [id number].    C.  Programming.  DDD 60/130 ppm.       CONCLUSIONS:  1.  Successful implantation of dual-chamber permanent pacemaker.  2.  No apparent in-lab complication.       Glucose by meter   Result Value Ref Range    Glucose 111 (H) 70 - 99 mg/dL   Glucose by meter   Result Value Ref Range    Glucose 96 70 - 99 mg/dL   Glucose by meter   Result Value Ref Range    Glucose 120 (H) 70 - 99 mg/dL    Lactic acid level STAT for sepsis protocol   Result Value Ref Range    Lactate for Sepsis Protocol 1.2 0.7 - 2.0 mmol/L   CBC with platelets   Result Value Ref Range    WBC 13.4 (H) 4.0 - 11.0 10e9/L    RBC Count 2.93 (L) 3.8 - 5.2 10e12/L    Hemoglobin 9.0 (L) 11.7 - 15.7 g/dL    Hematocrit 25.9 (L) 35.0 - 47.0 %    MCV 88 78 - 100 fl    MCH 30.7 26.5 - 33.0 pg    MCHC 34.7 31.5 - 36.5 g/dL    RDW 15.2 (H) 10.0 - 15.0 %    Platelet Count 139 (L) 150 - 450 10e9/L   Basic metabolic panel (AM Draw)   Result Value Ref Range    Sodium 134 133 - 144 mmol/L    Potassium 3.7 3.4 - 5.3 mmol/L    Chloride 102 94 - 109 mmol/L    Carbon Dioxide 23 20 - 32 mmol/L    Anion Gap 9 3 - 14 mmol/L    Glucose 104 (H) 70 - 99 mg/dL    Urea Nitrogen 20 7 - 30 mg/dL    Creatinine 0.80 0.52 - 1.04 mg/dL    GFR Estimate 74 >60 mL/min/[1.73_m2]    GFR Estimate If Black 85 >60 mL/min/[1.73_m2]    Calcium 7.9 (L) 8.5 - 10.1 mg/dL   EKG 12-lead, tracing only   Result Value Ref Range    Interpretation ECG Click View Image link to view waveform and result

## 2019-02-19 NOTE — PROGRESS NOTES
[facility]  Cardiovascular and Thoracic Surgery Daily Note          Assessment and Plan:   POD#4 s/p Mini right thoracotomy, mitral valve replacement with a 31 mm St. Ron Epic (porcine bioprosthetic) valve, tricuspid valve repair with a 32 mm Oquendo MC3 annuloplasty ring, right femoral artery and venous cannulation for cardiopulmonary bypass, intraoperative transesophageal echocardiogram by Dr. Andrea Hanna  -CVS: HR: 80-90s. SBP: 80s-90s. Symptomatic bradycardia on POD#1-- treated with dopamine. PPM on POD#3- tolerated well. EP following. ASA, BB and amiodarone on hold d/t soft blood pressors. Hx of PAF. Will resume Xarelto closer to discharge. Chest tubes with too much output to pull- will switch to waterseal.  -Resp: Extubated within protocol. Saturating well on 1L. Continue to encourage IS, cough, deep breathing, ambulation  -Neuro:  Grossly intact. Pain controlled. Concern of neuro deficits in right hand on POD#0. Code stroke called. CT head negative. Neurology saw-- will need to resume xarelto at discharge  -Renal: Good UOP. Up about 4kg from preoperative weight today. Cr: 0.80. Will hold diuretics today.  -GI: Tolerating diet.  +BM. Continue bowel regimen.   -: voiding well on own.   -Endo: pre op A1c: 5.9. Will transition to sliding scale insulin.  -FEN: replace electrolytes as needed. Na: 134. K: 3.7.   Orders Placed This Encounter      Advance Diet as Tolerated: Regular Diet Adult    -ID: Temp (24hrs), Av  F (36.7  C), Min:97.2  F (36.2  C), Max:98.7  F (37.1  C)  WBC: 13.4<16.5<14.8<11. Completed perioperative abx. Will continue to monitor.   -Heme: Hgb: 9.0. Plt: 139. Received 2u PRBC, POD#1. Acute blood loss anemia and thrombocytopenia related to surgery. Continue to monitor.  -Proph: PCD, ASA, PPI, sub q heparin  -Dispo: [facility]. Continue therapies. Continue to encourage IS, cough, deep breathing, ambulation.           Interval History:   No acute events overnight. Saturating  "well on 1L. Pain controlled. Sitting comfortably in chair. Tolerating diet. +BM         Medications:       aspirin  81 mg Oral or NG Tube Daily     atorvastatin  40 mg Oral At Bedtime     insulin aspart  1-7 Units Subcutaneous TID AC     insulin aspart  1-5 Units Subcutaneous At Bedtime     pantoprazole  40 mg Oral QAM AC     polyethylene glycol  17 g Oral Daily     senna-docusate  1 tablet Oral BID    Or     senna-docusate  2 tablet Oral BID     sodium chloride (PF)  3 mL Intracatheter Q8H     acetaminophen, bisacodyl, IV fluid REPLACEMENT ONLY, glucose **OR** dextrose **OR** glucagon, HOLD MEDICATION, HOLD MEDICATION, HOLD MEDICATION, hydrALAZINE, lidocaine 4%, lidocaine (buffered or not buffered), magnesium sulfate, magnesium sulfate, methocarbamol, naloxone, ondansetron **OR** ondansetron, oxyCODONE IR, potassium chloride, potassium chloride with lidocaine, potassium chloride, potassium chloride, potassium chloride, sodium chloride (PF), sodium chloride (PF), sodium phosphate, sodium phosphate, sodium phosphate, sodium phosphate          Physical Exam:   Vitals were reviewed  Blood pressure 92/54, pulse 85, temperature 97.9  F (36.6  C), resp. rate 18, height 1.651 m (5' 5\"), weight 61.1 kg (134 lb 12.8 oz), SpO2 95 %.  Rhythm: paced    Lungs: diminished bases    Cardiovascular: regular    Abdomen: soft NTND    Extremeties: minimal edema    Incision: CDI    CT: to waterseal    Weight:   Vitals:    02/15/19 0611 02/16/19 0455 02/17/19 0629 02/18/19 0400   Weight: 56.7 kg (125 lb) 60.1 kg (132 lb 7.9 oz) 61.8 kg (136 lb 3.9 oz) 62.1 kg (136 lb 14.5 oz)    02/19/19 0600   Weight: 61.1 kg (134 lb 12.8 oz)            Data:   Labs:   Lab Results   Component Value Date    WBC 13.4 02/19/2019     Lab Results   Component Value Date    RBC 2.93 02/19/2019     Lab Results   Component Value Date    HGB 9.0 02/19/2019     Lab Results   Component Value Date    HCT 25.9 02/19/2019     No components found for: MCT  Lab Results "   Component Value Date    MCV 88 02/19/2019     Lab Results   Component Value Date    MCH 30.7 02/19/2019     Lab Results   Component Value Date    MCHC 34.7 02/19/2019     Lab Results   Component Value Date    RDW 15.2 02/19/2019     Lab Results   Component Value Date     02/19/2019       Last Basic Metabolic Panel:  Lab Results   Component Value Date     02/19/2019      Lab Results   Component Value Date    POTASSIUM 3.7 02/19/2019     Lab Results   Component Value Date    CHLORIDE 102 02/19/2019     Lab Results   Component Value Date    ARIELLE 7.9 02/19/2019     Lab Results   Component Value Date    CO2 23 02/19/2019     Lab Results   Component Value Date    BUN 20 02/19/2019     Lab Results   Component Value Date    CR 0.80 02/19/2019     Lab Results   Component Value Date     02/19/2019       CXR: Final read pending  Clinical read-- clear lung fields, stable post operative changes    Loli Schuster PA-C  CV Surgery  Pager # [phone]

## 2019-02-19 NOTE — PLAN OF CARE
Voiding well post peña out yesterday afternoon. 02 weaned to 2 L/NC with sats at 93-94%. Denies pain.Up with SBA. CT's still draining moderate amount serosang drainage--150cc'c overnight. Tele--SR vs 100% A-V paced.

## 2019-02-19 NOTE — PROGRESS NOTES
"SP[facility]UAL [facility] Progress Note  [facility]     visited due to length of stay.  Patient said \"we are\" not part of a [facility] right now, after a bad experience at last [facility], so declined visit, but requested prayer.     provided prayer.    No plan to follow up, but SH remains available by request.      Kong Singh   Intern  "

## 2019-02-20 ENCOUNTER — APPOINTMENT (OUTPATIENT)
Dept: PHYSICAL THERAPY | Facility: CLINIC | Age: 74
DRG: 219 | End: 2019-02-20
Attending: SURGERY
Payer: COMMERCIAL

## 2019-02-20 LAB
GLUCOSE BLDC GLUCOMTR-MCNC: 107 MG/DL (ref 70–99)
GLUCOSE BLDC GLUCOMTR-MCNC: 118 MG/DL (ref 70–99)
GLUCOSE BLDC GLUCOMTR-MCNC: 121 MG/DL (ref 70–99)
GLUCOSE BLDC GLUCOMTR-MCNC: 125 MG/DL (ref 70–99)
GLUCOSE BLDC GLUCOMTR-MCNC: 161 MG/DL (ref 70–99)
POTASSIUM SERPL-SCNC: 3.8 MMOL/L (ref 3.4–5.3)

## 2019-02-20 PROCEDURE — 97110 THERAPEUTIC EXERCISES: CPT | Mod: GP

## 2019-02-20 PROCEDURE — 25000131 ZZH RX MED GY IP 250 OP 636 PS 637: Performed by: SURGERY

## 2019-02-20 PROCEDURE — 25000132 ZZH RX MED GY IP 250 OP 250 PS 637: Performed by: NURSE PRACTITIONER

## 2019-02-20 PROCEDURE — 25000132 ZZH RX MED GY IP 250 OP 250 PS 637: Performed by: STUDENT IN AN ORGANIZED HEALTH CARE EDUCATION/TRAINING PROGRAM

## 2019-02-20 PROCEDURE — 25000128 H RX IP 250 OP 636: Performed by: NURSE PRACTITIONER

## 2019-02-20 PROCEDURE — 12000000 ZZH R&B MED SURG/OB

## 2019-02-20 PROCEDURE — 36415 COLL VENOUS BLD VENIPUNCTURE: CPT | Performed by: SURGERY

## 2019-02-20 PROCEDURE — 84132 ASSAY OF SERUM POTASSIUM: CPT | Performed by: SURGERY

## 2019-02-20 PROCEDURE — 00000146 ZZHCL STATISTIC GLUCOSE BY METER IP

## 2019-02-20 PROCEDURE — 25000132 ZZH RX MED GY IP 250 OP 250 PS 637: Performed by: PHYSICIAN ASSISTANT

## 2019-02-20 PROCEDURE — 97530 THERAPEUTIC ACTIVITIES: CPT | Mod: GP

## 2019-02-20 RX ORDER — OMEPRAZOLE 10 MG/1
10 CAPSULE, DELAYED RELEASE ORAL
Status: DISCONTINUED | OUTPATIENT
Start: 2019-02-21 | End: 2019-02-23 | Stop reason: HOSPADM

## 2019-02-20 RX ORDER — MULTIPLE VITAMINS W/ MINERALS TAB 9MG-400MCG
1 TAB ORAL DAILY
Status: DISCONTINUED | OUTPATIENT
Start: 2019-02-20 | End: 2019-02-23 | Stop reason: HOSPADM

## 2019-02-20 RX ORDER — HEPARIN SODIUM 5000 [USP'U]/.5ML
5000 INJECTION, SOLUTION INTRAVENOUS; SUBCUTANEOUS EVERY 8 HOURS
Status: DISCONTINUED | OUTPATIENT
Start: 2019-02-20 | End: 2019-02-23 | Stop reason: HOSPADM

## 2019-02-20 RX ORDER — FEXOFENADINE HCL 180 MG/1
180 TABLET ORAL DAILY
Status: DISCONTINUED | OUTPATIENT
Start: 2019-02-20 | End: 2019-02-23 | Stop reason: HOSPADM

## 2019-02-20 RX ADMIN — INSULIN ASPART 1 UNITS: 100 INJECTION, SOLUTION INTRAVENOUS; SUBCUTANEOUS at 18:36

## 2019-02-20 RX ADMIN — SENNOSIDES AND DOCUSATE SODIUM 1 TABLET: 8.6; 5 TABLET ORAL at 08:54

## 2019-02-20 RX ADMIN — HEPARIN SODIUM 5000 UNITS: 5000 INJECTION, SOLUTION INTRAVENOUS; SUBCUTANEOUS at 12:06

## 2019-02-20 RX ADMIN — POTASSIUM CHLORIDE 20 MEQ: 1.5 POWDER, FOR SOLUTION ORAL at 16:51

## 2019-02-20 RX ADMIN — METHOCARBAMOL 500 MG: 500 TABLET, FILM COATED ORAL at 21:09

## 2019-02-20 RX ADMIN — MULTIPLE VITAMINS W/ MINERALS TAB 1 TABLET: TAB at 12:06

## 2019-02-20 RX ADMIN — HEPARIN SODIUM 5000 UNITS: 5000 INJECTION, SOLUTION INTRAVENOUS; SUBCUTANEOUS at 18:49

## 2019-02-20 RX ADMIN — Medication 1 TABLET: at 21:09

## 2019-02-20 RX ADMIN — PANTOPRAZOLE SODIUM 40 MG: 40 TABLET, DELAYED RELEASE ORAL at 06:41

## 2019-02-20 RX ADMIN — ASPIRIN 81 MG 81 MG: 81 TABLET ORAL at 08:55

## 2019-02-20 RX ADMIN — FEXOFENADINE HYDROCHLORIDE 180 MG: 180 TABLET, FILM COATED ORAL at 12:06

## 2019-02-20 RX ADMIN — ATORVASTATIN CALCIUM 40 MG: 40 TABLET, FILM COATED ORAL at 21:09

## 2019-02-20 RX ADMIN — Medication 1 TABLET: at 12:06

## 2019-02-20 ASSESSMENT — ACTIVITIES OF DAILY LIVING (ADL)
ADLS_ACUITY_SCORE: 15

## 2019-02-20 ASSESSMENT — MIFFLIN-ST. JEOR: SCORE: 1118.88

## 2019-02-20 NOTE — PLAN OF CARE
A&Ox4. Forgetful, sometimes needing safety reminders r/t chest tube and arm precautions. VSS on RA, dyspnea on exertion, coarse crackles LS. Encouraged IS/flutter valve. Incisions intact, bruised. Pacemaker site CDI. CT with serosanginous output, 75ml this shift. Tele paced. Regular diet. Blood sugars monitored. Up with SBA.

## 2019-02-20 NOTE — PLAN OF CARE
Alert and oriented x4. Vital signs stable on room air, BPs softer. Assist of 1 + gait belt and walker. Tolerating regular diet. Lung sounds diminished with crackles in bases. Bowel sounds active, + flatus. Adequate urine output. Chest tube to water seal, dressing changed, CDI, no air leak or crepitus. Right groin incision SOSA, bruised. Right underarm incision SOSA, bruised. Denies pain and nausea. Tele paced with BBB.

## 2019-02-20 NOTE — PROGRESS NOTES
[facility]  Cardiovascular and Thoracic Surgery Daily Note          Assessment and Plan:   POD#5 s/p Mini right thoracotomy, mitral valve replacement with a 31 mm St. Ron Epic (porcine bioprosthetic) valve, tricuspid valve repair with a 32 mm Oquendo MC3 annuloplasty ring, right femoral artery and venous cannulation for cardiopulmonary bypass, intraoperative transesophageal echocardiogram on 2/15/2019 by Dr. Andrea Hanna    Main Plans for today:  Discontinue CTs (done)  Work with therapies  Pulm hygiene    CVS:   BP 87/106/40-50s; HR 70/100% Vpaced; CT output 60 mL/14 hours - will discontinue  Symptomatic bradycardia on POD #1 s/p PPM placement on POD #3, EP following - appreciate  Hx PAF anticoagulated on Xarelto - will resume close to discharge  On ASA, BB and amiodarone on hold 2/2 soft BPs  Echo on 19 demonstrated an LVEF of 65% with noted valvular abnormalities  CXR in AM  Pulm:  Extubated per protocol; On room air with adequate saturations; IS to 1000; Encourage IS, C&DB, ambulating  Neuro:  Intact; pain well controlled with current regimen  Concern of neuro deficits in right hand on POD #0 with subsequent code stroke. CT head negative. Neurology consulted - will need to resume xarelto at discharge.  Renal:  BL Creat ~0.7-0.9, remains stable; Preop weight 56.7 kg, today 61.1 kg, down 1 kg overnight  UO adequate, will hold diuretics today 2/2 soft BP  GI:  Has passed flatus; + BM  Continue bowel program  Tolerating diet, will add supplements  On PPI  :  Voiding well on own  Endo:   Preop A1C 5.9; managed on insulin drip postop, transitioned to sliding scale goal BG <180  FEN:   Replace lytes as needed; Advance diet as tolerated  Orders Placed This Encounter      Advance Diet as Tolerated: Regular Diet Adult    ID:   Temp (24hrs), Av.7  F (36.5  C), Min:97.3  F (36.3  C), Max:98.3  F (36.8  C)  Completed perioperative antibiotics  Heme:   Received 2 U PRBC on POD #1  Acute blood  "loss anemia and thrombocytopenia related to surgery  CBC in AM  Proph:   PCD, ASA, PPI, subcutaneous heparin  Dispo:   Continue on station 33.  Plan for discharge to home  in 1-2 days.          Interval History:   States pain is well managed on current regimen. Slept well overnight. Appetite fair. Is passing flatus + BM.  Breathing is good, able to perform IS to 1000. Up in chair for meals, working with therapies. Denies hallucinations.         Medications:       aspirin  81 mg Oral or NG Tube Daily     atorvastatin  40 mg Oral At Bedtime     calcium carbonate-vitamin D  1 tablet Oral BID     fexofenadine  180 mg Oral Daily     insulin aspart  1-7 Units Subcutaneous TID AC     insulin aspart  1-5 Units Subcutaneous At Bedtime     MULTIVITAMIN ADULT  1 tablet Oral Daily     [START ON 2/21/2019] omeprazole  10 mg Oral QAM AC     polyethylene glycol  17 g Oral Daily     senna-docusate  1 tablet Oral BID    Or     senna-docusate  2 tablet Oral BID     sodium chloride (PF)  3 mL Intracatheter Q8H     acetaminophen, bisacodyl, IV fluid REPLACEMENT ONLY, glucose **OR** dextrose **OR** glucagon, HOLD MEDICATION, HOLD MEDICATION, HOLD MEDICATION, hydrALAZINE, lidocaine 4%, lidocaine (buffered or not buffered), magnesium sulfate, magnesium sulfate, methocarbamol, naloxone, ondansetron **OR** ondansetron, oxyCODONE IR, potassium chloride, potassium chloride with lidocaine, potassium chloride, potassium chloride, potassium chloride, sodium chloride (PF), sodium chloride (PF), sodium phosphate, sodium phosphate, sodium phosphate, sodium phosphate, traZODone          Physical Exam:   Vitals were reviewed  Blood pressure 93/55, pulse 69, temperature 97.9  F (36.6  C), temperature source Oral, resp. rate 18, height 1.651 m (5' 5\"), weight 60.8 kg (134 lb 0.6 oz), SpO2 94 %.  Rhythm: Paced    Lungs: CTA    Cardiovascular: S1, S2, RRR, no m/r/g.     Abdomen: S/NT/ND, +BS    Extremeties: No BLE edema    Incision(s): Right thoracotomy, " CT sites CDI, right fem site CDI    CT: To suction; no airleak, no crepitus     Weight:   Vitals:    02/16/19 0455 02/17/19 0629 02/18/19 0400 02/19/19 0600   Weight: 60.1 kg (132 lb 7.9 oz) 61.8 kg (136 lb 3.9 oz) 62.1 kg (136 lb 14.5 oz) 61.1 kg (134 lb 12.8 oz)    02/20/19 0500   Weight: 60.8 kg (134 lb 0.6 oz)            Data:    All labs and imaging reviewed by me.  Labs:   Lab Results   Component Value Date    WBC 13.4 02/19/2019     Lab Results   Component Value Date    RBC 2.93 02/19/2019     Lab Results   Component Value Date    HGB 9.0 02/19/2019     Lab Results   Component Value Date    HCT 25.9 02/19/2019     No components found for: MCT  Lab Results   Component Value Date    MCV 88 02/19/2019     Lab Results   Component Value Date    MCH 30.7 02/19/2019     Lab Results   Component Value Date    MCHC 34.7 02/19/2019     Lab Results   Component Value Date    RDW 15.2 02/19/2019     Lab Results   Component Value Date     02/19/2019       Last Basic Metabolic Panel:  Lab Results   Component Value Date     02/19/2019      Lab Results   Component Value Date    POTASSIUM 3.8 02/20/2019     Lab Results   Component Value Date    CHLORIDE 102 02/19/2019     Lab Results   Component Value Date    ARIELLE 7.9 02/19/2019     Lab Results   Component Value Date    CO2 23 02/19/2019     Lab Results   Component Value Date    BUN 20 02/19/2019     Lab Results   Component Value Date    CR 0.80 02/19/2019     Lab Results   Component Value Date     02/19/2019       CXR: No new imaging today    AYE Ramirez, CCRN-CSC  CV Surgery  [place] Pager: [phone]  Personal Pager: [phone]

## 2019-02-20 NOTE — PLAN OF CARE
Discharge Planner PT   Patient plan for discharge: Home with assist from spouse and daughter  Current status: Pt requires SBA for bed mobility and transfers. Pt tolerates ambulating 8 minutes with FWW and SBA, one seated rest break. Pt able to ascend/descend 3 steps x 2 with handrail and CGA. Pts VSS throughout activity, see flow sheet. Pt rates effort as 5/10 on OMNI effort scale. Continued with education regarding pacemaker precautions, exercise guidelines and energy conservation.  Barriers to return to prior living situation: None anticipated  Recommendations for discharge: Home with family, OP CR   Rationale for recommendations: Pt would benefit from continued skilled intervention for education on heart healthy lifestyle modifications as well as progressive, monitored aerobic exercise.        Entered by: Bridgette Dowell 02/20/2019 12:01 PM

## 2019-02-20 NOTE — PROVIDER NOTIFICATION
Dr. Monteiro paged via on-call service regarding pt's request for a sleep aid.    Dr. Monteiro called back, placed new order for PRN trazodone. See orders.

## 2019-02-20 NOTE — PLAN OF CARE
Pt alert, oriented.  Ambulates well with SBA.  Tele is 100% V-paced.  Lungs with diminished bases, incentive spirometer and pulmonary hygiene encouraged.  Chest tube removed today.   Incision dressing intact.  Tolerating regular diet, no sliding scale coverage needed for blood sugars.  Discharge after chest xray in AM.

## 2019-02-21 ENCOUNTER — APPOINTMENT (OUTPATIENT)
Dept: PHYSICAL THERAPY | Facility: CLINIC | Age: 74
DRG: 219 | End: 2019-02-21
Attending: SURGERY
Payer: COMMERCIAL

## 2019-02-21 ENCOUNTER — APPOINTMENT (OUTPATIENT)
Dept: GENERAL RADIOLOGY | Facility: CLINIC | Age: 74
DRG: 219 | End: 2019-02-21
Attending: NURSE PRACTITIONER
Payer: COMMERCIAL

## 2019-02-21 LAB
ANION GAP SERPL CALCULATED.3IONS-SCNC: 10 MMOL/L (ref 3–14)
BUN SERPL-MCNC: 26 MG/DL (ref 7–30)
CALCIUM SERPL-MCNC: 8.2 MG/DL (ref 8.5–10.1)
CHLORIDE SERPL-SCNC: 98 MMOL/L (ref 94–109)
CO2 SERPL-SCNC: 20 MMOL/L (ref 20–32)
CREAT SERPL-MCNC: 0.92 MG/DL (ref 0.52–1.04)
ERYTHROCYTE [DISTWIDTH] IN BLOOD BY AUTOMATED COUNT: 14.6 % (ref 10–15)
GFR SERPL CREATININE-BSD FRML MDRD: 62 ML/MIN/{1.73_M2}
GLUCOSE BLDC GLUCOMTR-MCNC: 102 MG/DL (ref 70–99)
GLUCOSE BLDC GLUCOMTR-MCNC: 125 MG/DL (ref 70–99)
GLUCOSE BLDC GLUCOMTR-MCNC: 166 MG/DL (ref 70–99)
GLUCOSE SERPL-MCNC: 110 MG/DL (ref 70–99)
HCT VFR BLD AUTO: 25.3 % (ref 35–47)
HGB BLD-MCNC: 8.9 G/DL (ref 11.7–15.7)
MCH RBC QN AUTO: 30.8 PG (ref 26.5–33)
MCHC RBC AUTO-ENTMCNC: 35.2 G/DL (ref 31.5–36.5)
MCV RBC AUTO: 88 FL (ref 78–100)
PLATELET # BLD AUTO: 206 10E9/L (ref 150–450)
POTASSIUM SERPL-SCNC: 4.3 MMOL/L (ref 3.4–5.3)
RBC # BLD AUTO: 2.89 10E12/L (ref 3.8–5.2)
SODIUM SERPL-SCNC: 128 MMOL/L (ref 133–144)
WBC # BLD AUTO: 13.3 10E9/L (ref 4–11)

## 2019-02-21 PROCEDURE — 25000128 H RX IP 250 OP 636: Performed by: NURSE PRACTITIONER

## 2019-02-21 PROCEDURE — 71046 X-RAY EXAM CHEST 2 VIEWS: CPT

## 2019-02-21 PROCEDURE — 25000132 ZZH RX MED GY IP 250 OP 250 PS 637: Performed by: STUDENT IN AN ORGANIZED HEALTH CARE EDUCATION/TRAINING PROGRAM

## 2019-02-21 PROCEDURE — 36415 COLL VENOUS BLD VENIPUNCTURE: CPT | Performed by: NURSE PRACTITIONER

## 2019-02-21 PROCEDURE — 00000146 ZZHCL STATISTIC GLUCOSE BY METER IP

## 2019-02-21 PROCEDURE — P9047 ALBUMIN (HUMAN), 25%, 50ML: HCPCS | Performed by: PHYSICIAN ASSISTANT

## 2019-02-21 PROCEDURE — 25000128 H RX IP 250 OP 636: Performed by: PHYSICIAN ASSISTANT

## 2019-02-21 PROCEDURE — 25000132 ZZH RX MED GY IP 250 OP 250 PS 637: Performed by: PHYSICIAN ASSISTANT

## 2019-02-21 PROCEDURE — 97110 THERAPEUTIC EXERCISES: CPT | Mod: GP

## 2019-02-21 PROCEDURE — 12000000 ZZH R&B MED SURG/OB

## 2019-02-21 PROCEDURE — 25000132 ZZH RX MED GY IP 250 OP 250 PS 637: Performed by: NURSE PRACTITIONER

## 2019-02-21 PROCEDURE — 85027 COMPLETE CBC AUTOMATED: CPT | Performed by: NURSE PRACTITIONER

## 2019-02-21 PROCEDURE — 97530 THERAPEUTIC ACTIVITIES: CPT | Mod: GP

## 2019-02-21 PROCEDURE — 80048 BASIC METABOLIC PNL TOTAL CA: CPT | Performed by: NURSE PRACTITIONER

## 2019-02-21 RX ORDER — FUROSEMIDE 20 MG
20 TABLET ORAL ONCE
Status: COMPLETED | OUTPATIENT
Start: 2019-02-21 | End: 2019-02-21

## 2019-02-21 RX ORDER — FUROSEMIDE 10 MG/ML
20 INJECTION INTRAMUSCULAR; INTRAVENOUS ONCE
Status: COMPLETED | OUTPATIENT
Start: 2019-02-21 | End: 2019-02-21

## 2019-02-21 RX ORDER — ALBUMIN (HUMAN) 12.5 G/50ML
25 SOLUTION INTRAVENOUS ONCE
Status: DISCONTINUED | OUTPATIENT
Start: 2019-02-21 | End: 2019-02-21

## 2019-02-21 RX ORDER — ALBUMIN (HUMAN) 12.5 G/50ML
25 SOLUTION INTRAVENOUS ONCE
Status: COMPLETED | OUTPATIENT
Start: 2019-02-21 | End: 2019-02-21

## 2019-02-21 RX ADMIN — ALBUMIN HUMAN 25 G: 0.25 SOLUTION INTRAVENOUS at 14:06

## 2019-02-21 RX ADMIN — Medication 1 TABLET: at 10:00

## 2019-02-21 RX ADMIN — FUROSEMIDE 20 MG: 10 INJECTION, SOLUTION INTRAVENOUS at 19:06

## 2019-02-21 RX ADMIN — FEXOFENADINE HYDROCHLORIDE 180 MG: 180 TABLET, FILM COATED ORAL at 10:00

## 2019-02-21 RX ADMIN — HEPARIN SODIUM 5000 UNITS: 5000 INJECTION, SOLUTION INTRAVENOUS; SUBCUTANEOUS at 03:56

## 2019-02-21 RX ADMIN — Medication 1 TABLET: at 21:16

## 2019-02-21 RX ADMIN — ALBUMIN HUMAN 25 G: 0.25 SOLUTION INTRAVENOUS at 18:05

## 2019-02-21 RX ADMIN — HEPARIN SODIUM 5000 UNITS: 5000 INJECTION, SOLUTION INTRAVENOUS; SUBCUTANEOUS at 19:07

## 2019-02-21 RX ADMIN — ASPIRIN 81 MG 81 MG: 81 TABLET ORAL at 10:00

## 2019-02-21 RX ADMIN — HEPARIN SODIUM 5000 UNITS: 5000 INJECTION, SOLUTION INTRAVENOUS; SUBCUTANEOUS at 12:08

## 2019-02-21 RX ADMIN — OMEPRAZOLE 10 MG: 10 CAPSULE, DELAYED RELEASE ORAL at 06:42

## 2019-02-21 RX ADMIN — ATORVASTATIN CALCIUM 40 MG: 40 TABLET, FILM COATED ORAL at 21:16

## 2019-02-21 RX ADMIN — FUROSEMIDE 20 MG: 20 TABLET ORAL at 15:09

## 2019-02-21 RX ADMIN — MULTIPLE VITAMINS W/ MINERALS TAB 1 TABLET: TAB at 10:00

## 2019-02-21 ASSESSMENT — ACTIVITIES OF DAILY LIVING (ADL)
ADLS_ACUITY_SCORE: 15

## 2019-02-21 ASSESSMENT — MIFFLIN-ST. JEOR: SCORE: 1126.88

## 2019-02-21 NOTE — PLAN OF CARE
Vital signs stable. Alert and oriented. Lung sounds clear. Bowel sounds active, flatus present. Pacer dressing clean dry and intact. Patient denies pain. Up independently in room. Tolerating diet. CMS/neuros intact.

## 2019-02-21 NOTE — PLAN OF CARE
A/O x4. AVSS on RA. Tele 100% V paced. Up independently in room, SBA in halls. Pt denies pain. Incisions, CDI. Tolerating regular diet. Voiding. K replaced, recheck scheduled for AM.

## 2019-02-21 NOTE — PROGRESS NOTES
[facility]  Cardiovascular and Thoracic Surgery Daily Note          Assessment and Plan:   POD#6 s/p Mini right thoracotomy, mitral valve replacement with a 31 mm St. Ron Epic (porcine bioprosthetic) valve, tricuspid valve repair with a 32 mm Oquendo MC3 annuloplasty ring, right femoral artery and venous cannulation for cardiopulmonary bypass, intraoperative transesophageal echocardiogram by Dr. Andrea Hanna  -CVS: HR: 60-70s. SBP: 80s-90s. Symptomatic bradycardia on POD#1-- treated with dopamine. PPM on POD#3- tolerated well. EP following. ASA, BB and amiodarone on hold d/t soft blood pressors. Hx of PAF. Will resume Xarelto closer to discharge.   -Resp: Extubated within protocol. Saturating well on 1L. Continue to encourage IS, cough, deep breathing, ambulation  -Neuro:  Grossly intact. Pain controlled. Concern of neuro deficits in right hand on POD#0. Code stroke called. CT head negative. Neurology saw-- will need to resume xarelto at discharge  -Renal: Good UOP. Up about 5<4kg from preoperative weight today. Cr: 0.92. Albumin/lasix today.  -GI: Tolerating diet.  +BM. Continue bowel regimen.   -: voiding well on own.   -Endo: pre op A1c: 5.9. Will transition to sliding scale insulin.  -FEN: replace electrolytes as needed. Na: 128<134. K: 4.3. Suspect hypervolemia    Orders Placed This Encounter      Advance Diet as Tolerated: Regular Diet Adult     -ID: Temp (24hrs), Av.1  F (36.7  C), Min:97.3  F (36.3  C), Max:99.1  F (37.3  C)  WBC: 13.3<13.4<16.5<14.8<11. Completed perioperative abx. Will continue to monitor.   -Heme: Hgb: 8.9. Plt: 206. Received 2u PRBC, POD#1. Acute blood loss anemia and thrombocytopenia related to surgery. Continue to monitor.  -Proph: PCD, ASA, PPI, sub q heparin  -Dispo: St[address] 33. Continue therapies. Continue to encourage IS, cough, deep breathing, ambulation. Home tomorrow if sodium stable.           Interval History:   Sitting up in bed, enjoying sun shining on  "her, denies pain, breathing a little worse today, eating fine, encourage to eat higher sodium foods and watch fluid intake         Medications:       albumin human  25 g Intravenous Once     albumin human  25 g Intravenous Once     aspirin  81 mg Oral or NG Tube Daily     atorvastatin  40 mg Oral At Bedtime     calcium carbonate 600 mg-vitamin D 400 units  1 tablet Oral BID     fexofenadine  180 mg Oral Daily     furosemide  20 mg Intravenous Once     furosemide  20 mg Oral Once     heparin  5,000 Units Subcutaneous Q8H     insulin aspart  1-7 Units Subcutaneous TID AC     insulin aspart  1-5 Units Subcutaneous At Bedtime     multivitamin w/minerals  1 tablet Oral Daily     omeprazole  10 mg Oral QAM AC     polyethylene glycol  17 g Oral Daily     senna-docusate  1 tablet Oral BID    Or     senna-docusate  2 tablet Oral BID     sodium chloride (PF)  3 mL Intracatheter Q8H     @MEDRN-@          Physical Exam:   Vitals were reviewed  Blood pressure 94/52, pulse (P) 71, temperature (P) 97.4  F (36.3  C), temperature source (P) Oral, resp. rate (P) 16, height 1.651 m (5' 5\"), weight 61.6 kg (135 lb 12.9 oz), SpO2 (P) 92 %.  Rhythm: NSR    Lungs: diminished bases    Cardiovascular: s1/s2, no m/r/g    Abdomen: s/nt/dn    Extremeties: no LE edema    Incision: cdi    CT: na    Weight:   Vitals:    02/17/19 0629 02/18/19 0400 02/19/19 0600 02/20/19 0500   Weight: 61.8 kg (136 lb 3.9 oz) 62.1 kg (136 lb 14.5 oz) 61.1 kg (134 lb 12.8 oz) 60.8 kg (134 lb 0.6 oz)    02/21/19 0600   Weight: 61.6 kg (135 lb 12.9 oz)            Data:   Labs:   Lab Results   Component Value Date    WBC 13.3 02/21/2019     Lab Results   Component Value Date    RBC 2.89 02/21/2019     Lab Results   Component Value Date    HGB 8.9 02/21/2019     Lab Results   Component Value Date    HCT 25.3 02/21/2019     No components found for: MCT  Lab Results   Component Value Date    MCV 88 02/21/2019     Lab Results   Component Value Date    MCH 30.8 " 02/21/2019     Lab Results   Component Value Date    MCHC 35.2 02/21/2019     Lab Results   Component Value Date    RDW 14.6 02/21/2019     Lab Results   Component Value Date     02/21/2019       Last Basic Metabolic Panel:  Lab Results   Component Value Date     02/21/2019      Lab Results   Component Value Date    POTASSIUM 4.3 02/21/2019     Lab Results   Component Value Date    CHLORIDE 98 02/21/2019     Lab Results   Component Value Date    ARIELLE 8.2 02/21/2019     Lab Results   Component Value Date    CO2 20 02/21/2019     Lab Results   Component Value Date    BUN 26 02/21/2019     Lab Results   Component Value Date    CR 0.92 02/21/2019     Lab Results   Component Value Date     02/21/2019       CXR: IMPRESSION: Since February 19, 2019, heart size is normal. Valve  replacement at two locations again identified. Blunting of both  lateral costophrenic sulci suggestive of small pleural effusions.  Scattered bibasilar opacities more apparent on today's exam, though  may be atelectasis. Previously identified right pulmonary opacity has  improved. Interval removal of right-sided chest tubes. No definite  pneumothorax. Dual lead pacemaker in the left hemithorax unchanged.    Salud Siddiqui PA-C  Pager #: [phone]

## 2019-02-21 NOTE — PLAN OF CARE
Discharge Planner PT   Patient plan for discharge: Home with family assist, outpatient cardiac rehab  Current status: Pt independent with bed mobility and transfers, ambulating with SBA with FWW. Pt able to ambulate 100' before becoming SOB and fatigued. Pt up/down 3 stairs x2 with SBA, fatigued. Pt's O2 sats decreased to mid 80's to low 90's on room air with activity, RN aware and placing pt on 1 L O2.  Barriers to return to prior living situation: Decreased activity tolerance, decreased strength  Recommendations for discharge: Home with family assist, outpatient cardiac rehab  Rationale for recommendations: Pt continues to fatigue easily, anticipate need for assistance with household tasks and possibly ADLs depending on fatigue level. Recommend outpatient cardiac rehab to further progress aerobic activity tolerance and for education to promote heart healthy lifestyle.       Entered by: Sheryl Washington 02/21/2019 12:27 PM      PM Session: Amb 200' with FWW and SBA, fatigued and SOB, unable to speak in full sentences with ambulation. O2 sats 96% at rest on 2 L O2, 85% after ambulation on room air. RN notified.

## 2019-02-22 ENCOUNTER — APPOINTMENT (OUTPATIENT)
Dept: PHYSICAL THERAPY | Facility: CLINIC | Age: 74
DRG: 219 | End: 2019-02-22
Attending: SURGERY
Payer: COMMERCIAL

## 2019-02-22 PROBLEM — Z95.2 S/P MVR (MITRAL VALVE REPLACEMENT): Status: ACTIVE | Noted: 2019-02-22

## 2019-02-22 LAB
ALBUMIN UR-MCNC: 30 MG/DL
ANION GAP SERPL CALCULATED.3IONS-SCNC: 11 MMOL/L (ref 3–14)
APPEARANCE UR: CLEAR
BILIRUB UR QL STRIP: NEGATIVE
BUN SERPL-MCNC: 28 MG/DL (ref 7–30)
CALCIUM SERPL-MCNC: 8.5 MG/DL (ref 8.5–10.1)
CHLORIDE SERPL-SCNC: 95 MMOL/L (ref 94–109)
CO2 SERPL-SCNC: 20 MMOL/L (ref 20–32)
COLOR UR AUTO: YELLOW
CREAT SERPL-MCNC: 0.98 MG/DL (ref 0.52–1.04)
ERYTHROCYTE [DISTWIDTH] IN BLOOD BY AUTOMATED COUNT: 14.8 % (ref 10–15)
GFR SERPL CREATININE-BSD FRML MDRD: 57 ML/MIN/{1.73_M2}
GLUCOSE BLDC GLUCOMTR-MCNC: 100 MG/DL (ref 70–99)
GLUCOSE BLDC GLUCOMTR-MCNC: 110 MG/DL (ref 70–99)
GLUCOSE BLDC GLUCOMTR-MCNC: 122 MG/DL (ref 70–99)
GLUCOSE BLDC GLUCOMTR-MCNC: 136 MG/DL (ref 70–99)
GLUCOSE SERPL-MCNC: 111 MG/DL (ref 70–99)
GLUCOSE UR STRIP-MCNC: NEGATIVE MG/DL
GRAN CASTS #/AREA URNS LPF: 2 /LPF
HCT VFR BLD AUTO: 23.4 % (ref 35–47)
HGB BLD-MCNC: 8.1 G/DL (ref 11.7–15.7)
HGB UR QL STRIP: NEGATIVE
HYALINE CASTS #/AREA URNS LPF: 3 /LPF (ref 0–2)
KETONES UR STRIP-MCNC: NEGATIVE MG/DL
LACTATE BLD-SCNC: 1.4 MMOL/L (ref 0.7–2)
LEUKOCYTE ESTERASE UR QL STRIP: NEGATIVE
MCH RBC QN AUTO: 30.7 PG (ref 26.5–33)
MCHC RBC AUTO-ENTMCNC: 34.6 G/DL (ref 31.5–36.5)
MCV RBC AUTO: 89 FL (ref 78–100)
MUCOUS THREADS #/AREA URNS LPF: PRESENT /LPF
NITRATE UR QL: NEGATIVE
OSMOLALITY SERPL: 270 MMOL/KG (ref 280–301)
OSMOLALITY UR: 616 MMOL/KG (ref 100–1200)
PH UR STRIP: 5.5 PH (ref 5–7)
PLATELET # BLD AUTO: 222 10E9/L (ref 150–450)
PLATELET # BLD AUTO: 232 10E9/L (ref 150–450)
POTASSIUM SERPL-SCNC: 4 MMOL/L (ref 3.4–5.3)
RBC # BLD AUTO: 2.64 10E12/L (ref 3.8–5.2)
RBC #/AREA URNS AUTO: 1 /HPF (ref 0–2)
SODIUM SERPL-SCNC: 126 MMOL/L (ref 133–144)
SODIUM UR-SCNC: 6 MMOL/L
SOURCE: ABNORMAL
SP GR UR STRIP: 1.02 (ref 1–1.03)
SQUAMOUS #/AREA URNS AUTO: <1 /HPF (ref 0–1)
UROBILINOGEN UR STRIP-MCNC: NORMAL MG/DL (ref 0–2)
WBC # BLD AUTO: 13.7 10E9/L (ref 4–11)
WBC #/AREA URNS AUTO: 1 /HPF (ref 0–5)

## 2019-02-22 PROCEDURE — 80048 BASIC METABOLIC PNL TOTAL CA: CPT | Performed by: PHYSICIAN ASSISTANT

## 2019-02-22 PROCEDURE — 83930 ASSAY OF BLOOD OSMOLALITY: CPT | Performed by: NURSE PRACTITIONER

## 2019-02-22 PROCEDURE — 85027 COMPLETE CBC AUTOMATED: CPT | Performed by: PHYSICIAN ASSISTANT

## 2019-02-22 PROCEDURE — 84300 ASSAY OF URINE SODIUM: CPT | Performed by: NURSE PRACTITIONER

## 2019-02-22 PROCEDURE — 83605 ASSAY OF LACTIC ACID: CPT | Performed by: INTERNAL MEDICINE

## 2019-02-22 PROCEDURE — 97110 THERAPEUTIC EXERCISES: CPT | Mod: GP

## 2019-02-22 PROCEDURE — 25000132 ZZH RX MED GY IP 250 OP 250 PS 637: Performed by: INTERNAL MEDICINE

## 2019-02-22 PROCEDURE — 85049 AUTOMATED PLATELET COUNT: CPT | Performed by: PHYSICIAN ASSISTANT

## 2019-02-22 PROCEDURE — 12000000 ZZH R&B MED SURG/OB

## 2019-02-22 PROCEDURE — 81001 URINALYSIS AUTO W/SCOPE: CPT | Performed by: NURSE PRACTITIONER

## 2019-02-22 PROCEDURE — 25000132 ZZH RX MED GY IP 250 OP 250 PS 637: Performed by: NURSE PRACTITIONER

## 2019-02-22 PROCEDURE — 83935 ASSAY OF URINE OSMOLALITY: CPT | Performed by: NURSE PRACTITIONER

## 2019-02-22 PROCEDURE — 25000132 ZZH RX MED GY IP 250 OP 250 PS 637: Performed by: STUDENT IN AN ORGANIZED HEALTH CARE EDUCATION/TRAINING PROGRAM

## 2019-02-22 PROCEDURE — 25000128 H RX IP 250 OP 636: Performed by: NURSE PRACTITIONER

## 2019-02-22 PROCEDURE — 36415 COLL VENOUS BLD VENIPUNCTURE: CPT | Performed by: PHYSICIAN ASSISTANT

## 2019-02-22 PROCEDURE — 00000146 ZZHCL STATISTIC GLUCOSE BY METER IP

## 2019-02-22 PROCEDURE — 83930 ASSAY OF BLOOD OSMOLALITY: CPT | Performed by: PHYSICIAN ASSISTANT

## 2019-02-22 RX ADMIN — HEPARIN SODIUM 5000 UNITS: 5000 INJECTION, SOLUTION INTRAVENOUS; SUBCUTANEOUS at 11:33

## 2019-02-22 RX ADMIN — Medication 1 TABLET: at 21:30

## 2019-02-22 RX ADMIN — MULTIPLE VITAMINS W/ MINERALS TAB 1 TABLET: TAB at 09:14

## 2019-02-22 RX ADMIN — OMEPRAZOLE 10 MG: 10 CAPSULE, DELAYED RELEASE ORAL at 06:26

## 2019-02-22 RX ADMIN — FEXOFENADINE HYDROCHLORIDE 180 MG: 180 TABLET, FILM COATED ORAL at 09:14

## 2019-02-22 RX ADMIN — HEPARIN SODIUM 5000 UNITS: 5000 INJECTION, SOLUTION INTRAVENOUS; SUBCUTANEOUS at 18:57

## 2019-02-22 RX ADMIN — ACETAMINOPHEN 650 MG: 325 TABLET, FILM COATED ORAL at 23:07

## 2019-02-22 RX ADMIN — ASPIRIN 81 MG 81 MG: 81 TABLET ORAL at 09:14

## 2019-02-22 RX ADMIN — ATORVASTATIN CALCIUM 40 MG: 40 TABLET, FILM COATED ORAL at 21:29

## 2019-02-22 RX ADMIN — HEPARIN SODIUM 5000 UNITS: 5000 INJECTION, SOLUTION INTRAVENOUS; SUBCUTANEOUS at 04:24

## 2019-02-22 RX ADMIN — Medication 1 TABLET: at 09:14

## 2019-02-22 ASSESSMENT — ACTIVITIES OF DAILY LIVING (ADL)
ADLS_ACUITY_SCORE: 15

## 2019-02-22 ASSESSMENT — MIFFLIN-ST. JEOR: SCORE: 1132.88

## 2019-02-22 NOTE — PLAN OF CARE
VSS on room air, at times desatted to 88%, placed on 1LPM throughout shift, may be related to Pulse ox machine in room, utilizing portable machine with better read. Tele 100% Vpaced. A/Ox4. CT site with moderate serosanguinous drainage, Dressing changed x2 on shift. Pacemaker dressing CDI. Denies pain. Up with SBA, ambulated halls on shift.  Regular diet, 1500ml fluid restriction. BS active, Flatus +. Voiding to bathroom. LS clear. IS to 1250. Sodium 128, will continue to monitor.

## 2019-02-22 NOTE — PLAN OF CARE
Discharge Planner PT   Patient plan for discharge: Home with family assist  Current status: Pt is independent with bed mobility and sit to stand transfers. Pt able to ambulate short distances in room with FWW mod I. Pt amb 200' in hallway with FWW, slow pace, 2 standing rest breaks and one sitting rest break needed. Rates 7/10 on OMNI effort scale, O2 sats decrease to 80's on room air with ambulation, recover to 90's with rest. Pt denies complaints except for feeling exhausted and SOB with activity.  Barriers to return to prior living situation: Decreased activity tolerance  Recommendations for discharge: Home with family assist, outpatient cardiac rehab  Rationale for recommendations: Pt continues to fatigue easily, anticipate she will need assistance for household tasks and possible ADLs depending on fatigue level, would benefit from cont outpatient cardiac rehab to further progress activity level and for education promoting heart healthy lifestyle.       Entered by: Sheryl Washington 02/22/2019 9:15 AM

## 2019-02-22 NOTE — PLAN OF CARE
VSS on room air, Desats in 80's with activity. Tele Paced occasionally. A/Ox4. Incision on Rt groin SOSA with liquid bandage, slight drainage noted, interdry placed. Denies pain. Up with AsstxSB, GB and walker.  Regular diet, meredith well. BS active, Flatus +, BM+ on shift. Voiding adequately to bathroom. LS clear. IS to 1000.

## 2019-02-22 NOTE — CONSULTS
RENAL CONSULTATION NOTE    REFERRING MD:  Beena Cesar NP    REASON FOR CONSULTATION:  Hyponatremia    HPI:  74 y.o pleasant woman with atria fibrillation, bi-leaflet mitral valve prolapse and tricuspid regurgitation, who mitral (St. Ron) and tricuspid (Oquendo annuloplasty ring) valves repair on 2/15. Post-po complicated with CHB s/p dual-chamber PM on 2/18.     Patient with excellent renal function at baseline. Serum sodium started to trend down on 2/17. She has been getting some Lasix. Appetite is poor. She eats half of her meals due to dysphagia. She denies N/V/D. No pain.   She feels winded walking from her. He weight has been stable, but up 12 lbs since admission.     ROS:  A complete review of systems was performed and is negative except as noted above.    PMH:    Past Medical History:   Diagnosis Date     Allergic rhinitis, cause unspecified     dust mites, ragweed     Complete AV block (H)     BSX DDD PM 2-     History of tricuspid valve repair      Mitral valve prolapse     bioprosthetic MVR 2-     Paroxysmal atrial fibrillation (H)        PSH:    Past Surgical History:   Procedure Laterality Date     COLONOSCOPY N/A 9/15/2015    Procedure: COLONOSCOPY;  Surgeon: Anson Llanos MD;  Location:  GI     CV HEART CATHETERIZATION WITH POSSIBLE INTERVENTION N/A 1/9/2019    Procedure: Heart Catheterization with Possible Intervention;  Surgeon: Ritesh Whittaker MD;  Location:  HEART CARDIAC CATH LAB     CV RIGHT AND LEFT HEART CATH N/A 1/9/2019    Procedure: Right and Left Heart Catherization;  Surgeon: Ritesh Whittaker MD;  Location:  HEART CARDIAC CATH LAB     ENT SURGERY      T&A     EP PACEMAKER N/A 2/18/2019    Procedure: EP Pacemaker;  Surgeon: Inocencia Guillen MD;  Location:  HEART CARDIAC CATH LAB     REPAIR VALVE TRICUSPID MINIMALLY INVASIVE N/A 2/15/2019    Procedure: MINIMALLY INVASIVE TRICUSPID VALVE REPAIR WITH 32MM OQUENDO RING;  Surgeon: Andrea Hanna  MD Maite;  Location:  OR     REPLACE VALVE MITRAL MINIMALLY INVASIVE N/A 2/15/2019    Procedure: MINIMALLY INVASIVE MITRAL VALVE REPLACEMENT WITH EPIC ST KEYUR 31MM VALVE; ON PUMP WITH TIA.  CARDIOLOGIST DR CARDENAS;  Surgeon: Andrea Hanna MD;  Location:  OR       MEDICATIONS:      aspirin  81 mg Oral or NG Tube Daily     atorvastatin  40 mg Oral At Bedtime     calcium carbonate 600 mg-vitamin D 400 units  1 tablet Oral BID     fexofenadine  180 mg Oral Daily     heparin  5,000 Units Subcutaneous Q8H     insulin aspart  1-7 Units Subcutaneous TID AC     insulin aspart  1-5 Units Subcutaneous At Bedtime     multivitamin w/minerals  1 tablet Oral Daily     omeprazole  10 mg Oral QAM AC     polyethylene glycol  17 g Oral Daily     senna-docusate  1 tablet Oral BID    Or     senna-docusate  2 tablet Oral BID     sodium chloride (PF)  3 mL Intracatheter Q8H       ALLERGIES:    Allergies as of 01/30/2019 - Reviewed 01/09/2019   Allergen Reaction Noted     Chlorpheniramine Other (See Comments) 01/09/2019     No known drug allergies  01/23/2004     Phenylephrine Other (See Comments) 01/09/2019       FH:    Family History   Problem Relation Age of Onset     Osteoporosis Mother      Alzheimer Disease Mother      Family History Negative Father      Heart Disease Maternal Grandfather      Family History Negative Paternal Grandmother      Family History Negative Paternal Grandfather        SH:    Social History     Socioeconomic History     Marital status:      Spouse name: Not on file     Number of children: 3     Years of education: Not on file     Highest education level: Not on file   Occupational History     Not on file   Social Needs     Financial resource strain: Not on file     Food insecurity:     Worry: Not on file     Inability: Not on file     Transportation needs:     Medical: Not on file     Non-medical: Not on file   Tobacco Use     Smoking status: Never Smoker     Smokeless tobacco: Never  "Used   Substance and Sexual Activity     Alcohol use: No     Drug use: No     Sexual activity: Not Currently   Lifestyle     Physical activity:     Days per week: Not on file     Minutes per session: Not on file     Stress: Not on file   Relationships     Social connections:     Talks on phone: Not on file     Gets together: Not on file     Attends Shinto service: Not on file     Active member of club or organization: Not on file     Attends meetings of clubs or organizations: Not on file     Relationship status: Not on file     Intimate partner violence:     Fear of current or ex partner: Not on file     Emotionally abused: Not on file     Physically abused: Not on file     Forced sexual activity: Not on file   Other Topics Concern      Service Not Asked     Blood Transfusions Not Asked     Caffeine Concern No     Comment: 1 cup of coffee and 1 can diet coke per day     Occupational Exposure Not Asked     Hobby Hazards Not Asked     Sleep Concern Yes     Comment: takes Doxylamine succinate 1/2 tab every night     Stress Concern No     Weight Concern No     Special Diet No     Comment: healthy      Back Care Not Asked     Exercise Yes     Comment: walking 45min x7 a wk. Very active, [employer], Golf, Bowling, Cardio     Bike Helmet Not Asked     Seat Belt Yes     Self-Exams Yes     Parent/sibling w/ CABG, MI or angioplasty before 65F 55M? No   Social History Narrative     Not on file       PHYSICAL EXAM:    BP 97/48 (BP Location: Left arm)   Pulse 72   Temp 98  F (36.7  C) (Oral)   Resp 16   Ht 1.651 m (5' 5\")   Wt 62.2 kg (137 lb 2 oz)   SpO2 90%   BMI 22.82 kg/m    GENERAL: pleasant, alert, NAD  HEENT:  Normocephalic. No gross abnormalities.  Pupils equal.  MMM.  Dentition is ok.  CV: RRR, no murmurs, no clicks, gallops, or rubs, 1+ edema  RESP: Good airflow left lung, BS diminish right lung. No wheezes.   GI: Abdomen soft. NT  MUSCULOSKELETAL: extremities nl - no gross deformities noted. 1+ edema b/l "   SKIN: no suspicious lesions or rashes, dry to touch.   NEURO:  Strength normal and symmetric. A/o x 3. Nonfocal.   PSYCH: mood good, affect appropriate  LYMPH: No palpable ant/post cervical     LABS:      CBC RESULTS:     Recent Labs   Lab 02/22/19  0644 02/22/19  0050 02/21/19  0722 02/19/19  0722 02/18/19  0545 02/17/19  0423 02/16/19  1156 02/16/19  0400   WBC 13.7*  --  13.3* 13.4* 16.5* 14.8*  --  11.0   RBC 2.64*  --  2.89* 2.93* 2.94* 2.81*  --  1.99*   HGB 8.1*  --  8.9* 9.0* 9.0* 8.7* 9.4* 6.3*   HCT 23.4*  --  25.3* 25.9* 26.0* 24.9*  --  18.3*    232 206 139* 119* 102*  --  117*       BMP RESULTS:  Recent Labs   Lab 02/22/19  0644 02/21/19  0722 02/20/19  0719 02/19/19  0722 02/18/19  0545 02/17/19  0423  02/16/19  0400   * 128*  --  134 133 136  --  141   POTASSIUM 4.0 4.3 3.8 3.7 3.8 4.3   < > 3.7   CHLORIDE 95 98  --  102 102 104  --  110*   CO2 20 20  --  23 23 24  --  23   BUN 28 26  --  20 22 18  --  13   CR 0.98 0.92  --  0.80 0.77 0.92  --  0.95   * 110*  --  104* 125* 126*  --  110*   ARIELLE 8.5 8.2*  --  7.9* 8.1* 8.0*  --  7.7*    < > = values in this interval not displayed.       INR  Recent Labs   Lab 02/18/19  0905 02/16/19  0400   INR 1.21* 1.24*        DIAGNOSTICS:  Reviewed    CXR: 2/21: Since February 19, 2019, heart size is normal. Valve  replacement at two locations again identified. Blunting of both  lateral costophrenic sulci suggestive of small pleural effusions.  Scattered bibasilar opacities more apparent on today's exam, though  may be atelectasis. Previously identified right pulmonary opacity has  improved. Interval removal of right-sided chest tubes. No definite  pneumothorax. Dual lead pacemaker in the left hemithorax unchanged.    A/P:  74 y.o pleasant woman with hyponatremia.     # Hypovolemia. This is like to poor oral intake. She is also up 12 lbs since admission with 1+ pitting edema. Likely component of excess ADH + low oral intake of Na.    -Uosm 616,  Soms 270 and Irene only 6     # POD7 martine thoracotomy for mitral and tricuspid valves repair. Normal EF.     # CHB s/p dual chamber PM 2/18.     Plan  # Fluid restriction to 1000 ml/daily  # High salt diet  # SNa tomorrow  # Avoid diuretics for now    Odin Gibbs MD  [facility] - Nephrology  Office Phone: [phone]  Pager: [phone]

## 2019-02-22 NOTE — PROGRESS NOTES
[facility]  Cardiovascular and Thoracic Surgery Daily Note          Assessment and Plan:   POD#7 s/p Mini right thoracotomy, mitral valve replacement with a 31 mm St. Ron Epic (porcine bioprosthetic) valve, tricuspid valve repair with a 32 mm Oquendo MC3 annuloplasty ring, right femoral artery and venous cannulation for cardiopulmonary bypass, intraoperative transesophageal echocardiogram on 2/15/2019by Dr. Andrea Hanna    Main Plans for today:  1. Nephrology consult for hyponatremia - appreciate  2. Work with therapies  3. Pulm hygiene  4. Please place interdry to right femoral incision site    CVS:   BP /50-60s; HR 70 100% V paced  Symptomatic bradycardia on POD #1 s/p PPM placement on POD #3, EP following - appreciate  Hx PAF anticoagulated on Xarelto - will resume close to discharge  On ASA, BB and amiodarone on hold 2/2 soft BPs  Echo on 1/9/19 demonstrated an LVEF of 65% with noted valvular abnormalities  Pulm:  Extubated per protocol; On room air with adequate saturations, requires 1 LPM with ambulation; Encourage IS, C&DB, ambulating  Neuro:  Intact; pain well controlled with current regimen; denies confusion  Concern of neuro deficits in right hand on POD #0 with subsequent code stroke. CT head negative. Neurology consulted - will need to resume xarelto at discharge.  Renal:  BL Creat ~0.7-0.9, today 0.98; Preop weight 56.7 kg, today 62.2 kg but net negative 655 mL  Hyponatremia: 126< 128< 134; likely 2/2 hypervolemia; denies confusion  Urine chemistries sent - nephrology consulted - appreciate  UO adequate, will hold off on diuresis in light of hyponatremia  BMP in AM  GI:  Has passed flatus; + BM  Continue bowel program  On PPI  Tolerating diet/supplementation  :  Voiding well on own  Endo:   Preop A1C 5.9; managed on insulin drip postop, transitioned to sliding scale with goal BG <180  FEN:   Na+ 126 (as noted above); K+ 4.0; Replace lytes as needed; Advance diet as  tolerated  Orders Placed This Encounter      Advance Diet as Tolerated: Regular Diet Adult    ID:   Temp (24hrs), Av.1  F (36.7  C), Min:97.3  F (36.3  C), Max:98.9  F (37.2  C)  Stress induced leukocytosis: WBC 13.7  Completed perioperative antibiotics  Heme:   Acute blood loss anemia and thrombocytopenia: Hgb 8.1; Plt 222  Proph:   Statin, ASA, subcutaneous heparin, PCD, PPI  Dispo:   Continue on station 33.  Plan for discharge to home once sodium >/=130          Interval History:   States pain is well managed on current regimen. Slept fair overnight. Appetite poor. Is passing flatus, +BM.  Breathing is good, able to perform IS to 1500. Up in chair for meals, working with therapies, ambulating in hallway. Denies hallucinations.         Medications:       aspirin  81 mg Oral or NG Tube Daily     atorvastatin  40 mg Oral At Bedtime     calcium carbonate 600 mg-vitamin D 400 units  1 tablet Oral BID     fexofenadine  180 mg Oral Daily     heparin  5,000 Units Subcutaneous Q8H     insulin aspart  1-7 Units Subcutaneous TID AC     insulin aspart  1-5 Units Subcutaneous At Bedtime     multivitamin w/minerals  1 tablet Oral Daily     omeprazole  10 mg Oral QAM AC     polyethylene glycol  17 g Oral Daily     senna-docusate  1 tablet Oral BID    Or     senna-docusate  2 tablet Oral BID     sodium chloride (PF)  3 mL Intracatheter Q8H     acetaminophen, bisacodyl, IV fluid REPLACEMENT ONLY, glucose **OR** dextrose **OR** glucagon, HOLD MEDICATION, hydrALAZINE, lidocaine 4%, lidocaine (buffered or not buffered), magnesium sulfate, magnesium sulfate, methocarbamol, naloxone, ondansetron **OR** ondansetron, oxyCODONE IR, potassium chloride, potassium chloride with lidocaine, potassium chloride, potassium chloride, potassium chloride, sodium chloride (PF), sodium chloride (PF), sodium phosphate, sodium phosphate, sodium phosphate, sodium phosphate, traZODone          Physical Exam:   Vitals were reviewed  Blood pressure  "92/47, pulse 72, temperature 98  F (36.7  C), temperature source Oral, resp. rate 16, height 1.651 m (5' 5\"), weight 62.2 kg (137 lb 2 oz), SpO2 (!) 85 %.  Rhythm: 100% v paced on tele    Lungs: CTA    Cardiovascular: S1, S2, RRR, no m/r/g.    Abdomen: S/NT/ND, +BS    Extremeties: Trace BLE edema    Incision(s): Right thoracotomy incision CDI, right femoral cutdown site CDI    CT: N/A    Weight:   Vitals:    02/18/19 0400 02/19/19 0600 02/20/19 0500 02/21/19 0600   Weight: 62.1 kg (136 lb 14.5 oz) 61.1 kg (134 lb 12.8 oz) 60.8 kg (134 lb 0.6 oz) 61.6 kg (135 lb 12.9 oz)    02/22/19 0500   Weight: 62.2 kg (137 lb 2 oz)            Data:    All labs and imaging reviewed by me.  Labs:   Lab Results   Component Value Date    WBC 13.7 02/22/2019     Lab Results   Component Value Date    RBC 2.64 02/22/2019     Lab Results   Component Value Date    HGB 8.1 02/22/2019     Lab Results   Component Value Date    HCT 23.4 02/22/2019     No components found for: MCT  Lab Results   Component Value Date    MCV 89 02/22/2019     Lab Results   Component Value Date    MCH 30.7 02/22/2019     Lab Results   Component Value Date    MCHC 34.6 02/22/2019     Lab Results   Component Value Date    RDW 14.8 02/22/2019     Lab Results   Component Value Date     02/22/2019       Last Basic Metabolic Panel:  Lab Results   Component Value Date     02/22/2019      Lab Results   Component Value Date    POTASSIUM 4.0 02/22/2019     Lab Results   Component Value Date    CHLORIDE 95 02/22/2019     Lab Results   Component Value Date    ARIELLE 8.5 02/22/2019     Lab Results   Component Value Date    CO2 20 02/22/2019     Lab Results   Component Value Date    BUN 28 02/22/2019     Lab Results   Component Value Date    CR 0.98 02/22/2019     Lab Results   Component Value Date     02/22/2019       CXR: No new imaging    Beena Cesar, RICOC, CCRN-CSC  CV Surgery  [place] Pager: [phone]  Personal Pager: [phone]          "

## 2019-02-22 NOTE — PLAN OF CARE
Vital signs stable except b/p on the soft side still high 80's/50's. Alert and oriented. Lung sounds clear. Bowel sounds active, flatus present. Pacer/ right flank incision dressing clean dry and intact. Patient denies pain. Up independently in room. Tolerating diet. CMS/neuros intact.

## 2019-02-23 ENCOUNTER — APPOINTMENT (OUTPATIENT)
Dept: PHYSICAL THERAPY | Facility: CLINIC | Age: 74
DRG: 219 | End: 2019-02-23
Attending: SURGERY
Payer: COMMERCIAL

## 2019-02-23 VITALS
WEIGHT: 136.69 LBS | RESPIRATION RATE: 16 BRPM | OXYGEN SATURATION: 96 % | SYSTOLIC BLOOD PRESSURE: 99 MMHG | TEMPERATURE: 98.4 F | BODY MASS INDEX: 22.77 KG/M2 | DIASTOLIC BLOOD PRESSURE: 59 MMHG | HEIGHT: 65 IN | HEART RATE: 70 BPM

## 2019-02-23 LAB
ANION GAP SERPL CALCULATED.3IONS-SCNC: 11 MMOL/L (ref 3–14)
BUN SERPL-MCNC: 31 MG/DL (ref 7–30)
CALCIUM SERPL-MCNC: 8.7 MG/DL (ref 8.5–10.1)
CHLORIDE SERPL-SCNC: 98 MMOL/L (ref 94–109)
CO2 SERPL-SCNC: 20 MMOL/L (ref 20–32)
CREAT SERPL-MCNC: 1.04 MG/DL (ref 0.52–1.04)
GFR SERPL CREATININE-BSD FRML MDRD: 54 ML/MIN/{1.73_M2}
GLUCOSE BLDC GLUCOMTR-MCNC: 112 MG/DL (ref 70–99)
GLUCOSE BLDC GLUCOMTR-MCNC: 115 MG/DL (ref 70–99)
GLUCOSE SERPL-MCNC: 109 MG/DL (ref 70–99)
INTERPRETATION ECG - MUSE: NORMAL
POTASSIUM SERPL-SCNC: 4.3 MMOL/L (ref 3.4–5.3)
SODIUM SERPL-SCNC: 129 MMOL/L (ref 133–144)

## 2019-02-23 PROCEDURE — 25000132 ZZH RX MED GY IP 250 OP 250 PS 637: Performed by: STUDENT IN AN ORGANIZED HEALTH CARE EDUCATION/TRAINING PROGRAM

## 2019-02-23 PROCEDURE — 36415 COLL VENOUS BLD VENIPUNCTURE: CPT | Performed by: NURSE PRACTITIONER

## 2019-02-23 PROCEDURE — 00000146 ZZHCL STATISTIC GLUCOSE BY METER IP

## 2019-02-23 PROCEDURE — 25000132 ZZH RX MED GY IP 250 OP 250 PS 637: Performed by: NURSE PRACTITIONER

## 2019-02-23 PROCEDURE — 80048 BASIC METABOLIC PNL TOTAL CA: CPT | Performed by: NURSE PRACTITIONER

## 2019-02-23 PROCEDURE — 25000128 H RX IP 250 OP 636: Performed by: NURSE PRACTITIONER

## 2019-02-23 PROCEDURE — 97110 THERAPEUTIC EXERCISES: CPT | Mod: GP

## 2019-02-23 RX ORDER — ACETAMINOPHEN 325 MG/1
650 TABLET ORAL EVERY 4 HOURS PRN
Qty: 40 TABLET | Refills: 0 | Status: SHIPPED | OUTPATIENT
Start: 2019-02-23 | End: 2019-03-07

## 2019-02-23 RX ORDER — OXYCODONE HYDROCHLORIDE 5 MG/1
5-10 TABLET ORAL EVERY 4 HOURS PRN
Qty: 20 TABLET | Refills: 0 | Status: SHIPPED | OUTPATIENT
Start: 2019-02-23 | End: 2019-07-26

## 2019-02-23 RX ORDER — AMOXICILLIN 250 MG
1 CAPSULE ORAL 2 TIMES DAILY
Qty: 20 TABLET | Refills: 0 | Status: SHIPPED | OUTPATIENT
Start: 2019-02-23 | End: 2019-04-18

## 2019-02-23 RX ORDER — METHOCARBAMOL 500 MG/1
500 TABLET, FILM COATED ORAL 3 TIMES DAILY PRN
Qty: 10 TABLET | Refills: 0 | Status: SHIPPED | OUTPATIENT
Start: 2019-02-23 | End: 2019-03-07

## 2019-02-23 RX ORDER — POLYETHYLENE GLYCOL 3350 17 G/17G
17 POWDER, FOR SOLUTION ORAL DAILY
Qty: 7 PACKET | Refills: 0 | Status: SHIPPED | OUTPATIENT
Start: 2019-02-24 | End: 2019-04-18

## 2019-02-23 RX ADMIN — HEPARIN SODIUM 5000 UNITS: 5000 INJECTION, SOLUTION INTRAVENOUS; SUBCUTANEOUS at 03:28

## 2019-02-23 RX ADMIN — ASPIRIN 81 MG 81 MG: 81 TABLET ORAL at 08:45

## 2019-02-23 RX ADMIN — Medication 1 TABLET: at 08:45

## 2019-02-23 RX ADMIN — HEPARIN SODIUM 5000 UNITS: 5000 INJECTION, SOLUTION INTRAVENOUS; SUBCUTANEOUS at 11:14

## 2019-02-23 RX ADMIN — MULTIPLE VITAMINS W/ MINERALS TAB 1 TABLET: TAB at 08:45

## 2019-02-23 RX ADMIN — FEXOFENADINE HYDROCHLORIDE 180 MG: 180 TABLET, FILM COATED ORAL at 08:45

## 2019-02-23 RX ADMIN — OMEPRAZOLE 10 MG: 10 CAPSULE, DELAYED RELEASE ORAL at 08:03

## 2019-02-23 ASSESSMENT — ACTIVITIES OF DAILY LIVING (ADL)
ADLS_ACUITY_SCORE: 15

## 2019-02-23 ASSESSMENT — MIFFLIN-ST. JEOR: SCORE: 1130.88

## 2019-02-23 NOTE — PLAN OF CARE
A&O.  AVSS, on RA.  Denies pain all night, had Tylenol prior to shift change last night.  Incision SOSA/CDI, ecchymotic.  Previous CT side with dressing CDI.  Pacemaker site with dressing CDI.  LS clear.  IS done.  BS active, tolerating oral intake, (+)BM previous shift.  Voiding w/o difficulty.  Up with SBA assist.  Slept in between care.

## 2019-02-23 NOTE — PLAN OF CARE
Discharge Planner PT   Patient plan for discharge: Home with assist, outpatient cardiac rehab  Current status: Pt amb 8 mins in hallway at slow pace with FWW, needing one standing rest break. Pt up/down 3 stairs x2 trials with one standing rest break, unable to tolerate further activity at this time, rates 8/10 on OMNI effort scale.  Barriers to return to prior living situation: Decreased activity tolerance, stairs  Recommendations for discharge: Home with assist for ADLs, household tasks, stairs, outpatient cardiac rehab  Rationale for recommendations: Pt continues to fatigue easily, anticipate need for assistance with ADLs, household tasks, and stair negotiation on discharge. Recommend outpatient cardiac rehab to further progress aerobic activity level.       Entered by: Sheryl Washington 02/23/2019 11:15 AM      Cardiac Rehab Discharge Summary    Reason for therapy discharge:    Discharged to home with outpatient therapy.    Progress towards therapy goal(s). [facility] for goal details.  Goals not met.  Barriers to achieving goals:   discharge from facility.    Therapy recommendation(s):    Continued therapy is recommended.  Rationale/Recommendations:  outpatient cardiac rehab to further progress activity level and for education.

## 2019-02-23 NOTE — PROGRESS NOTES
Dr. Gibbs did see patient see notes ok'd to discharge and pt and  given discharge instructions and verbalizes understanding of these. Rx's filled and given to patient with instructions as ordered.. Dressing changed to right side old chest tube site old dressing of ABD saturated with serous drainage. And noted right side bruised as documented in prior shifts. Pt does not recall last time changed thinks early morning. Dressing to left upper chest dry and intact. Pt escorted to door in w/c with nursing assistant Brennan. Pt states she did receive medical device card for pacemaker earlier.

## 2019-02-23 NOTE — PROGRESS NOTES
CV Surgery    S: Sodium stable, breathing stable, tolerating diet, denies pain, wants to discharge home, will plan for home today    O: B/P: 99/59, T: 98.4, P: 70, R: 16  General: NAD  Heart: s1/s2, no m/r/g  Lungs: clear  Abd: s/nt/nd  Sternum: cdi  Extremities: no LE edema    Lab Results   Component Value Date    WBC 13.7 02/22/2019     Lab Results   Component Value Date    RBC 2.64 02/22/2019     Lab Results   Component Value Date    HGB 8.1 02/22/2019     Lab Results   Component Value Date    HCT 23.4 02/22/2019     No components found for: MCT  Lab Results   Component Value Date    MCV 89 02/22/2019     Lab Results   Component Value Date    MCH 30.7 02/22/2019     Lab Results   Component Value Date    MCHC 34.6 02/22/2019     Lab Results   Component Value Date    RDW 14.8 02/22/2019     Lab Results   Component Value Date     02/22/2019       Last Basic Metabolic Panel:  Lab Results   Component Value Date     02/23/2019      Lab Results   Component Value Date    POTASSIUM 4.3 02/23/2019     Lab Results   Component Value Date    CHLORIDE 98 02/23/2019     Lab Results   Component Value Date    ARIELLE 8.7 02/23/2019     Lab Results   Component Value Date    CO2 20 02/23/2019     Lab Results   Component Value Date    BUN 31 02/23/2019     Lab Results   Component Value Date    CR 1.04 02/23/2019     Lab Results   Component Value Date     02/23/2019       A/P: POD#8  s/p Mini right thoracotomy, mitral valve replacement with a 31 mm St. Ron Epic (porcine bioprosthetic) valve, tricuspid valve repair with a 32 mm Oquendo MC3 annuloplasty ring, right femoral artery and venous cannulation for cardiopulmonary bypass, intraoperative transesophageal echocardiogram on 2/15/2019by Dr. Andrea Siddiqui PA-C  Pager [phone]

## 2019-02-23 NOTE — PLAN OF CARE
Surg: Minimally invasive mitral valve replacement. VSS on RA. Tele: SR perm paced. A/Ox4. Neuro's intact. Blood sugars: 100/122. Pacer incision near left clavicle dressing cdi . CMS intact. Pt denies. Up with SBA+walker. Pt ambulated and showered this evening. Tolerating regular diet. Denies N&V. +gas, +bm. Voiding adequately. LS clear, diminished in lower lobes. IS: 1500. Plan to discharge tomorrow or the next day. Will continue to monitor.

## 2019-02-23 NOTE — DISCHARGE SUMMARY
"Discharge Summary    Taty Aguila MRN# [id number]   YOB: 1945 Age: 74 year old     Date of Admission:  2/15/2019  Date of Discharge:  2/23/2019  Admitting Physician:  Anrdea Hanna MD  Discharge Physician:  Andrea Hanna,*  Discharging Service:  Cardiovascular and Thoracic Surgery     Home clinic: [facility]  Primary Provider: Anyi Olmos          Admission Diagnoses:   S/P MVR (mitral valve replacement) [Z95.2]          Discharge Diagnosis:   Patient Active Problem List   Diagnosis     Allergic rhinitis     Colitis     Paroxysmal atrial fibrillation (H)     Thoracic aortic ectasia (H)     Mitral valve prolapse     History of tricuspid valve repair-32 mm Oquendo Ring     Complete AV block (H)     S/P MVR (mitral valve replacement)-St junaid epic tissue 31 mm     Fluid overload     Hyponatremia     Anticoagulated-Xarelto     Transient hyperglycemia post procedure                Discharge Disposition:   Discharged to home           Condition on Discharge:   Discharge condition: Stable   Discharge vitals: Blood pressure 99/59, pulse 70, temperature 98.4  F (36.9  C), temperature source Oral, resp. rate 16, height 1.651 m (5' 5\"), weight 62 kg (136 lb 11 oz), SpO2 96 %.     Code status on discharge: Full Code           Procedures:   On 2/15/19 Mrs Aguila successfully underwent MINIMALLY INVASIVE MITRAL VALVE REPLACEMENT WITH EPIC ST JUNAID 31MM VALVE; ON PUMP WITH TIA.  CARDIOLOGIST DR CARDENAS  MINIMALLY INVASIVE TRICUSPID VALVE REPAIR WITH 32MM OQUENDO RING by Dr. Andrea Hanna.          Medications Prior to Admission:     Medications Prior to Admission   Medication Sig Dispense Refill Last Dose     aspirin 81 MG EC tablet Take 81 mg by mouth every evening   2/14/2019 at PM     calcium carbonate-vitamin D (CALCIUM 600+D) 600-200 MG-UNIT TABS Take 1 tablet by mouth 2 times daily   2/14/2019 at AM     fexofenadine (ALLEGRA) 180 MG tablet Take 180 mg by mouth daily   " 2/14/2019 at AM     Multiple Vitamins-Minerals (MULTIVITAMIN ADULT) TABS Take 1 tablet by mouth daily   2/5/2019 at AM     omeprazole (PRILOSEC) 10 MG CR capsule Take 1 capsule (10 mg) by mouth every morning (before breakfast) 90 capsule 3 2/15/2019 at 0400     rivaroxaban ANTICOAGULANT (XARELTO) 20 MG TABS tablet Take 1 tablet (20 mg) by mouth daily (with dinner) 90 tablet 3 2/10/2019 at PM     [DISCONTINUED] acetaminophen (TYLENOL) 500 MG tablet Take 1,000 mg by mouth daily as needed for mild pain   Past Week at PRN     [DISCONTINUED] amiodarone (PACERONE/CODARONE) 200 MG tablet Take 1 tab twice a day for 10 days, then decrease the dose to 1 tab once a day (Patient taking differently: Take 200 mg by mouth daily ) 90 tablet 3 2/14/2019 at Noon     [DISCONTINUED] metoprolol succinate ER (TOPROL-XL) 25 MG 24 hr tablet Take 1 tablet (25 mg) by mouth daily 90 tablet 3 2/15/2019 at 0400             Discharge Medications:     Current Discharge Medication List      START taking these medications    Details   methocarbamol (ROBAXIN) 500 MG tablet Take 1 tablet (500 mg) by mouth 3 times daily as needed for muscle spasms  Qty: 10 tablet, Refills: 0    Associated Diagnoses: S/P MVR (mitral valve replacement)      oxyCODONE (ROXICODONE) 5 MG tablet Take 1-2 tablets (5-10 mg) by mouth every 4 hours as needed for moderate to severe pain  Qty: 20 tablet, Refills: 0    Associated Diagnoses: S/P MVR (mitral valve replacement)      polyethylene glycol (MIRALAX/GLYCOLAX) packet Take 17 g by mouth daily  Qty: 7 packet, Refills: 0    Associated Diagnoses: S/P MVR (mitral valve replacement)      senna-docusate (SENOKOT-S/PERICOLACE) 8.6-50 MG tablet Take 1 tablet by mouth 2 times daily  Qty: 20 tablet, Refills: 0    Associated Diagnoses: S/P MVR (mitral valve replacement)         CONTINUE these medications which have CHANGED    Details   acetaminophen (TYLENOL) 325 MG tablet Take 2 tablets (650 mg) by mouth every 4 hours as needed for  mild pain or fever  Qty: 40 tablet, Refills: 0    Associated Diagnoses: S/P MVR (mitral valve replacement)         CONTINUE these medications which have NOT CHANGED    Details   aspirin 81 MG EC tablet Take 81 mg by mouth every evening      calcium carbonate-vitamin D (CALCIUM 600+D) 600-200 MG-UNIT TABS Take 1 tablet by mouth 2 times daily      fexofenadine (ALLEGRA) 180 MG tablet Take 180 mg by mouth daily      Multiple Vitamins-Minerals (MULTIVITAMIN ADULT) TABS Take 1 tablet by mouth daily      omeprazole (PRILOSEC) 10 MG CR capsule Take 1 capsule (10 mg) by mouth every morning (before breakfast)  Qty: 90 capsule, Refills: 3      rivaroxaban ANTICOAGULANT (XARELTO) 20 MG TABS tablet Take 1 tablet (20 mg) by mouth daily (with dinner)  Qty: 90 tablet, Refills: 3    Associated Diagnoses: Paroxysmal atrial fibrillation (H)         STOP taking these medications       amiodarone (PACERONE/CODARONE) 200 MG tablet Comments:   Reason for Stopping:         metoprolol succinate ER (TOPROL-XL) 25 MG 24 hr tablet Comments:   Reason for Stopping:                     Consultations:   Nutrition, Intensivist, neurology             Brief History of Illness:   Ms. Aguila a very pleasant 74-year-old female who was referred to me in the past for mitral valve insufficiency.  My first encounter with her was about 2 years ago but at that time, the decision was made to manage her medically as there is some question of the degree of her insufficiency and that she was fairly asymptomatic at that time.  I saw her again in clinic recently for followup at the end of December.  She had developed atrial fibrillation along with some symptoms.  She was also noted to have moderate the moderate to severe tricuspid valve insufficiency on a recent echocardiogram.  She was placed on Eliquis for atrial fibrillation.  For these reasons, the patient was taken to the operating today for possible mitral valve repair/replacement and tricuspid valve  repair.  Based on her preoperative TIA and also her LV gram and a coronary angiogram, it was noted that the posterior leaflet was quite significantly calcified, particularly the P2 segment, which would make a repair extremely difficult.  This was discussed with the patient told that most likely the valve would have to be replaced if indeed the patient had significant calcification of her posterior leaflet, especially if it encroaches the base and the annulus.  She understood.           Hospital Course:   On 2/15/19 Mrs Aguila successfully underwent MINIMALLY INVASIVE MITRAL VALVE REPLACEMENT WITH EPIC ST KEYUR 31MM VALVE; ON PUMP WITH TIA.  MINIMALLY INVASIVE TRICUSPID VALVE REPAIR WITH 32MM SULLIVAN RING by Dr. Andrea Hanna.  Was extubated within 24 hours of surgery. Upon awakening from surgery she had some R arm weakness. Neurology was consulted and she underwent head CT which was negative. She regained her arm strength and neuro signed off. Once she was weaned off hemodynamic stabilizing gtts, transferred to the surgical floor.   Had some transient hyperglycemia treated with an insulin gtt, transitioned to sliding scale insulin and has no need at discharge.  Had some fluid overload treated with diuretic medication. At discharge she is 6 kg above their pre-op weight and is sent with no days of diuretics as she struggled with ADH/Hyponatremia. She will follow up with her PCP and nephrology in clinic.  Nephrology was consulted to help manage her hyponatremia (Na 126-lowest).   Heart rhythm remained stable. She progressed well with cardiac rehab. Her PTA Xarelto was resumed on discharge. They are discharged to home on pod# 8 with the help of their family.              Significant Results:   Lab Results   Component Value Date    WBC 13.7 02/22/2019     Lab Results   Component Value Date    RBC 2.64 02/22/2019     Lab Results   Component Value Date    HGB 8.1 02/22/2019     Lab Results   Component Value Date    HCT  23.4 02/22/2019     No components found for: MCT  Lab Results   Component Value Date    MCV 89 02/22/2019     Lab Results   Component Value Date    MCH 30.7 02/22/2019     Lab Results   Component Value Date    MCHC 34.6 02/22/2019     Lab Results   Component Value Date    RDW 14.8 02/22/2019     Lab Results   Component Value Date     02/22/2019       Last Basic Metabolic Panel:  Lab Results   Component Value Date     02/23/2019      Lab Results   Component Value Date    POTASSIUM 4.3 02/23/2019     Lab Results   Component Value Date    CHLORIDE 98 02/23/2019     Lab Results   Component Value Date    ARIELLE 8.7 02/23/2019     Lab Results   Component Value Date    CO2 20 02/23/2019     Lab Results   Component Value Date    BUN 31 02/23/2019     Lab Results   Component Value Date    CR 1.04 02/23/2019     Lab Results   Component Value Date     02/23/2019                  Pending Results:   None           Discharge Instructions and Follow-Up:   Discharge diet: Regular   Discharge activity: Daily weights: Call if weight gain 2-3 lbs over 24 hours or if greater than 5 lbs in 1 week.  No lifting more than 10 lbs for 8-12 weeks.  No driving for 1 month.  Call for pain medication refill.  Call for temperature greater than 101.0  Daily shower with antibacterial soap.   Discharge follow-up: *Follow up primary care provider in 7-10 days after discharge in order to review your medication, vital signs, obtain any necessary lab work your doctor may want, and to update them on your hospitalization and medical issues.   *Follow up with Salud/Loli/Beena Andino with Dr Hanna, heart surgeon, at [facility] at [facility] [address] on 3/4/19 at 2PM. If any questions or concerns call [phone].  You will see us once at this visit and then if everything is going well you will not need to see us again.  You will follow long term with your cardiologist.   *Follow up with Marifer Mohamud PA-C  Cardiology, on 4/2/19 at 10 am in [facility]. This is who you will follow with long term about your heart issues. [phone].   *Please schedule outpatient cardiac rehab within 5 days of discharge from TCU, call [phone].    Outpatient therapy: Cardiac rehab   Home Care agency: None    Supplies and equipment: None   Lines and drains: None    Wound care: Wash incision daily with antibacterial soap   Other instructions: None

## 2019-02-23 NOTE — PROGRESS NOTES
" Renal Medicine Progress Note            Assessment/Plan:       74 y.o pleasant woman with hyponatremia.      # Hyponatremia. SNa is better.                -Uosm 616, Soms 270 and Irene only 6                 # POD8 martine thoracotomy for mitral and tricuspid valves repair. Normal EF.      # CHB s/p dual chamber PM 2/18.      Plan  # Okay to discharge  # Recheck BMP in a on Monday or Tuesday with PCP        Interval History:     She is still a little short of breath, but overall, better.           Medications and Allergies:       aspirin  81 mg Oral or NG Tube Daily     atorvastatin  40 mg Oral At Bedtime     calcium carbonate 600 mg-vitamin D 400 units  1 tablet Oral BID     fexofenadine  180 mg Oral Daily     heparin  5,000 Units Subcutaneous Q8H     insulin aspart  1-7 Units Subcutaneous TID AC     insulin aspart  1-5 Units Subcutaneous At Bedtime     multivitamin w/minerals  1 tablet Oral Daily     omeprazole  10 mg Oral QAM AC     polyethylene glycol  17 g Oral Daily     senna-docusate  1 tablet Oral BID    Or     senna-docusate  2 tablet Oral BID     sodium chloride (PF)  3 mL Intracatheter Q8H        Allergies   Allergen Reactions     Chlorpheniramine Other (See Comments)     LOC and fainting      Phenylephrine Other (See Comments)     fainting            Physical Exam:   Vitals were reviewed  Heart Rate: 70, Blood pressure 99/59, pulse 70, temperature 98.4  F (36.9  C), temperature source Oral, resp. rate 16, height 1.651 m (5' 5\"), weight 62 kg (136 lb 11 oz), SpO2 96 %.    Wt Readings from Last 3 Encounters:   02/23/19 62 kg (136 lb 11 oz)   02/07/19 58.3 kg (128 lb 8 oz)   01/09/19 56.1 kg (123 lb 9.6 oz)       Intake/Output Summary (Last 24 hours) at 2/23/2019 1339  Last data filed at 2/23/2019 0800  Gross per 24 hour   Intake 443 ml   Output 1550 ml   Net -1107 ml     GENERAL: pleasant, alert, NAD  HEENT:  Normocephalic. No gross abnormalities.  Pupils equal.  MMM.  Dentition is ok.  CV: RRR, no murmurs, no " clicks, gallops, or rubs, 1+ edema  RESP: Good airflow left lung, BS diminish right lung. No wheezes.   GI: Abdomen soft. NT  MUSCULOSKELETAL: extremities nl - no gross deformities noted. 1+ edema b/l   SKIN: no suspicious lesions or rashes, dry to touch.   NEURO:  Strength normal and symmetric. A/o x 3. Nonfocal.          Data:     CBC RESULTS:     Recent Labs   Lab 02/22/19  0644 02/22/19  0050 02/21/19  0722 02/19/19  0722 02/18/19  0545 02/17/19  0423   WBC 13.7*  --  13.3* 13.4* 16.5* 14.8*   RBC 2.64*  --  2.89* 2.93* 2.94* 2.81*   HGB 8.1*  --  8.9* 9.0* 9.0* 8.7*   HCT 23.4*  --  25.3* 25.9* 26.0* 24.9*    232 206 139* 119* 102*       Basic Metabolic Panel:  Recent Labs   Lab 02/23/19  0833 02/22/19  0644 02/21/19  0722 02/20/19  0719 02/19/19  0722 02/18/19  0545 02/17/19  0423   * 126* 128*  --  134 133 136   POTASSIUM 4.3 4.0 4.3 3.8 3.7 3.8 4.3   CHLORIDE 98 95 98  --  102 102 104   CO2 20 20 20  --  23 23 24   BUN 31* 28 26  --  20 22 18   CR 1.04 0.98 0.92  --  0.80 0.77 0.92   * 111* 110*  --  104* 125* 126*   ARIELLE 8.7 8.5 8.2*  --  7.9* 8.1* 8.0*       INR  Recent Labs   Lab 02/18/19  0905   INR 1.21*      Attestation:   I have reviewed today's relevant vital signs, notes, medications, labs and imaging.    Odin Gibbs MD  [facility] - Nephrology  Office phone :[phone]  Pager: [phone]

## 2019-02-23 NOTE — PROGRESS NOTES
Call placed to Salud ALVARADO for discharge order in place but patient states she thinks she is to see Dr. Gibbs before discharge calling to confirm.

## 2019-02-23 NOTE — DISCHARGE INSTRUCTIONS
Discharge Instructions for Pacemaker Implantation  You have had a procedure to insert a pacemaker. Once inside your body, this small electronic device helps keep your heart from beating too slowly. A pacemaker can t fix existing heart problems. But it can help you feel better and have more energy. As you recover, follow all of the instructions you are given, including those below.  Activity    Follow the instructions you are given about limiting your activity.    Do not raise your arm on the incision side above shoulder level for 3 weeks. This gives the device lead wires time to attach securely inside your heart.    Ask your doctor when you can expect to return to work.    You can still exercise. It s good for your body and your heart. Talk with your doctor about an exercise plan.  Other Precautions    Follow your doctor's directions carefully for wound care. You may remove the outer dressing in 3 - 4 days. Leave the steri-strips in place; these will be removed at your 1 week follow-up. Never put any creams, lotions, or products like peroxide on an incision unless your doctor tells you to.     Before you receive any treatment, tell all health care providers (including your dentist) that you have a pacemaker.    You will be given an ID card that contains information about your pacemaker. Always carry this card with you. You can show this card if your pacemaker sets off a metal detector. You should also show it to avoid screening with a hand-held security wand.    Avoid strong magnets. Examples are those used in MRIs or in hand-held security wands.    Avoid strong electrical fields. Examples are those made by radio transmitting towers,  ham  radios, and heavy-duty electrical equipment.    Avoid leaning over the open wahl of a running car. A running engine creates an electrical field. Most household and yard appliances will not cause any problems. If you use any large power tools, such as an industrial ,  talk with your doctor.   Follow-Up    Follow up in the device clinic as scheduled. 7-10 day check will be placed upon discharge. 7 week Etta check already in Place for April.     Make regular follow-up appointments with your Device Clinic. He or she will check the pacemaker to make sure it s working properly.  When to Call Your Doctor  Call your doctor immediately if you have any of the following:    Dizziness    Chest pain    Fainting spells    Twitching chest muscles    Rapid pulse or pounding heartbeat    Shortness of breath    Fever above 100.4 F (38 C) or other signs of infection (redness, swelling, drainage, or warmth at the incision site)     1276-6284 [employer]. [address] [place] [address], [place] [id number]. All rights reserved. This information is not intended as a substitute for professional medical care. Always follow your healthcare professional's instructions.          YOUR CARE AFTER HEART SURGERY   DISCHARGE INTRUCTIONS      Weight - Weigh yourself daily and record on daily weight sheet provided in this packet.     Incentive Spirometer - Continue to use 3 to 4 times a day for 10 days; follow use of spirometer with coughing. Use attached sheet to report progress.     Daily shower - Wash all surgical incisions daily with liquid antibacterial soap, such as Dial.   No tub baths until incisions are healed. The sticky glue used to close your incisions may peel off slowly.    Incisions - Avoid all lotions, oils, ointments or sunscreen on incisions for 8 weeks. Avoid sunlight on incision sites for 8 weeks and then use sunscreen on incisions for one year.   You may have numbness, tingling, and increased sensitivity around your incision lines for several weeks/months as the nerves grow back. Some drainage from the sites where your chest tubes were is normal and can persist for a while until it has healed. It is best to keep this open to air to allow scab formation and healing, you can cover it  with a gauze pad or band-aid if needed.     Diet - Eat a well balanced diet to promote healing. It can be normal to have a decreased appetite for a while after surgery. You can eat whatever it takes to keep up a normal food/calorie intake in the immediate post-operative period for about a month. Try to limit high sodium (salt) foods to prevent fluid retention.  If you are a diabetic, continue to balance your carbohydrate intake with your medicine. Eventually you will need to adopt a heart healthy diet, especially if you have coronary artery disease.     Daily exercise/activity precautions - Cardiac rehab therapist will review your restrictions with you.  No lifting, pushing or pulling anything over 10 pounds for 12 weeks if you are a diabetic or 8 weeks if you are not a diabetic (reference: a gallon of milk weighs 8 pounds).    Positioning -Keep your legs elevated above the level of your heart when you are in bed. You may lie on your side and stomach if it s comfortable and you have no sternal click (popping in breastbone).    Pain medications - Use as directed. Call surgeon for pain medication refill if needed. Other medications should be filled by your primary doctor.     Depression - It is not uncommon after surgery.  If it lasts longer than two weeks or you feel you need to talk to someone, contact your primary physician.    Driving -No driving for 4 weeks from the day of surgery (or until your surgeon has approved it).    Work and Travel - Consult with your physician about specific work and travel restrictions    Cardiac Rehabilitation - Usually begins approximately one week after discharge and lasts up to 6 weeks. In the meantime, continue to work on the rehab activities you learned in the hospital and maintain your physical activity. Aerobic activity, especially walking, is a great way to regain your physical endurance.     Checking your Blood sugar (glucose) - If you have been instructed to begin checking  your blood sugars at home, record them on the back page of this packet and take the packet with you to your follow appointment with you primary physician (usually about 1 week after surgery).          CALL YOUR SURGEON IF ANY OF THE FOLLOWING OCCURS:      Fever - If you feel chills, shaking, or your temperature is over 101 degrees    Sternal Click - Some popping or clicking sensations can be normal as the chest has been stretched open. If you notice a significant change or if pain becomes bothersome, please call.    Angina/Chest Pain - Or symptoms similar to those you experienced before surgery    Infection - If incisions look infected (increasing redness, tenderness, swelling, warmth, odor, drainage, or change in color if drainage).    Weight gain - Please call if weight gain of 2-3pounds over 24 hours or if greater than 5 pounds in 1 week as this may indicate fluid overload and could signify a problem with your heart.     Swelling - If you notice worsening swelling in your legs. A certain amount of swelling is expected, especially if you had a vein taken out of your leg for bypass surgery.      Shortness of breath - Not relieved by rest    Persistent heart palpitations - Rapid, pounding heartbeat    Dizziness/lightheadedness - While sitting or at rest    Pain - Not relieved by pain medications      Your Daily Weight  Weigh yourself every morning in the same clothes after urinating and before breakfast.* Call your physician if your weight increases 2-3 lbs in one day or 3-5 lbs in a week**  My baseline weight (first morning home):____________   Date Weight   Date Weight   1.    17.     2.    18.     3.    19.     4.    20.     5.    21.     6.    22.     7.    23.     8.    24.     9.    25.     10.    26.     11.    27.     12.    28.     13.    29.     14.    30.     15.    31.     16.    32.           Incentive Spirometer- After Surgery Progress Record    Discharge Volume_______________ml    As you recover from  your surgery it is important to continue the use of your incentive spirometer at home.  We recommend that you use your spirometer at least 3-4 times per day.  You should take 5 slow deep breaths with each use. Inhale slowly to raise the piston in the chamber as high as you are able.  Hold breath to count of 3 and then exhale normally.  Allow piston to return to bottom of chamber, rest and repeat 4 more times. It is helpful to see your progress by recording your average volume in the spaces provided below.    Day  1       Day  2       Day  3       Day  4       Day  5       Day  6       Day  7       Day  8       Day  9       Day  10

## 2019-02-25 ENCOUNTER — TELEPHONE (OUTPATIENT)
Dept: OTHER | Facility: CLINIC | Age: 74
End: 2019-02-25

## 2019-02-25 ENCOUNTER — TELEPHONE (OUTPATIENT)
Dept: CARDIOLOGY | Facility: CLINIC | Age: 74
End: 2019-02-25

## 2019-02-25 ENCOUNTER — TELEPHONE (OUTPATIENT)
Dept: INTERNAL MEDICINE | Facility: CLINIC | Age: 74
End: 2019-02-25

## 2019-02-25 DIAGNOSIS — I44.2 CHB (COMPLETE HEART BLOCK) (H): Primary | ICD-10-CM

## 2019-02-25 DIAGNOSIS — Z95.2 S/P MVR (MITRAL VALVE REPLACEMENT): Primary | ICD-10-CM

## 2019-02-25 PROBLEM — Z79.01 ANTICOAGULATED: Status: ACTIVE | Noted: 2019-02-25

## 2019-02-25 PROBLEM — E87.1 HYPONATREMIA: Status: ACTIVE | Noted: 2019-02-25

## 2019-02-25 PROBLEM — E87.70 FLUID OVERLOAD: Status: ACTIVE | Noted: 2019-02-25

## 2019-02-25 PROBLEM — R73.9 TRANSIENT HYPERGLYCEMIA POST PROCEDURE: Status: ACTIVE | Noted: 2019-02-25

## 2019-02-25 NOTE — TELEPHONE ENCOUNTER
IP F/U    Date: 02/23/19  Diagnosis: Mitral Valve Polpse and Ticuspid Reguritation, S/P Mvr, Mitral Valve Prolapse  Is patient active in care coordination? No  Was patient in TCU? No    Next 5 appointments (look out 90 days)    Apr 02, 2019 10:00 AM CDT  Return Visit with Marifer Mohamud PA-C  [facility] [id number] [address] [address] [phone]

## 2019-02-25 NOTE — TELEPHONE ENCOUNTER
48 hour post op call    ACTIVITY  How are you doing with activity? I am doing well. I can do stairs without shortness of breath now.   Are you continuing to use your IS 3-4 times a day and do you have any shortness of breath. Yes and my shortness is much better.   Are you weighing yourself daily and has it been stable?. No I need a new scale but my ankles are really swollen and I was still up 12 lbs. When I left the hospital.     PAIN  How is your pain, what are you doing for your pain? I am not having any pain at all.     Are you still doing sternal precautions? Minimally invasive.   Do you hear any clicking when you are moving or taking a deep breath? NA     INCISION: I am having drainage from 1 of the chest tube sites and the groin incision.     ASSISTANCE  Do you have someone at home to help you with your daily activities and transportation needs? Yes, my       MEDICATIONS  I would like to review your discharge medications and answer any questions you may have.   Reviewed     Are you on a blood thinner/Coumadin  No    Who is managing your Coumadin dosing/INR? Na       FOLLOW UP       Scheduled for cardiac rehab? 2/28   Scheduled to see our PA or Surgeon? 2/27 with labs.   Scheduled to see your cardiologist ? Marifer Mohamud 4/2, Pacer follow up 2/27  Scheduled to see your primary care physician? Not scheduled currently  Do you have our contact information? Yes    STOPLIGHT TOOL      Were you happy with your care? Yes  Could we have done anything better? No

## 2019-02-27 ENCOUNTER — ANCILLARY PROCEDURE (OUTPATIENT)
Dept: CARDIOLOGY | Facility: CLINIC | Age: 74
End: 2019-02-27
Attending: INTERNAL MEDICINE
Payer: COMMERCIAL

## 2019-02-27 ENCOUNTER — HOSPITAL ENCOUNTER (OUTPATIENT)
Dept: LAB | Facility: CLINIC | Age: 74
Discharge: HOME | End: 2019-02-27
Attending: SURGERY | Admitting: SURGERY
Payer: COMMERCIAL

## 2019-02-27 ENCOUNTER — OFFICE VISIT (OUTPATIENT)
Dept: CARDIOLOGY | Facility: CLINIC | Age: 74
End: 2019-02-27
Payer: COMMERCIAL

## 2019-02-27 VITALS
HEIGHT: 66 IN | DIASTOLIC BLOOD PRESSURE: 79 MMHG | WEIGHT: 134 LBS | BODY MASS INDEX: 21.53 KG/M2 | SYSTOLIC BLOOD PRESSURE: 131 MMHG | HEART RATE: 94 BPM

## 2019-02-27 DIAGNOSIS — Z95.2 S/P MVR (MITRAL VALVE REPLACEMENT): Primary | ICD-10-CM

## 2019-02-27 DIAGNOSIS — Z95.2 S/P MVR (MITRAL VALVE REPLACEMENT): ICD-10-CM

## 2019-02-27 DIAGNOSIS — I44.2 CHB (COMPLETE HEART BLOCK) (H): ICD-10-CM

## 2019-02-27 DIAGNOSIS — Z95.0 CARDIAC PACEMAKER IN SITU: Primary | ICD-10-CM

## 2019-02-27 LAB
ANION GAP SERPL CALCULATED.3IONS-SCNC: 10 MMOL/L (ref 3–14)
BUN SERPL-MCNC: 17 MG/DL (ref 7–30)
CALCIUM SERPL-MCNC: 8.3 MG/DL (ref 8.5–10.1)
CHLORIDE SERPL-SCNC: 102 MMOL/L (ref 94–109)
CO2 SERPL-SCNC: 21 MMOL/L (ref 20–32)
CREAT SERPL-MCNC: 0.88 MG/DL (ref 0.52–1.04)
GFR SERPL CREATININE-BSD FRML MDRD: 65 ML/MIN/{1.73_M2}
GLUCOSE SERPL-MCNC: 98 MG/DL (ref 70–99)
POTASSIUM SERPL-SCNC: 3.7 MMOL/L (ref 3.4–5.3)
SODIUM SERPL-SCNC: 133 MMOL/L (ref 133–144)

## 2019-02-27 PROCEDURE — 93280 PM DEVICE PROGR EVAL DUAL: CPT | Performed by: INTERNAL MEDICINE

## 2019-02-27 PROCEDURE — 36415 COLL VENOUS BLD VENIPUNCTURE: CPT | Performed by: SURGERY

## 2019-02-27 PROCEDURE — 80048 BASIC METABOLIC PNL TOTAL CA: CPT | Performed by: SURGERY

## 2019-02-27 RX ORDER — POTASSIUM CHLORIDE 750 MG/1
10 TABLET, EXTENDED RELEASE ORAL DAILY
Qty: 5 TABLET | Refills: 0 | Status: SHIPPED | OUTPATIENT
Start: 2019-02-27 | End: 2019-03-07

## 2019-02-27 RX ORDER — AMOXICILLIN 500 MG/1
1000 TABLET, FILM COATED ORAL ONCE
Qty: 4 TABLET | Refills: 0 | Status: SHIPPED | OUTPATIENT
Start: 2019-02-27 | End: 2019-09-09

## 2019-02-27 RX ORDER — FUROSEMIDE 20 MG
10 TABLET ORAL DAILY
Qty: 5 TABLET | Refills: 0 | Status: SHIPPED | OUTPATIENT
Start: 2019-02-27 | End: 2019-03-07

## 2019-02-27 ASSESSMENT — MIFFLIN-ST. JEOR: SCORE: 1134.57

## 2019-02-27 NOTE — PROGRESS NOTES
CV Surgery Post Op Follow Up Note:     Assessment/Plan:     Ms. Aguila is doing ok in her post operative period. She has been washing/cleansing her surgical incisions daily but they are draining excessive amounts of fluid. She has been watching her fluid intake and keeping below 1L/24 hrs along with inc Na intake which she does not like. Given her fluid overload status, I have prescribed her lasix 20 mg PO for 3 days with potassium and asked that she continue with inc sodium intake. I have reached out to nephrology for recommendations 20 mg PO lasix with potassium. I will obtain a new set of labs on Friday to evaluate her sodium status.   I have asked that cancel her dental cleaning apt in 2 weeks and to make it 6 months out to protect her new mitral valve per surgeon.     All of the patient's questions were answered. Our contact information was given to her if they should have any further questions/concerns. No further follow-up is needed with us.    S: From discharge summary: On 2/15/19 Mrs Aguila successfully underwent MINIMALLY INVASIVE MITRAL VALVE REPLACEMENT WITH EPIC ST KEYUR 31MM VALVE; ON PUMP WITH TIA.  MINIMALLY INVASIVE TRICUSPID VALVE REPAIR WITH 32MM SULLIVAN RING by Dr. Andrea Hanna.  Was extubated within 24 hours of surgery. Upon awakening from surgery she had some R arm weakness. Neurology was consulted and she underwent head CT which was negative. She regained her arm strength and neuro signed off. Once she was weaned off hemodynamic stabilizing gtts, transferred to the surgical floor.   Had some transient hyperglycemia treated with an insulin gtt, transitioned to sliding scale insulin and has no need at discharge.  Had some fluid overload treated with diuretic medication. At discharge she is 6 kg above their pre-op weight and is sent with no days of diuretics as she struggled with ADH/Hyponatremia. She will follow up with her PCP and nephrology in clinic.  Nephrology was consulted to help  manage her hyponatremia (Na 126-lowest).   Heart rhythm remained stable. She progressed well with cardiac rehab. Her PTA Xarelto was resumed on discharge. They are discharged to home on pod# 8 with the help of their family.     Allergies:   Allergies   Allergen Reactions     Chlorpheniramine Other (See Comments)     LOC and fainting      Phenylephrine Other (See Comments)     fainting       Medications:   Current Outpatient Medications:      acetaminophen (TYLENOL) 325 MG tablet, Take 2 tablets (650 mg) by mouth every 4 hours as needed for mild pain or fever, Disp: 40 tablet, Rfl: 0     amoxicillin (AMOXIL) 500 MG tablet, Take 2 tablets (1,000 mg) by mouth once for 1 dose, Disp: 4 tablet, Rfl: 0     aspirin 81 MG EC tablet, Take 81 mg by mouth every evening, Disp: , Rfl:      calcium carbonate-vitamin D (CALCIUM 600+D) 600-200 MG-UNIT TABS, Take 1 tablet by mouth 2 times daily, Disp: , Rfl:      furosemide (LASIX) 20 MG tablet, Take 0.5 tablets (10 mg) by mouth daily for 5 days, Disp: 5 tablet, Rfl: 0     methocarbamol (ROBAXIN) 500 MG tablet, Take 1 tablet (500 mg) by mouth 3 times daily as needed for muscle spasms, Disp: 10 tablet, Rfl: 0     Multiple Vitamins-Minerals (MULTIVITAMIN ADULT) TABS, Take 1 tablet by mouth daily, Disp: , Rfl:      omeprazole (PRILOSEC) 10 MG CR capsule, Take 1 capsule (10 mg) by mouth every morning (before breakfast), Disp: 90 capsule, Rfl: 3     potassium chloride ER (K-DUR/KLOR-CON M) 10 MEQ CR tablet, Take 1 tablet (10 mEq) by mouth daily for 3 days, Disp: 5 tablet, Rfl: 0     rivaroxaban ANTICOAGULANT (XARELTO) 20 MG TABS tablet, Take 1 tablet (20 mg) by mouth daily (with dinner), Disp: 90 tablet, Rfl: 3     fexofenadine (ALLEGRA) 180 MG tablet, Take 180 mg by mouth daily, Disp: , Rfl:      oxyCODONE (ROXICODONE) 5 MG tablet, Take 1-2 tablets (5-10 mg) by mouth every 4 hours as needed for moderate to severe pain (Patient not taking: Reported on 2/27/2019), Disp: 20 tablet, Rfl:  "0     polyethylene glycol (MIRALAX/GLYCOLAX) packet, Take 17 g by mouth daily (Patient not taking: Reported on 2/27/2019), Disp: 7 packet, Rfl: 0     senna-docusate (SENOKOT-S/PERICOLACE) 8.6-50 MG tablet, Take 1 tablet by mouth 2 times daily (Patient not taking: Reported on 2/27/2019), Disp: 20 tablet, Rfl: 0    Physical Exam: /79   Pulse 94   Ht 1.676 m (5' 6\")   Wt 60.8 kg (134 lb)   BMI 21.63 kg/m    Constitutional: NAD  Lungs: clear  CV: s1/s2, no m/r/g  Sternum: cdi  Extremities: no LE edema  Incisions: healing but drainage from CT sites and from groin incision.         Salud Siddiqui PA-C  CV Surgery  [facility]  Pager: [phone]    "

## 2019-02-28 ENCOUNTER — HOSPITAL ENCOUNTER (OUTPATIENT)
Dept: CARDIAC REHAB | Facility: CLINIC | Age: 74
End: 2019-02-28
Attending: STUDENT IN AN ORGANIZED HEALTH CARE EDUCATION/TRAINING PROGRAM
Payer: COMMERCIAL

## 2019-02-28 DIAGNOSIS — I34.0 MITRAL VALVE INSUFFICIENCY, UNSPECIFIED ETIOLOGY: ICD-10-CM

## 2019-02-28 PROCEDURE — 93797 PHYS/QHP OP CAR RHAB WO ECG: CPT | Mod: XU | Performed by: OCCUPATIONAL THERAPIST

## 2019-02-28 PROCEDURE — 40000575 ZZH STATISTIC OP CARDIAC VISIT #2: Performed by: OCCUPATIONAL THERAPIST

## 2019-02-28 PROCEDURE — 93798 PHYS/QHP OP CAR RHAB W/ECG: CPT | Performed by: OCCUPATIONAL THERAPIST

## 2019-02-28 PROCEDURE — 40000116 ZZH STATISTIC OP CR VISIT: Performed by: OCCUPATIONAL THERAPIST

## 2019-03-01 ENCOUNTER — TELEPHONE (OUTPATIENT)
Dept: OTHER | Facility: CLINIC | Age: 74
End: 2019-03-01

## 2019-03-01 DIAGNOSIS — E87.79 OTHER HYPERVOLEMIA: Primary | ICD-10-CM

## 2019-03-01 RX ORDER — POTASSIUM CHLORIDE 1500 MG/1
20 TABLET, EXTENDED RELEASE ORAL DAILY
Qty: 2 TABLET | Refills: 0 | Status: SHIPPED | OUTPATIENT
Start: 2019-03-01 | End: 2019-03-07

## 2019-03-01 NOTE — TELEPHONE ENCOUNTER
Follow up phone call made. Patient has dropped 7 lbs, remains 5 lbs up from baseline. States her groin incision has stopped draining. Discussed with Loli Schuster PA-C. Will continue lasix/KCL for 2 more days and recheck labs 3/4 and reassess. Patient states understanding.

## 2019-03-04 ENCOUNTER — HOSPITAL ENCOUNTER (OUTPATIENT)
Dept: LAB | Facility: CLINIC | Age: 74
Discharge: HOME | End: 2019-03-04
Attending: REGISTERED NURSE | Admitting: PHYSICIAN ASSISTANT
Payer: COMMERCIAL

## 2019-03-04 DIAGNOSIS — Z95.2 S/P MVR (MITRAL VALVE REPLACEMENT): Primary | ICD-10-CM

## 2019-03-04 DIAGNOSIS — Z98.890 S/P MVR (MITRAL VALVE REPAIR): Primary | ICD-10-CM

## 2019-03-04 DIAGNOSIS — Z95.2 S/P MVR (MITRAL VALVE REPLACEMENT): ICD-10-CM

## 2019-03-04 LAB
ANION GAP SERPL CALCULATED.3IONS-SCNC: 6 MMOL/L (ref 3–14)
BUN SERPL-MCNC: 16 MG/DL (ref 7–30)
CALCIUM SERPL-MCNC: 8.4 MG/DL (ref 8.5–10.1)
CHLORIDE SERPL-SCNC: 110 MMOL/L (ref 94–109)
CO2 SERPL-SCNC: 25 MMOL/L (ref 20–32)
CREAT SERPL-MCNC: 0.81 MG/DL (ref 0.52–1.04)
GFR SERPL CREATININE-BSD FRML MDRD: 73 ML/MIN/{1.73_M2}
GLUCOSE SERPL-MCNC: 160 MG/DL (ref 70–99)
POTASSIUM SERPL-SCNC: 3.8 MMOL/L (ref 3.4–5.3)
SODIUM SERPL-SCNC: 141 MMOL/L (ref 133–144)

## 2019-03-04 PROCEDURE — 80048 BASIC METABOLIC PNL TOTAL CA: CPT | Performed by: PHYSICIAN ASSISTANT

## 2019-03-04 PROCEDURE — 36415 COLL VENOUS BLD VENIPUNCTURE: CPT | Performed by: PHYSICIAN ASSISTANT

## 2019-03-04 RX ORDER — POTASSIUM CHLORIDE 750 MG/1
10 TABLET, EXTENDED RELEASE ORAL DAILY
Qty: 10 TABLET | Refills: 0 | Status: SHIPPED | OUTPATIENT
Start: 2019-03-04 | End: 2019-03-07

## 2019-03-04 RX ORDER — FUROSEMIDE 20 MG
20 TABLET ORAL DAILY
Qty: 10 TABLET | Refills: 0 | Status: SHIPPED | OUTPATIENT
Start: 2019-03-04 | End: 2019-03-07

## 2019-03-04 NOTE — PROGRESS NOTES
Patient called about labs today and future guidance about diuretic medication. Her weight is still up about 5 kg. Her labs were reviewed and stable. Advised to continue lasix and potassium for 5 more days. Will reorder labs for this Friday.     Will continue to follow up with patient    Salud Siddiqui PA-C  CV Surgery

## 2019-03-05 ENCOUNTER — TELEPHONE (OUTPATIENT)
Dept: OTHER | Facility: CLINIC | Age: 74
End: 2019-03-05

## 2019-03-05 ENCOUNTER — CARE COORDINATION (OUTPATIENT)
Dept: CARDIOLOGY | Facility: CLINIC | Age: 74
End: 2019-03-05

## 2019-03-05 NOTE — TELEPHONE ENCOUNTER
Patient e-mailed stating she has gained 4 lbs despite continuation of Lasix. Will place [facility] consult referral. Explanation of plan given to patient. Will follow along.

## 2019-03-05 NOTE — PROGRESS NOTES
VM received from CHADD Berry with CV surgery. They have been struggling managing pt's fluid status and wanted call back to discuss getting pt in with [facility] ASAP. Attempted to call Kristi back, no answer. LVM letting her know next available with [facility] would be 7:30am on Thursday 3/7 with CHRISTIANO Pineda, and pt would need to come for labs then tomorrow 3/6 for review at 3/7 visit, otherwise, next available after that would be 3/14 at 7:30am. Asked Kristi to put in order for [facility] Enrollment visit. Asked Kristi to call back to discuss, otherwise she can have pt call scheduling as well at [phone] to get set up. Left RN call back number.     Held 7:30am slot on 3/7/19 with CHRISTIANO Pineda. CHADD Mcintyre 4:24 PM 03/05/19

## 2019-03-06 ENCOUNTER — DOCUMENTATION ONLY (OUTPATIENT)
Dept: OTHER | Facility: CLINIC | Age: 74
End: 2019-03-06

## 2019-03-06 ENCOUNTER — TELEPHONE (OUTPATIENT)
Dept: CARDIOLOGY | Facility: CLINIC | Age: 74
End: 2019-03-06

## 2019-03-06 DIAGNOSIS — I34.1 MITRAL VALVE PROLAPSE: Primary | ICD-10-CM

## 2019-03-06 DIAGNOSIS — I48.0 PAROXYSMAL ATRIAL FIBRILLATION (H): Primary | ICD-10-CM

## 2019-03-06 DIAGNOSIS — R63.5 WEIGHT GAIN: ICD-10-CM

## 2019-03-06 DIAGNOSIS — E87.79 OTHER HYPERVOLEMIA: ICD-10-CM

## 2019-03-06 DIAGNOSIS — Z95.2 S/P MVR (MITRAL VALVE REPLACEMENT): ICD-10-CM

## 2019-03-06 DIAGNOSIS — R06.02 SHORTNESS OF BREATH: ICD-10-CM

## 2019-03-06 LAB
MDC_IDC_EPISODE_DTM: NORMAL
MDC_IDC_EPISODE_ID: NORMAL
MDC_IDC_EPISODE_TYPE: NORMAL
MDC_IDC_LEAD_IMPLANT_DT: NORMAL
MDC_IDC_LEAD_IMPLANT_DT: NORMAL
MDC_IDC_LEAD_LOCATION: NORMAL
MDC_IDC_LEAD_LOCATION: NORMAL
MDC_IDC_LEAD_LOCATION_DETAIL_1: NORMAL
MDC_IDC_LEAD_LOCATION_DETAIL_1: NORMAL
MDC_IDC_LEAD_MFG: NORMAL
MDC_IDC_LEAD_MFG: NORMAL
MDC_IDC_LEAD_MODEL: NORMAL
MDC_IDC_LEAD_MODEL: NORMAL
MDC_IDC_LEAD_POLARITY_TYPE: NORMAL
MDC_IDC_LEAD_POLARITY_TYPE: NORMAL
MDC_IDC_LEAD_SERIAL: NORMAL
MDC_IDC_LEAD_SERIAL: NORMAL
MDC_IDC_MSMT_BATTERY_STATUS: NORMAL
MDC_IDC_MSMT_LEADCHNL_RA_IMPEDANCE_VALUE: 720 OHM
MDC_IDC_MSMT_LEADCHNL_RA_SENSING_INTR_AMPL: 2.3 MV
MDC_IDC_MSMT_LEADCHNL_RV_IMPEDANCE_VALUE: 651 OHM
MDC_IDC_MSMT_LEADCHNL_RV_PACING_THRESHOLD_AMPLITUDE: 0.7 V
MDC_IDC_MSMT_LEADCHNL_RV_PACING_THRESHOLD_PULSEWIDTH: 0.4 MS
MDC_IDC_MSMT_LEADCHNL_RV_SENSING_INTR_AMPL: 8.9 MV
MDC_IDC_PG_IMPLANT_DTM: NORMAL
MDC_IDC_PG_MFG: NORMAL
MDC_IDC_PG_MODEL: NORMAL
MDC_IDC_PG_SERIAL: NORMAL
MDC_IDC_PG_TYPE: NORMAL
MDC_IDC_SESS_CLINIC_NAME: NORMAL
MDC_IDC_SESS_DTM: NORMAL
MDC_IDC_SESS_TYPE: NORMAL
MDC_IDC_SET_BRADY_AT_MODE_SWITCH_MODE: NORMAL
MDC_IDC_SET_BRADY_AT_MODE_SWITCH_RATE: 170 {BEATS}/MIN
MDC_IDC_SET_BRADY_HYSTRATE: NORMAL
MDC_IDC_SET_BRADY_LOWRATE: 60 {BEATS}/MIN
MDC_IDC_SET_BRADY_MAX_SENSOR_RATE: 130 {BEATS}/MIN
MDC_IDC_SET_BRADY_MAX_TRACKING_RATE: 130 {BEATS}/MIN
MDC_IDC_SET_BRADY_MODE: NORMAL
MDC_IDC_SET_BRADY_PAV_DELAY_HIGH: 150 MS
MDC_IDC_SET_BRADY_PAV_DELAY_LOW: 200 MS
MDC_IDC_SET_BRADY_SAV_DELAY_HIGH: 150 MS
MDC_IDC_SET_BRADY_SAV_DELAY_LOW: 200 MS
MDC_IDC_SET_LEADCHNL_RA_PACING_AMPLITUDE: 3.5 V
MDC_IDC_SET_LEADCHNL_RA_PACING_ANODE_ELECTRODE_1: NORMAL
MDC_IDC_SET_LEADCHNL_RA_PACING_ANODE_LOCATION_1: NORMAL
MDC_IDC_SET_LEADCHNL_RA_PACING_CAPTURE_MODE: NORMAL
MDC_IDC_SET_LEADCHNL_RA_PACING_CATHODE_ELECTRODE_1: NORMAL
MDC_IDC_SET_LEADCHNL_RA_PACING_CATHODE_LOCATION_1: NORMAL
MDC_IDC_SET_LEADCHNL_RA_PACING_POLARITY: NORMAL
MDC_IDC_SET_LEADCHNL_RA_PACING_PULSEWIDTH: 0.5 MS
MDC_IDC_SET_LEADCHNL_RA_SENSING_ADAPTATION_MODE: NORMAL
MDC_IDC_SET_LEADCHNL_RA_SENSING_ANODE_ELECTRODE_1: NORMAL
MDC_IDC_SET_LEADCHNL_RA_SENSING_ANODE_LOCATION_1: NORMAL
MDC_IDC_SET_LEADCHNL_RA_SENSING_CATHODE_ELECTRODE_1: NORMAL
MDC_IDC_SET_LEADCHNL_RA_SENSING_CATHODE_LOCATION_1: NORMAL
MDC_IDC_SET_LEADCHNL_RA_SENSING_POLARITY: NORMAL
MDC_IDC_SET_LEADCHNL_RA_SENSING_SENSITIVITY: 0.5 MV
MDC_IDC_SET_LEADCHNL_RV_PACING_AMPLITUDE: 1.3 V
MDC_IDC_SET_LEADCHNL_RV_PACING_ANODE_ELECTRODE_1: NORMAL
MDC_IDC_SET_LEADCHNL_RV_PACING_ANODE_LOCATION_1: NORMAL
MDC_IDC_SET_LEADCHNL_RV_PACING_CAPTURE_MODE: NORMAL
MDC_IDC_SET_LEADCHNL_RV_PACING_CATHODE_ELECTRODE_1: NORMAL
MDC_IDC_SET_LEADCHNL_RV_PACING_CATHODE_LOCATION_1: NORMAL
MDC_IDC_SET_LEADCHNL_RV_PACING_POLARITY: NORMAL
MDC_IDC_SET_LEADCHNL_RV_PACING_PULSEWIDTH: 0.4 MS
MDC_IDC_SET_LEADCHNL_RV_SENSING_ADAPTATION_MODE: NORMAL
MDC_IDC_SET_LEADCHNL_RV_SENSING_ANODE_ELECTRODE_1: NORMAL
MDC_IDC_SET_LEADCHNL_RV_SENSING_ANODE_LOCATION_1: NORMAL
MDC_IDC_SET_LEADCHNL_RV_SENSING_CATHODE_ELECTRODE_1: NORMAL
MDC_IDC_SET_LEADCHNL_RV_SENSING_CATHODE_LOCATION_1: NORMAL
MDC_IDC_SET_LEADCHNL_RV_SENSING_POLARITY: NORMAL
MDC_IDC_SET_LEADCHNL_RV_SENSING_SENSITIVITY: 1.5 MV
MDC_IDC_SET_ZONE_DETECTION_INTERVAL: 375 MS
MDC_IDC_SET_ZONE_TYPE: NORMAL
MDC_IDC_SET_ZONE_VENDOR_TYPE: NORMAL
MDC_IDC_STAT_EPISODE_RECENT_COUNT: 0
MDC_IDC_STAT_EPISODE_RECENT_COUNT_DTM_END: NORMAL
MDC_IDC_STAT_EPISODE_RECENT_COUNT_DTM_START: NORMAL
MDC_IDC_STAT_EPISODE_TOTAL_COUNT: 0
MDC_IDC_STAT_EPISODE_TOTAL_COUNT_DTM_END: NORMAL
MDC_IDC_STAT_EPISODE_TYPE: NORMAL
MDC_IDC_STAT_EPISODE_TYPE: NORMAL
MDC_IDC_STAT_EPISODE_VENDOR_TYPE: NORMAL
MDC_IDC_STAT_EPISODE_VENDOR_TYPE: NORMAL

## 2019-03-06 NOTE — PROGRESS NOTES
[facility] Enrollment ordered. Labs and Frankie visit scheduled for 3/7. Instructions given to patient. States understanding.

## 2019-03-06 NOTE — TELEPHONE ENCOUNTER
Message received from scheduling that patient needs [facility] Enrollment appointment as soon as possible due to fluid retention. Recommended office visit with Courtney Brian PA-C tomorrow. Placed orders for BMP, TSH, and Hgb based on standard recommendations. Unable to order ntproBNP until review with provider. Thomas FLORENTINO    3/7/19 9:16 AM:  Spoke with patient when she arrived for lab draw. She does report shortness of breath and edema. Order placed for add on ntproBNP per Courtney Brian PA-C request. Thomas FLORENTINO

## 2019-03-07 ENCOUNTER — OFFICE VISIT (OUTPATIENT)
Dept: CARDIOLOGY | Facility: CLINIC | Age: 74
End: 2019-03-07
Payer: COMMERCIAL

## 2019-03-07 ENCOUNTER — CARE COORDINATION (OUTPATIENT)
Dept: CARDIOLOGY | Facility: CLINIC | Age: 74
End: 2019-03-07

## 2019-03-07 VITALS
WEIGHT: 134.7 LBS | BODY MASS INDEX: 21.65 KG/M2 | HEIGHT: 66 IN | DIASTOLIC BLOOD PRESSURE: 72 MMHG | SYSTOLIC BLOOD PRESSURE: 112 MMHG | OXYGEN SATURATION: 98 % | HEART RATE: 89 BPM

## 2019-03-07 DIAGNOSIS — R06.02 SHORTNESS OF BREATH: ICD-10-CM

## 2019-03-07 DIAGNOSIS — I48.0 PAROXYSMAL ATRIAL FIBRILLATION (H): ICD-10-CM

## 2019-03-07 DIAGNOSIS — I50.31 ACUTE DIASTOLIC CONGESTIVE HEART FAILURE (H): Primary | ICD-10-CM

## 2019-03-07 DIAGNOSIS — E87.79 OTHER HYPERVOLEMIA: ICD-10-CM

## 2019-03-07 DIAGNOSIS — Z95.2 S/P MVR (MITRAL VALVE REPLACEMENT): ICD-10-CM

## 2019-03-07 LAB
ANION GAP SERPL CALCULATED.3IONS-SCNC: 7 MMOL/L (ref 3–14)
BUN SERPL-MCNC: 10 MG/DL (ref 7–30)
CALCIUM SERPL-MCNC: 8.3 MG/DL (ref 8.5–10.1)
CHLORIDE SERPL-SCNC: 107 MMOL/L (ref 94–109)
CO2 SERPL-SCNC: 25 MMOL/L (ref 20–32)
CREAT SERPL-MCNC: 0.77 MG/DL (ref 0.52–1.04)
GFR SERPL CREATININE-BSD FRML MDRD: 77 ML/MIN/{1.73_M2}
GLUCOSE SERPL-MCNC: 111 MG/DL (ref 70–99)
HGB BLD-MCNC: 10 G/DL (ref 11.7–15.7)
NT-PROBNP SERPL-MCNC: 7664 PG/ML (ref 0–900)
POTASSIUM SERPL-SCNC: 3.8 MMOL/L (ref 3.4–5.3)
SODIUM SERPL-SCNC: 139 MMOL/L (ref 133–144)
T4 FREE SERPL-MCNC: 1.12 NG/DL (ref 0.76–1.46)
TSH SERPL DL<=0.005 MIU/L-ACNC: 7.67 MU/L (ref 0.4–4)

## 2019-03-07 PROCEDURE — 83880 ASSAY OF NATRIURETIC PEPTIDE: CPT | Performed by: PHYSICIAN ASSISTANT

## 2019-03-07 PROCEDURE — 36415 COLL VENOUS BLD VENIPUNCTURE: CPT | Performed by: INTERNAL MEDICINE

## 2019-03-07 PROCEDURE — 84443 ASSAY THYROID STIM HORMONE: CPT | Performed by: PHYSICIAN ASSISTANT

## 2019-03-07 PROCEDURE — 84439 ASSAY OF FREE THYROXINE: CPT | Performed by: PHYSICIAN ASSISTANT

## 2019-03-07 PROCEDURE — 80048 BASIC METABOLIC PNL TOTAL CA: CPT | Performed by: PHYSICIAN ASSISTANT

## 2019-03-07 PROCEDURE — 99215 OFFICE O/P EST HI 40 MIN: CPT | Mod: 25 | Performed by: PHYSICIAN ASSISTANT

## 2019-03-07 PROCEDURE — 93000 ELECTROCARDIOGRAM COMPLETE: CPT | Performed by: PHYSICIAN ASSISTANT

## 2019-03-07 PROCEDURE — 85018 HEMOGLOBIN: CPT | Performed by: PHYSICIAN ASSISTANT

## 2019-03-07 RX ORDER — POTASSIUM CHLORIDE 1500 MG/1
40 TABLET, EXTENDED RELEASE ORAL DAILY
Qty: 2 TABLET | Refills: 0 | Status: SHIPPED | OUTPATIENT
Start: 2019-03-07 | End: 2019-03-08

## 2019-03-07 RX ORDER — POTASSIUM CHLORIDE 1500 MG/1
40 TABLET, EXTENDED RELEASE ORAL DAILY
Qty: 2 TABLET | Refills: 0
Start: 2019-03-07 | End: 2019-03-07

## 2019-03-07 RX ORDER — TORSEMIDE 20 MG/1
20 TABLET ORAL DAILY
Qty: 60 TABLET | Refills: 3 | Status: SHIPPED | OUTPATIENT
Start: 2019-03-07 | End: 2019-03-15

## 2019-03-07 RX ORDER — AMIODARONE HYDROCHLORIDE 200 MG/1
TABLET ORAL
Start: 2019-03-07 | End: 2019-03-25

## 2019-03-07 ASSESSMENT — MIFFLIN-ST. JEOR: SCORE: 1137.75

## 2019-03-07 NOTE — PATIENT INSTRUCTIONS
Call [facility]. nurse for any questions or concerns Mon-Fri 8am-4pm:  [phone]  For concerns after hours: [phone]    Medication changes:  STOP lasix  START torsemide; Take 40mg (2 pills) daily x 3 days, then drop to 20mg (1 pill) once a day.  START amiodarone; take 400mg (2 pills) twice a day x 3 days, then 400mg (2 pills) once a day x 1 week. Then continue at 200mg (1 pill) once a day.   INCREASE Kdur (potassium) to 40meq (2 pills) once daily.    Plan from today:  Recheck labs in 1 week. We will touch base with you via phone at that time and get an update on your weight and symptoms.  Return to see Courtney in [facility] in 2 weeks.   Please get some compression socks to wear, and elevate your feet when resting. Keep working on low sodium diet.     We made an appt for you to see Faye in  the end of March for follow up of your afib.      Ok to stay home from cardiac rehab tomorrow, then please resume.     Lab results:  Results for GERONIMO PAYTON (MRN [id number]) as of 3/7/2019 11:00   Ref. Range 3/7/2019 08:55   Sodium Latest Ref Range: 133 - 144 mmol/L 139   Potassium Latest Ref Range: 3.4 - 5.3 mmol/L 3.8   Chloride Latest Ref Range: 94 - 109 mmol/L 107   Carbon Dioxide Latest Ref Range: 20 - 32 mmol/L 25   Urea Nitrogen Latest Ref Range: 7 - 30 mg/dL 10   Creatinine Latest Ref Range: 0.52 - 1.04 mg/dL 0.77   GFR Estimate Latest Ref Range: >60 mL/min/1.73_m2 77   GFR Estimate If Black Latest Ref Range: >60 mL/min/1.73_m2 89   Calcium Latest Ref Range: 8.5 - 10.1 mg/dL 8.3    Anion Gap Latest Ref Range: 3 - 14 mmol/L 7   N-Terminal Pro BNP Inpatient Latest Ref Range: 0 - 900 pg/mL 7,664 (H)   T4 Free Latest Ref Range: 0.76 - 1.46 ng/dL 1.12   TSH Latest Ref Range: 0.40 - 4.00 mU/L 7.67 (H)

## 2019-03-07 NOTE — LETTER
3/7/2019    Anyi Olmos MD  [address] [facility] [address]  [facility] [address]    RE: Taty Aguila       Dear Colleague,    I had the pleasure of seeing Taty Aguila in the [facility].      Cardiology Progress Note    Date of Service: 03/07/2019      Reason for visit: C[facility] clinic enrollment, fluid retention s/p mitral valve surgery.    Primary cardiologist: Dr. Jesus Stone      HPI:  Ms. Aguila is a pleasant 74 year old female who has been followed in our clinic for mitral valve regurgitation and atrial fibrillation. 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  In Oct 2018 she developed palpitations and was found to be in afib with RVR. She converted back to sinus rhythm with diltiazem gtt, and was placed on anticoagulation. She did will until Dec 2018 when again she returned to afib with RVR. Her metoprolol dose was increased. Her rates were still elevated on short term follow up, and she was loaded with amiodarone and a cardioversion was arranged. However, when she presented for this, she was noted to be back in sinus rhythm.    She then saw Dr. Hanna the end of December to re-consider intervention on her valve. He felt that she did indeed have severe mitral regurgitation, and in the setting of new onset atrial fibrillation, valve surgery was recommended. Preop angio showed no coronary disease. TIA showed preserved EF 65% with normal RV size and function. She had confirmed 3-4+ MR, and the valve was severely thickened and myxomatous. She also had 3+ TR. Then, in mid Feb 2019 she successfully underwent minimally invasive MV replacement with Epic St Ron 31mm valve. At the same time, she had tricuspid valve repair with a 32mm Oquendo ring. She developed complete AV block post operatively, and underwent pacemaker placement. Her stay was complicated by some volume overload for  which she received diuresis, but this was complicated due to hyponatremia (lowest of 126), and nephrology assisted in her management. She was discharged on Xarelto/ASA, but unfortunately no diuretics. Of note, as she did not have recurrence of afib, her amiodarone and beta blockers were discontinued.     She was seen the end of Feb by the CT surgery team for follow up and noted fluid retention and some RAYO. She was given lasix 20mg daily transiently along with potassium. She was encouraged to continue high sodium intake given her issues with hyponatremia while in house. She responded initially to this but then again noted weight gain. This was adjusted further via phone over the last week, but she has continued to struggle. Thus, I am seeing her now as an urgent CORE evaluation.     Today she tells me she continues to feel poorly. Her weight is 134 lbs today which is ~10 lbs up from her presurgical baseline. She has pitting edema up to her knees. She endorses RAYO, fatigue, and a frequent dry cough. She has some mild orthopnea and also notes some palpitations. I did an ECG today which shows she is back in atrial fibrillation. On her device check last week, it confirms she is in fact now 100% in afib on there as well. Her heart rates appear to be in the 80-90 range. Fortunately, labs today show preserved renal function, and her sodium has normalized at 139, Her K remains within normal limits as well. Her TSH is noted to be high, but she has a normal Free T4. Hemoglobin is trending up from previous, and her NT-proBNP, as expected, is quite high at 7664.       ASSESSMENT/PLAN:    1. Acute heart failure with preserved EF.   --Per TIA Jan 2019 her EF has remained preserved at 65%. This was prior to her valve intervention. She had some perioperative fluid retention, but not sent home on diuretics. She is clinically volume up on exam today with pitting edema and NT-proBNP of 7664. Etiology may be secondary to the return of  her afib, but not yet entirely clear. Will recheck 2D echo in the next few days if possible, to look at valves and ensure no change in EF.    --Change lasix to torsemide; 40mg daily x 3 days then 20mg. Increase KDur to 40meq daily. She is ~10 lbs above her baseline weight. Repeat labs next week for close monitoring of her electrolytes. She will call early next week for a weight/symptom update and further changes can be made at that time.   --For now, I've asked her no longer to push a high salt diet and instead restrict for the short term. I also recommended she get compression socks.      2. Paroxysmal atrial fibrillation, symptomatic.   --First noted Oct 2018 with palpitations. Converted to sinus with diltiazem, and placed on beta blockers.  Returned Dec 2018 with similar symptoms. Placed on amiodarone with plans for CV, but when she presented had converted back to sinus once again.   --After her valve surgery last month, amio was discontinued after device placement for CHB (dual chamber PPM), and initially no recurrence of afib. Today, she is symptomatic with palpitations once again, with recurrence of her afib, and evidence of heart failure. Rates appear in the 80-90s. I D/W CHRISTIANO Degroot with EP; will reload with amiodarone today. She will return to see EP in 2-3 weeks with repeat EKG and device interrogation prior to that visit. If she does not convert in the short term will reconsider cardioversion.   --Not currently on beta blockers, which we may be able to add back at follow up.    --She is on ASA/Xarelto post valve surgery, which we will continue.     3. Valvular heart disease.   --Now s/p minimally invasive MV replacement with Epic St Ron 31mm valve and tricuspid valve repair with a 32mm Oquendo ring in Feb 2019.   --On AC with Xarelto and ASA as above.    --Continue cardiac rehab, though told her she may cancel for this week until we are able to improve her hypervolemia.        Follow up plan: Labs in  1 week, and check in via phone with CORE RN. Then return in 2 weeks with myself.                           Return to see EP PAMELLA in ~3 weeks for f/u after amiodarone loading. EKG prior to that visit.       Orders this Visit:  Orders Placed This Encounter   Procedures     Basic metabolic panel     N terminal pro BNP outpatient     Follow-Up with [facility] Clinic - PAMELLA visit     Follow-Up with Cardiac Advanced Practice Provider     EKG 12-lead complete w/read - Clinics (performed today)     EKG 12-lead complete w/read (Future)- to be scheduled     Echocardiogram Complete     Orders Placed This Encounter   Medications     torsemide (DEMADEX) 20 MG tablet     Sig: Take 1 tablet (20 mg) by mouth daily Take 2 tablets daily x 3 days, then continue at 1 tablet daily.     Dispense:  60 tablet     Refill:  3     DISCONTD: potassium chloride ER (K-DUR/KLOR-CON M) 20 MEQ CR tablet     Sig: Take 2 tablets (40 mEq) by mouth daily     Dispense:  2 tablet     Refill:  0     amiodarone (PACERONE/CODARONE) 200 MG tablet     Sig: Take 2 tablets (400 mg) by mouth 2 times daily for 3 days, THEN 2 tablets (400 mg) daily for 7 days, THEN 1 tablet (200 mg) daily.         CURRENT MEDICATIONS:  Current Outpatient Medications   Medication Sig Dispense Refill     amiodarone (PACERONE/CODARONE) 200 MG tablet Take 2 tablets (400 mg) by mouth 2 times daily for 3 days, THEN 2 tablets (400 mg) daily for 7 days, THEN 1 tablet (200 mg) daily.       aspirin 81 MG EC tablet Take 81 mg by mouth every evening       calcium carbonate-vitamin D (CALCIUM 600+D) 600-200 MG-UNIT TABS Take 1 tablet by mouth 2 times daily       Multiple Vitamins-Minerals (MULTIVITAMIN ADULT) TABS Take 1 tablet by mouth daily       omeprazole (PRILOSEC) 10 MG CR capsule Take 1 capsule (10 mg) by mouth every morning (before breakfast) 90 capsule 3     rivaroxaban ANTICOAGULANT (XARELTO) 20 MG TABS tablet Take 1 tablet (20 mg) by mouth daily (with dinner) 90 tablet 3     torsemide  (DEMADEX) 20 MG tablet Take 1 tablet (20 mg) by mouth daily Take 2 tablets daily x 3 days, then continue at 1 tablet daily. 60 tablet 3     oxyCODONE (ROXICODONE) 5 MG tablet Take 1-2 tablets (5-10 mg) by mouth every 4 hours as needed for moderate to severe pain (Patient not taking: Reported on 2/27/2019) 20 tablet 0     polyethylene glycol (MIRALAX/GLYCOLAX) packet Take 17 g by mouth daily (Patient not taking: Reported on 2/27/2019) 7 packet 0     potassium chloride ER (K-DUR/KLOR-CON M) 20 MEQ CR tablet Take 2 tablets (40 mEq) by mouth daily 2 tablet 0     senna-docusate (SENOKOT-S/PERICOLACE) 8.6-50 MG tablet Take 1 tablet by mouth 2 times daily (Patient not taking: Reported on 2/27/2019) 20 tablet 0       ALLERGIES     Allergies   Allergen Reactions     Chlorpheniramine Other (See Comments)     LOC and fainting      Phenylephrine Other (See Comments)     fainting       PAST MEDICAL HISTORY:  Past Medical History:   Diagnosis Date     Allergic rhinitis, cause unspecified     dust mites, ragweed     Complete AV block (H)     BSX DDD PM 2-     History of tricuspid valve repair      Mitral valve prolapse     bioprosthetic MVR 2-     Paroxysmal atrial fibrillation (H)        PAST SURGICAL HISTORY:  Past Surgical History:   Procedure Laterality Date     COLONOSCOPY N/A 9/15/2015    Procedure: COLONOSCOPY;  Surgeon: Anson Llanos MD;  Location:  GI     CV HEART CATHETERIZATION WITH POSSIBLE INTERVENTION N/A 1/9/2019    Procedure: Heart Catheterization with Possible Intervention;  Surgeon: Ritesh Whittaker MD;  Location:  HEART CARDIAC CATH LAB     CV RIGHT AND LEFT HEART CATH N/A 1/9/2019    Procedure: Right and Left Heart Catherization;  Surgeon: Ritesh Whittaker MD;  Location:  HEART CARDIAC CATH LAB     ENT SURGERY      T&A     EP PACEMAKER N/A 2/18/2019    Procedure: EP Pacemaker;  Surgeon: Inocencia Guillen MD;  Location:  HEART CARDIAC CATH LAB     REPAIR VALVE TRICUSPID  MINIMALLY INVASIVE N/A 2/15/2019    Procedure: MINIMALLY INVASIVE TRICUSPID VALVE REPAIR WITH 32MM SULLIVAN RING;  Surgeon: Andrea Hanna MD;  Location:  OR     REPLACE VALVE MITRAL MINIMALLY INVASIVE N/A 2/15/2019    Procedure: MINIMALLY INVASIVE MITRAL VALVE REPLACEMENT WITH EPIC ST KEYUR 31MM VALVE; ON PUMP WITH TIA.  CARDIOLOGIST DR CARDENAS;  Surgeon: Andrea Hanna MD;  Location:  OR       FAMILY HISTORY:  Family History   Problem Relation Age of Onset     Osteoporosis Mother      Alzheimer Disease Mother      Family History Negative Father      Heart Disease Maternal Grandfather      Family History Negative Paternal Grandmother      Family History Negative Paternal Grandfather        SOCIAL HISTORY:  Social History     Socioeconomic History     Marital status:      Spouse name: Not on file     Number of children: 3     Years of education: Not on file     Highest education level: Not on file   Occupational History     Not on file   Social Needs     Financial resource strain: Not on file     Food insecurity:     Worry: Not on file     Inability: Not on file     Transportation needs:     Medical: Not on file     Non-medical: Not on file   Tobacco Use     Smoking status: Never Smoker     Smokeless tobacco: Never Used   Substance and Sexual Activity     Alcohol use: No     Drug use: No     Sexual activity: Not Currently   Lifestyle     Physical activity:     Days per week: Not on file     Minutes per session: Not on file     Stress: Not on file   Relationships     Social connections:     Talks on phone: Not on file     Gets together: Not on file     Attends Worship service: Not on file     Active member of club or organization: Not on file     Attends meetings of clubs or organizations: Not on file     Relationship status: Not on file     Intimate partner violence:     Fear of current or ex partner: Not on file     Emotionally abused: Not on file     Physically abused: Not on file     " Forced sexual activity: Not on file   Other Topics Concern      Service Not Asked     Blood Transfusions Not Asked     Caffeine Concern No     Comment: 1 cup of coffee and 1 can diet coke per day     Occupational Exposure Not Asked     Hobby Hazards Not Asked     Sleep Concern Yes     Comment: takes Doxylamine succinate 1/2 tab every night     Stress Concern No     Weight Concern No     Special Diet No     Comment: healthy      Back Care Not Asked     Exercise Yes     Comment: walking 45min x7 a wk. Very active, [employer], Golf, Bowling, Cardio     Bike Helmet Not Asked     Seat Belt Yes     Self-Exams Yes     Parent/sibling w/ CABG, MI or angioplasty before 65F 55M? No   Social History Narrative     Not on file       Review of Systems:  Cardiovascular: negative for chest pain, pos palpitations, orthopnea, LE edema  Constitutional: negative for chills, sweats, fevers. Pos fatigue.   Resp: Negative for dyspnea at rest, pos dyspnea on exertion, cough, neg hemoptysis, known chronic lung disease  HEENT: Negative for new visual changes, frequent headaches  Gastrointestinal: negative for abdominal pain, neg diarrhea, blood in stool, nausea, vomiting  Hematologic/lymphatic: pos for current systemic anticoagulation, neg hx of blood clots  Musculoskeletal: negative for new back pain, joint pain  Neurological: negative for focal weakness, LOC, seizures, syncope. Pos mild dizziness.      Physical Exam:  Vitals: /72 (BP Location: Right arm, Patient Position: Chair, Cuff Size: Adult Regular)   Pulse 89   Ht 1.676 m (5' 6\")   Wt 61.1 kg (134 lb 11.2 oz)   SpO2 98%   BMI 21.74 kg/m      Wt Readings from Last 4 Encounters:   03/07/19 61.1 kg (134 lb 11.2 oz)   02/27/19 60.8 kg (134 lb)   02/23/19 62 kg (136 lb 11 oz)   02/07/19 58.3 kg (128 lb 8 oz)       GEN:  In general, this is a well nourished  female in no acute distress on room air.  Patient ambulatory, accompanied by her .  HEENT:  Pupils " equal, round. Sclerae nonicteric.   NECK: Supple, no masses appreciated. Trachea midline. +JVD.   C/V:  Irregular rhythm on exam, no obvious murmur, rub or gallop.   RESP: Respirations are unlabored. No use of accessory muscles. Has a few crackles in bases posteriorly, no wheezing or rhonchi.   GI: Abdomen soft, nontender, nondistended. No HSM appreciated.   EXTREM: 2+ bilateral pitting edema up to her knees. No cyanosis or clubbing.  NEURO: Alert and oriented, cooperative. Gait not formally assessed. No obvious focal deficits.   PSYCH: Normal affect.  SKIN: Warm and dry. No rashes or petechiae appreciated.       Recent Lab Results:    CBC RESULTS:  Lab Results   Component Value Date    WBC 13.7 (H) 02/22/2019    RBC 2.64 (L) 02/22/2019    HGB 10.0 (L) 03/07/2019    HCT 23.4 (L) 02/22/2019    MCV 89 02/22/2019    MCH 30.7 02/22/2019    MCHC 34.6 02/22/2019    RDW 14.8 02/22/2019     02/22/2019       BMP RESULTS:  Lab Results   Component Value Date     03/07/2019    POTASSIUM 3.8 03/07/2019    CHLORIDE 107 03/07/2019    CO2 25 03/07/2019    ANIONGAP 7 03/07/2019     (H) 03/07/2019    BUN 10 03/07/2019    CR 0.77 03/07/2019    GFRESTIMATED 77 03/07/2019    GFRESTBLACK 89 03/07/2019    ARIELLE 8.3 (L) 03/07/2019          Additional pertinent testing:  TIA 1/9/19  Interpretation Summary     1. The left ventricle is normal in structure, function and size. The visual  ejection fraction is estimated at 65%.  2. The right ventricle is normal in structure, function and size.  3. There is mod-severe to severe (3-4+) mitral regurgitation. 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 Moderate  bileaflet prolapse, hard to localized segments, likely most of each leaflet is  prolapsing to some degree. One large and one smaller MR jet. RV of 36ml and  ERO 0.28 cm2. Pulmonary veins have S blunting, but no flow reversal.  4. There is moderately severe (3+) tricuspid  regurgitation. TV appears mildly  myxomatous and hypermobile.     Echo from 8/2018 showed 1-2+ MR, 1-2+ TR; the degree of MR was probably  underestimated.     TIA from 9/2016 showed 3-4+ MR; when directly compared there has been some  progression of the myxomatous changes.      Greater than 40 minutes was spent with this patient, >50% of which was spent in counseling and coordination of care.       Courtney Brian PA-C  [facility]  Pager [phone]      Thank you for allowing me to participate in the care of your patient.      Sincerely,     CHRISTIANO Weathers     [facility]    cc:   No referring provider defined for this encounter.

## 2019-03-07 NOTE — PROGRESS NOTES
Cardiology Progress Note    Date of Service: 03/07/2019      Reason for visit: [facility] clinic enrollment, fluid retention s/p mitral valve surgery.    Primary cardiologist: Dr. Jesus Stone      HPI:  Ms. Aguila is a pleasant 74 year old female who has been followed in our clinic for mitral valve regurgitation and atrial fibrillation. 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  In Oct 2018 she developed palpitations and was found to be in afib with RVR. She converted back to sinus rhythm with diltiazem gtt, and was placed on anticoagulation. She did will until Dec 2018 when again she returned to afib with RVR. Her metoprolol dose was increased. Her rates were still elevated on short term follow up, and she was loaded with amiodarone and a cardioversion was arranged. However, when she presented for this, she was noted to be back in sinus rhythm.    She then saw Dr. Hanna the end of December to re-consider intervention on her valve. He felt that she did indeed have severe mitral regurgitation, and in the setting of new onset atrial fibrillation, valve surgery was recommended. Preop angio showed no coronary disease. TIA showed preserved EF 65% with normal RV size and function. She had confirmed 3-4+ MR, and the valve was severely thickened and myxomatous. She also had 3+ TR. Then, in mid Feb 2019 she successfully underwent minimally invasive MV replacement with Epic St Ron 31mm valve. At the same time, she had tricuspid valve repair with a 32mm Oquendo ring. She developed complete AV block post operatively, and underwent pacemaker placement. Her stay was complicated by some volume overload for which she received diuresis, but this was complicated due to hyponatremia (lowest of 126), and nephrology assisted in her management. She was discharged on Xarelto/ASA, but unfortunately no diuretics. Of note, as she did not  have recurrence of afib, her amiodarone and beta blockers were discontinued.     She was seen the end of Feb by the CT surgery team for follow up and noted fluid retention and some RAYO. She was given lasix 20mg daily transiently along with potassium. She was encouraged to continue high sodium intake given her issues with hyponatremia while in house. She responded initially to this but then again noted weight gain. This was adjusted further via phone over the last week, but she has continued to struggle. Thus, I am seeing her now as an urgent CORE evaluation.     Today she tells me she continues to feel poorly. Her weight is 134 lbs today which is ~10 lbs up from her presurgical baseline. She has pitting edema up to her knees. She endorses RAYO, fatigue, and a frequent dry cough. She has some mild orthopnea and also notes some palpitations. I did an ECG today which shows she is back in atrial fibrillation. On her device check last week, it confirms she is in fact now 100% in afib on there as well. Her heart rates appear to be in the 80-90 range. Fortunately, labs today show preserved renal function, and her sodium has normalized at 139, Her K remains within normal limits as well. Her TSH is noted to be high, but she has a normal Free T4. Hemoglobin is trending up from previous, and her NT-proBNP, as expected, is quite high at 7664.       ASSESSMENT/PLAN:    1. Acute heart failure with preserved EF.   --Per TIA Jan 2019 her EF has remained preserved at 65%. This was prior to her valve intervention. She had some perioperative fluid retention, but not sent home on diuretics. She is clinically volume up on exam today with pitting edema and NT-proBNP of 7664. Etiology may be secondary to the return of her afib, but not yet entirely clear. Will recheck 2D echo in the next few days if possible, to look at valves and ensure no change in EF.    --Change lasix to torsemide; 40mg daily x 3 days then 20mg. Increase KDur to 40meq  daily. She is ~10 lbs above her baseline weight. Repeat labs next week for close monitoring of her electrolytes. She will call early next week for a weight/symptom update and further changes can be made at that time.   --For now, I've asked her no longer to push a high salt diet and instead restrict for the short term. I also recommended she get compression socks.      2. Paroxysmal atrial fibrillation, symptomatic.   --First noted Oct 2018 with palpitations. Converted to sinus with diltiazem, and placed on beta blockers.  Returned Dec 2018 with similar symptoms. Placed on amiodarone with plans for CV, but when she presented had converted back to sinus once again.   --After her valve surgery last month, amio was discontinued after device placement for CHB (dual chamber PPM), and initially no recurrence of afib. Today, she is symptomatic with palpitations once again, with recurrence of her afib, and evidence of heart failure. Rates appear in the 80-90s. I D/W CHRISTIANO Degroot with EP; will reload with amiodarone today. She will return to see EP in 2-3 weeks with repeat EKG and device interrogation prior to that visit. If she does not convert in the short term will reconsider cardioversion.   --Not currently on beta blockers, which we may be able to add back at follow up.    --She is on ASA/Xarelto post valve surgery, which we will continue.     3. Valvular heart disease.   --Now s/p minimally invasive MV replacement with Epic St Ron 31mm valve and tricuspid valve repair with a 32mm Oquendo ring in Feb 2019.   --On AC with Xarelto and ASA as above.    --Continue cardiac rehab, though told her she may cancel for this week until we are able to improve her hypervolemia.        Follow up plan: Labs in 1 week, and check in via phone with [facility] RN. Then return in 2 weeks with myself.                           Return to see EP PAMELLA in ~3 weeks for f/u after amiodarone loading. EKG prior to that visit.       Orders this  Visit:  Orders Placed This Encounter   Procedures     Basic metabolic panel     N terminal pro BNP outpatient     Follow-Up with [facility] - PAMELLA visit     Follow-Up with Cardiac Advanced Practice Provider     EKG 12-lead complete w/read - Clinics (performed today)     EKG 12-lead complete w/read (Future)- to be scheduled     Echocardiogram Complete     Orders Placed This Encounter   Medications     torsemide (DEMADEX) 20 MG tablet     Sig: Take 1 tablet (20 mg) by mouth daily Take 2 tablets daily x 3 days, then continue at 1 tablet daily.     Dispense:  60 tablet     Refill:  3     DISCONTD: potassium chloride ER (K-DUR/KLOR-CON M) 20 MEQ CR tablet     Sig: Take 2 tablets (40 mEq) by mouth daily     Dispense:  2 tablet     Refill:  0     amiodarone (PACERONE/CODARONE) 200 MG tablet     Sig: Take 2 tablets (400 mg) by mouth 2 times daily for 3 days, THEN 2 tablets (400 mg) daily for 7 days, THEN 1 tablet (200 mg) daily.         CURRENT MEDICATIONS:  Current Outpatient Medications   Medication Sig Dispense Refill     amiodarone (PACERONE/CODARONE) 200 MG tablet Take 2 tablets (400 mg) by mouth 2 times daily for 3 days, THEN 2 tablets (400 mg) daily for 7 days, THEN 1 tablet (200 mg) daily.       aspirin 81 MG EC tablet Take 81 mg by mouth every evening       calcium carbonate-vitamin D (CALCIUM 600+D) 600-200 MG-UNIT TABS Take 1 tablet by mouth 2 times daily       Multiple Vitamins-Minerals (MULTIVITAMIN ADULT) TABS Take 1 tablet by mouth daily       omeprazole (PRILOSEC) 10 MG CR capsule Take 1 capsule (10 mg) by mouth every morning (before breakfast) 90 capsule 3     rivaroxaban ANTICOAGULANT (XARELTO) 20 MG TABS tablet Take 1 tablet (20 mg) by mouth daily (with dinner) 90 tablet 3     torsemide (DEMADEX) 20 MG tablet Take 1 tablet (20 mg) by mouth daily Take 2 tablets daily x 3 days, then continue at 1 tablet daily. 60 tablet 3     oxyCODONE (ROXICODONE) 5 MG tablet Take 1-2 tablets (5-10 mg) by mouth every 4  hours as needed for moderate to severe pain (Patient not taking: Reported on 2/27/2019) 20 tablet 0     polyethylene glycol (MIRALAX/GLYCOLAX) packet Take 17 g by mouth daily (Patient not taking: Reported on 2/27/2019) 7 packet 0     potassium chloride ER (K-DUR/KLOR-CON M) 20 MEQ CR tablet Take 2 tablets (40 mEq) by mouth daily 2 tablet 0     senna-docusate (SENOKOT-S/PERICOLACE) 8.6-50 MG tablet Take 1 tablet by mouth 2 times daily (Patient not taking: Reported on 2/27/2019) 20 tablet 0       ALLERGIES     Allergies   Allergen Reactions     Chlorpheniramine Other (See Comments)     LOC and fainting      Phenylephrine Other (See Comments)     fainting       PAST MEDICAL HISTORY:  Past Medical History:   Diagnosis Date     Allergic rhinitis, cause unspecified     dust mites, ragweed     Complete AV block (H)     BSX DDD PM 2-     History of tricuspid valve repair      Mitral valve prolapse     bioprosthetic MVR 2-     Paroxysmal atrial fibrillation (H)        PAST SURGICAL HISTORY:  Past Surgical History:   Procedure Laterality Date     COLONOSCOPY N/A 9/15/2015    Procedure: COLONOSCOPY;  Surgeon: Anson Llanos MD;  Location:  GI     CV HEART CATHETERIZATION WITH POSSIBLE INTERVENTION N/A 1/9/2019    Procedure: Heart Catheterization with Possible Intervention;  Surgeon: Ritesh Whittaker MD;  Location:  HEART CARDIAC CATH LAB     CV RIGHT AND LEFT HEART CATH N/A 1/9/2019    Procedure: Right and Left Heart Catherization;  Surgeon: Ritesh Whittaker MD;  Location:  HEART CARDIAC CATH LAB     ENT SURGERY      T&A     EP PACEMAKER N/A 2/18/2019    Procedure: EP Pacemaker;  Surgeon: Inocencia Guillen MD;  Location:  HEART CARDIAC CATH LAB     REPAIR VALVE TRICUSPID MINIMALLY INVASIVE N/A 2/15/2019    Procedure: MINIMALLY INVASIVE TRICUSPID VALVE REPAIR WITH 32MM SULLIVAN RING;  Surgeon: Andrea Hanna MD;  Location:  OR     REPLACE VALVE MITRAL MINIMALLY INVASIVE N/A  2/15/2019    Procedure: MINIMALLY INVASIVE MITRAL VALVE REPLACEMENT WITH EPIC ST KEYUR 31MM VALVE; ON PUMP WITH TIA.  CARDIOLOGIST DR CARDENAS;  Surgeon: Andrea Hanna MD;  Location:  OR       FAMILY HISTORY:  Family History   Problem Relation Age of Onset     Osteoporosis Mother      Alzheimer Disease Mother      Family History Negative Father      Heart Disease Maternal Grandfather      Family History Negative Paternal Grandmother      Family History Negative Paternal Grandfather        SOCIAL HISTORY:  Social History     Socioeconomic History     Marital status:      Spouse name: Not on file     Number of children: 3     Years of education: Not on file     Highest education level: Not on file   Occupational History     Not on file   Social Needs     Financial resource strain: Not on file     Food insecurity:     Worry: Not on file     Inability: Not on file     Transportation needs:     Medical: Not on file     Non-medical: Not on file   Tobacco Use     Smoking status: Never Smoker     Smokeless tobacco: Never Used   Substance and Sexual Activity     Alcohol use: No     Drug use: No     Sexual activity: Not Currently   Lifestyle     Physical activity:     Days per week: Not on file     Minutes per session: Not on file     Stress: Not on file   Relationships     Social connections:     Talks on phone: Not on file     Gets together: Not on file     Attends Hoahaoism service: Not on file     Active member of club or organization: Not on file     Attends meetings of clubs or organizations: Not on file     Relationship status: Not on file     Intimate partner violence:     Fear of current or ex partner: Not on file     Emotionally abused: Not on file     Physically abused: Not on file     Forced sexual activity: Not on file   Other Topics Concern      Service Not Asked     Blood Transfusions Not Asked     Caffeine Concern No     Comment: 1 cup of coffee and 1 can diet coke per day      "Occupational Exposure Not Asked     Hobby Hazards Not Asked     Sleep Concern Yes     Comment: takes Doxylamine succinate 1/2 tab every night     Stress Concern No     Weight Concern No     Special Diet No     Comment: healthy      Back Care Not Asked     Exercise Yes     Comment: walking 45min x7 a wk. Very active, [employer], Golf, Bowling, Cardio     Bike Helmet Not Asked     Seat Belt Yes     Self-Exams Yes     Parent/sibling w/ CABG, MI or angioplasty before 65F 55M? No   Social History Narrative     Not on file       Review of Systems:  Cardiovascular: negative for chest pain, pos palpitations, orthopnea, LE edema  Constitutional: negative for chills, sweats, fevers. Pos fatigue.   Resp: Negative for dyspnea at rest, pos dyspnea on exertion, cough, neg hemoptysis, known chronic lung disease  HEENT: Negative for new visual changes, frequent headaches  Gastrointestinal: negative for abdominal pain, neg diarrhea, blood in stool, nausea, vomiting  Hematologic/lymphatic: pos for current systemic anticoagulation, neg hx of blood clots  Musculoskeletal: negative for new back pain, joint pain  Neurological: negative for focal weakness, LOC, seizures, syncope. Pos mild dizziness.      Physical Exam:  Vitals: /72 (BP Location: Right arm, Patient Position: Chair, Cuff Size: Adult Regular)   Pulse 89   Ht 1.676 m (5' 6\")   Wt 61.1 kg (134 lb 11.2 oz)   SpO2 98%   BMI 21.74 kg/m     Wt Readings from Last 4 Encounters:   03/07/19 61.1 kg (134 lb 11.2 oz)   02/27/19 60.8 kg (134 lb)   02/23/19 62 kg (136 lb 11 oz)   02/07/19 58.3 kg (128 lb 8 oz)       GEN:  In general, this is a well nourished  female in no acute distress on room air.  Patient ambulatory, accompanied by her .  HEENT:  Pupils equal, round. Sclerae nonicteric.   NECK: Supple, no masses appreciated. Trachea midline. +JVD.   C/V:  Irregular rhythm on exam, no obvious murmur, rub or gallop.   RESP: Respirations are unlabored. No use of " accessory muscles. Has a few crackles in bases posteriorly, no wheezing or rhonchi.   GI: Abdomen soft, nontender, nondistended. No HSM appreciated.   EXTREM: 2+ bilateral pitting edema up to her knees. No cyanosis or clubbing.  NEURO: Alert and oriented, cooperative. Gait not formally assessed. No obvious focal deficits.   PSYCH: Normal affect.  SKIN: Warm and dry. No rashes or petechiae appreciated.       Recent Lab Results:    CBC RESULTS:  Lab Results   Component Value Date    WBC 13.7 (H) 02/22/2019    RBC 2.64 (L) 02/22/2019    HGB 10.0 (L) 03/07/2019    HCT 23.4 (L) 02/22/2019    MCV 89 02/22/2019    MCH 30.7 02/22/2019    MCHC 34.6 02/22/2019    RDW 14.8 02/22/2019     02/22/2019       BMP RESULTS:  Lab Results   Component Value Date     03/07/2019    POTASSIUM 3.8 03/07/2019    CHLORIDE 107 03/07/2019    CO2 25 03/07/2019    ANIONGAP 7 03/07/2019     (H) 03/07/2019    BUN 10 03/07/2019    CR 0.77 03/07/2019    GFRESTIMATED 77 03/07/2019    GFRESTBLACK 89 03/07/2019    ARIELLE 8.3 (L) 03/07/2019          Additional pertinent testing:  TIA 1/9/19  Interpretation Summary     1. The left ventricle is normal in structure, function and size. The visual  ejection fraction is estimated at 65%.  2. The right ventricle is normal in structure, function and size.  3. There is mod-severe to severe (3-4+) mitral regurgitation. Mitral valve is  severely thickened and myxomatous, suggestive of Hzu's pathology. Moderate  bileaflet prolapse, hard to localized segments, likely most of each leaflet is  prolapsing to some degree. One large and one smaller MR jet. RV of 36ml and  ERO 0.28 cm2. Pulmonary veins have S blunting, but no flow reversal.  4. There is moderately severe (3+) tricuspid regurgitation. TV appears mildly  myxomatous and hypermobile.     Echo from 8/2018 showed 1-2+ MR, 1-2+ TR; the degree of MR was probably  underestimated.     TIA from 9/2016 showed 3-4+ MR; when directly compared there  has been some  progression of the myxomatous changes.      Greater than 40 minutes was spent with this patient, >50% of which was spent in counseling and coordination of care.       Courtney Brian PA-C  [facility]  Pager [phone]

## 2019-03-07 NOTE — LETTER
3/7/2019    Anyi Olmos MD  [address] [facility] [address]  [facility] [address]    RE: Taty Aguila       Dear Colleague,    I had the pleasure of seeing Taty Aguila in the [facility].      Cardiology Progress Note    Date of Service: 03/07/2019      Reason for visit: C[facility] clinic enrollment, fluid retention s/p mitral valve surgery.    Primary cardiologist: Dr. Jesus Stone      HPI:  Ms. Aguila is a pleasant 74 year old female who has been followed in our clinic for mitral valve regurgitation and atrial fibrillation. 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  In Oct 2018 she developed palpitations and was found to be in afib with RVR. She converted back to sinus rhythm with diltiazem gtt, and was placed on anticoagulation. She did will until Dec 2018 when again she returned to afib with RVR. Her metoprolol dose was increased. Her rates were still elevated on short term follow up, and she was loaded with amiodarone and a cardioversion was arranged. However, when she presented for this, she was noted to be back in sinus rhythm.    She then saw Dr. Hanna the end of December to re-consider intervention on her valve. He felt that she did indeed have severe mitral regurgitation, and in the setting of new onset atrial fibrillation, valve surgery was recommended. Preop angio showed no coronary disease. TIA showed preserved EF 65% with normal RV size and function. She had confirmed 3-4+ MR, and the valve was severely thickened and myxomatous. She also had 3+ TR. Then, in mid Feb 2019 she successfully underwent minimally invasive MV replacement with Epic St Ron 31mm valve. At the same time, she had tricuspid valve repair with a 32mm Oquendo ring. She developed complete AV block post operatively, and underwent pacemaker placement. Her stay was complicated by some volume overload for  which she received diuresis, but this was complicated due to hyponatremia (lowest of 126), and nephrology assisted in her management. She was discharged on Xarelto/ASA, but unfortunately no diuretics. Of note, as she did not have recurrence of afib, her amiodarone and beta blockers were discontinued.     She was seen the end of Feb by the CT surgery team for follow up and noted fluid retention and some RAYO. She was given lasix 20mg daily transiently along with potassium. She was encouraged to continue high sodium intake given her issues with hyponatremia while in house. She responded initially to this but then again noted weight gain. This was adjusted further via phone over the last week, but she has continued to struggle. Thus, I am seeing her now as an urgent CORE evaluation.     Today she tells me she continues to feel poorly. Her weight is 134 lbs today which is ~10 lbs up from her presurgical baseline. She has pitting edema up to her knees. She endorses RAYO, fatigue, and a frequent dry cough. She has some mild orthopnea and also notes some palpitations. I did an ECG today which shows she is back in atrial fibrillation. On her device check last week, it confirms she is in fact now 100% in afib on there as well. Her heart rates appear to be in the 80-90 range. Fortunately, labs today show preserved renal function, and her sodium has normalized at 139, Her K remains within normal limits as well. Her TSH is noted to be high, but she has a normal Free T4. Hemoglobin is trending up from previous, and her NT-proBNP, as expected, is quite high at 7664.       ASSESSMENT/PLAN:    1. Acute heart failure with preserved EF.   --Per TIA Jan 2019 her EF has remained preserved at 65%. This was prior to her valve intervention. She had some perioperative fluid retention, but not sent home on diuretics. She is clinically volume up on exam today with pitting edema and NT-proBNP of 7664. Etiology may be secondary to the return of  her afib, but not yet entirely clear. Will recheck 2D echo in the next few days if possible, to look at valves and ensure no change in EF.    --Change lasix to torsemide; 40mg daily x 3 days then 20mg. Increase KDur to 40meq daily. She is ~10 lbs above her baseline weight. Repeat labs next week for close monitoring of her electrolytes. She will call early next week for a weight/symptom update and further changes can be made at that time.   --For now, I've asked her no longer to push a high salt diet and instead restrict for the short term. I also recommended she get compression socks.      2. Paroxysmal atrial fibrillation, symptomatic.   --First noted Oct 2018 with palpitations. Converted to sinus with diltiazem, and placed on beta blockers.  Returned Dec 2018 with similar symptoms. Placed on amiodarone with plans for CV, but when she presented had converted back to sinus once again.   --After her valve surgery last month, amio was discontinued after device placement for CHB (dual chamber PPM), and initially no recurrence of afib. Today, she is symptomatic with palpitations once again, with recurrence of her afib, and evidence of heart failure. Rates appear in the 80-90s. I D/W CHRISTIANO Degroot with EP; will reload with amiodarone today. She will return to see EP in 2-3 weeks with repeat EKG and device interrogation prior to that visit. If she does not convert in the short term will reconsider cardioversion.   --Not currently on beta blockers, which we may be able to add back at follow up.    --She is on ASA/Xarelto post valve surgery, which we will continue.     3. Valvular heart disease.   --Now s/p minimally invasive MV replacement with Epic St Ron 31mm valve and tricuspid valve repair with a 32mm Oquendo ring in Feb 2019.   --On AC with Xarelto and ASA as above.    --Continue cardiac rehab, though told her she may cancel for this week until we are able to improve her hypervolemia.        Follow up plan: Labs in  1 week, and check in via phone with CORE RN. Then return in 2 weeks with myself.                           Return to see EP PAMELLA in ~3 weeks for f/u after amiodarone loading. EKG prior to that visit.       Orders this Visit:  Orders Placed This Encounter   Procedures     Basic metabolic panel     N terminal pro BNP outpatient     Follow-Up with [facility] Clinic - PAMELLA visit     Follow-Up with Cardiac Advanced Practice Provider     EKG 12-lead complete w/read - Clinics (performed today)     EKG 12-lead complete w/read (Future)- to be scheduled     Echocardiogram Complete     Orders Placed This Encounter   Medications     torsemide (DEMADEX) 20 MG tablet     Sig: Take 1 tablet (20 mg) by mouth daily Take 2 tablets daily x 3 days, then continue at 1 tablet daily.     Dispense:  60 tablet     Refill:  3     DISCONTD: potassium chloride ER (K-DUR/KLOR-CON M) 20 MEQ CR tablet     Sig: Take 2 tablets (40 mEq) by mouth daily     Dispense:  2 tablet     Refill:  0     amiodarone (PACERONE/CODARONE) 200 MG tablet     Sig: Take 2 tablets (400 mg) by mouth 2 times daily for 3 days, THEN 2 tablets (400 mg) daily for 7 days, THEN 1 tablet (200 mg) daily.         CURRENT MEDICATIONS:  Current Outpatient Medications   Medication Sig Dispense Refill     amiodarone (PACERONE/CODARONE) 200 MG tablet Take 2 tablets (400 mg) by mouth 2 times daily for 3 days, THEN 2 tablets (400 mg) daily for 7 days, THEN 1 tablet (200 mg) daily.       aspirin 81 MG EC tablet Take 81 mg by mouth every evening       calcium carbonate-vitamin D (CALCIUM 600+D) 600-200 MG-UNIT TABS Take 1 tablet by mouth 2 times daily       Multiple Vitamins-Minerals (MULTIVITAMIN ADULT) TABS Take 1 tablet by mouth daily       omeprazole (PRILOSEC) 10 MG CR capsule Take 1 capsule (10 mg) by mouth every morning (before breakfast) 90 capsule 3     rivaroxaban ANTICOAGULANT (XARELTO) 20 MG TABS tablet Take 1 tablet (20 mg) by mouth daily (with dinner) 90 tablet 3     torsemide  (DEMADEX) 20 MG tablet Take 1 tablet (20 mg) by mouth daily Take 2 tablets daily x 3 days, then continue at 1 tablet daily. 60 tablet 3     oxyCODONE (ROXICODONE) 5 MG tablet Take 1-2 tablets (5-10 mg) by mouth every 4 hours as needed for moderate to severe pain (Patient not taking: Reported on 2/27/2019) 20 tablet 0     polyethylene glycol (MIRALAX/GLYCOLAX) packet Take 17 g by mouth daily (Patient not taking: Reported on 2/27/2019) 7 packet 0     potassium chloride ER (K-DUR/KLOR-CON M) 20 MEQ CR tablet Take 2 tablets (40 mEq) by mouth daily 2 tablet 0     senna-docusate (SENOKOT-S/PERICOLACE) 8.6-50 MG tablet Take 1 tablet by mouth 2 times daily (Patient not taking: Reported on 2/27/2019) 20 tablet 0       ALLERGIES     Allergies   Allergen Reactions     Chlorpheniramine Other (See Comments)     LOC and fainting      Phenylephrine Other (See Comments)     fainting       PAST MEDICAL HISTORY:  Past Medical History:   Diagnosis Date     Allergic rhinitis, cause unspecified     dust mites, ragweed     Complete AV block (H)     BSX DDD PM 2-     History of tricuspid valve repair      Mitral valve prolapse     bioprosthetic MVR 2-     Paroxysmal atrial fibrillation (H)        PAST SURGICAL HISTORY:  Past Surgical History:   Procedure Laterality Date     COLONOSCOPY N/A 9/15/2015    Procedure: COLONOSCOPY;  Surgeon: Anson Llanos MD;  Location:  GI     CV HEART CATHETERIZATION WITH POSSIBLE INTERVENTION N/A 1/9/2019    Procedure: Heart Catheterization with Possible Intervention;  Surgeon: Ritesh Whittaker MD;  Location:  HEART CARDIAC CATH LAB     CV RIGHT AND LEFT HEART CATH N/A 1/9/2019    Procedure: Right and Left Heart Catherization;  Surgeon: Ritesh Whittaker MD;  Location:  HEART CARDIAC CATH LAB     ENT SURGERY      T&A     EP PACEMAKER N/A 2/18/2019    Procedure: EP Pacemaker;  Surgeon: Inocencia Guillen MD;  Location:  HEART CARDIAC CATH LAB     REPAIR VALVE TRICUSPID  MINIMALLY INVASIVE N/A 2/15/2019    Procedure: MINIMALLY INVASIVE TRICUSPID VALVE REPAIR WITH 32MM SULLIVAN RING;  Surgeon: Andrea Hanna MD;  Location:  OR     REPLACE VALVE MITRAL MINIMALLY INVASIVE N/A 2/15/2019    Procedure: MINIMALLY INVASIVE MITRAL VALVE REPLACEMENT WITH EPIC ST KEYUR 31MM VALVE; ON PUMP WITH TIA.  CARDIOLOGIST DR CARDENAS;  Surgeon: Andrea Hanna MD;  Location:  OR       FAMILY HISTORY:  Family History   Problem Relation Age of Onset     Osteoporosis Mother      Alzheimer Disease Mother      Family History Negative Father      Heart Disease Maternal Grandfather      Family History Negative Paternal Grandmother      Family History Negative Paternal Grandfather        SOCIAL HISTORY:  Social History     Socioeconomic History     Marital status:      Spouse name: Not on file     Number of children: 3     Years of education: Not on file     Highest education level: Not on file   Occupational History     Not on file   Social Needs     Financial resource strain: Not on file     Food insecurity:     Worry: Not on file     Inability: Not on file     Transportation needs:     Medical: Not on file     Non-medical: Not on file   Tobacco Use     Smoking status: Never Smoker     Smokeless tobacco: Never Used   Substance and Sexual Activity     Alcohol use: No     Drug use: No     Sexual activity: Not Currently   Lifestyle     Physical activity:     Days per week: Not on file     Minutes per session: Not on file     Stress: Not on file   Relationships     Social connections:     Talks on phone: Not on file     Gets together: Not on file     Attends [employer] service: Not on file     Active member of club or organization: Not on file     Attends meetings of clubs or organizations: Not on file     Relationship status: Not on file     Intimate partner violence:     Fear of current or ex partner: Not on file     Emotionally abused: Not on file     Physically abused: Not on file     " Forced sexual activity: Not on file   Other Topics Concern      Service Not Asked     Blood Transfusions Not Asked     Caffeine Concern No     Comment: 1 cup of coffee and 1 can diet coke per day     Occupational Exposure Not Asked     Hobby Hazards Not Asked     Sleep Concern Yes     Comment: takes Doxylamine succinate 1/2 tab every night     Stress Concern No     Weight Concern No     Special Diet No     Comment: healthy      Back Care Not Asked     Exercise Yes     Comment: walking 45min x7 a wk. Very active, [employer], Golf, Bowling, Cardio     Bike Helmet Not Asked     Seat Belt Yes     Self-Exams Yes     Parent/sibling w/ CABG, MI or angioplasty before 65F 55M? No   Social History Narrative     Not on file       Review of Systems:  Cardiovascular: negative for chest pain, pos palpitations, orthopnea, LE edema  Constitutional: negative for chills, sweats, fevers. Pos fatigue.   Resp: Negative for dyspnea at rest, pos dyspnea on exertion, cough, neg hemoptysis, known chronic lung disease  HEENT: Negative for new visual changes, frequent headaches  Gastrointestinal: negative for abdominal pain, neg diarrhea, blood in stool, nausea, vomiting  Hematologic/lymphatic: pos for current systemic anticoagulation, neg hx of blood clots  Musculoskeletal: negative for new back pain, joint pain  Neurological: negative for focal weakness, LOC, seizures, syncope. Pos mild dizziness.      Physical Exam:  Vitals: /72 (BP Location: Right arm, Patient Position: Chair, Cuff Size: Adult Regular)   Pulse 89   Ht 1.676 m (5' 6\")   Wt 61.1 kg (134 lb 11.2 oz)   SpO2 98%   BMI 21.74 kg/m      Wt Readings from Last 4 Encounters:   03/07/19 61.1 kg (134 lb 11.2 oz)   02/27/19 60.8 kg (134 lb)   02/23/19 62 kg (136 lb 11 oz)   02/07/19 58.3 kg (128 lb 8 oz)       GEN:  In general, this is a well nourished  female in no acute distress on room air.  Patient ambulatory, accompanied by her .  HEENT:  Pupils " equal, round. Sclerae nonicteric.   NECK: Supple, no masses appreciated. Trachea midline. +JVD.   C/V:  Irregular rhythm on exam, no obvious murmur, rub or gallop.   RESP: Respirations are unlabored. No use of accessory muscles. Has a few crackles in bases posteriorly, no wheezing or rhonchi.   GI: Abdomen soft, nontender, nondistended. No HSM appreciated.   EXTREM: 2+ bilateral pitting edema up to her knees. No cyanosis or clubbing.  NEURO: Alert and oriented, cooperative. Gait not formally assessed. No obvious focal deficits.   PSYCH: Normal affect.  SKIN: Warm and dry. No rashes or petechiae appreciated.       Recent Lab Results:    CBC RESULTS:  Lab Results   Component Value Date    WBC 13.7 (H) 02/22/2019    RBC 2.64 (L) 02/22/2019    HGB 10.0 (L) 03/07/2019    HCT 23.4 (L) 02/22/2019    MCV 89 02/22/2019    MCH 30.7 02/22/2019    MCHC 34.6 02/22/2019    RDW 14.8 02/22/2019     02/22/2019       BMP RESULTS:  Lab Results   Component Value Date     03/07/2019    POTASSIUM 3.8 03/07/2019    CHLORIDE 107 03/07/2019    CO2 25 03/07/2019    ANIONGAP 7 03/07/2019     (H) 03/07/2019    BUN 10 03/07/2019    CR 0.77 03/07/2019    GFRESTIMATED 77 03/07/2019    GFRESTBLACK 89 03/07/2019    ARIELLE 8.3 (L) 03/07/2019          Additional pertinent testing:  TIA 1/9/19  Interpretation Summary     1. The left ventricle is normal in structure, function and size. The visual  ejection fraction is estimated at 65%.  2. The right ventricle is normal in structure, function and size.  3. There is mod-severe to severe (3-4+) mitral regurgitation. 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 Moderate  bileaflet prolapse, hard to localized segments, likely most of each leaflet is  prolapsing to some degree. One large and one smaller MR jet. RV of 36ml and  ERO 0.28 cm2. Pulmonary veins have S blunting, but no flow reversal.  4. There is moderately severe (3+) tricuspid  regurgitation. TV appears mildly  myxomatous and hypermobile.     Echo from 8/2018 showed 1-2+ MR, 1-2+ TR; the degree of MR was probably  underestimated.     TIA from 9/2016 showed 3-4+ MR; when directly compared there has been some  progression of the myxomatous changes.      Greater than 40 minutes was spent with this patient, >50% of which was spent in counseling and coordination of care.       Courtney Brian PA-C  [facility]  Pager [phone]    Thank you for allowing me to participate in the care of your patient.    Sincerely,     CHRISTIANO Weathers     [facility]

## 2019-03-08 ENCOUNTER — CARE COORDINATION (OUTPATIENT)
Dept: CARDIOLOGY | Facility: CLINIC | Age: 74
End: 2019-03-08

## 2019-03-08 ENCOUNTER — TELEPHONE (OUTPATIENT)
Dept: CARDIOLOGY | Facility: CLINIC | Age: 74
End: 2019-03-08

## 2019-03-08 DIAGNOSIS — E87.79 OTHER HYPERVOLEMIA: ICD-10-CM

## 2019-03-08 DIAGNOSIS — Z95.0 CARDIAC PACEMAKER IN SITU: Primary | ICD-10-CM

## 2019-03-08 RX ORDER — POTASSIUM CHLORIDE 1500 MG/1
40 TABLET, EXTENDED RELEASE ORAL DAILY
Qty: 180 TABLET | Refills: 1 | Status: SHIPPED | OUTPATIENT
Start: 2019-03-08 | End: 2019-03-15

## 2019-03-08 NOTE — TELEPHONE ENCOUNTER
Received message form Faye ALVARADO that pt should have a remote check to check for any AF prior to OV with her on 3/25/2019. Scheduled pt for courtesy remote check on 3/25/2019, and called pt to let her know. Remote should send automatically, so reminded pt to keep her Latitude plugged in at her bedside, she states understanding.

## 2019-03-13 ENCOUNTER — HOSPITAL ENCOUNTER (OUTPATIENT)
Dept: CARDIAC REHAB | Facility: CLINIC | Age: 74
End: 2019-03-13
Attending: STUDENT IN AN ORGANIZED HEALTH CARE EDUCATION/TRAINING PROGRAM
Payer: COMMERCIAL

## 2019-03-13 PROCEDURE — 40000116 ZZH STATISTIC OP CR VISIT: Performed by: REHABILITATION PRACTITIONER

## 2019-03-13 PROCEDURE — 93798 PHYS/QHP OP CAR RHAB W/ECG: CPT | Performed by: REHABILITATION PRACTITIONER

## 2019-03-14 ENCOUNTER — HOSPITAL ENCOUNTER (OUTPATIENT)
Dept: CARDIOLOGY | Facility: CLINIC | Age: 74
Discharge: HOME | End: 2019-03-14
Attending: PHYSICIAN ASSISTANT | Admitting: PHYSICIAN ASSISTANT
Payer: COMMERCIAL

## 2019-03-14 DIAGNOSIS — I50.31 ACUTE DIASTOLIC CONGESTIVE HEART FAILURE (H): ICD-10-CM

## 2019-03-14 DIAGNOSIS — E87.79 OTHER HYPERVOLEMIA: ICD-10-CM

## 2019-03-14 LAB
ANION GAP SERPL CALCULATED.3IONS-SCNC: 7 MMOL/L (ref 3–14)
BUN SERPL-MCNC: 15 MG/DL (ref 7–30)
CALCIUM SERPL-MCNC: 8.3 MG/DL (ref 8.5–10.1)
CHLORIDE SERPL-SCNC: 104 MMOL/L (ref 94–109)
CO2 SERPL-SCNC: 27 MMOL/L (ref 20–32)
CREAT SERPL-MCNC: 1.01 MG/DL (ref 0.52–1.04)
GFR SERPL CREATININE-BSD FRML MDRD: 56 ML/MIN/{1.73_M2}
GLUCOSE SERPL-MCNC: 100 MG/DL (ref 70–99)
POTASSIUM SERPL-SCNC: 3.6 MMOL/L (ref 3.4–5.3)
SODIUM SERPL-SCNC: 138 MMOL/L (ref 133–144)

## 2019-03-14 PROCEDURE — 93306 TTE W/DOPPLER COMPLETE: CPT

## 2019-03-14 PROCEDURE — 93306 TTE W/DOPPLER COMPLETE: CPT | Mod: 26 | Performed by: INTERNAL MEDICINE

## 2019-03-14 PROCEDURE — 80048 BASIC METABOLIC PNL TOTAL CA: CPT | Performed by: PHYSICIAN ASSISTANT

## 2019-03-14 PROCEDURE — 36415 COLL VENOUS BLD VENIPUNCTURE: CPT | Performed by: INTERNAL MEDICINE

## 2019-03-15 ENCOUNTER — CARE COORDINATION (OUTPATIENT)
Dept: CARDIOLOGY | Facility: CLINIC | Age: 74
End: 2019-03-15

## 2019-03-15 ENCOUNTER — HOSPITAL ENCOUNTER (OUTPATIENT)
Dept: CARDIAC REHAB | Facility: CLINIC | Age: 74
End: 2019-03-15
Attending: STUDENT IN AN ORGANIZED HEALTH CARE EDUCATION/TRAINING PROGRAM
Payer: COMMERCIAL

## 2019-03-15 DIAGNOSIS — E87.79 OTHER HYPERVOLEMIA: ICD-10-CM

## 2019-03-15 DIAGNOSIS — I50.31 ACUTE DIASTOLIC CONGESTIVE HEART FAILURE (H): ICD-10-CM

## 2019-03-15 PROCEDURE — 40000116 ZZH STATISTIC OP CR VISIT: Performed by: OCCUPATIONAL THERAPIST

## 2019-03-15 PROCEDURE — 93798 PHYS/QHP OP CAR RHAB W/ECG: CPT | Performed by: OCCUPATIONAL THERAPIST

## 2019-03-15 RX ORDER — POTASSIUM CHLORIDE 1500 MG/1
40 TABLET, EXTENDED RELEASE ORAL DAILY
COMMUNITY
Start: 2019-03-15 | End: 2019-09-04

## 2019-03-15 RX ORDER — TORSEMIDE 20 MG/1
TABLET ORAL DAILY
COMMUNITY
Start: 2019-03-15 | End: 2019-09-04

## 2019-03-15 NOTE — PROGRESS NOTES
Reviewed with Courtney: Instructed pt to decrease torsemide to 10 mg daily and KCL to 20 mEq daily.  Pt states she already took her KCL, 40 mEq. Reassured she will be ok as her K+ is 3.6 but to start at lower dose tomorrow.   Pt expressed understanding. Med list updated.   Milagro Velazquez RN 11:44 AM 03/15/19

## 2019-03-15 NOTE — PROGRESS NOTES
Spoke to pt. Her wt today is 115 lbs. Verified pt is taking torsemide 20 mg daily; asking if she should continue.   Pt is still taking KCL 40 mEq daily; recommended she stay on dose as her K+ is 3.6 per BMP  3./14.   Did review pt's finding on echo (per Courtney's request). Pt worried as her EF is low again. Reassured her that EF became low most likey due to her being in Atrial fibrillation. Reassured pt that her echo done yesterday did show she is in SR. Pt states she can tell she is in SR as she is feeling well.     Reviewing with Courtney regarding Torsemide dose.   Milagro Velazquez RN 10:03 AM 03/15/19

## 2019-03-18 ENCOUNTER — HOSPITAL ENCOUNTER (OUTPATIENT)
Dept: CARDIAC REHAB | Facility: CLINIC | Age: 74
End: 2019-03-18
Attending: STUDENT IN AN ORGANIZED HEALTH CARE EDUCATION/TRAINING PROGRAM
Payer: COMMERCIAL

## 2019-03-18 PROCEDURE — 40000116 ZZH STATISTIC OP CR VISIT: Performed by: OCCUPATIONAL THERAPIST

## 2019-03-18 PROCEDURE — 93798 PHYS/QHP OP CAR RHAB W/ECG: CPT | Performed by: OCCUPATIONAL THERAPIST

## 2019-03-20 ENCOUNTER — HOSPITAL ENCOUNTER (OUTPATIENT)
Dept: CARDIAC REHAB | Facility: CLINIC | Age: 74
End: 2019-03-20
Attending: STUDENT IN AN ORGANIZED HEALTH CARE EDUCATION/TRAINING PROGRAM
Payer: COMMERCIAL

## 2019-03-20 PROCEDURE — 40000116 ZZH STATISTIC OP CR VISIT

## 2019-03-20 PROCEDURE — 93798 PHYS/QHP OP CAR RHAB W/ECG: CPT

## 2019-03-21 ENCOUNTER — OFFICE VISIT (OUTPATIENT)
Dept: CARDIOLOGY | Facility: CLINIC | Age: 74
End: 2019-03-21
Payer: COMMERCIAL

## 2019-03-21 VITALS
DIASTOLIC BLOOD PRESSURE: 68 MMHG | BODY MASS INDEX: 19.35 KG/M2 | HEIGHT: 66 IN | OXYGEN SATURATION: 98 % | HEART RATE: 94 BPM | SYSTOLIC BLOOD PRESSURE: 100 MMHG | WEIGHT: 120.4 LBS

## 2019-03-21 DIAGNOSIS — I48.0 PAF (PAROXYSMAL ATRIAL FIBRILLATION) (H): ICD-10-CM

## 2019-03-21 DIAGNOSIS — I50.22 CHRONIC SYSTOLIC HEART FAILURE (H): Primary | ICD-10-CM

## 2019-03-21 DIAGNOSIS — I50.22 CHRONIC SYSTOLIC HEART FAILURE (H): ICD-10-CM

## 2019-03-21 DIAGNOSIS — I50.21 ACUTE SYSTOLIC HEART FAILURE (H): ICD-10-CM

## 2019-03-21 DIAGNOSIS — I50.31 ACUTE DIASTOLIC CONGESTIVE HEART FAILURE (H): Primary | ICD-10-CM

## 2019-03-21 LAB
ANION GAP SERPL CALCULATED.3IONS-SCNC: 6 MMOL/L (ref 3–14)
BUN SERPL-MCNC: 12 MG/DL (ref 7–30)
CALCIUM SERPL-MCNC: 8.9 MG/DL (ref 8.5–10.1)
CHLORIDE SERPL-SCNC: 106 MMOL/L (ref 94–109)
CO2 SERPL-SCNC: 27 MMOL/L (ref 20–32)
CREAT SERPL-MCNC: 0.91 MG/DL (ref 0.52–1.04)
GFR SERPL CREATININE-BSD FRML MDRD: 63 ML/MIN/{1.73_M2}
GLUCOSE SERPL-MCNC: 121 MG/DL (ref 70–99)
POTASSIUM SERPL-SCNC: 3.5 MMOL/L (ref 3.4–5.3)
SODIUM SERPL-SCNC: 139 MMOL/L (ref 133–144)

## 2019-03-21 PROCEDURE — 93000 ELECTROCARDIOGRAM COMPLETE: CPT | Performed by: PHYSICIAN ASSISTANT

## 2019-03-21 PROCEDURE — 80048 BASIC METABOLIC PNL TOTAL CA: CPT | Performed by: PHYSICIAN ASSISTANT

## 2019-03-21 PROCEDURE — 36415 COLL VENOUS BLD VENIPUNCTURE: CPT | Performed by: INTERNAL MEDICINE

## 2019-03-21 PROCEDURE — 99214 OFFICE O/P EST MOD 30 MIN: CPT | Mod: 24 | Performed by: PHYSICIAN ASSISTANT

## 2019-03-21 RX ORDER — METOPROLOL SUCCINATE 25 MG/1
25 TABLET, EXTENDED RELEASE ORAL DAILY
Start: 2019-03-21 | End: 2019-10-24

## 2019-03-21 ASSESSMENT — MIFFLIN-ST. JEOR: SCORE: 1072.88

## 2019-03-21 NOTE — LETTER
3/21/2019    Anyi Olmos MD  [address] [facility] [address]  [facility] [address]    RE: Taty Aguila       Dear Colleague,    I had the pleasure of seeing Taty Aguila in the [facility].      Cardiology Progress Note    Date of Service: 03/21/2019      Reason for visit: [facility] clinic follow up, HFpEF and atrial fibrillation s/p mitral valve surgery.    Primary cardiologist: Dr. Jesus Stone      HPI:  Ms. Aguila is a pleasant 74 year old female who has been followed in our clinic for mitral valve regurgitation and atrial fibrillation. 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  In Oct 2018 she developed palpitations and was found to be in afib with RVR. She converted back to sinus rhythm with diltiazem gtt, and was placed on anticoagulation. She did will until Dec 2018 when again she returned to afib with RVR. Her metoprolol dose was increased. Her rates were still elevated on short term follow up, and she was loaded with amiodarone and a cardioversion was arranged. However, when she presented for this, she was noted to be back in sinus rhythm.    She then saw Dr. Hanna the end of December to re-consider intervention on her valve. He felt that she did indeed have severe mitral regurgitation, and in the setting of new onset atrial fibrillation, valve surgery was recommended. Preop angio showed no coronary disease. TIA showed preserved EF 65% with normal RV size and function. She had confirmed 3-4+ MR, and the valve was severely thickened and myxomatous. She also had 3+ TR. Then, in mid Feb 2019 she successfully underwent minimally invasive MV replacement with Epic St Ron 31mm valve. At the same time, she had tricuspid valve repair with a 32mm Oquendo ring. She developed complete AV block post operatively, and underwent pacemaker placement. She had some volume overload pos op  for which she received diuresis, but this was complicated by hyponatremia (lowest of 126), and nephrology assisted with management. She was discharged on Xarelto/ASA, and notably no diuretics. Of note, it does not appear that she had any recurrence of afib during that stay, and her amiodarone and beta blockers were discontinued.     She was seen the end of Feb by the CT surgery team for follow up and noted fluid retention with RAYO. She was given lasix 20mg daily transiently along with potassium. She was encouraged to continue high sodium intake given her issues with hyponatremia while in house. She had minimal response to this and continued to struggle with edema and weight gain. I then saw her in early March for an urgent CORE evaluation. She was feeling poorly with cough, orthopnea, and was ~10 lbs up from her perceived baseline. ECG showed she was back in atrial fibrillation (device check confirmed 100% afib, appeared rate controlled in the 80-90 range). I changed her lasix to torsemide at that visit, and we reloaded her with amiodarone in attempts to convert her back to sinus rhythm. In addition, a repeat echocardiogram was requested. She's back today for follow up of these changes.    Since last visit, she's feeling much better. Her weight is down 14 lbs on our scale, and has stabilized at 115-116 lbs on her home scale. Her swelling has nearly resolved, she is sleeping better and no longer has RAYO. She is back to participating in cardiac rehab. Today on ECG she is back in sinus rhythm; however, she feels she continues to go in and out of afib periodically based on occasional palpitations and irregular rhythms on her apple watch. HR here today in the mid 90s, and at home it is running 90-low 100s as well. BP is soft today but she denies significant dizziness or any presyncope. We reviewed her echo from after our last visit; her EF has shown a decline, now 20-25%, with severe global hypokinesia of the LV and also  moderately decreased RV function. Her MV shows leaflets open well with no leak and normal valve function. The tricuspid ring has 1+ regurgitation. IVC, as expected, suggested elevated CVP. Currently she is on 10mg of torsemide and labs show continued preserved renal function; her K is low normal at 3.5 while still on KDur 40meq daily.       ASSESSMENT/PLAN:    1. Acute heart failure with preserved EF.   --Echo last week showed decline in EF now 20-25% (previously normal 65%),  with severe global hypokinesia of the LV and moderately decreased RV function. Appears her MV is functioning well, and tricuspid ring has 1+ regurgitation. IVC, as expected, suggested elevated CVP. This is, at this point, suspected secondary to recurrence of her afib.   --She responded well to torsemide, weight down 14 lbs. Will continue on lower dose, 10mg daily, with the KDur at 40meq daily.       2. Paroxysmal atrial fibrillation, symptomatic.   --First noted Oct 2018, converted to sinus with diltiazem, and placed on beta blockers.  Returned Dec 2018 and placed on amiodarone with plans for CV, but when presented she had converted back to sinus. Her beta blockers and amio were stopped after her valve surgery last month, and she underwent dual chamber PPM for CHB.    --In sinus today, but having paroxysmal afib based on symptoms. She has been reloaded with amio and notes less palpitations, but not entirely resolved. Rates in the  range. Will resume metoprolol 25mg daily today. I've asked her to call with any concerns of worsening dizziness/hypotension.   --She has an appt in place for later this week with EP PAMELLA, with planned repeat device check prior to appt.    --She is on ASA/Xarelto post valve surgery, which we will continue.       3. Valvular heart disease.   --Now s/p minimally invasive MV replacement with Epic St Ron 31mm valve and tricuspid valve repair with a 32mm Oquendo ring in Feb 2019.   --On AC as above.    --Continue  cardiac rehab.        Follow up plan:  To see CHRISTIANO Degroot later this week for EP.  Then return to see myself in CORE in ~1 month.      Orders this Visit:  Orders Placed This Encounter   Procedures     Basic metabolic panel     N terminal pro BNP outpatient     Follow-Up with [facility] - PAMELLA visit     Orders Placed This Encounter   Medications     metoprolol succinate ER (TOPROL-XL) 25 MG 24 hr tablet     Sig: Take 1 tablet (25 mg) by mouth daily         CURRENT MEDICATIONS:  Current Outpatient Medications   Medication Sig Dispense Refill     amiodarone (PACERONE/CODARONE) 200 MG tablet Take 2 tablets (400 mg) by mouth 2 times daily for 3 days, THEN 2 tablets (400 mg) daily for 7 days, THEN 1 tablet (200 mg) daily.       aspirin 81 MG EC tablet Take 81 mg by mouth every evening       calcium carbonate-vitamin D (CALCIUM 600+D) 600-200 MG-UNIT TABS Take 1 tablet by mouth 2 times daily       metoprolol succinate ER (TOPROL-XL) 25 MG 24 hr tablet Take 1 tablet (25 mg) by mouth daily       Multiple Vitamins-Minerals (MULTIVITAMIN ADULT) TABS Take 1 tablet by mouth daily       omeprazole (PRILOSEC) 10 MG CR capsule Take 1 capsule (10 mg) by mouth every morning (before breakfast) 90 capsule 3     potassium chloride ER (K-DUR/KLOR-CON M) 20 MEQ CR tablet Take 40 mEq by mouth daily        rivaroxaban ANTICOAGULANT (XARELTO) 20 MG TABS tablet Take 1 tablet (20 mg) by mouth daily (with dinner) 90 tablet 3     torsemide (DEMADEX) 20 MG tablet Take 1/2 tablet (10 mg)       oxyCODONE (ROXICODONE) 5 MG tablet Take 1-2 tablets (5-10 mg) by mouth every 4 hours as needed for moderate to severe pain (Patient not taking: Reported on 2/27/2019) 20 tablet 0     polyethylene glycol (MIRALAX/GLYCOLAX) packet Take 17 g by mouth daily (Patient not taking: Reported on 2/27/2019) 7 packet 0     senna-docusate (SENOKOT-S/PERICOLACE) 8.6-50 MG tablet Take 1 tablet by mouth 2 times daily (Patient not taking: Reported on 2/27/2019) 20 tablet  0       ALLERGIES     Allergies   Allergen Reactions     Chlorpheniramine Other (See Comments)     LOC and fainting      Phenylephrine Other (See Comments)     fainting       PAST MEDICAL HISTORY:  Past Medical History:   Diagnosis Date     Allergic rhinitis, cause unspecified     dust mites, ragweed     Complete AV block (H)     BSX DDD PM 2-     History of tricuspid valve repair      Mitral valve prolapse     bioprosthetic MVR 2-     Paroxysmal atrial fibrillation (H)        PAST SURGICAL HISTORY:  Past Surgical History:   Procedure Laterality Date     COLONOSCOPY N/A 9/15/2015    Procedure: COLONOSCOPY;  Surgeon: Anson Llanos MD;  Location:  GI     CV HEART CATHETERIZATION WITH POSSIBLE INTERVENTION N/A 1/9/2019    Procedure: Heart Catheterization with Possible Intervention;  Surgeon: Ritesh Whittaker MD;  Location:  HEART CARDIAC CATH LAB     CV RIGHT AND LEFT HEART CATH N/A 1/9/2019    Procedure: Right and Left Heart Catherization;  Surgeon: Ritesh Whittaker MD;  Location:  HEART CARDIAC CATH LAB     ENT SURGERY      T&A     EP PACEMAKER N/A 2/18/2019    Procedure: EP Pacemaker;  Surgeon: Inocencia Guillen MD;  Location:  HEART CARDIAC CATH LAB     REPAIR VALVE TRICUSPID MINIMALLY INVASIVE N/A 2/15/2019    Procedure: MINIMALLY INVASIVE TRICUSPID VALVE REPAIR WITH 32MM SULLIVAN RING;  Surgeon: Andrea Hanna MD;  Location:  OR     REPLACE VALVE MITRAL MINIMALLY INVASIVE N/A 2/15/2019    Procedure: MINIMALLY INVASIVE MITRAL VALVE REPLACEMENT WITH EPIC ST KEYUR 31MM VALVE; ON PUMP WITH TIA.  CARDIOLOGIST DR CARDENAS;  Surgeon: Andrea Hanna MD;  Location:  OR       FAMILY HISTORY:  Family History   Problem Relation Age of Onset     Osteoporosis Mother      Alzheimer Disease Mother      Family History Negative Father      Heart Disease Maternal Grandfather      Family History Negative Paternal Grandmother      Family History Negative Paternal  Grandfather        SOCIAL HISTORY:  Social History     Socioeconomic History     Marital status:      Spouse name: None     Number of children: 3     Years of education: None     Highest education level: None   Occupational History     None   Social Needs     Financial resource strain: None     Food insecurity:     Worry: None     Inability: None     Transportation needs:     Medical: None     Non-medical: None   Tobacco Use     Smoking status: Never Smoker     Smokeless tobacco: Never Used   Substance and Sexual Activity     Alcohol use: No     Drug use: No     Sexual activity: Not Currently   Lifestyle     Physical activity:     Days per week: None     Minutes per session: None     Stress: None   Relationships     Social connections:     Talks on phone: None     Gets together: None     Attends Sabianist service: None     Active member of club or organization: None     Attends meetings of clubs or organizations: None     Relationship status: None     Intimate partner violence:     Fear of current or ex partner: None     Emotionally abused: None     Physically abused: None     Forced sexual activity: None   Other Topics Concern      Service Not Asked     Blood Transfusions Not Asked     Caffeine Concern No     Comment: 1 cup of coffee and 1 can diet coke per day     Occupational Exposure Not Asked     Hobby Hazards Not Asked     Sleep Concern Yes     Comment: takes Doxylamine succinate 1/2 tab every night     Stress Concern No     Weight Concern No     Special Diet No     Comment: healthy      Back Care Not Asked     Exercise Yes     Comment: walking 45min x7 a wk. Very active, [employer], Golf, Bowling, Cardio     Bike Helmet Not Asked     Seat Belt Yes     Self-Exams Yes     Parent/sibling w/ CABG, MI or angioplasty before 65F 55M? No   Social History Narrative     None       Review of Systems:  Cardiovascular: negative for chest pain, pos palpitations, though improved. Neg orthopnea, LE  "edema.  Constitutional: negative for chills, sweats, fevers.   Resp: Negative for dyspnea at rest, neg RAYO, pos cough (though improved as well),  neg hemoptysis, known chronic lung disease  HEENT: Negative for new visual changes, frequent headaches  Gastrointestinal: negative for abdominal pain, neg diarrhea, blood in stool, nausea, vomiting  Hematologic/lymphatic: pos for current systemic anticoagulation, neg hx of blood clots  Musculoskeletal: negative for new back pain, joint pain  Neurological: negative for focal weakness, LOC, seizures, syncope. Pos occasional mild dizziness, neg presyncope.      Physical Exam:  Vitals: /68 (BP Location: Right arm, Patient Position: Chair, Cuff Size: Adult Small)   Pulse 94   Ht 1.676 m (5' 6\")   Wt 54.6 kg (120 lb 6.4 oz)   SpO2 98%   BMI 19.43 kg/m      Wt Readings from Last 4 Encounters:   03/21/19 54.6 kg (120 lb 6.4 oz)   03/07/19 61.1 kg (134 lb 11.2 oz)   02/27/19 60.8 kg (134 lb)   02/23/19 62 kg (136 lb 11 oz)       GEN:  In general, this is a well nourished  female in no acute distress on room air.  Patient ambulatory, accompanied by her .  HEENT:  Pupils equal, round. Sclerae nonicteric.   NECK: Supple, no masses appreciated. Trachea midline. No JVD while upright.  C/V:  Today regular rhythm and rate, no obvious murmur, rub or gallop.   RESP: Respirations are unlabored. No use of accessory muscles. No crackles or wheezing today.    GI: Abdomen soft, nontender, nondistended. No HSM appreciated.   EXTREM: Trace bilateral ankle edema. No cyanosis or clubbing.  NEURO: Alert and oriented, cooperative. Gait not formally assessed. No obvious focal deficits.   PSYCH: Normal affect.  SKIN: Warm and dry. No rashes or petechiae appreciated.       Recent Lab Results:    BMP RESULTS:  Lab Results   Component Value Date     03/21/2019    POTASSIUM 3.5 03/21/2019    CHLORIDE 106 03/21/2019    CO2 27 03/21/2019    ANIONGAP 6 03/21/2019     (H) " 2019    BUN 12 2019    CR 0.91 2019    GFRESTIMATED 63 2019    GFRESTBLACK 73 2019    ARIELLE 8.9 2019          Additional pertinent testinD echo 3/14/19  Interpretation Summary     1. The left ventricle is normal in size. The visual ejection fraction is  estimated at 20-25%. There is severe global hypokinesia of the left ventricle.  2. The right ventricle is normal size. Moderately decreased right ventricular  systolic function  3. s/p tissue MVR with #31 St Ron Epic, implanted 2019. Leaflets open well.  Mean 3mmHg, EOAI 1.4cm/m2, DVI 1.9. No MR or paravalvular leak. Normal valve  function.  4. An annuloplasty ring is noted in the tricuspid position. There is mild (1+)  tricuspid regurgitation.  5. The IVC is dilated and fails to change with respiration, suggesting  elevated central venous pressure.     Echo 2019 (prior to valve surgery) showed EF 65%, 3-4+ MR, 3+ TR.      Courtney Brian PA-C  [facility]  Pager [phone]      Thank you for allowing me to participate in the care of your patient.      Sincerely,     CHRISTIANO Weathers     [facility]    cc:   No referring provider defined for this encounter.

## 2019-03-21 NOTE — PROGRESS NOTES
Cardiology Progress Note    Date of Service: 03/21/2019      Reason for visit: [facility] clinic follow up, HFpEF and atrial fibrillation s/p mitral valve surgery.    Primary cardiologist: Dr. Jesus Stone      HPI:  Ms. Aguila is a pleasant 74 year old female who has been followed in our clinic for mitral valve regurgitation and atrial fibrillation. 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  In Oct 2018 she developed palpitations and was found to be in afib with RVR. She converted back to sinus rhythm with diltiazem gtt, and was placed on anticoagulation. She did will until Dec 2018 when again she returned to afib with RVR. Her metoprolol dose was increased. Her rates were still elevated on short term follow up, and she was loaded with amiodarone and a cardioversion was arranged. However, when she presented for this, she was noted to be back in sinus rhythm.    She then saw Dr. Hanna the end of December to re-consider intervention on her valve. He felt that she did indeed have severe mitral regurgitation, and in the setting of new onset atrial fibrillation, valve surgery was recommended. Preop angio showed no coronary disease. TIA showed preserved EF 65% with normal RV size and function. She had confirmed 3-4+ MR, and the valve was severely thickened and myxomatous. She also had 3+ TR. Then, in mid Feb 2019 she successfully underwent minimally invasive MV replacement with Epic St Ron 31mm valve. At the same time, she had tricuspid valve repair with a 32mm Oquendo ring. She developed complete AV block post operatively, and underwent pacemaker placement. She had some volume overload pos op for which she received diuresis, but this was complicated by hyponatremia (lowest of 126), and nephrology assisted with management. She was discharged on Xarelto/ASA, and notably no diuretics. Of note, it does not appear that she  had any recurrence of afib during that stay, and her amiodarone and beta blockers were discontinued.     She was seen the end of Feb by the CT surgery team for follow up and noted fluid retention with RAYO. She was given lasix 20mg daily transiently along with potassium. She was encouraged to continue high sodium intake given her issues with hyponatremia while in house. She had minimal response to this and continued to struggle with edema and weight gain. I then saw her in early March for an urgent [facility] evaluation. She was feeling poorly with cough, orthopnea, and was ~10 lbs up from her perceived baseline. ECG showed she was back in atrial fibrillation (device check confirmed 100% afib, appeared rate controlled in the 80-90 range). I changed her lasix to torsemide at that visit, and we reloaded her with amiodarone in attempts to convert her back to sinus rhythm. In addition, a repeat echocardiogram was requested. She's back today for follow up of these changes.    Since last visit, she's feeling much better. Her weight is down 14 lbs on our scale, and has stabilized at 115-116 lbs on her home scale. Her swelling has nearly resolved, she is sleeping better and no longer has RAYO. She is back to participating in cardiac rehab. Today on ECG she is back in sinus rhythm; however, she feels she continues to go in and out of afib periodically based on occasional palpitations and irregular rhythms on her apple watch. HR here today in the mid 90s, and at home it is running 90-low 100s as well. BP is soft today but she denies significant dizziness or any presyncope. We reviewed her echo from after our last visit; her EF has shown a decline, now 20-25%, with severe global hypokinesia of the LV and also moderately decreased RV function. Her MV shows leaflets open well with no leak and normal valve function. The tricuspid ring has 1+ regurgitation. IVC, as expected, suggested elevated CVP. Currently she is on 10mg of torsemide  and labs show continued preserved renal function; her K is low normal at 3.5 while still on KDur 40meq daily.       ASSESSMENT/PLAN:    1. Acute heart failure with preserved EF.   --Echo last week showed decline in EF now 20-25% (previously normal 65%),  with severe global hypokinesia of the LV and moderately decreased RV function. Appears her MV is functioning well, and tricuspid ring has 1+ regurgitation. IVC, as expected, suggested elevated CVP. This is, at this point, suspected secondary to recurrence of her afib.   --She responded well to torsemide, weight down 14 lbs. Will continue on lower dose, 10mg daily, with the KDur at 40meq daily.       2. Paroxysmal atrial fibrillation, symptomatic.   --First noted Oct 2018, converted to sinus with diltiazem, and placed on beta blockers.  Returned Dec 2018 and placed on amiodarone with plans for CV, but when presented she had converted back to sinus. Her beta blockers and amio were stopped after her valve surgery last month, and she underwent dual chamber PPM for CHB.    --In sinus today, but having paroxysmal afib based on symptoms. She has been reloaded with amio and notes less palpitations, but not entirely resolved. Rates in the  range. Will resume metoprolol 25mg daily today. I've asked her to call with any concerns of worsening dizziness/hypotension.   --She has an appt in place for later this week with EP PAMELLA, with planned repeat device check prior to appt.    --She is on ASA/Xarelto post valve surgery, which we will continue.       3. Valvular heart disease.   --Now s/p minimally invasive MV replacement with Epic St Ron 31mm valve and tricuspid valve repair with a 32mm Oquendo ring in Feb 2019.   --On AC as above.    --Continue cardiac rehab.        Follow up plan:  To see CHRISTIANO Degroot later this week for EP.  Then return to see myself in [facility] in ~1 month.      Orders this Visit:  Orders Placed This Encounter   Procedures     Basic metabolic panel      N terminal pro BNP outpatient     Follow-Up with [facility] - PAMELLA visit     Orders Placed This Encounter   Medications     metoprolol succinate ER (TOPROL-XL) 25 MG 24 hr tablet     Sig: Take 1 tablet (25 mg) by mouth daily         CURRENT MEDICATIONS:  Current Outpatient Medications   Medication Sig Dispense Refill     amiodarone (PACERONE/CODARONE) 200 MG tablet Take 2 tablets (400 mg) by mouth 2 times daily for 3 days, THEN 2 tablets (400 mg) daily for 7 days, THEN 1 tablet (200 mg) daily.       aspirin 81 MG EC tablet Take 81 mg by mouth every evening       calcium carbonate-vitamin D (CALCIUM 600+D) 600-200 MG-UNIT TABS Take 1 tablet by mouth 2 times daily       metoprolol succinate ER (TOPROL-XL) 25 MG 24 hr tablet Take 1 tablet (25 mg) by mouth daily       Multiple Vitamins-Minerals (MULTIVITAMIN ADULT) TABS Take 1 tablet by mouth daily       omeprazole (PRILOSEC) 10 MG CR capsule Take 1 capsule (10 mg) by mouth every morning (before breakfast) 90 capsule 3     potassium chloride ER (K-DUR/KLOR-CON M) 20 MEQ CR tablet Take 40 mEq by mouth daily        rivaroxaban ANTICOAGULANT (XARELTO) 20 MG TABS tablet Take 1 tablet (20 mg) by mouth daily (with dinner) 90 tablet 3     torsemide (DEMADEX) 20 MG tablet Take 1/2 tablet (10 mg)       oxyCODONE (ROXICODONE) 5 MG tablet Take 1-2 tablets (5-10 mg) by mouth every 4 hours as needed for moderate to severe pain (Patient not taking: Reported on 2/27/2019) 20 tablet 0     polyethylene glycol (MIRALAX/GLYCOLAX) packet Take 17 g by mouth daily (Patient not taking: Reported on 2/27/2019) 7 packet 0     senna-docusate (SENOKOT-S/PERICOLACE) 8.6-50 MG tablet Take 1 tablet by mouth 2 times daily (Patient not taking: Reported on 2/27/2019) 20 tablet 0       ALLERGIES     Allergies   Allergen Reactions     Chlorpheniramine Other (See Comments)     LOC and fainting      Phenylephrine Other (See Comments)     fainting       PAST MEDICAL HISTORY:  Past Medical History:    Diagnosis Date     Allergic rhinitis, cause unspecified     dust mites, ragweed     Complete AV block (H)     BSX DDD PM 2-     History of tricuspid valve repair      Mitral valve prolapse     bioprosthetic MVR 2-     Paroxysmal atrial fibrillation (H)        PAST SURGICAL HISTORY:  Past Surgical History:   Procedure Laterality Date     COLONOSCOPY N/A 9/15/2015    Procedure: COLONOSCOPY;  Surgeon: Anson Llanos MD;  Location:  GI     CV HEART CATHETERIZATION WITH POSSIBLE INTERVENTION N/A 1/9/2019    Procedure: Heart Catheterization with Possible Intervention;  Surgeon: Ritesh Whittaker MD;  Location:  HEART CARDIAC CATH LAB     CV RIGHT AND LEFT HEART CATH N/A 1/9/2019    Procedure: Right and Left Heart Catherization;  Surgeon: Ritesh Whittaker MD;  Location:  HEART CARDIAC CATH LAB     ENT SURGERY      T&A     EP PACEMAKER N/A 2/18/2019    Procedure: EP Pacemaker;  Surgeon: Inocencia Guillen MD;  Location:  HEART CARDIAC CATH LAB     REPAIR VALVE TRICUSPID MINIMALLY INVASIVE N/A 2/15/2019    Procedure: MINIMALLY INVASIVE TRICUSPID VALVE REPAIR WITH 32MM SULLIVAN RING;  Surgeon: Andrea Hanna MD;  Location:  OR     REPLACE VALVE MITRAL MINIMALLY INVASIVE N/A 2/15/2019    Procedure: MINIMALLY INVASIVE MITRAL VALVE REPLACEMENT WITH EPIC ST KEYUR 31MM VALVE; ON PUMP WITH TIA.  CARDIOLOGIST DR CARDENAS;  Surgeon: Andrea Hanna MD;  Location:  OR       FAMILY HISTORY:  Family History   Problem Relation Age of Onset     Osteoporosis Mother      Alzheimer Disease Mother      Family History Negative Father      Heart Disease Maternal Grandfather      Family History Negative Paternal Grandmother      Family History Negative Paternal Grandfather        SOCIAL HISTORY:  Social History     Socioeconomic History     Marital status:      Spouse name: None     Number of children: 3     Years of education: None     Highest education level: None    Occupational History     None   Social Needs     Financial resource strain: None     Food insecurity:     Worry: None     Inability: None     Transportation needs:     Medical: None     Non-medical: None   Tobacco Use     Smoking status: Never Smoker     Smokeless tobacco: Never Used   Substance and Sexual Activity     Alcohol use: No     Drug use: No     Sexual activity: Not Currently   Lifestyle     Physical activity:     Days per week: None     Minutes per session: None     Stress: None   Relationships     Social connections:     Talks on phone: None     Gets together: None     Attends Protestant service: None     Active member of club or organization: None     Attends meetings of clubs or organizations: None     Relationship status: None     Intimate partner violence:     Fear of current or ex partner: None     Emotionally abused: None     Physically abused: None     Forced sexual activity: None   Other Topics Concern      Service Not Asked     Blood Transfusions Not Asked     Caffeine Concern No     Comment: 1 cup of coffee and 1 can diet coke per day     Occupational Exposure Not Asked     Hobby Hazards Not Asked     Sleep Concern Yes     Comment: takes Doxylamine succinate 1/2 tab every night     Stress Concern No     Weight Concern No     Special Diet No     Comment: healthy      Back Care Not Asked     Exercise Yes     Comment: walking 45min x7 a wk. Very active, [employer], Golf, Bowling, Cardio     Bike Helmet Not Asked     Seat Belt Yes     Self-Exams Yes     Parent/sibling w/ CABG, MI or angioplasty before 65F 55M? No   Social History Narrative     None       Review of Systems:  Cardiovascular: negative for chest pain, pos palpitations, though improved. Neg orthopnea, LE edema.  Constitutional: negative for chills, sweats, fevers.   Resp: Negative for dyspnea at rest, neg RAYO, pos cough (though improved as well),  neg hemoptysis, known chronic lung disease  HEENT: Negative for new visual changes,  "frequent headaches  Gastrointestinal: negative for abdominal pain, neg diarrhea, blood in stool, nausea, vomiting  Hematologic/lymphatic: pos for current systemic anticoagulation, neg hx of blood clots  Musculoskeletal: negative for new back pain, joint pain  Neurological: negative for focal weakness, LOC, seizures, syncope. Pos occasional mild dizziness, neg presyncope.      Physical Exam:  Vitals: /68 (BP Location: Right arm, Patient Position: Chair, Cuff Size: Adult Small)   Pulse 94   Ht 1.676 m (5' 6\")   Wt 54.6 kg (120 lb 6.4 oz)   SpO2 98%   BMI 19.43 kg/m     Wt Readings from Last 4 Encounters:   19 54.6 kg (120 lb 6.4 oz)   19 61.1 kg (134 lb 11.2 oz)   19 60.8 kg (134 lb)   19 62 kg (136 lb 11 oz)       GEN:  In general, this is a well nourished  female in no acute distress on room air.  Patient ambulatory, accompanied by her .  HEENT:  Pupils equal, round. Sclerae nonicteric.   NECK: Supple, no masses appreciated. Trachea midline. No JVD while upright.  C/V:  Today regular rhythm and rate, no obvious murmur, rub or gallop.   RESP: Respirations are unlabored. No use of accessory muscles. No crackles or wheezing today.    GI: Abdomen soft, nontender, nondistended. No HSM appreciated.   EXTREM: Trace bilateral ankle edema. No cyanosis or clubbing.  NEURO: Alert and oriented, cooperative. Gait not formally assessed. No obvious focal deficits.   PSYCH: Normal affect.  SKIN: Warm and dry. No rashes or petechiae appreciated.       Recent Lab Results:    BMP RESULTS:  Lab Results   Component Value Date     2019    POTASSIUM 3.5 2019    CHLORIDE 106 2019    CO2 27 2019    ANIONGAP 6 2019     (H) 2019    BUN 12 2019    CR 0.91 2019    GFRESTIMATED 63 2019    GFRESTBLACK 73 2019    ARIELLE 8.9 2019          Additional pertinent testinD echo 3/14/19  Interpretation Summary     1. The left " ventricle is normal in size. The visual ejection fraction is  estimated at 20-25%. There is severe global hypokinesia of the left ventricle.  2. The right ventricle is normal size. Moderately decreased right ventricular  systolic function  3. s/p tissue MVR with #31 St Ron Epic, implanted 2/2019. Leaflets open well.  Mean 3mmHg, EOAI 1.4cm/m2, DVI 1.9. No MR or paravalvular leak. Normal valve  function.  4. An annuloplasty ring is noted in the tricuspid position. There is mild (1+)  tricuspid regurgitation.  5. The IVC is dilated and fails to change with respiration, suggesting  elevated central venous pressure.     Echo 1/2019 (prior to valve surgery) showed EF 65%, 3-4+ MR, 3+ TR.      Courtney Brian PA-C  [facility]  Pager [phone]

## 2019-03-21 NOTE — LETTER
3/21/2019    Anyi Olmos MD  [address] [facility] [address]  [facility] [address]    RE: Taty Aguila       Dear Colleague,    I had the pleasure of seeing Taty Aguila in the [facility].      Cardiology Progress Note    Date of Service: 03/21/2019      Reason for visit: [facility] clinic follow up, HFpEF and atrial fibrillation s/p mitral valve surgery.    Primary cardiologist: Dr. Jesus Stone      HPI:  Ms. Aguila is a pleasant 74 year old female who has been followed in our clinic for mitral valve regurgitation and atrial fibrillation. 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  In Oct 2018 she developed palpitations and was found to be in afib with RVR. She converted back to sinus rhythm with diltiazem gtt, and was placed on anticoagulation. She did will until Dec 2018 when again she returned to afib with RVR. Her metoprolol dose was increased. Her rates were still elevated on short term follow up, and she was loaded with amiodarone and a cardioversion was arranged. However, when she presented for this, she was noted to be back in sinus rhythm.    She then saw Dr. Hanna the end of December to re-consider intervention on her valve. He felt that she did indeed have severe mitral regurgitation, and in the setting of new onset atrial fibrillation, valve surgery was recommended. Preop angio showed no coronary disease. TIA showed preserved EF 65% with normal RV size and function. She had confirmed 3-4+ MR, and the valve was severely thickened and myxomatous. She also had 3+ TR. Then, in mid Feb 2019 she successfully underwent minimally invasive MV replacement with Epic St Ron 31mm valve. At the same time, she had tricuspid valve repair with a 32mm Oquendo ring. She developed complete AV block post operatively, and underwent pacemaker placement. She had some volume overload pos op  for which she received diuresis, but this was complicated by hyponatremia (lowest of 126), and nephrology assisted with management. She was discharged on Xarelto/ASA, and notably no diuretics. Of note, it does not appear that she had any recurrence of afib during that stay, and her amiodarone and beta blockers were discontinued.     She was seen the end of Feb by the CT surgery team for follow up and noted fluid retention with RAYO. She was given lasix 20mg daily transiently along with potassium. She was encouraged to continue high sodium intake given her issues with hyponatremia while in house. She had minimal response to this and continued to struggle with edema and weight gain. I then saw her in early March for an urgent C[facility] evaluation. She was feeling poorly with cough, orthopnea, and was ~10 lbs up from her perceived baseline. ECG showed she was back in atrial fibrillation (device check confirmed 100% afib, appeared rate controlled in the 80-90 range). I changed her lasix to torsemide at that visit, and we reloaded her with amiodarone in attempts to convert her back to sinus rhythm. In addition, a repeat echocardiogram was requested. She's back today for follow up of these changes.    Since last visit, she's feeling much better. Her weight is down 14 lbs on our scale, and has stabilized at 115-116 lbs on her home scale. Her swelling has nearly resolved, she is sleeping better and no longer has RAYO. She is back to participating in cardiac rehab. Today on ECG she is back in sinus rhythm; however, she feels she continues to go in and out of afib periodically based on occasional palpitations and irregular rhythms on her apple watch. HR here today in the mid 90s, and at home it is running 90-low 100s as well. BP is soft today but she denies significant dizziness or any presyncope. We reviewed her echo from after our last visit; her EF has shown a decline, now 20-25%, with severe global hypokinesia of the LV and also  moderately decreased RV function. Her MV shows leaflets open well with no leak and normal valve function. The tricuspid ring has 1+ regurgitation. IVC, as expected, suggested elevated CVP. Currently she is on 10mg of torsemide and labs show continued preserved renal function; her K is low normal at 3.5 while still on KDur 40meq daily.       ASSESSMENT/PLAN:    1. Acute heart failure with preserved EF.   --Echo last week showed decline in EF now 20-25% (previously normal 65%),  with severe global hypokinesia of the LV and moderately decreased RV function. Appears her MV is functioning well, and tricuspid ring has 1+ regurgitation. IVC, as expected, suggested elevated CVP. This is, at this point, suspected secondary to recurrence of her afib.   --She responded well to torsemide, weight down 14 lbs. Will continue on lower dose, 10mg daily, with the KDur at 40meq daily.       2. Paroxysmal atrial fibrillation, symptomatic.   --First noted Oct 2018, converted to sinus with diltiazem, and placed on beta blockers.  Returned Dec 2018 and placed on amiodarone with plans for CV, but when presented she had converted back to sinus. Her beta blockers and amio were stopped after her valve surgery last month, and she underwent dual chamber PPM for CHB.    --In sinus today, but having paroxysmal afib based on symptoms. She has been reloaded with amio and notes less palpitations, but not entirely resolved. Rates in the  range. Will resume metoprolol 25mg daily today. I've asked her to call with any concerns of worsening dizziness/hypotension.   --She has an appt in place for later this week with EP PAMELLA, with planned repeat device check prior to appt.    --She is on ASA/Xarelto post valve surgery, which we will continue.       3. Valvular heart disease.   --Now s/p minimally invasive MV replacement with Epic St Ron 31mm valve and tricuspid valve repair with a 32mm Oquendo ring in Feb 2019.   --On AC as above.    --Continue  cardiac rehab.        Follow up plan:  To see CHRISTIANO Degroot later this week for EP.  Then return to see myself in [facility] in ~1 month.      Orders this Visit:  Orders Placed This Encounter   Procedures     Basic metabolic panel     N terminal pro BNP outpatient     Follow-Up with [facility] - PAMELLA visit     Orders Placed This Encounter   Medications     metoprolol succinate ER (TOPROL-XL) 25 MG 24 hr tablet     Sig: Take 1 tablet (25 mg) by mouth daily         CURRENT MEDICATIONS:  Current Outpatient Medications   Medication Sig Dispense Refill     amiodarone (PACERONE/CODARONE) 200 MG tablet Take 2 tablets (400 mg) by mouth 2 times daily for 3 days, THEN 2 tablets (400 mg) daily for 7 days, THEN 1 tablet (200 mg) daily.       aspirin 81 MG EC tablet Take 81 mg by mouth every evening       calcium carbonate-vitamin D (CALCIUM 600+D) 600-200 MG-UNIT TABS Take 1 tablet by mouth 2 times daily       metoprolol succinate ER (TOPROL-XL) 25 MG 24 hr tablet Take 1 tablet (25 mg) by mouth daily       Multiple Vitamins-Minerals (MULTIVITAMIN ADULT) TABS Take 1 tablet by mouth daily       omeprazole (PRILOSEC) 10 MG CR capsule Take 1 capsule (10 mg) by mouth every morning (before breakfast) 90 capsule 3     potassium chloride ER (K-DUR/KLOR-CON M) 20 MEQ CR tablet Take 40 mEq by mouth daily        rivaroxaban ANTICOAGULANT (XARELTO) 20 MG TABS tablet Take 1 tablet (20 mg) by mouth daily (with dinner) 90 tablet 3     torsemide (DEMADEX) 20 MG tablet Take 1/2 tablet (10 mg)       oxyCODONE (ROXICODONE) 5 MG tablet Take 1-2 tablets (5-10 mg) by mouth every 4 hours as needed for moderate to severe pain (Patient not taking: Reported on 2/27/2019) 20 tablet 0     polyethylene glycol (MIRALAX/GLYCOLAX) packet Take 17 g by mouth daily (Patient not taking: Reported on 2/27/2019) 7 packet 0     senna-docusate (SENOKOT-S/PERICOLACE) 8.6-50 MG tablet Take 1 tablet by mouth 2 times daily (Patient not taking: Reported on 2/27/2019) 20 tablet  0       ALLERGIES     Allergies   Allergen Reactions     Chlorpheniramine Other (See Comments)     LOC and fainting      Phenylephrine Other (See Comments)     fainting       PAST MEDICAL HISTORY:  Past Medical History:   Diagnosis Date     Allergic rhinitis, cause unspecified     dust mites, ragweed     Complete AV block (H)     BSX DDD PM 2-     History of tricuspid valve repair      Mitral valve prolapse     bioprosthetic MVR 2-     Paroxysmal atrial fibrillation (H)        PAST SURGICAL HISTORY:  Past Surgical History:   Procedure Laterality Date     COLONOSCOPY N/A 9/15/2015    Procedure: COLONOSCOPY;  Surgeon: Anson Llanos MD;  Location:  GI     CV HEART CATHETERIZATION WITH POSSIBLE INTERVENTION N/A 1/9/2019    Procedure: Heart Catheterization with Possible Intervention;  Surgeon: Ritesh Whittaker MD;  Location:  HEART CARDIAC CATH LAB     CV RIGHT AND LEFT HEART CATH N/A 1/9/2019    Procedure: Right and Left Heart Catherization;  Surgeon: Ritesh Whittaker MD;  Location:  HEART CARDIAC CATH LAB     ENT SURGERY      T&A     EP PACEMAKER N/A 2/18/2019    Procedure: EP Pacemaker;  Surgeon: Inocencia Guillen MD;  Location:  HEART CARDIAC CATH LAB     REPAIR VALVE TRICUSPID MINIMALLY INVASIVE N/A 2/15/2019    Procedure: MINIMALLY INVASIVE TRICUSPID VALVE REPAIR WITH 32MM SULLIVAN RING;  Surgeon: Andrea Hanna MD;  Location:  OR     REPLACE VALVE MITRAL MINIMALLY INVASIVE N/A 2/15/2019    Procedure: MINIMALLY INVASIVE MITRAL VALVE REPLACEMENT WITH EPIC ST KEYUR 31MM VALVE; ON PUMP WITH ITA.  CARDIOLOGIST DR CARDENAS;  Surgeon: Andrea Hanna MD;  Location:  OR       FAMILY HISTORY:  Family History   Problem Relation Age of Onset     Osteoporosis Mother      Alzheimer Disease Mother      Family History Negative Father      Heart Disease Maternal Grandfather      Family History Negative Paternal Grandmother      Family History Negative Paternal  Grandfather        SOCIAL HISTORY:  Social History     Socioeconomic History     Marital status:      Spouse name: None     Number of children: 3     Years of education: None     Highest education level: None   Occupational History     None   Social Needs     Financial resource strain: None     Food insecurity:     Worry: None     Inability: None     Transportation needs:     Medical: None     Non-medical: None   Tobacco Use     Smoking status: Never Smoker     Smokeless tobacco: Never Used   Substance and Sexual Activity     Alcohol use: No     Drug use: No     Sexual activity: Not Currently   Lifestyle     Physical activity:     Days per week: None     Minutes per session: None     Stress: None   Relationships     Social connections:     Talks on phone: None     Gets together: None     Attends [employer] service: None     Active member of club or organization: None     Attends meetings of clubs or organizations: None     Relationship status: None     Intimate partner violence:     Fear of current or ex partner: None     Emotionally abused: None     Physically abused: None     Forced sexual activity: None   Other Topics Concern      Service Not Asked     Blood Transfusions Not Asked     Caffeine Concern No     Comment: 1 cup of coffee and 1 can diet coke per day     Occupational Exposure Not Asked     Hobby Hazards Not Asked     Sleep Concern Yes     Comment: takes Doxylamine succinate 1/2 tab every night     Stress Concern No     Weight Concern No     Special Diet No     Comment: healthy      Back Care Not Asked     Exercise Yes     Comment: walking 45min x7 a wk. Very active, [employer], Golf, Bowling, Cardio     Bike Helmet Not Asked     Seat Belt Yes     Self-Exams Yes     Parent/sibling w/ CABG, MI or angioplasty before 65F 55M? No   Social History Narrative     None       Review of Systems:  Cardiovascular: negative for chest pain, pos palpitations, though improved. Neg orthopnea, LE  "edema.  Constitutional: negative for chills, sweats, fevers.   Resp: Negative for dyspnea at rest, neg RAYO, pos cough (though improved as well),  neg hemoptysis, known chronic lung disease  HEENT: Negative for new visual changes, frequent headaches  Gastrointestinal: negative for abdominal pain, neg diarrhea, blood in stool, nausea, vomiting  Hematologic/lymphatic: pos for current systemic anticoagulation, neg hx of blood clots  Musculoskeletal: negative for new back pain, joint pain  Neurological: negative for focal weakness, LOC, seizures, syncope. Pos occasional mild dizziness, neg presyncope.      Physical Exam:  Vitals: /68 (BP Location: Right arm, Patient Position: Chair, Cuff Size: Adult Small)   Pulse 94   Ht 1.676 m (5' 6\")   Wt 54.6 kg (120 lb 6.4 oz)   SpO2 98%   BMI 19.43 kg/m      Wt Readings from Last 4 Encounters:   03/21/19 54.6 kg (120 lb 6.4 oz)   03/07/19 61.1 kg (134 lb 11.2 oz)   02/27/19 60.8 kg (134 lb)   02/23/19 62 kg (136 lb 11 oz)       GEN:  In general, this is a well nourished  female in no acute distress on room air.  Patient ambulatory, accompanied by her .  HEENT:  Pupils equal, round. Sclerae nonicteric.   NECK: Supple, no masses appreciated. Trachea midline. No JVD while upright.  C/V:  Today regular rhythm and rate, no obvious murmur, rub or gallop.   RESP: Respirations are unlabored. No use of accessory muscles. No crackles or wheezing today.    GI: Abdomen soft, nontender, nondistended. No HSM appreciated.   EXTREM: Trace bilateral ankle edema. No cyanosis or clubbing.  NEURO: Alert and oriented, cooperative. Gait not formally assessed. No obvious focal deficits.   PSYCH: Normal affect.  SKIN: Warm and dry. No rashes or petechiae appreciated.       Recent Lab Results:    BMP RESULTS:  Lab Results   Component Value Date     03/21/2019    POTASSIUM 3.5 03/21/2019    CHLORIDE 106 03/21/2019    CO2 27 03/21/2019    ANIONGAP 6 03/21/2019     (H) " 2019    BUN 12 2019    CR 0.91 2019    GFRESTIMATED 63 2019    GFRESTBLACK 73 2019    ARIELLE 8.9 2019          Additional pertinent testinD echo 3/14/19  Interpretation Summary     1. The left ventricle is normal in size. The visual ejection fraction is  estimated at 20-25%. There is severe global hypokinesia of the left ventricle.  2. The right ventricle is normal size. Moderately decreased right ventricular  systolic function  3. s/p tissue MVR with #31 St Ron Epic, implanted 2019. Leaflets open well.  Mean 3mmHg, EOAI 1.4cm/m2, DVI 1.9. No MR or paravalvular leak. Normal valve  function.  4. An annuloplasty ring is noted in the tricuspid position. There is mild (1+)  tricuspid regurgitation.  5. The IVC is dilated and fails to change with respiration, suggesting  elevated central venous pressure.     Echo 2019 (prior to valve surgery) showed EF 65%, 3-4+ MR, 3+ TR.      Courtney Brian PA-C  [facility]  Pager [phone]      Thank you for allowing me to participate in the care of your patient.    Sincerely,     CHRISTIANO Weathers     [facility]

## 2019-03-21 NOTE — PATIENT INSTRUCTIONS
Call C.O.R.E. nurse for any questions or concerns Mon-Fri 8am-4pm: [phone] For concerns after hours: [phone]    Medication changes:  START metoprolol 25mg once a day (Toprol XL)  We will continue the current doses of your water pills and potassium without change.     Plan from today:   Keep your appt with Faye Ángel later this week, they will remotely check your device before that visit.   Return to see Courtney in 1 month with labs in the [facility] clinic.     Lab results:   Results for GERONIMO PAYTON (MRN [id number]) as of 3/21/2019 08:29   Ref. Range 3/21/2019 07:43   Sodium Latest Ref Range: 133 - 144 mmol/L 139   Potassium Latest Ref Range: 3.4 - 5.3 mmol/L 3.5   Chloride Latest Ref Range: 94 - 109 mmol/L 106   Carbon Dioxide Latest Ref Range: 20 - 32 mmol/L 27   Urea Nitrogen Latest Ref Range: 7 - 30 mg/dL 12   Creatinine Latest Ref Range: 0.52 - 1.04 mg/dL 0.91   GFR Estimate Latest Ref Range: >60 mL/min/1.73_m2 63   GFR Estimate If Black Latest Ref Range: >60 mL/min/1.73_m2 73   Calcium Latest Ref Range: 8.5 - 10.1 mg/dL 8.9   Anion Gap Latest Ref Range: 3 - 14 mmol/L 6

## 2019-03-25 ENCOUNTER — OFFICE VISIT (OUTPATIENT)
Dept: CARDIOLOGY | Facility: CLINIC | Age: 74
End: 2019-03-25
Payer: COMMERCIAL

## 2019-03-25 ENCOUNTER — HOSPITAL ENCOUNTER (OUTPATIENT)
Dept: CARDIAC REHAB | Facility: CLINIC | Age: 74
End: 2019-03-25
Attending: STUDENT IN AN ORGANIZED HEALTH CARE EDUCATION/TRAINING PROGRAM
Payer: COMMERCIAL

## 2019-03-25 ENCOUNTER — ANCILLARY PROCEDURE (OUTPATIENT)
Dept: CARDIOLOGY | Facility: CLINIC | Age: 74
End: 2019-03-25
Attending: INTERNAL MEDICINE
Payer: COMMERCIAL

## 2019-03-25 VITALS
DIASTOLIC BLOOD PRESSURE: 68 MMHG | BODY MASS INDEX: 18.96 KG/M2 | HEART RATE: 93 BPM | SYSTOLIC BLOOD PRESSURE: 104 MMHG | WEIGHT: 118 LBS | HEIGHT: 66 IN

## 2019-03-25 DIAGNOSIS — I50.31 ACUTE DIASTOLIC CONGESTIVE HEART FAILURE (H): ICD-10-CM

## 2019-03-25 DIAGNOSIS — I48.0 PAROXYSMAL ATRIAL FIBRILLATION (H): ICD-10-CM

## 2019-03-25 DIAGNOSIS — I48.19 PERSISTENT ATRIAL FIBRILLATION (H): Primary | ICD-10-CM

## 2019-03-25 DIAGNOSIS — Z95.0 CARDIAC PACEMAKER IN SITU: ICD-10-CM

## 2019-03-25 DIAGNOSIS — E87.79 OTHER HYPERVOLEMIA: ICD-10-CM

## 2019-03-25 PROCEDURE — 93000 ELECTROCARDIOGRAM COMPLETE: CPT | Performed by: PHYSICIAN ASSISTANT

## 2019-03-25 PROCEDURE — 40000116 ZZH STATISTIC OP CR VISIT: Performed by: OCCUPATIONAL THERAPIST

## 2019-03-25 PROCEDURE — 99214 OFFICE O/P EST MOD 30 MIN: CPT | Performed by: PHYSICIAN ASSISTANT

## 2019-03-25 PROCEDURE — 93798 PHYS/QHP OP CAR RHAB W/ECG: CPT | Performed by: OCCUPATIONAL THERAPIST

## 2019-03-25 RX ORDER — AMIODARONE HYDROCHLORIDE 200 MG/1
200 TABLET ORAL DAILY
Refills: 0 | COMMUNITY
Start: 2019-03-25 | End: 2019-05-30

## 2019-03-25 ASSESSMENT — MIFFLIN-ST. JEOR: SCORE: 1061.99

## 2019-03-25 NOTE — LETTER
3/25/2019    Anyi Olmos MD  [address]  [facility] [id number]    RE: Taty De La Rosa Ayla       Dear Colleague,    I had the pleasure of seeing Taty Hendersonlaratalon in the [facility].    HPI:   I had the pleasure of seeing Taty and Tiago when she came for follow up of atrial fibrillation. She is a 74 year old who sees Dr. Stone for her history of:    1. Valvular disease with bivalvular mitral valve prolapse and tricuspid regurgitation now s/p 31 mm [employer] Epic porcine mitral valve replacement and 32 mm [place] MC 3 tricuspid annuloplasty ring 2/15/19 via right mini-thoracotomy with Dr. Hanna   2.  Post op complete heart block status post dual-chamber [employer] pacemaker 2/18/19  3.  Paroxysmal atrial fibrillation first diagnosed 12/2018.  Rate control/anticoagulation with Xarelto was recommended and she underwent surgery as above.  Pristinely, she did not have any recurrent atrial fibrillation postoperatively until following device implant.  Noted to be in atrial fibrillation since device implant, when she saw Courtney 3/7.  Amiodarone loaded, and is now back in sinus rhythm  4.  Postoperative heart failure, with drop in ejection fraction from 65% (preop) to 20-25% on echo 3/2019.  She has been enrolled in the [facility] and saw Courtney, who adjusted diuretic therapy.    I saw Taty just once in the hospital following implantation of her dual-chamber pacemaker.  She was hospitalized 2/15-2/23, and discharged home on no new cardiac medications.  Xarelto was continued and metoprolol and amiodarone were both discontinued.    She then saw Courtney 3/7 and was up at least 10 pounds, with pitting lower extremity edema to the knees.  She felt quite poorly.  Antelmo noted she was in atrial fibrillation in the 80s-90s.  She switched her Lasix to torsemide, recommended stockings and check an echocardiogram.  At that time, we opted to reload her amiodarone (which she had been on prior  to hospitalization, and which was not restarted as she had remained in sinus rhythm postop).    When Courtney saw her 3/21, she was doing quite a bit better, and was down 14 pounds without complaints of dyspnea.  EKG showed that she was back in sinus rhythm.  She was continued on torsemide 10 mg daily and potassium chloride 40 mEq daily.  Metoprolol 25 mg daily was restarted.  Courtney noted that her hepatic panel 1/2019 was fine, but TSH was elevated at 7.67.  T4 was still normal.  EP follow-up was recommended.    Overall, Taty really feels like she is doing well and getting better.  She does not think she is had any atrial fibrillation since amiodarone was started.  Device interrogation confirms this, with her last episode occurring 3/10.    She still has a little bit of lower extremity edema, but states it is much improved.  She denies orthopnea or PND.  She complains of muscle loss, and is eager to continue cardiac rehab.  She denies chest pain, pressure or tightness.  She has had no problems with the device site.    EKG today, which I over read, shows sinus rhythm at 92 bpm  Device interrogation (remote) done today showed  0% AP and 15% , with 53 mode switches comprising 47% of the time since 2/27/19.  Longest episode was 42 hours, with controlled ventricular rates.  She had no recurrent atrial fibrillation since 3/10/19, which was a 20-hour episode.  Echo 3/14/2019 with drop in EF 20-25% with severe global hypokinesis of the left ventricle.  RV was of normal size with moderately decreased right ventricular systolic function.  IVC was dilated and failed to change with respiration, suggesting elevated central venous pressure.  While percent mitral valve looked good, with good leaflet opening, mean gradient of 3 mmHg and no evidence of mitral regurgitation or perivalvular leaking.  Tricuspid valve showed 1+ tricuspid regurgitation.  RVSP was 24+ right atrial pressure (elevated).  Coronary angiography preoperatively  1/2019 showed normal coronary arteries.  There is some evidence of remodeling, with EF of 45-50%.      Assessment & Plan:    1.  Atrial fibrillation    As above, first diagnosed 12/2018, manifested by palpitations and rapid heart rate.  She was started on amiodarone 12/13/18, but this was discontinued after her surgery as she had remained    Back in atrial fibrillation at the time of her first device check, with overall adequate heart rate control    Reloaded with amiodarone starting 3/7, and currently in sinus rhythm by EKG and device interrogation    Has remained on anticoagulation with Xarelto for her CHADSVASc of 3 (cardiomyopathy, age and sex)    Device interrogation, as above, shows that she is been in sinus rhythm since 3/10, shortly after the initiation of    PLAN:    We will continue amiodarone 200 mg daily    When I see her 5/2019, we will get a device interrogation prior.  At that time, I am hopeful that we will be able to discontinue amiodarone therapy.  If not, we will need to continue routine laboratory monitoring (TSH and CMP done already)    Continue anticoagulation      2.  Postoperative heart failure    As above, EF dropped from 65% preoperatively to 20-25% on most recent echocardiogram.    Sees Courtney in the [facility].  Still has some mild edema and jugular venous distention.  She is on torsemide 10 mg daily and potassium chloride.    PLAN:    Continue to weigh daily    Continue diuretic/potassium supplementation    See Courtney with repeat blood work 4/2019 per her request ([place])    Continue beta-blocker, but blood pressure a bit soft to initiate ACE inhibitor    3.  Recent valvular surgery    As above, mitral and tricuspid valves look great on repeat echocardiogram, but EF is dropped significantly    As above, had postoperative issues with fluid overload, recurrent atrial fibrillation, complete heart block requiring dual-chamber pacemaker implantation and drop in ejection fraction    We will  continue to see Courtney and Dr. Stone    PLAN:    SBE prophylaxis      Faye Neal PA-C, MSPAS      Orders Placed This Encounter   Procedures     Follow-Up with Cardiac Advanced Practice Provider     EKG 12-lead complete w/read - Clinics (performed today)     EKG 12-lead complete w/read - Clinics (to be scheduled)     Orders Placed This Encounter   Medications     amiodarone (PACERONE/CODARONE) 200 MG tablet     Sig: Take 1 tablet (200 mg) by mouth daily     Refill:  0     Medications Discontinued During This Encounter   Medication Reason     amiodarone (PACERONE/CODARONE) 200 MG tablet          Encounter Diagnoses   Name Primary?     Acute diastolic congestive heart failure (H)      Other hypervolemia      Persistent atrial fibrillation (H) Yes     Paroxysmal atrial fibrillation (H)        CURRENT MEDICATIONS:  Current Outpatient Medications   Medication Sig Dispense Refill     amiodarone (PACERONE/CODARONE) 200 MG tablet Take 1 tablet (200 mg) by mouth daily  0     aspirin 81 MG EC tablet Take 81 mg by mouth every evening       calcium carbonate-vitamin D (CALCIUM 600+D) 600-200 MG-UNIT TABS Take 1 tablet by mouth 2 times daily       metoprolol succinate ER (TOPROL-XL) 25 MG 24 hr tablet Take 1 tablet (25 mg) by mouth daily       Multiple Vitamins-Minerals (MULTIVITAMIN ADULT) TABS Take 1 tablet by mouth daily       omeprazole (PRILOSEC) 10 MG CR capsule Take 1 capsule (10 mg) by mouth every morning (before breakfast) 90 capsule 3     oxyCODONE (ROXICODONE) 5 MG tablet Take 1-2 tablets (5-10 mg) by mouth every 4 hours as needed for moderate to severe pain 20 tablet 0     polyethylene glycol (MIRALAX/GLYCOLAX) packet Take 17 g by mouth daily 7 packet 0     potassium chloride ER (K-DUR/KLOR-CON M) 20 MEQ CR tablet Take 40 mEq by mouth daily        rivaroxaban ANTICOAGULANT (XARELTO) 20 MG TABS tablet Take 1 tablet (20 mg) by mouth daily (with dinner) 90 tablet 3     senna-docusate (SENOKOT-S/PERICOLACE) 8.6-50 MG  tablet Take 1 tablet by mouth 2 times daily 20 tablet 0     torsemide (DEMADEX) 20 MG tablet Take 1/2 tablet (10 mg)         ALLERGIES     Allergies   Allergen Reactions     Chlorpheniramine Other (See Comments)     LOC and fainting      Phenylephrine Other (See Comments)     fainting       PAST MEDICAL HISTORY:  Past Medical History:   Diagnosis Date     Allergic rhinitis, cause unspecified     dust mites, ragweed     Complete AV block (H)     BSX DDD PM 2-     History of tricuspid valve repair      Mitral valve prolapse     bioprosthetic MVR 2-     Paroxysmal atrial fibrillation (H)        PAST SURGICAL HISTORY:  Past Surgical History:   Procedure Laterality Date     COLONOSCOPY N/A 9/15/2015    Procedure: COLONOSCOPY;  Surgeon: Anson Llanos MD;  Location:  GI     CV HEART CATHETERIZATION WITH POSSIBLE INTERVENTION N/A 1/9/2019    Procedure: Heart Catheterization with Possible Intervention;  Surgeon: Ritesh Whittaker MD;  Location:  HEART CARDIAC CATH LAB     CV RIGHT AND LEFT HEART CATH N/A 1/9/2019    Procedure: Right and Left Heart Catherization;  Surgeon: Ritesh Whittaker MD;  Location:  HEART CARDIAC CATH LAB     ENT SURGERY      T&A     EP PACEMAKER N/A 2/18/2019    Procedure: EP Pacemaker;  Surgeon: Inocencia Guillen MD;  Location:  HEART CARDIAC CATH LAB     REPAIR VALVE TRICUSPID MINIMALLY INVASIVE N/A 2/15/2019    Procedure: MINIMALLY INVASIVE TRICUSPID VALVE REPAIR WITH 32MM SULLIVAN RING;  Surgeon: Andrea Hanna MD;  Location:  OR     REPLACE VALVE MITRAL MINIMALLY INVASIVE N/A 2/15/2019    Procedure: MINIMALLY INVASIVE MITRAL VALVE REPLACEMENT WITH EPIC ST KEYUR 31MM VALVE; ON PUMP WITH TIA.  CARDIOLOGIST DR CARDENAS;  Surgeon: Andrea Hanna MD;  Location:  OR       FAMILY HISTORY:  Family History   Problem Relation Age of Onset     Osteoporosis Mother      Alzheimer Disease Mother      Family History Negative Father      Heart Disease  Maternal Grandfather      Family History Negative Paternal Grandmother      Family History Negative Paternal Grandfather        SOCIAL HISTORY:  Social History     Socioeconomic History     Marital status:      Spouse name: None     Number of children: 3     Years of education: None     Highest education level: None   Occupational History     None   Social Needs     Financial resource strain: None     Food insecurity:     Worry: None     Inability: None     Transportation needs:     Medical: None     Non-medical: None   Tobacco Use     Smoking status: Never Smoker     Smokeless tobacco: Never Used   Substance and Sexual Activity     Alcohol use: No     Drug use: No     Sexual activity: Not Currently   Lifestyle     Physical activity:     Days per week: None     Minutes per session: None     Stress: None   Relationships     Social connections:     Talks on phone: None     Gets together: None     Attends Druze service: None     Active member of club or organization: None     Attends meetings of clubs or organizations: None     Relationship status: None     Intimate partner violence:     Fear of current or ex partner: None     Emotionally abused: None     Physically abused: None     Forced sexual activity: None   Other Topics Concern      Service Not Asked     Blood Transfusions Not Asked     Caffeine Concern No     Comment: 1 cup of coffee and 1 can diet coke per day     Occupational Exposure Not Asked     Hobby Hazards Not Asked     Sleep Concern Yes     Comment: takes Doxylamine succinate 1/2 tab every night     Stress Concern No     Weight Concern No     Special Diet No     Comment: healthy      Back Care Not Asked     Exercise Yes     Comment: walking 45min x7 a wk. Very active, [employer], Golf, Bowling, Cardio     Bike Helmet Not Asked     Seat Belt Yes     Self-Exams Yes     Parent/sibling w/ CABG, MI or angioplasty before 65F 55M? No   Social History Narrative     None       Review of  "Systems:  Skin:  Negative     Eyes:  Positive for glasses  ENT:  Positive for sinus trouble;nasal congestion  Respiratory:  Positive for cough  Cardiovascular:  Negative for;palpitations;lightheadedness;dizziness fatigue;Positive for;edema;exercise intolerance  Gastroenterology: Negative for melena;hematochezia  Genitourinary:  Negative    Musculoskeletal:  Positive for arthritis  Neurologic:  Negative    Psychiatric:  Positive for sleep disturbances;anxiety  Heme/Lymph/Imm:  Positive for allergies  Endocrine:  Positive for      Physical Exam:  Vitals: /68   Pulse 93   Ht 1.676 m (5' 6\")   Wt 53.5 kg (118 lb)   BMI 19.05 kg/m       Constitutional:  cooperative, alert and oriented, well developed, well nourished, in no acute distress        Skin:  warm and dry to the touch, no apparent skin lesions or masses noted        Head:  normocephalic, no masses or lesions        Eyes:  pupils equal and round;conjunctivae and lids unremarkable;sclera white        ENT:  no pallor or cyanosis, dentition good        Neck:  no carotid bruit JVP 10-12;JVP elevated      Chest:  normal breath sounds, clear to auscultation, normal A-P diameter, normal symmetry, normal respiratory excursion, no use of accessory muscles        Cardiac: regular rhythm;no murmurs, gallops or rubs detected                  Abdomen:  abdomen soft        Vascular: pulses full and equal                                      Extremities and Back:  no deformities, clubbing, cyanosis, erythema observed        Neurological:  no gross motor deficits          Recent Lab Results:  LIPID RESULTS:  Lab Results   Component Value Date    CHOL 202 (H) 07/23/2015    HDL 96 07/23/2015    LDL 83 07/23/2015    TRIG 116 07/23/2015    CHOLHDLRATIO 2.1 07/23/2015       LIVER ENZYME RESULTS:  Lab Results   Component Value Date     (H) 02/16/2019    ALT 40 02/16/2019       CBC RESULTS:  Lab Results   Component Value Date    WBC 13.7 (H) 02/22/2019    RBC 2.64 (L) " 02/22/2019    HGB 10.0 (L) 03/07/2019    HCT 23.4 (L) 02/22/2019    MCV 89 02/22/2019    MCH 30.7 02/22/2019    MCHC 34.6 02/22/2019    RDW 14.8 02/22/2019     02/22/2019       BMP RESULTS:  Lab Results   Component Value Date     03/21/2019    POTASSIUM 3.5 03/21/2019    CHLORIDE 106 03/21/2019    CO2 27 03/21/2019    ANIONGAP 6 03/21/2019     (H) 03/21/2019    BUN 12 03/21/2019    CR 0.91 03/21/2019    GFRESTIMATED 63 03/21/2019    GFRESTBLACK 73 03/21/2019    ARIELLE 8.9 03/21/2019        A1C RESULTS:  Lab Results   Component Value Date    A1C 5.9 (H) 01/09/2019       INR RESULTS:  Lab Results   Component Value Date    INR 1.21 (H) 02/18/2019    INR 1.24 (H) 02/16/2019                   Thank you for allowing me to participate in the care of your patient.      Sincerely,     Jo-Ann Neal PA-C     [facility]    cc:   CHRISTIANO Weathers  [facility]  [address] [place] [id number]

## 2019-03-25 NOTE — PROGRESS NOTES
HPI:   I had the pleasure of seeing Taty and Taigo when she came for follow up of atrial fibrillation. She is a 74 year old who sees Dr. Stone for her history of:    1. Valvular disease with bivalvular mitral valve prolapse and tricuspid regurgitation now s/p 31 mm [employer] Epic porcine mitral valve replacement and 32 mm [place] MC 3 tricuspid annuloplasty ring 2/15/19 via right mini-thoracotomy with Dr. Hanna   2.  Post op complete heart block status post dual-chamber [employer] pacemaker 2/18/19  3.  Paroxysmal atrial fibrillation first diagnosed 12/2018.  Rate control/anticoagulation with Xarelto was recommended and she underwent surgery as above.  Pristinely, she did not have any recurrent atrial fibrillation postoperatively until following device implant.  Noted to be in atrial fibrillation since device implant, when she saw Courtney 3/7.  Amiodarone loaded, and is now back in sinus rhythm  4.  Postoperative heart failure, with drop in ejection fraction from 65% (preop) to 20-25% on echo 3/2019.  She has been enrolled in the [facility] and saw Courtney, who adjusted diuretic therapy.    I saw Taty just once in the hospital following implantation of her dual-chamber pacemaker.  She was hospitalized 2/15-2/23, and discharged home on no new cardiac medications.  Xarelto was continued and metoprolol and amiodarone were both discontinued.    She then saw Courtney 3/7 and was up at least 10 pounds, with pitting lower extremity edema to the knees.  She felt quite poorly.  Antelmo noted she was in atrial fibrillation in the 80s-90s.  She switched her Lasix to torsemide, recommended stockings and check an echocardiogram.  At that time, we opted to reload her amiodarone (which she had been on prior to hospitalization, and which was not restarted as she had remained in sinus rhythm postop).    When Courtney saw her 3/21, she was doing quite a bit better, and was down 14 pounds without complaints of dyspnea.  EKG showed that  she was back in sinus rhythm.  She was continued on torsemide 10 mg daily and potassium chloride 40 mEq daily.  Metoprolol 25 mg daily was restarted.  Courtney noted that her hepatic panel 1/2019 was fine, but TSH was elevated at 7.67.  T4 was still normal.  EP follow-up was recommended.    Overall, Taty really feels like she is doing well and getting better.  She does not think she is had any atrial fibrillation since amiodarone was started.  Device interrogation confirms this, with her last episode occurring 3/10.    She still has a little bit of lower extremity edema, but states it is much improved.  She denies orthopnea or PND.  She complains of muscle loss, and is eager to continue cardiac rehab.  She denies chest pain, pressure or tightness.  She has had no problems with the device site.    EKG today, which I over read, shows sinus rhythm at 92 bpm  Device interrogation (remote) done today showed  0% AP and 15% , with 53 mode switches comprising 47% of the time since 2/27/19.  Longest episode was 42 hours, with controlled ventricular rates.  She had no recurrent atrial fibrillation since 3/10/19, which was a 20-hour episode.  Echo 3/14/2019 with drop in EF 20-25% with severe global hypokinesis of the left ventricle.  RV was of normal size with moderately decreased right ventricular systolic function.  IVC was dilated and failed to change with respiration, suggesting elevated central venous pressure.  While percent mitral valve looked good, with good leaflet opening, mean gradient of 3 mmHg and no evidence of mitral regurgitation or perivalvular leaking.  Tricuspid valve showed 1+ tricuspid regurgitation.  RVSP was 24+ right atrial pressure (elevated).  Coronary angiography preoperatively 1/2019 showed normal coronary arteries.  There is some evidence of remodeling, with EF of 45-50%.      Assessment & Plan:    1.  Atrial fibrillation    As above, first diagnosed 12/2018, manifested by palpitations and rapid  heart rate.  She was started on amiodarone 12/13/18, but this was discontinued after her surgery as she had remained    Back in atrial fibrillation at the time of her first device check, with overall adequate heart rate control    Reloaded with amiodarone starting 3/7, and currently in sinus rhythm by EKG and device interrogation    Has remained on anticoagulation with Xarelto for her CHADSVASc of 3 (cardiomyopathy, age and sex)    Device interrogation, as above, shows that she is been in sinus rhythm since 3/10, shortly after the initiation of    PLAN:    We will continue amiodarone 200 mg daily    When I see her 5/2019, we will get a device interrogation prior.  At that time, I am hopeful that we will be able to discontinue amiodarone therapy.  If not, we will need to continue routine laboratory monitoring (TSH and CMP done already)    Continue anticoagulation      2.  Postoperative heart failure    As above, EF dropped from 65% preoperatively to 20-25% on most recent echocardiogram.    Sees Courtney in the [facility].  Still has some mild edema and jugular venous distention.  She is on torsemide 10 mg daily and potassium chloride.    PLAN:    Continue to weigh daily    Continue diuretic/potassium supplementation    See Courtney with repeat blood work 4/2019 per her request ([facility])    Continue beta-blocker, but blood pressure a bit soft to initiate ACE inhibitor    3.  Recent valvular surgery    As above, mitral and tricuspid valves look great on repeat echocardiogram, but EF is dropped significantly    As above, had postoperative issues with fluid overload, recurrent atrial fibrillation, complete heart block requiring dual-chamber pacemaker implantation and drop in ejection fraction    We will continue to see Courtney and Dr. Stone    PLAN:    SBE prophylaxis      Faye Neal PA-C, MSPAS      Orders Placed This Encounter   Procedures     Follow-Up with Cardiac Advanced Practice Provider     EKG 12-lead complete  w/read - Clinics (performed today)     EKG 12-lead complete w/read - Clinics (to be scheduled)     Orders Placed This Encounter   Medications     amiodarone (PACERONE/CODARONE) 200 MG tablet     Sig: Take 1 tablet (200 mg) by mouth daily     Refill:  0     Medications Discontinued During This Encounter   Medication Reason     amiodarone (PACERONE/CODARONE) 200 MG tablet          Encounter Diagnoses   Name Primary?     Acute diastolic congestive heart failure (H)      Other hypervolemia      Persistent atrial fibrillation (H) Yes     Paroxysmal atrial fibrillation (H)        CURRENT MEDICATIONS:  Current Outpatient Medications   Medication Sig Dispense Refill     amiodarone (PACERONE/CODARONE) 200 MG tablet Take 1 tablet (200 mg) by mouth daily  0     aspirin 81 MG EC tablet Take 81 mg by mouth every evening       calcium carbonate-vitamin D (CALCIUM 600+D) 600-200 MG-UNIT TABS Take 1 tablet by mouth 2 times daily       metoprolol succinate ER (TOPROL-XL) 25 MG 24 hr tablet Take 1 tablet (25 mg) by mouth daily       Multiple Vitamins-Minerals (MULTIVITAMIN ADULT) TABS Take 1 tablet by mouth daily       omeprazole (PRILOSEC) 10 MG CR capsule Take 1 capsule (10 mg) by mouth every morning (before breakfast) 90 capsule 3     oxyCODONE (ROXICODONE) 5 MG tablet Take 1-2 tablets (5-10 mg) by mouth every 4 hours as needed for moderate to severe pain 20 tablet 0     polyethylene glycol (MIRALAX/GLYCOLAX) packet Take 17 g by mouth daily 7 packet 0     potassium chloride ER (K-DUR/KLOR-CON M) 20 MEQ CR tablet Take 40 mEq by mouth daily        rivaroxaban ANTICOAGULANT (XARELTO) 20 MG TABS tablet Take 1 tablet (20 mg) by mouth daily (with dinner) 90 tablet 3     senna-docusate (SENOKOT-S/PERICOLACE) 8.6-50 MG tablet Take 1 tablet by mouth 2 times daily 20 tablet 0     torsemide (DEMADEX) 20 MG tablet Take 1/2 tablet (10 mg)         ALLERGIES     Allergies   Allergen Reactions     Chlorpheniramine Other (See Comments)     LOC and  fainting      Phenylephrine Other (See Comments)     fainting       PAST MEDICAL HISTORY:  Past Medical History:   Diagnosis Date     Allergic rhinitis, cause unspecified     dust mites, ragweed     Complete AV block (H)     BSX DDD PM 2-     History of tricuspid valve repair      Mitral valve prolapse     bioprosthetic MVR 2-     Paroxysmal atrial fibrillation (H)        PAST SURGICAL HISTORY:  Past Surgical History:   Procedure Laterality Date     COLONOSCOPY N/A 9/15/2015    Procedure: COLONOSCOPY;  Surgeon: Anson Llaons MD;  Location:  GI     CV HEART CATHETERIZATION WITH POSSIBLE INTERVENTION N/A 1/9/2019    Procedure: Heart Catheterization with Possible Intervention;  Surgeon: Ritesh Whittaker MD;  Location:  HEART CARDIAC CATH LAB     CV RIGHT AND LEFT HEART CATH N/A 1/9/2019    Procedure: Right and Left Heart Catherization;  Surgeon: Ritesh Whittaker MD;  Location:  HEART CARDIAC CATH LAB     ENT SURGERY      T&A     EP PACEMAKER N/A 2/18/2019    Procedure: EP Pacemaker;  Surgeon: Inocencia Guillen MD;  Location:  HEART CARDIAC CATH LAB     REPAIR VALVE TRICUSPID MINIMALLY INVASIVE N/A 2/15/2019    Procedure: MINIMALLY INVASIVE TRICUSPID VALVE REPAIR WITH 32MM SULLIVAN RING;  Surgeon: Andrea Hanna MD;  Location:  OR     REPLACE VALVE MITRAL MINIMALLY INVASIVE N/A 2/15/2019    Procedure: MINIMALLY INVASIVE MITRAL VALVE REPLACEMENT WITH EPIC ST KEYUR 31MM VALVE; ON PUMP WITH TIA.  CARDIOLOGIST DR CARDENAS;  Surgeon: Andrea Hanna MD;  Location:  OR       FAMILY HISTORY:  Family History   Problem Relation Age of Onset     Osteoporosis Mother      Alzheimer Disease Mother      Family History Negative Father      Heart Disease Maternal Grandfather      Family History Negative Paternal Grandmother      Family History Negative Paternal Grandfather        SOCIAL HISTORY:  Social History     Socioeconomic History     Marital status:      Spouse  name: None     Number of children: 3     Years of education: None     Highest education level: None   Occupational History     None   Social Needs     Financial resource strain: None     Food insecurity:     Worry: None     Inability: None     Transportation needs:     Medical: None     Non-medical: None   Tobacco Use     Smoking status: Never Smoker     Smokeless tobacco: Never Used   Substance and Sexual Activity     Alcohol use: No     Drug use: No     Sexual activity: Not Currently   Lifestyle     Physical activity:     Days per week: None     Minutes per session: None     Stress: None   Relationships     Social connections:     Talks on phone: None     Gets together: None     Attends Oriental orthodox service: None     Active member of club or organization: None     Attends meetings of clubs or organizations: None     Relationship status: None     Intimate partner violence:     Fear of current or ex partner: None     Emotionally abused: None     Physically abused: None     Forced sexual activity: None   Other Topics Concern      Service Not Asked     Blood Transfusions Not Asked     Caffeine Concern No     Comment: 1 cup of coffee and 1 can diet coke per day     Occupational Exposure Not Asked     Hobby Hazards Not Asked     Sleep Concern Yes     Comment: takes Doxylamine succinate 1/2 tab every night     Stress Concern No     Weight Concern No     Special Diet No     Comment: healthy      Back Care Not Asked     Exercise Yes     Comment: walking 45min x7 a wk. Very active, [employer], Golf, Bowling, Cardio     Bike Helmet Not Asked     Seat Belt Yes     Self-Exams Yes     Parent/sibling w/ CABG, MI or angioplasty before 65F 55M? No   Social History Narrative     None       Review of Systems:  Skin:  Negative     Eyes:  Positive for glasses  ENT:  Positive for sinus trouble;nasal congestion  Respiratory:  Positive for cough  Cardiovascular:  Negative for;palpitations;lightheadedness;dizziness fatigue;Positive  "for;edema;exercise intolerance  Gastroenterology: Negative for melena;hematochezia  Genitourinary:  Negative    Musculoskeletal:  Positive for arthritis  Neurologic:  Negative    Psychiatric:  Positive for sleep disturbances;anxiety  Heme/Lymph/Imm:  Positive for allergies  Endocrine:  Positive for      Physical Exam:  Vitals: /68   Pulse 93   Ht 1.676 m (5' 6\")   Wt 53.5 kg (118 lb)   BMI 19.05 kg/m      Constitutional:  cooperative, alert and oriented, well developed, well nourished, in no acute distress        Skin:  warm and dry to the touch, no apparent skin lesions or masses noted        Head:  normocephalic, no masses or lesions        Eyes:  pupils equal and round;conjunctivae and lids unremarkable;sclera white        ENT:  no pallor or cyanosis, dentition good        Neck:  no carotid bruit JVP 10-12;JVP elevated      Chest:  normal breath sounds, clear to auscultation, normal A-P diameter, normal symmetry, normal respiratory excursion, no use of accessory muscles        Cardiac: regular rhythm;no murmurs, gallops or rubs detected                  Abdomen:  abdomen soft        Vascular: pulses full and equal                                      Extremities and Back:  no deformities, clubbing, cyanosis, erythema observed        Neurological:  no gross motor deficits          Recent Lab Results:  LIPID RESULTS:  Lab Results   Component Value Date    CHOL 202 (H) 07/23/2015    HDL 96 07/23/2015    LDL 83 07/23/2015    TRIG 116 07/23/2015    CHOLHDLRATIO 2.1 07/23/2015       LIVER ENZYME RESULTS:  Lab Results   Component Value Date     (H) 02/16/2019    ALT 40 02/16/2019       CBC RESULTS:  Lab Results   Component Value Date    WBC 13.7 (H) 02/22/2019    RBC 2.64 (L) 02/22/2019    HGB 10.0 (L) 03/07/2019    HCT 23.4 (L) 02/22/2019    MCV 89 02/22/2019    MCH 30.7 02/22/2019    MCHC 34.6 02/22/2019    RDW 14.8 02/22/2019     02/22/2019       BMP RESULTS:  Lab Results   Component Value " Date     03/21/2019    POTASSIUM 3.5 03/21/2019    CHLORIDE 106 03/21/2019    CO2 27 03/21/2019    ANIONGAP 6 03/21/2019     (H) 03/21/2019    BUN 12 03/21/2019    CR 0.91 03/21/2019    GFRESTIMATED 63 03/21/2019    GFRESTBLACK 73 03/21/2019    ARIELLE 8.9 03/21/2019        A1C RESULTS:  Lab Results   Component Value Date    A1C 5.9 (H) 01/09/2019       INR RESULTS:  Lab Results   Component Value Date    INR 1.21 (H) 02/18/2019    INR 1.24 (H) 02/16/2019

## 2019-03-25 NOTE — PATIENT INSTRUCTIONS
1. EKG showed normal rhythm - hooray!  2. Device check showed no atrial fibrillation since 3/10 (right after you started amiodarone)    3. Plan to see me May 2019 and consider stopping amiodarone entirely    4. See Courtney April 2019 with blood work. Continue to weigh daily and call [facility] nurses if sudden weight gain, etc    5. My nurses (for questions about pacemaker, rhythm or amiodarone or anything else) [phone]    6. Pay attention to cough - if not continuing to improve over the next week, call and we'll arrange chest x-ray [phone]

## 2019-03-25 NOTE — LETTER
3/25/2019    Anyi Olmos MD  [address]  [facility] [id number]    RE: Taty De La Rosa Ayla       Dear Colleague,    I had the pleasure of seeing Taty Hendersonlaratalon in the [facility].    HPI:   I had the pleasure of seeing Taty and Tiago when she came for follow up of atrial fibrillation. She is a 74 year old who sees Dr. Stone for her history of:    1. Valvular disease with bivalvular mitral valve prolapse and tricuspid regurgitation now s/p 31 mm [employer] Epic porcine mitral valve replacement and 32 mm [employer] MC 3 tricuspid annuloplasty ring 2/15/19 via right mini-thoracotomy with Dr. Hanna   2.  Post op complete heart block status post dual-chamber [employer] pacemaker 2/18/19  3.  Paroxysmal atrial fibrillation first diagnosed 12/2018.  Rate control/anticoagulation with Xarelto was recommended and she underwent surgery as above.  Pristinely, she did not have any recurrent atrial fibrillation postoperatively until following device implant.  Noted to be in atrial fibrillation since device implant, when she saw Courtney 3/7.  Amiodarone loaded, and is now back in sinus rhythm  4.  Postoperative heart failure, with drop in ejection fraction from 65% (preop) to 20-25% on echo 3/2019.  She has been enrolled in the [facility] and saw Courtney, who adjusted diuretic therapy.    I saw Taty just once in the hospital following implantation of her dual-chamber pacemaker.  She was hospitalized 2/15-2/23, and discharged home on no new cardiac medications.  Xarelto was continued and metoprolol and amiodarone were both discontinued.    She then saw Courtney 3/7 and was up at least 10 pounds, with pitting lower extremity edema to the knees.  She felt quite poorly.  Antelmo noted she was in atrial fibrillation in the 80s-90s.  She switched her Lasix to torsemide, recommended stockings and check an echocardiogram.  At that time, we opted to reload her amiodarone (which she had been on prior  to hospitalization, and which was not restarted as she had remained in sinus rhythm postop).    When Courtney saw her 3/21, she was doing quite a bit better, and was down 14 pounds without complaints of dyspnea.  EKG showed that she was back in sinus rhythm.  She was continued on torsemide 10 mg daily and potassium chloride 40 mEq daily.  Metoprolol 25 mg daily was restarted.  Courtney noted that her hepatic panel 1/2019 was fine, but TSH was elevated at 7.67.  T4 was still normal.  EP follow-up was recommended.    Overall, Taty really feels like she is doing well and getting better.  She does not think she is had any atrial fibrillation since amiodarone was started.  Device interrogation confirms this, with her last episode occurring 3/10.    She still has a little bit of lower extremity edema, but states it is much improved.  She denies orthopnea or PND.  She complains of muscle loss, and is eager to continue cardiac rehab.  She denies chest pain, pressure or tightness.  She has had no problems with the device site.    EKG today, which I over read, shows sinus rhythm at 92 bpm  Device interrogation (remote) done today showed  0% AP and 15% , with 53 mode switches comprising 47% of the time since 2/27/19.  Longest episode was 42 hours, with controlled ventricular rates.  She had no recurrent atrial fibrillation since 3/10/19, which was a 20-hour episode.  Echo 3/14/2019 with drop in EF 20-25% with severe global hypokinesis of the left ventricle.  RV was of normal size with moderately decreased right ventricular systolic function.  IVC was dilated and failed to change with respiration, suggesting elevated central venous pressure.  While percent mitral valve looked good, with good leaflet opening, mean gradient of 3 mmHg and no evidence of mitral regurgitation or perivalvular leaking.  Tricuspid valve showed 1+ tricuspid regurgitation.  RVSP was 24+ right atrial pressure (elevated).  Coronary angiography preoperatively  1/2019 showed normal coronary arteries.  There is some evidence of remodeling, with EF of 45-50%.      Assessment & Plan:    1.  Atrial fibrillation    As above, first diagnosed 12/2018, manifested by palpitations and rapid heart rate.  She was started on amiodarone 12/13/18, but this was discontinued after her surgery as she had remained    Back in atrial fibrillation at the time of her first device check, with overall adequate heart rate control    Reloaded with amiodarone starting 3/7, and currently in sinus rhythm by EKG and device interrogation    Has remained on anticoagulation with Xarelto for her CHADSVASc of 3 (cardiomyopathy, age and sex)    Device interrogation, as above, shows that she is been in sinus rhythm since 3/10, shortly after the initiation of    PLAN:    We will continue amiodarone 200 mg daily    When I see her 5/2019, we will get a device interrogation prior.  At that time, I am hopeful that we will be able to discontinue amiodarone therapy.  If not, we will need to continue routine laboratory monitoring (TSH and CMP done already)    Continue anticoagulation      2.  Postoperative heart failure    As above, EF dropped from 65% preoperatively to 20-25% on most recent echocardiogram.    Sees Courtney in the [facility].  Still has some mild edema and jugular venous distention.  She is on torsemide 10 mg daily and potassium chloride.    PLAN:    Continue to weigh daily    Continue diuretic/potassium supplementation    See Courtney with repeat blood work 4/2019 per her request ([place])    Continue beta-blocker, but blood pressure a bit soft to initiate ACE inhibitor    3.  Recent valvular surgery    As above, mitral and tricuspid valves look great on repeat echocardiogram, but EF is dropped significantly    As above, had postoperative issues with fluid overload, recurrent atrial fibrillation, complete heart block requiring dual-chamber pacemaker implantation and drop in ejection fraction    We will  continue to see Courtney and Dr. Stone    PLAN:    SBE prophylaxis      Faye Neal PA-C, MSPAS      Orders Placed This Encounter   Procedures     Follow-Up with Cardiac Advanced Practice Provider     EKG 12-lead complete w/read - Clinics (performed today)     EKG 12-lead complete w/read - Clinics (to be scheduled)     Orders Placed This Encounter   Medications     amiodarone (PACERONE/CODARONE) 200 MG tablet     Sig: Take 1 tablet (200 mg) by mouth daily     Refill:  0     Medications Discontinued During This Encounter   Medication Reason     amiodarone (PACERONE/CODARONE) 200 MG tablet          Encounter Diagnoses   Name Primary?     Acute diastolic congestive heart failure (H)      Other hypervolemia      Persistent atrial fibrillation (H) Yes     Paroxysmal atrial fibrillation (H)        CURRENT MEDICATIONS:  Current Outpatient Medications   Medication Sig Dispense Refill     amiodarone (PACERONE/CODARONE) 200 MG tablet Take 1 tablet (200 mg) by mouth daily  0     aspirin 81 MG EC tablet Take 81 mg by mouth every evening       calcium carbonate-vitamin D (CALCIUM 600+D) 600-200 MG-UNIT TABS Take 1 tablet by mouth 2 times daily       metoprolol succinate ER (TOPROL-XL) 25 MG 24 hr tablet Take 1 tablet (25 mg) by mouth daily       Multiple Vitamins-Minerals (MULTIVITAMIN ADULT) TABS Take 1 tablet by mouth daily       omeprazole (PRILOSEC) 10 MG CR capsule Take 1 capsule (10 mg) by mouth every morning (before breakfast) 90 capsule 3     oxyCODONE (ROXICODONE) 5 MG tablet Take 1-2 tablets (5-10 mg) by mouth every 4 hours as needed for moderate to severe pain 20 tablet 0     polyethylene glycol (MIRALAX/GLYCOLAX) packet Take 17 g by mouth daily 7 packet 0     potassium chloride ER (K-DUR/KLOR-CON M) 20 MEQ CR tablet Take 40 mEq by mouth daily        rivaroxaban ANTICOAGULANT (XARELTO) 20 MG TABS tablet Take 1 tablet (20 mg) by mouth daily (with dinner) 90 tablet 3     senna-docusate (SENOKOT-S/PERICOLACE) 8.6-50 MG  tablet Take 1 tablet by mouth 2 times daily 20 tablet 0     torsemide (DEMADEX) 20 MG tablet Take 1/2 tablet (10 mg)         ALLERGIES     Allergies   Allergen Reactions     Chlorpheniramine Other (See Comments)     LOC and fainting      Phenylephrine Other (See Comments)     fainting       PAST MEDICAL HISTORY:  Past Medical History:   Diagnosis Date     Allergic rhinitis, cause unspecified     dust mites, ragweed     Complete AV block (H)     BSX DDD PM 2-     History of tricuspid valve repair      Mitral valve prolapse     bioprosthetic MVR 2-     Paroxysmal atrial fibrillation (H)        PAST SURGICAL HISTORY:  Past Surgical History:   Procedure Laterality Date     COLONOSCOPY N/A 9/15/2015    Procedure: COLONOSCOPY;  Surgeon: Anson Llanos MD;  Location:  GI     CV HEART CATHETERIZATION WITH POSSIBLE INTERVENTION N/A 1/9/2019    Procedure: Heart Catheterization with Possible Intervention;  Surgeon: Ritesh Whittaker MD;  Location:  HEART CARDIAC CATH LAB     CV RIGHT AND LEFT HEART CATH N/A 1/9/2019    Procedure: Right and Left Heart Catherization;  Surgeon: Ritesh Whittaker MD;  Location:  HEART CARDIAC CATH LAB     ENT SURGERY      T&A     EP PACEMAKER N/A 2/18/2019    Procedure: EP Pacemaker;  Surgeon: Inocencia Guillen MD;  Location:  HEART CARDIAC CATH LAB     REPAIR VALVE TRICUSPID MINIMALLY INVASIVE N/A 2/15/2019    Procedure: MINIMALLY INVASIVE TRICUSPID VALVE REPAIR WITH 32MM SULLIVAN RING;  Surgeon: Andrea Hanna MD;  Location:  OR     REPLACE VALVE MITRAL MINIMALLY INVASIVE N/A 2/15/2019    Procedure: MINIMALLY INVASIVE MITRAL VALVE REPLACEMENT WITH EPIC ST KEYUR 31MM VALVE; ON PUMP WITH TIA.  CARDIOLOGIST DR CARDENAS;  Surgeon: Andrea Hanna MD;  Location:  OR       FAMILY HISTORY:  Family History   Problem Relation Age of Onset     Osteoporosis Mother      Alzheimer Disease Mother      Family History Negative Father      Heart Disease  Maternal Grandfather      Family History Negative Paternal Grandmother      Family History Negative Paternal Grandfather        SOCIAL HISTORY:  Social History     Socioeconomic History     Marital status:      Spouse name: None     Number of children: 3     Years of education: None     Highest education level: None   Occupational History     None   Social Needs     Financial resource strain: None     Food insecurity:     Worry: None     Inability: None     Transportation needs:     Medical: None     Non-medical: None   Tobacco Use     Smoking status: Never Smoker     Smokeless tobacco: Never Used   Substance and Sexual Activity     Alcohol use: No     Drug use: No     Sexual activity: Not Currently   Lifestyle     Physical activity:     Days per week: None     Minutes per session: None     Stress: None   Relationships     Social connections:     Talks on phone: None     Gets together: None     Attends Anabaptist service: None     Active member of club or organization: None     Attends meetings of clubs or organizations: None     Relationship status: None     Intimate partner violence:     Fear of current or ex partner: None     Emotionally abused: None     Physically abused: None     Forced sexual activity: None   Other Topics Concern      Service Not Asked     Blood Transfusions Not Asked     Caffeine Concern No     Comment: 1 cup of coffee and 1 can diet coke per day     Occupational Exposure Not Asked     Hobby Hazards Not Asked     Sleep Concern Yes     Comment: takes Doxylamine succinate 1/2 tab every night     Stress Concern No     Weight Concern No     Special Diet No     Comment: healthy      Back Care Not Asked     Exercise Yes     Comment: walking 45min x7 a wk. Very active, [employer], Golf, Bowling, Cardio     Bike Helmet Not Asked     Seat Belt Yes     Self-Exams Yes     Parent/sibling w/ CABG, MI or angioplasty before 65F 55M? No   Social History Narrative     None       Review of  "Systems:  Skin:  Negative     Eyes:  Positive for glasses  ENT:  Positive for sinus trouble;nasal congestion  Respiratory:  Positive for cough  Cardiovascular:  Negative for;palpitations;lightheadedness;dizziness fatigue;Positive for;edema;exercise intolerance  Gastroenterology: Negative for melena;hematochezia  Genitourinary:  Negative    Musculoskeletal:  Positive for arthritis  Neurologic:  Negative    Psychiatric:  Positive for sleep disturbances;anxiety  Heme/Lymph/Imm:  Positive for allergies  Endocrine:  Positive for      Physical Exam:  Vitals: /68   Pulse 93   Ht 1.676 m (5' 6\")   Wt 53.5 kg (118 lb)   BMI 19.05 kg/m       Constitutional:  cooperative, alert and oriented, well developed, well nourished, in no acute distress        Skin:  warm and dry to the touch, no apparent skin lesions or masses noted        Head:  normocephalic, no masses or lesions        Eyes:  pupils equal and round;conjunctivae and lids unremarkable;sclera white        ENT:  no pallor or cyanosis, dentition good        Neck:  no carotid bruit JVP 10-12;JVP elevated      Chest:  normal breath sounds, clear to auscultation, normal A-P diameter, normal symmetry, normal respiratory excursion, no use of accessory muscles        Cardiac: regular rhythm;no murmurs, gallops or rubs detected                  Abdomen:  abdomen soft        Vascular: pulses full and equal                                      Extremities and Back:  no deformities, clubbing, cyanosis, erythema observed        Neurological:  no gross motor deficits          Recent Lab Results:  LIPID RESULTS:  Lab Results   Component Value Date    CHOL 202 (H) 07/23/2015    HDL 96 07/23/2015    LDL 83 07/23/2015    TRIG 116 07/23/2015    CHOLHDLRATIO 2.1 07/23/2015       LIVER ENZYME RESULTS:  Lab Results   Component Value Date     (H) 02/16/2019    ALT 40 02/16/2019       CBC RESULTS:  Lab Results   Component Value Date    WBC 13.7 (H) 02/22/2019    RBC 2.64 (L) " 02/22/2019    HGB 10.0 (L) 03/07/2019    HCT 23.4 (L) 02/22/2019    MCV 89 02/22/2019    MCH 30.7 02/22/2019    MCHC 34.6 02/22/2019    RDW 14.8 02/22/2019     02/22/2019       BMP RESULTS:  Lab Results   Component Value Date     03/21/2019    POTASSIUM 3.5 03/21/2019    CHLORIDE 106 03/21/2019    CO2 27 03/21/2019    ANIONGAP 6 03/21/2019     (H) 03/21/2019    BUN 12 03/21/2019    CR 0.91 03/21/2019    GFRESTIMATED 63 03/21/2019    GFRESTBLACK 73 03/21/2019    ARIELLE 8.9 03/21/2019        A1C RESULTS:  Lab Results   Component Value Date    A1C 5.9 (H) 01/09/2019       INR RESULTS:  Lab Results   Component Value Date    INR 1.21 (H) 02/18/2019    INR 1.24 (H) 02/16/2019           Thank you for allowing me to participate in the care of your patient.    Sincerely,     SAMMI JonC     [facility]

## 2019-03-27 ENCOUNTER — HOSPITAL ENCOUNTER (OUTPATIENT)
Dept: CARDIAC REHAB | Facility: CLINIC | Age: 74
End: 2019-03-27
Attending: STUDENT IN AN ORGANIZED HEALTH CARE EDUCATION/TRAINING PROGRAM
Payer: COMMERCIAL

## 2019-03-27 PROCEDURE — 40000116 ZZH STATISTIC OP CR VISIT

## 2019-03-27 PROCEDURE — 93798 PHYS/QHP OP CAR RHAB W/ECG: CPT

## 2019-04-01 ENCOUNTER — HOSPITAL ENCOUNTER (OUTPATIENT)
Dept: CARDIAC REHAB | Facility: CLINIC | Age: 74
End: 2019-04-01
Attending: STUDENT IN AN ORGANIZED HEALTH CARE EDUCATION/TRAINING PROGRAM
Payer: COMMERCIAL

## 2019-04-01 PROCEDURE — 93798 PHYS/QHP OP CAR RHAB W/ECG: CPT

## 2019-04-01 PROCEDURE — 40000116 ZZH STATISTIC OP CR VISIT

## 2019-04-03 ENCOUNTER — HOSPITAL ENCOUNTER (OUTPATIENT)
Dept: CARDIAC REHAB | Facility: CLINIC | Age: 74
End: 2019-04-03
Attending: STUDENT IN AN ORGANIZED HEALTH CARE EDUCATION/TRAINING PROGRAM
Payer: COMMERCIAL

## 2019-04-03 PROCEDURE — 40000116 ZZH STATISTIC OP CR VISIT

## 2019-04-03 PROCEDURE — 93798 PHYS/QHP OP CAR RHAB W/ECG: CPT

## 2019-04-05 LAB
MDC_IDC_EPISODE_DTM: NORMAL
MDC_IDC_EPISODE_DURATION: 3568 S
MDC_IDC_EPISODE_DURATION: 3573 S
MDC_IDC_EPISODE_DURATION: 3577 S
MDC_IDC_EPISODE_DURATION: 3591 S
MDC_IDC_EPISODE_DURATION: 3596 S
MDC_IDC_EPISODE_DURATION: 3617 S
MDC_IDC_EPISODE_DURATION: NORMAL S
MDC_IDC_EPISODE_ID: NORMAL
MDC_IDC_EPISODE_TYPE: NORMAL
MDC_IDC_LEAD_IMPLANT_DT: NORMAL
MDC_IDC_LEAD_IMPLANT_DT: NORMAL
MDC_IDC_LEAD_LOCATION: NORMAL
MDC_IDC_LEAD_LOCATION: NORMAL
MDC_IDC_LEAD_LOCATION_DETAIL_1: NORMAL
MDC_IDC_LEAD_LOCATION_DETAIL_1: NORMAL
MDC_IDC_LEAD_MFG: NORMAL
MDC_IDC_LEAD_MFG: NORMAL
MDC_IDC_LEAD_MODEL: NORMAL
MDC_IDC_LEAD_MODEL: NORMAL
MDC_IDC_LEAD_POLARITY_TYPE: NORMAL
MDC_IDC_LEAD_POLARITY_TYPE: NORMAL
MDC_IDC_LEAD_SERIAL: NORMAL
MDC_IDC_LEAD_SERIAL: NORMAL
MDC_IDC_MSMT_BATTERY_DTM: NORMAL
MDC_IDC_MSMT_BATTERY_REMAINING_LONGEVITY: 156 MO
MDC_IDC_MSMT_BATTERY_REMAINING_PERCENTAGE: 100 %
MDC_IDC_MSMT_BATTERY_STATUS: NORMAL
MDC_IDC_MSMT_LEADCHNL_RA_IMPEDANCE_VALUE: 716 OHM
MDC_IDC_MSMT_LEADCHNL_RV_IMPEDANCE_VALUE: 949 OHM
MDC_IDC_MSMT_LEADCHNL_RV_PACING_THRESHOLD_AMPLITUDE: 0.7 V
MDC_IDC_MSMT_LEADCHNL_RV_PACING_THRESHOLD_PULSEWIDTH: 0.4 MS
MDC_IDC_PG_IMPLANT_DTM: NORMAL
MDC_IDC_PG_MFG: NORMAL
MDC_IDC_PG_MODEL: NORMAL
MDC_IDC_PG_SERIAL: NORMAL
MDC_IDC_PG_TYPE: NORMAL
MDC_IDC_SESS_CLINIC_NAME: NORMAL
MDC_IDC_SESS_DTM: NORMAL
MDC_IDC_SESS_TYPE: NORMAL
MDC_IDC_SET_BRADY_AT_MODE_SWITCH_MODE: NORMAL
MDC_IDC_SET_BRADY_AT_MODE_SWITCH_RATE: 170 {BEATS}/MIN
MDC_IDC_SET_BRADY_LOWRATE: 60 {BEATS}/MIN
MDC_IDC_SET_BRADY_MAX_SENSOR_RATE: 130 {BEATS}/MIN
MDC_IDC_SET_BRADY_MAX_TRACKING_RATE: 130 {BEATS}/MIN
MDC_IDC_SET_BRADY_MODE: NORMAL
MDC_IDC_SET_BRADY_PAV_DELAY_HIGH: 150 MS
MDC_IDC_SET_BRADY_PAV_DELAY_LOW: 200 MS
MDC_IDC_SET_BRADY_SAV_DELAY_HIGH: 150 MS
MDC_IDC_SET_BRADY_SAV_DELAY_LOW: 200 MS
MDC_IDC_SET_LEADCHNL_RA_PACING_AMPLITUDE: 3.5 V
MDC_IDC_SET_LEADCHNL_RA_PACING_CAPTURE_MODE: NORMAL
MDC_IDC_SET_LEADCHNL_RA_PACING_POLARITY: NORMAL
MDC_IDC_SET_LEADCHNL_RA_PACING_PULSEWIDTH: 0.5 MS
MDC_IDC_SET_LEADCHNL_RA_SENSING_ADAPTATION_MODE: NORMAL
MDC_IDC_SET_LEADCHNL_RA_SENSING_POLARITY: NORMAL
MDC_IDC_SET_LEADCHNL_RA_SENSING_SENSITIVITY: 0.5 MV
MDC_IDC_SET_LEADCHNL_RV_PACING_AMPLITUDE: 1.2 V
MDC_IDC_SET_LEADCHNL_RV_PACING_CAPTURE_MODE: NORMAL
MDC_IDC_SET_LEADCHNL_RV_PACING_POLARITY: NORMAL
MDC_IDC_SET_LEADCHNL_RV_PACING_PULSEWIDTH: 0.4 MS
MDC_IDC_SET_LEADCHNL_RV_SENSING_ADAPTATION_MODE: NORMAL
MDC_IDC_SET_LEADCHNL_RV_SENSING_POLARITY: NORMAL
MDC_IDC_SET_LEADCHNL_RV_SENSING_SENSITIVITY: 1.5 MV
MDC_IDC_SET_ZONE_DETECTION_INTERVAL: 375 MS
MDC_IDC_SET_ZONE_TYPE: NORMAL
MDC_IDC_SET_ZONE_VENDOR_TYPE: NORMAL
MDC_IDC_STAT_AT_BURDEN_PERCENT: 47 %
MDC_IDC_STAT_AT_DTM_END: NORMAL
MDC_IDC_STAT_AT_DTM_START: NORMAL
MDC_IDC_STAT_BRADY_DTM_END: NORMAL
MDC_IDC_STAT_BRADY_DTM_START: NORMAL
MDC_IDC_STAT_BRADY_RA_PERCENT_PACED: 0 %
MDC_IDC_STAT_BRADY_RV_PERCENT_PACED: 15 %
MDC_IDC_STAT_EPISODE_RECENT_COUNT: 0
MDC_IDC_STAT_EPISODE_RECENT_COUNT: 53
MDC_IDC_STAT_EPISODE_RECENT_COUNT_DTM_END: NORMAL
MDC_IDC_STAT_EPISODE_RECENT_COUNT_DTM_START: NORMAL
MDC_IDC_STAT_EPISODE_TYPE: NORMAL
MDC_IDC_STAT_EPISODE_VENDOR_TYPE: NORMAL

## 2019-04-08 ENCOUNTER — HOSPITAL ENCOUNTER (OUTPATIENT)
Dept: CARDIAC REHAB | Facility: CLINIC | Age: 74
End: 2019-04-08
Attending: STUDENT IN AN ORGANIZED HEALTH CARE EDUCATION/TRAINING PROGRAM
Payer: COMMERCIAL

## 2019-04-08 PROCEDURE — 40000116 ZZH STATISTIC OP CR VISIT: Performed by: OCCUPATIONAL THERAPIST

## 2019-04-08 PROCEDURE — 93798 PHYS/QHP OP CAR RHAB W/ECG: CPT | Performed by: OCCUPATIONAL THERAPIST

## 2019-04-15 ENCOUNTER — HOSPITAL ENCOUNTER (OUTPATIENT)
Dept: CARDIAC REHAB | Facility: CLINIC | Age: 74
End: 2019-04-15
Attending: STUDENT IN AN ORGANIZED HEALTH CARE EDUCATION/TRAINING PROGRAM
Payer: COMMERCIAL

## 2019-04-15 PROCEDURE — 40000116 ZZH STATISTIC OP CR VISIT: Performed by: REHABILITATION PRACTITIONER

## 2019-04-15 PROCEDURE — 93798 PHYS/QHP OP CAR RHAB W/ECG: CPT | Performed by: REHABILITATION PRACTITIONER

## 2019-04-18 ENCOUNTER — OFFICE VISIT (OUTPATIENT)
Dept: CARDIOLOGY | Facility: CLINIC | Age: 74
End: 2019-04-18
Attending: PHYSICIAN ASSISTANT
Payer: COMMERCIAL

## 2019-04-18 VITALS
DIASTOLIC BLOOD PRESSURE: 78 MMHG | BODY MASS INDEX: 19.25 KG/M2 | HEIGHT: 66 IN | WEIGHT: 119.8 LBS | SYSTOLIC BLOOD PRESSURE: 100 MMHG | HEART RATE: 72 BPM | OXYGEN SATURATION: 97 %

## 2019-04-18 DIAGNOSIS — I50.21 ACUTE SYSTOLIC HEART FAILURE (H): ICD-10-CM

## 2019-04-18 DIAGNOSIS — I50.31 ACUTE DIASTOLIC CONGESTIVE HEART FAILURE (H): ICD-10-CM

## 2019-04-18 DIAGNOSIS — I48.0 PAF (PAROXYSMAL ATRIAL FIBRILLATION) (H): ICD-10-CM

## 2019-04-18 LAB
ANION GAP SERPL CALCULATED.3IONS-SCNC: 4 MMOL/L (ref 3–14)
BUN SERPL-MCNC: 15 MG/DL (ref 7–30)
CALCIUM SERPL-MCNC: 8.8 MG/DL (ref 8.5–10.1)
CHLORIDE SERPL-SCNC: 105 MMOL/L (ref 94–109)
CO2 SERPL-SCNC: 29 MMOL/L (ref 20–32)
CREAT SERPL-MCNC: 0.94 MG/DL (ref 0.52–1.04)
GFR SERPL CREATININE-BSD FRML MDRD: 60 ML/MIN/{1.73_M2}
GLUCOSE SERPL-MCNC: 89 MG/DL (ref 70–99)
NT-PROBNP SERPL-MCNC: 2131 PG/ML (ref 0–125)
POTASSIUM SERPL-SCNC: 4.1 MMOL/L (ref 3.4–5.3)
SODIUM SERPL-SCNC: 138 MMOL/L (ref 133–144)

## 2019-04-18 PROCEDURE — 36415 COLL VENOUS BLD VENIPUNCTURE: CPT | Performed by: PHYSICIAN ASSISTANT

## 2019-04-18 PROCEDURE — 83880 ASSAY OF NATRIURETIC PEPTIDE: CPT | Performed by: PHYSICIAN ASSISTANT

## 2019-04-18 PROCEDURE — 80048 BASIC METABOLIC PNL TOTAL CA: CPT | Performed by: PHYSICIAN ASSISTANT

## 2019-04-18 PROCEDURE — 99214 OFFICE O/P EST MOD 30 MIN: CPT | Mod: 24 | Performed by: PHYSICIAN ASSISTANT

## 2019-04-18 RX ORDER — SENNOSIDES A AND B 8.6 MG/1
1 TABLET, FILM COATED ORAL DAILY
COMMUNITY
End: 2019-07-29

## 2019-04-18 ASSESSMENT — MIFFLIN-ST. JEOR: SCORE: 1070.16

## 2019-04-18 NOTE — LETTER
4/18/2019    Anyi Olmos MD  [address] [facility] [address]  [facility] [address]    RE: Taty Aguila       Dear Colleague,    I had the pleasure of seeing Taty Aguila in the [facility].      Cardiology Progress Note    Date of Service: 04/18/2019      Reason for visit: CORE clinic follow up, HFpEF and atrial fibrillation s/p mitral valve surgery.    Primary cardiologist: Dr. Jesus Stone      HPI:  Ms. Aguila is a pleasant 74 year old female who has been followed in our clinic for mitral valve regurgitation and atrial fibrillation. 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  In Oct 2018 she developed palpitations and was found to be in afib with RVR. She converted back to sinus rhythm with diltiazem gtt, and was placed on anticoagulation. She did will until Dec 2018 when again she returned to afib with RVR. Her metoprolol dose was increased. Her rates were still elevated on short term follow up, and she was loaded with amiodarone and a cardioversion was arranged. However, when she presented for this, she was noted to be back in sinus rhythm.    She then saw Dr. Hanna the end of December to re-consider intervention on her valve. He felt that she did indeed have severe mitral regurgitation, and in the setting of new onset atrial fibrillation, valve surgery was recommended. Preop angio showed no coronary disease. TIA showed preserved EF 65% with normal RV size and function. She had confirmed 3-4+ MR, and the valve was severely thickened and myxomatous. She also had 3+ TR. Then, in mid Feb 2019 she successfully underwent minimally invasive MV replacement with Epic St Ron 31mm valve. At the same time, she had tricuspid valve repair with a 32mm Oquendo ring. She developed complete AV block post operatively, and underwent pacemaker placement. She had some volume overload post op  for which she received diuresis, but this was complicated by hyponatremia (lowest of 126), and nephrology assisted with management. She was discharged on Xarelto/ASA, and notably no diuretics. She did not have any recurrence of afib during that stay, and her amiodarone and beta blockers were discontinued.     She was seen the end of Feb by the CT surgery team for follow up and noted fluid retention with RAYO which did not respond to low dose diuretics; I then saw her in early March for an urgent CORE evaluation. She was feeling poorly with cough, orthopnea, and was ~10 lbs up from her perceived baseline. ECG showed she was back in atrial fibrillation (device check confirmed 100% afib, appeared rate controlled in the 80-90 range). I changed her lasix to torsemide which resulted in good weight loss and much improvement in her edema. With the assistance of EP, we also reloaded her with amiodarone which successfully converted her back to sinus rhythm. Around the same time, I requested a repeat echocardiogram which showed her EF hade declined, to 20-25%, with severe global hypokinesia of the LV and also moderately decreased RV function. Her MV showed leaflets open well with no leak and normal valve function. The tricuspid ring had 1+ regurgitation. IVC, as expected, suggested elevated CVP.     She followed up with CHRISTIANO Degroot of EP shortly after our last visit and she remained in sinus rhythm on ECG and by device check. Her diuretics were not changed and amiodarone was continued. Plan was for return visit in 2 months and possible discontinuation of amiodarone. She's back today for our planned CORE follow up.    Since last visit she continues to do well, with the exception of some fatigue and occasional dizziness when she first stands or moves too fast. Her weight remains stable at 118-119 lbs on her home scale, with minimal edema. Her cough and orthopnea have resolved, and she remains on torsemide at 10mg daily. She  has not had recurrence of palpitations and believes she has remained in sinus rhythm. BP today is a bit soft, and as above she has had occasional dizziness, though denies presyncope/syncope. Labs today show preserved renal function and acceptable electrolytes. Her NT-proBNP is much improved, down to 2131 from 7664 at our initial visit.       ASSESSMENT/PLAN:    1. Acute systolic heart failure.   --Echo previously with normal EF, but recent acute systolic heart failure in March 2019 post operatively after MV replacement and TV repair. Post op vs recurrence of afib as below.   --Today she appears essentially euvolemic with stable weights, though still some trace leg edema. Will continue torsemide at 10mg daily for now, but have some room to go back up if needed. Renal function is holding and electrolytes are acceptable on current dose KDur.    --Continue Toprol XL 25mg daily. I am hesitant to add an ACE-I currently given what sounds like some orthostatic hypotension at home.    --Will plan repeat echo at our next f/u in 2-3 now in sinus rhythm.    --Continue cardiac rehab.       2. Paroxysmal atrial fibrillation, symptomatic.   --First noted Oct 2018, converted to sinus with diltiazem, and placed on beta blockers.  Returned Dec 2018 and placed on amiodarone with plans for CV, but when presented she had converted back to sinus. Her beta blockers and amio were stopped after her valve surgery as she remained in sinus rhythm. In addition, she underwent dual chamber PPM for CHB post operatively.    --Remains in sinus today on exam, back on amiodarone along with her metoprolol. She has a f/u planned with EP next month, with repeat device check.    --She is on ASA/Xarelto post valve surgery, which we will continue.     3. Valvular heart disease.   --Now s/p minimally invasive MV replacement with Epic St Ron 31mm valve and tricuspid valve repair with a 32mm Oquendo ring in Feb 2019. Echo last month showed decline in EF,  though appears her MV is functioning well, and tricuspid ring has 1+ regurgitation.   --As above, had postoperative issues with fluid overload, recurrent atrial fibrillation, complete heart block requiring dual-chamber pacemaker implantation and drop in EF. We are continuing to monitor closely.       Follow up plan:  To see CHRISTIANO Degroot in May 2019 as planned. Have requested 3 month f/u with myself in CORE with repeat echocardiogram and labs.       Orders this Visit:  Orders Placed This Encounter   Procedures     Basic metabolic panel     N terminal pro BNP outpatient     Follow-Up with CORE Clinic - PAMELLA visit     Echocardiogram Complete     Orders Placed This Encounter   Medications     senna (SENOKOT) 8.6 MG tablet     Sig: Take 1 tablet by mouth daily         CURRENT MEDICATIONS:  Current Outpatient Medications   Medication Sig Dispense Refill     amiodarone (PACERONE/CODARONE) 200 MG tablet Take 1 tablet (200 mg) by mouth daily  0     aspirin 81 MG EC tablet Take 81 mg by mouth every evening       calcium carbonate-vitamin D (CALCIUM 600+D) 600-200 MG-UNIT TABS Take 1 tablet by mouth 2 times daily       metoprolol succinate ER (TOPROL-XL) 25 MG 24 hr tablet Take 1 tablet (25 mg) by mouth daily       Multiple Vitamins-Minerals (MULTIVITAMIN ADULT) TABS Take 1 tablet by mouth daily       omeprazole (PRILOSEC) 10 MG CR capsule Take 1 capsule (10 mg) by mouth every morning (before breakfast) 90 capsule 3     potassium chloride ER (K-DUR/KLOR-CON M) 20 MEQ CR tablet Take 40 mEq by mouth daily        rivaroxaban ANTICOAGULANT (XARELTO) 20 MG TABS tablet Take 1 tablet (20 mg) by mouth daily (with dinner) 90 tablet 3     senna (SENOKOT) 8.6 MG tablet Take 1 tablet by mouth daily       torsemide (DEMADEX) 20 MG tablet Take 1/2 tablet (10 mg)       oxyCODONE (ROXICODONE) 5 MG tablet Take 1-2 tablets (5-10 mg) by mouth every 4 hours as needed for moderate to severe pain (Patient not taking: Reported on 4/18/2019) 20  tablet 0       ALLERGIES     Allergies   Allergen Reactions     Chlorpheniramine Other (See Comments)     LOC and fainting      Phenylephrine Other (See Comments)     fainting       PAST MEDICAL HISTORY:  Past Medical History:   Diagnosis Date     Allergic rhinitis, cause unspecified     dust mites, ragweed     Complete AV block (H)     BSX DDD PM 2-     History of tricuspid valve repair      Mitral valve prolapse     bioprosthetic MVR 2-     Paroxysmal atrial fibrillation (H)        PAST SURGICAL HISTORY:  Past Surgical History:   Procedure Laterality Date     COLONOSCOPY N/A 9/15/2015    Procedure: COLONOSCOPY;  Surgeon: Anson Llanos MD;  Location:  GI     CV HEART CATHETERIZATION WITH POSSIBLE INTERVENTION N/A 1/9/2019    Procedure: Heart Catheterization with Possible Intervention;  Surgeon: Ritesh Whittaker MD;  Location:  HEART CARDIAC CATH LAB     CV RIGHT AND LEFT HEART CATH N/A 1/9/2019    Procedure: Right and Left Heart Catherization;  Surgeon: Ritesh Whittaker MD;  Location:  HEART CARDIAC CATH LAB     ENT SURGERY      T&A     EP PACEMAKER N/A 2/18/2019    Procedure: EP Pacemaker;  Surgeon: Inocencia Guillen MD;  Location:  HEART CARDIAC CATH LAB     REPAIR VALVE TRICUSPID MINIMALLY INVASIVE N/A 2/15/2019    Procedure: MINIMALLY INVASIVE TRICUSPID VALVE REPAIR WITH 32MM SULLIVAN RING;  Surgeon: Andrea Hanna MD;  Location:  OR     REPLACE VALVE MITRAL MINIMALLY INVASIVE N/A 2/15/2019    Procedure: MINIMALLY INVASIVE MITRAL VALVE REPLACEMENT WITH EPIC ST KEYUR 31MM VALVE; ON PUMP WITH TIA.  CARDIOLOGIST DR CARDENAS;  Surgeon: Andrea Hanna MD;  Location:  OR       FAMILY HISTORY:  Family History   Problem Relation Age of Onset     Osteoporosis Mother      Alzheimer Disease Mother      Family History Negative Father      Heart Disease Maternal Grandfather      Family History Negative Paternal Grandmother      Family History Negative Paternal  Grandfather        SOCIAL HISTORY:  Social History     Socioeconomic History     Marital status:      Spouse name: None     Number of children: 3     Years of education: None     Highest education level: None   Occupational History     None   Social Needs     Financial resource strain: None     Food insecurity:     Worry: None     Inability: None     Transportation needs:     Medical: None     Non-medical: None   Tobacco Use     Smoking status: Never Smoker     Smokeless tobacco: Never Used   Substance and Sexual Activity     Alcohol use: No     Drug use: No     Sexual activity: Not Currently   Lifestyle     Physical activity:     Days per week: None     Minutes per session: None     Stress: None   Relationships     Social connections:     Talks on phone: None     Gets together: None     Attends [employer] service: None     Active member of club or organization: None     Attends meetings of clubs or organizations: None     Relationship status: None     Intimate partner violence:     Fear of current or ex partner: None     Emotionally abused: None     Physically abused: None     Forced sexual activity: None   Other Topics Concern      Service Not Asked     Blood Transfusions Not Asked     Caffeine Concern No     Comment: 1 cup of coffee and 1 can diet coke per day     Occupational Exposure Not Asked     Hobby Hazards Not Asked     Sleep Concern Yes     Comment: takes Doxylamine succinate 1/2 tab every night     Stress Concern No     Weight Concern No     Special Diet No     Comment: healthy      Back Care Not Asked     Exercise Yes     Comment: walking 45min x7 a wk. Very active, [employer], Golf, Bowling, Cardio     Bike Helmet Not Asked     Seat Belt Yes     Self-Exams Yes     Parent/sibling w/ CABG, MI or angioplasty before 65F 55M? No   Social History Narrative     None       Review of Systems:  Cardiovascular: negative for chest pain, neg palpitations, orthopnea, pos mild LE edema.  Constitutional:  "negative for chills, sweats, fevers.   Resp: Negative for dyspnea at rest, neg RAYO, neg cough, hemoptysis, known chronic lung disease  HEENT: Negative for new visual changes, frequent headaches  Gastrointestinal: negative for abdominal pain, neg diarrhea, blood in stool, nausea, vomiting  Hematologic/lymphatic: pos for current systemic anticoagulation, neg hx of blood clots  Musculoskeletal: negative for new back pain, joint pain  Neurological: negative for focal weakness, LOC, seizures, syncope. Pos occasional dizziness, neg presyncope/syncope.       Physical Exam:  Vitals: /78 (BP Location: Right arm, Patient Position: Chair, Cuff Size: Adult Small)   Pulse 72   Ht 1.676 m (5' 6\")   Wt 54.3 kg (119 lb 12.8 oz)   SpO2 97%   BMI 19.34 kg/m      Wt Readings from Last 4 Encounters:   04/18/19 54.3 kg (119 lb 12.8 oz)   03/25/19 53.5 kg (118 lb)   03/21/19 54.6 kg (120 lb 6.4 oz)   03/07/19 61.1 kg (134 lb 11.2 oz)       GEN:  In general, this is a well nourished  female in no acute distress on room air.  Patient ambulatory, accompanied by her .  HEENT:  Pupils equal, round. Sclerae nonicteric.   NECK: Supple, no masses appreciated. Trachea midline. No JVD noted while upright.  C/V:  Regular rate and rhythm on exam today, no obvious murmur, rub or gallop.   RESP: Respirations are unlabored. No use of accessory muscles. Clear with no crackles or wheezing.    GI: Abdomen soft, nontender, nondistended. No HSM appreciated.   EXTREM: Trace pitting PT bilateral edema. No cyanosis or clubbing.  NEURO: Alert and oriented, cooperative. Gait not formally assessed. No obvious focal deficits.   PSYCH: Normal affect.  SKIN: Warm and dry. No rashes or petechiae appreciated.       Recent Lab Results:    BMP RESULTS:  Lab Results   Component Value Date     04/18/2019    POTASSIUM 4.1 04/18/2019    CHLORIDE 105 04/18/2019    CO2 29 04/18/2019    ANIONGAP 4 04/18/2019    GLC 89 04/18/2019    BUN 15 " 2019    CR 0.94 2019    GFRESTIMATED 60 (L) 2019    GFRESTBLACK 70 2019    ARIELLE 8.8 2019          Additional pertinent testinD echo 3/14/19  Interpretation Summary     1. The left ventricle is normal in size. The visual ejection fraction is  estimated at 20-25%. There is severe global hypokinesia of the left ventricle.  2. The right ventricle is normal size. Moderately decreased right ventricular  systolic function  3. s/p tissue MVR with #31 St Ron Epic, implanted 2019. Leaflets open well.  Mean 3mmHg, EOAI 1.4cm/m2, DVI 1.9. No MR or paravalvular leak. Normal valve  function.  4. An annuloplasty ring is noted in the tricuspid position. There is mild (1+)  tricuspid regurgitation.  5. The IVC is dilated and fails to change with respiration, suggesting  elevated central venous pressure.     Echo 2019 (prior to valve surgery) showed EF 65%, 3-4+ MR, 3+ TR.      SAMMI WeathersC  [facility]  Pager [phone]      Thank you for allowing me to participate in the care of your patient.      Sincerely,     CHRISTIANO Weathers     [facility]    cc:   CHRISTIANO Weathers  [facility]  [address] [place] [id number]

## 2019-04-18 NOTE — PROGRESS NOTES
Cardiology Progress Note    Date of Service: 04/18/2019      Reason for visit: CORE clinic follow up, HFpEF and atrial fibrillation s/p mitral valve surgery.    Primary cardiologist: Dr. Jesus Stone      HPI:  Ms. Aguila is a pleasant 74 year old female who has been followed in our clinic for mitral valve regurgitation and atrial fibrillation. 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  In Oct 2018 she developed palpitations and was found to be in afib with RVR. She converted back to sinus rhythm with diltiazem gtt, and was placed on anticoagulation. She did will until Dec 2018 when again she returned to afib with RVR. Her metoprolol dose was increased. Her rates were still elevated on short term follow up, and she was loaded with amiodarone and a cardioversion was arranged. However, when she presented for this, she was noted to be back in sinus rhythm.    She then saw Dr. Hanna the end of December to re-consider intervention on her valve. He felt that she did indeed have severe mitral regurgitation, and in the setting of new onset atrial fibrillation, valve surgery was recommended. Preop angio showed no coronary disease. TIA showed preserved EF 65% with normal RV size and function. She had confirmed 3-4+ MR, and the valve was severely thickened and myxomatous. She also had 3+ TR. Then, in mid Feb 2019 she successfully underwent minimally invasive MV replacement with Epic St Ron 31mm valve. At the same time, she had tricuspid valve repair with a 32mm Oquendo ring. She developed complete AV block post operatively, and underwent pacemaker placement. She had some volume overload post op for which she received diuresis, but this was complicated by hyponatremia (lowest of 126), and nephrology assisted with management. She was discharged on Xarelto/ASA, and notably no diuretics. She did not have any recurrence of  afib during that stay, and her amiodarone and beta blockers were discontinued.     She was seen the end of Feb by the CT surgery team for follow up and noted fluid retention with RAYO which did not respond to low dose diuretics; I then saw her in early March for an urgent CORE evaluation. She was feeling poorly with cough, orthopnea, and was ~10 lbs up from her perceived baseline. ECG showed she was back in atrial fibrillation (device check confirmed 100% afib, appeared rate controlled in the 80-90 range). I changed her lasix to torsemide which resulted in good weight loss and much improvement in her edema. With the assistance of EP, we also reloaded her with amiodarone which successfully converted her back to sinus rhythm. Around the same time, I requested a repeat echocardiogram which showed her EF hade declined, to 20-25%, with severe global hypokinesia of the LV and also moderately decreased RV function. Her MV showed leaflets open well with no leak and normal valve function. The tricuspid ring had 1+ regurgitation. IVC, as expected, suggested elevated CVP.     She followed up with CHRISTIANO Degroot of EP shortly after our last visit and she remained in sinus rhythm on ECG and by device check. Her diuretics were not changed and amiodarone was continued. Plan was for return visit in 2 months and possible discontinuation of amiodarone. She's back today for our planned CORE follow up.    Since last visit she continues to do well, with the exception of some fatigue and occasional dizziness when she first stands or moves too fast. Her weight remains stable at 118-119 lbs on her home scale, with minimal edema. Her cough and orthopnea have resolved, and she remains on torsemide at 10mg daily. She has not had recurrence of palpitations and believes she has remained in sinus rhythm. BP today is a bit soft, and as above she has had occasional dizziness, though denies presyncope/syncope. Labs today show preserved renal  function and acceptable electrolytes. Her NT-proBNP is much improved, down to 2131 from 7664 at our initial visit.       ASSESSMENT/PLAN:    1. Acute systolic heart failure.   --Echo previously with normal EF, but recent acute systolic heart failure in March 2019 post operatively after MV replacement and TV repair. Post op vs recurrence of afib as below.   --Today she appears essentially euvolemic with stable weights, though still some trace leg edema. Will continue torsemide at 10mg daily for now, but have some room to go back up if needed. Renal function is holding and electrolytes are acceptable on current dose KDur.    --Continue Toprol XL 25mg daily. I am hesitant to add an ACE-I currently given what sounds like some orthostatic hypotension at home.    --Will plan repeat echo at our next f/u in 2-3 now in sinus rhythm.    --Continue cardiac rehab.       2. Paroxysmal atrial fibrillation, symptomatic.   --First noted Oct 2018, converted to sinus with diltiazem, and placed on beta blockers.  Returned Dec 2018 and placed on amiodarone with plans for CV, but when presented she had converted back to sinus. Her beta blockers and amio were stopped after her valve surgery as she remained in sinus rhythm. In addition, she underwent dual chamber PPM for CHB post operatively.    --Remains in sinus today on exam, back on amiodarone along with her metoprolol. She has a f/u planned with EP next month, with repeat device check.    --She is on ASA/Xarelto post valve surgery, which we will continue.     3. Valvular heart disease.   --Now s/p minimally invasive MV replacement with Epic St Ron 31mm valve and tricuspid valve repair with a 32mm Oquendo ring in Feb 2019. Echo last month showed decline in EF, though appears her MV is functioning well, and tricuspid ring has 1+ regurgitation.   --As above, had postoperative issues with fluid overload, recurrent atrial fibrillation, complete heart block requiring dual-chamber  pacemaker implantation and drop in EF. We are continuing to monitor closely.       Follow up plan:  To see CHRISTIANO Degroot in May 2019 as planned. Have requested 3 month f/u with myself in CORE with repeat echocardiogram and labs.       Orders this Visit:  Orders Placed This Encounter   Procedures     Basic metabolic panel     N terminal pro BNP outpatient     Follow-Up with [facility] - PAMELLA visit     Echocardiogram Complete     Orders Placed This Encounter   Medications     senna (SENOKOT) 8.6 MG tablet     Sig: Take 1 tablet by mouth daily         CURRENT MEDICATIONS:  Current Outpatient Medications   Medication Sig Dispense Refill     amiodarone (PACERONE/CODARONE) 200 MG tablet Take 1 tablet (200 mg) by mouth daily  0     aspirin 81 MG EC tablet Take 81 mg by mouth every evening       calcium carbonate-vitamin D (CALCIUM 600+D) 600-200 MG-UNIT TABS Take 1 tablet by mouth 2 times daily       metoprolol succinate ER (TOPROL-XL) 25 MG 24 hr tablet Take 1 tablet (25 mg) by mouth daily       Multiple Vitamins-Minerals (MULTIVITAMIN ADULT) TABS Take 1 tablet by mouth daily       omeprazole (PRILOSEC) 10 MG CR capsule Take 1 capsule (10 mg) by mouth every morning (before breakfast) 90 capsule 3     potassium chloride ER (K-DUR/KLOR-CON M) 20 MEQ CR tablet Take 40 mEq by mouth daily        rivaroxaban ANTICOAGULANT (XARELTO) 20 MG TABS tablet Take 1 tablet (20 mg) by mouth daily (with dinner) 90 tablet 3     senna (SENOKOT) 8.6 MG tablet Take 1 tablet by mouth daily       torsemide (DEMADEX) 20 MG tablet Take 1/2 tablet (10 mg)       oxyCODONE (ROXICODONE) 5 MG tablet Take 1-2 tablets (5-10 mg) by mouth every 4 hours as needed for moderate to severe pain (Patient not taking: Reported on 4/18/2019) 20 tablet 0       ALLERGIES     Allergies   Allergen Reactions     Chlorpheniramine Other (See Comments)     LOC and fainting      Phenylephrine Other (See Comments)     fainting       PAST MEDICAL HISTORY:  Past Medical  History:   Diagnosis Date     Allergic rhinitis, cause unspecified     dust mites, ragweed     Complete AV block (H)     BSX DDD PM 2-     History of tricuspid valve repair      Mitral valve prolapse     bioprosthetic MVR 2-     Paroxysmal atrial fibrillation (H)        PAST SURGICAL HISTORY:  Past Surgical History:   Procedure Laterality Date     COLONOSCOPY N/A 9/15/2015    Procedure: COLONOSCOPY;  Surgeon: Anson Llanos MD;  Location:  GI     CV HEART CATHETERIZATION WITH POSSIBLE INTERVENTION N/A 1/9/2019    Procedure: Heart Catheterization with Possible Intervention;  Surgeon: Ritesh Whittaker MD;  Location:  HEART CARDIAC CATH LAB     CV RIGHT AND LEFT HEART CATH N/A 1/9/2019    Procedure: Right and Left Heart Catherization;  Surgeon: Ritesh Whittaker MD;  Location:  HEART CARDIAC CATH LAB     ENT SURGERY      T&A     EP PACEMAKER N/A 2/18/2019    Procedure: EP Pacemaker;  Surgeon: Inocencia Guillen MD;  Location:  HEART CARDIAC CATH LAB     REPAIR VALVE TRICUSPID MINIMALLY INVASIVE N/A 2/15/2019    Procedure: MINIMALLY INVASIVE TRICUSPID VALVE REPAIR WITH 32MM SULLIVAN RING;  Surgeon: Andrea Hanna MD;  Location:  OR     REPLACE VALVE MITRAL MINIMALLY INVASIVE N/A 2/15/2019    Procedure: MINIMALLY INVASIVE MITRAL VALVE REPLACEMENT WITH EPIC ST KEYUR 31MM VALVE; ON PUMP WITH TIA.  CARDIOLOGIST DR CARDENAS;  Surgeon: Andrea Hanna MD;  Location:  OR       FAMILY HISTORY:  Family History   Problem Relation Age of Onset     Osteoporosis Mother      Alzheimer Disease Mother      Family History Negative Father      Heart Disease Maternal Grandfather      Family History Negative Paternal Grandmother      Family History Negative Paternal Grandfather        SOCIAL HISTORY:  Social History     Socioeconomic History     Marital status:      Spouse name: None     Number of children: 3     Years of education: None     Highest education level: None    Occupational History     None   Social Needs     Financial resource strain: None     Food insecurity:     Worry: None     Inability: None     Transportation needs:     Medical: None     Non-medical: None   Tobacco Use     Smoking status: Never Smoker     Smokeless tobacco: Never Used   Substance and Sexual Activity     Alcohol use: No     Drug use: No     Sexual activity: Not Currently   Lifestyle     Physical activity:     Days per week: None     Minutes per session: None     Stress: None   Relationships     Social connections:     Talks on phone: None     Gets together: None     Attends Restorationist service: None     Active member of club or organization: None     Attends meetings of clubs or organizations: None     Relationship status: None     Intimate partner violence:     Fear of current or ex partner: None     Emotionally abused: None     Physically abused: None     Forced sexual activity: None   Other Topics Concern      Service Not Asked     Blood Transfusions Not Asked     Caffeine Concern No     Comment: 1 cup of coffee and 1 can diet coke per day     Occupational Exposure Not Asked     Hobby Hazards Not Asked     Sleep Concern Yes     Comment: takes Doxylamine succinate 1/2 tab every night     Stress Concern No     Weight Concern No     Special Diet No     Comment: healthy      Back Care Not Asked     Exercise Yes     Comment: walking 45min x7 a wk. Very active, [employer], Golf, Bowling, Cardio     Bike Helmet Not Asked     Seat Belt Yes     Self-Exams Yes     Parent/sibling w/ CABG, MI or angioplasty before 65F 55M? No   Social History Narrative     None       Review of Systems:  Cardiovascular: negative for chest pain, neg palpitations, orthopnea, pos mild LE edema.  Constitutional: negative for chills, sweats, fevers.   Resp: Negative for dyspnea at rest, neg RAYO, neg cough, hemoptysis, known chronic lung disease  HEENT: Negative for new visual changes, frequent headaches  Gastrointestinal: negative  "for abdominal pain, neg diarrhea, blood in stool, nausea, vomiting  Hematologic/lymphatic: pos for current systemic anticoagulation, neg hx of blood clots  Musculoskeletal: negative for new back pain, joint pain  Neurological: negative for focal weakness, LOC, seizures, syncope. Pos occasional dizziness, neg presyncope/syncope.       Physical Exam:  Vitals: /78 (BP Location: Right arm, Patient Position: Chair, Cuff Size: Adult Small)   Pulse 72   Ht 1.676 m (5' 6\")   Wt 54.3 kg (119 lb 12.8 oz)   SpO2 97%   BMI 19.34 kg/m     Wt Readings from Last 4 Encounters:   19 54.3 kg (119 lb 12.8 oz)   19 53.5 kg (118 lb)   19 54.6 kg (120 lb 6.4 oz)   19 61.1 kg (134 lb 11.2 oz)       GEN:  In general, this is a well nourished  female in no acute distress on room air.  Patient ambulatory, accompanied by her .  HEENT:  Pupils equal, round. Sclerae nonicteric.   NECK: Supple, no masses appreciated. Trachea midline. No JVD noted while upright.  C/V:  Regular rate and rhythm on exam today, no obvious murmur, rub or gallop.   RESP: Respirations are unlabored. No use of accessory muscles. Clear with no crackles or wheezing.    GI: Abdomen soft, nontender, nondistended. No HSM appreciated.   EXTREM: Trace pitting PT bilateral edema. No cyanosis or clubbing.  NEURO: Alert and oriented, cooperative. Gait not formally assessed. No obvious focal deficits.   PSYCH: Normal affect.  SKIN: Warm and dry. No rashes or petechiae appreciated.       Recent Lab Results:    BMP RESULTS:  Lab Results   Component Value Date     2019    POTASSIUM 4.1 2019    CHLORIDE 105 2019    CO2 29 2019    ANIONGAP 4 2019    GLC 89 2019    BUN 15 2019    CR 0.94 2019    GFRESTIMATED 60 (L) 2019    GFRESTBLACK 70 2019    ARIELLE 8.8 2019          Additional pertinent testinD echo 3/14/19  Interpretation Summary     1. The left ventricle is " normal in size. The visual ejection fraction is  estimated at 20-25%. There is severe global hypokinesia of the left ventricle.  2. The right ventricle is normal size. Moderately decreased right ventricular  systolic function  3. s/p tissue MVR with #31 St Ron Epic, implanted 2/2019. Leaflets open well.  Mean 3mmHg, EOAI 1.4cm/m2, DVI 1.9. No MR or paravalvular leak. Normal valve  function.  4. An annuloplasty ring is noted in the tricuspid position. There is mild (1+)  tricuspid regurgitation.  5. The IVC is dilated and fails to change with respiration, suggesting  elevated central venous pressure.     Echo 1/2019 (prior to valve surgery) showed EF 65%, 3-4+ MR, 3+ TR.      Courtney Brian PA-C  [facility]  Pager [phone]

## 2019-04-18 NOTE — PATIENT INSTRUCTIONS
Call [facility].O.R.E. nurse for any questions or concerns Mon-Fri 8am-4pm: [phone] For concerns after hours: [phone]    Medication changes:  NONE today     Plan from today: Return in 3 months with repeat echocardiogram.     Lab results:   Results for GERONIMO PAYTON (MRN [id number]) as of 4/18/2019 10:17   Ref. Range 4/18/2019 09:20   Sodium Latest Ref Range: 133 - 144 mmol/L 138   Potassium Latest Ref Range: 3.4 - 5.3 mmol/L 4.1   Chloride Latest Ref Range: 94 - 109 mmol/L 105   Carbon Dioxide Latest Ref Range: 20 - 32 mmol/L 29   Urea Nitrogen Latest Ref Range: 7 - 30 mg/dL 15   Creatinine Latest Ref Range: 0.52 - 1.04 mg/dL 0.94   GFR Estimate Latest Ref Range: >60 mL/min/1.73_m2 60    GFR Estimate If Black Latest Ref Range: >60 mL/min/1.73_m2 70   Calcium Latest Ref Range: 8.5 - 10.1 mg/dL 8.8   Anion Gap Latest Ref Range: 3 - 14 mmol/L 4   N-Terminal Pro Bnp Latest Ref Range: 0 - 125 pg/mL 2,131 (H)

## 2019-04-18 NOTE — LETTER
4/18/2019    Anyi Olmos MD  [address] [facility] [address]  [facility] [address]    RE: Taty Aguila       Dear Colleague,    I had the pleasure of seeing Taty Aguila in the [facility].      Cardiology Progress Note    Date of Service: 04/18/2019      Reason for visit: CORE clinic follow up, HFpEF and atrial fibrillation s/p mitral valve surgery.    Primary cardiologist: Dr. Jesus Stone      HPI:  Ms. Aguila is a pleasant 74 year old female who has been followed in our clinic for mitral valve regurgitation and atrial fibrillation. 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  In Oct 2018 she developed palpitations and was found to be in afib with RVR. She converted back to sinus rhythm with diltiazem gtt, and was placed on anticoagulation. She did will until Dec 2018 when again she returned to afib with RVR. Her metoprolol dose was increased. Her rates were still elevated on short term follow up, and she was loaded with amiodarone and a cardioversion was arranged. However, when she presented for this, she was noted to be back in sinus rhythm.    She then saw Dr. Hanna the end of December to re-consider intervention on her valve. He felt that she did indeed have severe mitral regurgitation, and in the setting of new onset atrial fibrillation, valve surgery was recommended. Preop angio showed no coronary disease. TIA showed preserved EF 65% with normal RV size and function. She had confirmed 3-4+ MR, and the valve was severely thickened and myxomatous. She also had 3+ TR. Then, in mid Feb 2019 she successfully underwent minimally invasive MV replacement with Epic St Ron 31mm valve. At the same time, she had tricuspid valve repair with a 32mm Oquendo ring. She developed complete AV block post operatively, and underwent pacemaker placement. She had some volume overload post op  for which she received diuresis, but this was complicated by hyponatremia (lowest of 126), and nephrology assisted with management. She was discharged on Xarelto/ASA, and notably no diuretics. She did not have any recurrence of afib during that stay, and her amiodarone and beta blockers were discontinued.     She was seen the end of Feb by the CT surgery team for follow up and noted fluid retention with RAYO which did not respond to low dose diuretics; I then saw her in early March for an urgent CORE evaluation. She was feeling poorly with cough, orthopnea, and was ~10 lbs up from her perceived baseline. ECG showed she was back in atrial fibrillation (device check confirmed 100% afib, appeared rate controlled in the 80-90 range). I changed her lasix to torsemide which resulted in good weight loss and much improvement in her edema. With the assistance of EP, we also reloaded her with amiodarone which successfully converted her back to sinus rhythm. Around the same time, I requested a repeat echocardiogram which showed her EF hade declined, to 20-25%, with severe global hypokinesia of the LV and also moderately decreased RV function. Her MV showed leaflets open well with no leak and normal valve function. The tricuspid ring had 1+ regurgitation. IVC, as expected, suggested elevated CVP.     She followed up with CHRISTIANO Degroot of EP shortly after our last visit and she remained in sinus rhythm on ECG and by device check. Her diuretics were not changed and amiodarone was continued. Plan was for return visit in 2 months and possible discontinuation of amiodarone. She's back today for our planned CORE follow up.    Since last visit she continues to do well, with the exception of some fatigue and occasional dizziness when she first stands or moves too fast. Her weight remains stable at 118-119 lbs on her home scale, with minimal edema. Her cough and orthopnea have resolved, and she remains on torsemide at 10mg daily. She  has not had recurrence of palpitations and believes she has remained in sinus rhythm. BP today is a bit soft, and as above she has had occasional dizziness, though denies presyncope/syncope. Labs today show preserved renal function and acceptable electrolytes. Her NT-proBNP is much improved, down to 2131 from 7664 at our initial visit.       ASSESSMENT/PLAN:    1. Acute systolic heart failure.   --Echo previously with normal EF, but recent acute systolic heart failure in March 2019 post operatively after MV replacement and TV repair. Post op vs recurrence of afib as below.   --Today she appears essentially euvolemic with stable weights, though still some trace leg edema. Will continue torsemide at 10mg daily for now, but have some room to go back up if needed. Renal function is holding and electrolytes are acceptable on current dose KDur.    --Continue Toprol XL 25mg daily. I am hesitant to add an ACE-I currently given what sounds like some orthostatic hypotension at home.    --Will plan repeat echo at our next f/u in 2-3 now in sinus rhythm.    --Continue cardiac rehab.       2. Paroxysmal atrial fibrillation, symptomatic.   --First noted Oct 2018, converted to sinus with diltiazem, and placed on beta blockers.  Returned Dec 2018 and placed on amiodarone with plans for CV, but when presented she had converted back to sinus. Her beta blockers and amio were stopped after her valve surgery as she remained in sinus rhythm. In addition, she underwent dual chamber PPM for CHB post operatively.    --Remains in sinus today on exam, back on amiodarone along with her metoprolol. She has a f/u planned with EP next month, with repeat device check.    --She is on ASA/Xarelto post valve surgery, which we will continue.     3. Valvular heart disease.   --Now s/p minimally invasive MV replacement with Epic St Ron 31mm valve and tricuspid valve repair with a 32mm Oquendo ring in Feb 2019. Echo last month showed decline in EF,  though appears her MV is functioning well, and tricuspid ring has 1+ regurgitation.   --As above, had postoperative issues with fluid overload, recurrent atrial fibrillation, complete heart block requiring dual-chamber pacemaker implantation and drop in EF. We are continuing to monitor closely.       Follow up plan:  To see CHRISTIANO Degroot in May 2019 as planned. Have requested 3 month f/u with myself in CORE with repeat echocardiogram and labs.       Orders this Visit:  Orders Placed This Encounter   Procedures     Basic metabolic panel     N terminal pro BNP outpatient     Follow-Up with CORE Clinic - PAMELLA visit     Echocardiogram Complete     Orders Placed This Encounter   Medications     senna (SENOKOT) 8.6 MG tablet     Sig: Take 1 tablet by mouth daily         CURRENT MEDICATIONS:  Current Outpatient Medications   Medication Sig Dispense Refill     amiodarone (PACERONE/CODARONE) 200 MG tablet Take 1 tablet (200 mg) by mouth daily  0     aspirin 81 MG EC tablet Take 81 mg by mouth every evening       calcium carbonate-vitamin D (CALCIUM 600+D) 600-200 MG-UNIT TABS Take 1 tablet by mouth 2 times daily       metoprolol succinate ER (TOPROL-XL) 25 MG 24 hr tablet Take 1 tablet (25 mg) by mouth daily       Multiple Vitamins-Minerals (MULTIVITAMIN ADULT) TABS Take 1 tablet by mouth daily       omeprazole (PRILOSEC) 10 MG CR capsule Take 1 capsule (10 mg) by mouth every morning (before breakfast) 90 capsule 3     potassium chloride ER (K-DUR/KLOR-CON M) 20 MEQ CR tablet Take 40 mEq by mouth daily        rivaroxaban ANTICOAGULANT (XARELTO) 20 MG TABS tablet Take 1 tablet (20 mg) by mouth daily (with dinner) 90 tablet 3     senna (SENOKOT) 8.6 MG tablet Take 1 tablet by mouth daily       torsemide (DEMADEX) 20 MG tablet Take 1/2 tablet (10 mg)       oxyCODONE (ROXICODONE) 5 MG tablet Take 1-2 tablets (5-10 mg) by mouth every 4 hours as needed for moderate to severe pain (Patient not taking: Reported on 4/18/2019) 20  tablet 0       ALLERGIES     Allergies   Allergen Reactions     Chlorpheniramine Other (See Comments)     LOC and fainting      Phenylephrine Other (See Comments)     fainting       PAST MEDICAL HISTORY:  Past Medical History:   Diagnosis Date     Allergic rhinitis, cause unspecified     dust mites, ragweed     Complete AV block (H)     BSX DDD PM 2-     History of tricuspid valve repair      Mitral valve prolapse     bioprosthetic MVR 2-     Paroxysmal atrial fibrillation (H)        PAST SURGICAL HISTORY:  Past Surgical History:   Procedure Laterality Date     COLONOSCOPY N/A 9/15/2015    Procedure: COLONOSCOPY;  Surgeon: Anson Llanos MD;  Location:  GI     CV HEART CATHETERIZATION WITH POSSIBLE INTERVENTION N/A 1/9/2019    Procedure: Heart Catheterization with Possible Intervention;  Surgeon: Ritesh Whittaker MD;  Location:  HEART CARDIAC CATH LAB     CV RIGHT AND LEFT HEART CATH N/A 1/9/2019    Procedure: Right and Left Heart Catherization;  Surgeon: Ritesh Whittaker MD;  Location:  HEART CARDIAC CATH LAB     ENT SURGERY      T&A     EP PACEMAKER N/A 2/18/2019    Procedure: EP Pacemaker;  Surgeon: Inocencia Guillen MD;  Location:  HEART CARDIAC CATH LAB     REPAIR VALVE TRICUSPID MINIMALLY INVASIVE N/A 2/15/2019    Procedure: MINIMALLY INVASIVE TRICUSPID VALVE REPAIR WITH 32MM SULLIVAN RING;  Surgeon: Andrea Hanna MD;  Location:  OR     REPLACE VALVE MITRAL MINIMALLY INVASIVE N/A 2/15/2019    Procedure: MINIMALLY INVASIVE MITRAL VALVE REPLACEMENT WITH EPIC ST KEYUR 31MM VALVE; ON PUMP WITH TIA.  CARDIOLOGIST DR CARDENAS;  Surgeon: Andrea Hanna MD;  Location:  OR       FAMILY HISTORY:  Family History   Problem Relation Age of Onset     Osteoporosis Mother      Alzheimer Disease Mother      Family History Negative Father      Heart Disease Maternal Grandfather      Family History Negative Paternal Grandmother      Family History Negative Paternal  Grandfather        SOCIAL HISTORY:  Social History     Socioeconomic History     Marital status:      Spouse name: None     Number of children: 3     Years of education: None     Highest education level: None   Occupational History     None   Social Needs     Financial resource strain: None     Food insecurity:     Worry: None     Inability: None     Transportation needs:     Medical: None     Non-medical: None   Tobacco Use     Smoking status: Never Smoker     Smokeless tobacco: Never Used   Substance and Sexual Activity     Alcohol use: No     Drug use: No     Sexual activity: Not Currently   Lifestyle     Physical activity:     Days per week: None     Minutes per session: None     Stress: None   Relationships     Social connections:     Talks on phone: None     Gets together: None     Attends Jewish service: None     Active member of club or organization: None     Attends meetings of clubs or organizations: None     Relationship status: None     Intimate partner violence:     Fear of current or ex partner: None     Emotionally abused: None     Physically abused: None     Forced sexual activity: None   Other Topics Concern      Service Not Asked     Blood Transfusions Not Asked     Caffeine Concern No     Comment: 1 cup of coffee and 1 can diet coke per day     Occupational Exposure Not Asked     Hobby Hazards Not Asked     Sleep Concern Yes     Comment: takes Doxylamine succinate 1/2 tab every night     Stress Concern No     Weight Concern No     Special Diet No     Comment: healthy      Back Care Not Asked     Exercise Yes     Comment: walking 45min x7 a wk. Very active, [employer], Golf, Bowling, Cardio     Bike Helmet Not Asked     Seat Belt Yes     Self-Exams Yes     Parent/sibling w/ CABG, MI or angioplasty before 65F 55M? No   Social History Narrative     None       Review of Systems:  Cardiovascular: negative for chest pain, neg palpitations, orthopnea, pos mild LE edema.  Constitutional:  "negative for chills, sweats, fevers.   Resp: Negative for dyspnea at rest, neg RAYO, neg cough, hemoptysis, known chronic lung disease  HEENT: Negative for new visual changes, frequent headaches  Gastrointestinal: negative for abdominal pain, neg diarrhea, blood in stool, nausea, vomiting  Hematologic/lymphatic: pos for current systemic anticoagulation, neg hx of blood clots  Musculoskeletal: negative for new back pain, joint pain  Neurological: negative for focal weakness, LOC, seizures, syncope. Pos occasional dizziness, neg presyncope/syncope.       Physical Exam:  Vitals: /78 (BP Location: Right arm, Patient Position: Chair, Cuff Size: Adult Small)   Pulse 72   Ht 1.676 m (5' 6\")   Wt 54.3 kg (119 lb 12.8 oz)   SpO2 97%   BMI 19.34 kg/m      Wt Readings from Last 4 Encounters:   04/18/19 54.3 kg (119 lb 12.8 oz)   03/25/19 53.5 kg (118 lb)   03/21/19 54.6 kg (120 lb 6.4 oz)   03/07/19 61.1 kg (134 lb 11.2 oz)       GEN:  In general, this is a well nourished  female in no acute distress on room air.  Patient ambulatory, accompanied by her .  HEENT:  Pupils equal, round. Sclerae nonicteric.   NECK: Supple, no masses appreciated. Trachea midline. No JVD noted while upright.  C/V:  Regular rate and rhythm on exam today, no obvious murmur, rub or gallop.   RESP: Respirations are unlabored. No use of accessory muscles. Clear with no crackles or wheezing.    GI: Abdomen soft, nontender, nondistended. No HSM appreciated.   EXTREM: Trace pitting PT bilateral edema. No cyanosis or clubbing.  NEURO: Alert and oriented, cooperative. Gait not formally assessed. No obvious focal deficits.   PSYCH: Normal affect.  SKIN: Warm and dry. No rashes or petechiae appreciated.       Recent Lab Results:    BMP RESULTS:  Lab Results   Component Value Date     04/18/2019    POTASSIUM 4.1 04/18/2019    CHLORIDE 105 04/18/2019    CO2 29 04/18/2019    ANIONGAP 4 04/18/2019    GLC 89 04/18/2019    BUN 15 " 2019    CR 0.94 2019    GFRESTIMATED 60 (L) 2019    GFRESTBLACK 70 2019    ARIELLE 8.8 2019          Additional pertinent testinD echo 3/14/19  Interpretation Summary     1. The left ventricle is normal in size. The visual ejection fraction is  estimated at 20-25%. There is severe global hypokinesia of the left ventricle.  2. The right ventricle is normal size. Moderately decreased right ventricular  systolic function  3. s/p tissue MVR with #31 St Ron Epic, implanted 2019. Leaflets open well.  Mean 3mmHg, EOAI 1.4cm/m2, DVI 1.9. No MR or paravalvular leak. Normal valve  function.  4. An annuloplasty ring is noted in the tricuspid position. There is mild (1+)  tricuspid regurgitation.  5. The IVC is dilated and fails to change with respiration, suggesting  elevated central venous pressure.     Echo 2019 (prior to valve surgery) showed EF 65%, 3-4+ MR, 3+ TR.      Courtney Brian PA-C  [facility]  Pager [phone]      Thank you for allowing me to participate in the care of your patient.    Sincerely,     CHRISTIANO Weathers     [facility]

## 2019-04-19 ENCOUNTER — HOSPITAL ENCOUNTER (OUTPATIENT)
Dept: CARDIAC REHAB | Facility: CLINIC | Age: 74
End: 2019-04-19
Attending: STUDENT IN AN ORGANIZED HEALTH CARE EDUCATION/TRAINING PROGRAM
Payer: COMMERCIAL

## 2019-04-19 PROCEDURE — 93798 PHYS/QHP OP CAR RHAB W/ECG: CPT

## 2019-04-19 PROCEDURE — 40000116 ZZH STATISTIC OP CR VISIT

## 2019-05-29 NOTE — PROGRESS NOTES
HPI:   I had the pleasure of seeing Taty and Tiago when she came for follow up of atrial fibrillation. She is a 74 year old who sees Dr. Stone and Courtney Brian, [facility] PA for her history of:     1. Valvular disease with bivalvular mitral valve prolapse and tricuspid regurgitation now s/p 31 mm [employer] Epic porcine mitral valve replacement and 32 mm [place] MC 3 tricuspid annuloplasty ring 2/15/19 via right mini-thoracotomy with Dr. Hanna   2. Post op complete heart block s/p dual-chamber [employer] pacemaker 2/18/19  3. Paroxysmal atrial fibrillation first diagnosed 10/2018, with spontaneous conversion. Xarelto was started. She had recurrent AFib 12/2018, and was placed on amiodarone. She presented for DCCV but had chemically converted and was back in sinus.  She subsequently underwent CV Surgery as above.  Postoperatively, she did not initially have any recurrent atrial fibrillation and rate control/anticoagulation was continued and amiodarone was discontinued.  Noted to be back in A. fib at follow-up appointment 3/7/2019, and amiodarone was reloaded at that time.  4. Postoperative heart failure, with drop in ejection fraction from 65% (preop) to 20-25% on echo 3/2019.  She has been enrolled in [facility] and when Courtney saw her 4/2019, she was continued on diuretic therapy.    When I saw Taty 3/2019 we reviewed her CV surgery done 2/2019, complicated by complete heart block requiring dual-chamber pacemaker and recurrent atrial fibrillation.  EF had also dropped from 65% down to 20-25% postoperatively.  Amiodarone had been restarted/reloaded when she went into atrial fibrillation 3/2019, and device interrogation confirmed that her last episode of AFib had occurred 3/10.  I made no medication changes at that visit in 3/2019, and asked her to see me again 5/2019 with the device interrogation to determine if we could discontinue amiodarone therapy for her postoperative atrial fibrillation.  She then saw  Courtney 4/2019 at which time she was continued on torsemide 10 mg daily and KCl 40 mEq daily.    Overall, she continues to heal from her surgery. Her cough, which she had right away after waking up for surgery, has finally dissipated.  She is back to walking 2 miles every morning and is doing well.    She does still note some lower extremity edema, noting it worsens throughout the day.  She thinks torsemide 10 mg daily is helping quite a bit.  She denies dyspnea, orthopnea or PND.    She denies any frontal chest pain, pressure tightness.  She denies palpitations.    She does describe one episode of severe lightheadedness that caused her to quickly sit down and put her head down.  This was on 5/8.  She has been at a dermatologic procedure (biopsy) and suddenly got very hot while standing at the kitchen counter making a salad.  She sat down right away because she felt lightheaded.  She put her head down for about 10 minutes, and the sensation passed.  After the 10-minute episode was done, she checked her blood pressure and it was 94/74.  She had not eaten that day, which she attributes to this episode.  This is not recurred.    Device interrogation today showed <1% APaced and 6% VPaced, in DDD  bpm.  Underlying rhythm was normal sinus.  She is stable sensing, threshold and impedance.  She 43 most which is compressing 12% of the time, with no episodes after 3/10 (her last courtesy check).    Echo 3/14/2019 with drop in EF 20-25% with severe global hypokinesis of the left ventricle.  RV was of normal size with moderately decreased right ventricular systolic function.  IVC was dilated and failed to change with respiration, suggesting elevated central venous pressure.  Her mitral valve looked good, with good leaflet opening, mean gradient of 3 mmHg and no evidence of mitral regurgitation or perivalvular leaking.  Tricuspid valve showed 1+ tricuspid regurgitation.  RVSP was 24+ right atrial pressure  (elevated).    Coronary angiography preoperatively 1/2019 showed normal coronary arteries.  There is some evidence of remodeling, with EF of 45-50%.    Assessment & Plan:    1. Atrial Fibrillation    As above, first dx'd 12/2018, manifested by palpitations and rapid heart rates. Started on amiodarone 12/2018, but discontinued after her surgery 12/2019 as she had remained in sinus rhythm.  She was back in A. fib at the time of her first device check, with overall adequate heart rate control, and amiodarone was restarted 3/7.    She has not had any recurrent atrial fibrillation since 3/10    Remains on anticoagulation with Xarelto for her CHADSVASc of 3 (cardiomyopathy, age, sex)    PLAN:    Continue Xarelto 20 mg daily    Discontinue amiodarone 200 mg daily      I confirmed with Lashanda that her ALERTS are turned on for atrial fibrillation, and we should be notified if she has any recurrent atrial arrhythmias.  I have asked Taty to contact us if she think she is back in A. fib.  Which she previously was symptomatic with.      Continue metoprolol XL      2.  Postoperative heart failure with cardiomyopathy    Preop EF was 65%, and dropped to 20-25% on most recent echo    Continues on torsemide 10 mg daily [facility]    Remains on beta-blocker, but blood pressure has been too soft to initiate ACE inhibitor in the past    PLAN:    Continue beta-blocker    Hesitate to add ACE inhibitor at this point given her episode of severe lightheadedness 5/8    Repeat echocardiogram when she sees Courtney 7/2019    3.  Valvular surgery    Echo done postoperatively showed her mitral and tricuspid valves looked good, but EF dropped significantly    PLAN:    Plan to see Courtney with a repeat echo 7/2019 as previously ordered    Dr. Stone August 2019 as previously ordered    SBE prophylaxis - no dental work x 6 months following valve surgery per CV Surgery    Faye Neal PA-C, MSPAS      No orders of the defined types were placed in this  encounter.    No orders of the defined types were placed in this encounter.    Medications Discontinued During This Encounter   Medication Reason     amiodarone (PACERONE/CODARONE) 200 MG tablet          Encounter Diagnosis   Name Primary?     Persistent atrial fibrillation (H)        CURRENT MEDICATIONS:  Current Outpatient Medications   Medication Sig Dispense Refill     amoxicillin (AMOXIL) 500 MG tablet Take 2 tablets (1,000 mg) by mouth once for 1 dose 4 tablet 0     aspirin 81 MG EC tablet Take 81 mg by mouth every evening       calcium carbonate-vitamin D (CALCIUM 600+D) 600-200 MG-UNIT TABS Take 1 tablet by mouth 2 times daily       metoprolol succinate ER (TOPROL-XL) 25 MG 24 hr tablet Take 1 tablet (25 mg) by mouth daily       Multiple Vitamins-Minerals (MULTIVITAMIN ADULT) TABS Take 1 tablet by mouth daily       omeprazole (PRILOSEC) 10 MG CR capsule Take 1 capsule (10 mg) by mouth every morning (before breakfast) 90 capsule 3     potassium chloride ER (K-DUR/KLOR-CON M) 20 MEQ CR tablet Take 40 mEq by mouth daily        rivaroxaban ANTICOAGULANT (XARELTO) 20 MG TABS tablet Take 1 tablet (20 mg) by mouth daily (with dinner) 90 tablet 3     torsemide (DEMADEX) 20 MG tablet daily Take 1/2 tablet (10 mg)       oxyCODONE (ROXICODONE) 5 MG tablet Take 1-2 tablets (5-10 mg) by mouth every 4 hours as needed for moderate to severe pain (Patient not taking: Reported on 4/18/2019) 20 tablet 0     senna (SENOKOT) 8.6 MG tablet Take 1 tablet by mouth daily         ALLERGIES     Allergies   Allergen Reactions     Chlorpheniramine Other (See Comments)     LOC and fainting      Phenylephrine Other (See Comments)     fainting       PAST MEDICAL HISTORY:  Past Medical History:   Diagnosis Date     Allergic rhinitis, cause unspecified     dust mites, ragweed     Complete AV block (H)     BSX DDD PM 2-     History of tricuspid valve repair      Mitral valve prolapse     bioprosthetic MVR 2-     Paroxysmal  atrial fibrillation (H)        PAST SURGICAL HISTORY:  Past Surgical History:   Procedure Laterality Date     COLONOSCOPY N/A 9/15/2015    Procedure: COLONOSCOPY;  Surgeon: Anson Llanos MD;  Location:  GI     CV HEART CATHETERIZATION WITH POSSIBLE INTERVENTION N/A 1/9/2019    Procedure: Heart Catheterization with Possible Intervention;  Surgeon: Ritesh Whittaker MD;  Location:  HEART CARDIAC CATH LAB     CV RIGHT AND LEFT HEART CATH N/A 1/9/2019    Procedure: Right and Left Heart Catherization;  Surgeon: Ritesh Whittaker MD;  Location:  HEART CARDIAC CATH LAB     ENT SURGERY      T&A     EP PACEMAKER N/A 2/18/2019    Procedure: EP Pacemaker;  Surgeon: Inocencia Guillen MD;  Location:  HEART CARDIAC CATH LAB     REPAIR VALVE TRICUSPID MINIMALLY INVASIVE N/A 2/15/2019    Procedure: MINIMALLY INVASIVE TRICUSPID VALVE REPAIR WITH 32MM SULLIVAN RING;  Surgeon: Andrea Hanna MD;  Location:  OR     REPLACE VALVE MITRAL MINIMALLY INVASIVE N/A 2/15/2019    Procedure: MINIMALLY INVASIVE MITRAL VALVE REPLACEMENT WITH EPIC ST KEYUR 31MM VALVE; ON PUMP WITH TIA.  CARDIOLOGIST DR CARDENAS;  Surgeon: Andrea Hanna MD;  Location:  OR       FAMILY HISTORY:  Family History   Problem Relation Age of Onset     Osteoporosis Mother      Alzheimer Disease Mother      Family History Negative Father      Heart Disease Maternal Grandfather      Family History Negative Paternal Grandmother      Family History Negative Paternal Grandfather        SOCIAL HISTORY:  Social History     Socioeconomic History     Marital status:      Spouse name: None     Number of children: 3     Years of education: None     Highest education level: None   Occupational History     None   Social Needs     Financial resource strain: None     Food insecurity:     Worry: None     Inability: None     Transportation needs:     Medical: None     Non-medical: None   Tobacco Use     Smoking status: Never Smoker      Smokeless tobacco: Never Used   Substance and Sexual Activity     Alcohol use: No     Drug use: No     Sexual activity: Not Currently   Lifestyle     Physical activity:     Days per week: None     Minutes per session: None     Stress: None   Relationships     Social connections:     Talks on phone: None     Gets together: None     Attends Spiritism service: None     Active member of club or organization: None     Attends meetings of clubs or organizations: None     Relationship status: None     Intimate partner violence:     Fear of current or ex partner: None     Emotionally abused: None     Physically abused: None     Forced sexual activity: None   Other Topics Concern      Service Not Asked     Blood Transfusions Not Asked     Caffeine Concern No     Comment: 1 cup of coffee and 1 can diet coke per day     Occupational Exposure Not Asked     Hobby Hazards Not Asked     Sleep Concern Yes     Comment: takes Doxylamine succinate 1/2 tab every night     Stress Concern No     Weight Concern No     Special Diet No     Comment: healthy      Back Care Not Asked     Exercise Yes     Comment: walking 45min x7 a wk. Very active, [employer], Golf, Bowling, Cardio     Bike Helmet Not Asked     Seat Belt Yes     Self-Exams Yes     Parent/sibling w/ CABG, MI or angioplasty before 65F 55M? No   Social History Narrative     None       Review of Systems:  Skin:  Negative     Eyes:  Positive for glasses  ENT:  Positive for sinus trouble;nasal congestion  Respiratory:  Positive for dyspnea on exertion  Cardiovascular:  Negative for;palpitations;chest pain fatigue;Positive for;edema  Gastroenterology: Negative for melena;hematochezia  Genitourinary:  Negative    Musculoskeletal:  Positive for arthritis  Neurologic:  Negative    Psychiatric:  Positive for sleep disturbances;anxiety  Heme/Lymph/Imm:  Positive for allergies  Endocrine:  Positive for      Physical Exam:  Vitals: /82 (BP Location: Left arm, Cuff Size: Adult  "Regular)   Pulse 86   Ht 1.676 m (5' 6\")   Wt 54.4 kg (120 lb)   BMI 19.37 kg/m      Constitutional:  cooperative, alert and oriented, well developed, well nourished, in no acute distress        Skin:  warm and dry to the touch, no apparent skin lesions or masses noted        Head:  normocephalic, no masses or lesions        Eyes:  pupils equal and round;conjunctivae and lids unremarkable;sclera white        ENT:  no pallor or cyanosis, dentition good        Neck:  no carotid bruit JVP elevated;JVP 8-10      Chest:  normal breath sounds, clear to auscultation, normal A-P diameter, normal symmetry, normal respiratory excursion, no use of accessory muscles        Cardiac: regular rhythm;no murmurs, gallops or rubs detected                  Abdomen:  abdomen soft        Vascular: pulses full and equal                                      Extremities and Back:  no deformities, clubbing, cyanosis, erythema observed        Neurological:  no gross motor deficits          Recent Lab Results:  LIPID RESULTS:  Lab Results   Component Value Date    CHOL 202 (H) 07/23/2015    HDL 96 07/23/2015    LDL 83 07/23/2015    TRIG 116 07/23/2015    CHOLHDLRATIO 2.1 07/23/2015       LIVER ENZYME RESULTS:  Lab Results   Component Value Date     (H) 02/16/2019    ALT 40 02/16/2019       CBC RESULTS:  Lab Results   Component Value Date    WBC 13.7 (H) 02/22/2019    RBC 2.64 (L) 02/22/2019    HGB 10.0 (L) 03/07/2019    HCT 23.4 (L) 02/22/2019    MCV 89 02/22/2019    MCH 30.7 02/22/2019    MCHC 34.6 02/22/2019    RDW 14.8 02/22/2019     02/22/2019       BMP RESULTS:  Lab Results   Component Value Date     04/18/2019    POTASSIUM 4.1 04/18/2019    CHLORIDE 105 04/18/2019    CO2 29 04/18/2019    ANIONGAP 4 04/18/2019    GLC 89 04/18/2019    BUN 15 04/18/2019    CR 0.94 04/18/2019    GFRESTIMATED 60 (L) 04/18/2019    GFRESTBLACK 70 04/18/2019    ARIELLE 8.8 04/18/2019        A1C RESULTS:  Lab Results   Component Value Date    " A1C 5.9 (H) 01/09/2019       INR RESULTS:  Lab Results   Component Value Date    INR 1.21 (H) 02/18/2019    INR 1.24 (H) 02/16/2019

## 2019-05-30 ENCOUNTER — ANCILLARY PROCEDURE (OUTPATIENT)
Dept: CARDIOLOGY | Facility: CLINIC | Age: 74
End: 2019-05-30
Attending: INTERNAL MEDICINE
Payer: COMMERCIAL

## 2019-05-30 ENCOUNTER — OFFICE VISIT (OUTPATIENT)
Dept: CARDIOLOGY | Facility: CLINIC | Age: 74
End: 2019-05-30
Attending: PHYSICIAN ASSISTANT
Payer: COMMERCIAL

## 2019-05-30 VITALS
BODY MASS INDEX: 19.29 KG/M2 | DIASTOLIC BLOOD PRESSURE: 82 MMHG | HEART RATE: 86 BPM | SYSTOLIC BLOOD PRESSURE: 120 MMHG | HEIGHT: 66 IN | WEIGHT: 120 LBS

## 2019-05-30 DIAGNOSIS — Z95.0 PACEMAKER: ICD-10-CM

## 2019-05-30 DIAGNOSIS — Z95.2 S/P MVR (MITRAL VALVE REPLACEMENT): ICD-10-CM

## 2019-05-30 DIAGNOSIS — Z98.890 HISTORY OF TRICUSPID VALVE REPAIR: ICD-10-CM

## 2019-05-30 DIAGNOSIS — I48.19 PERSISTENT ATRIAL FIBRILLATION (H): Primary | ICD-10-CM

## 2019-05-30 PROCEDURE — 93280 PM DEVICE PROGR EVAL DUAL: CPT | Performed by: INTERNAL MEDICINE

## 2019-05-30 PROCEDURE — 99214 OFFICE O/P EST MOD 30 MIN: CPT | Performed by: PHYSICIAN ASSISTANT

## 2019-05-30 ASSESSMENT — MIFFLIN-ST. JEOR: SCORE: 1071.07

## 2019-05-30 NOTE — LETTER
5/30/2019    Anyi Olmos MD  [address]  [facility] [address]    RE: Taty Hendersonjon       Dear Colleague,    I had the pleasure of seeing Taty Aguila in the [facility].    HPI:   I had the pleasure of seeing Declan when she came for follow up of atrial fibrillation. She is a 74 year old who sees Dr. Kelton coates and Courtney Brian, C[facility] PA for her history of:     1. Valvular disease with bivalvular mitral valve prolapse and tricuspid regurgitation now s/p 31 mm [employer] Epic porcine mitral valve replacement and 32 mm [employer] MC 3 tricuspid annuloplasty ring 2/15/19 via right mini-thoracotomy with Dr. Hanna   2. Post op complete heart block s /p dual-chamber [employer] pacemaker 2/18/19  3. Paroxysmal atrial fibrillation first diagnosed 10/2018, with spontaneous conversion. Xarelto was started. She had recurrent AFib 12/2018, and was placed on amiodarone. She presented for DCCV but had chemically converted and was back in sinus.   She subsequently underwent CV Surgery as above.   Postoperatively, she did not initially have any recurrent atrial fibrillation and rate control/anticoagulation was continued and amiodarone was discontinued.  Noted to be back in A. fib at follow-up appointment 3/7/2019, and amiodarone was reloaded at that time.  4. Postoperative heart failure, with drop in ejection fraction from 65% (preop) to 20-25% on echo 3/2019.  She has been enrolled in the [facility] and when Courtney saw her 4/2019, she was continued on diuretic therapy.    When I saw Taty 3/2019 we reviewed her CV surgery done 2/2019, complicated by complete heart block requiring dual-chamber pacemaker and recurrent atrial fibrillation.  EF had also dropped from 65% down to 20-25% postoperatively.  Amiodarone had been restarted/reloaded when she went into atrial fibrillation 3/2019, and device interrogation confirmed that her last episode of AFib had occurred 3/10.  I  made no medication changes at that visit in 3/2019, and asked her to see me again 5/2019 with the device interrogation to determine if we could discontinue amiodarone therapy for her postoperative atrial fibrillation.  She then saw Courtney 4/2019 at which time she was continued on torsemide 10 mg daily and KCl 40 mEq daily.    Overall, she continues to heal from her surgery. Her cough, which she had right away after waking up for surgery, has finally dissipated.  She is back to walking 2 miles every morning and is doing well.    She does still note some lower extremity edema, noting it worsens throughout the day.  She thinks torsemide 10 mg daily is helping quite a bit.  She denies dyspnea, orthopnea or PND.    She denies any frontal chest pain, pressure tightness.  She denies palpitations.    She does describe one episode of severe lightheadedness that caused her to quickly sit down and put her head down.  This was on 5/8.  She has been at a dermatologic procedure (biopsy) and suddenly got very hot while standing at the kitchen counter making a salad.  She sat down right away because she felt lightheaded.  She put her head down for about 10 minutes, and the sensation passed.  After the 10-minute episode was done, she checked her blood pressure and it was 94/74.  She had not eaten that day, which she attributes to this episode.  This is not recurred.    Device interrogation today showed  <1% APaced and 6% VPaced, in DDD  bpm.  Underlying rhythm was normal sinus.  She is stable sensing, threshold and impedance.  She 43 most which is compressing 12% of the time, with no episodes after 3/10 (her last courtesy check).    Echo 3/14/2019 with drop in EF 20-25% with severe global hypokinesis of the left ventricle.  RV was of normal size with moderately decreased right ventricular systolic function.  IVC was dilated and failed to change with respiration, suggesting elevated central venous pressure.  Her mitral valve  looked good, with good leaflet opening, mean gradient of 3 mmHg and no evidence of mitral regurgitation or perivalvular leaking.  Tricuspid valve showed 1+ tricuspid regurgitation.  RVSP was 24+ right atrial pressure (elevated).    Coronary angiography preoperatively 1/2019 showed normal coronary arteries.  There is some evidence of remodeling, with EF of 45-50%.    Assessment & Plan:    1. Atrial Fibrillation    As above, first dx'd 12/2018, manifested by palpitations and rapid heart rates. Started on amiodarone 12/2018, but discontinued after her surgery 12/2019 as she had remained in sinus rhythm.  She was back in A. fib at the time of her first device check, with overall adequate heart rate control, and amiodarone was restarted 3/7.    She has not had any recurrent atrial fibrillation since 3/10    Remains on anticoagulation with Xarelto for her CHADSVASc of 3 (cardiomyopathy, age, sex)    PLAN:    Continue Xarelto 20 mg daily    Discontinue amiodarone 200 mg daily      I confirmed with Lashanda that her ALERTS are turned on for atrial fibrillation, and we should be notified if she has any recurrent atrial arrhythmias.  I have asked Taty to contact us if she think she is back in A. fib.  Which she previously was symptomatic with.      Continue metoprolol XL      2.  Postoperative heart failure with cardiomyopathy    Preop EF was 65%, and dropped to 20-25% on most recent echo    Continues on torsemide 10 mg daily per [facility]    Remains on beta-blocker, but blood pressure has been too soft to initiate ACE inhibitor in the past    PLAN:    Continue beta-blocker    Hesitate to add ACE inhibitor at this point given her episode of severe lightheadedness 5/8    Repeat echocardiogram when she sees Courtney 7/2019    3.  Valvular surgery    Echo done postoperatively showed her mitral and tricuspid valves looked good, but EF dropped significantly    PLAN:    Plan to see Courtney with a repeat echo 7/2019 as previously  ordered    Dr. Stone August 2019 as previously ordered    SBE prophylaxis - no dental work x 6 months following valve surgery per CV Surgery    Faye Neal PA-C, MSPAS      No orders of the defined types were placed in this encounter.    No orders of the defined types were placed in this encounter.    Medications Discontinued During This Encounter   Medication Reason     amiodarone (PACERONE/CODARONE) 200 MG tablet          Encounter Diagnosis   Name Primary?     Persistent atrial fibrillation (H)        CURRENT MEDICATIONS:  Current Outpatient Medications   Medication Sig Dispense Refill     amoxicillin (AMOXIL) 500 MG tablet Take 2 tablets (1,000 mg) by mouth once for 1 dose 4 tablet 0     aspirin 81 MG EC tablet Take 81 mg by mouth every evening       calcium carbonate-vitamin D (CALCIUM 600+D) 600-200 MG-UNIT TABS Take 1 tablet by mouth 2 times daily       metoprolol succinate ER (TOPROL-XL) 25 MG 24 hr tablet Take 1 tablet (25 mg) by mouth daily       Multiple Vitamins-Minerals (MULTIVITAMIN ADULT) TABS Take 1 tablet by mouth daily       omeprazole (PRILOSEC) 10 MG CR capsule Take 1 capsule (10 mg) by mouth every morning (before breakfast) 90 capsule 3     potassium chloride ER (K-DUR/KLOR-CON M) 20 MEQ CR tablet Take 40 mEq by mouth daily        rivaroxaban ANTICOAGULANT (XARELTO) 20 MG TABS tablet Take 1 tablet (20 mg) by mouth daily (with dinner) 90 tablet 3     torsemide (DEMADEX) 20 MG tablet daily Take 1/2 tablet (10 mg)       oxyCODONE (ROXICODONE) 5 MG tablet Take 1-2 tablets (5-10 mg) by mouth every 4 hours as needed for moderate to severe pain (Patient not taking: Reported on 4/18/2019) 20 tablet 0     senna (SENOKOT) 8.6 MG tablet Take 1 tablet by mouth daily         ALLERGIES     Allergies   Allergen Reactions     Chlorpheniramine Other (See Comments)     LOC and fainting      Phenylephrine Other (See Comments)     fainting       PAST MEDICAL HISTORY:  Past Medical History:   Diagnosis Date      Allergic rhinitis, cause unspecified     dust mites, ragweed     Complete AV block (H)     BSX DDD PM 2-     History of tricuspid valve repair      Mitral valve prolapse     bioprosthetic MVR 2-     Paroxysmal atrial fibrillation (H)        PAST SURGICAL HISTORY:  Past Surgical History:   Procedure Laterality Date     COLONOSCOPY N/A 9/15/2015    Procedure: COLONOSCOPY;  Surgeon: Anson Llanos MD;  Location:  GI     CV HEART CATHETERIZATION WITH POSSIBLE INTERVENTION N/A 1/9/2019    Procedure: Heart Catheterization with Possible Intervention;  Surgeon: Ritesh Whittaker MD;  Location:  HEART CARDIAC CATH LAB     CV RIGHT AND LEFT HEART CATH N/A 1/9/2019    Procedure: Right and Left Heart Catherization;  Surgeon: Ritesh Whittaker MD;  Location:  HEART CARDIAC CATH LAB     ENT SURGERY      T&A     EP PACEMAKER N/A 2/18/2019    Procedure: EP Pacemaker;  Surgeon: Inocencia Guillen MD;  Location:  HEART CARDIAC CATH LAB     REPAIR VALVE TRICUSPID MINIMALLY INVASIVE N/A 2/15/2019    Procedure: MINIMALLY INVASIVE TRICUSPID VALVE REPAIR WITH 32MM SULLIVAN RING;  Surgeon: Andrea Hanna MD;  Location:  OR     REPLACE VALVE MITRAL MINIMALLY INVASIVE N/A 2/15/2019    Procedure: MINIMALLY INVASIVE MITRAL VALVE REPLACEMENT WITH EPIC ST KEYUR 31MM VALVE; ON PUMP WITH TIA.  CARDIOLOGIST DR CARDENAS;  Surgeon: Andrea Hanna MD;  Location:  OR       FAMILY HISTORY:  Family History   Problem Relation Age of Onset     Osteoporosis Mother      Alzheimer Disease Mother      Family History Negative Father      Heart Disease Maternal Grandfather      Family History Negative Paternal Grandmother      Family History Negative Paternal Grandfather        SOCIAL HISTORY:  Social History     Socioeconomic History     Marital status:      Spouse name: None     Number of children: 3     Years of education: None     Highest education level: None   Occupational History     None    Social Needs     Financial resource strain: None     Food insecurity:     Worry: None     Inability: None     Transportation needs:     Medical: None     Non-medical: None   Tobacco Use     Smoking status: Never Smoker     Smokeless tobacco: Never Used   Substance and Sexual Activity     Alcohol use: No     Drug use: No     Sexual activity: Not Currently   Lifestyle     Physical activity:     Days per week: None     Minutes per session: None     Stress: None   Relationships     Social connections:     Talks on phone: None     Gets together: None     Attends [employer] service: None     Active member of club or organization: None     Attends meetings of clubs or organizations: None     Relationship status: None     Intimate partner violence:     Fear of current or ex partner: None     Emotionally abused: None     Physically abused: None     Forced sexual activity: None   Other Topics Concern      Service Not Asked     Blood Transfusions Not Asked     Caffeine Concern No     Comment: 1 cup of coffee and 1 can diet coke per day     Occupational Exposure Not Asked     Hobby Hazards Not Asked     Sleep Concern Yes     Comment: takes Doxylamine succinate 1/2 tab every night     Stress Concern No     Weight Concern No     Special Diet No     Comment: healthy      Back Care Not Asked     Exercise Yes     Comment: walking 45min x7 a wk. Very active, [employer], Golf, Bowling, Cardio     Bike Helmet Not Asked     Seat Belt Yes     Self-Exams Yes     Parent/sibling w/ CABG, MI or angioplasty before 65F 55M? No   Social History Narrative     None       Review of Systems:  Skin:  Negative     Eyes:  Positive for glasses  ENT:  Positive for sinus trouble;nasal congestion  Respiratory:  Positive for dyspnea on exertion  Cardiovascular:  Negative for;palpitations;chest pain fatigue;Positive for;edema  Gastroenterology: Negative for melena;hematochezia  Genitourinary:  Negative    Musculoskeletal:  Positive for  "arthritis  Neurologic:  Negative    Psychiatric:  Positive for sleep disturbances;anxiety  Heme/Lymph/Imm:  Positive for allergies  Endocrine:  Positive for      Physical Exam:  Vitals: /82 (BP Location: Left arm, Cuff Size: Adult Regular)   Pulse 86   Ht 1.676 m (5' 6\")   Wt 54.4 kg (120 lb)   BMI 19.37 kg/m       Constitutional:  cooperative, alert and oriented, well developed, well nourished, in no acute distress        Skin:  warm and dry to the touch, no apparent skin lesions or masses noted        Head:  normocephalic, no masses or lesions        Eyes:  pupils equal and round;conjunctivae and lids unremarkable;sclera white        ENT:  no pallor or cyanosis, dentition good        Neck:  no carotid bruit JVP elevated;JVP 8-10      Chest:  normal breath sounds, clear to auscultation, normal A-P diameter, normal symmetry, normal respiratory excursion, no use of accessory muscles        Cardiac: regular rhythm;no murmurs, gallops or rubs detected                  Abdomen:  abdomen soft        Vascular: pulses full and equal                                      Extremities and Back:  no deformities, clubbing, cyanosis, erythema observed        Neurological:  no gross motor deficits          Recent Lab Results:  LIPID RESULTS:  Lab Results   Component Value Date    CHOL 202 (H) 07/23/2015    HDL 96 07/23/2015    LDL 83 07/23/2015    TRIG 116 07/23/2015    CHOLHDLRATIO 2.1 07/23/2015       LIVER ENZYME RESULTS:  Lab Results   Component Value Date     (H) 02/16/2019    ALT 40 02/16/2019       CBC RESULTS:  Lab Results   Component Value Date    WBC 13.7 (H) 02/22/2019    RBC 2.64 (L) 02/22/2019    HGB 10.0 (L) 03/07/2019    HCT 23.4 (L) 02/22/2019    MCV 89 02/22/2019    MCH 30.7 02/22/2019    MCHC 34.6 02/22/2019    RDW 14.8 02/22/2019     02/22/2019       BMP RESULTS:  Lab Results   Component Value Date     04/18/2019    POTASSIUM 4.1 04/18/2019    CHLORIDE 105 04/18/2019    CO2 29 " 04/18/2019    ANIONGAP 4 04/18/2019    GLC 89 04/18/2019    BUN 15 04/18/2019    CR 0.94 04/18/2019    GFRESTIMATED 60 (L) 04/18/2019    GFRESTBLACK 70 04/18/2019    ARIELLE 8.8 04/18/2019        A1C RESULTS:  Lab Results   Component Value Date    A1C 5.9 (H) 01/09/2019       INR RESULTS:  Lab Results   Component Value Date    INR 1.21 (H) 02/18/2019    INR 1.24 (H) 02/16/2019                     Thank you for allowing me to participate in the care of your patient.      Sincerely,     Jo-Ann Neal PA-C     [facility]

## 2019-05-30 NOTE — PATIENT INSTRUCTIONS
1. Device check today looked good!  NO atrial fibrillation seen!    2. STOP amiodarone 200 mg daily. Let us know if you think you have recurrent AFib beforehand. [phone]    3. See Courtney 7/2019 with echo. See Dr. Stone Fall 2019

## 2019-06-04 LAB
MDC_IDC_LEAD_IMPLANT_DT: NORMAL
MDC_IDC_LEAD_IMPLANT_DT: NORMAL
MDC_IDC_LEAD_LOCATION: NORMAL
MDC_IDC_LEAD_LOCATION: NORMAL
MDC_IDC_LEAD_LOCATION_DETAIL_1: NORMAL
MDC_IDC_LEAD_LOCATION_DETAIL_1: NORMAL
MDC_IDC_LEAD_MFG: NORMAL
MDC_IDC_LEAD_MFG: NORMAL
MDC_IDC_LEAD_MODEL: NORMAL
MDC_IDC_LEAD_MODEL: NORMAL
MDC_IDC_LEAD_POLARITY_TYPE: NORMAL
MDC_IDC_LEAD_POLARITY_TYPE: NORMAL
MDC_IDC_LEAD_SERIAL: NORMAL
MDC_IDC_LEAD_SERIAL: NORMAL
MDC_IDC_MSMT_BATTERY_STATUS: NORMAL
MDC_IDC_MSMT_LEADCHNL_RA_IMPEDANCE_VALUE: 680 OHM
MDC_IDC_MSMT_LEADCHNL_RA_PACING_THRESHOLD_AMPLITUDE: 0.8 V
MDC_IDC_MSMT_LEADCHNL_RA_PACING_THRESHOLD_PULSEWIDTH: 0.5 MS
MDC_IDC_MSMT_LEADCHNL_RA_SENSING_INTR_AMPL: 2 MV
MDC_IDC_MSMT_LEADCHNL_RV_IMPEDANCE_VALUE: 1034 OHM
MDC_IDC_MSMT_LEADCHNL_RV_PACING_THRESHOLD_AMPLITUDE: 0.5 V
MDC_IDC_MSMT_LEADCHNL_RV_PACING_THRESHOLD_PULSEWIDTH: 0.4 MS
MDC_IDC_MSMT_LEADCHNL_RV_SENSING_INTR_AMPL: 13.9 MV
MDC_IDC_PG_IMPLANT_DTM: NORMAL
MDC_IDC_PG_MFG: NORMAL
MDC_IDC_PG_MODEL: NORMAL
MDC_IDC_PG_SERIAL: NORMAL
MDC_IDC_PG_TYPE: NORMAL
MDC_IDC_SESS_CLINIC_NAME: NORMAL
MDC_IDC_SESS_DTM: NORMAL
MDC_IDC_SESS_TYPE: NORMAL
MDC_IDC_SET_BRADY_AT_MODE_SWITCH_MODE: NORMAL
MDC_IDC_SET_BRADY_AT_MODE_SWITCH_RATE: 170 {BEATS}/MIN
MDC_IDC_SET_BRADY_HYSTRATE: NORMAL
MDC_IDC_SET_BRADY_LOWRATE: 60 {BEATS}/MIN
MDC_IDC_SET_BRADY_MAX_SENSOR_RATE: 130 {BEATS}/MIN
MDC_IDC_SET_BRADY_MAX_TRACKING_RATE: 130 {BEATS}/MIN
MDC_IDC_SET_BRADY_MODE: NORMAL
MDC_IDC_SET_BRADY_PAV_DELAY_HIGH: 150 MS
MDC_IDC_SET_BRADY_PAV_DELAY_LOW: 200 MS
MDC_IDC_SET_BRADY_SAV_DELAY_HIGH: 150 MS
MDC_IDC_SET_BRADY_SAV_DELAY_LOW: 200 MS
MDC_IDC_SET_LEADCHNL_RA_PACING_AMPLITUDE: 2 V
MDC_IDC_SET_LEADCHNL_RA_PACING_ANODE_ELECTRODE_1: NORMAL
MDC_IDC_SET_LEADCHNL_RA_PACING_ANODE_LOCATION_1: NORMAL
MDC_IDC_SET_LEADCHNL_RA_PACING_CAPTURE_MODE: NORMAL
MDC_IDC_SET_LEADCHNL_RA_PACING_CATHODE_ELECTRODE_1: NORMAL
MDC_IDC_SET_LEADCHNL_RA_PACING_CATHODE_LOCATION_1: NORMAL
MDC_IDC_SET_LEADCHNL_RA_PACING_POLARITY: NORMAL
MDC_IDC_SET_LEADCHNL_RA_PACING_PULSEWIDTH: 0.4 MS
MDC_IDC_SET_LEADCHNL_RA_SENSING_ADAPTATION_MODE: NORMAL
MDC_IDC_SET_LEADCHNL_RA_SENSING_ANODE_ELECTRODE_1: NORMAL
MDC_IDC_SET_LEADCHNL_RA_SENSING_ANODE_LOCATION_1: NORMAL
MDC_IDC_SET_LEADCHNL_RA_SENSING_CATHODE_ELECTRODE_1: NORMAL
MDC_IDC_SET_LEADCHNL_RA_SENSING_CATHODE_LOCATION_1: NORMAL
MDC_IDC_SET_LEADCHNL_RA_SENSING_POLARITY: NORMAL
MDC_IDC_SET_LEADCHNL_RA_SENSING_SENSITIVITY: 0.5 MV
MDC_IDC_SET_LEADCHNL_RV_PACING_AMPLITUDE: 1.3 V
MDC_IDC_SET_LEADCHNL_RV_PACING_ANODE_ELECTRODE_1: NORMAL
MDC_IDC_SET_LEADCHNL_RV_PACING_ANODE_LOCATION_1: NORMAL
MDC_IDC_SET_LEADCHNL_RV_PACING_CAPTURE_MODE: NORMAL
MDC_IDC_SET_LEADCHNL_RV_PACING_CATHODE_ELECTRODE_1: NORMAL
MDC_IDC_SET_LEADCHNL_RV_PACING_CATHODE_LOCATION_1: NORMAL
MDC_IDC_SET_LEADCHNL_RV_PACING_POLARITY: NORMAL
MDC_IDC_SET_LEADCHNL_RV_PACING_PULSEWIDTH: 0.4 MS
MDC_IDC_SET_LEADCHNL_RV_SENSING_ADAPTATION_MODE: NORMAL
MDC_IDC_SET_LEADCHNL_RV_SENSING_ANODE_ELECTRODE_1: NORMAL
MDC_IDC_SET_LEADCHNL_RV_SENSING_ANODE_LOCATION_1: NORMAL
MDC_IDC_SET_LEADCHNL_RV_SENSING_CATHODE_ELECTRODE_1: NORMAL
MDC_IDC_SET_LEADCHNL_RV_SENSING_CATHODE_LOCATION_1: NORMAL
MDC_IDC_SET_LEADCHNL_RV_SENSING_POLARITY: NORMAL
MDC_IDC_SET_LEADCHNL_RV_SENSING_SENSITIVITY: 1.5 MV
MDC_IDC_SET_ZONE_DETECTION_INTERVAL: 375 MS
MDC_IDC_SET_ZONE_TYPE: NORMAL
MDC_IDC_SET_ZONE_VENDOR_TYPE: NORMAL
MDC_IDC_STAT_EPISODE_RECENT_COUNT: 0
MDC_IDC_STAT_EPISODE_RECENT_COUNT_DTM_END: NORMAL
MDC_IDC_STAT_EPISODE_RECENT_COUNT_DTM_START: NORMAL
MDC_IDC_STAT_EPISODE_TOTAL_COUNT: 0
MDC_IDC_STAT_EPISODE_TOTAL_COUNT_DTM_END: NORMAL
MDC_IDC_STAT_EPISODE_TYPE: NORMAL
MDC_IDC_STAT_EPISODE_TYPE: NORMAL
MDC_IDC_STAT_EPISODE_VENDOR_TYPE: NORMAL
MDC_IDC_STAT_EPISODE_VENDOR_TYPE: NORMAL

## 2019-07-11 ENCOUNTER — HOSPITAL ENCOUNTER (OUTPATIENT)
Dept: CARDIOLOGY | Facility: CLINIC | Age: 74
Discharge: HOME | End: 2019-07-11
Attending: PHYSICIAN ASSISTANT | Admitting: PHYSICIAN ASSISTANT
Payer: COMMERCIAL

## 2019-07-11 DIAGNOSIS — I48.0 PAF (PAROXYSMAL ATRIAL FIBRILLATION) (H): ICD-10-CM

## 2019-07-11 DIAGNOSIS — I50.31 ACUTE DIASTOLIC CONGESTIVE HEART FAILURE (H): ICD-10-CM

## 2019-07-11 DIAGNOSIS — I50.21 ACUTE SYSTOLIC HEART FAILURE (H): ICD-10-CM

## 2019-07-11 LAB
ANION GAP SERPL CALCULATED.3IONS-SCNC: 6 MMOL/L (ref 3–14)
BUN SERPL-MCNC: 16 MG/DL (ref 7–30)
CALCIUM SERPL-MCNC: 9 MG/DL (ref 8.5–10.1)
CHLORIDE SERPL-SCNC: 103 MMOL/L (ref 94–109)
CO2 SERPL-SCNC: 25 MMOL/L (ref 20–32)
CREAT SERPL-MCNC: 0.97 MG/DL (ref 0.52–1.04)
GFR SERPL CREATININE-BSD FRML MDRD: 58 ML/MIN/{1.73_M2}
GLUCOSE SERPL-MCNC: 91 MG/DL (ref 70–99)
NT-PROBNP SERPL-MCNC: 648 PG/ML (ref 0–125)
POTASSIUM SERPL-SCNC: 4.3 MMOL/L (ref 3.4–5.3)
SODIUM SERPL-SCNC: 134 MMOL/L (ref 133–144)

## 2019-07-11 PROCEDURE — 93306 TTE W/DOPPLER COMPLETE: CPT

## 2019-07-11 PROCEDURE — 93306 TTE W/DOPPLER COMPLETE: CPT | Mod: 26 | Performed by: INTERNAL MEDICINE

## 2019-07-11 PROCEDURE — 36415 COLL VENOUS BLD VENIPUNCTURE: CPT | Performed by: INTERNAL MEDICINE

## 2019-07-11 PROCEDURE — 80076 HEPATIC FUNCTION PANEL: CPT | Performed by: PHYSICIAN ASSISTANT

## 2019-07-11 PROCEDURE — 80048 BASIC METABOLIC PNL TOTAL CA: CPT | Performed by: PHYSICIAN ASSISTANT

## 2019-07-11 PROCEDURE — 84443 ASSAY THYROID STIM HORMONE: CPT | Performed by: PHYSICIAN ASSISTANT

## 2019-07-11 PROCEDURE — 83880 ASSAY OF NATRIURETIC PEPTIDE: CPT | Performed by: PHYSICIAN ASSISTANT

## 2019-07-11 PROCEDURE — 84439 ASSAY OF FREE THYROXINE: CPT | Performed by: PHYSICIAN ASSISTANT

## 2019-07-12 ENCOUNTER — TELEPHONE (OUTPATIENT)
Dept: CARDIOLOGY | Facility: CLINIC | Age: 74
End: 2019-07-12

## 2019-07-12 ENCOUNTER — OFFICE VISIT (OUTPATIENT)
Dept: CARDIOLOGY | Facility: CLINIC | Age: 74
End: 2019-07-12
Attending: PHYSICIAN ASSISTANT
Payer: COMMERCIAL

## 2019-07-12 ENCOUNTER — DOCUMENTATION ONLY (OUTPATIENT)
Dept: CARDIOLOGY | Facility: CLINIC | Age: 74
End: 2019-07-12

## 2019-07-12 VITALS
SYSTOLIC BLOOD PRESSURE: 104 MMHG | WEIGHT: 122 LBS | OXYGEN SATURATION: 97 % | HEART RATE: 86 BPM | HEIGHT: 66 IN | DIASTOLIC BLOOD PRESSURE: 76 MMHG | BODY MASS INDEX: 19.61 KG/M2

## 2019-07-12 DIAGNOSIS — I50.31 ACUTE DIASTOLIC CONGESTIVE HEART FAILURE (H): ICD-10-CM

## 2019-07-12 DIAGNOSIS — I50.21 ACUTE SYSTOLIC HEART FAILURE (H): ICD-10-CM

## 2019-07-12 DIAGNOSIS — I48.0 PAF (PAROXYSMAL ATRIAL FIBRILLATION) (H): ICD-10-CM

## 2019-07-12 LAB
ALBUMIN SERPL-MCNC: 3.7 G/DL (ref 3.4–5)
ALP SERPL-CCNC: 57 U/L (ref 40–150)
ALT SERPL W P-5'-P-CCNC: 26 U/L (ref 0–50)
AST SERPL W P-5'-P-CCNC: 29 U/L (ref 0–45)
BILIRUB DIRECT SERPL-MCNC: <0.1 MG/DL (ref 0–0.2)
BILIRUB SERPL-MCNC: 0.5 MG/DL (ref 0.2–1.3)
PROT SERPL-MCNC: 7.3 G/DL (ref 6.8–8.8)
T4 FREE SERPL-MCNC: 0.69 NG/DL (ref 0.76–1.46)
TSH SERPL DL<=0.005 MIU/L-ACNC: 8.96 MU/L (ref 0.4–4)

## 2019-07-12 PROCEDURE — 99214 OFFICE O/P EST MOD 30 MIN: CPT | Performed by: PHYSICIAN ASSISTANT

## 2019-07-12 ASSESSMENT — MIFFLIN-ST. JEOR: SCORE: 1080.14

## 2019-07-12 NOTE — PROGRESS NOTES
Cardiology Progress Note    Date of Service: 07/12/2019      Reason for visit: CORE clinic follow up, acute systolic heart failure, and atrial fibrillation s/p mitral valve surgery.      Primary cardiologist: Dr. Jesus Stone      HPI:  Ms. Aguila is a pleasant 74 year old female who has been followed in our clinic for mitral valve regurgitation and atrial fibrillation. 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  In Oct 2018 she developed palpitations and was found to be in afib with RVR. She converted back to sinus rhythm with diltiazem gtt, and was placed on anticoagulation. She did will until Dec 2018 when again she returned to afib with RVR. Her metoprolol dose was increased. Her rates were still elevated on short term follow up, and she was loaded with amiodarone and a cardioversion was arranged. However, when she presented for this, she was noted to be back in sinus rhythm.    She then saw Dr. Hanna the end of December to re-consider intervention on her valve. He felt that she did indeed have severe mitral regurgitation, and in the setting of new onset atrial fibrillation, valve surgery was recommended. Preop angio showed no coronary disease. TIA at that time showed preserved EF 65% with normal RV size and function. She had confirmed 3-4+ MR, and the valve was severely thickened and myxomatous. She also had 3+ TR. In Feb 2019 she successfully underwent minimally invasive MV replacement with Epic St Ron 31mm valve. At the same time, she had tricuspid valve repair with a 32mm Oquendo ring. She developed complete AV block post operatively, and underwent pacemaker placement. She had some volume overload post op for which she received diuresis, but this was complicated by hyponatremia (lowest of 126), and nephrology assisted with management. She was discharged on Xarelto/ASA, and no diuretics. She did not  have any recurrence of afib during that stay, and her amiodarone and beta blockers were discontinued.     She was seen the end of Feb by the CT surgery team for follow up and noted fluid retention with RAYO which did not respond to low dose diuretics; I then saw her in early March for an urgent CORE evaluation. She was feeling poorly with cough, orthopnea, and was ~10 lbs up from her perceived baseline. ECG showed she was back in atrial fibrillation (device check confirmed 100% afib, rates in the 80-90 range). I changed her lasix to torsemide which resulted in good weight loss and much improvement in her edema. With the assistance of EP, we also reloaded her with amiodarone which successfully converted her back to sinus rhythm. Around the same time, I requested a repeat echocardiogram which showed her EF hade declined, to 20-25%, with severe global hypokinesia of the LV and also moderately decreased RV function. Her MV showed leaflets opened well with no leak and normal valve function. The tricuspid ring had 1+ regurgitation. IVC, as expected, suggested elevated CVP.     Over the last few months she has done very well. Her weights have been stable at home in the 117-119# range. She no longer has leg edema, and is back to walking 4-5 miles per day without dyspnea. She has completed a course of pulmonary rehab. Her last device check end of May showed she has remained in sinus rhythm since early March. When she saw Faye back in EP, her amiodarone was discontinued. She has remained on metoprolol without change. Today she is back for our planned follow up and repeat echocardiogram.    Unfortunately, her echo done yesterday shows no improvement in regard to her EF, still reported at 20-25% and she has severe global hypokinesia. RV was normal size with mildly reduced function. Her bioprosthetic MV appears to open well, with a mean MV gradient of 4.9mmHg. Annuloplasty ring was in the tricuspid position with trace TR. Despite  "this, she tells me she has been feeling very well. She denies any further orthopnea, leg edema, or RAYO. She continues to walk several miles most day without issue. She has no chest pain or palpitations and does not believe she's had any afib recurrence. She on occasion still has some mild dizziness when she stands too fast, but this is improved overall as well. She feels \"puffy\" in her abdomen at times, but this is minimal, with stable weights. Labs done yesterday show ongoing preserved renal function, and her NT-proBNP is down significantly, at 648 (last visit 2131, and as high as 7K on our initial visit).     During our visit today we discussed the other potential causes of her cardiomyopathy. She denies ETOH or NSAID use at all. It is noted that shortly after surgery both her TSH and LFTs were mildly abnormal, so we did repeat these today. Her LFTs have normalized, but her TSH is still elevated with a mildly low free T4.       ASSESSMENT/PLAN:    1. Idiopathic cardiomyopathy, with acute systolic heart failure.   --Previously normal EF, but recent acute systolic heart failure in March 2019 post operatively after MV replacement and TV repair. Noted preop angiogram with normal coronaries. She also had a CMRI earlier this year, at which time there was no evidence of infiltrative disease.    --This was initially suspected to be tachymediated; however, repeat echo yesterday continues to show significant impairment in her EF at 20-25% with severe global hypokinesia, despite maintaining sinus rhythm the last few months. No other obvious causes currently, though on recheck her TSH was abnormal with a mildly low free T4. Will forward to her primary provider for follow up.    --Today she appears euvolemic with stable weights. Renal function stable as well. Continue torsemide 10mg daily.    --Continue Toprol XL 25mg daily. I am hesitant to add an ACE-I or spironolactone given lower baseline BP and occasional mild dizziness. " Titration of her regimen may be difficult.  If no improvement with medical mgmt, may require device upgrade.     2. Paroxysmal atrial fibrillation, symptomatic.   --First noted Oct 2018, converted to sinus with diltiazem, and placed on beta blockers.  Returned Dec 2018 and placed on amiodarone with plans for CV, but when presented she had converted back to sinus. Her beta blockers and amio were stopped after her valve surgery as she remained in sinus rhythm. In addition, she underwent dual chamber PPM for CHB post operatively.    --Remains in sinus today on exam, now off amiodarone. Continue routine device checks.     --She is on ASA/Xarelto post valve surgery.     3. Valvular heart disease.   --Now s/p minimally invasive MV replacement with Epic St Ron 31mm valve and tricuspid valve repair with a 32mm Oquendo ring in Feb 2019. Echo as above still with decline in EF though appears that her MV is functioning well, and tricuspid ring has only trace regurgitation.       Follow up plan:  To see Dr. Stone in Sept 2019 as planned. I am happy to see her back sooner if needed.       Orders this Visit:  Orders Placed This Encounter   Procedures     TSH with free T4 reflex     Hepatic panel     No orders of the defined types were placed in this encounter.        CURRENT MEDICATIONS:  Current Outpatient Medications   Medication Sig Dispense Refill     aspirin 81 MG EC tablet Take 81 mg by mouth every evening       calcium carbonate-vitamin D (CALCIUM 600+D) 600-200 MG-UNIT TABS Take 1 tablet by mouth 2 times daily       metoprolol succinate ER (TOPROL-XL) 25 MG 24 hr tablet Take 1 tablet (25 mg) by mouth daily       Multiple Vitamins-Minerals (MULTIVITAMIN ADULT) TABS Take 1 tablet by mouth daily       omeprazole (PRILOSEC) 10 MG CR capsule Take 1 capsule (10 mg) by mouth every morning (before breakfast) 90 capsule 3     potassium chloride ER (K-DUR/KLOR-CON M) 20 MEQ CR tablet Take 40 mEq by mouth daily        rivaroxaban  ANTICOAGULANT (XARELTO) 20 MG TABS tablet Take 1 tablet (20 mg) by mouth daily (with dinner) 90 tablet 3     torsemide (DEMADEX) 20 MG tablet daily Take 1/2 tablet (10 mg)       oxyCODONE (ROXICODONE) 5 MG tablet Take 1-2 tablets (5-10 mg) by mouth every 4 hours as needed for moderate to severe pain (Patient not taking: Reported on 4/18/2019) 20 tablet 0     senna (SENOKOT) 8.6 MG tablet Take 1 tablet by mouth daily         ALLERGIES     Allergies   Allergen Reactions     Chlorpheniramine Other (See Comments)     LOC and fainting      Phenylephrine Other (See Comments)     fainting       PAST MEDICAL HISTORY:  Past Medical History:   Diagnosis Date     Allergic rhinitis, cause unspecified     dust mites, ragweed     Complete AV block (H)     BSX DDD PM 2-     History of tricuspid valve repair      Mitral valve prolapse     bioprosthetic MVR 2-     Paroxysmal atrial fibrillation (H)        PAST SURGICAL HISTORY:  Past Surgical History:   Procedure Laterality Date     COLONOSCOPY N/A 9/15/2015    Procedure: COLONOSCOPY;  Surgeon: Anson Llanos MD;  Location:  GI     CV HEART CATHETERIZATION WITH POSSIBLE INTERVENTION N/A 1/9/2019    Procedure: Heart Catheterization with Possible Intervention;  Surgeon: Ritesh Whittaker MD;  Location:  HEART CARDIAC CATH LAB     CV RIGHT AND LEFT HEART CATH N/A 1/9/2019    Procedure: Right and Left Heart Catherization;  Surgeon: Ritesh Whittaker MD;  Location:  HEART CARDIAC CATH LAB     ENT SURGERY      T&A     EP PACEMAKER N/A 2/18/2019    Procedure: EP Pacemaker;  Surgeon: Inocencia Guillen MD;  Location:  HEART CARDIAC CATH LAB     REPAIR VALVE TRICUSPID MINIMALLY INVASIVE N/A 2/15/2019    Procedure: MINIMALLY INVASIVE TRICUSPID VALVE REPAIR WITH 32MM SULLIVAN RING;  Surgeon: Andrea Hanna MD;  Location:  OR     REPLACE VALVE MITRAL MINIMALLY INVASIVE N/A 2/15/2019    Procedure: MINIMALLY INVASIVE MITRAL VALVE REPLACEMENT WITH  EPIC ST KEYUR 31MM VALVE; ON PUMP WITH TIA.  CARDIOLOGIST DR CARDENAS;  Surgeon: Andrea Hanna MD;  Location:  OR       FAMILY HISTORY:  Family History   Problem Relation Age of Onset     Osteoporosis Mother      Alzheimer Disease Mother      Family History Negative Father      Heart Disease Maternal Grandfather      Family History Negative Paternal Grandmother      Family History Negative Paternal Grandfather        SOCIAL HISTORY:  Social History     Socioeconomic History     Marital status:      Spouse name: None     Number of children: 3     Years of education: None     Highest education level: None   Occupational History     None   Social Needs     Financial resource strain: None     Food insecurity:     Worry: None     Inability: None     Transportation needs:     Medical: None     Non-medical: None   Tobacco Use     Smoking status: Never Smoker     Smokeless tobacco: Never Used   Substance and Sexual Activity     Alcohol use: No     Drug use: No     Sexual activity: Not Currently   Lifestyle     Physical activity:     Days per week: None     Minutes per session: None     Stress: None   Relationships     Social connections:     Talks on phone: None     Gets together: None     Attends Anabaptist service: None     Active member of club or organization: None     Attends meetings of clubs or organizations: None     Relationship status: None     Intimate partner violence:     Fear of current or ex partner: None     Emotionally abused: None     Physically abused: None     Forced sexual activity: None   Other Topics Concern      Service Not Asked     Blood Transfusions Not Asked     Caffeine Concern No     Comment: 1 cup of coffee and 1 can diet coke per day     Occupational Exposure Not Asked     Hobby Hazards Not Asked     Sleep Concern Yes     Comment: takes Doxylamine succinate 1/2 tab every night     Stress Concern No     Weight Concern No     Special Diet No     Comment: healthy       "Back Care Not Asked     Exercise Yes     Comment: walking 45min x7 a wk. Very active, [employer], Golf, Bowling, Cardio     Bike Helmet Not Asked     Seat Belt Yes     Self-Exams Yes     Parent/sibling w/ CABG, MI or angioplasty before 65F 55M? No   Social History Narrative     None       Review of Systems:  Cardiovascular: negative for chest pain, neg palpitations, orthopnea, leg edema.  Constitutional: negative for chills, sweats, fevers.   Resp: Negative for dyspnea at rest, neg RAYO, neg cough, hemoptysis, known chronic lung disease  HEENT: Negative for new visual changes, frequent headaches  Gastrointestinal: negative for abdominal pain, neg diarrhea, blood in stool, nausea, vomiting  Hematologic/lymphatic: pos for current systemic anticoagulation, neg hx of blood clots  Musculoskeletal: negative for new back pain, joint pain  Neurological: negative for focal weakness, LOC, seizures, syncope. Pos occasional mild dizziness, neg presyncope/syncope.       Physical Exam:  Vitals: /76 (BP Location: Right arm, Patient Position: Chair, Cuff Size: Adult Small)   Pulse 86   Ht 1.676 m (5' 6\")   Wt 55.3 kg (122 lb)   SpO2 97%   BMI 19.69 kg/m     Wt Readings from Last 4 Encounters:   07/12/19 55.3 kg (122 lb)   05/30/19 54.4 kg (120 lb)   04/18/19 54.3 kg (119 lb 12.8 oz)   03/25/19 53.5 kg (118 lb)       GEN:  In general, this is a well nourished  female in no acute distress on room air.  Patient ambulatory, accompanied by her .  HEENT:  Pupils equal, round. Sclerae nonicteric.   NECK: Supple, no masses appreciated. Trachea midline. No JVD noted while upright.  C/V:  Regular rate and rhythm on exam today, no obvious murmur, rub or gallop.   RESP: Respirations are unlabored. No use of accessory muscles. Clear with no crackles or wheezing.    GI: Abdomen soft, nontender, nondistended. No HSM appreciated.   EXTREM: Trace pitting PT bilateral edema. No cyanosis or clubbing.  NEURO: Alert and oriented, " cooperative. Gait not formally assessed. No obvious focal deficits.   PSYCH: Normal affect.  SKIN: Warm and dry. No rashes or petechiae appreciated.       Recent Lab Results:    BMP RESULTS:  Lab Results   Component Value Date     07/11/2019    POTASSIUM 4.3 07/11/2019    CHLORIDE 103 07/11/2019    CO2 25 07/11/2019    ANIONGAP 6 07/11/2019    GLC 91 07/11/2019    BUN 16 07/11/2019    CR 0.97 07/11/2019    GFRESTIMATED 58 (L) 07/11/2019    GFRESTBLACK 68 07/11/2019    ARIELLE 9.0 07/11/2019          Additional pertinent testing:  Echo 7/11/19  Interpretation Summary     There is a bioprosthetic mitral valve.  S/P tissue MVR with #31 St Ron Epic, implanted 2/2019  The prosthetic mitral valve appears to open well.  The mean mitral valve gradient is 4.9mmHg at 77 bpm.  An annuloplasty ring is noted in the tricuspid position.  There is trace tricuspid regurgitation.  The right ventricular systolic pressure is approximated at 15mmHg plus the  right atrial pressure.  Left ventricular systolic function is severely reduced.  The visual ejection fraction is estimated at 20-25%.  LVEF 24% based on biplane 2D tracing.  There is severe global hypokinesia of the left ventricle.  Septal motion is consistent with post-operative state.  Sinus rhythm was noted.  Compared to the prior study dated 3/14/2019, there are changes as noted. IVC  is now normal with appropriate respiratory variation. The tricuspid  regurgitation is trivial. No change in LVEF or the function of the  bioprosthetic mitral valve.      Courtney Brian PA-C  [facility]  Pager [phone]

## 2019-07-12 NOTE — TELEPHONE ENCOUNTER
----- Message from CHRISTIANO Weathers sent at 7/12/2019  2:02 PM CDT -----  Regarding: thyroid studies  Please let her know that her liver function has normalized but her thyroid function was still abnormal.  I have messaged her PCP for follow up. If she doesn't hear from them next week have to call to follow up please.    Thanks  Courtney

## 2019-07-12 NOTE — PATIENT INSTRUCTIONS
Visit Summary:    Today we discussed:   We reviewed your echocardiogram today. Unfortunately, your heart function is still impaired as we discussed.     Medication changes:    NONE    Follow up:   With Dr Stone in Sept as planned.     Please call my nurse Anyi at [phone] with any questions or concerns.

## 2019-07-12 NOTE — LETTER
7/12/2019    Anyi Olmos MD  [address] [facility] [address]  [facility], [place] [address]    RE: Taty Aguila       Dear Colleague,    I had the pleasure of seeing Taty Aguila in the [facility].      Cardiology Progress Note    Date of Service: 07/12/2019      Reason for visit: CORE clinic follow up, acute systolic heart failure, and atrial fibrillation s/p mitral valve surgery.      Primary cardiologist: Dr. Jesus Stone      HPI:  Ms. Aguila is a pleasant 74 year old female who has been followed in our clinic for mitral valve regurgitation and atrial fibrillation. 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  In Oct 2018 she developed palpitations and was found to be in afib with RVR. She converted back to sinus rhythm with diltiazem gtt, and was placed on anticoagulation. She did will until Dec 2018 when again she returned to afib with RVR. Her metoprolol dose was increased. Her rates were still elevated on short term follow up, and she was loaded with amiodarone and a cardioversion was arranged. However, when she presented for this, she was noted to be back in sinus rhythm.    She then saw Dr. Hanna the end of December to re-consider intervention on her valve. He felt that she did indeed have severe mitral regurgitation, and in the setting of new onset atrial fibrillation, valve surgery was recommended. Preop angio showed no coronary disease. TIA at that time showed preserved EF 65% with normal RV size and function. She had confirmed 3-4+ MR, and the valve was severely thickened and myxomatous. She also had 3+ TR. In Feb 2019 she successfully underwent minimally invasive MV replacement with Epic St Ron 31mm valve. At the same time, she had tricuspid valve repair with a 32mm Oquendo ring. She developed complete AV block post operatively, and underwent pacemaker placement. She had  some volume overload post op for which she received diuresis, but this was complicated by hyponatremia (lowest of 126), and nephrology assisted with management. She was discharged on Xarelto/ASA, and no diuretics. She did not have any recurrence of afib during that stay, and her amiodarone and beta blockers were discontinued.     She was seen the end of Feb by the CT surgery team for follow up and noted fluid retention with RAYO which did not respond to low dose diuretics; I then saw her in early March for an urgent CORE evaluation. She was feeling poorly with cough, orthopnea, and was ~10 lbs up from her perceived baseline. ECG showed she was back in atrial fibrillation (device check confirmed 100% afib, rates in the 80-90 range). I changed her lasix to torsemide which resulted in good weight loss and much improvement in her edema. With the assistance of EP, we also reloaded her with amiodarone which successfully converted her back to sinus rhythm. Around the same time, I requested a repeat echocardiogram which showed her EF hade declined, to 20-25%, with severe global hypokinesia of the LV and also moderately decreased RV function. Her MV showed leaflets opened well with no leak and normal valve function. The tricuspid ring had 1+ regurgitation. IVC, as expected, suggested elevated CVP.     Over the last few months she has done very well. Her weights have been stable at home in the 117-119# range. She no longer has leg edema, and is back to walking 4-5 miles per day without dyspnea. She has completed a course of pulmonary rehab. Her last device check end of May showed she has remained in sinus rhythm since early March. When she saw Faye back in EP, her amiodarone was discontinued. She has remained on metoprolol without change. Today she is back for our planned follow up and repeat echocardiogram.    Unfortunately, her echo done yesterday shows no improvement in regard to her EF, still reported at 20-25% and she has  "severe global hypokinesia. RV was normal size with mildly reduced function. Her bioprosthetic MV appears to open well, with a mean MV gradient of 4.9mmHg. Annuloplasty ring was in the tricuspid position with trace TR. Despite this, she tells me she has been feeling very well. She denies any further orthopnea, leg edema, or RAYO. She continues to walk several miles most day without issue. She has no chest pain or palpitations and does not believe she's had any afib recurrence. She on occasion still has some mild dizziness when she stands too fast, but this is improved overall as well. She feels \"puffy\" in her abdomen at times, but this is minimal, with stable weights. Labs done yesterday show ongoing preserved renal function, and her NT-proBNP is down significantly, at 648 (last visit 2131, and as high as 7K on our initial visit).     During our visit today we discussed the other potential causes of her cardiomyopathy. She denies ETOH or NSAID use at all. It is noted that shortly after surgery both her TSH and LFTs were mildly abnormal, so we did repeat these today. Her LFTs have normalized, but her TSH is still elevated with a mildly low free T4.       ASSESSMENT/PLAN:    1. Idiopathic cardiomyopathy, with acute systolic heart failure.   --Previously normal EF, but recent acute systolic heart failure in March 2019 post operatively after MV replacement and TV repair. Noted preop angiogram with normal coronaries. She also had a CMRI earlier this year, at which time there was no evidence of infiltrative disease.    --This was initially suspected to be tachymediated; however, repeat echo yesterday continues to show significant impairment in her EF at 20-25% with severe global hypokinesia, despite maintaining sinus rhythm the last few months. No other obvious causes currently, though on recheck her TSH was abnormal with a mildly low free T4. Will forward to her primary provider for follow up.    --Today she appears " euvolemic with stable weights. Renal function stable as well. Continue torsemide 10mg daily.    --Continue Toprol XL 25mg daily. I am hesitant to add an ACE-I or spironolactone given lower baseline BP and occasional mild dizziness. Titration of her regimen may be difficult.  If no improvement with medical mgmt, may require device upgrade.     2. Paroxysmal atrial fibrillation, symptomatic.   --First noted Oct 2018, converted to sinus with diltiazem, and placed on beta blockers.  Returned Dec 2018 and placed on amiodarone with plans for CV, but when presented she had converted back to sinus. Her beta blockers and amio were stopped after her valve surgery as she remained in sinus rhythm. In addition, she underwent dual chamber PPM for CHB post operatively.    --Remains in sinus today on exam, now off amiodarone. Continue routine device checks.     --She is on ASA/Xarelto post valve surgery.     3. Valvular heart disease.   --Now s/p minimally invasive MV replacement with Epic St Ron 31mm valve and tricuspid valve repair with a 32mm Oquendo ring in Feb 2019. Echo as above still with decline in EF though appears that her MV is functioning well, and tricuspid ring has only trace regurgitation.       Follow up plan:  To see Dr. Stone in Sept 2019 as planned. I am happy to see her back sooner if needed.       Orders this Visit:  Orders Placed This Encounter   Procedures     TSH with free T4 reflex     Hepatic panel     No orders of the defined types were placed in this encounter.        CURRENT MEDICATIONS:  Current Outpatient Medications   Medication Sig Dispense Refill     aspirin 81 MG EC tablet Take 81 mg by mouth every evening       calcium carbonate-vitamin D (CALCIUM 600+D) 600-200 MG-UNIT TABS Take 1 tablet by mouth 2 times daily       metoprolol succinate ER (TOPROL-XL) 25 MG 24 hr tablet Take 1 tablet (25 mg) by mouth daily       Multiple Vitamins-Minerals (MULTIVITAMIN ADULT) TABS Take 1 tablet by mouth daily        omeprazole (PRILOSEC) 10 MG CR capsule Take 1 capsule (10 mg) by mouth every morning (before breakfast) 90 capsule 3     potassium chloride ER (K-DUR/KLOR-CON M) 20 MEQ CR tablet Take 40 mEq by mouth daily        rivaroxaban ANTICOAGULANT (XARELTO) 20 MG TABS tablet Take 1 tablet (20 mg) by mouth daily (with dinner) 90 tablet 3     torsemide (DEMADEX) 20 MG tablet daily Take 1/2 tablet (10 mg)       oxyCODONE (ROXICODONE) 5 MG tablet Take 1-2 tablets (5-10 mg) by mouth every 4 hours as needed for moderate to severe pain (Patient not taking: Reported on 4/18/2019) 20 tablet 0     senna (SENOKOT) 8.6 MG tablet Take 1 tablet by mouth daily         ALLERGIES     Allergies   Allergen Reactions     Chlorpheniramine Other (See Comments)     LOC and fainting      Phenylephrine Other (See Comments)     fainting       PAST MEDICAL HISTORY:  Past Medical History:   Diagnosis Date     Allergic rhinitis, cause unspecified     dust mites, ragweed     Complete AV block (H)     BSX DDD PM 2-     History of tricuspid valve repair      Mitral valve prolapse     bioprosthetic MVR 2-     Paroxysmal atrial fibrillation (H)        PAST SURGICAL HISTORY:  Past Surgical History:   Procedure Laterality Date     COLONOSCOPY N/A 9/15/2015    Procedure: COLONOSCOPY;  Surgeon: Anson Llanos MD;  Location:  GI     CV HEART CATHETERIZATION WITH POSSIBLE INTERVENTION N/A 1/9/2019    Procedure: Heart Catheterization with Possible Intervention;  Surgeon: Ritesh Whittaker MD;  Location:  HEART CARDIAC CATH LAB     CV RIGHT AND LEFT HEART CATH N/A 1/9/2019    Procedure: Right and Left Heart Catherization;  Surgeon: Ritesh Whittaker MD;  Location:  HEART CARDIAC CATH LAB     ENT SURGERY      T&A     EP PACEMAKER N/A 2/18/2019    Procedure: EP Pacemaker;  Surgeon: Inocencia Guillen MD;  Location:  HEART CARDIAC CATH LAB     REPAIR VALVE TRICUSPID MINIMALLY INVASIVE N/A 2/15/2019    Procedure: MINIMALLY  INVASIVE TRICUSPID VALVE REPAIR WITH 32MM SULLIVAN RING;  Surgeon: Andrea Hanna MD;  Location:  OR     REPLACE VALVE MITRAL MINIMALLY INVASIVE N/A 2/15/2019    Procedure: MINIMALLY INVASIVE MITRAL VALVE REPLACEMENT WITH EPIC ST KEYUR 31MM VALVE; ON PUMP WITH TIA.  CARDIOLOGIST DR CARDENAS;  Surgeon: Andrea aHnna MD;  Location:  OR       FAMILY HISTORY:  Family History   Problem Relation Age of Onset     Osteoporosis Mother      Alzheimer Disease Mother      Family History Negative Father      Heart Disease Maternal Grandfather      Family History Negative Paternal Grandmother      Family History Negative Paternal Grandfather        SOCIAL HISTORY:  Social History     Socioeconomic History     Marital status:      Spouse name: None     Number of children: 3     Years of education: None     Highest education level: None   Occupational History     None   Social Needs     Financial resource strain: None     Food insecurity:     Worry: None     Inability: None     Transportation needs:     Medical: None     Non-medical: None   Tobacco Use     Smoking status: Never Smoker     Smokeless tobacco: Never Used   Substance and Sexual Activity     Alcohol use: No     Drug use: No     Sexual activity: Not Currently   Lifestyle     Physical activity:     Days per week: None     Minutes per session: None     Stress: None   Relationships     Social connections:     Talks on phone: None     Gets together: None     Attends Restoration service: None     Active member of club or organization: None     Attends meetings of clubs or organizations: None     Relationship status: None     Intimate partner violence:     Fear of current or ex partner: None     Emotionally abused: None     Physically abused: None     Forced sexual activity: None   Other Topics Concern      Service Not Asked     Blood Transfusions Not Asked     Caffeine Concern No     Comment: 1 cup of coffee and 1 can diet coke per day      "Occupational Exposure Not Asked     Hobby Hazards Not Asked     Sleep Concern Yes     Comment: takes Doxylamine succinate 1/2 tab every night     Stress Concern No     Weight Concern No     Special Diet No     Comment: healthy      Back Care Not Asked     Exercise Yes     Comment: walking 45min x7 a wk. Very active, [employer], Golf, Bowling, Cardio     Bike Helmet Not Asked     Seat Belt Yes     Self-Exams Yes     Parent/sibling w/ CABG, MI or angioplasty before 65F 55M? No   Social History Narrative     None       Review of Systems:  Cardiovascular: negative for chest pain, neg palpitations, orthopnea, leg edema.  Constitutional: negative for chills, sweats, fevers.   Resp: Negative for dyspnea at rest, neg RAYO, neg cough, hemoptysis, known chronic lung disease  HEENT: Negative for new visual changes, frequent headaches  Gastrointestinal: negative for abdominal pain, neg diarrhea, blood in stool, nausea, vomiting  Hematologic/lymphatic: pos for current systemic anticoagulation, neg hx of blood clots  Musculoskeletal: negative for new back pain, joint pain  Neurological: negative for focal weakness, LOC, seizures, syncope. Pos occasional mild dizziness, neg presyncope/syncope.       Physical Exam:  Vitals: /76 (BP Location: Right arm, Patient Position: Chair, Cuff Size: Adult Small)   Pulse 86   Ht 1.676 m (5' 6\")   Wt 55.3 kg (122 lb)   SpO2 97%   BMI 19.69 kg/m      Wt Readings from Last 4 Encounters:   07/12/19 55.3 kg (122 lb)   05/30/19 54.4 kg (120 lb)   04/18/19 54.3 kg (119 lb 12.8 oz)   03/25/19 53.5 kg (118 lb)       GEN:  In general, this is a well nourished  female in no acute distress on room air.  Patient ambulatory, accompanied by her .  HEENT:  Pupils equal, round. Sclerae nonicteric.   NECK: Supple, no masses appreciated. Trachea midline. No JVD noted while upright.  C/V:  Regular rate and rhythm on exam today, no obvious murmur, rub or gallop.   RESP: Respirations are " unlabored. No use of accessory muscles. Clear with no crackles or wheezing.    GI: Abdomen soft, nontender, nondistended. No HSM appreciated.   EXTREM: Trace pitting PT bilateral edema. No cyanosis or clubbing.  NEURO: Alert and oriented, cooperative. Gait not formally assessed. No obvious focal deficits.   PSYCH: Normal affect.  SKIN: Warm and dry. No rashes or petechiae appreciated.       Recent Lab Results:    BMP RESULTS:  Lab Results   Component Value Date     07/11/2019    POTASSIUM 4.3 07/11/2019    CHLORIDE 103 07/11/2019    CO2 25 07/11/2019    ANIONGAP 6 07/11/2019    GLC 91 07/11/2019    BUN 16 07/11/2019    CR 0.97 07/11/2019    GFRESTIMATED 58 (L) 07/11/2019    GFRESTBLACK 68 07/11/2019    ARIELLE 9.0 07/11/2019          Additional pertinent testing:  Echo 7/11/19  Interpretation Summary     There is a bioprosthetic mitral valve.  S/P tissue MVR with #31 St Ron Epic, implanted 2/2019  The prosthetic mitral valve appears to open well.  The mean mitral valve gradient is 4.9mmHg at 77 bpm.  An annuloplasty ring is noted in the tricuspid position.  There is trace tricuspid regurgitation.  The right ventricular systolic pressure is approximated at 15mmHg plus the  right atrial pressure.  Left ventricular systolic function is severely reduced.  The visual ejection fraction is estimated at 20-25%.  LVEF 24% based on biplane 2D tracing.  There is severe global hypokinesia of the left ventricle.  Septal motion is consistent with post-operative state.  Sinus rhythm was noted.  Compared to the prior study dated 3/14/2019, there are changes as noted. IVC  is now normal with appropriate respiratory variation. The tricuspid  regurgitation is trivial. No change in LVEF or the function of the  bioprosthetic mitral valve.      Courtney Brian PA-C  [facility]  Pager [phone]    Thank you for allowing me to participate in the care of your patient.    Sincerely,     CHRISTIANO Weathers     [facility]  [facility]

## 2019-07-12 NOTE — TELEPHONE ENCOUNTER
Reviewed TSH and hepatic panel with pt. Did let pt to let CORE know if she does not hear back from Dr Olmos's office. Expressed understanding.   Milagro Velazquez RN 3:13 PM 07/12/19

## 2019-07-13 NOTE — PROGRESS NOTES
Dr. Motley .. Should we meet to talk about ICD upgrade?    75 y/o F who you saw while hospitalized 2/18/2019 for MVR and tricuspid repair. EF pre-op was 65% and dropped to 25%.  Developed CHB and dual chamber Stetson PPM. Had pAFib and was on amiodarone until 4/2019    - EF still hasn't improved despite CORE clinic. BPs too soft and dizzy for much med titration. Echo 7/11/2019 showed EF still 24%.    * Pacer check shows no AFib since 3/10  * Last PPM check showed 6% , so conduction has recovered.    ?? Upgrade to ICD ??    Mo Mckinney

## 2019-07-16 ENCOUNTER — CARE COORDINATION (OUTPATIENT)
Dept: CARDIOLOGY | Facility: CLINIC | Age: 74
End: 2019-07-16

## 2019-07-16 DIAGNOSIS — Z95.2 S/P MVR (MITRAL VALVE REPLACEMENT): Primary | ICD-10-CM

## 2019-07-16 DIAGNOSIS — R93.1 ABNORMAL FINDINGS DIAGNOSTIC IMAGING OF HEART AND CORONARY CIRCULATION: ICD-10-CM

## 2019-07-16 DIAGNOSIS — I42.9 CARDIOMYOPATHY (H): ICD-10-CM

## 2019-07-16 NOTE — PROGRESS NOTES
Dr. Stone has reviewed most recent echo showing drop in EF to 20-25% which was similar to echo that was done in March when it was felt to be related to atrial fib with RVR, but now patient is in SR.  She is doing well currently.  Dr. Stone would like patient to have lexiscan done to r/o any CAD.

## 2019-07-19 ENCOUNTER — HOSPITAL ENCOUNTER (OUTPATIENT)
Dept: CARDIOLOGY | Facility: CLINIC | Age: 74
Discharge: HOME | End: 2019-07-19
Attending: INTERNAL MEDICINE | Admitting: INTERNAL MEDICINE
Payer: COMMERCIAL

## 2019-07-19 DIAGNOSIS — I42.9 CARDIOMYOPATHY (H): ICD-10-CM

## 2019-07-19 DIAGNOSIS — R93.1 ABNORMAL FINDINGS DIAGNOSTIC IMAGING OF HEART AND CORONARY CIRCULATION: ICD-10-CM

## 2019-07-19 PROCEDURE — 93016 CV STRESS TEST SUPVJ ONLY: CPT | Performed by: INTERNAL MEDICINE

## 2019-07-19 PROCEDURE — 78452 HT MUSCLE IMAGE SPECT MULT: CPT | Mod: 26 | Performed by: INTERNAL MEDICINE

## 2019-07-19 PROCEDURE — 78452 HT MUSCLE IMAGE SPECT MULT: CPT

## 2019-07-19 PROCEDURE — A9502 TC99M TETROFOSMIN: HCPCS | Performed by: INTERNAL MEDICINE

## 2019-07-19 PROCEDURE — 93018 CV STRESS TEST I&R ONLY: CPT | Performed by: INTERNAL MEDICINE

## 2019-07-19 PROCEDURE — 25000128 H RX IP 250 OP 636: Performed by: INTERNAL MEDICINE

## 2019-07-19 PROCEDURE — 34300033 ZZH RX 343: Performed by: INTERNAL MEDICINE

## 2019-07-19 RX ORDER — ACYCLOVIR 200 MG/1
0-1 CAPSULE ORAL
Status: DISCONTINUED | OUTPATIENT
Start: 2019-07-19 | End: 2019-07-20 | Stop reason: HOSPADM

## 2019-07-19 RX ORDER — ALBUTEROL SULFATE 90 UG/1
2 AEROSOL, METERED RESPIRATORY (INHALATION) EVERY 5 MIN PRN
Status: DISCONTINUED | OUTPATIENT
Start: 2019-07-19 | End: 2019-07-20 | Stop reason: HOSPADM

## 2019-07-19 RX ORDER — AMINOPHYLLINE 25 MG/ML
50-100 INJECTION, SOLUTION INTRAVENOUS
Status: DISCONTINUED | OUTPATIENT
Start: 2019-07-19 | End: 2019-07-20 | Stop reason: HOSPADM

## 2019-07-19 RX ORDER — REGADENOSON 0.08 MG/ML
0.4 INJECTION, SOLUTION INTRAVENOUS ONCE
Status: COMPLETED | OUTPATIENT
Start: 2019-07-19 | End: 2019-07-19

## 2019-07-19 RX ADMIN — REGADENOSON 0.4 MG: 0.08 INJECTION, SOLUTION INTRAVENOUS at 13:52

## 2019-07-19 RX ADMIN — TETROFOSMIN 9.22 MCI.: 1.38 INJECTION, POWDER, LYOPHILIZED, FOR SOLUTION INTRAVENOUS at 13:56

## 2019-07-19 RX ADMIN — TETROFOSMIN 3.43 MCI.: 1.38 INJECTION, POWDER, LYOPHILIZED, FOR SOLUTION INTRAVENOUS at 11:56

## 2019-07-22 ENCOUNTER — TELEPHONE (OUTPATIENT)
Dept: INTERNAL MEDICINE | Facility: CLINIC | Age: 74
End: 2019-07-22

## 2019-07-22 ENCOUNTER — DOCUMENTATION ONLY (OUTPATIENT)
Dept: CARDIOLOGY | Facility: CLINIC | Age: 74
End: 2019-07-22

## 2019-07-22 ENCOUNTER — CARE COORDINATION (OUTPATIENT)
Dept: CARDIOLOGY | Facility: CLINIC | Age: 74
End: 2019-07-22

## 2019-07-22 NOTE — TELEPHONE ENCOUNTER
Received call from [facility] asking primary care provider to address abnormal thyroid function drawn at their clinic 7/11/19. DORA Neff R.N.    Component      Latest Ref Rng & Units 7/11/2019   TSH      0.40 - 4.00 mU/L 8.96 (H)     Component      Latest Ref Rng & Units 7/11/2019   T4 Free      0.76 - 1.46 ng/dL 0.69 (L)

## 2019-07-22 NOTE — PROGRESS NOTES
Dr. Stone - you are seeing pt next week.  Dr. Motley has rec'd upgrade to dual chamber ICD based on continued low EF.    Pls d/w her at her OV with you OR get her back in to see me so I can d/w her.    Mo Mckinney

## 2019-07-26 ENCOUNTER — OFFICE VISIT (OUTPATIENT)
Dept: INTERNAL MEDICINE | Facility: CLINIC | Age: 74
End: 2019-07-26
Payer: COMMERCIAL

## 2019-07-26 VITALS
HEART RATE: 82 BPM | OXYGEN SATURATION: 97 % | SYSTOLIC BLOOD PRESSURE: 108 MMHG | HEIGHT: 66 IN | BODY MASS INDEX: 19.67 KG/M2 | WEIGHT: 122.4 LBS | DIASTOLIC BLOOD PRESSURE: 82 MMHG | RESPIRATION RATE: 16 BRPM | TEMPERATURE: 98.6 F

## 2019-07-26 DIAGNOSIS — I42.9 CARDIOMYOPATHY, UNSPECIFIED TYPE (H): ICD-10-CM

## 2019-07-26 DIAGNOSIS — E03.9 ACQUIRED HYPOTHYROIDISM: Primary | ICD-10-CM

## 2019-07-26 DIAGNOSIS — H61.23 BILATERAL IMPACTED CERUMEN: ICD-10-CM

## 2019-07-26 PROCEDURE — 99207 C PAF COMPLETED  NO CHARGE: CPT | Performed by: INTERNAL MEDICINE

## 2019-07-26 PROCEDURE — 99214 OFFICE O/P EST MOD 30 MIN: CPT | Mod: 25 | Performed by: INTERNAL MEDICINE

## 2019-07-26 PROCEDURE — 69210 REMOVE IMPACTED EAR WAX UNI: CPT | Mod: RT | Performed by: INTERNAL MEDICINE

## 2019-07-26 RX ORDER — LEVOTHYROXINE SODIUM 25 UG/1
25 TABLET ORAL DAILY
Qty: 30 TABLET | Refills: 1 | Status: SHIPPED | OUTPATIENT
Start: 2019-07-26 | End: 2019-09-09

## 2019-07-26 ASSESSMENT — MIFFLIN-ST. JEOR: SCORE: 1081.95

## 2019-07-26 NOTE — NURSING NOTE
"Vital signs:  Temp: 98.6  F (37  C) Temp src: Oral BP: 108/82 Pulse: 82   Resp: 16 SpO2: 97 %     Height: 167.6 cm (5' 6\") Weight: 55.5 kg (122 lb 6.4 oz)  Estimated body mass index is 19.76 kg/m  as calculated from the following:    Height as of this encounter: 1.676 m (5' 6\").    Weight as of this encounter: 55.5 kg (122 lb 6.4 oz).       Discuss HM mammogram- postpone for now due to surgery on chest area.           "

## 2019-07-26 NOTE — PROGRESS NOTES
Subjective     Taty Aguila is a 74 year old female who presents to clinic today for the following health issues:    HPI   Follow-up of abnormal thyroid function tests: She had levels done through the cardiology clinic.  They apparently thought that her cardiomyopathy was not responding as well as they would have expected after her mitral valve replacement and were concerned it could be due to hypothyroidism and her TSH came back elevated so they referred her here for management.  She did have a stress test which ruled out ischemic disease.  She is continuing to feel dyspnea on exertion, some palpitations.  She had been on amiodarone for about 5 months.  She reports in the past few months she has been aware hair loss.  She does not feel cold a lot, has not really had dry skin however it has been very humid.  She does not notice heartburn, mild constipation, mild fatigue but that has been since her valve replacement.            Patient Active Problem List   Diagnosis     Allergic rhinitis     Colitis     Paroxysmal atrial fibrillation (H)     Thoracic aortic ectasia (H)     Mitral valve prolapse     History of tricuspid valve repair-32 mm Oquendo Ring     Complete AV block (H)     S/P MVR (mitral valve replacement)-St junaid epic tissue 31 mm     Anticoagulated-Xarelto     Current Outpatient Medications   Medication Sig Dispense Refill     aspirin 81 MG EC tablet Take 81 mg by mouth every evening       calcium carbonate-vitamin D (CALCIUM 600+D) 600-200 MG-UNIT TABS Take 1 tablet by mouth 2 times daily       levothyroxine (SYNTHROID/LEVOTHROID) 25 MCG tablet Take 1 tablet (25 mcg) by mouth daily 30 tablet 1     metoprolol succinate ER (TOPROL-XL) 25 MG 24 hr tablet Take 1 tablet (25 mg) by mouth daily       Multiple Vitamins-Minerals (MULTIVITAMIN ADULT) TABS Take 1 tablet by mouth daily       omeprazole (PRILOSEC) 10 MG CR capsule Take 1 capsule (10 mg) by mouth every morning (before breakfast) 90 capsule 3      "potassium chloride ER (K-DUR/KLOR-CON M) 20 MEQ CR tablet Take 40 mEq by mouth daily        rivaroxaban ANTICOAGULANT (XARELTO) 20 MG TABS tablet Take 1 tablet (20 mg) by mouth daily (with dinner) 90 tablet 3     senna (SENOKOT) 8.6 MG tablet Take 1 tablet by mouth daily       torsemide (DEMADEX) 20 MG tablet daily Take 1/2 tablet (10 mg)        Social History     Tobacco Use     Smoking status: Never Smoker     Smokeless tobacco: Never Used   Substance Use Topics     Alcohol use: No     Drug use: No      Reviewed and updated as needed this visit by Provider         Review of Systems   Some hair loss, she gets occasional palpitations, often with exertion, mild dyspnea on exertion, overall the symptoms are stable, mild constipation, no diarrhea, no heartburn    He does complain of plugging of her right ear, thinks it may be wax.  She is tried some Debrox but not having good results.      Objective    /82   Pulse 82   Temp 98.6  F (37  C) (Oral)   Resp 16   Ht 1.676 m (5' 6\")   Wt 55.5 kg (122 lb 6.4 oz)   SpO2 97%   BMI 19.76 kg/m     Body mass index is 19.76 kg/m .  Physical Exam     Bilateral cerumen impactions, moderate amount of soft wax removed from the right ear, there is some hard wax in left ear that was not able to be removed.  Rest was flushed by the nurse with good results.    Lab Results   Component Value Date    TSH 8.96 07/11/2019    TSH 7.67 03/07/2019    TSH 1.96 10/16/2018    TSH 1.34 05/04/2011    TSH 2.82 04/26/2010    TSH 1.39 04/12/2005            Assessment & Plan     1. Acquired hypothyroidism  Reviewed her test results, TSH is gone up and now her T4 level is low which does suggest she truly has hypothyroidism.  Her first abnormal test was in March, so it is unclear whether this is really responsible for her cardiac changes or not.  Her duration of amiodarone treatment was fairly low but that could be a factor.  Advised about what thyroid hormone dose, symptoms are related to " abnormal levels, test results, medications to treat.  Start on low-dose thyroid and recheck lab in 4 weeks.  She may likely need increased dosing but I wish to start her at the very lowest dose because of her heart issues, advised to call if she is having any significant increase in palpitations or any other symptoms associated with it.  - levothyroxine (SYNTHROID/LEVOTHROID) 25 MCG tablet; Take 1 tablet (25 mcg) by mouth daily  Dispense: 30 tablet; Refill: 1  - TSH with free T4 reflex; Future     2.  Cardiomyopathy: Unclear if this is really due to her thyroid but will go ahead and treat, she has cardiology follow-up on this    3.  Cerumen impactions: Moderate amounts removed manually, the rest was flushed with good results.    25 minutes spent with the patient, >50% of time spent counseling about thyroid disease, management, symptoms      No follow-ups on file.    Anyi Olmos MD  [facility]

## 2019-07-26 NOTE — PROGRESS NOTES
Pt called stating she has not heard from Dr Olmos's office regarding her TSH.   Writer called and spoke to nurse who will reach out to Dr Olmos to address.   Called pt back to let her know.   Pt appreciate of call.   CHADD Waterman 7/22/19 [id number]

## 2019-07-27 PROBLEM — I42.9 CARDIOMYOPATHY, UNSPECIFIED TYPE (H): Status: ACTIVE | Noted: 2019-07-27

## 2019-07-27 PROBLEM — E87.1 HYPONATREMIA: Status: RESOLVED | Noted: 2019-02-25 | Resolved: 2019-07-27

## 2019-07-27 PROBLEM — E87.70 FLUID OVERLOAD: Status: RESOLVED | Noted: 2019-02-25 | Resolved: 2019-07-27

## 2019-07-27 PROBLEM — R73.9 TRANSIENT HYPERGLYCEMIA POST PROCEDURE: Status: RESOLVED | Noted: 2019-02-25 | Resolved: 2019-07-27

## 2019-07-29 ENCOUNTER — OFFICE VISIT (OUTPATIENT)
Dept: CARDIOLOGY | Facility: CLINIC | Age: 74
End: 2019-07-29
Payer: COMMERCIAL

## 2019-07-29 VITALS
DIASTOLIC BLOOD PRESSURE: 74 MMHG | OXYGEN SATURATION: 99 % | BODY MASS INDEX: 19.53 KG/M2 | SYSTOLIC BLOOD PRESSURE: 117 MMHG | HEIGHT: 66 IN | HEART RATE: 72 BPM | WEIGHT: 121.5 LBS

## 2019-07-29 DIAGNOSIS — I42.0 DILATED CARDIOMYOPATHY (H): Primary | ICD-10-CM

## 2019-07-29 DIAGNOSIS — R93.1 ABNORMAL FINDINGS DIAGNOSTIC IMAGING OF HEART AND CORONARY CIRCULATION: ICD-10-CM

## 2019-07-29 DIAGNOSIS — Z95.2 S/P MVR (MITRAL VALVE REPLACEMENT): ICD-10-CM

## 2019-07-29 DIAGNOSIS — Z95.0 PACEMAKER: ICD-10-CM

## 2019-07-29 PROCEDURE — 99214 OFFICE O/P EST MOD 30 MIN: CPT | Performed by: INTERNAL MEDICINE

## 2019-07-29 RX ORDER — LISINOPRIL 2.5 MG/1
2.5 TABLET ORAL DAILY
Status: DISCONTINUED | OUTPATIENT
Start: 2019-07-29 | End: 2019-09-09

## 2019-07-29 ASSESSMENT — MIFFLIN-ST. JEOR: SCORE: 1077.87

## 2019-07-29 NOTE — PROGRESS NOTES
HPI and Plan:   See dictation    Orders Placed This Encounter   Procedures     Follow-Up with Cardiac Advanced Practice Provider     Orders Placed This Encounter   Medications     lisinopril (PRINIVIL/Zestril) tablet 2.5 mg     Medications Discontinued During This Encounter   Medication Reason     senna (SENOKOT) 8.6 MG tablet Medication Reconciliation Clean Up         Encounter Diagnoses   Name Primary?     Dilated cardiomyopathy (H) Yes     Abnormal findings diagnostic imaging of heart and coronary circulation      S/P MVR (mitral valve replacement)-St junaid epic tissue 31 mm      Pacemaker        CURRENT MEDICATIONS:  Current Outpatient Medications   Medication Sig Dispense Refill     aspirin 81 MG EC tablet Take 81 mg by mouth every evening       calcium carbonate-vitamin D (CALCIUM 600+D) 600-200 MG-UNIT TABS Take 1 tablet by mouth 2 times daily       levothyroxine (SYNTHROID/LEVOTHROID) 25 MCG tablet Take 1 tablet (25 mcg) by mouth daily 30 tablet 1     metoprolol succinate ER (TOPROL-XL) 25 MG 24 hr tablet Take 1 tablet (25 mg) by mouth daily       Multiple Vitamins-Minerals (MULTIVITAMIN ADULT) TABS Take 1 tablet by mouth daily       omeprazole (PRILOSEC) 10 MG CR capsule Take 1 capsule (10 mg) by mouth every morning (before breakfast) 90 capsule 3     potassium chloride ER (K-DUR/KLOR-CON M) 20 MEQ CR tablet Take 40 mEq by mouth daily        rivaroxaban ANTICOAGULANT (XARELTO) 20 MG TABS tablet Take 1 tablet (20 mg) by mouth daily (with dinner) 90 tablet 3     torsemide (DEMADEX) 20 MG tablet daily Take 1/2 tablet (10 mg)         ALLERGIES     Allergies   Allergen Reactions     Chlorpheniramine Other (See Comments)     LOC and fainting      Phenylephrine Other (See Comments)     fainting       PAST MEDICAL HISTORY:  Past Medical History:   Diagnosis Date     Allergic rhinitis, cause unspecified     dust mites, ragweed     Complete AV block (H)     BSX DDD PM 2-     History of tricuspid valve repair       Mitral valve prolapse     bioprosthetic MVR 2-     Paroxysmal atrial fibrillation (H)        PAST SURGICAL HISTORY:  Past Surgical History:   Procedure Laterality Date     COLONOSCOPY N/A 9/15/2015    Procedure: COLONOSCOPY;  Surgeon: Anson Llanos MD;  Location:  GI     CV HEART CATHETERIZATION WITH POSSIBLE INTERVENTION N/A 1/9/2019    Procedure: Heart Catheterization with Possible Intervention;  Surgeon: Ritesh Whittaker MD;  Location:  HEART CARDIAC CATH LAB     CV RIGHT AND LEFT HEART CATH N/A 1/9/2019    Procedure: Right and Left Heart Catherization;  Surgeon: Ritesh Whittaker MD;  Location:  HEART CARDIAC CATH LAB     ENT SURGERY      T&A     EP PACEMAKER N/A 2/18/2019    Procedure: EP Pacemaker;  Surgeon: Inocencia Guillen MD;  Location:  HEART CARDIAC CATH LAB     REPAIR VALVE TRICUSPID MINIMALLY INVASIVE N/A 2/15/2019    Procedure: MINIMALLY INVASIVE TRICUSPID VALVE REPAIR WITH 32MM SULLIVAN RING;  Surgeon: Andrea Hanna MD;  Location:  OR     REPLACE VALVE MITRAL MINIMALLY INVASIVE N/A 2/15/2019    Procedure: MINIMALLY INVASIVE MITRAL VALVE REPLACEMENT WITH EPIC ST KEYUR 31MM VALVE; ON PUMP WITH TIA.  CARDIOLOGIST DR CARDENAS;  Surgeon: Andrea Hanna MD;  Location:  OR       FAMILY HISTORY:  Family History   Problem Relation Age of Onset     Osteoporosis Mother      Alzheimer Disease Mother      Family History Negative Father      Heart Disease Maternal Grandfather      Family History Negative Paternal Grandmother      Family History Negative Paternal Grandfather        SOCIAL HISTORY:  Social History     Socioeconomic History     Marital status:      Spouse name: None     Number of children: 3     Years of education: None     Highest education level: None   Occupational History     None   Social Needs     Financial resource strain: None     Food insecurity:     Worry: None     Inability: None     Transportation needs:     Medical: None      Non-medical: None   Tobacco Use     Smoking status: Never Smoker     Smokeless tobacco: Never Used   Substance and Sexual Activity     Alcohol use: No     Drug use: No     Sexual activity: Not Currently   Lifestyle     Physical activity:     Days per week: None     Minutes per session: None     Stress: None   Relationships     Social connections:     Talks on phone: None     Gets together: None     Attends [employer] service: None     Active member of club or organization: None     Attends meetings of clubs or organizations: None     Relationship status: None     Intimate partner violence:     Fear of current or ex partner: None     Emotionally abused: None     Physically abused: None     Forced sexual activity: None   Other Topics Concern      Service Not Asked     Blood Transfusions Not Asked     Caffeine Concern No     Comment: 1 cup of coffee and 1 can diet coke per day     Occupational Exposure Not Asked     Hobby Hazards Not Asked     Sleep Concern Yes     Comment: takes Doxylamine succinate 1/2 tab every night     Stress Concern No     Weight Concern No     Special Diet No     Comment: healthy      Back Care Not Asked     Exercise Yes     Comment: walking 45min x7 a wk. Very active, [employer], Golf, Bowling, Cardio     Bike Helmet Not Asked     Seat Belt Yes     Self-Exams Yes     Parent/sibling w/ CABG, MI or angioplasty before 65F 55M? No   Social History Narrative     None       Review of Systems:  Skin:  Negative     Eyes:  Positive for glasses  ENT:  Positive for sinus trouble;nasal congestion  Respiratory:  Positive for dyspnea on exertion  Cardiovascular:  Negative fatigue;Positive for;dizziness  Gastroenterology: Negative for melena;hematochezia  Genitourinary:  Negative    Musculoskeletal:  Positive for arthritis  Neurologic:  Negative    Psychiatric:  Positive for sleep disturbances;anxiety  Heme/Lymph/Imm:  Positive for allergies  Endocrine:  Positive for      Physical Exam:  Vitals: BP  "117/74   Pulse 72   Ht 1.676 m (5' 6\")   Wt 55.1 kg (121 lb 8 oz)   SpO2 99%   BMI 19.61 kg/m      Constitutional:  cooperative, alert and oriented, well developed, well nourished, in no acute distress        Skin:  warm and dry to the touch, no apparent skin lesions or masses noted   pacemaker incision in the left infraclavicular area was well-healed      Head:  normocephalic, no masses or lesions        Eyes:  pupils equal and round;conjunctivae and lids unremarkable;sclera white        Lymph:      ENT:  no pallor or cyanosis, dentition good        Neck:  no carotid bruit JVP elevated;JVP 8-10      Respiratory:  normal breath sounds, clear to auscultation, normal A-P diameter, normal symmetry, normal respiratory excursion, no use of accessory muscles         Cardiac: regular rhythm;no murmurs, gallops or rubs detected       holosystolic murmur;radiation to the axilla;grade 4        pulses full and equal                                        GI:  abdomen soft        Extremities and Muscular Skeletal:  no deformities, clubbing, cyanosis, erythema observed   bilateral LE edema;trace          Neurological:  no gross motor deficits        Psych:  Alert and Oriented x 3        Recent Lab Results:  LIPID RESULTS:  Lab Results   Component Value Date    CHOL 202 (H) 07/23/2015    HDL 96 07/23/2015    LDL 83 07/23/2015    TRIG 116 07/23/2015    CHOLHDLRATIO 2.1 07/23/2015       LIVER ENZYME RESULTS:  Lab Results   Component Value Date    AST 29 07/11/2019    ALT 26 07/11/2019       CBC RESULTS:  Lab Results   Component Value Date    WBC 13.7 (H) 02/22/2019    RBC 2.64 (L) 02/22/2019    HGB 10.0 (L) 03/07/2019    HCT 23.4 (L) 02/22/2019    MCV 89 02/22/2019    MCH 30.7 02/22/2019    MCHC 34.6 02/22/2019    RDW 14.8 02/22/2019     02/22/2019       BMP RESULTS:  Lab Results   Component Value Date     07/11/2019    POTASSIUM 4.3 07/11/2019    CHLORIDE 103 07/11/2019    CO2 25 07/11/2019    ANIONGAP 6 " 07/11/2019    GLC 91 07/11/2019    BUN 16 07/11/2019    CR 0.97 07/11/2019    GFRESTIMATED 58 (L) 07/11/2019    GFRESTBLACK 68 07/11/2019    ARIELLE 9.0 07/11/2019        A1C RESULTS:  Lab Results   Component Value Date    A1C 5.9 (H) 01/09/2019       INR RESULTS:  Lab Results   Component Value Date    INR 1.21 (H) 02/18/2019    INR 1.24 (H) 02/16/2019           CC  Anyi Olmos MD  [address] [place] [address]

## 2019-07-29 NOTE — Clinical Note
7/29/2019    Anyi Olmos MD  [address]  [facility] [address]    RE: Taty Aguila       Dear Colleague,    I had the pleasure of seeing Taty Rj Aguila in the [facility].    HPI and Plan:   See dictation    Orders Placed This Encounter   Procedures     Follow-Up with Cardiac Advanced Practice Provider     Orders Placed This Encounter   Medications     lisinopril (PRINIVIL/Zestril) tablet 2.5 mg     Medications Discontinued During This Encounter   Medication Reason     senna (SENOKOT) 8.6 MG tablet Medication Reconciliation Clean Up         Encounter Diagnoses   Name Primary?     Dilated cardiomyopathy (H) Yes     Abnormal findings diagnostic imaging of heart and coronary circulation      S/P MVR (mitral valve replacement)-St junaid epic tissue 31 mm      Pacemaker        CURRENT MEDICATIONS:  Current Outpatient Medications   Medication Sig Dispense Refill     aspirin 81 MG EC tablet Take 81 mg by mouth every evening       calcium carbonate-vitamin D (CALCIUM 600+D) 600-200 MG-UNIT TABS Take 1 tablet by mouth 2 times daily       levothyroxine (SYNTHROID/LEVOTHROID) 25 MCG tablet Take 1 tablet (25 mcg) by mouth daily 30 tablet 1     metoprolol succinate ER (TOPROL-XL) 25 MG 24 hr tablet Take 1 tablet (25 mg) by mouth daily       Multiple Vitamins-Minerals (MULTIVITAMIN ADULT) TABS Take 1 tablet by mouth daily       omeprazole (PRILOSEC) 10 MG CR capsule Take 1 capsule (10 mg) by mouth every morning (before breakfast) 90 capsule 3     potassium chloride ER (K-DUR/KLOR-CON M) 20 MEQ CR tablet Take 40 mEq by mouth daily        rivaroxaban ANTICOAGULANT (XARELTO) 20 MG TABS tablet Take 1 tablet (20 mg) by mouth daily (with dinner) 90 tablet 3     torsemide (DEMADEX) 20 MG tablet daily Take 1/2 tablet (10 mg)         ALLERGIES     Allergies   Allergen Reactions     Chlorpheniramine Other (See Comments)     LOC and fainting      Phenylephrine Other (See Comments)      fainting       PAST MEDICAL HISTORY:  Past Medical History:   Diagnosis Date     Allergic rhinitis, cause unspecified     dust mites, ragweed     Complete AV block (H)     BSX DDD PM 2-     History of tricuspid valve repair      Mitral valve prolapse     bioprosthetic MVR 2-     Paroxysmal atrial fibrillation (H)        PAST SURGICAL HISTORY:  Past Surgical History:   Procedure Laterality Date     COLONOSCOPY N/A 9/15/2015    Procedure: COLONOSCOPY;  Surgeon: Anson Llanos MD;  Location:  GI     CV HEART CATHETERIZATION WITH POSSIBLE INTERVENTION N/A 1/9/2019    Procedure: Heart Catheterization with Possible Intervention;  Surgeon: Ritesh Whittaker MD;  Location:  HEART CARDIAC CATH LAB     CV RIGHT AND LEFT HEART CATH N/A 1/9/2019    Procedure: Right and Left Heart Catherization;  Surgeon: Ritesh Whittaker MD;  Location:  HEART CARDIAC CATH LAB     ENT SURGERY      T&A     EP PACEMAKER N/A 2/18/2019    Procedure: EP Pacemaker;  Surgeon: Inocencia Guillen MD;  Location:  HEART CARDIAC CATH LAB     REPAIR VALVE TRICUSPID MINIMALLY INVASIVE N/A 2/15/2019    Procedure: MINIMALLY INVASIVE TRICUSPID VALVE REPAIR WITH 32MM SULLIVAN RING;  Surgeon: Andrea Hanna MD;  Location:  OR     REPLACE VALVE MITRAL MINIMALLY INVASIVE N/A 2/15/2019    Procedure: MINIMALLY INVASIVE MITRAL VALVE REPLACEMENT WITH EPIC ST KEYUR 31MM VALVE; ON PUMP WITH TIA.  CARDIOLOGIST DR CARDENAS;  Surgeon: Andrea Hanna MD;  Location:  OR       FAMILY HISTORY:  Family History   Problem Relation Age of Onset     Osteoporosis Mother      Alzheimer Disease Mother      Family History Negative Father      Heart Disease Maternal Grandfather      Family History Negative Paternal Grandmother      Family History Negative Paternal Grandfather        SOCIAL HISTORY:  Social History     Socioeconomic History     Marital status:      Spouse name: None     Number of children: 3     Years of  education: None     Highest education level: None   Occupational History     None   Social Needs     Financial resource strain: None     Food insecurity:     Worry: None     Inability: None     Transportation needs:     Medical: None     Non-medical: None   Tobacco Use     Smoking status: Never Smoker     Smokeless tobacco: Never Used   Substance and Sexual Activity     Alcohol use: No     Drug use: No     Sexual activity: Not Currently   Lifestyle     Physical activity:     Days per week: None     Minutes per session: None     Stress: None   Relationships     Social connections:     Talks on phone: None     Gets together: None     Attends Confucianist service: None     Active member of club or organization: None     Attends meetings of clubs or organizations: None     Relationship status: None     Intimate partner violence:     Fear of current or ex partner: None     Emotionally abused: None     Physically abused: None     Forced sexual activity: None   Other Topics Concern      Service Not Asked     Blood Transfusions Not Asked     Caffeine Concern No     Comment: 1 cup of coffee and 1 can diet coke per day     Occupational Exposure Not Asked     Hobby Hazards Not Asked     Sleep Concern Yes     Comment: takes Doxylamine succinate 1/2 tab every night     Stress Concern No     Weight Concern No     Special Diet No     Comment: healthy      Back Care Not Asked     Exercise Yes     Comment: walking 45min x7 a wk. Very active, [employer], Golf, Bowling, Cardio     Bike Helmet Not Asked     Seat Belt Yes     Self-Exams Yes     Parent/sibling w/ CABG, MI or angioplasty before 65F 55M? No   Social History Narrative     None       Review of Systems:  Skin:  Negative     Eyes:  Positive for glasses  ENT:  Positive for sinus trouble;nasal congestion  Respiratory:  Positive for dyspnea on exertion  Cardiovascular:  Negative fatigue;Positive for;dizziness  Gastroenterology: Negative for melena;hematochezia  Genitourinary:   "Negative    Musculoskeletal:  Positive for arthritis  Neurologic:  Negative    Psychiatric:  Positive for sleep disturbances;anxiety  Heme/Lymph/Imm:  Positive for allergies  Endocrine:  Positive for      Physical Exam:  Vitals: /74   Pulse 72   Ht 1.676 m (5' 6\")   Wt 55.1 kg (121 lb 8 oz)   SpO2 99%   BMI 19.61 kg/m       Constitutional:  cooperative, alert and oriented, well developed, well nourished, in no acute distress        Skin:  warm and dry to the touch, no apparent skin lesions or masses noted   pacemaker incision in the left infraclavicular area was well-healed      Head:  normocephalic, no masses or lesions        Eyes:  pupils equal and round;conjunctivae and lids unremarkable;sclera white        Lymph:      ENT:  no pallor or cyanosis, dentition good        Neck:  no carotid bruit JVP elevated;JVP 8-10      Respiratory:  normal breath sounds, clear to auscultation, normal A-P diameter, normal symmetry, normal respiratory excursion, no use of accessory muscles         Cardiac: regular rhythm;no murmurs, gallops or rubs detected       holosystolic murmur;radiation to the axilla;grade 4        pulses full and equal                                        GI:  abdomen soft        Extremities and Muscular Skeletal:  no deformities, clubbing, cyanosis, erythema observed   bilateral LE edema;trace          Neurological:  no gross motor deficits        Psych:  Alert and Oriented x 3        Recent Lab Results:  LIPID RESULTS:  Lab Results   Component Value Date    CHOL 202 (H) 07/23/2015    HDL 96 07/23/2015    LDL 83 07/23/2015    TRIG 116 07/23/2015    CHOLHDLRATIO 2.1 07/23/2015       LIVER ENZYME RESULTS:  Lab Results   Component Value Date    AST 29 07/11/2019    ALT 26 07/11/2019       CBC RESULTS:  Lab Results   Component Value Date    WBC 13.7 (H) 02/22/2019    RBC 2.64 (L) 02/22/2019    HGB 10.0 (L) 03/07/2019    HCT 23.4 (L) 02/22/2019    MCV 89 02/22/2019    MCH 30.7 02/22/2019    MCHC " 34.6 02/22/2019    RDW 14.8 02/22/2019     02/22/2019       BMP RESULTS:  Lab Results   Component Value Date     07/11/2019    POTASSIUM 4.3 07/11/2019    CHLORIDE 103 07/11/2019    CO2 25 07/11/2019    ANIONGAP 6 07/11/2019    GLC 91 07/11/2019    BUN 16 07/11/2019    CR 0.97 07/11/2019    GFRESTIMATED 58 (L) 07/11/2019    GFRESTBLACK 68 07/11/2019    ARIELLE 9.0 07/11/2019        A1C RESULTS:  Lab Results   Component Value Date    A1C 5.9 (H) 01/09/2019       INR RESULTS:  Lab Results   Component Value Date    INR 1.21 (H) 02/18/2019    INR 1.24 (H) 02/16/2019           CC  Anyi Olmos MD  [address] [facility] [address]  [place] [address]                  HPI and Plan:   See dictation    No orders of the defined types were placed in this encounter.    Orders Placed This Encounter   Medications     lisinopril (PRINIVIL/Zestril) tablet 2.5 mg     Medications Discontinued During This Encounter   Medication Reason     senna (SENOKOT) 8.6 MG tablet Medication Reconciliation Clean Up         Encounter Diagnoses   Name Primary?     Dilated cardiomyopathy (H) Yes     Abnormal findings diagnostic imaging of heart and coronary circulation      S/P MVR (mitral valve replacement)-St junaid epic tissue 31 mm      Pacemaker        CURRENT MEDICATIONS:  Current Outpatient Medications   Medication Sig Dispense Refill     aspirin 81 MG EC tablet Take 81 mg by mouth every evening       calcium carbonate-vitamin D (CALCIUM 600+D) 600-200 MG-UNIT TABS Take 1 tablet by mouth 2 times daily       levothyroxine (SYNTHROID/LEVOTHROID) 25 MCG tablet Take 1 tablet (25 mcg) by mouth daily 30 tablet 1     metoprolol succinate ER (TOPROL-XL) 25 MG 24 hr tablet Take 1 tablet (25 mg) by mouth daily       Multiple Vitamins-Minerals (MULTIVITAMIN ADULT) TABS Take 1 tablet by mouth daily       omeprazole (PRILOSEC) 10 MG CR capsule Take 1 capsule (10 mg) by mouth every morning (before breakfast) 90 capsule 3     potassium chloride ER  (K-DUR/KLOR-CON M) 20 MEQ CR tablet Take 40 mEq by mouth daily        rivaroxaban ANTICOAGULANT (XARELTO) 20 MG TABS tablet Take 1 tablet (20 mg) by mouth daily (with dinner) 90 tablet 3     torsemide (DEMADEX) 20 MG tablet daily Take 1/2 tablet (10 mg)         ALLERGIES     Allergies   Allergen Reactions     Chlorpheniramine Other (See Comments)     LOC and fainting      Phenylephrine Other (See Comments)     fainting       PAST MEDICAL HISTORY:  Past Medical History:   Diagnosis Date     Allergic rhinitis, cause unspecified     dust mites, ragweed     Complete AV block (H)     BSX DDD PM 2-     History of tricuspid valve repair      Mitral valve prolapse     bioprosthetic MVR 2-     Paroxysmal atrial fibrillation (H)        PAST SURGICAL HISTORY:  Past Surgical History:   Procedure Laterality Date     COLONOSCOPY N/A 9/15/2015    Procedure: COLONOSCOPY;  Surgeon: Anson Llanos MD;  Location:  GI     CV HEART CATHETERIZATION WITH POSSIBLE INTERVENTION N/A 1/9/2019    Procedure: Heart Catheterization with Possible Intervention;  Surgeon: Ritesh Whittaker MD;  Location:  HEART CARDIAC CATH LAB     CV RIGHT AND LEFT HEART CATH N/A 1/9/2019    Procedure: Right and Left Heart Catherization;  Surgeon: Ritesh Whittaker MD;  Location:  HEART CARDIAC CATH LAB     ENT SURGERY      T&A     EP PACEMAKER N/A 2/18/2019    Procedure: EP Pacemaker;  Surgeon: Inocecnia Guillen MD;  Location:  HEART CARDIAC CATH LAB     REPAIR VALVE TRICUSPID MINIMALLY INVASIVE N/A 2/15/2019    Procedure: MINIMALLY INVASIVE TRICUSPID VALVE REPAIR WITH 32MM SULLIVAN RING;  Surgeon: Andrea Hanna MD;  Location:  OR     REPLACE VALVE MITRAL MINIMALLY INVASIVE N/A 2/15/2019    Procedure: MINIMALLY INVASIVE MITRAL VALVE REPLACEMENT WITH EPIC ST KEYUR 31MM VALVE; ON PUMP WITH TIA.  CARDIOLOGIST DR CARDENAS;  Surgeon: Andrea Hanna MD;  Location:  OR       FAMILY HISTORY:  Family History    Problem Relation Age of Onset     Osteoporosis Mother      Alzheimer Disease Mother      Family History Negative Father      Heart Disease Maternal Grandfather      Family History Negative Paternal Grandmother      Family History Negative Paternal Grandfather        SOCIAL HISTORY:  Social History     Socioeconomic History     Marital status:      Spouse name: None     Number of children: 3     Years of education: None     Highest education level: None   Occupational History     None   Social Needs     Financial resource strain: None     Food insecurity:     Worry: None     Inability: None     Transportation needs:     Medical: None     Non-medical: None   Tobacco Use     Smoking status: Never Smoker     Smokeless tobacco: Never Used   Substance and Sexual Activity     Alcohol use: No     Drug use: No     Sexual activity: Not Currently   Lifestyle     Physical activity:     Days per week: None     Minutes per session: None     Stress: None   Relationships     Social connections:     Talks on phone: None     Gets together: None     Attends Latter-day service: None     Active member of club or organization: None     Attends meetings of clubs or organizations: None     Relationship status: None     Intimate partner violence:     Fear of current or ex partner: None     Emotionally abused: None     Physically abused: None     Forced sexual activity: None   Other Topics Concern      Service Not Asked     Blood Transfusions Not Asked     Caffeine Concern No     Comment: 1 cup of coffee and 1 can diet coke per day     Occupational Exposure Not Asked     Hobby Hazards Not Asked     Sleep Concern Yes     Comment: takes Doxylamine succinate 1/2 tab every night     Stress Concern No     Weight Concern No     Special Diet No     Comment: healthy      Back Care Not Asked     Exercise Yes     Comment: walking 45min x7 a wk. Very active, [employer], Golf, Bowling, Cardio     Bike Helmet Not Asked     Seat Belt Yes      "Self-Exams Yes     Parent/sibling w/ CABG, MI or angioplasty before 65F 55M? No   Social History Narrative     None       Review of Systems:  Skin:  Negative     Eyes:  Positive for glasses  ENT:  Positive for sinus trouble;nasal congestion  Respiratory:  Positive for dyspnea on exertion  Cardiovascular:  Negative fatigue;Positive for;dizziness  Gastroenterology: Negative for melena;hematochezia  Genitourinary:  Negative    Musculoskeletal:  Positive for arthritis  Neurologic:  Negative    Psychiatric:  Positive for sleep disturbances;anxiety  Heme/Lymph/Imm:  Positive for allergies  Endocrine:  Positive for      Physical Exam:  Vitals: /74   Pulse 72   Ht 1.676 m (5' 6\")   Wt 55.1 kg (121 lb 8 oz)   SpO2 99%   BMI 19.61 kg/m       Constitutional:           Skin:             Head:           Eyes:           Lymph:      ENT:           Neck:           Respiratory:            Cardiac:                                                           GI:           Extremities and Muscular Skeletal:                 Neurological:           Psych:           Recent Lab Results:  LIPID RESULTS:  Lab Results   Component Value Date    CHOL 202 (H) 07/23/2015    HDL 96 07/23/2015    LDL 83 07/23/2015    TRIG 116 07/23/2015    CHOLHDLRATIO 2.1 07/23/2015       LIVER ENZYME RESULTS:  Lab Results   Component Value Date    AST 29 07/11/2019    ALT 26 07/11/2019       CBC RESULTS:  Lab Results   Component Value Date    WBC 13.7 (H) 02/22/2019    RBC 2.64 (L) 02/22/2019    HGB 10.0 (L) 03/07/2019    HCT 23.4 (L) 02/22/2019    MCV 89 02/22/2019    MCH 30.7 02/22/2019    MCHC 34.6 02/22/2019    RDW 14.8 02/22/2019     02/22/2019       BMP RESULTS:  Lab Results   Component Value Date     07/11/2019    POTASSIUM 4.3 07/11/2019    CHLORIDE 103 07/11/2019    CO2 25 07/11/2019    ANIONGAP 6 07/11/2019    GLC 91 07/11/2019    BUN 16 07/11/2019    CR 0.97 07/11/2019    GFRESTIMATED 58 (L) 07/11/2019    GFRESTBLACK 68 07/11/2019 "    ARIELLE 9.0 07/11/2019        A1C RESULTS:  Lab Results   Component Value Date    A1C 5.9 (H) 01/09/2019       INR RESULTS:  Lab Results   Component Value Date    INR 1.21 (H) 02/18/2019    INR 1.24 (H) 02/16/2019           CC  Anyi Olmos MD  [address] [facility] [address]  [place] [address]                  Service Date: 07/29/2019      CARDIOLOGY CLINIC NOTE      HISTORY OF PRESENT ILLNESS:  Taty is a very pleasant 74-year-old female with a prior history of mitral and tricuspid valve prolapse and underwent repair with a 32 mm Oquendo ring on 02/15/2019.  This was performed by Dr. Hanna.  Postoperatively, she had AV block and had a [employer] pacemaker implanted on 02/18/2019.  She had paroxysmal atrial fibrillation and underwent CV Surgery.  She did not have any recurrence of her atrial fibrillation.  She was maintained on amiodarone and Xarelto.  Postoperatively, she did well except for her loss in ejection fraction from 65% to 20%-25% without clear etiology.  Her dual-chamber pacer showed recurrent atrial fibrillation which was thought perhaps the reason for her decreased EF and then more recently, her pacemaker showed she has had no AFib since 03/2019.  Despite this, her ejection fraction remains low.  For this concern, we had her undergo a nuclear stress test which showed no ischemia and ejection fraction of 31%.      Currently, Taty feels well.  She is walking regularly without difficulty.  [facility] which she would like to do.  She denies any chest pain or palpitations.  Interestingly, her coronary angiography did show her ejection fraction preoperatively at 45%-50%, so I suspect that her decreased EF was the fact that her EF was actually not normal preoperatively but was diminished at 40%-45% preoperatively and then the afterload changes after the mitral valve repair showed the true 25% range.  Hopefully, with afterload reduction,  medical therapy, time, we can improve that.  I have started her on low-dose lisinopril 2.5 mg daily and will have her follow up with [facility] in [place] in 1 month's time to see how she is tolerating in order to even see if we could increase this.      Jesus Cardenas MD, Located within [facility]         JESUS CARDENAS MD Located within [facility]             D: 2019   T: 2019   MT: MICHA      Name:     GERONIMO PAYTON   MRN:      [id number]        Account:      [id number]   :      1945           Service Date: 2019      Document: [id number]       Thank you for allowing me to participate in the care of your patient.      Sincerely,     JESUS CARDENAS MD     [facility]    cc:   Anyi Olmos MD  [address]  [place] [address]

## 2019-07-29 NOTE — PROGRESS NOTES
HPI and Plan:   See dictation    No orders of the defined types were placed in this encounter.    Orders Placed This Encounter   Medications     lisinopril (PRINIVIL/Zestril) tablet 2.5 mg     Medications Discontinued During This Encounter   Medication Reason     senna (SENOKOT) 8.6 MG tablet Medication Reconciliation Clean Up         Encounter Diagnoses   Name Primary?     Dilated cardiomyopathy (H) Yes     Abnormal findings diagnostic imaging of heart and coronary circulation      S/P MVR (mitral valve replacement)-St junaid epic tissue 31 mm      Pacemaker        CURRENT MEDICATIONS:  Current Outpatient Medications   Medication Sig Dispense Refill     aspirin 81 MG EC tablet Take 81 mg by mouth every evening       calcium carbonate-vitamin D (CALCIUM 600+D) 600-200 MG-UNIT TABS Take 1 tablet by mouth 2 times daily       levothyroxine (SYNTHROID/LEVOTHROID) 25 MCG tablet Take 1 tablet (25 mcg) by mouth daily 30 tablet 1     metoprolol succinate ER (TOPROL-XL) 25 MG 24 hr tablet Take 1 tablet (25 mg) by mouth daily       Multiple Vitamins-Minerals (MULTIVITAMIN ADULT) TABS Take 1 tablet by mouth daily       omeprazole (PRILOSEC) 10 MG CR capsule Take 1 capsule (10 mg) by mouth every morning (before breakfast) 90 capsule 3     potassium chloride ER (K-DUR/KLOR-CON M) 20 MEQ CR tablet Take 40 mEq by mouth daily        rivaroxaban ANTICOAGULANT (XARELTO) 20 MG TABS tablet Take 1 tablet (20 mg) by mouth daily (with dinner) 90 tablet 3     torsemide (DEMADEX) 20 MG tablet daily Take 1/2 tablet (10 mg)         ALLERGIES     Allergies   Allergen Reactions     Chlorpheniramine Other (See Comments)     LOC and fainting      Phenylephrine Other (See Comments)     fainting       PAST MEDICAL HISTORY:  Past Medical History:   Diagnosis Date     Allergic rhinitis, cause unspecified     dust mites, ragweed     Complete AV block (H)     BSX DDD PM 2-     History of tricuspid valve repair      Mitral valve prolapse      bioprosthetic MVR 2-     Paroxysmal atrial fibrillation (H)        PAST SURGICAL HISTORY:  Past Surgical History:   Procedure Laterality Date     COLONOSCOPY N/A 9/15/2015    Procedure: COLONOSCOPY;  Surgeon: Anson Llanos MD;  Location:  GI     CV HEART CATHETERIZATION WITH POSSIBLE INTERVENTION N/A 1/9/2019    Procedure: Heart Catheterization with Possible Intervention;  Surgeon: Ritesh Whittaker MD;  Location:  HEART CARDIAC CATH LAB     CV RIGHT AND LEFT HEART CATH N/A 1/9/2019    Procedure: Right and Left Heart Catherization;  Surgeon: Ritesh Whittaker MD;  Location:  HEART CARDIAC CATH LAB     ENT SURGERY      T&A     EP PACEMAKER N/A 2/18/2019    Procedure: EP Pacemaker;  Surgeon: Inocencia Guillen MD;  Location:  HEART CARDIAC CATH LAB     REPAIR VALVE TRICUSPID MINIMALLY INVASIVE N/A 2/15/2019    Procedure: MINIMALLY INVASIVE TRICUSPID VALVE REPAIR WITH 32MM SULLIVAN RING;  Surgeon: Andrea Hanna MD;  Location:  OR     REPLACE VALVE MITRAL MINIMALLY INVASIVE N/A 2/15/2019    Procedure: MINIMALLY INVASIVE MITRAL VALVE REPLACEMENT WITH EPIC ST KEYUR 31MM VALVE; ON PUMP WITH TIA.  CARDIOLOGIST DR CARDENAS;  Surgeon: Andrea Hanna MD;  Location:  OR       FAMILY HISTORY:  Family History   Problem Relation Age of Onset     Osteoporosis Mother      Alzheimer Disease Mother      Family History Negative Father      Heart Disease Maternal Grandfather      Family History Negative Paternal Grandmother      Family History Negative Paternal Grandfather        SOCIAL HISTORY:  Social History     Socioeconomic History     Marital status:      Spouse name: None     Number of children: 3     Years of education: None     Highest education level: None   Occupational History     None   Social Needs     Financial resource strain: None     Food insecurity:     Worry: None     Inability: None     Transportation needs:     Medical: None     Non-medical: None    Tobacco Use     Smoking status: Never Smoker     Smokeless tobacco: Never Used   Substance and Sexual Activity     Alcohol use: No     Drug use: No     Sexual activity: Not Currently   Lifestyle     Physical activity:     Days per week: None     Minutes per session: None     Stress: None   Relationships     Social connections:     Talks on phone: None     Gets together: None     Attends [employer] service: None     Active member of club or organization: None     Attends meetings of clubs or organizations: None     Relationship status: None     Intimate partner violence:     Fear of current or ex partner: None     Emotionally abused: None     Physically abused: None     Forced sexual activity: None   Other Topics Concern      Service Not Asked     Blood Transfusions Not Asked     Caffeine Concern No     Comment: 1 cup of coffee and 1 can diet coke per day     Occupational Exposure Not Asked     Hobby Hazards Not Asked     Sleep Concern Yes     Comment: takes Doxylamine succinate 1/2 tab every night     Stress Concern No     Weight Concern No     Special Diet No     Comment: healthy      Back Care Not Asked     Exercise Yes     Comment: walking 45min x7 a wk. Very active, [employer], Golf, Bowling, Cardio     Bike Helmet Not Asked     Seat Belt Yes     Self-Exams Yes     Parent/sibling w/ CABG, MI or angioplasty before 65F 55M? No   Social History Narrative     None       Review of Systems:  Skin:  Negative     Eyes:  Positive for glasses  ENT:  Positive for sinus trouble;nasal congestion  Respiratory:  Positive for dyspnea on exertion  Cardiovascular:  Negative fatigue;Positive for;dizziness  Gastroenterology: Negative for melena;hematochezia  Genitourinary:  Negative    Musculoskeletal:  Positive for arthritis  Neurologic:  Negative    Psychiatric:  Positive for sleep disturbances;anxiety  Heme/Lymph/Imm:  Positive for allergies  Endocrine:  Positive for      Physical Exam:  Vitals: /74   Pulse 72   Ht  "1.676 m (5' 6\")   Wt 55.1 kg (121 lb 8 oz)   SpO2 99%   BMI 19.61 kg/m      Constitutional:           Skin:             Head:           Eyes:           Lymph:      ENT:           Neck:           Respiratory:            Cardiac:                                                           GI:           Extremities and Muscular Skeletal:                 Neurological:           Psych:           Recent Lab Results:  LIPID RESULTS:  Lab Results   Component Value Date    CHOL 202 (H) 07/23/2015    HDL 96 07/23/2015    LDL 83 07/23/2015    TRIG 116 07/23/2015    CHOLHDLRATIO 2.1 07/23/2015       LIVER ENZYME RESULTS:  Lab Results   Component Value Date    AST 29 07/11/2019    ALT 26 07/11/2019       CBC RESULTS:  Lab Results   Component Value Date    WBC 13.7 (H) 02/22/2019    RBC 2.64 (L) 02/22/2019    HGB 10.0 (L) 03/07/2019    HCT 23.4 (L) 02/22/2019    MCV 89 02/22/2019    MCH 30.7 02/22/2019    MCHC 34.6 02/22/2019    RDW 14.8 02/22/2019     02/22/2019       BMP RESULTS:  Lab Results   Component Value Date     07/11/2019    POTASSIUM 4.3 07/11/2019    CHLORIDE 103 07/11/2019    CO2 25 07/11/2019    ANIONGAP 6 07/11/2019    GLC 91 07/11/2019    BUN 16 07/11/2019    CR 0.97 07/11/2019    GFRESTIMATED 58 (L) 07/11/2019    GFRESTBLACK 68 07/11/2019    ARIELEL 9.0 07/11/2019        A1C RESULTS:  Lab Results   Component Value Date    A1C 5.9 (H) 01/09/2019       INR RESULTS:  Lab Results   Component Value Date    INR 1.21 (H) 02/18/2019    INR 1.24 (H) 02/16/2019           CC  Anyi Olmos MD  [address] [place] [address]                "

## 2019-07-29 NOTE — LETTER
7/29/2019      Anyi Olmos MD  [address] [facility] [address]  [facility] [address]      RE: Taty Aguila       Dear Colleague,    I had the pleasure of seeing Taty Aguila in the [facility].    Service Date: 07/29/2019      CARDIOLOGY CLINIC NOTE      HISTORY OF PRESENT ILLNESS:  Taty is a very pleasant 74-year-old female with a prior history of mitral and tricuspid valve prolapse and underwent repair with a 32 mm Oquendo ring on 02/15/2019.  This was performed by Dr. Hanna.  Postoperatively, she had AV block and had a [employer] pacemaker implanted on 02/18/2019.  She had paroxysmal atrial fibrillation and underwent CV Surgery.  She did not have any recurrence of her atrial fibrillation.  She was maintained on amiodarone and Xarelto.  Postoperatively, she did well except for her loss in ejection fraction from 65% to 20%-25% without clear etiology.  Her dual-chamber pacer showed recurrent atrial fibrillation which was thought perhaps the reason for her decreased EF and then more recently, her pacemaker showed she has had no AFib since 03/2019.  Despite this, her ejection fraction remains low.  For this concern, we had her undergo a nuclear stress test which showed no ischemia and ejection fraction of 31%.      Currently, Taty feels well.  She is walking regularly without difficulty.  She is undergoing exercise classes, the slow one at the  but not the fast one which she would like to do.  She denies any chest pain or palpitations.  Interestingly, her coronary angiography did show her ejection fraction preoperatively at 45%-50%, so I suspect that her decreased EF was the fact that her EF was actually not normal preoperatively but was diminished at 40%-45% preoperatively and then the afterload changes after the mitral valve repair showed the true 25% range.  Hopefully, with afterload reduction, medical therapy, time, we can improve that.  I have started her on low-dose  lisinopril 2.5 mg daily and will have her follow up with [facility] in [place] in 1 month's time to see how she is tolerating in order to even see if we could increase this.      Jesus Cardenas MD, [facility]         JESUS CARDENAS MD [facility]             D: 2019   T: 2019   MT: DW      Name:     GERONIMO PAYTON   MRN:      [id number]        Account:      [id number]   :      1945           Service Date: 2019      Document: [id number]         Outpatient Encounter Medications as of 2019   Medication Sig Dispense Refill     aspirin 81 MG EC tablet Take 81 mg by mouth every evening       calcium carbonate-vitamin D (CALCIUM 600+D) 600-200 MG-UNIT TABS Take 1 tablet by mouth 2 times daily       levothyroxine (SYNTHROID/LEVOTHROID) 25 MCG tablet Take 1 tablet (25 mcg) by mouth daily 30 tablet 1     metoprolol succinate ER (TOPROL-XL) 25 MG 24 hr tablet Take 1 tablet (25 mg) by mouth daily       Multiple Vitamins-Minerals (MULTIVITAMIN ADULT) TABS Take 1 tablet by mouth daily       omeprazole (PRILOSEC) 10 MG CR capsule Take 1 capsule (10 mg) by mouth every morning (before breakfast) 90 capsule 3     potassium chloride ER (K-DUR/KLOR-CON M) 20 MEQ CR tablet Take 40 mEq by mouth daily        rivaroxaban ANTICOAGULANT (XARELTO) 20 MG TABS tablet Take 1 tablet (20 mg) by mouth daily (with dinner) 90 tablet 3     torsemide (DEMADEX) 20 MG tablet daily Take 1/2 tablet (10 mg)       [] amoxicillin (AMOXIL) 500 MG tablet Take 2 tablets (1,000 mg) by mouth once for 1 dose 4 tablet 0     [DISCONTINUED] senna (SENOKOT) 8.6 MG tablet Take 1 tablet by mouth daily       Facility-Administered Encounter Medications as of 2019   Medication Dose Route Frequency Provider Last Rate Last Dose     lisinopril (PRINIVIL/Zestril) tablet 2.5 mg  2.5 mg Oral Daily Jesus Cardenas MD           Again, thank you for allowing me to participate in the care of your patient.       Sincerely,    GILLIAN CARDENAS MD     [facility]

## 2019-07-29 NOTE — PROGRESS NOTES
Service Date: 07/29/2019      CARDIOLOGY CLINIC NOTE      HISTORY OF PRESENT ILLNESS:  Taty is a very pleasant 74-year-old female with a prior history of mitral and tricuspid valve prolapse and underwent repair with a 32 mm Oquendo ring on 02/15/2019.  This was performed by Dr. Hanna.  Postoperatively, she had AV block and had a [employer] pacemaker implanted on 02/18/2019.  She had paroxysmal atrial fibrillation and underwent CV Surgery.  She did not have any recurrence of her atrial fibrillation.  She was maintained on amiodarone and Xarelto.  Postoperatively, she did well except for her loss in ejection fraction from 65% to 20%-25% without clear etiology.  Her dual-chamber pacer showed recurrent atrial fibrillation which was thought perhaps the reason for her decreased EF and then more recently, her pacemaker showed she has had no AFib since 03/2019.  Despite this, her ejection fraction remains low.  For this concern, we had her undergo a nuclear stress test which showed no ischemia and ejection fraction of 31%.      Currently, Taty feels well.  She is walking regularly without difficulty.  She is undergoing exercise classes, the slow one at the  but not the fast one which she would like to do.  She denies any chest pain or palpitations.  Interestingly, her coronary angiography did show her ejection fraction preoperatively at 45%-50%, so I suspect that her decreased EF was the fact that her EF was actually not normal preoperatively but was diminished at 40%-45% preoperatively and then the afterload changes after the mitral valve repair showed the true 25% range.  Hopefully, with afterload reduction, medical therapy, time, we can improve that.  I have started her on low-dose lisinopril 2.5 mg daily and will have her follow up with [facility] in [place] in 1 month's time to see how she is tolerating in order to even see if we could increase this.      Jesus Stone MD, [facility]         JESUS FARRELL  MD RONAL[facility]             D: 2019   T: 2019   MT: MICHA      Name:     GERONIMO PAYTON   MRN:      [id number]        Account:      [id number]   :      1945           Service Date: 2019      Document: [id number]

## 2019-08-05 DIAGNOSIS — Z98.890 S/P MVR (MITRAL VALVE REPAIR): Primary | ICD-10-CM

## 2019-08-05 RX ORDER — LISINOPRIL 2.5 MG/1
2.5 TABLET ORAL DAILY
Qty: 30 TABLET | Refills: 0 | Status: SHIPPED | OUTPATIENT
Start: 2019-08-05 | End: 2019-08-07

## 2019-08-07 DIAGNOSIS — Z98.890 S/P MVR (MITRAL VALVE REPAIR): ICD-10-CM

## 2019-08-07 RX ORDER — LISINOPRIL 2.5 MG/1
2.5 TABLET ORAL DAILY
Qty: 30 TABLET | Refills: 0 | Status: SHIPPED | OUTPATIENT
Start: 2019-08-07 | End: 2019-09-09

## 2019-08-16 NOTE — PROGRESS NOTES
Reviewed Dr. Stone's OV note from 7/29. Medical mgmt continued (lisinopril 2.5 mg added) and f/u made.     If EF does not improve, Dr. Motley has recommended upgrade of PPM to dual chamber ICD.    Will fwd to [facility] (who has seen her in past) and Marifer ULRICH (who will be seeing her in f/u due to scheduling)    Faye

## 2019-08-29 DIAGNOSIS — E03.9 ACQUIRED HYPOTHYROIDISM: ICD-10-CM

## 2019-08-29 PROCEDURE — 84439 ASSAY OF FREE THYROXINE: CPT | Performed by: INTERNAL MEDICINE

## 2019-08-29 PROCEDURE — 84443 ASSAY THYROID STIM HORMONE: CPT | Performed by: INTERNAL MEDICINE

## 2019-08-29 PROCEDURE — 36415 COLL VENOUS BLD VENIPUNCTURE: CPT | Performed by: INTERNAL MEDICINE

## 2019-08-30 LAB
T4 FREE SERPL-MCNC: 0.81 NG/DL (ref 0.76–1.46)
TSH SERPL DL<=0.005 MIU/L-ACNC: 6.37 MU/L (ref 0.4–4)

## 2019-09-03 DIAGNOSIS — E03.9 ACQUIRED HYPOTHYROIDISM: Primary | ICD-10-CM

## 2019-09-03 RX ORDER — LEVOTHYROXINE SODIUM 50 UG/1
50 TABLET ORAL DAILY
Qty: 30 TABLET | Refills: 1 | Status: SHIPPED | OUTPATIENT
Start: 2019-09-03 | End: 2019-10-16

## 2019-09-04 DIAGNOSIS — I50.31 ACUTE DIASTOLIC CONGESTIVE HEART FAILURE (H): ICD-10-CM

## 2019-09-04 DIAGNOSIS — E87.79 OTHER HYPERVOLEMIA: ICD-10-CM

## 2019-09-04 RX ORDER — TORSEMIDE 20 MG/1
TABLET ORAL
Qty: 30 TABLET | Refills: 0 | Status: SHIPPED | OUTPATIENT
Start: 2019-09-04 | End: 2019-10-10

## 2019-09-04 RX ORDER — POTASSIUM CHLORIDE 1500 MG/1
40 TABLET, EXTENDED RELEASE ORAL DAILY
Qty: 60 TABLET | Refills: 0 | Status: SHIPPED | OUTPATIENT
Start: 2019-09-04 | End: 2019-09-09

## 2019-09-05 ENCOUNTER — HOSPITAL ENCOUNTER (OUTPATIENT)
Dept: MAMMOGRAPHY | Facility: CLINIC | Age: 74
Discharge: HOME | End: 2019-09-05
Attending: INTERNAL MEDICINE | Admitting: INTERNAL MEDICINE
Payer: COMMERCIAL

## 2019-09-05 DIAGNOSIS — Z12.31 VISIT FOR SCREENING MAMMOGRAM: ICD-10-CM

## 2019-09-05 PROCEDURE — 77067 SCR MAMMO BI INCL CAD: CPT

## 2019-09-09 ENCOUNTER — OFFICE VISIT (OUTPATIENT)
Dept: CARDIOLOGY | Facility: CLINIC | Age: 74
End: 2019-09-09
Attending: INTERNAL MEDICINE
Payer: COMMERCIAL

## 2019-09-09 VITALS
WEIGHT: 119 LBS | HEART RATE: 90 BPM | SYSTOLIC BLOOD PRESSURE: 94 MMHG | BODY MASS INDEX: 19.13 KG/M2 | DIASTOLIC BLOOD PRESSURE: 60 MMHG | HEIGHT: 66 IN

## 2019-09-09 DIAGNOSIS — Z98.890 S/P TVR (TRICUSPID VALVE REPAIR): ICD-10-CM

## 2019-09-09 DIAGNOSIS — Z95.0 PACEMAKER: ICD-10-CM

## 2019-09-09 DIAGNOSIS — Z95.2 S/P MVR (MITRAL VALVE REPLACEMENT): ICD-10-CM

## 2019-09-09 DIAGNOSIS — I50.22 CHRONIC SYSTOLIC HEART FAILURE (H): Primary | ICD-10-CM

## 2019-09-09 PROCEDURE — 99213 OFFICE O/P EST LOW 20 MIN: CPT | Mod: 25 | Performed by: PHYSICIAN ASSISTANT

## 2019-09-09 PROCEDURE — 93296 REM INTERROG EVL PM/IDS: CPT | Performed by: INTERNAL MEDICINE

## 2019-09-09 PROCEDURE — 93294 REM INTERROG EVL PM/LDLS PM: CPT | Performed by: INTERNAL MEDICINE

## 2019-09-09 RX ORDER — POTASSIUM CHLORIDE 1500 MG/1
40 TABLET, EXTENDED RELEASE ORAL DAILY
Qty: 180 TABLET | Refills: 3 | Status: SHIPPED | OUTPATIENT
Start: 2019-09-09 | End: 2019-11-22

## 2019-09-09 RX ORDER — AMOXICILLIN 500 MG/1
2000 TABLET, FILM COATED ORAL ONCE
Qty: 4 TABLET | Refills: 1 | Status: SHIPPED | OUTPATIENT
Start: 2019-09-09 | End: 2019-10-24

## 2019-09-09 RX ORDER — LISINOPRIL 2.5 MG/1
2.5 TABLET ORAL 2 TIMES DAILY
Qty: 90 TABLET | Refills: 3 | Status: SHIPPED | OUTPATIENT
Start: 2019-09-09 | End: 2019-10-10 | Stop reason: SINTOL

## 2019-09-09 ASSESSMENT — MIFFLIN-ST. JEOR: SCORE: 1066.53

## 2019-09-09 NOTE — LETTER
9/9/2019      Anyi Olmos MD  [address] [facility] [address]  [facility] [address]      RE: Taty Aguila       Dear Colleague,    I had the pleasure of seeing Taty Aguila in the [facility].    Service Date: 09/09/2019      HISTORY OF PRESENTING COMPLAINT:  Taty is a pleasant 74-year-old female who presents to the office today for followup after recently being started on lisinopril.      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  In 10/2018, she was found to be in AFib with RVR.  She converted back to sinus rhythm; however, in 12/2018, she again had recurrent AFib with RVR.  At that time, we sent her back to Dr. Hanna whom felt that she indeed had severe mitral regurgitation and, in the setting of new onset atrial fibrillation, valve surgery was recommended.  Her preoperative coronary angiogram showed no significant coronary disease.  A TIA at that time showed preserved LV systolic function with an EF of 65% with normal RV size and function.  This confirmed severe mitral regurgitation and she was also noted to have 3+ tricuspid regurgitation.  In 02/2019, she underwent successful minimally invasive mitral valve replacement with a 31 mm Epic [place] valve at the same time she had a tricuspid valve repair with a 32 mm Oquendo ring.  She developed complete AV block postoperatively and did undergo permanent pacemaker implantation.      Unfortunately, shortly after surgery, she developed significant volume overload.  She was found to be back in atrial fibrillation.  She was evaluated in our [facility] by [facility].  Her diuretics were adjusted and she had significant improvement in her volume status.  She also was reloaded with amiodarone which successfully converted her back to sinus rhythm.  She did have an echocardiogram which showed an LVEF in the 20-25%  range.  Since that time she has been seen by Faye Neal with EP Cardiology several times, has been followed by [facility] in the [facility] and most recently saw Dr. Stone about 6 weeks ago.  The patient had undergone a repeat echocardiogram in mid July which again showed her EF was in the 20-25% range.  It was noted that she appeared less volume overloaded, but there was no significant change in her LVEF or the function of the bioprosthetic mitral valve.  She did undergo a stress test which did not show any evidence of infarction or ischemia.  She saw Dr. Stone at the end of July and lisinopril was added to her medication regimen.      Since starting lisinopril, the patient states that she has been feeling about the same.  She monitors her blood pressure closely at home and again has had chronically low blood pressure readings typically in the  mmHg systolic range.  Overall, her blood pressure has not had any significant change since the addition of lisinopril.  She does state that she notices an occasional dry cough.  She notices this more so at night than during the day.  She is unsure if it started after the lisinopril or if she was having it before as well.  Her weight has been stable.  She continues to remain quite active.      CURRENT CARDIAC MEDICATIONS:   1.  Aspirin 81 mg daily.   2.  Lisinopril 2.5 mg daily.   3.  Toprol-XL 25 mg daily.   4.  Potassium chloride 20 mEq 2 tablets daily.   5.  Xarelto 20 mg daily.   6.  Torsemide 20 mg half tablet daily.      The remainder of her medications, allergies and review of systems were reviewed and as are documented separately.      PHYSICAL EXAMINATION:   GENERAL:  The patient is a pleasant 74-year-old female who appears her stated age.  She is in no apparent distress.   VITAL SIGNS:  Her blood pressure is 94/60, pulse is 90, weight is 119 pounds which is stable.   PULMONARY:  Breathing is nonlabored.  Lungs are clear to auscultation.   CARDIAC:  Reveals a  regular rate and rhythm.  I do not appreciate any significant murmur.   EXTREMITIES:  Lower extremities show trace edema bilaterally.   NEUROLOGIC:  Alert and oriented.      She did have a remote device check today.  This did not show any concerning findings.  She did not have any atrial arrhythmia (the last time she was noted to have atrial arrhythmia was 03/10/19).  No ventricular arrhythmia.      She recently had her TSH rechecked through her primary care provider's office.  This has improved from close to 9 down to 6.37.  Her primary care provider did increase her Synthroid.       ASSESSMENT AND PLAN:  Taty is a pleasant 74-year-old female with a history of severe mitral regurgitation who is status post mitral valve replacement as well as tricuspid valve repair in 2019.  Unfortunately, she subsequently developed a cardiomyopathy with an EF in the 20-25% range and previously had symptoms of volume overload; however, she is euvolemic at this time.  We will try to optimize her cardiomyopathy medications.  I did recommend we try increasing the lisinopril from 2.5 mg once a day to 2.5 mg b.i.d.  She was agreeable to this.  We will plan on having her do a basic metabolic panel and follow up in the Cardiology office with either myself or [facility] in approximately 1 month.  She did mention that she has an upcoming dental appointment and stated that she had been given a prescription for amoxicillin 1000 mg to be taken an hour before dental work.  According to guidelines, she should be taking 2000 mg.  I provided her a new prescription today and wrote out written instructions for her.      Thank you for allowing us to participate in the care of this pleasant patient.         SANDRA AMADOR PA-C             D: 2019   T: 2019   MT: OLILE      Name:     TATY PAYTON   MRN:      [id number]        Account:      [id number]   :      1945           Service Date: 2019      Document: [id number]            Outpatient Encounter Medications as of 2019   Medication Sig Dispense Refill     [] amoxicillin (AMOXIL) 500 MG tablet Take 4 tablets (2,000 mg) by mouth once for 1 dose 1 hour prior to dental work 4 tablet 1     aspirin 81 MG EC tablet Take 81 mg by mouth every evening       calcium carbonate-vitamin D (CALCIUM 600+D) 600-200 MG-UNIT TABS Take 1 tablet by mouth 2 times daily       levothyroxine (SYNTHROID/LEVOTHROID) 50 MCG tablet Take 1 tablet (50 mcg) by mouth daily 30 tablet 1     lisinopril (PRINIVIL/ZESTRIL) 2.5 MG tablet Take 1 tablet (2.5 mg) by mouth 2 times daily 90 tablet 3     metoprolol succinate ER (TOPROL-XL) 25 MG 24 hr tablet Take 1 tablet (25 mg) by mouth daily       Multiple Vitamins-Minerals (MULTIVITAMIN ADULT) TABS Take 1 tablet by mouth daily       omeprazole (PRILOSEC) 10 MG CR capsule Take 1 capsule (10 mg) by mouth every morning (before breakfast) 90 capsule 3     potassium chloride ER (K-DUR/KLOR-CON M) 20 MEQ CR tablet Take 2 tablets (40 mEq) by mouth daily 180 tablet 3     rivaroxaban ANTICOAGULANT (XARELTO) 20 MG TABS tablet Take 1 tablet (20 mg) by mouth daily (with dinner) 90 tablet 3     torsemide (DEMADEX) 20 MG tablet Take 1/2 tablet (10 mg) 30 tablet 0     [DISCONTINUED] amoxicillin (AMOXIL) 500 MG tablet Take 2 tablets (1,000 mg) by mouth once for 1 dose 4 tablet 0     [DISCONTINUED] levothyroxine (SYNTHROID/LEVOTHROID) 25 MCG tablet Take 1 tablet (25 mcg) by mouth daily 30 tablet 1     [DISCONTINUED] lisinopril (PRINIVIL/ZESTRIL) 2.5 MG tablet Take 1 tablet (2.5 mg) by mouth daily 30 tablet 0     [DISCONTINUED] potassium chloride ER (K-DUR/KLOR-CON M) 20 MEQ CR tablet Take 2 tablets (40 mEq) by mouth daily 60 tablet 0     [DISCONTINUED] lisinopril (PRINIVIL/Zestril) tablet 2.5 mg        No facility-administered encounter medications on file as of 2019.        Again, thank you for allowing me to participate in the care of your patient.       Sincerely,    Marifer Mohamud PA-C     [facility]

## 2019-09-09 NOTE — PATIENT INSTRUCTIONS
Thank you for your [facility] visit today. Your provider has recommended the following:  Medication Changes:  Increase the lisinopril to 2.5mg TWICE a day  2g (2,000mg) of amoxicillin 1 hour prior to dental procedures  Recommendations:  Labs in 1 month  Follow-up:  See Marifer or [facility]ney for cardiology follow up in 1 month.    Reminder:  Please bring in your current medication list or your medication, over the counter supplements and vitamin bottles as we will review these at each office visit.

## 2019-09-09 NOTE — LETTER
9/9/2019    Anyi Olmos MD  [address]  [facility] [address]    RE: Taty Aguila       Dear Colleague,    I had the pleasure of seeing Tatymorenita Aguila in the [facility].    Please see separate dictation for HPI, PHYSICAL EXAM AND IMPRESSION/PLAN.    CURRENT MEDICATIONS:  Current Outpatient Medications   Medication Sig Dispense Refill     aspirin 81 MG EC tablet Take 81 mg by mouth every evening       calcium carbonate-vitamin D (CALCIUM 600+D) 600-200 MG-UNIT TABS Take 1 tablet by mouth 2 times daily       levothyroxine (SYNTHROID/LEVOTHROID) 50 MCG tablet Take 1 tablet (50 mcg) by mouth daily 30 tablet 1     lisinopril (PRINIVIL/ZESTRIL) 2.5 MG tablet Take 1 tablet (2.5 mg) by mouth daily 30 tablet 0     metoprolol succinate ER (TOPROL-XL) 25 MG 24 hr tablet Take 1 tablet (25 mg) by mouth daily       Multiple Vitamins-Minerals (MULTIVITAMIN ADULT) TABS Take 1 tablet by mouth daily       omeprazole (PRILOSEC) 10 MG CR capsule Take 1 capsule (10 mg) by mouth every morning (before breakfast) 90 capsule 3     potassium chloride ER (K-DUR/KLOR-CON M) 20 MEQ CR tablet Take 2 tablets (40 mEq) by mouth daily 60 tablet 0     rivaroxaban ANTICOAGULANT (XARELTO) 20 MG TABS tablet Take 1 tablet (20 mg) by mouth daily (with dinner) 90 tablet 3     torsemide (DEMADEX) 20 MG tablet Take 1/2 tablet (10 mg) 30 tablet 0       ALLERGIES:     Allergies   Allergen Reactions     Chlorpheniramine Other (See Comments)     LOC and fainting      Phenylephrine Other (See Comments)     fainting       PAST MEDICAL HISTORY:  Past Medical History:   Diagnosis Date     Allergic rhinitis, cause unspecified     dust mites, ragweed     Complete AV block (H)     BSX DDD PM 2-     History of tricuspid valve repair      Mitral valve prolapse     bioprosthetic MVR 2-     Paroxysmal atrial fibrillation (H)        PAST SURGICAL HISTORY:  Past Surgical History:   Procedure Laterality Date      COLONOSCOPY N/A 9/15/2015    Procedure: COLONOSCOPY;  Surgeon: Anson Llanos MD;  Location:  GI     CV HEART CATHETERIZATION WITH POSSIBLE INTERVENTION N/A 1/9/2019    Procedure: Heart Catheterization with Possible Intervention;  Surgeon: Ritesh Whittaker MD;  Location:  HEART CARDIAC CATH LAB     CV RIGHT AND LEFT HEART CATH N/A 1/9/2019    Procedure: Right and Left Heart Catherization;  Surgeon: Ritesh Whittaker MD;  Location:  HEART CARDIAC CATH LAB     ENT SURGERY      T&A     EP PACEMAKER N/A 2/18/2019    Procedure: EP Pacemaker;  Surgeon: Inocencia Guillen MD;  Location:  HEART CARDIAC CATH LAB     REPAIR VALVE TRICUSPID MINIMALLY INVASIVE N/A 2/15/2019    Procedure: MINIMALLY INVASIVE TRICUSPID VALVE REPAIR WITH 32MM SULLIVAN RING;  Surgeon: Andrea Hanna MD;  Location:  OR     REPLACE VALVE MITRAL MINIMALLY INVASIVE N/A 2/15/2019    Procedure: MINIMALLY INVASIVE MITRAL VALVE REPLACEMENT WITH EPIC ST KEYUR 31MM VALVE; ON PUMP WITH TIA.  CARDIOLOGIST DR CARDENAS;  Surgeon: Andrea Hanna MD;  Location:  OR       SOCIAL HISTORY:  Social History     Socioeconomic History     Marital status:      Spouse name: None     Number of children: 3     Years of education: None     Highest education level: None   Occupational History     None   Social Needs     Financial resource strain: None     Food insecurity:     Worry: None     Inability: None     Transportation needs:     Medical: None     Non-medical: None   Tobacco Use     Smoking status: Never Smoker     Smokeless tobacco: Never Used   Substance and Sexual Activity     Alcohol use: No     Drug use: No     Sexual activity: Not Currently   Lifestyle     Physical activity:     Days per week: None     Minutes per session: None     Stress: None   Relationships     Social connections:     Talks on phone: None     Gets together: None     Attends [employer] service: None     Active member of club or organization:  None     Attends meetings of clubs or organizations: None     Relationship status: None     Intimate partner violence:     Fear of current or ex partner: None     Emotionally abused: None     Physically abused: None     Forced sexual activity: None   Other Topics Concern      Service Not Asked     Blood Transfusions Not Asked     Caffeine Concern No     Comment: 1 cup of coffee and 1 can diet coke per day     Occupational Exposure Not Asked     Hobby Hazards Not Asked     Sleep Concern Yes     Comment: takes Doxylamine succinate 1/2 tab every night     Stress Concern No     Weight Concern No     Special Diet No     Comment: healthy      Back Care Not Asked     Exercise Yes     Comment: walking 45min x7 a wk. Very active, [employer], Golf, Bowling, Cardio     Bike Helmet Not Asked     Seat Belt Yes     Self-Exams Yes     Parent/sibling w/ CABG, MI or angioplasty before 65F 55M? No   Social History Narrative     None       FAMILY HISTORY:  Family History   Problem Relation Age of Onset     Osteoporosis Mother      Alzheimer Disease Mother      Family History Negative Father      Heart Disease Maternal Grandfather      Family History Negative Paternal Grandmother      Family History Negative Paternal Grandfather        Review of Systems:  Skin:  Negative for       Eyes:  Positive for glasses    ENT:  Negative for      Respiratory:  Positive for cough     Cardiovascular:    palpitations;Positive for;lightheadedness    Gastroenterology: Positive for reflux    Genitourinary:  not assessed      Musculoskeletal:  Positive for      Neurologic:  Negative for      Psychiatric:  Positive for sleep disturbances    Heme/Lymph/Imm:  Positive for allergies    Endocrine:  Positive for thyroid disorder       Reviewed. Remainder of the note dictated.    Marifer Mohamud PA-C    Service Date: 09/09/2019      HISTORY OF PRESENTING COMPLAINT:  Taty is a pleasant 74-year-old female who presents to the office today for followup  after recently being started on lisinopril.      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  In 10/2018, she was found to be in AFib with RVR.  She converted back to sinus rhythm; however, in 12/2018, she again had recurrent AFib with RVR.  At that time, we sent her back to Dr. Hanna whom felt that she indeed had severe mitral regurgitation and, in the setting of new onset atrial fibrillation, valve surgery was recommended.  Her preoperative coronary angiogram showed no significant coronary disease.  A TIA at that time showed preserved LV systolic function with an EF of 65% with normal RV size and function.  This confirmed severe mitral regurgitation and she was also noted to have 3+ tricuspid regurgitation.  In 02/2019, she underwent successful minimally invasive mitral valve replacement with a 31 mm Epic St. Ron valve at the same time she had a tricuspid valve repair with a 32 mm Oquendo ring.  She developed complete AV block postoperatively and did undergo permanent pacemaker implantation.      Unfortunately, shortly after surgery, she developed significant volume overload.  She was found to be back in atrial fibrillation.  She was evaluated in our [facility] by Courtney Brian.  Her diuretics were adjusted and she had significant improvement in her volume status.  She also was reloaded with amiodarone which successfully converted her back to sinus rhythm.  She did have an echocardiogram which showed an LVEF in the 20-25% range.  Since that time she has been seen by Faye Neal with [facility] several times, has been followed by Courtney in the [facility] and most recently saw Dr. Stone about 6 weeks ago.  The patient had undergone a repeat echocardiogram in mid July which again showed her EF was in the 20-25% range.  It was noted that she appeared less volume overloaded, but there was no  significant change in her LVEF or the function of the bioprosthetic mitral valve.  She did undergo a stress test which did not show any evidence of infarction or ischemia.  She saw Dr. Stone at the end of July and lisinopril was added to her medication regimen.      Since starting lisinopril, the patient states that she has been feeling about the same.  She monitors her blood pressure closely at home and again has had chronically low blood pressure readings typically in the  mmHg systolic range.  Overall, her blood pressure has not had any significant change since the addition of lisinopril.  She does state that she notices an occasional dry cough.  She notices this more so at night than during the day.  She is unsure if it started after the lisinopril or if she was having it before as well.  Her weight has been stable.  She continues to remain quite active.      CURRENT CARDIAC MEDICATIONS:   1.  Aspirin 81 mg daily.   2.  Lisinopril 2.5 mg daily.   3.  Toprol-XL 25 mg daily.   4.  Potassium chloride 20 mEq 2 tablets daily.   5.  Xarelto 20 mg daily.   6.  Torsemide 20 mg half tablet daily.      The remainder of her medications, allergies and review of systems were reviewed and as are documented separately.      PHYSICAL EXAMINATION:   GENERAL:  The patient is a pleasant 74-year-old female who appears her stated age.  She is in no apparent distress.   VITAL SIGNS:  Her blood pressure is 94/60, pulse is 90, weight is 119 pounds which is stable.   PULMONARY:  Breathing is nonlabored.  Lungs are clear to auscultation.   CARDIAC:  Reveals a regular rate and rhythm.  I do not appreciate any significant murmur.   EXTREMITIES:  Lower extremities show trace edema bilaterally.   NEUROLOGIC:  Alert and oriented.      She did have a remote device check today.  This did not show any concerning findings.  She did not have any atrial arrhythmia (the last time she was noted to have atrial arrhythmia was 03/10/19).  No  ventricular arrhythmia.      She recently had her TSH rechecked through her primary care provider's office.  This has improved from close to 9 down to 6.37.  Her primary care provider did increase her Synthroid.       ASSESSMENT AND PLAN:  Taty is a pleasant 74-year-old female with a history of severe mitral regurgitation who is status post mitral valve replacement as well as tricuspid valve repair in 2019.  Unfortunately, she subsequently developed a cardiomyopathy with an EF in the 20-25% range and previously had symptoms of volume overload; however, she is euvolemic at this time.  We will try to optimize her cardiomyopathy medications.  I did recommend we try increasing the lisinopril from 2.5 mg once a day to 2.5 mg b.i.d.  She was agreeable to this.  We will plan on having her do a basic metabolic panel and follow up in the Cardiology office with either myself or Courtney in approximately 1 month.  She did mention that she has an upcoming dental appointment and stated that she had been given a prescription for amoxicillin 1000 mg to be taken an hour before dental work.  According to guidelines, she should be taking 2000 mg.  I provided her a new prescription today and wrote out written instructions for her.      Thank you for allowing us to participate in the care of this pleasant patient.         MARIFER AMADOR PA-C             D: 2019   T: 2019   MT: OLLIE      Name:     TATY PAYTON   MRN:      [id number]        Account:      [id number]   :      1945           Service Date: 2019      Document: [id number]        Thank you for allowing me to participate in the care of your patient.      Sincerely,     Marifer Amador PA-C     [facility]    cc:   Jesus Stone MD  [address]  [place] [address]

## 2019-09-09 NOTE — PROGRESS NOTES
Please see separate dictation for HPI, PHYSICAL EXAM AND IMPRESSION/PLAN.    CURRENT MEDICATIONS:  Current Outpatient Medications   Medication Sig Dispense Refill     aspirin 81 MG EC tablet Take 81 mg by mouth every evening       calcium carbonate-vitamin D (CALCIUM 600+D) 600-200 MG-UNIT TABS Take 1 tablet by mouth 2 times daily       levothyroxine (SYNTHROID/LEVOTHROID) 50 MCG tablet Take 1 tablet (50 mcg) by mouth daily 30 tablet 1     lisinopril (PRINIVIL/ZESTRIL) 2.5 MG tablet Take 1 tablet (2.5 mg) by mouth daily 30 tablet 0     metoprolol succinate ER (TOPROL-XL) 25 MG 24 hr tablet Take 1 tablet (25 mg) by mouth daily       Multiple Vitamins-Minerals (MULTIVITAMIN ADULT) TABS Take 1 tablet by mouth daily       omeprazole (PRILOSEC) 10 MG CR capsule Take 1 capsule (10 mg) by mouth every morning (before breakfast) 90 capsule 3     potassium chloride ER (K-DUR/KLOR-CON M) 20 MEQ CR tablet Take 2 tablets (40 mEq) by mouth daily 60 tablet 0     rivaroxaban ANTICOAGULANT (XARELTO) 20 MG TABS tablet Take 1 tablet (20 mg) by mouth daily (with dinner) 90 tablet 3     torsemide (DEMADEX) 20 MG tablet Take 1/2 tablet (10 mg) 30 tablet 0       ALLERGIES:     Allergies   Allergen Reactions     Chlorpheniramine Other (See Comments)     LOC and fainting      Phenylephrine Other (See Comments)     fainting       PAST MEDICAL HISTORY:  Past Medical History:   Diagnosis Date     Allergic rhinitis, cause unspecified     dust mites, ragweed     Complete AV block (H)     BSX DDD PM 2-     History of tricuspid valve repair      Mitral valve prolapse     bioprosthetic MVR 2-     Paroxysmal atrial fibrillation (H)        PAST SURGICAL HISTORY:  Past Surgical History:   Procedure Laterality Date     COLONOSCOPY N/A 9/15/2015    Procedure: COLONOSCOPY;  Surgeon: Anson Llanos MD;  Location:  GI     CV HEART CATHETERIZATION WITH POSSIBLE INTERVENTION N/A 1/9/2019    Procedure: Heart Catheterization with Possible  Intervention;  Surgeon: Ritesh Whittaker MD;  Location:  HEART CARDIAC CATH LAB     CV RIGHT AND LEFT HEART CATH N/A 1/9/2019    Procedure: Right and Left Heart Catherization;  Surgeon: Ritesh Whittaker MD;  Location:  HEART CARDIAC CATH LAB     ENT SURGERY      T&A     EP PACEMAKER N/A 2/18/2019    Procedure: EP Pacemaker;  Surgeon: Inocencia Guillen MD;  Location:  HEART CARDIAC CATH LAB     REPAIR VALVE TRICUSPID MINIMALLY INVASIVE N/A 2/15/2019    Procedure: MINIMALLY INVASIVE TRICUSPID VALVE REPAIR WITH 32MM SULLIVAN RING;  Surgeon: Andrea Hanna MD;  Location:  OR     REPLACE VALVE MITRAL MINIMALLY INVASIVE N/A 2/15/2019    Procedure: MINIMALLY INVASIVE MITRAL VALVE REPLACEMENT WITH EPIC ST KEYUR 31MM VALVE; ON PUMP WITH TIA.  CARDIOLOGIST DR CARDENAS;  Surgeon: Andrea Hanna MD;  Location:  OR       SOCIAL HISTORY:  Social History     Socioeconomic History     Marital status:      Spouse name: None     Number of children: 3     Years of education: None     Highest education level: None   Occupational History     None   Social Needs     Financial resource strain: None     Food insecurity:     Worry: None     Inability: None     Transportation needs:     Medical: None     Non-medical: None   Tobacco Use     Smoking status: Never Smoker     Smokeless tobacco: Never Used   Substance and Sexual Activity     Alcohol use: No     Drug use: No     Sexual activity: Not Currently   Lifestyle     Physical activity:     Days per week: None     Minutes per session: None     Stress: None   Relationships     Social connections:     Talks on phone: None     Gets together: None     Attends [employer] service: None     Active member of club or organization: None     Attends meetings of clubs or organizations: None     Relationship status: None     Intimate partner violence:     Fear of current or ex partner: None     Emotionally abused: None     Physically abused: None      Forced sexual activity: None   Other Topics Concern      Service Not Asked     Blood Transfusions Not Asked     Caffeine Concern No     Comment: 1 cup of coffee and 1 can diet coke per day     Occupational Exposure Not Asked     Hobby Hazards Not Asked     Sleep Concern Yes     Comment: takes Doxylamine succinate 1/2 tab every night     Stress Concern No     Weight Concern No     Special Diet No     Comment: healthy      Back Care Not Asked     Exercise Yes     Comment: walking 45min x7 a wk. Very active, [employer], Golf, Bowling, Cardio     Bike Helmet Not Asked     Seat Belt Yes     Self-Exams Yes     Parent/sibling w/ CABG, MI or angioplasty before 65F 55M? No   Social History Narrative     None       FAMILY HISTORY:  Family History   Problem Relation Age of Onset     Osteoporosis Mother      Alzheimer Disease Mother      Family History Negative Father      Heart Disease Maternal Grandfather      Family History Negative Paternal Grandmother      Family History Negative Paternal Grandfather        Review of Systems:  Skin:  Negative for       Eyes:  Positive for glasses    ENT:  Negative for      Respiratory:  Positive for cough     Cardiovascular:    palpitations;Positive for;lightheadedness    Gastroenterology: Positive for reflux    Genitourinary:  not assessed      Musculoskeletal:  Positive for      Neurologic:  Negative for      Psychiatric:  Positive for sleep disturbances    Heme/Lymph/Imm:  Positive for allergies    Endocrine:  Positive for thyroid disorder       Reviewed. Remainder of the note dictated.    Marifer Mohamud PA-C

## 2019-09-09 NOTE — PROGRESS NOTES
Service Date: 09/09/2019      HISTORY OF PRESENTING COMPLAINT:  Taty is a pleasant 74-year-old female who presents to the office today for followup after recently being started on lisinopril.      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  In 10/2018, she was found to be in AFib with RVR.  She converted back to sinus rhythm; however, in 12/2018, she again had recurrent AFib with RVR.  At that time, we sent her back to Dr. Hanna whom felt that she indeed had severe mitral regurgitation and, in the setting of new onset atrial fibrillation, valve surgery was recommended.  Her preoperative coronary angiogram showed no significant coronary disease.  A TIA at that time showed preserved LV systolic function with an EF of 65% with normal RV size and function.  This confirmed severe mitral regurgitation and she was also noted to have 3+ tricuspid regurgitation.  In 02/2019, she underwent successful minimally invasive mitral valve replacement with a 31 mm Epic St. Ron valve at the same time she had a tricuspid valve repair with a 32 mm Oquendo ring.  She developed complete AV block postoperatively and did undergo permanent pacemaker implantation.      Unfortunately, shortly after surgery, she developed significant volume overload.  She was found to be back in atrial fibrillation.  She was evaluated in our [facility] by Courtney Brian.  Her diuretics were adjusted and she had significant improvement in her volume status.  She also was reloaded with amiodarone which successfully converted her back to sinus rhythm.  She did have an echocardiogram which showed an LVEF in the 20-25% range.  Since that time she has been seen by Faye Neal with [facility] several times, has been followed by Courtney in the [facility] and most recently saw Dr. Stone about 6 weeks ago.  The patient had undergone a repeat  echocardiogram in mid July which again showed her EF was in the 20-25% range.  It was noted that she appeared less volume overloaded, but there was no significant change in her LVEF or the function of the bioprosthetic mitral valve.  She did undergo a stress test which did not show any evidence of infarction or ischemia.  She saw Dr. Stone at the end of July and lisinopril was added to her medication regimen.      Since starting lisinopril, the patient states that she has been feeling about the same.  She monitors her blood pressure closely at home and again has had chronically low blood pressure readings typically in the  mmHg systolic range.  Overall, her blood pressure has not had any significant change since the addition of lisinopril.  She does state that she notices an occasional dry cough.  She notices this more so at night than during the day.  She is unsure if it started after the lisinopril or if she was having it before as well.  Her weight has been stable.  She continues to remain quite active.      CURRENT CARDIAC MEDICATIONS:   1.  Aspirin 81 mg daily.   2.  Lisinopril 2.5 mg daily.   3.  Toprol-XL 25 mg daily.   4.  Potassium chloride 20 mEq 2 tablets daily.   5.  Xarelto 20 mg daily.   6.  Torsemide 20 mg half tablet daily.      The remainder of her medications, allergies and review of systems were reviewed and as are documented separately.      PHYSICAL EXAMINATION:   GENERAL:  The patient is a pleasant 74-year-old female who appears her stated age.  She is in no apparent distress.   VITAL SIGNS:  Her blood pressure is 94/60, pulse is 90, weight is 119 pounds which is stable.   PULMONARY:  Breathing is nonlabored.  Lungs are clear to auscultation.   CARDIAC:  Reveals a regular rate and rhythm.  I do not appreciate any significant murmur.   EXTREMITIES:  Lower extremities show trace edema bilaterally.   NEUROLOGIC:  Alert and oriented.      She did have a remote device check today.  This did  not show any concerning findings.  She did not have any atrial arrhythmia (the last time she was noted to have atrial arrhythmia was 03/10/19).  No ventricular arrhythmia.      She recently had her TSH rechecked through her primary care provider's office.  This has improved from close to 9 down to 6.37.  Her primary care provider did increase her Synthroid.       ASSESSMENT AND PLAN:  Taty is a pleasant 74-year-old female with a history of severe mitral regurgitation who is status post mitral valve replacement as well as tricuspid valve repair in 2019.  Unfortunately, she subsequently developed a cardiomyopathy with an EF in the 20-25% range and previously had symptoms of volume overload; however, she is euvolemic at this time.  We will try to optimize her cardiomyopathy medications.  I did recommend we try increasing the lisinopril from 2.5 mg once a day to 2.5 mg b.i.d.  She was agreeable to this.  We will plan on having her do a basic metabolic panel and follow up in the Cardiology office with either myself or Courtney in approximately 1 month.  She did mention that she has an upcoming dental appointment and stated that she had been given a prescription for amoxicillin 1000 mg to be taken an hour before dental work.  According to guidelines, she should be taking 2000 mg.  I provided her a new prescription today and wrote out written instructions for her.      Thank you for allowing us to participate in the care of this pleasant patient.         SANDRA AMADOR PA-C             D: 2019   T: 2019   MT: OLLIE      Name:     TATY PAYTON   MRN:      [id number]        Account:      [id number]   :      1945           Service Date: 2019      Document: [id number]

## 2019-09-16 ENCOUNTER — HOSPITAL ENCOUNTER (OUTPATIENT)
Dept: MAMMOGRAPHY | Facility: CLINIC | Age: 74
Discharge: HOME | End: 2019-09-16
Attending: INTERNAL MEDICINE | Admitting: INTERNAL MEDICINE
Payer: COMMERCIAL

## 2019-09-16 ENCOUNTER — HOSPITAL ENCOUNTER (OUTPATIENT)
Dept: ULTRASOUND IMAGING | Facility: CLINIC | Age: 74
End: 2019-09-16
Attending: INTERNAL MEDICINE
Payer: COMMERCIAL

## 2019-09-16 DIAGNOSIS — R92.8 ABNORMAL MAMMOGRAM: ICD-10-CM

## 2019-09-16 PROCEDURE — G0279 TOMOSYNTHESIS, MAMMO: HCPCS

## 2019-09-16 PROCEDURE — 76642 ULTRASOUND BREAST LIMITED: CPT | Mod: LT

## 2019-10-01 LAB
MDC_IDC_EPISODE_DTM: NORMAL
MDC_IDC_EPISODE_ID: NORMAL
MDC_IDC_EPISODE_TYPE: NORMAL
MDC_IDC_LEAD_IMPLANT_DT: NORMAL
MDC_IDC_LEAD_IMPLANT_DT: NORMAL
MDC_IDC_LEAD_LOCATION: NORMAL
MDC_IDC_LEAD_LOCATION: NORMAL
MDC_IDC_LEAD_LOCATION_DETAIL_1: NORMAL
MDC_IDC_LEAD_LOCATION_DETAIL_1: NORMAL
MDC_IDC_LEAD_MFG: NORMAL
MDC_IDC_LEAD_MFG: NORMAL
MDC_IDC_LEAD_MODEL: NORMAL
MDC_IDC_LEAD_MODEL: NORMAL
MDC_IDC_LEAD_POLARITY_TYPE: NORMAL
MDC_IDC_LEAD_POLARITY_TYPE: NORMAL
MDC_IDC_LEAD_SERIAL: NORMAL
MDC_IDC_LEAD_SERIAL: NORMAL
MDC_IDC_MSMT_BATTERY_DTM: NORMAL
MDC_IDC_MSMT_BATTERY_REMAINING_LONGEVITY: 168 MO
MDC_IDC_MSMT_BATTERY_REMAINING_PERCENTAGE: 100 %
MDC_IDC_MSMT_BATTERY_STATUS: NORMAL
MDC_IDC_MSMT_LEADCHNL_RA_IMPEDANCE_VALUE: 691 OHM
MDC_IDC_MSMT_LEADCHNL_RV_IMPEDANCE_VALUE: 816 OHM
MDC_IDC_MSMT_LEADCHNL_RV_PACING_THRESHOLD_AMPLITUDE: 0.8 V
MDC_IDC_MSMT_LEADCHNL_RV_PACING_THRESHOLD_PULSEWIDTH: 0.4 MS
MDC_IDC_PG_IMPLANT_DTM: NORMAL
MDC_IDC_PG_MFG: NORMAL
MDC_IDC_PG_MODEL: NORMAL
MDC_IDC_PG_SERIAL: NORMAL
MDC_IDC_PG_TYPE: NORMAL
MDC_IDC_SESS_CLINIC_NAME: NORMAL
MDC_IDC_SESS_DTM: NORMAL
MDC_IDC_SESS_TYPE: NORMAL
MDC_IDC_SET_BRADY_AT_MODE_SWITCH_MODE: NORMAL
MDC_IDC_SET_BRADY_AT_MODE_SWITCH_RATE: 170 {BEATS}/MIN
MDC_IDC_SET_BRADY_LOWRATE: 60 {BEATS}/MIN
MDC_IDC_SET_BRADY_MAX_SENSOR_RATE: 130 {BEATS}/MIN
MDC_IDC_SET_BRADY_MAX_TRACKING_RATE: 130 {BEATS}/MIN
MDC_IDC_SET_BRADY_MODE: NORMAL
MDC_IDC_SET_BRADY_PAV_DELAY_HIGH: 150 MS
MDC_IDC_SET_BRADY_PAV_DELAY_LOW: 200 MS
MDC_IDC_SET_BRADY_SAV_DELAY_HIGH: 150 MS
MDC_IDC_SET_BRADY_SAV_DELAY_LOW: 200 MS
MDC_IDC_SET_LEADCHNL_RA_PACING_AMPLITUDE: 2 V
MDC_IDC_SET_LEADCHNL_RA_PACING_CAPTURE_MODE: NORMAL
MDC_IDC_SET_LEADCHNL_RA_PACING_POLARITY: NORMAL
MDC_IDC_SET_LEADCHNL_RA_PACING_PULSEWIDTH: 0.4 MS
MDC_IDC_SET_LEADCHNL_RA_SENSING_ADAPTATION_MODE: NORMAL
MDC_IDC_SET_LEADCHNL_RA_SENSING_POLARITY: NORMAL
MDC_IDC_SET_LEADCHNL_RA_SENSING_SENSITIVITY: 0.5 MV
MDC_IDC_SET_LEADCHNL_RV_PACING_AMPLITUDE: 1.3 V
MDC_IDC_SET_LEADCHNL_RV_PACING_CAPTURE_MODE: NORMAL
MDC_IDC_SET_LEADCHNL_RV_PACING_POLARITY: NORMAL
MDC_IDC_SET_LEADCHNL_RV_PACING_PULSEWIDTH: 0.4 MS
MDC_IDC_SET_LEADCHNL_RV_SENSING_ADAPTATION_MODE: NORMAL
MDC_IDC_SET_LEADCHNL_RV_SENSING_POLARITY: NORMAL
MDC_IDC_SET_LEADCHNL_RV_SENSING_SENSITIVITY: 1.5 MV
MDC_IDC_SET_ZONE_DETECTION_INTERVAL: 375 MS
MDC_IDC_SET_ZONE_TYPE: NORMAL
MDC_IDC_SET_ZONE_VENDOR_TYPE: NORMAL
MDC_IDC_STAT_AT_BURDEN_PERCENT: 0 %
MDC_IDC_STAT_AT_DTM_END: NORMAL
MDC_IDC_STAT_AT_DTM_START: NORMAL
MDC_IDC_STAT_BRADY_DTM_END: NORMAL
MDC_IDC_STAT_BRADY_DTM_START: NORMAL
MDC_IDC_STAT_BRADY_RA_PERCENT_PACED: 0 %
MDC_IDC_STAT_BRADY_RV_PERCENT_PACED: 1 %
MDC_IDC_STAT_EPISODE_RECENT_COUNT: 0
MDC_IDC_STAT_EPISODE_RECENT_COUNT_DTM_END: NORMAL
MDC_IDC_STAT_EPISODE_RECENT_COUNT_DTM_START: NORMAL
MDC_IDC_STAT_EPISODE_TYPE: NORMAL
MDC_IDC_STAT_EPISODE_VENDOR_TYPE: NORMAL

## 2019-10-07 DIAGNOSIS — E03.9 ACQUIRED HYPOTHYROIDISM: ICD-10-CM

## 2019-10-07 PROCEDURE — 84443 ASSAY THYROID STIM HORMONE: CPT | Performed by: INTERNAL MEDICINE

## 2019-10-07 PROCEDURE — 36415 COLL VENOUS BLD VENIPUNCTURE: CPT | Performed by: INTERNAL MEDICINE

## 2019-10-08 LAB — TSH SERPL DL<=0.005 MIU/L-ACNC: 1.31 MU/L (ref 0.4–4)

## 2019-10-10 ENCOUNTER — OFFICE VISIT (OUTPATIENT)
Dept: CARDIOLOGY | Facility: CLINIC | Age: 74
End: 2019-10-10
Attending: PHYSICIAN ASSISTANT
Payer: COMMERCIAL

## 2019-10-10 VITALS
OXYGEN SATURATION: 93 % | BODY MASS INDEX: 19.44 KG/M2 | DIASTOLIC BLOOD PRESSURE: 68 MMHG | SYSTOLIC BLOOD PRESSURE: 100 MMHG | HEART RATE: 86 BPM | WEIGHT: 121 LBS | HEIGHT: 66 IN

## 2019-10-10 DIAGNOSIS — I50.22 CHRONIC SYSTOLIC HEART FAILURE (H): ICD-10-CM

## 2019-10-10 DIAGNOSIS — I50.22 CHRONIC SYSTOLIC HEART FAILURE (H): Primary | ICD-10-CM

## 2019-10-10 DIAGNOSIS — Z98.890 S/P TVR (TRICUSPID VALVE REPAIR): ICD-10-CM

## 2019-10-10 DIAGNOSIS — Z95.2 S/P MVR (MITRAL VALVE REPLACEMENT): ICD-10-CM

## 2019-10-10 LAB
ANION GAP SERPL CALCULATED.3IONS-SCNC: 3 MMOL/L (ref 3–14)
BUN SERPL-MCNC: 16 MG/DL (ref 7–30)
CALCIUM SERPL-MCNC: 8.7 MG/DL (ref 8.5–10.1)
CHLORIDE SERPL-SCNC: 103 MMOL/L (ref 94–109)
CO2 SERPL-SCNC: 29 MMOL/L (ref 20–32)
CREAT SERPL-MCNC: 0.97 MG/DL (ref 0.52–1.04)
GFR SERPL CREATININE-BSD FRML MDRD: 58 ML/MIN/{1.73_M2}
GLUCOSE SERPL-MCNC: 89 MG/DL (ref 70–99)
POTASSIUM SERPL-SCNC: 4.3 MMOL/L (ref 3.4–5.3)
SODIUM SERPL-SCNC: 135 MMOL/L (ref 133–144)

## 2019-10-10 PROCEDURE — 80048 BASIC METABOLIC PNL TOTAL CA: CPT | Performed by: PHYSICIAN ASSISTANT

## 2019-10-10 PROCEDURE — 36415 COLL VENOUS BLD VENIPUNCTURE: CPT | Performed by: PHYSICIAN ASSISTANT

## 2019-10-10 PROCEDURE — 99214 OFFICE O/P EST MOD 30 MIN: CPT | Performed by: PHYSICIAN ASSISTANT

## 2019-10-10 RX ORDER — TORSEMIDE 10 MG/1
10 TABLET ORAL DAILY
Qty: 90 TABLET | Refills: 3 | Status: SHIPPED | OUTPATIENT
Start: 2019-10-10 | End: 2019-11-22

## 2019-10-10 RX ORDER — LOSARTAN POTASSIUM 25 MG/1
25 TABLET ORAL DAILY
Qty: 90 TABLET | Refills: 3 | Status: SHIPPED | OUTPATIENT
Start: 2019-10-10 | End: 2019-10-24

## 2019-10-10 ASSESSMENT — MIFFLIN-ST. JEOR: SCORE: 1075.6

## 2019-10-10 NOTE — PROGRESS NOTES
Please see separate dictation for HPI, PHYSICAL EXAM AND IMPRESSION/PLAN.    CURRENT MEDICATIONS:  Current Outpatient Medications   Medication Sig Dispense Refill     amoxicillin (AMOXIL) 500 MG tablet Take 4 tablets (2,000 mg) by mouth once for 1 dose 1 hour prior to dental work 4 tablet 1     aspirin 81 MG EC tablet Take 81 mg by mouth every evening       calcium carbonate-vitamin D (CALCIUM 600+D) 600-200 MG-UNIT TABS Take 1 tablet by mouth 2 times daily       levothyroxine (SYNTHROID/LEVOTHROID) 50 MCG tablet Take 1 tablet (50 mcg) by mouth daily 30 tablet 1     lisinopril (PRINIVIL/ZESTRIL) 2.5 MG tablet Take 1 tablet (2.5 mg) by mouth 2 times daily 90 tablet 3     metoprolol succinate ER (TOPROL-XL) 25 MG 24 hr tablet Take 1 tablet (25 mg) by mouth daily       Multiple Vitamins-Minerals (MULTIVITAMIN ADULT) TABS Take 1 tablet by mouth daily       omeprazole (PRILOSEC) 10 MG CR capsule Take 1 capsule (10 mg) by mouth every morning (before breakfast) 90 capsule 3     potassium chloride ER (K-DUR/KLOR-CON M) 20 MEQ CR tablet Take 2 tablets (40 mEq) by mouth daily 180 tablet 3     rivaroxaban ANTICOAGULANT (XARELTO) 20 MG TABS tablet Take 1 tablet (20 mg) by mouth daily (with dinner) 90 tablet 3     torsemide (DEMADEX) 20 MG tablet Take 1/2 tablet (10 mg) 30 tablet 0       ALLERGIES:     Allergies   Allergen Reactions     Chlorpheniramine Other (See Comments)     LOC and fainting      Phenylephrine Other (See Comments)     fainting       PAST MEDICAL HISTORY:  Past Medical History:   Diagnosis Date     Allergic rhinitis, cause unspecified     dust mites, ragweed     Complete AV block (H)     BSX DDD PM 2-     History of tricuspid valve repair      Mitral valve prolapse     bioprosthetic MVR 2-     Paroxysmal atrial fibrillation (H)        PAST SURGICAL HISTORY:  Past Surgical History:   Procedure Laterality Date     COLONOSCOPY N/A 9/15/2015    Procedure: COLONOSCOPY;  Surgeon: Anson Llanos MD;   Location: [facility]     CV HEART CATHETERIZATION WITH POSSIBLE INTERVENTION N/A 1/9/2019    Procedure: Heart Catheterization with Possible Intervention;  Surgeon: Ritesh Whittaker MD;  Location:  HEART CARDIAC CATH LAB     CV RIGHT AND LEFT HEART CATH N/A 1/9/2019    Procedure: Right and Left Heart Catherization;  Surgeon: Ritesh Whittaker MD;  Location:  HEART CARDIAC CATH LAB     ENT SURGERY      T&A     EP PACEMAKER N/A 2/18/2019    Procedure: EP Pacemaker;  Surgeon: Inocencia Guillen MD;  Location:  HEART CARDIAC CATH LAB     REPAIR VALVE TRICUSPID MINIMALLY INVASIVE N/A 2/15/2019    Procedure: MINIMALLY INVASIVE TRICUSPID VALVE REPAIR WITH 32MM SULLIVAN RING;  Surgeon: Andrea Hanna MD;  Location:  OR     REPLACE VALVE MITRAL MINIMALLY INVASIVE N/A 2/15/2019    Procedure: MINIMALLY INVASIVE MITRAL VALVE REPLACEMENT WITH EPIC ST KEYUR 31MM VALVE; ON PUMP WITH TIA.  CARDIOLOGIST DR CARDENAS;  Surgeon: Andrea Hanna MD;  Location:  OR       SOCIAL HISTORY:  Social History     Socioeconomic History     Marital status:      Spouse name: None     Number of children: 3     Years of education: None     Highest education level: None   Occupational History     None   Social Needs     Financial resource strain: None     Food insecurity:     Worry: None     Inability: None     Transportation needs:     Medical: None     Non-medical: None   Tobacco Use     Smoking status: Never Smoker     Smokeless tobacco: Never Used   Substance and Sexual Activity     Alcohol use: No     Drug use: No     Sexual activity: Not Currently   Lifestyle     Physical activity:     Days per week: None     Minutes per session: None     Stress: None   Relationships     Social connections:     Talks on phone: None     Gets together: None     Attends Anglican service: None     Active member of club or organization: None     Attends meetings of clubs or organizations: None     Relationship status: None      Intimate partner violence:     Fear of current or ex partner: None     Emotionally abused: None     Physically abused: None     Forced sexual activity: None   Other Topics Concern      Service Not Asked     Blood Transfusions Not Asked     Caffeine Concern No     Comment: 1 cup of coffee and 1 can diet coke per day     Occupational Exposure Not Asked     Hobby Hazards Not Asked     Sleep Concern Yes     Comment: takes Doxylamine succinate 1/2 tab every night     Stress Concern No     Weight Concern No     Special Diet No     Comment: healthy      Back Care Not Asked     Exercise Yes     Comment: walking 45min x7 a wk. Very active, [employer], Golf, Bowling, Cardio     Bike Helmet Not Asked     Seat Belt Yes     Self-Exams Yes     Parent/sibling w/ CABG, MI or angioplasty before 65F 55M? No   Social History Narrative     None       FAMILY HISTORY:  Family History   Problem Relation Age of Onset     Osteoporosis Mother      Alzheimer Disease Mother      Family History Negative Father      Heart Disease Maternal Grandfather      Family History Negative Paternal Grandmother      Family History Negative Paternal Grandfather        Review of Systems:  Skin:  Negative       Eyes:  Positive for glasses    ENT:  Positive for      Respiratory:  Positive for cough [id number] coughing with the increased dosage of lisinopril    Cardiovascular:  Negative      Gastroenterology: Positive for reflux controlled with meds   Genitourinary:  Negative      Musculoskeletal:  Positive for back pain;arthritis;joint pain no more than normal   Neurologic:  Negative      Psychiatric:  Negative      Heme/Lymph/Imm:  Positive for easy bruising    Endocrine:  Positive for thyroid disorder       Reviewed. Remainder of the note dictated.    Marifer Mohamud PA-C

## 2019-10-10 NOTE — LETTER
10/10/2019    Anyi Olmos MD  [address]  [facility] [address]    RE: Taty Aguila       Dear Colleague,    I had the pleasure of seeing Taty Rj Aguila in the [facility].    Please see separate dictation for HPI, PHYSICAL EXAM AND IMPRESSION/PLAN.    CURRENT MEDICATIONS:  Current Outpatient Medications   Medication Sig Dispense Refill     amoxicillin (AMOXIL) 500 MG tablet Take 4 tablets (2,000 mg) by mouth once for 1 dose 1 hour prior to dental work 4 tablet 1     aspirin 81 MG EC tablet Take 81 mg by mouth every evening       calcium carbonate-vitamin D (CALCIUM 600+D) 600-200 MG-UNIT TABS Take 1 tablet by mouth 2 times daily       levothyroxine (SYNTHROID/LEVOTHROID) 50 MCG tablet Take 1 tablet (50 mcg) by mouth daily 30 tablet 1     lisinopril (PRINIVIL/ZESTRIL) 2.5 MG tablet Take 1 tablet (2.5 mg) by mouth 2 times daily 90 tablet 3     metoprolol succinate ER (TOPROL-XL) 25 MG 24 hr tablet Take 1 tablet (25 mg) by mouth daily       Multiple Vitamins-Minerals (MULTIVITAMIN ADULT) TABS Take 1 tablet by mouth daily       omeprazole (PRILOSEC) 10 MG CR capsule Take 1 capsule (10 mg) by mouth every morning (before breakfast) 90 capsule 3     potassium chloride ER (K-DUR/KLOR-CON M) 20 MEQ CR tablet Take 2 tablets (40 mEq) by mouth daily 180 tablet 3     rivaroxaban ANTICOAGULANT (XARELTO) 20 MG TABS tablet Take 1 tablet (20 mg) by mouth daily (with dinner) 90 tablet 3     torsemide (DEMADEX) 20 MG tablet Take 1/2 tablet (10 mg) 30 tablet 0       ALLERGIES:     Allergies   Allergen Reactions     Chlorpheniramine Other (See Comments)     LOC and fainting      Phenylephrine Other (See Comments)     fainting       PAST MEDICAL HISTORY:  Past Medical History:   Diagnosis Date     Allergic rhinitis, cause unspecified     dust mites, ragweed     Complete AV block (H)     BSX DDD PM 2-     History of tricuspid valve repair      Mitral valve prolapse      bioprosthetic MVR 2-     Paroxysmal atrial fibrillation (H)        PAST SURGICAL HISTORY:  Past Surgical History:   Procedure Laterality Date     COLONOSCOPY N/A 9/15/2015    Procedure: COLONOSCOPY;  Surgeon: Anson Llanos MD;  Location:  GI     CV HEART CATHETERIZATION WITH POSSIBLE INTERVENTION N/A 1/9/2019    Procedure: Heart Catheterization with Possible Intervention;  Surgeon: Ritesh Whittaker MD;  Location:  HEART CARDIAC CATH LAB     CV RIGHT AND LEFT HEART CATH N/A 1/9/2019    Procedure: Right and Left Heart Catherization;  Surgeon: Ritesh Whittaker MD;  Location:  HEART CARDIAC CATH LAB     ENT SURGERY      T&A     EP PACEMAKER N/A 2/18/2019    Procedure: EP Pacemaker;  Surgeon: Inocencia Guillen MD;  Location:  HEART CARDIAC CATH LAB     REPAIR VALVE TRICUSPID MINIMALLY INVASIVE N/A 2/15/2019    Procedure: MINIMALLY INVASIVE TRICUSPID VALVE REPAIR WITH 32MM SULLIVAN RING;  Surgeon: Andrea Hanna MD;  Location:  OR     REPLACE VALVE MITRAL MINIMALLY INVASIVE N/A 2/15/2019    Procedure: MINIMALLY INVASIVE MITRAL VALVE REPLACEMENT WITH EPIC ST KEYUR 31MM VALVE; ON PUMP WITH TIA.  CARDIOLOGIST DR CARDENAS;  Surgeon: Andrea Hanna MD;  Location:  OR       SOCIAL HISTORY:  Social History     Socioeconomic History     Marital status:      Spouse name: None     Number of children: 3     Years of education: None     Highest education level: None   Occupational History     None   Social Needs     Financial resource strain: None     Food insecurity:     Worry: None     Inability: None     Transportation needs:     Medical: None     Non-medical: None   Tobacco Use     Smoking status: Never Smoker     Smokeless tobacco: Never Used   Substance and Sexual Activity     Alcohol use: No     Drug use: No     Sexual activity: Not Currently   Lifestyle     Physical activity:     Days per week: None     Minutes per session: None     Stress: None   Relationships      Social connections:     Talks on phone: None     Gets together: None     Attends Yarsani service: None     Active member of club or organization: None     Attends meetings of clubs or organizations: None     Relationship status: None     Intimate partner violence:     Fear of current or ex partner: None     Emotionally abused: None     Physically abused: None     Forced sexual activity: None   Other Topics Concern      Service Not Asked     Blood Transfusions Not Asked     Caffeine Concern No     Comment: 1 cup of coffee and 1 can diet coke per day     Occupational Exposure Not Asked     Hobby Hazards Not Asked     Sleep Concern Yes     Comment: takes Doxylamine succinate 1/2 tab every night     Stress Concern No     Weight Concern No     Special Diet No     Comment: healthy      Back Care Not Asked     Exercise Yes     Comment: walking 45min x7 a wk. Very active, [employer], Golf, Bowling, Cardio     Bike Helmet Not Asked     Seat Belt Yes     Self-Exams Yes     Parent/sibling w/ CABG, MI or angioplasty before 65F 55M? No   Social History Narrative     None       FAMILY HISTORY:  Family History   Problem Relation Age of Onset     Osteoporosis Mother      Alzheimer Disease Mother      Family History Negative Father      Heart Disease Maternal Grandfather      Family History Negative Paternal Grandmother      Family History Negative Paternal Grandfather        Review of Systems:  Skin:  Negative       Eyes:  Positive for glasses    ENT:  Positive for      Respiratory:  Positive for cough [id number] coughing with the increased dosage of lisinopril    Cardiovascular:  Negative      Gastroenterology: Positive for reflux controlled with meds   Genitourinary:  Negative      Musculoskeletal:  Positive for back pain;arthritis;joint pain no more than normal   Neurologic:  Negative      Psychiatric:  Negative      Heme/Lymph/Imm:  Positive for easy bruising    Endocrine:  Positive for thyroid disorder       Reviewed.  Remainder of the note dictated.    Marifer Mohamud PA-C        Service Date: 10/10/2019      HISTORY OF PRESENT ILLNESS:  Taty is a pleasant 74-year-old female who presents to the office today after a recent increase in her lisinopril.      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  In 10/2018, she was found to be in AFib with RVR.  She did convert back to sinus rhythm; however, about 2 months later she had recurrent atrial fibrillation.  At that time she was sent back to Dr. Hanna with CV Surgery who felt that she indeed did have severe mitral regurgitation and in the setting of new onset atrial fibrillation valve surgery was recommended.  Her preoperative coronary angiogram did not show any significant coronary disease.  She had a TIA which showed preserved LV systolic function with an EF of 65% with normal RV size and function.  This confirmed severe mitral regurgitation and 3+ tricuspid regurgitation.  Ultimately, in 02/2019, she underwent successful minimally invasive mitral valve replacement with a 31 mm Epic St. Ron valve at which time she also had a tricuspid valve repair with a 32 mm Oquendo ring.  She developed complete heart block postoperatively and underwent pacemaker implantation.      Unfortunately, shortly after surgery, she developed significant volume overload.  She was found to be back in atrial fibrillation.  She was evaluated in our [facility] by CHRISTIANO Weathers.  Her diuretics were adjusted and she had a significant improvement in her volume status.  She was also reloaded with amiodarone which converted her back to sinus rhythm.  She underwent echocardiography which showed an LVEF in the 20%-25% range.  Since the beginning of the year, she has been seen by EP Cardiology several times and is now off of amiodarone.  She also was seen in the [facility] several times  and saw Dr. Stone this summer.  She did have a repeat echocardiogram in mid July which again showed her EF was in the 20%-25% range.  At that time, she underwent a stress test which did not show any evidence of infarction or ischemia.  Lisinopril was added to her regimen.  I saw her about a month ago at which time she overall was doing well.  We increased her lisinopril from 2.5 mg daily to 2.5 mg b.i.d.  She returns today for followup.      She has tolerated the increase in lisinopril without any dizziness or significant change to her blood pressure (the patient chronically runs low blood pressure readings).  Unfortunately, she has developed a significant dry cough with the increased dose.  Otherwise, she denies any cardiovascular symptoms such as chest discomfort, dyspnea on exertion, orthopnea or PND.  Her home weight has been stable.  Again, her home blood pressure readings are typically in the  mmHg systolic range.      CURRENT CARDIAC MEDICATIONS:   1.  Aspirin 81 mg daily.   2.  Lisinopril 2.5 mg b.i.d.   3.  Toprol-XL 25 mg daily.   4.  Potassium chloride 40 mEq daily.   5.  Torsemide 20 mEq half tablet daily.   6.  Xarelto 20 mg daily.      The remainder of her medications, allergies and review of systems were reviewed and as are documented separately.      PHYSICAL EXAMINATION:   GENERAL:  The patient is a pleasant 74-year-old female who appears younger than her stated age.  She is in no apparent distress.   VITAL SIGNS:  Her blood pressure is 100/68, pulse is 86.  Weight is 121 pounds.   PULMONARY:  Breathing is nonlabored.  Lungs are clear to auscultation.   CARDIAC:  Reveals a regular rate and rhythm.   EXTREMITIES:  No lower extremity edema.   NEUROLOGIC:  Alert and oriented.      ASSESSMENT AND PLAN:  Taty is a pleasant 74-year-old female with a history of severe mitral regurgitation secondary to mitral valve prolapse who is status post mitral valve replacement as well as tricuspid valve repair  in 2019.  Postoperatively, she developed complete heart block and underwent permanent pacemaker implantation.  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  She previously had symptoms of volume overload, but appears to be euvolemic at this time.  We are working to optimize her cardiomyopathy medications.  Unfortunately, she has developed a cough from the lisinopril.  We will discontinue this and start her on losartan 25 mg once a day.  I have asked her to return to the clinic in approximately 2 weeks for further medication titration.  Of course, I encouraged her to contact us sooner with questions or concerns.      Thank you for allowing me to participate in the care of this pleasant patient.         SANDRA AMADOR PA-C             D: 10/10/2019   T: 10/10/2019   MT: OLLIE      Name:     GERONIMO PAYOTN   MRN:      [id number]        Account:      [id number]   :      1945           Service Date: 10/10/2019      Document: [id number]       Thank you for allowing me to participate in the care of your patient.      Sincerely,     Sandra Amador PA-C     [facility]    cc:   Sandra Amador PA-C  [facility]  [address] [place] [address]

## 2019-10-10 NOTE — LETTER
10/10/2019      Anyi Olmos MD  [address] [facility] [address]  [facility] [address]      RE: Taty Aguila       Dear Colleague,    I had the pleasure of seeing Taty Aguila in the [facility].    Service Date: 10/10/2019      HISTORY OF PRESENT ILLNESS:  Taty is a pleasant 74-year-old female who presents to the office today after a recent increase in her lisinopril.      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  In 10/2018, she was found to be in AFib with RVR.  She did convert back to sinus rhythm; however, about 2 months later she had recurrent atrial fibrillation.  At that time she was sent back to Dr. Hanna with CV Surgery who felt that she indeed did have severe mitral regurgitation and in the setting of new onset atrial fibrillation valve surgery was recommended.  Her preoperative coronary angiogram did not show any significant coronary disease.  She had a TIA which showed preserved LV systolic function with an EF of 65% with normal RV size and function.  This confirmed severe mitral regurgitation and 3+ tricuspid regurgitation.  Ultimately, in 02/2019, she underwent successful minimally invasive mitral valve replacement with a 31 mm Epic St. Ron valve at which time she also had a tricuspid valve repair with a 32 mm Oquendo ring.  She developed complete heart block postoperatively and underwent pacemaker implantation.      Unfortunately, shortly after surgery, she developed significant volume overload.  She was found to be back in atrial fibrillation.  She was evaluated in our [facility] by CHRISTIANO Weathers.  Her diuretics were adjusted and she had a significant improvement in her volume status.  She was also reloaded with amiodarone which converted her back to sinus rhythm.  She underwent echocardiography which showed an LVEF in the 20%-25% range.  Since  the beginning of the year, she has been seen by EP Cardiology several times and is now off of amiodarone.  She also was seen in the [facility] several times and saw Dr. Stone this summer.  She did have a repeat echocardiogram in mid July which again showed her EF was in the 20%-25% range.  At that time, she underwent a stress test which did not show any evidence of infarction or ischemia.  Lisinopril was added to her regimen.  I saw her about a month ago at which time she overall was doing well.  We increased her lisinopril from 2.5 mg daily to 2.5 mg b.i.d.  She returns today for followup.      She has tolerated the increase in lisinopril without any dizziness or significant change to her blood pressure (the patient chronically runs low blood pressure readings).  Unfortunately, she has developed a significant dry cough with the increased dose.  Otherwise, she denies any cardiovascular symptoms such as chest discomfort, dyspnea on exertion, orthopnea or PND.  Her home weight has been stable.  Again, her home blood pressure readings are typically in the  mmHg systolic range.      CURRENT CARDIAC MEDICATIONS:   1.  Aspirin 81 mg daily.   2.  Lisinopril 2.5 mg b.i.d.   3.  Toprol-XL 25 mg daily.   4.  Potassium chloride 40 mEq daily.   5.  Torsemide 20 mEq half tablet daily.   6.  Xarelto 20 mg daily.      The remainder of her medications, allergies and review of systems were reviewed and as are documented separately.      PHYSICAL EXAMINATION:   GENERAL:  The patient is a pleasant 74-year-old female who appears younger than her stated age.  She is in no apparent distress.   VITAL SIGNS:  Her blood pressure is 100/68, pulse is 86.  Weight is 121 pounds.   PULMONARY:  Breathing is nonlabored.  Lungs are clear to auscultation.   CARDIAC:  Reveals a regular rate and rhythm.   EXTREMITIES:  No lower extremity edema.   NEUROLOGIC:  Alert and oriented.      ASSESSMENT AND PLAN:  Taty is a pleasant 74-year-old  female with a history of severe mitral regurgitation secondary to mitral valve prolapse who is status post mitral valve replacement as well as tricuspid valve repair in 2019.  Postoperatively, she developed complete heart block and underwent permanent pacemaker implantation.  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  She previously had symptoms of volume overload, but appears to be euvolemic at this time.  We are working to optimize her cardiomyopathy medications.  Unfortunately, she has developed a cough from the lisinopril.  We will discontinue this and start her on losartan 25 mg once a day.  I have asked her to return to the clinic in approximately 2 weeks for further medication titration.  Of course, I encouraged her to contact us sooner with questions or concerns.      Thank you for allowing me to participate in the care of this pleasant patient.         SANDRA AMADOR PA-C             D: 10/10/2019   T: 10/10/2019   MT: OLLIE      Name:     GERONIMO PAYTON   MRN:      [id number]        Account:      [id number]   :      1945           Service Date: 10/10/2019      Document: [id number]         Outpatient Encounter Medications as of 10/10/2019   Medication Sig Dispense Refill     [] amoxicillin (AMOXIL) 500 MG tablet Take 4 tablets (2,000 mg) by mouth once for 1 dose 1 hour prior to dental work 4 tablet 1     aspirin 81 MG EC tablet Take 81 mg by mouth every evening       calcium carbonate-vitamin D (CALCIUM 600+D) 600-200 MG-UNIT TABS Take 1 tablet by mouth 2 times daily       levothyroxine (SYNTHROID/LEVOTHROID) 50 MCG tablet Take 1 tablet (50 mcg) by mouth daily 30 tablet 1     losartan (COZAAR) 25 MG tablet Take 1 tablet (25 mg) by mouth daily 90 tablet 3     metoprolol succinate ER (TOPROL-XL) 25 MG 24 hr tablet Take 1 tablet (25 mg) by mouth daily       Multiple Vitamins-Minerals (MULTIVITAMIN ADULT) TABS Take 1  tablet by mouth daily       omeprazole (PRILOSEC) 10 MG CR capsule Take 1 capsule (10 mg) by mouth every morning (before breakfast) 90 capsule 3     potassium chloride ER (K-DUR/KLOR-CON M) 20 MEQ CR tablet Take 2 tablets (40 mEq) by mouth daily 180 tablet 3     rivaroxaban ANTICOAGULANT (XARELTO) 20 MG TABS tablet Take 1 tablet (20 mg) by mouth daily (with dinner) 90 tablet 3     torsemide (DEMADEX) 10 MG tablet Take 1 tablet (10 mg) by mouth daily 90 tablet 3     [DISCONTINUED] lisinopril (PRINIVIL/ZESTRIL) 2.5 MG tablet Take 1 tablet (2.5 mg) by mouth 2 times daily 90 tablet 3     [DISCONTINUED] torsemide (DEMADEX) 20 MG tablet Take 1/2 tablet (10 mg) 30 tablet 0     No facility-administered encounter medications on file as of 10/10/2019.      Again, thank you for allowing me to participate in the care of your patient.      Sincerely,    Marifer Mohamud PA-C     [facility]

## 2019-10-10 NOTE — PROGRESS NOTES
Service Date: 10/10/2019      HISTORY OF PRESENT ILLNESS:  Taty is a pleasant 74-year-old female who presents to the office today after a recent increase in her lisinopril.      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  In 10/2018, she was found to be in AFib with RVR.  She did convert back to sinus rhythm; however, about 2 months later she had recurrent atrial fibrillation.  At that time she was sent back to Dr. Hanna with CV Surgery who felt that she indeed did have severe mitral regurgitation and in the setting of new onset atrial fibrillation valve surgery was recommended.  Her preoperative coronary angiogram did not show any significant coronary disease.  She had a TIA which showed preserved LV systolic function with an EF of 65% with normal RV size and function.  This confirmed severe mitral regurgitation and 3+ tricuspid regurgitation.  Ultimately, in 02/2019, she underwent successful minimally invasive mitral valve replacement with a 31 mm Epic St. Ron valve at which time she also had a tricuspid valve repair with a 32 mm Oquendo ring.  She developed complete heart block postoperatively and underwent pacemaker implantation.      Unfortunately, shortly after surgery, she developed significant volume overload.  She was found to be back in atrial fibrillation.  She was evaluated in our [facility] by CHRISTIANO Weathers.  Her diuretics were adjusted and she had a significant improvement in her volume status.  She was also reloaded with amiodarone which converted her back to sinus rhythm.  She underwent echocardiography which showed an LVEF in the 20%-25% range.  Since the beginning of the year, she has been seen by EP Cardiology several times and is now off of amiodarone.  She also was seen in the [facility] several times and saw Dr. Stone this summer.  She did have a repeat echocardiogram  in mid July which again showed her EF was in the 20%-25% range.  At that time, she underwent a stress test which did not show any evidence of infarction or ischemia.  Lisinopril was added to her regimen.  I saw her about a month ago at which time she overall was doing well.  We increased her lisinopril from 2.5 mg daily to 2.5 mg b.i.d.  She returns today for followup.      She has tolerated the increase in lisinopril without any dizziness or significant change to her blood pressure (the patient chronically runs low blood pressure readings).  Unfortunately, she has developed a significant dry cough with the increased dose.  Otherwise, she denies any cardiovascular symptoms such as chest discomfort, dyspnea on exertion, orthopnea or PND.  Her home weight has been stable.  Again, her home blood pressure readings are typically in the  mmHg systolic range.      CURRENT CARDIAC MEDICATIONS:   1.  Aspirin 81 mg daily.   2.  Lisinopril 2.5 mg b.i.d.   3.  Toprol-XL 25 mg daily.   4.  Potassium chloride 40 mEq daily.   5.  Torsemide 20 mEq half tablet daily.   6.  Xarelto 20 mg daily.      The remainder of her medications, allergies and review of systems were reviewed and as are documented separately.      PHYSICAL EXAMINATION:   GENERAL:  The patient is a pleasant 74-year-old female who appears younger than her stated age.  She is in no apparent distress.   VITAL SIGNS:  Her blood pressure is 100/68, pulse is 86.  Weight is 121 pounds.   PULMONARY:  Breathing is nonlabored.  Lungs are clear to auscultation.   CARDIAC:  Reveals a regular rate and rhythm.   EXTREMITIES:  No lower extremity edema.   NEUROLOGIC:  Alert and oriented.      ASSESSMENT AND PLAN:  Taty is a pleasant 74-year-old female with a history of severe mitral regurgitation secondary to mitral valve prolapse who is status post mitral valve replacement as well as tricuspid valve repair in 02/2019.  Postoperatively, she developed complete heart block and  underwent permanent pacemaker implantation.  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  She previously had symptoms of volume overload, but appears to be euvolemic at this time.  We are working to optimize her cardiomyopathy medications.  Unfortunately, she has developed a cough from the lisinopril.  We will discontinue this and start her on losartan 25 mg once a day.  I have asked her to return to the clinic in approximately 2 weeks for further medication titration.  Of course, I encouraged her to contact us sooner with questions or concerns.      Thank you for allowing me to participate in the care of this pleasant patient.         SANDRA AMADOR PA-C             D: 10/10/2019   T: 10/10/2019   MT: OLLIE      Name:     GERONIMO PAYTON   MRN:      [id number]        Account:      [id number]   :      1945           Service Date: 10/10/2019      Document: [id number]

## 2019-10-10 NOTE — PATIENT INSTRUCTIONS
Thank you for your [facility] visit today. Your provider has recommended the following:  Medication Changes:  Stop the lisinopril  Start the losartan 25mg once a day  Recommendations:  Nothing new at this time  Follow-up:  See Marifer ULRICH for cardiology follow up in 2 weeks.    Reminder:  Please bring in your current medication list or your medication, over the counter supplements and vitamin bottles as we will review these at each office visit.

## 2019-10-16 ENCOUNTER — MYC MEDICAL ADVICE (OUTPATIENT)
Dept: INTERNAL MEDICINE | Facility: CLINIC | Age: 74
End: 2019-10-16

## 2019-10-16 DIAGNOSIS — E03.9 ACQUIRED HYPOTHYROIDISM: ICD-10-CM

## 2019-10-16 NOTE — TELEPHONE ENCOUNTER
See her mychart message.     TSH   Date Value Ref Range Status   10/07/2019 1.31 0.40 - 4.00 mU/L Final     Last OV 7/26/19    TSH 6.37  High   0.40 - 4.00 mU/L Final 08/29/2019 11:32 AM     T4 Free 0.81   0.76 - 1.46 ng/dL Final 08/29/2019 11:32 AM 65

## 2019-10-17 RX ORDER — LEVOTHYROXINE SODIUM 50 UG/1
50 TABLET ORAL DAILY
Qty: 90 TABLET | Refills: 1 | Status: SHIPPED | OUTPATIENT
Start: 2019-10-17 | End: 2020-03-12

## 2019-10-24 ENCOUNTER — OFFICE VISIT (OUTPATIENT)
Dept: CARDIOLOGY | Facility: CLINIC | Age: 74
End: 2019-10-24
Attending: PHYSICIAN ASSISTANT
Payer: COMMERCIAL

## 2019-10-24 VITALS
HEIGHT: 66 IN | BODY MASS INDEX: 19.14 KG/M2 | WEIGHT: 119.1 LBS | DIASTOLIC BLOOD PRESSURE: 76 MMHG | SYSTOLIC BLOOD PRESSURE: 112 MMHG | HEART RATE: 84 BPM

## 2019-10-24 DIAGNOSIS — I48.0 PAF (PAROXYSMAL ATRIAL FIBRILLATION) (H): ICD-10-CM

## 2019-10-24 DIAGNOSIS — Z98.890 S/P TVR (TRICUSPID VALVE REPAIR): ICD-10-CM

## 2019-10-24 DIAGNOSIS — I50.22 CHRONIC SYSTOLIC HEART FAILURE (H): Primary | ICD-10-CM

## 2019-10-24 DIAGNOSIS — Z95.2 S/P MVR (MITRAL VALVE REPLACEMENT): ICD-10-CM

## 2019-10-24 PROCEDURE — 99214 OFFICE O/P EST MOD 30 MIN: CPT | Performed by: PHYSICIAN ASSISTANT

## 2019-10-24 RX ORDER — LOSARTAN POTASSIUM 50 MG/1
50 TABLET ORAL DAILY
Qty: 90 TABLET | Refills: 3 | Status: SHIPPED | OUTPATIENT
Start: 2019-11-07 | End: 2019-11-22

## 2019-10-24 RX ORDER — METOPROLOL SUCCINATE 50 MG/1
50 TABLET, EXTENDED RELEASE ORAL DAILY
Qty: 90 TABLET | Refills: 3 | Status: SHIPPED | OUTPATIENT
Start: 2019-10-24 | End: 2019-11-22

## 2019-10-24 ASSESSMENT — MIFFLIN-ST. JEOR: SCORE: 1066.98

## 2019-10-24 NOTE — PATIENT INSTRUCTIONS
Thank you for your [facility] visit today. Your provider has recommended the following:  Medication Changes:  INCREASE the metoprolol XL to 50mg once a day  In 2 weeks if you continue to feel well (no new side effects from the increase in metoprolol) then INCREASE your losartan to 50mg once a day  Recommendations:  Non-fasting blood work in 1 month  Follow-up:  See Marifer ULRICH for cardiology follow up in 1 month.    Reminder:  Please bring in your current medication list or your medication, over the counter supplements and vitamin bottles as we will review these at each office visit.

## 2019-10-24 NOTE — LETTER
10/24/2019    Anyi Olmos MD  [address]  [facility] [address]    RE: Taty Aguila       Dear Colleague,    I had the pleasure of seeing Taty Rj Aguila in the [facility].    Please see separate dictation for HPI, PHYSICAL EXAM AND IMPRESSION/PLAN.    CURRENT MEDICATIONS:  Current Outpatient Medications   Medication Sig Dispense Refill     amoxicillin (AMOXIL) 500 MG tablet Take 4 tablets (2,000 mg) by mouth once for 1 dose 1 hour prior to dental work 4 tablet 1     aspirin 81 MG EC tablet Take 81 mg by mouth every evening       calcium carbonate-vitamin D (CALCIUM 600+D) 600-200 MG-UNIT TABS Take 1 tablet by mouth 2 times daily       levothyroxine (SYNTHROID/LEVOTHROID) 50 MCG tablet Take 1 tablet (50 mcg) by mouth daily 90 tablet 1     losartan (COZAAR) 25 MG tablet Take 1 tablet (25 mg) by mouth daily 90 tablet 3     metoprolol succinate ER (TOPROL-XL) 25 MG 24 hr tablet Take 1 tablet (25 mg) by mouth daily       Multiple Vitamins-Minerals (MULTIVITAMIN ADULT) TABS Take 1 tablet by mouth daily       omeprazole (PRILOSEC) 10 MG CR capsule Take 1 capsule (10 mg) by mouth every morning (before breakfast) 90 capsule 3     potassium chloride ER (K-DUR/KLOR-CON M) 20 MEQ CR tablet Take 2 tablets (40 mEq) by mouth daily 180 tablet 3     rivaroxaban ANTICOAGULANT (XARELTO) 20 MG TABS tablet Take 1 tablet (20 mg) by mouth daily (with dinner) 90 tablet 3     torsemide (DEMADEX) 10 MG tablet Take 1 tablet (10 mg) by mouth daily 90 tablet 3       ALLERGIES:     Allergies   Allergen Reactions     Chlorpheniramine Other (See Comments)     LOC and fainting      Lisinopril Cough     Phenylephrine Other (See Comments)     fainting       PAST MEDICAL HISTORY:  Past Medical History:   Diagnosis Date     Allergic rhinitis, cause unspecified     dust mites, ragweed     Complete AV block (H)     BSX DDD PM 2-     History of tricuspid valve repair      Mitral valve prolapse      bioprosthetic MVR 2-     Paroxysmal atrial fibrillation (H)        PAST SURGICAL HISTORY:  Past Surgical History:   Procedure Laterality Date     COLONOSCOPY N/A 9/15/2015    Procedure: COLONOSCOPY;  Surgeon: Anson Llanos MD;  Location:  GI     CV HEART CATHETERIZATION WITH POSSIBLE INTERVENTION N/A 1/9/2019    Procedure: Heart Catheterization with Possible Intervention;  Surgeon: Ritesh Whittaker MD;  Location:  HEART CARDIAC CATH LAB     CV RIGHT AND LEFT HEART CATH N/A 1/9/2019    Procedure: Right and Left Heart Catherization;  Surgeon: Ritesh Whittaker MD;  Location:  HEART CARDIAC CATH LAB     ENT SURGERY      T&A     EP PACEMAKER N/A 2/18/2019    Procedure: EP Pacemaker;  Surgeon: Inocencia Guillen MD;  Location:  HEART CARDIAC CATH LAB     REPAIR VALVE TRICUSPID MINIMALLY INVASIVE N/A 2/15/2019    Procedure: MINIMALLY INVASIVE TRICUSPID VALVE REPAIR WITH 32MM SULLIVAN RING;  Surgeon: Andrea Hanna MD;  Location:  OR     REPLACE VALVE MITRAL MINIMALLY INVASIVE N/A 2/15/2019    Procedure: MINIMALLY INVASIVE MITRAL VALVE REPLACEMENT WITH EPIC ST KEYUR 31MM VALVE; ON PUMP WITH TIA.  CARDIOLOGIST DR CARDENAS;  Surgeon: Andrea Hanna MD;  Location:  OR       SOCIAL HISTORY:  Social History     Socioeconomic History     Marital status:      Spouse name: None     Number of children: 3     Years of education: None     Highest education level: None   Occupational History     None   Social Needs     Financial resource strain: None     Food insecurity:     Worry: None     Inability: None     Transportation needs:     Medical: None     Non-medical: None   Tobacco Use     Smoking status: Never Smoker     Smokeless tobacco: Never Used   Substance and Sexual Activity     Alcohol use: No     Drug use: No     Sexual activity: Not Currently   Lifestyle     Physical activity:     Days per week: None     Minutes per session: None     Stress: None   Relationships      Social connections:     Talks on phone: None     Gets together: None     Attends Orthodox service: None     Active member of club or organization: None     Attends meetings of clubs or organizations: None     Relationship status: None     Intimate partner violence:     Fear of current or ex partner: None     Emotionally abused: None     Physically abused: None     Forced sexual activity: None   Other Topics Concern      Service Not Asked     Blood Transfusions Not Asked     Caffeine Concern No     Comment: 1 cup of coffee and 1 can diet coke per day     Occupational Exposure Not Asked     Hobby Hazards Not Asked     Sleep Concern Yes     Comment: takes Doxylamine succinate 1/2 tab every night     Stress Concern No     Weight Concern No     Special Diet No     Comment: healthy      Back Care Not Asked     Exercise Yes     Comment: walking 45min x7 a wk. Very active, [employer], Golf, Bowling, Cardio     Bike Helmet Not Asked     Seat Belt Yes     Self-Exams Yes     Parent/sibling w/ CABG, MI or angioplasty before 65F 55M? No   Social History Narrative     None       FAMILY HISTORY:  Family History   Problem Relation Age of Onset     Osteoporosis Mother      Alzheimer Disease Mother      Family History Negative Father      Heart Disease Maternal Grandfather      Family History Negative Paternal Grandmother      Family History Negative Paternal Grandfather        Review of Systems:  Skin:  Negative       Eyes:  Positive for glasses    ENT:  Positive for sinus trouble;nasal congestion due to allergies per pt   Respiratory:  Positive for cough- coughing has improved   Cardiovascular:    Positive for;palpitations    Gastroenterology: Positive for reflux controlled with meds  Genitourinary:  Negative      Musculoskeletal:  Positive for back pain;arthritis;joint pain;nocturnal cramping    Neurologic:  Positive for numbness or tingling of hands of the right thumb  Psychiatric:  Negative      Heme/Lymph/Imm:  Positive  for allergies;easy bruising RX  Endocrine:  Positive for thyroid disorder cold hands and feet     Reviewed. Remainder of the note dictated.    Marifer Mohamud PA-C      Service Date: 10/24/2019       HISTORY OF PRESENT ILLNESS:  Taty is a pleasant 74-year-old female who presents to the office today after a recent change from lisinopril to losartan.      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  In 10/2018, she was found to be in AFib with RVR.  She did convert back to sinus rhythm; however, about 2 months later she had recurrent atrial fibrillation.  At that time she was sent back to Dr. Hanna with CV Surgery who felt that she indeed did have severe mitral regurgitation and in the setting of new onset atrial fibrillation valve surgery was recommended.  Her preoperative coronary angiogram did not show any significant coronary disease, LV gram showed slightly reduced systolic function.  She had a TIA which showed preserved LV systolic function with an EF of 65% with normal RV size and function.  This confirmed severe mitral regurgitation and 3+ tricuspid regurgitation.  Ultimately, in 02/2019, she underwent successful minimally invasive mitral valve replacement with a 31 mm Epic St. Ron valve at which time she also had a tricuspid valve repair with a 32 mm Oquendo ring.  She developed complete heart block postoperatively and underwent pacemaker implantation.      Unfortunately, shortly after surgery, she developed significant volume overload.  She was found to be back in atrial fibrillation.  She was evaluated in our [facility] by CHRISTIANO Weathers.  Her diuretics were adjusted and she had a significant improvement in her volume status.  She was also reloaded with amiodarone which converted her back to sinus rhythm.  She underwent echocardiography which showed an LVEF in the 20%-25% range.   "Since the beginning of the year, she has been seen by EP Cardiology several times and is now off of amiodarone.  She also was seen in the [facility] several times and saw Dr. Stone this summer.  She did have a repeat echocardiogram in mid July which again showed her EF was in the 20%-25% range.  At that time, she underwent a stress test which did not show any evidence of infarction or ischemia.  Lisinopril was added to her regimen but she developed a cough and our last visit I change her to losartan 25mg once a day.      She has tolerated the losartan without any dizziness or significant change to her blood pressure (the patient chronically runs low blood pressure readings).  Her cough has resolved.  Otherwise, she denies any cardiovascular symptoms such as chest discomfort, dyspnea on exertion, orthopnea or PND.  Her home weight has been stable.  Again, her home blood pressure readings are typically in the  mmHg systolic range. She typically walks 5 miles a day without symptoms. She is expecting her first grandchild next week.      CURRENT CARDIAC MEDICATIONS:   1.  Aspirin 81 mg daily.   2.  Losartan 25 mg b.i.d.   3.  Toprol-XL 25 mg daily.   4.  Potassium chloride 40 mEq daily.   5.  Torsemide 20 mEq half tablet daily.   6.  Xarelto 20 mg daily.      The remainder of her medications, allergies and review of systems were reviewed and as are documented separately.      PHYSICAL EXAMINATION:   GENERAL:  The patient is a pleasant 74-year-old female who appears younger than her stated age.  She is in no apparent distress.   VITAL SIGNS:  /76 (BP Location: Right arm, Patient Position: Chair, Cuff Size: Adult Regular)   Pulse 84   Ht 1.676 m (5' 6\")   Wt 54 kg (119 lb 1.6 oz)   BMI 19.22 kg/m       PULMONARY:  Breathing is nonlabored.  Lungs are clear to auscultation.   CARDIAC:  Reveals a regular rate and rhythm.   EXTREMITIES:  No lower extremity edema.   NEUROLOGIC:  Alert and oriented.    "   ASSESSMENT AND PLAN:  Taty is a pleasant 74-year-old female with a history of severe mitral regurgitation secondary to mitral valve prolapse who is status post mitral valve replacement as well as tricuspid valve repair in 02/2019.  Postoperatively, she developed complete heart block and underwent permanent pacemaker implantation.  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  She previously had symptoms of volume overload, but appears to be euvolemic at this time.  We are working to optimize her cardiomyopathy medications.  Today I asked her to increase the Toprol XL to 50mg daily.  If she is feeling well in 2 weeks she will increase the losartan to 50mg daily.  I have asked her to return to the clinic in approximately 1 month for repeat lab work (BMP) and further medication titration.  Of course, I encouraged her to contact us sooner with questions or concerns. We discussed once we have optimized her medical therapy we will arrange a repeat echo. If her EF remains less than 35% she likely would be a candidate for an ICD upgrade to her device.      Thank you for allowing me to participate in the care of this pleasant patient.         SANDRA AMADOR PA-C              Thank you for allowing me to participate in the care of your patient.      Sincerely,     Sandra Amador PA-C     [facility]    cc:   Sandra Amador PA-C  [facility]  [address] [place] [address]

## 2019-10-24 NOTE — LETTER
10/24/2019    Anyi Olmos MD  [address]  [facility] [address]    RE: Taty Aguila       Dear Colleague,    I had the pleasure of seeing Taty Rj Aguila in the [facility].    Please see separate dictation for HPI, PHYSICAL EXAM AND IMPRESSION/PLAN.    CURRENT MEDICATIONS:  Current Outpatient Medications   Medication Sig Dispense Refill     amoxicillin (AMOXIL) 500 MG tablet Take 4 tablets (2,000 mg) by mouth once for 1 dose 1 hour prior to dental work 4 tablet 1     aspirin 81 MG EC tablet Take 81 mg by mouth every evening       calcium carbonate-vitamin D (CALCIUM 600+D) 600-200 MG-UNIT TABS Take 1 tablet by mouth 2 times daily       levothyroxine (SYNTHROID/LEVOTHROID) 50 MCG tablet Take 1 tablet (50 mcg) by mouth daily 90 tablet 1     losartan (COZAAR) 25 MG tablet Take 1 tablet (25 mg) by mouth daily 90 tablet 3     metoprolol succinate ER (TOPROL-XL) 25 MG 24 hr tablet Take 1 tablet (25 mg) by mouth daily       Multiple Vitamins-Minerals (MULTIVITAMIN ADULT) TABS Take 1 tablet by mouth daily       omeprazole (PRILOSEC) 10 MG CR capsule Take 1 capsule (10 mg) by mouth every morning (before breakfast) 90 capsule 3     potassium chloride ER (K-DUR/KLOR-CON M) 20 MEQ CR tablet Take 2 tablets (40 mEq) by mouth daily 180 tablet 3     rivaroxaban ANTICOAGULANT (XARELTO) 20 MG TABS tablet Take 1 tablet (20 mg) by mouth daily (with dinner) 90 tablet 3     torsemide (DEMADEX) 10 MG tablet Take 1 tablet (10 mg) by mouth daily 90 tablet 3       ALLERGIES:     Allergies   Allergen Reactions     Chlorpheniramine Other (See Comments)     LOC and fainting      Lisinopril Cough     Phenylephrine Other (See Comments)     fainting       PAST MEDICAL HISTORY:  Past Medical History:   Diagnosis Date     Allergic rhinitis, cause unspecified     dust mites, ragweed     Complete AV block (H)     BSX DDD PM 2-     History of tricuspid valve repair      Mitral valve prolapse      bioprosthetic MVR 2-     Paroxysmal atrial fibrillation (H)        PAST SURGICAL HISTORY:  Past Surgical History:   Procedure Laterality Date     COLONOSCOPY N/A 9/15/2015    Procedure: COLONOSCOPY;  Surgeon: Anson Llanos MD;  Location:  GI     CV HEART CATHETERIZATION WITH POSSIBLE INTERVENTION N/A 1/9/2019    Procedure: Heart Catheterization with Possible Intervention;  Surgeon: Ritesh Whittaker MD;  Location:  HEART CARDIAC CATH LAB     CV RIGHT AND LEFT HEART CATH N/A 1/9/2019    Procedure: Right and Left Heart Catherization;  Surgeon: Ritesh Whittaker MD;  Location:  HEART CARDIAC CATH LAB     ENT SURGERY      T&A     EP PACEMAKER N/A 2/18/2019    Procedure: EP Pacemaker;  Surgeon: Inocencia Guillen MD;  Location:  HEART CARDIAC CATH LAB     REPAIR VALVE TRICUSPID MINIMALLY INVASIVE N/A 2/15/2019    Procedure: MINIMALLY INVASIVE TRICUSPID VALVE REPAIR WITH 32MM SULLIVAN RING;  Surgeon: Andrea Hanna MD;  Location:  OR     REPLACE VALVE MITRAL MINIMALLY INVASIVE N/A 2/15/2019    Procedure: MINIMALLY INVASIVE MITRAL VALVE REPLACEMENT WITH EPIC ST KEYUR 31MM VALVE; ON PUMP WITH TIA.  CARDIOLOGIST DR CARDENAS;  Surgeon: Andrea Hanna MD;  Location:  OR       SOCIAL HISTORY:  Social History     Socioeconomic History     Marital status:      Spouse name: None     Number of children: 3     Years of education: None     Highest education level: None   Occupational History     None   Social Needs     Financial resource strain: None     Food insecurity:     Worry: None     Inability: None     Transportation needs:     Medical: None     Non-medical: None   Tobacco Use     Smoking status: Never Smoker     Smokeless tobacco: Never Used   Substance and Sexual Activity     Alcohol use: No     Drug use: No     Sexual activity: Not Currently   Lifestyle     Physical activity:     Days per week: None     Minutes per session: None     Stress: None   Relationships      Social connections:     Talks on phone: None     Gets together: None     Attends Anabaptist service: None     Active member of club or organization: None     Attends meetings of clubs or organizations: None     Relationship status: None     Intimate partner violence:     Fear of current or ex partner: None     Emotionally abused: None     Physically abused: None     Forced sexual activity: None   Other Topics Concern      Service Not Asked     Blood Transfusions Not Asked     Caffeine Concern No     Comment: 1 cup of coffee and 1 can diet coke per day     Occupational Exposure Not Asked     Hobby Hazards Not Asked     Sleep Concern Yes     Comment: takes Doxylamine succinate 1/2 tab every night     Stress Concern No     Weight Concern No     Special Diet No     Comment: healthy      Back Care Not Asked     Exercise Yes     Comment: walking 45min x7 a wk. Very active, [employer], Golf, Bowling, Cardio     Bike Helmet Not Asked     Seat Belt Yes     Self-Exams Yes     Parent/sibling w/ CABG, MI or angioplasty before 65F 55M? No   Social History Narrative     None       FAMILY HISTORY:  Family History   Problem Relation Age of Onset     Osteoporosis Mother      Alzheimer Disease Mother      Family History Negative Father      Heart Disease Maternal Grandfather      Family History Negative Paternal Grandmother      Family History Negative Paternal Grandfather        Review of Systems:  Skin:  Negative       Eyes:  Positive for glasses    ENT:  Positive for sinus trouble;nasal congestion due to allergies per pt   Respiratory:  Positive for cough- coughing has improved   Cardiovascular:    Positive for;palpitations    Gastroenterology: Positive for reflux controlled with meds  Genitourinary:  Negative      Musculoskeletal:  Positive for back pain;arthritis;joint pain;nocturnal cramping    Neurologic:  Positive for numbness or tingling of hands of the right thumb  Psychiatric:  Negative      Heme/Lymph/Imm:  Positive  for allergies;easy bruising RX  Endocrine:  Positive for thyroid disorder cold hands and feet     Reviewed. Remainder of the note dictated.    Marifer Mohamud PA-C      Service Date: 10/24/2019       HISTORY OF PRESENT ILLNESS:  Taty is a pleasant 74-year-old female who presents to the office today after a recent change from lisinopril to losartan.      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  In 10/2018, she was found to be in AFib with RVR.  She did convert back to sinus rhythm; however, about 2 months later she had recurrent atrial fibrillation.  At that time she was sent back to Dr. Hanna with CV Surgery who felt that she indeed did have severe mitral regurgitation and in the setting of new onset atrial fibrillation valve surgery was recommended.  Her preoperative coronary angiogram did not show any significant coronary disease, LV gram showed slightly reduced systolic function.  She had a TIA which showed preserved LV systolic function with an EF of 65% with normal RV size and function.  This confirmed severe mitral regurgitation and 3+ tricuspid regurgitation.  Ultimately, in 02/2019, she underwent successful minimally invasive mitral valve replacement with a 31 mm Epic St. Ron valve at which time she also had a tricuspid valve repair with a 32 mm Oquendo ring.  She developed complete heart block postoperatively and underwent pacemaker implantation.      Unfortunately, shortly after surgery, she developed significant volume overload.  She was found to be back in atrial fibrillation.  She was evaluated in our [facility] by CHRISTIANO Weathers.  Her diuretics were adjusted and she had a significant improvement in her volume status.  She was also reloaded with amiodarone which converted her back to sinus rhythm.  She underwent echocardiography which showed an LVEF in the 20%-25% range.   "Since the beginning of the year, she has been seen by EP Cardiology several times and is now off of amiodarone.  She also was seen in the [facility] several times and saw Dr. Stone this summer.  She did have a repeat echocardiogram in mid July which again showed her EF was in the 20%-25% range.  At that time, she underwent a stress test which did not show any evidence of infarction or ischemia.  Lisinopril was added to her regimen but she developed a cough and our last visit I change her to losartan 25mg once a day.      She has tolerated the losartan without any dizziness or significant change to her blood pressure (the patient chronically runs low blood pressure readings).  Her cough has resolved.  Otherwise, she denies any cardiovascular symptoms such as chest discomfort, dyspnea on exertion, orthopnea or PND.  Her home weight has been stable.  Again, her home blood pressure readings are typically in the  mmHg systolic range. She typically walks 5 miles a day without symptoms. She is expecting her first grandchild next week.      CURRENT CARDIAC MEDICATIONS:   1.  Aspirin 81 mg daily.   2.  Losartan 25 mg b.i.d.   3.  Toprol-XL 25 mg daily.   4.  Potassium chloride 40 mEq daily.   5.  Torsemide 20 mEq half tablet daily.   6.  Xarelto 20 mg daily.      The remainder of her medications, allergies and review of systems were reviewed and as are documented separately.      PHYSICAL EXAMINATION:   GENERAL:  The patient is a pleasant 74-year-old female who appears younger than her stated age.  She is in no apparent distress.   VITAL SIGNS:  /76 (BP Location: Right arm, Patient Position: Chair, Cuff Size: Adult Regular)   Pulse 84   Ht 1.676 m (5' 6\")   Wt 54 kg (119 lb 1.6 oz)   BMI 19.22 kg/m       PULMONARY:  Breathing is nonlabored.  Lungs are clear to auscultation.   CARDIAC:  Reveals a regular rate and rhythm.   EXTREMITIES:  No lower extremity edema.   NEUROLOGIC:  Alert and oriented.    "   ASSESSMENT AND PLAN:  Taty is a pleasant 74-year-old female with a history of severe mitral regurgitation secondary to mitral valve prolapse who is status post mitral valve replacement as well as tricuspid valve repair in 02/2019.  Postoperatively, she developed complete heart block and underwent permanent pacemaker implantation.  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  She previously had symptoms of volume overload, but appears to be euvolemic at this time.  We are working to optimize her cardiomyopathy medications.  Today I asked her to increase the Toprol XL to 50mg daily.  If she is feeling well in 2 weeks she will increase the losartan to 50mg daily.  I have asked her to return to the clinic in approximately 1 month for repeat lab work (BMP) and further medication titration.  Of course, I encouraged her to contact us sooner with questions or concerns. We discussed once we have optimized her medical therapy we will arrange a repeat echo. If her EF remains less than 35% she likely would be a candidate for an ICD upgrade to her device.      Thank you for allowing me to participate in the care of this pleasant patient.     Sincerely,     Marifer Mohamud PA-C     [facility]

## 2019-10-24 NOTE — PROGRESS NOTES
Please see separate dictation for HPI, PHYSICAL EXAM AND IMPRESSION/PLAN.    CURRENT MEDICATIONS:  Current Outpatient Medications   Medication Sig Dispense Refill     amoxicillin (AMOXIL) 500 MG tablet Take 4 tablets (2,000 mg) by mouth once for 1 dose 1 hour prior to dental work 4 tablet 1     aspirin 81 MG EC tablet Take 81 mg by mouth every evening       calcium carbonate-vitamin D (CALCIUM 600+D) 600-200 MG-UNIT TABS Take 1 tablet by mouth 2 times daily       levothyroxine (SYNTHROID/LEVOTHROID) 50 MCG tablet Take 1 tablet (50 mcg) by mouth daily 90 tablet 1     losartan (COZAAR) 25 MG tablet Take 1 tablet (25 mg) by mouth daily 90 tablet 3     metoprolol succinate ER (TOPROL-XL) 25 MG 24 hr tablet Take 1 tablet (25 mg) by mouth daily       Multiple Vitamins-Minerals (MULTIVITAMIN ADULT) TABS Take 1 tablet by mouth daily       omeprazole (PRILOSEC) 10 MG CR capsule Take 1 capsule (10 mg) by mouth every morning (before breakfast) 90 capsule 3     potassium chloride ER (K-DUR/KLOR-CON M) 20 MEQ CR tablet Take 2 tablets (40 mEq) by mouth daily 180 tablet 3     rivaroxaban ANTICOAGULANT (XARELTO) 20 MG TABS tablet Take 1 tablet (20 mg) by mouth daily (with dinner) 90 tablet 3     torsemide (DEMADEX) 10 MG tablet Take 1 tablet (10 mg) by mouth daily 90 tablet 3       ALLERGIES:     Allergies   Allergen Reactions     Chlorpheniramine Other (See Comments)     LOC and fainting      Lisinopril Cough     Phenylephrine Other (See Comments)     fainting       PAST MEDICAL HISTORY:  Past Medical History:   Diagnosis Date     Allergic rhinitis, cause unspecified     dust mites, ragweed     Complete AV block (H)     BSX DDD PM 2-     History of tricuspid valve repair      Mitral valve prolapse     bioprosthetic MVR 2-     Paroxysmal atrial fibrillation (H)        PAST SURGICAL HISTORY:  Past Surgical History:   Procedure Laterality Date     COLONOSCOPY N/A 9/15/2015    Procedure: COLONOSCOPY;  Surgeon: Viet  Anson GONG MD;  Location:  GI     CV HEART CATHETERIZATION WITH POSSIBLE INTERVENTION N/A 1/9/2019    Procedure: Heart Catheterization with Possible Intervention;  Surgeon: Ritesh Whittaker MD;  Location:  HEART CARDIAC CATH LAB     CV RIGHT AND LEFT HEART CATH N/A 1/9/2019    Procedure: Right and Left Heart Catherization;  Surgeon: Ritesh Whittaker MD;  Location:  HEART CARDIAC CATH LAB     ENT SURGERY      T&A     EP PACEMAKER N/A 2/18/2019    Procedure: EP Pacemaker;  Surgeon: Inocencia Guillen MD;  Location:  HEART CARDIAC CATH LAB     REPAIR VALVE TRICUSPID MINIMALLY INVASIVE N/A 2/15/2019    Procedure: MINIMALLY INVASIVE TRICUSPID VALVE REPAIR WITH 32MM SULLIVAN RING;  Surgeon: Andrea Hanna MD;  Location:  OR     REPLACE VALVE MITRAL MINIMALLY INVASIVE N/A 2/15/2019    Procedure: MINIMALLY INVASIVE MITRAL VALVE REPLACEMENT WITH EPIC ST KEYUR 31MM VALVE; ON PUMP WITH TIA.  CARDIOLOGIST DR CARDENAS;  Surgeon: Andrea Hanna MD;  Location:  OR       SOCIAL HISTORY:  Social History     Socioeconomic History     Marital status:      Spouse name: None     Number of children: 3     Years of education: None     Highest education level: None   Occupational History     None   Social Needs     Financial resource strain: None     Food insecurity:     Worry: None     Inability: None     Transportation needs:     Medical: None     Non-medical: None   Tobacco Use     Smoking status: Never Smoker     Smokeless tobacco: Never Used   Substance and Sexual Activity     Alcohol use: No     Drug use: No     Sexual activity: Not Currently   Lifestyle     Physical activity:     Days per week: None     Minutes per session: None     Stress: None   Relationships     Social connections:     Talks on phone: None     Gets together: None     Attends Christian service: None     Active member of club or organization: None     Attends meetings of clubs or organizations: None     Relationship  status: None     Intimate partner violence:     Fear of current or ex partner: None     Emotionally abused: None     Physically abused: None     Forced sexual activity: None   Other Topics Concern      Service Not Asked     Blood Transfusions Not Asked     Caffeine Concern No     Comment: 1 cup of coffee and 1 can diet coke per day     Occupational Exposure Not Asked     Hobby Hazards Not Asked     Sleep Concern Yes     Comment: takes Doxylamine succinate 1/2 tab every night     Stress Concern No     Weight Concern No     Special Diet No     Comment: healthy      Back Care Not Asked     Exercise Yes     Comment: walking 45min x7 a wk. Very active, [employer], Golf, Bowling, Cardio     Bike Helmet Not Asked     Seat Belt Yes     Self-Exams Yes     Parent/sibling w/ CABG, MI or angioplasty before 65F 55M? No   Social History Narrative     None       FAMILY HISTORY:  Family History   Problem Relation Age of Onset     Osteoporosis Mother      Alzheimer Disease Mother      Family History Negative Father      Heart Disease Maternal Grandfather      Family History Negative Paternal Grandmother      Family History Negative Paternal Grandfather        Review of Systems:  Skin:  Negative       Eyes:  Positive for glasses    ENT:  Positive for sinus trouble;nasal congestion due to allergies per pt   Respiratory:  Positive for cough- coughing has improved   Cardiovascular:    Positive for;palpitations    Gastroenterology: Positive for reflux controlled with meds  Genitourinary:  Negative      Musculoskeletal:  Positive for back pain;arthritis;joint pain;nocturnal cramping    Neurologic:  Positive for numbness or tingling of hands of the right thumb  Psychiatric:  Negative      Heme/Lymph/Imm:  Positive for allergies;easy bruising RX  Endocrine:  Positive for thyroid disorder cold hands and feet     Reviewed. Remainder of the note dictated.    Marifer Mohamud PA-C

## 2019-10-24 NOTE — PROGRESS NOTES
Service Date: 10/24/2019       HISTORY OF PRESENT ILLNESS:  Taty is a pleasant 74-year-old female who presents to the office today after a recent change from lisinopril to losartan.      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  In 10/2018, she was found to be in AFib with RVR.  She did convert back to sinus rhythm; however, about 2 months later she had recurrent atrial fibrillation.  At that time she was sent back to Dr. Hanna with CV Surgery who felt that she indeed did have severe mitral regurgitation and in the setting of new onset atrial fibrillation valve surgery was recommended.  Her preoperative coronary angiogram did not show any significant coronary disease, LV gram showed slightly reduced systolic function.  She had a TIA which showed preserved LV systolic function with an EF of 65% with normal RV size and function.  This confirmed severe mitral regurgitation and 3+ tricuspid regurgitation.  Ultimately, in 02/2019, she underwent successful minimally invasive mitral valve replacement with a 31 mm Epic St. Ron valve at which time she also had a tricuspid valve repair with a 32 mm Oquendo ring.  She developed complete heart block postoperatively and underwent pacemaker implantation.      Unfortunately, shortly after surgery, she developed significant volume overload.  She was found to be back in atrial fibrillation.  She was evaluated in our [facility] by CHRISTIANO Weathers.  Her diuretics were adjusted and she had a significant improvement in her volume status.  She was also reloaded with amiodarone which converted her back to sinus rhythm.  She underwent echocardiography which showed an LVEF in the 20%-25% range.  Since the beginning of the year, she has been seen by EP Cardiology several times and is now off of amiodarone.  She also was seen in the [facility] several times and saw  "Dr. Stone this summer.  She did have a repeat echocardiogram in mid July which again showed her EF was in the 20%-25% range.  At that time, she underwent a stress test which did not show any evidence of infarction or ischemia.  Lisinopril was added to her regimen but she developed a cough and our last visit I change her to losartan 25mg once a day.      She has tolerated the losartan without any dizziness or significant change to her blood pressure (the patient chronically runs low blood pressure readings).  Her cough has resolved.  Otherwise, she denies any cardiovascular symptoms such as chest discomfort, dyspnea on exertion, orthopnea or PND.  Her home weight has been stable.  Again, her home blood pressure readings are typically in the  mmHg systolic range. She typically walks 5 miles a day without symptoms. She is expecting her first grandchild next week.      CURRENT CARDIAC MEDICATIONS:   1.  Aspirin 81 mg daily.   2.  Losartan 25 mg b.i.d.   3.  Toprol-XL 25 mg daily.   4.  Potassium chloride 40 mEq daily.   5.  Torsemide 20 mEq half tablet daily.   6.  Xarelto 20 mg daily.      The remainder of her medications, allergies and review of systems were reviewed and as are documented separately.      PHYSICAL EXAMINATION:   GENERAL:  The patient is a pleasant 74-year-old female who appears younger than her stated age.  She is in no apparent distress.   VITAL SIGNS:  /76 (BP Location: Right arm, Patient Position: Chair, Cuff Size: Adult Regular)   Pulse 84   Ht 1.676 m (5' 6\")   Wt 54 kg (119 lb 1.6 oz)   BMI 19.22 kg/m      PULMONARY:  Breathing is nonlabored.  Lungs are clear to auscultation.   CARDIAC:  Reveals a regular rate and rhythm.   EXTREMITIES:  No lower extremity edema.   NEUROLOGIC:  Alert and oriented.      ASSESSMENT AND PLAN:  Taty is a pleasant 74-year-old female with a history of severe mitral regurgitation secondary to mitral valve prolapse who is status post mitral valve " replacement as well as tricuspid valve repair in 02/2019.  Postoperatively, she developed complete heart block and underwent permanent pacemaker implantation.  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  She previously had symptoms of volume overload, but appears to be euvolemic at this time.  We are working to optimize her cardiomyopathy medications.  Today I asked her to increase the Toprol XL to 50mg daily.  If she is feeling well in 2 weeks she will increase the losartan to 50mg daily.  I have asked her to return to the clinic in approximately 1 month for repeat lab work (BMP) and further medication titration.  Of course, I encouraged her to contact us sooner with questions or concerns. We discussed once we have optimized her medical therapy we will arrange a repeat echo. If her EF remains less than 35% she likely would be a candidate for an ICD upgrade to her device.      Thank you for allowing me to participate in the care of this pleasant patient.         SANDRA AMADOR PA-C

## 2019-11-06 ENCOUNTER — HEALTH MAINTENANCE LETTER (OUTPATIENT)
Age: 74
End: 2019-11-06

## 2019-11-13 ENCOUNTER — MYC REFILL (OUTPATIENT)
Dept: CARDIOLOGY | Facility: CLINIC | Age: 74
End: 2019-11-13

## 2019-11-13 DIAGNOSIS — I48.0 PAROXYSMAL ATRIAL FIBRILLATION (H): ICD-10-CM

## 2019-11-21 RX ORDER — OMEPRAZOLE 10 MG/1
10 CAPSULE, DELAYED RELEASE ORAL
Qty: 90 CAPSULE | Refills: 3 | Status: SHIPPED | OUTPATIENT
Start: 2019-11-21 | End: 2020-10-14

## 2019-11-22 ENCOUNTER — OFFICE VISIT (OUTPATIENT)
Dept: CARDIOLOGY | Facility: CLINIC | Age: 74
End: 2019-11-22
Attending: PHYSICIAN ASSISTANT
Payer: COMMERCIAL

## 2019-11-22 VITALS
HEART RATE: 80 BPM | DIASTOLIC BLOOD PRESSURE: 70 MMHG | HEIGHT: 66 IN | WEIGHT: 118.7 LBS | SYSTOLIC BLOOD PRESSURE: 110 MMHG | BODY MASS INDEX: 19.08 KG/M2

## 2019-11-22 DIAGNOSIS — I48.0 PAF (PAROXYSMAL ATRIAL FIBRILLATION) (H): ICD-10-CM

## 2019-11-22 DIAGNOSIS — I50.22 CHRONIC SYSTOLIC HEART FAILURE (H): ICD-10-CM

## 2019-11-22 DIAGNOSIS — I50.22 CHRONIC SYSTOLIC HEART FAILURE (H): Primary | ICD-10-CM

## 2019-11-22 LAB
ANION GAP SERPL CALCULATED.3IONS-SCNC: 4 MMOL/L (ref 3–14)
BUN SERPL-MCNC: 18 MG/DL (ref 7–30)
CALCIUM SERPL-MCNC: 9 MG/DL (ref 8.5–10.1)
CHLORIDE SERPL-SCNC: 105 MMOL/L (ref 94–109)
CO2 SERPL-SCNC: 28 MMOL/L (ref 20–32)
CREAT SERPL-MCNC: 0.89 MG/DL (ref 0.52–1.04)
GFR SERPL CREATININE-BSD FRML MDRD: 65 ML/MIN/{1.73_M2}
GLUCOSE SERPL-MCNC: 88 MG/DL (ref 70–99)
POTASSIUM SERPL-SCNC: 4.3 MMOL/L (ref 3.4–5.3)
SODIUM SERPL-SCNC: 137 MMOL/L (ref 133–144)

## 2019-11-22 PROCEDURE — 36415 COLL VENOUS BLD VENIPUNCTURE: CPT | Performed by: INTERNAL MEDICINE

## 2019-11-22 PROCEDURE — 99214 OFFICE O/P EST MOD 30 MIN: CPT | Performed by: PHYSICIAN ASSISTANT

## 2019-11-22 PROCEDURE — 80048 BASIC METABOLIC PNL TOTAL CA: CPT | Performed by: PHYSICIAN ASSISTANT

## 2019-11-22 RX ORDER — POTASSIUM CHLORIDE 1500 MG/1
20 TABLET, EXTENDED RELEASE ORAL DAILY
Qty: 90 TABLET | Refills: 3 | Status: SHIPPED | OUTPATIENT
Start: 2019-11-22 | End: 2020-10-09

## 2019-11-22 RX ORDER — METOPROLOL SUCCINATE 100 MG/1
100 TABLET, EXTENDED RELEASE ORAL DAILY
Qty: 90 TABLET | Refills: 3 | Status: SHIPPED | OUTPATIENT
Start: 2019-11-22 | End: 2020-10-09

## 2019-11-22 RX ORDER — TORSEMIDE 10 MG/1
10 TABLET ORAL DAILY
Qty: 90 TABLET | Refills: 3 | Status: SHIPPED | OUTPATIENT
Start: 2019-11-22 | End: 2020-10-09

## 2019-11-22 RX ORDER — LOSARTAN POTASSIUM 100 MG/1
100 TABLET ORAL DAILY
Qty: 90 TABLET | Refills: 3 | Status: SHIPPED | OUTPATIENT
Start: 2019-11-22 | End: 2020-10-09

## 2019-11-22 ASSESSMENT — MIFFLIN-ST. JEOR: SCORE: 1065.17

## 2019-11-22 NOTE — LETTER
11/22/2019    Anyi Olmos MD  [address]  [facility] [address]    RE: Taty Aguila       Dear Colleague,    I had the pleasure of seeing Taty Aguila in the [facility].    Please see separate dictation for HPI, PHYSICAL EXAM AND IMPRESSION/PLAN.    CURRENT MEDICATIONS:  Current Outpatient Medications   Medication Sig Dispense Refill     aspirin 81 MG EC tablet Take 81 mg by mouth every evening       calcium carbonate-vitamin D (CALCIUM 600+D) 600-200 MG-UNIT TABS Take 1 tablet by mouth 2 times daily       levothyroxine (SYNTHROID/LEVOTHROID) 50 MCG tablet Take 1 tablet (50 mcg) by mouth daily 90 tablet 1     losartan (COZAAR) 50 MG tablet Take 1 tablet (50 mg) by mouth daily 90 tablet 3     metoprolol succinate ER (TOPROL-XL) 50 MG 24 hr tablet Take 1 tablet (50 mg) by mouth daily 90 tablet 3     Multiple Vitamins-Minerals (MULTIVITAMIN ADULT) TABS Take 1 tablet by mouth daily       omeprazole (PRILOSEC) 10 MG DR capsule Take 1 capsule (10 mg) by mouth every morning (before breakfast) 90 capsule 3     potassium chloride ER (K-DUR/KLOR-CON M) 20 MEQ CR tablet Take 2 tablets (40 mEq) by mouth daily 180 tablet 3     rivaroxaban ANTICOAGULANT (XARELTO) 20 MG TABS tablet Take 1 tablet (20 mg) by mouth daily (with dinner) 90 tablet 3     torsemide (DEMADEX) 10 MG tablet Take 1 tablet (10 mg) by mouth daily 90 tablet 3       ALLERGIES:     Allergies   Allergen Reactions     Chlorpheniramine Other (See Comments)     LOC and fainting      Lisinopril Cough     Phenylephrine Other (See Comments)     fainting       PAST MEDICAL HISTORY:  Past Medical History:   Diagnosis Date     Allergic rhinitis, cause unspecified     dust mites, ragweed     Complete AV block (H)     BSX DDD PM 2-     History of tricuspid valve repair      Mitral valve prolapse     bioprosthetic MVR 2-     Paroxysmal atrial fibrillation (H)        PAST SURGICAL HISTORY:  Past Surgical  History:   Procedure Laterality Date     COLONOSCOPY N/A 9/15/2015    Procedure: COLONOSCOPY;  Surgeon: Anson Llanos MD;  Location:  GI     CV HEART CATHETERIZATION WITH POSSIBLE INTERVENTION N/A 1/9/2019    Procedure: Heart Catheterization with Possible Intervention;  Surgeon: Ritesh Whittaker MD;  Location:  HEART CARDIAC CATH LAB     CV RIGHT AND LEFT HEART CATH N/A 1/9/2019    Procedure: Right and Left Heart Catherization;  Surgeon: Ritesh Whittaker MD;  Location:  HEART CARDIAC CATH LAB     ENT SURGERY      T&A     EP PACEMAKER N/A 2/18/2019    Procedure: EP Pacemaker;  Surgeon: Inocencia Guillen MD;  Location:  HEART CARDIAC CATH LAB     REPAIR VALVE TRICUSPID MINIMALLY INVASIVE N/A 2/15/2019    Procedure: MINIMALLY INVASIVE TRICUSPID VALVE REPAIR WITH 32MM SULLIVAN RING;  Surgeon: Andrea Hanna MD;  Location:  OR     REPLACE VALVE MITRAL MINIMALLY INVASIVE N/A 2/15/2019    Procedure: MINIMALLY INVASIVE MITRAL VALVE REPLACEMENT WITH EPIC ST KEYUR 31MM VALVE; ON PUMP WITH TIA.  CARDIOLOGIST DR CARDENAS;  Surgeon: Andrea Hanna MD;  Location:  OR       SOCIAL HISTORY:  Social History     Socioeconomic History     Marital status:      Spouse name: None     Number of children: 3     Years of education: None     Highest education level: None   Occupational History     None   Social Needs     Financial resource strain: None     Food insecurity:     Worry: None     Inability: None     Transportation needs:     Medical: None     Non-medical: None   Tobacco Use     Smoking status: Never Smoker     Smokeless tobacco: Never Used   Substance and Sexual Activity     Alcohol use: No     Drug use: No     Sexual activity: Not Currently   Lifestyle     Physical activity:     Days per week: None     Minutes per session: None     Stress: None   Relationships     Social connections:     Talks on phone: None     Gets together: None     Attends Rastafarian service: None      Active member of club or organization: None     Attends meetings of clubs or organizations: None     Relationship status: None     Intimate partner violence:     Fear of current or ex partner: None     Emotionally abused: None     Physically abused: None     Forced sexual activity: None   Other Topics Concern      Service Not Asked     Blood Transfusions Not Asked     Caffeine Concern No     Comment: 1 cup of coffee and 1 can diet coke per day     Occupational Exposure Not Asked     Hobby Hazards Not Asked     Sleep Concern Yes     Comment: takes Doxylamine succinate 1/2 tab every night     Stress Concern No     Weight Concern No     Special Diet No     Comment: healthy      Back Care Not Asked     Exercise Yes     Comment: walking 45min x7 a wk. Very active, [employer], Golf, Bowling, Cardio     Bike Helmet Not Asked     Seat Belt Yes     Self-Exams Yes     Parent/sibling w/ CABG, MI or angioplasty before 65F 55M? No   Social History Narrative     None       FAMILY HISTORY:  Family History   Problem Relation Age of Onset     Osteoporosis Mother      Alzheimer Disease Mother      Family History Negative Father      Heart Disease Maternal Grandfather      Family History Negative Paternal Grandmother      Family History Negative Paternal Grandfather        Review of Systems:  Skin:  Negative       Eyes:  Positive for glasses    ENT:  Positive for sinus trouble;nasal congestion due to allergies per pt   Respiratory:  Positive for dyspnea on exertion     Cardiovascular:    palpitations;Positive for;fatigue;dizziness;edema    Gastroenterology: Negative      Genitourinary:  Negative      Musculoskeletal:  Positive for arthritis    Neurologic:  Negative      Psychiatric:  Positive for sleep disturbances    Heme/Lymph/Imm:  Positive for allergies;easy bruising    Endocrine:  Positive for thyroid disorder       Reviewed. Remainder of the note dictated.    Marifer Mohamud PA-C        Service Date: 11/22/2019         HISTORY OF PRESENT ILLNESS:  Taty is a pleasant 74-year-old female who presents to the office today after a recent up titration of her losartan and metoprolol.      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  In 10/2018, she was found to be in AFib with RVR.  She did convert back to sinus rhythm; however, about 2 months later she had recurrent atrial fibrillation.  At that time she was sent back to Dr. Hanna with CV Surgery who felt that she indeed did have severe mitral regurgitation and in the setting of new onset atrial fibrillation valve surgery was recommended.  Her preoperative coronary angiogram did not show any significant coronary disease, LV gram showed slightly reduced systolic function.  She had a TIA which showed preserved LV systolic function with an EF of 65% with normal RV size and function.  This confirmed severe mitral regurgitation and 3+ tricuspid regurgitation.  Ultimately, in 02/2019, she underwent successful minimally invasive mitral valve replacement with a 31 mm Epic St. Ron valve at which time she also had a tricuspid valve repair with a 32 mm Oquendo ring.  She developed complete heart block postoperatively and underwent pacemaker implantation.      Unfortunately, shortly after surgery, she developed significant volume overload.  She was found to be back in atrial fibrillation.  She was evaluated in our [facility] by CHRISTIANO Weathers.  Her diuretics were adjusted and she had a significant improvement in her volume status.  She was also reloaded with amiodarone which converted her back to sinus rhythm.  She underwent echocardiography which showed an LVEF in the 20%-25% range.  Since the beginning of the year, she has been seen by EP Cardiology several times and is now off of amiodarone.  She also was seen in the [facility] several times and saw Dr. Chase wilkinson  "summer.  She did have a repeat echocardiogram in mid July which again showed her EF was in the 20%-25% range.  At that time, she underwent a stress test which did not show any evidence of infarction or ischemia.  Lisinopril was added to her regimen but she developed a cough so was changed to losartan. At my last office visit with her I gave her instructions to up titrate her metoprolol, then 2 weeks later increase the losartan.     She has tolerated the changes without any dizziness or significant change to her blood pressure (the patient chronically runs low blood pressure readings).  Again, she denies any cardiovascular symptoms such as chest discomfort, dyspnea on exertion, orthopnea or PND.  Her home weight has been stable.  Again, her home blood pressure readings are typically in the  mmHg systolic range. She typically walks 5 miles a day without symptoms. She is enjoying spending time with her first grandchild born earlier this month.      CURRENT CARDIAC MEDICATIONS:   1.  Aspirin 81 mg daily.   2.  Losartan 50 mg daily.   3.  Toprol-XL 50 mg daily.   4.  Potassium chloride 40 mEq daily.   5.  Torsemide 20 mEq half tablet daily.   6.  Xarelto 20 mg daily.      The remainder of her medications, allergies and review of systems were reviewed and as are documented separately.      PHYSICAL EXAMINATION:   GENERAL:  The patient is a pleasant 74-year-old female who appears younger than her stated age.  She is in no apparent distress.   VITAL SIGNS:  /70 (BP Location: Right arm, Patient Position: Sitting, Cuff Size: Adult Regular)   Pulse 80   Ht 1.676 m (5' 6\")   Wt 53.8 kg (118 lb 11.2 oz)   Breastfeeding No   BMI 19.16 kg/m       PULMONARY:  Breathing is nonlabored.  Lungs are clear to auscultation.   CARDIAC:  Reveals a regular rate and rhythm.   EXTREMITIES:  No lower extremity edema.   NEUROLOGIC:  Alert and oriented.      ASSESSMENT AND PLAN:  Taty is a pleasant 74-year-old female with a " history of severe mitral regurgitation secondary to mitral valve prolapse who is status post mitral valve replacement as well as tricuspid valve repair in 02/2019.  Postoperatively, she developed complete heart block and underwent permanent pacemaker implantation.  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  She previously had symptoms of volume overload, but appears to be euvolemic at this time.  We are working to optimize her cardiomyopathy medications.  Today I asked her to increase the Toprol XL to 100 mg daily.  If she is feeling well in 2 weeks she will increase the losartan to 100 mg daily.  I also told her she can reduce her potassium to 20 meq daily.  I have asked her to return for repeat lab work (BMP) in 1 month.  Of course, I encouraged her to contact us sooner with questions or concerns. I feel like at that time her medical therapy will be fairly optimized.  She will return to see me in late April or early May with a repeat echo prior to that office visit.  If her EF remains less than 35% she likely would be a candidate for an ICD upgrade to her device.      Thank you for allowing me to participate in the care of this pleasant patient.     Sincerely,     Marifer Mohamud PA-C     [facility]

## 2019-11-22 NOTE — LETTER
11/22/2019    Anyi Olmos MD  [address]  [facility] [address]    RE: Taty Aguila       Dear Colleague,    I had the pleasure of seeing Taty Aguila in the [facility].    Please see separate dictation for HPI, PHYSICAL EXAM AND IMPRESSION/PLAN.    CURRENT MEDICATIONS:  Current Outpatient Medications   Medication Sig Dispense Refill     aspirin 81 MG EC tablet Take 81 mg by mouth every evening       calcium carbonate-vitamin D (CALCIUM 600+D) 600-200 MG-UNIT TABS Take 1 tablet by mouth 2 times daily       levothyroxine (SYNTHROID/LEVOTHROID) 50 MCG tablet Take 1 tablet (50 mcg) by mouth daily 90 tablet 1     losartan (COZAAR) 50 MG tablet Take 1 tablet (50 mg) by mouth daily 90 tablet 3     metoprolol succinate ER (TOPROL-XL) 50 MG 24 hr tablet Take 1 tablet (50 mg) by mouth daily 90 tablet 3     Multiple Vitamins-Minerals (MULTIVITAMIN ADULT) TABS Take 1 tablet by mouth daily       omeprazole (PRILOSEC) 10 MG DR capsule Take 1 capsule (10 mg) by mouth every morning (before breakfast) 90 capsule 3     potassium chloride ER (K-DUR/KLOR-CON M) 20 MEQ CR tablet Take 2 tablets (40 mEq) by mouth daily 180 tablet 3     rivaroxaban ANTICOAGULANT (XARELTO) 20 MG TABS tablet Take 1 tablet (20 mg) by mouth daily (with dinner) 90 tablet 3     torsemide (DEMADEX) 10 MG tablet Take 1 tablet (10 mg) by mouth daily 90 tablet 3       ALLERGIES:     Allergies   Allergen Reactions     Chlorpheniramine Other (See Comments)     LOC and fainting      Lisinopril Cough     Phenylephrine Other (See Comments)     fainting       PAST MEDICAL HISTORY:  Past Medical History:   Diagnosis Date     Allergic rhinitis, cause unspecified     dust mites, ragweed     Complete AV block (H)     BSX DDD PM 2-     History of tricuspid valve repair      Mitral valve prolapse     bioprosthetic MVR 2-     Paroxysmal atrial fibrillation (H)        PAST SURGICAL HISTORY:  Past Surgical  History:   Procedure Laterality Date     COLONOSCOPY N/A 9/15/2015    Procedure: COLONOSCOPY;  Surgeon: Anson Llanos MD;  Location:  GI     CV HEART CATHETERIZATION WITH POSSIBLE INTERVENTION N/A 1/9/2019    Procedure: Heart Catheterization with Possible Intervention;  Surgeon: Ritesh Whittaker MD;  Location:  HEART CARDIAC CATH LAB     CV RIGHT AND LEFT HEART CATH N/A 1/9/2019    Procedure: Right and Left Heart Catherization;  Surgeon: Ritesh Whittaker MD;  Location:  HEART CARDIAC CATH LAB     ENT SURGERY      T&A     EP PACEMAKER N/A 2/18/2019    Procedure: EP Pacemaker;  Surgeon: Inocencia Guillen MD;  Location:  HEART CARDIAC CATH LAB     REPAIR VALVE TRICUSPID MINIMALLY INVASIVE N/A 2/15/2019    Procedure: MINIMALLY INVASIVE TRICUSPID VALVE REPAIR WITH 32MM SULLIVAN RING;  Surgeon: Andrea Hanna MD;  Location:  OR     REPLACE VALVE MITRAL MINIMALLY INVASIVE N/A 2/15/2019    Procedure: MINIMALLY INVASIVE MITRAL VALVE REPLACEMENT WITH EPIC ST KEYUR 31MM VALVE; ON PUMP WITH TIA.  CARDIOLOGIST DR CARDENAS;  Surgeon: Andrea Hanna MD;  Location:  OR       SOCIAL HISTORY:  Social History     Socioeconomic History     Marital status:      Spouse name: None     Number of children: 3     Years of education: None     Highest education level: None   Occupational History     None   Social Needs     Financial resource strain: None     Food insecurity:     Worry: None     Inability: None     Transportation needs:     Medical: None     Non-medical: None   Tobacco Use     Smoking status: Never Smoker     Smokeless tobacco: Never Used   Substance and Sexual Activity     Alcohol use: No     Drug use: No     Sexual activity: Not Currently   Lifestyle     Physical activity:     Days per week: None     Minutes per session: None     Stress: None   Relationships     Social connections:     Talks on phone: None     Gets together: None     Attends Pentecostal service: None      Active member of club or organization: None     Attends meetings of clubs or organizations: None     Relationship status: None     Intimate partner violence:     Fear of current or ex partner: None     Emotionally abused: None     Physically abused: None     Forced sexual activity: None   Other Topics Concern      Service Not Asked     Blood Transfusions Not Asked     Caffeine Concern No     Comment: 1 cup of coffee and 1 can diet coke per day     Occupational Exposure Not Asked     Hobby Hazards Not Asked     Sleep Concern Yes     Comment: takes Doxylamine succinate 1/2 tab every night     Stress Concern No     Weight Concern No     Special Diet No     Comment: healthy      Back Care Not Asked     Exercise Yes     Comment: walking 45min x7 a wk. Very active, [employer], Golf, Bowling, Cardio     Bike Helmet Not Asked     Seat Belt Yes     Self-Exams Yes     Parent/sibling w/ CABG, MI or angioplasty before 65F 55M? No   Social History Narrative     None       FAMILY HISTORY:  Family History   Problem Relation Age of Onset     Osteoporosis Mother      Alzheimer Disease Mother      Family History Negative Father      Heart Disease Maternal Grandfather      Family History Negative Paternal Grandmother      Family History Negative Paternal Grandfather        Review of Systems:  Skin:  Negative       Eyes:  Positive for glasses    ENT:  Positive for sinus trouble;nasal congestion due to allergies per pt   Respiratory:  Positive for dyspnea on exertion     Cardiovascular:    palpitations;Positive for;fatigue;dizziness;edema    Gastroenterology: Negative      Genitourinary:  Negative      Musculoskeletal:  Positive for arthritis    Neurologic:  Negative      Psychiatric:  Positive for sleep disturbances    Heme/Lymph/Imm:  Positive for allergies;easy bruising    Endocrine:  Positive for thyroid disorder       Reviewed. Remainder of the note dictated.    Marifer Mohamud PA-C        Service Date: 11/22/2019         HISTORY OF PRESENT ILLNESS:  Taty is a pleasant 74-year-old female who presents to the office today after a recent up titration of her losartan and metoprolol.      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  In 10/2018, she was found to be in AFib with RVR.  She did convert back to sinus rhythm; however, about 2 months later she had recurrent atrial fibrillation.  At that time she was sent back to Dr. Hanna with CV Surgery who felt that she indeed did have severe mitral regurgitation and in the setting of new onset atrial fibrillation valve surgery was recommended.  Her preoperative coronary angiogram did not show any significant coronary disease, LV gram showed slightly reduced systolic function.  She had a TIA which showed preserved LV systolic function with an EF of 65% with normal RV size and function.  This confirmed severe mitral regurgitation and 3+ tricuspid regurgitation.  Ultimately, in 02/2019, she underwent successful minimally invasive mitral valve replacement with a 31 mm Epic St. Ron valve at which time she also had a tricuspid valve repair with a 32 mm Oquendo ring.  She developed complete heart block postoperatively and underwent pacemaker implantation.      Unfortunately, shortly after surgery, she developed significant volume overload.  She was found to be back in atrial fibrillation.  She was evaluated in our [facility] by CHRISTIANO Weathers.  Her diuretics were adjusted and she had a significant improvement in her volume status.  She was also reloaded with amiodarone which converted her back to sinus rhythm.  She underwent echocardiography which showed an LVEF in the 20%-25% range.  Since the beginning of the year, she has been seen by EP Cardiology several times and is now off of amiodarone.  She also was seen in the [facility] several times and saw Dr. Chase wilkinson  "summer.  She did have a repeat echocardiogram in mid July which again showed her EF was in the 20%-25% range.  At that time, she underwent a stress test which did not show any evidence of infarction or ischemia.  Lisinopril was added to her regimen but she developed a cough so was changed to losartan. At my last office visit with her I gave her instructions to up titrate her metoprolol, then 2 weeks later increase the losartan.     She has tolerated the changes without any dizziness or significant change to her blood pressure (the patient chronically runs low blood pressure readings).  Again, she denies any cardiovascular symptoms such as chest discomfort, dyspnea on exertion, orthopnea or PND.  Her home weight has been stable.  Again, her home blood pressure readings are typically in the  mmHg systolic range. She typically walks 5 miles a day without symptoms. She is enjoying spending time with her first grandchild born earlier this month.      CURRENT CARDIAC MEDICATIONS:   1.  Aspirin 81 mg daily.   2.  Losartan 50 mg daily.   3.  Toprol-XL 50 mg daily.   4.  Potassium chloride 40 mEq daily.   5.  Torsemide 20 mEq half tablet daily.   6.  Xarelto 20 mg daily.      The remainder of her medications, allergies and review of systems were reviewed and as are documented separately.      PHYSICAL EXAMINATION:   GENERAL:  The patient is a pleasant 74-year-old female who appears younger than her stated age.  She is in no apparent distress.   VITAL SIGNS:  /70 (BP Location: Right arm, Patient Position: Sitting, Cuff Size: Adult Regular)   Pulse 80   Ht 1.676 m (5' 6\")   Wt 53.8 kg (118 lb 11.2 oz)   Breastfeeding No   BMI 19.16 kg/m       PULMONARY:  Breathing is nonlabored.  Lungs are clear to auscultation.   CARDIAC:  Reveals a regular rate and rhythm.   EXTREMITIES:  No lower extremity edema.   NEUROLOGIC:  Alert and oriented.      ASSESSMENT AND PLAN:  Taty is a pleasant 74-year-old female with a " history of severe mitral regurgitation secondary to mitral valve prolapse who is status post mitral valve replacement as well as tricuspid valve repair in 02/2019.  Postoperatively, she developed complete heart block and underwent permanent pacemaker implantation.  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  She previously had symptoms of volume overload, but appears to be euvolemic at this time.  We are working to optimize her cardiomyopathy medications.  Today I asked her to increase the Toprol XL to 100 mg daily.  If she is feeling well in 2 weeks she will increase the losartan to 100 mg daily.  I also told her she can reduce her potassium to 20 meq daily.  I have asked her to return for repeat lab work (BMP) in 1 month.  Of course, I encouraged her to contact us sooner with questions or concerns. I feel like at that time her medical therapy will be fairly optimized.  She will return to see me in late April or early May with a repeat echo prior to that office visit.  If her EF remains less than 35% she likely would be a candidate for an ICD upgrade to her device.      Thank you for allowing me to participate in the care of this pleasant patient.         SANDRA AMADOR PA-C              Thank you for allowing me to participate in the care of your patient.      Sincerely,     Sandra Amador PA-C     [facility] [place]    cc:   Sandra Amador PA-C  [facility]  [address] [place] [address]

## 2019-11-22 NOTE — PROGRESS NOTES
Service Date: 11/22/2019        HISTORY OF PRESENT ILLNESS:  Taty is a pleasant 74-year-old female who presents to the office today after a recent up titration of her losartan and metoprolol.      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  In 10/2018, she was found to be in AFib with RVR.  She did convert back to sinus rhythm; however, about 2 months later she had recurrent atrial fibrillation.  At that time she was sent back to Dr. Hanna with CV Surgery who felt that she indeed did have severe mitral regurgitation and in the setting of new onset atrial fibrillation valve surgery was recommended.  Her preoperative coronary angiogram did not show any significant coronary disease, LV gram showed slightly reduced systolic function.  She had a TIA which showed preserved LV systolic function with an EF of 65% with normal RV size and function.  This confirmed severe mitral regurgitation and 3+ tricuspid regurgitation.  Ultimately, in 02/2019, she underwent successful minimally invasive mitral valve replacement with a 31 mm Epic [place] valve at which time she also had a tricuspid valve repair with a 32 mm Oquendo ring.  She developed complete heart block postoperatively and underwent pacemaker implantation.      Unfortunately, shortly after surgery, she developed significant volume overload.  She was found to be back in atrial fibrillation.  She was evaluated in our [facility] by CHRISTIANO Weathers.  Her diuretics were adjusted and she had a significant improvement in her volume status.  She was also reloaded with amiodarone which converted her back to sinus rhythm.  She underwent echocardiography which showed an LVEF in the 20%-25% range.  Since the beginning of the year, she has been seen by [facility] several times and is now off of amiodarone.  She also was seen in the [facility] several times  "and saw Dr. Stone this summer.  She did have a repeat echocardiogram in mid July which again showed her EF was in the 20%-25% range.  At that time, she underwent a stress test which did not show any evidence of infarction or ischemia.  Lisinopril was added to her regimen but she developed a cough so was changed to losartan. At my last office visit with her I gave her instructions to up titrate her metoprolol, then 2 weeks later increase the losartan.     She has tolerated the changes without any dizziness or significant change to her blood pressure (the patient chronically runs low blood pressure readings).  Again, she denies any cardiovascular symptoms such as chest discomfort, dyspnea on exertion, orthopnea or PND.  Her home weight has been stable.  Again, her home blood pressure readings are typically in the  mmHg systolic range. She typically walks 5 miles a day without symptoms. She is enjoying spending time with her first grandchild born earlier this month.      CURRENT CARDIAC MEDICATIONS:   1.  Aspirin 81 mg daily.   2.  Losartan 50 mg daily.   3.  Toprol-XL 50 mg daily.   4.  Potassium chloride 40 mEq daily.   5.  Torsemide 20 mEq half tablet daily.   6.  Xarelto 20 mg daily.      The remainder of her medications, allergies and review of systems were reviewed and as are documented separately.      PHYSICAL EXAMINATION:   GENERAL:  The patient is a pleasant 74-year-old female who appears younger than her stated age.  She is in no apparent distress.   VITAL SIGNS:  /70 (BP Location: Right arm, Patient Position: Sitting, Cuff Size: Adult Regular)   Pulse 80   Ht 1.676 m (5' 6\")   Wt 53.8 kg (118 lb 11.2 oz)   Breastfeeding No   BMI 19.16 kg/m      PULMONARY:  Breathing is nonlabored.  Lungs are clear to auscultation.   CARDIAC:  Reveals a regular rate and rhythm.   EXTREMITIES:  No lower extremity edema.   NEUROLOGIC:  Alert and oriented.      ASSESSMENT AND PLAN:  Taty is a pleasant " 74-year-old female with a history of severe mitral regurgitation secondary to mitral valve prolapse who is status post mitral valve replacement as well as tricuspid valve repair in 02/2019.  Postoperatively, she developed complete heart block and underwent permanent pacemaker implantation.  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  She previously had symptoms of volume overload, but appears to be euvolemic at this time.  We are working to optimize her cardiomyopathy medications.  Today I asked her to increase the Toprol XL to 100 mg daily.  If she is feeling well in 2 weeks she will increase the losartan to 100 mg daily.  I also told her she can reduce her potassium to 20 meq daily.  I have asked her to return for repeat lab work (BMP) in 1 month.  Of course, I encouraged her to contact us sooner with questions or concerns. I feel like at that time her medical therapy will be fairly optimized.  She will return to see me in late April or early May with a repeat echo prior to that office visit.  If her EF remains less than 35% she likely would be a candidate for an ICD upgrade to her device.      Thank you for allowing me to participate in the care of this pleasant patient.         SANDRA AMADOR PA-C

## 2019-11-22 NOTE — PATIENT INSTRUCTIONS
Thank you for your [facility] visit today. Your provider has recommended the following:  Medication Changes:  INCREASE the metoprolol XL to 100mg once a day  In 2 weeks if you continue to feel well (no new side effects from the increase in metoprolol) then INCREASE your losartan to 100mg once a day  Recommendations:  Non-fasting blood work in 1 month  Follow-up:  See Marifer ULRICH for cardiology follow up April/May 2020 with an echo prior.     Reminder:  Please bring in your current medication list or your medication, over the counter supplements and vitamin bottles as we will review these at each office visit.

## 2019-11-22 NOTE — PROGRESS NOTES
Please see separate dictation for HPI, PHYSICAL EXAM AND IMPRESSION/PLAN.    CURRENT MEDICATIONS:  Current Outpatient Medications   Medication Sig Dispense Refill     aspirin 81 MG EC tablet Take 81 mg by mouth every evening       calcium carbonate-vitamin D (CALCIUM 600+D) 600-200 MG-UNIT TABS Take 1 tablet by mouth 2 times daily       levothyroxine (SYNTHROID/LEVOTHROID) 50 MCG tablet Take 1 tablet (50 mcg) by mouth daily 90 tablet 1     losartan (COZAAR) 50 MG tablet Take 1 tablet (50 mg) by mouth daily 90 tablet 3     metoprolol succinate ER (TOPROL-XL) 50 MG 24 hr tablet Take 1 tablet (50 mg) by mouth daily 90 tablet 3     Multiple Vitamins-Minerals (MULTIVITAMIN ADULT) TABS Take 1 tablet by mouth daily       omeprazole (PRILOSEC) 10 MG DR capsule Take 1 capsule (10 mg) by mouth every morning (before breakfast) 90 capsule 3     potassium chloride ER (K-DUR/KLOR-CON M) 20 MEQ CR tablet Take 2 tablets (40 mEq) by mouth daily 180 tablet 3     rivaroxaban ANTICOAGULANT (XARELTO) 20 MG TABS tablet Take 1 tablet (20 mg) by mouth daily (with dinner) 90 tablet 3     torsemide (DEMADEX) 10 MG tablet Take 1 tablet (10 mg) by mouth daily 90 tablet 3       ALLERGIES:     Allergies   Allergen Reactions     Chlorpheniramine Other (See Comments)     LOC and fainting      Lisinopril Cough     Phenylephrine Other (See Comments)     fainting       PAST MEDICAL HISTORY:  Past Medical History:   Diagnosis Date     Allergic rhinitis, cause unspecified     dust mites, ragweed     Complete AV block (H)     BSX DDD PM 2-     History of tricuspid valve repair      Mitral valve prolapse     bioprosthetic MVR 2-     Paroxysmal atrial fibrillation (H)        PAST SURGICAL HISTORY:  Past Surgical History:   Procedure Laterality Date     COLONOSCOPY N/A 9/15/2015    Procedure: COLONOSCOPY;  Surgeon: Anson Llanos MD;  Location:  GI     CV HEART CATHETERIZATION WITH POSSIBLE INTERVENTION N/A 1/9/2019    Procedure: Heart  Catheterization with Possible Intervention;  Surgeon: Ritesh Whittaker MD;  Location:  HEART CARDIAC CATH LAB     CV RIGHT AND LEFT HEART CATH N/A 1/9/2019    Procedure: Right and Left Heart Catherization;  Surgeon: Ritesh Whittaker MD;  Location:  HEART CARDIAC CATH LAB     ENT SURGERY      T&A     EP PACEMAKER N/A 2/18/2019    Procedure: EP Pacemaker;  Surgeon: Inocencia Guillen MD;  Location:  HEART CARDIAC CATH LAB     REPAIR VALVE TRICUSPID MINIMALLY INVASIVE N/A 2/15/2019    Procedure: MINIMALLY INVASIVE TRICUSPID VALVE REPAIR WITH 32MM SULLIVAN RING;  Surgeon: Andrea Hanna MD;  Location:  OR     REPLACE VALVE MITRAL MINIMALLY INVASIVE N/A 2/15/2019    Procedure: MINIMALLY INVASIVE MITRAL VALVE REPLACEMENT WITH EPIC ST KEYUR 31MM VALVE; ON PUMP WITH TIA.  CARDIOLOGIST DR CARDENAS;  Surgeon: Andrea Hanna MD;  Location:  OR       SOCIAL HISTORY:  Social History     Socioeconomic History     Marital status:      Spouse name: None     Number of children: 3     Years of education: None     Highest education level: None   Occupational History     None   Social Needs     Financial resource strain: None     Food insecurity:     Worry: None     Inability: None     Transportation needs:     Medical: None     Non-medical: None   Tobacco Use     Smoking status: Never Smoker     Smokeless tobacco: Never Used   Substance and Sexual Activity     Alcohol use: No     Drug use: No     Sexual activity: Not Currently   Lifestyle     Physical activity:     Days per week: None     Minutes per session: None     Stress: None   Relationships     Social connections:     Talks on phone: None     Gets together: None     Attends Restoration service: None     Active member of club or organization: None     Attends meetings of clubs or organizations: None     Relationship status: None     Intimate partner violence:     Fear of current or ex partner: None     Emotionally abused: None      Physically abused: None     Forced sexual activity: None   Other Topics Concern      Service Not Asked     Blood Transfusions Not Asked     Caffeine Concern No     Comment: 1 cup of coffee and 1 can diet coke per day     Occupational Exposure Not Asked     Hobby Hazards Not Asked     Sleep Concern Yes     Comment: takes Doxylamine succinate 1/2 tab every night     Stress Concern No     Weight Concern No     Special Diet No     Comment: healthy      Back Care Not Asked     Exercise Yes     Comment: walking 45min x7 a wk. Very active, [employer], Golf, Bowling, Cardio     Bike Helmet Not Asked     Seat Belt Yes     Self-Exams Yes     Parent/sibling w/ CABG, MI or angioplasty before 65F 55M? No   Social History Narrative     None       FAMILY HISTORY:  Family History   Problem Relation Age of Onset     Osteoporosis Mother      Alzheimer Disease Mother      Family History Negative Father      Heart Disease Maternal Grandfather      Family History Negative Paternal Grandmother      Family History Negative Paternal Grandfather        Review of Systems:  Skin:  Negative       Eyes:  Positive for glasses    ENT:  Positive for sinus trouble;nasal congestion due to allergies per pt   Respiratory:  Positive for dyspnea on exertion     Cardiovascular:    palpitations;Positive for;fatigue;dizziness;edema    Gastroenterology: Negative      Genitourinary:  Negative      Musculoskeletal:  Positive for arthritis    Neurologic:  Negative      Psychiatric:  Positive for sleep disturbances    Heme/Lymph/Imm:  Positive for allergies;easy bruising    Endocrine:  Positive for thyroid disorder       Reviewed. Remainder of the note dictated.    Marifer Mohamud PA-C

## 2019-12-16 ENCOUNTER — ANCILLARY PROCEDURE (OUTPATIENT)
Dept: CARDIOLOGY | Facility: CLINIC | Age: 74
End: 2019-12-16
Attending: INTERNAL MEDICINE
Payer: COMMERCIAL

## 2019-12-16 DIAGNOSIS — Z95.0 PACEMAKER: ICD-10-CM

## 2019-12-16 LAB
MDC_IDC_EPISODE_DTM: NORMAL
MDC_IDC_EPISODE_DURATION: 13 S
MDC_IDC_EPISODE_DURATION: 15 S
MDC_IDC_EPISODE_DURATION: 3 S
MDC_IDC_EPISODE_ID: NORMAL
MDC_IDC_EPISODE_TYPE: NORMAL
MDC_IDC_LEAD_IMPLANT_DT: NORMAL
MDC_IDC_LEAD_IMPLANT_DT: NORMAL
MDC_IDC_LEAD_LOCATION: NORMAL
MDC_IDC_LEAD_LOCATION: NORMAL
MDC_IDC_LEAD_LOCATION_DETAIL_1: NORMAL
MDC_IDC_LEAD_LOCATION_DETAIL_1: NORMAL
MDC_IDC_LEAD_MFG: NORMAL
MDC_IDC_LEAD_MFG: NORMAL
MDC_IDC_LEAD_MODEL: NORMAL
MDC_IDC_LEAD_MODEL: NORMAL
MDC_IDC_LEAD_POLARITY_TYPE: NORMAL
MDC_IDC_LEAD_POLARITY_TYPE: NORMAL
MDC_IDC_LEAD_SERIAL: NORMAL
MDC_IDC_LEAD_SERIAL: NORMAL
MDC_IDC_MSMT_BATTERY_DTM: NORMAL
MDC_IDC_MSMT_BATTERY_REMAINING_LONGEVITY: 168 MO
MDC_IDC_MSMT_BATTERY_REMAINING_PERCENTAGE: 100 %
MDC_IDC_MSMT_BATTERY_STATUS: NORMAL
MDC_IDC_MSMT_LEADCHNL_RA_IMPEDANCE_VALUE: 593 OHM
MDC_IDC_MSMT_LEADCHNL_RV_IMPEDANCE_VALUE: 851 OHM
MDC_IDC_MSMT_LEADCHNL_RV_PACING_THRESHOLD_AMPLITUDE: 0.8 V
MDC_IDC_MSMT_LEADCHNL_RV_PACING_THRESHOLD_PULSEWIDTH: 0.4 MS
MDC_IDC_PG_IMPLANT_DTM: NORMAL
MDC_IDC_PG_MFG: NORMAL
MDC_IDC_PG_MODEL: NORMAL
MDC_IDC_PG_SERIAL: NORMAL
MDC_IDC_PG_TYPE: NORMAL
MDC_IDC_SESS_CLINIC_NAME: NORMAL
MDC_IDC_SESS_DTM: NORMAL
MDC_IDC_SESS_TYPE: NORMAL
MDC_IDC_SET_BRADY_AT_MODE_SWITCH_MODE: NORMAL
MDC_IDC_SET_BRADY_AT_MODE_SWITCH_RATE: 170 {BEATS}/MIN
MDC_IDC_SET_BRADY_LOWRATE: 60 {BEATS}/MIN
MDC_IDC_SET_BRADY_MAX_SENSOR_RATE: 130 {BEATS}/MIN
MDC_IDC_SET_BRADY_MAX_TRACKING_RATE: 130 {BEATS}/MIN
MDC_IDC_SET_BRADY_MODE: NORMAL
MDC_IDC_SET_BRADY_PAV_DELAY_HIGH: 150 MS
MDC_IDC_SET_BRADY_PAV_DELAY_LOW: 200 MS
MDC_IDC_SET_BRADY_SAV_DELAY_HIGH: 150 MS
MDC_IDC_SET_BRADY_SAV_DELAY_LOW: 200 MS
MDC_IDC_SET_LEADCHNL_RA_PACING_AMPLITUDE: 2 V
MDC_IDC_SET_LEADCHNL_RA_PACING_CAPTURE_MODE: NORMAL
MDC_IDC_SET_LEADCHNL_RA_PACING_POLARITY: NORMAL
MDC_IDC_SET_LEADCHNL_RA_PACING_PULSEWIDTH: 0.4 MS
MDC_IDC_SET_LEADCHNL_RA_SENSING_ADAPTATION_MODE: NORMAL
MDC_IDC_SET_LEADCHNL_RA_SENSING_POLARITY: NORMAL
MDC_IDC_SET_LEADCHNL_RA_SENSING_SENSITIVITY: 0.5 MV
MDC_IDC_SET_LEADCHNL_RV_PACING_AMPLITUDE: 1.2 V
MDC_IDC_SET_LEADCHNL_RV_PACING_CAPTURE_MODE: NORMAL
MDC_IDC_SET_LEADCHNL_RV_PACING_POLARITY: NORMAL
MDC_IDC_SET_LEADCHNL_RV_PACING_PULSEWIDTH: 0.4 MS
MDC_IDC_SET_LEADCHNL_RV_SENSING_ADAPTATION_MODE: NORMAL
MDC_IDC_SET_LEADCHNL_RV_SENSING_POLARITY: NORMAL
MDC_IDC_SET_LEADCHNL_RV_SENSING_SENSITIVITY: 1.5 MV
MDC_IDC_SET_ZONE_DETECTION_INTERVAL: 375 MS
MDC_IDC_SET_ZONE_TYPE: NORMAL
MDC_IDC_SET_ZONE_VENDOR_TYPE: NORMAL
MDC_IDC_STAT_AT_BURDEN_PERCENT: 1 %
MDC_IDC_STAT_AT_DTM_END: NORMAL
MDC_IDC_STAT_AT_DTM_START: NORMAL
MDC_IDC_STAT_BRADY_DTM_END: NORMAL
MDC_IDC_STAT_BRADY_DTM_START: NORMAL
MDC_IDC_STAT_BRADY_RA_PERCENT_PACED: 1 %
MDC_IDC_STAT_BRADY_RV_PERCENT_PACED: 1 %
MDC_IDC_STAT_EPISODE_RECENT_COUNT: 0
MDC_IDC_STAT_EPISODE_RECENT_COUNT: 1
MDC_IDC_STAT_EPISODE_RECENT_COUNT: 2
MDC_IDC_STAT_EPISODE_RECENT_COUNT_DTM_END: NORMAL
MDC_IDC_STAT_EPISODE_RECENT_COUNT_DTM_START: NORMAL
MDC_IDC_STAT_EPISODE_TYPE: NORMAL
MDC_IDC_STAT_EPISODE_VENDOR_TYPE: NORMAL

## 2019-12-16 PROCEDURE — 93296 REM INTERROG EVL PM/IDS: CPT | Performed by: INTERNAL MEDICINE

## 2019-12-16 PROCEDURE — 93294 REM INTERROG EVL PM/LDLS PM: CPT | Performed by: INTERNAL MEDICINE

## 2019-12-20 DIAGNOSIS — I50.22 CHRONIC SYSTOLIC HEART FAILURE (H): ICD-10-CM

## 2019-12-20 LAB
ANION GAP SERPL CALCULATED.3IONS-SCNC: 4 MMOL/L (ref 3–14)
BUN SERPL-MCNC: 18 MG/DL (ref 7–30)
CALCIUM SERPL-MCNC: 8.8 MG/DL (ref 8.5–10.1)
CHLORIDE SERPL-SCNC: 105 MMOL/L (ref 94–109)
CO2 SERPL-SCNC: 30 MMOL/L (ref 20–32)
CREAT SERPL-MCNC: 0.8 MG/DL (ref 0.52–1.04)
GFR SERPL CREATININE-BSD FRML MDRD: 73 ML/MIN/{1.73_M2}
GLUCOSE SERPL-MCNC: 103 MG/DL (ref 70–99)
POTASSIUM SERPL-SCNC: 4.1 MMOL/L (ref 3.4–5.3)
SODIUM SERPL-SCNC: 139 MMOL/L (ref 133–144)

## 2019-12-20 PROCEDURE — 80048 BASIC METABOLIC PNL TOTAL CA: CPT | Performed by: PHYSICIAN ASSISTANT

## 2019-12-20 PROCEDURE — 36415 COLL VENOUS BLD VENIPUNCTURE: CPT | Performed by: PHYSICIAN ASSISTANT

## 2020-02-16 ENCOUNTER — HEALTH MAINTENANCE LETTER (OUTPATIENT)
Age: 75
End: 2020-02-16

## 2020-03-12 ENCOUNTER — OFFICE VISIT (OUTPATIENT)
Dept: INTERNAL MEDICINE | Facility: CLINIC | Age: 75
End: 2020-03-12
Payer: COMMERCIAL

## 2020-03-12 VITALS
BODY MASS INDEX: 19.77 KG/M2 | RESPIRATION RATE: 18 BRPM | SYSTOLIC BLOOD PRESSURE: 100 MMHG | DIASTOLIC BLOOD PRESSURE: 64 MMHG | WEIGHT: 123 LBS | HEART RATE: 87 BPM | TEMPERATURE: 97.5 F | OXYGEN SATURATION: 100 % | HEIGHT: 66 IN

## 2020-03-12 DIAGNOSIS — E03.9 ACQUIRED HYPOTHYROIDISM: ICD-10-CM

## 2020-03-12 DIAGNOSIS — K21.9 GASTROESOPHAGEAL REFLUX DISEASE WITHOUT ESOPHAGITIS: ICD-10-CM

## 2020-03-12 DIAGNOSIS — Z00.00 ENCOUNTER FOR ROUTINE ADULT HEALTH EXAMINATION WITHOUT ABNORMAL FINDINGS: Primary | ICD-10-CM

## 2020-03-12 DIAGNOSIS — I48.0 PAROXYSMAL ATRIAL FIBRILLATION (H): ICD-10-CM

## 2020-03-12 DIAGNOSIS — I42.9 CARDIOMYOPATHY, UNSPECIFIED TYPE (H): ICD-10-CM

## 2020-03-12 PROCEDURE — 99397 PER PM REEVAL EST PAT 65+ YR: CPT | Performed by: INTERNAL MEDICINE

## 2020-03-12 PROCEDURE — 99213 OFFICE O/P EST LOW 20 MIN: CPT | Mod: 25 | Performed by: INTERNAL MEDICINE

## 2020-03-12 PROCEDURE — 36415 COLL VENOUS BLD VENIPUNCTURE: CPT | Performed by: INTERNAL MEDICINE

## 2020-03-12 PROCEDURE — 84443 ASSAY THYROID STIM HORMONE: CPT | Performed by: INTERNAL MEDICINE

## 2020-03-12 RX ORDER — AMOXICILLIN 500 MG/1
500 CAPSULE ORAL PRN
COMMUNITY
End: 2020-12-30

## 2020-03-12 RX ORDER — LEVOTHYROXINE SODIUM 50 UG/1
50 TABLET ORAL DAILY
Qty: 90 TABLET | Refills: 3 | Status: SHIPPED | OUTPATIENT
Start: 2020-03-12 | End: 2021-03-23

## 2020-03-12 ASSESSMENT — ENCOUNTER SYMPTOMS
COUGH: 0
ABDOMINAL PAIN: 0
HEMATURIA: 0
CONSTIPATION: 0
CHILLS: 0
DIARRHEA: 0
HEMATOCHEZIA: 0
DIZZINESS: 0

## 2020-03-12 ASSESSMENT — ACTIVITIES OF DAILY LIVING (ADL): CURRENT_FUNCTION: NO ASSISTANCE NEEDED

## 2020-03-12 ASSESSMENT — MIFFLIN-ST. JEOR: SCORE: 1071.73

## 2020-03-12 NOTE — PATIENT INSTRUCTIONS
Consider the shingles vaccine.     Try to get Calcium 1200 mg total per day. It is best to not take it all at once. Try to get Vitamin D at least 2349-7193 units (25-50 mcg) per day.

## 2020-03-12 NOTE — PROGRESS NOTES
"SUBJECTIVE:   Taty Aguila is a 75 year old female who presents for Preventive Visit.  Are you in the first 12 months of your Medicare coverage?  No    Healthy Habits:     In general, how would you rate your overall health?  Good    Frequency of exercise:  6-7 days/week    Duration of exercise:  45-60 minutes    Do you usually eat at least 4 servings of fruit and vegetables a day, include whole grains    & fiber and avoid regularly eating high fat or \"junk\" foods?  Yes    Taking medications regularly:  Yes    Medication side effects:  None    Ability to successfully perform activities of daily living:  No assistance needed    Home Safety:  Lack of grab bars in the bathroom    Hearing Impairment:  No hearing concerns    In the past 6 months, have you been bothered by leaking of urine?  No    In general, how would you rate your overall mental or emotional health?  Good      PHQ-2 Total Score: 0    Additional concerns today:  No  Do you feel safe in your environment? Yes  Have you ever done Advance Care Planning? (For example, a Health Directive, POLST, or a discussion with a medical provider or your loved ones about your wishes): Yes, advance care planning is on file.  Fall risk    Cognitive Screening   1) Repeat 3 items (Leader, Season, Table)    2) Clock draw: NORMAL  3) 3 item recall: Recalls 3 objects  Results: 3 items recalled: COGNITIVE IMPAIRMENT LESS LIKELY    Mini-CogTM Copyright MOJGAN Hannah. Licensed by the author for use in [facility]; reprinted with permission (sera@.[facility]). All rights reserved.      Do you have sleep apnea, excessive snoring or daytime drowsiness?: no    Problems:   1. Afib, heart block: She has a pacemaker reports that she thought it had initially been set at a rate of 70.  It is now been running around 60.  She feels a little tired with it that slow.   2. Hypothyroidism: improved hair   3. GERD: Stable  4.  Cardiomyopathy: Still some reduced exercise capacity, no PND " orthopnea    Current concerns:   none      Reviewed and updated as needed this visit by clinical staff         Reviewed and updated as needed this visit by Provider        Social History     Tobacco Use     Smoking status: Never Smoker     Smokeless tobacco: Never Used   Substance Use Topics     Alcohol use: No     If you drink alcohol do you typically have >3 drinks per day or >7 drinks per week? No    Alcohol Use 8/15/2016   Prescreen: >3 drinks/day or >7 drinks/week? The patient does not drink >3 drinks per day nor >7 drinks per week.   No flowsheet data found.            Current providers sharing in care for this patient include:   Patient Care Team:  Anyi Olmos MD as PCP - General (Internal Medicine)  Anyi Olmos MD as Assigned PCP  Courtney Brian PA as Physician Assistant (Cardiology)  Jesus Stone MD as MD (Cardiology)  Milagro Velazquez, RN as Registered Nurse (Cardiology)  Jo-Ann Neal PA-C as Physician Assistant (Physician Assistant)    The following health maintenance items are reviewed in Epic and correct as of today:  Health Maintenance   Topic Date Due     HF ACTION PLAN  1945     ZOSTER IMMUNIZATION (2 of 3) 10/07/2008     LIPID  07/23/2016     MEDICARE ANNUAL WELLNESS VISIT  08/15/2017     PHQ-2  01/01/2020     FALL RISK ASSESSMENT  02/07/2020     CBC  02/22/2020     BMP  06/20/2020     ALT  07/11/2020     MAMMO SCREENING  09/16/2020     DTAP/TDAP/TD IMMUNIZATION (3 - Td) 07/19/2023     ADVANCE CARE PLANNING  10/17/2023     COLORECTAL CANCER SCREENING  09/15/2025     DEXA  Completed     TSH W/FREE T4 REFLEX  Completed     HEPATITIS C SCREENING  Completed     INFLUENZA VACCINE  Completed     PNEUMOCOCCAL IMMUNIZATION 65+ LOW/MEDIUM RISK  Completed     IPV IMMUNIZATION  Aged Out     MENINGITIS IMMUNIZATION  Aged Out       Patient Active Problem List   Diagnosis     Allergic rhinitis     Colitis     Paroxysmal atrial fibrillation (H)     Thoracic aortic ectasia (H)      Mitral valve prolapse     History of tricuspid valve repair-32 mm Oquendo Ring     Complete AV block (H)     S/P MVR (mitral valve replacement)-St junaid epic tissue 31 mm     Anticoagulated-Xarelto     Cardiomyopathy, unspecified type (H)     Acquired hypothyroidism     Current Outpatient Medications   Medication Sig Dispense Refill     amoxicillin (AMOXIL) 500 MG capsule Take 500 mg by mouth as needed       aspirin 81 MG EC tablet Take 81 mg by mouth every evening       calcium carbonate-vitamin D (CALCIUM 600+D) 600-200 MG-UNIT TABS Take 1 tablet by mouth 2 times daily       levothyroxine (SYNTHROID/LEVOTHROID) 50 MCG tablet Take 1 tablet (50 mcg) by mouth daily 90 tablet 1     losartan (COZAAR) 100 MG tablet Take 1 tablet (100 mg) by mouth daily 90 tablet 3     metoprolol succinate ER (TOPROL-XL) 100 MG 24 hr tablet Take 1 tablet (100 mg) by mouth daily 90 tablet 3     Multiple Vitamins-Minerals (MULTIVITAMIN ADULT) TABS Take 1 tablet by mouth daily       omeprazole (PRILOSEC) 10 MG DR capsule Take 1 capsule (10 mg) by mouth every morning (before breakfast) 90 capsule 3     potassium chloride ER (K-DUR/KLOR-CON M) 20 MEQ CR tablet Take 1 tablet (20 mEq) by mouth daily 90 tablet 3     rivaroxaban ANTICOAGULANT (XARELTO) 20 MG TABS tablet Take 1 tablet (20 mg) by mouth daily (with dinner) 90 tablet 3     torsemide (DEMADEX) 10 MG tablet Take 1 tablet (10 mg) by mouth daily 90 tablet 3        Review of Systems   Constitutional: Negative for chills.   HENT: Negative for congestion.    Respiratory: Negative for cough.    Cardiovascular: Negative for chest pain.   Gastrointestinal: Negative for abdominal pain, constipation, diarrhea and hematochezia.   Genitourinary: Negative for hematuria.   Neurological: Negative for dizziness.     Negative for muscle or joint pains  No skin changes      OBJECTIVE:      Physical Exam  Patient alert, in no acute distress  /64 (BP Location: Left arm, Patient Position: Sitting,  "Cuff Size: Adult Regular)   Pulse 87   Temp 97.5  F (36.4  C) (Oral)   Resp 18   Ht 1.664 m (5' 5.5\")   Wt 55.8 kg (123 lb)   SpO2 100%   BMI 20.16 kg/m      HEENT: extraocular movements are intact, pupils equal and reactive to light and accommodation, TMs clear, oropharynx clear  NECK: Neck supple. No adenopathy. Thyroid symmetric, normal size,, Carotids without bruits.  PULMONARY: clear to auscultation  CARDIAC: Regular S1, S2 rate around 60, sharp valve click, soft murmur  PULSES: 2/2 throughout  BACK: no spinal or CVAT  ABDOMINAL: Soft, nontender.  Normal bowel sounds.  No hepatosplenomegaly or abnormal masses  BREAST: No breast masses or tenderness, No axillary masses or tenderness and No galactorrhea  REFLEXES: 1+ throughout          ASSESSMENT / PLAN:   1. Encounter for routine adult health examination without abnormal findings  Up to date    2. Cardiomyopathy, unspecified type (H)  Stable    3. Paroxysmal atrial fibrillation (H)  No evidence of recurrence, advised to discuss with cardiology regarding her pacemaker rate    4. Acquired hypothyroidism  Check her lab, refill medication  - TSH with free T4 reflex  - levothyroxine (SYNTHROID/LEVOTHROID) 50 MCG tablet; Take 1 tablet (50 mcg) by mouth daily  Dispense: 90 tablet; Refill: 3    5. Gastroesophageal reflux disease without esophagitis  Stable, continue      COUNSELING:  Reviewed preventive health counseling, as reflected in patient instructions    Estimated body mass index is 19.16 kg/m  as calculated from the following:    Height as of 11/22/19: 1.676 m (5' 6\").    Weight as of 11/22/19: 53.8 kg (118 lb 11.2 oz).         reports that she has never smoked. She has never used smokeless tobacco.      Appropriate preventive services were discussed with this patient, including applicable screening as appropriate for cardiovascular disease, diabetes, osteopenia/osteoporosis, and glaucoma.  As appropriate for age/gender, discussed screening for " colorectal cancer, prostate cancer, breast cancer, and cervical cancer. Checklist reviewing preventive services available has been given to the patient.    Reviewed patients plan of care and provided an AVS. The Basic Care Plan (routine screening as documented in Health Maintenance) for Taty meets the Care Plan requirement. This Care Plan has been established and reviewed with the Patient.    Counseling Resources:  ATP IV Guidelines  Pooled Cohorts Equation Calculator  Breast Cancer Risk Calculator  FRAX Risk Assessment  ICSI Preventive Guidelines  Dietary Guidelines for Americans, 2010  USDA's MyPlate  ASA Prophylaxis  Lung CA Screening    Anyi Olmos MD  [facility]    Identified Health Risks:

## 2020-03-12 NOTE — NURSING NOTE
"/64 (BP Location: Left arm, Patient Position: Sitting, Cuff Size: Adult Regular)   Pulse 87   Temp 97.5  F (36.4  C) (Oral)   Resp 18   Ht 1.664 m (5' 5.5\")   Wt 55.8 kg (123 lb)   SpO2 100%   BMI 20.16 kg/m      "

## 2020-03-13 LAB — TSH SERPL DL<=0.005 MIU/L-ACNC: 0.85 MU/L (ref 0.4–4)

## 2020-03-18 PROBLEM — I34.1 MITRAL VALVE PROLAPSE: Status: RESOLVED | Noted: 2019-02-15 | Resolved: 2020-03-18

## 2020-03-18 PROBLEM — K21.9 GASTROESOPHAGEAL REFLUX DISEASE WITHOUT ESOPHAGITIS: Status: ACTIVE | Noted: 2020-03-18

## 2020-03-23 ENCOUNTER — ANCILLARY PROCEDURE (OUTPATIENT)
Dept: CARDIOLOGY | Facility: CLINIC | Age: 75
End: 2020-03-23
Attending: INTERNAL MEDICINE
Payer: COMMERCIAL

## 2020-03-23 DIAGNOSIS — Z95.0 CARDIAC PACEMAKER IN SITU: ICD-10-CM

## 2020-03-23 PROCEDURE — 93296 REM INTERROG EVL PM/IDS: CPT | Performed by: INTERNAL MEDICINE

## 2020-03-23 PROCEDURE — 93294 REM INTERROG EVL PM/LDLS PM: CPT | Performed by: INTERNAL MEDICINE

## 2020-04-03 LAB
MDC_IDC_EPISODE_DTM: NORMAL
MDC_IDC_EPISODE_DURATION: 13 S
MDC_IDC_EPISODE_DURATION: 17 S
MDC_IDC_EPISODE_DURATION: 7 S
MDC_IDC_EPISODE_ID: NORMAL
MDC_IDC_EPISODE_TYPE: NORMAL
MDC_IDC_LEAD_IMPLANT_DT: NORMAL
MDC_IDC_LEAD_IMPLANT_DT: NORMAL
MDC_IDC_LEAD_LOCATION: NORMAL
MDC_IDC_LEAD_LOCATION: NORMAL
MDC_IDC_LEAD_LOCATION_DETAIL_1: NORMAL
MDC_IDC_LEAD_LOCATION_DETAIL_1: NORMAL
MDC_IDC_LEAD_MFG: NORMAL
MDC_IDC_LEAD_MFG: NORMAL
MDC_IDC_LEAD_MODEL: NORMAL
MDC_IDC_LEAD_MODEL: NORMAL
MDC_IDC_LEAD_POLARITY_TYPE: NORMAL
MDC_IDC_LEAD_POLARITY_TYPE: NORMAL
MDC_IDC_LEAD_SERIAL: NORMAL
MDC_IDC_LEAD_SERIAL: NORMAL
MDC_IDC_MSMT_BATTERY_DTM: NORMAL
MDC_IDC_MSMT_BATTERY_REMAINING_LONGEVITY: 162 MO
MDC_IDC_MSMT_BATTERY_REMAINING_PERCENTAGE: 100 %
MDC_IDC_MSMT_BATTERY_STATUS: NORMAL
MDC_IDC_MSMT_LEADCHNL_RA_IMPEDANCE_VALUE: 741 OHM
MDC_IDC_MSMT_LEADCHNL_RA_PACING_THRESHOLD_AMPLITUDE: 0.6 V
MDC_IDC_MSMT_LEADCHNL_RA_PACING_THRESHOLD_PULSEWIDTH: 0.4 MS
MDC_IDC_MSMT_LEADCHNL_RV_IMPEDANCE_VALUE: 734 OHM
MDC_IDC_MSMT_LEADCHNL_RV_PACING_THRESHOLD_AMPLITUDE: 1 V
MDC_IDC_MSMT_LEADCHNL_RV_PACING_THRESHOLD_PULSEWIDTH: 0.4 MS
MDC_IDC_PG_IMPLANT_DTM: NORMAL
MDC_IDC_PG_MFG: NORMAL
MDC_IDC_PG_MODEL: NORMAL
MDC_IDC_PG_SERIAL: NORMAL
MDC_IDC_PG_TYPE: NORMAL
MDC_IDC_SESS_CLINIC_NAME: NORMAL
MDC_IDC_SESS_DTM: NORMAL
MDC_IDC_SESS_TYPE: NORMAL
MDC_IDC_SET_BRADY_AT_MODE_SWITCH_MODE: NORMAL
MDC_IDC_SET_BRADY_AT_MODE_SWITCH_RATE: 170 {BEATS}/MIN
MDC_IDC_SET_BRADY_LOWRATE: 60 {BEATS}/MIN
MDC_IDC_SET_BRADY_MAX_SENSOR_RATE: 130 {BEATS}/MIN
MDC_IDC_SET_BRADY_MAX_TRACKING_RATE: 130 {BEATS}/MIN
MDC_IDC_SET_BRADY_MODE: NORMAL
MDC_IDC_SET_BRADY_PAV_DELAY_HIGH: 150 MS
MDC_IDC_SET_BRADY_PAV_DELAY_LOW: 200 MS
MDC_IDC_SET_BRADY_SAV_DELAY_HIGH: 150 MS
MDC_IDC_SET_BRADY_SAV_DELAY_LOW: 200 MS
MDC_IDC_SET_LEADCHNL_RA_PACING_AMPLITUDE: 2 V
MDC_IDC_SET_LEADCHNL_RA_PACING_CAPTURE_MODE: NORMAL
MDC_IDC_SET_LEADCHNL_RA_PACING_POLARITY: NORMAL
MDC_IDC_SET_LEADCHNL_RA_PACING_PULSEWIDTH: 0.4 MS
MDC_IDC_SET_LEADCHNL_RA_SENSING_ADAPTATION_MODE: NORMAL
MDC_IDC_SET_LEADCHNL_RA_SENSING_POLARITY: NORMAL
MDC_IDC_SET_LEADCHNL_RA_SENSING_SENSITIVITY: 0.5 MV
MDC_IDC_SET_LEADCHNL_RV_PACING_AMPLITUDE: 1.5 V
MDC_IDC_SET_LEADCHNL_RV_PACING_CAPTURE_MODE: NORMAL
MDC_IDC_SET_LEADCHNL_RV_PACING_POLARITY: NORMAL
MDC_IDC_SET_LEADCHNL_RV_PACING_PULSEWIDTH: 0.4 MS
MDC_IDC_SET_LEADCHNL_RV_SENSING_ADAPTATION_MODE: NORMAL
MDC_IDC_SET_LEADCHNL_RV_SENSING_POLARITY: NORMAL
MDC_IDC_SET_LEADCHNL_RV_SENSING_SENSITIVITY: 1.5 MV
MDC_IDC_SET_ZONE_DETECTION_INTERVAL: 375 MS
MDC_IDC_SET_ZONE_TYPE: NORMAL
MDC_IDC_SET_ZONE_VENDOR_TYPE: NORMAL
MDC_IDC_STAT_AT_BURDEN_PERCENT: 1 %
MDC_IDC_STAT_AT_DTM_END: NORMAL
MDC_IDC_STAT_AT_DTM_START: NORMAL
MDC_IDC_STAT_BRADY_DTM_END: NORMAL
MDC_IDC_STAT_BRADY_DTM_START: NORMAL
MDC_IDC_STAT_BRADY_RA_PERCENT_PACED: 4 %
MDC_IDC_STAT_BRADY_RV_PERCENT_PACED: 5 %
MDC_IDC_STAT_EPISODE_RECENT_COUNT: 0
MDC_IDC_STAT_EPISODE_RECENT_COUNT: 2
MDC_IDC_STAT_EPISODE_RECENT_COUNT: 4
MDC_IDC_STAT_EPISODE_RECENT_COUNT_DTM_END: NORMAL
MDC_IDC_STAT_EPISODE_RECENT_COUNT_DTM_START: NORMAL
MDC_IDC_STAT_EPISODE_TYPE: NORMAL
MDC_IDC_STAT_EPISODE_VENDOR_TYPE: NORMAL

## 2020-05-04 ENCOUNTER — VIRTUAL VISIT (OUTPATIENT)
Dept: CARDIOLOGY | Facility: CLINIC | Age: 75
End: 2020-05-04
Payer: COMMERCIAL

## 2020-05-04 VITALS
SYSTOLIC BLOOD PRESSURE: 100 MMHG | DIASTOLIC BLOOD PRESSURE: 70 MMHG | BODY MASS INDEX: 19.93 KG/M2 | WEIGHT: 124 LBS | HEIGHT: 66 IN | HEART RATE: 64 BPM

## 2020-05-04 DIAGNOSIS — I50.22 CHRONIC SYSTOLIC HEART FAILURE (H): Primary | ICD-10-CM

## 2020-05-04 DIAGNOSIS — Z98.890 S/P TVR (TRICUSPID VALVE REPAIR): ICD-10-CM

## 2020-05-04 DIAGNOSIS — Z95.2 S/P MVR (MITRAL VALVE REPLACEMENT): ICD-10-CM

## 2020-05-04 PROCEDURE — 99213 OFFICE O/P EST LOW 20 MIN: CPT | Mod: 95 | Performed by: PHYSICIAN ASSISTANT

## 2020-05-04 ASSESSMENT — MIFFLIN-ST. JEOR: SCORE: 1076.27

## 2020-05-04 NOTE — PROGRESS NOTES
"Service Date: 5/4/2020     Taty Aguila is a 75 year old female who is being evaluated via a billable video visit.      The patient has been notified of following:     \"This video visit will be conducted via a call between you and your physician/provider. We have found that certain health care needs can be provided without the need for an in-person physical exam.  This service lets us provide the care you need with a video conversation.  If a prescription is necessary we can send it directly to your pharmacy.  If lab work is needed we can place an order for that and you can then stop by our lab to have the test done at a later time.    Video visits are billed at different rates depending on your insurance coverage.  Please reach out to your insurance provider with any questions.    If during the course of the call the physician/provider feels a video visit is not appropriate, you will not be charged for this service.\"    Patient reported vitals:  BP:100/70  Heart rate:64  Weight:124    Review Of Systems  Skin: negative  Eyes: glasses  Ears/Nose/Throat: negative  Respiratory: No shortness of breath, dyspnea on exertion, cough, or hemoptysis  Cardiovascular: negative  Gastrointestinal: negative  Genitourinary: negative  Musculoskeletal: cramps legs  Neurologic: negative  Psychiatric: negative  Hematologic/Lymphatic/Immunologic: allergies  Endocrine: thyroid disorder    SHowley CMA    Patient has given verbal consent for Video visit? Yes    How would you like to obtain your AVS? Mail a copy    Patient would like the video invitation sent by: Text to cell phone: [phone]    Video-Visit Details    Type of service:  Video Visit    Video Start Time: 1555  Video End Time: 1607    Originating Location (pt. Location): Home    Distant Location (provider location):  Home     Platform used for Video Visit: [facility]      HISTORY OF PRESENTING COMPLAINT:  Taty Aguila is a pleasant 75 year old female who is being " evaluated via a billable video visit in order to minimize the possibility of exposure due to the current COVID-19 pandemic. This is a HF follow up visit.     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  In 10/2018, she was found to be in AFib with RVR.  She did convert back to sinus rhythm; however, about 2 months later she had recurrent atrial fibrillation.  At that time she was sent back to Dr. Hanna with CV Surgery who felt that she indeed did have severe mitral regurgitation and in the setting of new onset atrial fibrillation valve surgery was recommended.  Her preoperative coronary angiogram did not show any significant coronary disease, LV gram showed slightly reduced systolic function.  She had a TIA which showed preserved LV systolic function with an EF of 65% with normal RV size and function.  This confirmed severe mitral regurgitation and 3+ tricuspid regurgitation.  Ultimately, in 02/2019, she underwent successful minimally invasive mitral valve replacement with a 31 mm Epic St. Ron valve at which time she also had a tricuspid valve repair with a 32 mm Oquendo ring.  She developed complete heart block postoperatively and underwent pacemaker implantation.      Unfortunately, shortly after surgery, she developed significant volume overload.  She was found to be back in atrial fibrillation.  She was evaluated in our [facility] by CHRISTIANO Weathers.  Her diuretics were adjusted and she had a significant improvement in her volume status.  She was also reloaded with amiodarone which converted her back to sinus rhythm.  She underwent echocardiography which showed an LVEF in the 20%-25% range.  She was seen by EP Cardiology several times and is now off of amiodarone.  She also was seen in the [facility] several times and saw Dr. Stone most recently in the summer of 2019.  She did have a  repeat echocardiogram in mid July which again showed her EF was in the 20%-25% range.  At that time, she underwent a stress test which did not show any evidence of infarction or ischemia.  Lisinopril was added to her regimen but she developed a cough so was changed to losartan. I have been working with her to uptitrate her HF medications. She was scheduled for a follow up echo prior to this visit but it was cancelled to minimize her risk of COVID-19 exposure.     She has tolerated the changes without any dizziness or significant change to her blood pressure (the patient chronically runs low blood pressure readings).  Again, she denies any cardiovascular symptoms such as chest discomfort, dyspnea on exertion, orthopnea or PND.  Her home weight has been stable.  Again, her home blood pressure readings are typically in the  mmHg systolic range. She typically walks 5 miles a day with her dog without symptoms.      CURRENT CARDIAC MEDICATIONS:   1.  Aspirin 81 mg daily.   2.  Losartan 100 mg daily.   3.  Toprol- mg daily.   4.  Potassium chloride 20 mEq daily.   5.  Torsemide 10 mEq tablet daily.   6.  Xarelto 20 mg daily.   7.  She has a script for amoxicillin for SBE prophylaxis as needed.     The remainder of her medications, allergies and review of systems were reviewed and as are documented.      PHYSICAL EXAMINATION:   Patient reported vitals:  BP:100/70  Heart rate:64  Weight:124  GENERAL: healthy, alert and no distress  EYES: Eyes grossly normal to inspection, conjunctivae and sclerae normal  RESP: no audible wheeze, cough, or visible cyanosis.  No visible retractions or increased work of breathing.  Able to speak fully in complete sentences.  NEURO: Cranial nerves grossly intact, mentation intact and speech normal  PSYCH: mentation appears normal, affect normal/bright, judgement and insight intact, normal speech and appearance well-groomed     ASSESSMENT AND PLAN:  Taty Aguila is a pleasant 75  year old female with a history of severe mitral regurgitation secondary to mitral valve prolapse who is status post mitral valve replacement as well as tricuspid valve repair in 02/2019.  Postoperatively, she developed complete heart block and underwent permanent pacemaker implantation.  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  She previously had symptoms of volume overload, but appears to be euvolemic at this time.  We have worked to optimize her cardiomyopathy medications. She is feeling well without CV complaints.    I have recommended we get the echo and do a BMP in about 1 month as long as the number of COVID-19 cases locally are not rapidly increasing.  If her EF remains less than 35% she likely would be a candidate for an ICD upgrade to her device, she would need an EP consult first.    An AVS will be mailed to the patient.      Thank you for allowing me to participate in the care of this pleasant patient.  Please do not hesitate to contact us with further questions or concerns.      SANDRA AMADOR PA-C

## 2020-05-04 NOTE — PATIENT INSTRUCTIONS
Thank you for your [facility] visit today. Your provider has recommended the following:  Medication Changes:  No changes  Recommendations:  Echo and labs in 1 month as long as the local COVID cases are remaining fairly stable  Follow-up:  See Marifer for cardiology follow up in 1 month.    It was nice to talk to you today! Take care!  Marifer Mohamud PA-C     Reminder:  Please bring in your current medication list or your medication, over the counter supplements and vitamin bottles as we will review these at each office visit.

## 2020-05-04 NOTE — LETTER
5/4/2020      RE: Taty Aguila  [address] [facility] [id number]       Dear Colleague,    Thank you for the opportunity to participate in the care of your patient, Taty Aguila, at the [facility]. Please see a copy of my visit note below.    HISTORY OF PRESENTING COMPLAINT:  Taty Aguila is a pleasant 75 year old female who is being evaluated via a billable video visit in order to minimize the possibility of exposure due to the current COVID-19 pandemic. This is a HF follow up visit.     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  In 10/2018, she was found to be in AFib with RVR.  She did convert back to sinus rhythm; however, about 2 months later she had recurrent atrial fibrillation.  At that time she was sent back to Dr. Hanna with CV Surgery who felt that she indeed did have severe mitral regurgitation and in the setting of new onset atrial fibrillation valve surgery was recommended.  Her preoperative coronary angiogram did not show any significant coronary disease, LV gram showed slightly reduced systolic function.  She had a TIA which showed preserved LV systolic function with an EF of 65% with normal RV size and function.  This confirmed severe mitral regurgitation and 3+ tricuspid regurgitation.  Ultimately, in 02/2019, she underwent successful minimally invasive mitral valve replacement with a 31 mm Epic St. Ron valve at which time she also had a tricuspid valve repair with a 32 mm Oquendo ring.  She developed complete heart block postoperatively and underwent pacemaker implantation.      Unfortunately, shortly after surgery, she developed significant volume overload.  She was found to be back in atrial fibrillation.  She was evaluated in our [facility] by oCurtney Brian,  PA.  Her diuretics were adjusted and she had a significant improvement in her volume status.  She was also reloaded with amiodarone which converted her back to sinus rhythm.  She underwent echocardiography which showed an LVEF in the 20%-25% range.  She was seen by EP Cardiology several times and is now off of amiodarone.  She also was seen in the [facility] several times and saw Dr. Stone most recently in the summer of 2019.  She did have a repeat echocardiogram in mid July which again showed her EF was in the 20%-25% range.  At that time, she underwent a stress test which did not show any evidence of infarction or ischemia.  Lisinopril was added to her regimen but she developed a cough so was changed to losartan. I have been working with her to uptitrate her HF medications. She was scheduled for a follow up echo prior to this visit but it was cancelled to minimize her risk of COVID-19 exposure.     She has tolerated the changes without any dizziness or significant change to her blood pressure (the patient chronically runs low blood pressure readings).  Again, she denies any cardiovascular symptoms such as chest discomfort, dyspnea on exertion, orthopnea or PND.  Her home weight has been stable.  Again, her home blood pressure readings are typically in the  mmHg systolic range. She typically walks 5 miles a day with her dog without symptoms.      CURRENT CARDIAC MEDICATIONS:   1.  Aspirin 81 mg daily.   2.  Losartan 100 mg daily.   3.  Toprol- mg daily.   4.  Potassium chloride 20 mEq daily.   5.  Torsemide 10 mEq tablet daily.   6.  Xarelto 20 mg daily.   7.  She has a script for amoxicillin for SBE prophylaxis as needed.     The remainder of her medications, allergies and review of systems were reviewed and as are documented.      PHYSICAL EXAMINATION:   Patient reported vitals:  BP:100/70  Heart rate:64  Weight:124  GENERAL: healthy, alert and no distress  EYES: Eyes grossly normal to  inspection, conjunctivae and sclerae normal  RESP: no audible wheeze, cough, or visible cyanosis.  No visible retractions or increased work of breathing.  Able to speak fully in complete sentences.  NEURO: Cranial nerves grossly intact, mentation intact and speech normal  PSYCH: mentation appears normal, affect normal/bright, judgement and insight intact, normal speech and appearance well-groomed     ASSESSMENT AND PLAN:  Taty Aguila is a pleasant 75 year old female with a history of severe mitral regurgitation secondary to mitral valve prolapse who is status post mitral valve replacement as well as tricuspid valve repair in 02/2019.  Postoperatively, she developed complete heart block and underwent permanent pacemaker implantation.  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  She previously had symptoms of volume overload, but appears to be euvolemic at this time.  We have worked to optimize her cardiomyopathy medications. She is feeling well without CV complaints.    I have recommended we get the echo and do a BMP in about 1 month as long as the number of COVID-19 cases locally are not rapidly increasing.  If her EF remains less than 35% she likely would be a candidate for an ICD upgrade to her device, she would need an EP consult first.    An AVS will be mailed to the patient.      Thank you for allowing me to participate in the care of this pleasant patient.  Please do not hesitate to contact us with further questions or concerns.      Please do not hesitate to contact me if you have any questions/concerns.     Sincerely,     Marifer Mohamud PA-C

## 2020-05-11 DIAGNOSIS — I44.2 CHB (COMPLETE HEART BLOCK) (H): ICD-10-CM

## 2020-05-11 DIAGNOSIS — Z95.0 CARDIAC PACEMAKER IN SITU: Primary | ICD-10-CM

## 2020-06-02 DIAGNOSIS — I50.22 CHRONIC SYSTOLIC HEART FAILURE (H): ICD-10-CM

## 2020-06-02 PROCEDURE — 36415 COLL VENOUS BLD VENIPUNCTURE: CPT | Performed by: PHYSICIAN ASSISTANT

## 2020-06-02 PROCEDURE — 80048 BASIC METABOLIC PNL TOTAL CA: CPT | Performed by: PHYSICIAN ASSISTANT

## 2020-06-03 LAB
ANION GAP SERPL CALCULATED.3IONS-SCNC: 6 MMOL/L (ref 3–14)
BUN SERPL-MCNC: 21 MG/DL (ref 7–30)
CALCIUM SERPL-MCNC: 8.8 MG/DL (ref 8.5–10.1)
CHLORIDE SERPL-SCNC: 100 MMOL/L (ref 94–109)
CO2 SERPL-SCNC: 26 MMOL/L (ref 20–32)
CREAT SERPL-MCNC: 0.9 MG/DL (ref 0.52–1.04)
GFR SERPL CREATININE-BSD FRML MDRD: 63 ML/MIN/{1.73_M2}
GLUCOSE SERPL-MCNC: 98 MG/DL (ref 70–99)
POTASSIUM SERPL-SCNC: 4.5 MMOL/L (ref 3.4–5.3)
SODIUM SERPL-SCNC: 132 MMOL/L (ref 133–144)

## 2020-06-05 ENCOUNTER — HOSPITAL ENCOUNTER (OUTPATIENT)
Dept: CARDIOLOGY | Facility: CLINIC | Age: 75
Discharge: HOME | End: 2020-06-05
Attending: PHYSICIAN ASSISTANT | Admitting: PHYSICIAN ASSISTANT
Payer: COMMERCIAL

## 2020-06-05 DIAGNOSIS — Z95.2 S/P MVR (MITRAL VALVE REPLACEMENT): ICD-10-CM

## 2020-06-05 DIAGNOSIS — Z98.890 S/P TVR (TRICUSPID VALVE REPAIR): ICD-10-CM

## 2020-06-05 DIAGNOSIS — I50.22 CHRONIC SYSTOLIC HEART FAILURE (H): ICD-10-CM

## 2020-06-05 PROCEDURE — 93325 DOPPLER ECHO COLOR FLOW MAPG: CPT | Mod: 26 | Performed by: INTERNAL MEDICINE

## 2020-06-05 PROCEDURE — 93321 DOPPLER ECHO F-UP/LMTD STD: CPT | Mod: 26 | Performed by: INTERNAL MEDICINE

## 2020-06-05 PROCEDURE — 93325 DOPPLER ECHO COLOR FLOW MAPG: CPT

## 2020-06-05 PROCEDURE — 93308 TTE F-UP OR LMTD: CPT | Mod: 26 | Performed by: INTERNAL MEDICINE

## 2020-06-08 ENCOUNTER — VIRTUAL VISIT (OUTPATIENT)
Dept: CARDIOLOGY | Facility: CLINIC | Age: 75
End: 2020-06-08
Attending: PHYSICIAN ASSISTANT
Payer: COMMERCIAL

## 2020-06-08 DIAGNOSIS — I50.22 CHRONIC SYSTOLIC HEART FAILURE (H): Primary | ICD-10-CM

## 2020-06-08 DIAGNOSIS — Z98.890 S/P TVR (TRICUSPID VALVE REPAIR): ICD-10-CM

## 2020-06-08 DIAGNOSIS — Z95.2 S/P MVR (MITRAL VALVE REPLACEMENT): ICD-10-CM

## 2020-06-08 PROCEDURE — 99214 OFFICE O/P EST MOD 30 MIN: CPT | Mod: 95 | Performed by: PHYSICIAN ASSISTANT

## 2020-06-08 NOTE — PROGRESS NOTES
"Service Date: 6/8/2020     Taty Aguila is a 75 year old female who is being evaluated via a billable video visit.      The patient has been notified of following:     \"This video visit will be conducted via a call between you and your physician/provider. We have found that certain health care needs can be provided without the need for an in-person physical exam.  This service lets us provide the care you need with a video conversation.  If a prescription is necessary we can send it directly to your pharmacy.  If lab work is needed we can place an order for that and you can then stop by our lab to have the test done at a later time.    Video visits are billed at different rates depending on your insurance coverage.  Please reach out to your insurance provider with any questions.    If during the course of the call the physician/provider feels a video visit is not appropriate, you will not be charged for this service.\"    Patient has given verbal consent for Video visit? Yes    How would you like to obtain your AVS? Mail a copy    Patient would like the video invitation sent by: Text to cell phone: [phone]    Will anyone else be joining your video visit? No     Patient reported vitals:  BP: 111/72  Heart rate: 63  Weight: 122lb    Review Of Systems  Skin: negative  Eyes: glasses  Ears/Nose/Throat: nasal congestion  Respiratory: No shortness of breath and No dyspnea on exertion  Cardiovascular: palpitations and lower extremity edema  Gastrointestinal: reflux  Genitourinary: negative  Musculoskeletal: negative  Neurologic: negative  Psychiatric: negative  Hematologic/Lymphatic/Immunologic: allergies and hay fever  Endocrine: thyroid disorder    Ileana Huynh CMA 6/8/2020    Video-Visit Details    Type of service:  Video Visit    Video Start Time: 3:12 PM  Video End Time: 3:33 PM    Originating Location (pt. Location): Home    Distant Location (provider location):  [facility]    Platform used for Video Visit: [facility]     HISTORY OF PRESENTING COMPLAINT:  Taty Aguila is a pleasant 75 year old female who is being evaluated via a billable video visit in order to minimize the possibility of exposure due to the current COVID-19 pandemic. This is a HF follow up.     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  In 10/2018, she was found to be in AFib with RVR.  She did convert back to sinus rhythm; however, about 2 months later she had recurrent atrial fibrillation.  At that time she was sent back to Dr. Hanna with CV Surgery who felt that she indeed did have severe mitral regurgitation and in the setting of new onset atrial fibrillation valve surgery was recommended.  Her preoperative coronary angiogram did not show any significant coronary disease, LV gram showed slightly reduced systolic function.  She had a TIA which showed preserved LV systolic function with an EF of 65% with normal RV size and function.  This confirmed severe mitral regurgitation and 3+ tricuspid regurgitation.  Ultimately, in 02/2019, she underwent successful minimally invasive mitral valve replacement with a 31 mm Epic St. Ron valve at which time she also had a tricuspid valve repair with a 32 mm Oquendo ring.  She developed complete heart block postoperatively and underwent pacemaker implantation.      Unfortunately, shortly after surgery, she developed significant volume overload.  She was found to be back in atrial fibrillation.  She was evaluated in our [facility] by CHRISTIANO Weathers.  Her diuretics were adjusted and she had a significant improvement in her volume status.  She was also reloaded with amiodarone which converted her back to sinus rhythm.  She underwent echocardiography which showed an LVEF in the 20%-25% range.  She was seen by EP Cardiology several times  and is now off of amiodarone.  She also was seen in the [facility] several times and saw Dr. Stone most recently in the summer of 2019.  She did have a repeat echocardiogram in mid July which again showed her EF was in the 20%-25% range.  At that time, she underwent a stress test which did not show any evidence of infarction or ischemia.  Lisinopril was added to her regimen but she developed a cough so was changed to losartan. I have been working with her to up titrate her HF medications. We planned for follow up echocardiography this spring, but things were delayed due to the pandemic.     She recently did have her echo and it shows an improvement in her EF to ~35% (visually) (40-45% by biplane), valve function is stable.     She is feeling well. Continues to walk on a daily basis without CV limitation. Very mild LE swelling occasionally. Weight is stable. Denies lightheadedness or dizziness.     CURRENT CARDIAC MEDICATIONS:   Current Outpatient Medications   Medication Sig Dispense Refill     amoxicillin (AMOXIL) 500 MG capsule Take 500 mg by mouth as needed       aspirin 81 MG EC tablet Take 81 mg by mouth every evening       calcium carbonate-vitamin D (CALCIUM 600+D) 600-200 MG-UNIT TABS Take 1 tablet by mouth 2 times daily       Cetirizine HCl (ZYRTEC PO)        levothyroxine (SYNTHROID/LEVOTHROID) 50 MCG tablet Take 1 tablet (50 mcg) by mouth daily 90 tablet 3     losartan (COZAAR) 100 MG tablet Take 1 tablet (100 mg) by mouth daily 90 tablet 3     metoprolol succinate ER (TOPROL-XL) 100 MG 24 hr tablet Take 1 tablet (100 mg) by mouth daily 90 tablet 3     Multiple Vitamins-Minerals (MULTIVITAMIN ADULT) TABS Take 1 tablet by mouth daily       omeprazole (PRILOSEC) 10 MG DR capsule Take 1 capsule (10 mg) by mouth every morning (before breakfast) 90 capsule 3     potassium chloride ER (K-DUR/KLOR-CON M) 20 MEQ CR tablet Take 1 tablet (20 mEq) by mouth daily 90 tablet 3     rivaroxaban ANTICOAGULANT  (XARELTO) 20 MG TABS tablet Take 1 tablet (20 mg) by mouth daily (with dinner) 90 tablet 3     torsemide (DEMADEX) 10 MG tablet Take 1 tablet (10 mg) by mouth daily 90 tablet 3     The remainder of the medications, allergies and review of systems were reviewed and as are documented.      PHYSICAL EXAMINATION:   GENERAL: Healthy, alert and no distress  EYES: Eyes grossly normal to inspection.  No discharge or erythema, or obvious scleral/conjunctival abnormalities.  RESP: No audible wheeze, cough, or visible cyanosis.  No visible retractions or increased work of breathing.    SKIN: Visible skin clear. No significant rash, abnormal pigmentation or lesions.  PSYCH: Mentation appears normal, affect normal/bright, judgement and insight intact, normal speech and appearance well-groomed.     Echo:  1. The left ventricle is normal in size. The visual ejection fraction is estimated at 35%. EF 40-45% by biplane, but visually appears lower. There is moderate global hypokinesia of the left ventricle.  2. The right ventricle is normal size. Mildly decreased right ventricular systolic function  3. s/p MVR with 31mm St Ron Epic tissue, implanted 2/2019. Mean 5mmHg, trace MR.  4. An annuloplasty ring is noted in the tricuspid position. No TR.     Echo from 7/2019 showed EF 20-25%, MV with mean 5mmHg; when directly compared  there has been some improvement in EF.    Last Comprehensive Metabolic Panel:  Sodium   Date Value Ref Range Status   06/02/2020 132 (L) 133 - 144 mmol/L Final     Potassium   Date Value Ref Range Status   06/02/2020 4.5 3.4 - 5.3 mmol/L Final     Chloride   Date Value Ref Range Status   06/02/2020 100 94 - 109 mmol/L Final     Carbon Dioxide   Date Value Ref Range Status   06/02/2020 26 20 - 32 mmol/L Final     Anion Gap   Date Value Ref Range Status   06/02/2020 6 3 - 14 mmol/L Final     Glucose   Date Value Ref Range Status   06/02/2020 98 70 - 99 mg/dL Final     Comment:     Non Fasting     Urea Nitrogen    Date Value Ref Range Status   06/02/2020 21 7 - 30 mg/dL Final     Creatinine   Date Value Ref Range Status   06/02/2020 0.90 0.52 - 1.04 mg/dL Final     GFR Estimate   Date Value Ref Range Status   06/02/2020 63 >60 mL/min/[1.73_m2] Final     Comment:     Non  GFR Calc  Starting 12/18/2018, serum creatinine based estimated GFR (eGFR) will be   calculated using the Chronic Kidney Disease Epidemiology Collaboration   (CKD-EPI) equation.       Calcium   Date Value Ref Range Status   06/02/2020 8.8 8.5 - 10.1 mg/dL Final     ASSESSMENT AND PLAN:  Taty Aguila is a pleasant 75 year old female with a history of severe mitral regurgitation secondary to mitral valve prolapse who is status post mitral valve replacement as well as tricuspid valve repair in 02/2019.  Postoperatively, she developed complete heart block and underwent permanent pacemaker implantation.  Unfortunately, she developed a cardiomyopathy/HF after surgery with an EF in the 20%-25% range.  She previously had symptoms of volume overload, but appears to be euvolemic at this time.  We have worked to optimize her cardiomyopathy medications.  Her EF has improved to ~35%. She is feeling well without CV complaints at this time. I did not make any med changes today. I will plan to see her back in 6 months with a repeat BMP prior.     An AVS will be mailed to the patient.      Thank you for allowing me to participate in the care of this pleasant patient.  Please do not hesitate to contact us with further questions or concerns.         SANDRA AMADOR PA-C

## 2020-06-08 NOTE — LETTER
6/8/2020    Anyi Olmos MD  [address] [facility] [address]  [facility] [address]    RE: Taty Aguila       Dear Colleague,    I had the pleasure of seeing Taty Aguila in the [facility].    Taty Aguila is a 75 year old female who is being evaluated via a billable video visit.       HISTORY OF PRESENTING COMPLAINT:  Taty Aguila is a pleasant 75 year old female who is being evaluated via a billable video visit in order to minimize the possibility of exposure due to the current COVID-19 pandemic. This is a HF follow up.     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  In 10/2018, she was found to be in AFib with RVR.  She did convert back to sinus rhythm; however, about 2 months later she had recurrent atrial fibrillation.  At that time she was sent back to Dr. Hanna with CV Surgery who felt that she indeed did have severe mitral regurgitation and in the setting of new onset atrial fibrillation valve surgery was recommended.  Her preoperative coronary angiogram did not show any significant coronary disease, LV gram showed slightly reduced systolic function.  She had a TIA which showed preserved LV systolic function with an EF of 65% with normal RV size and function.  This confirmed severe mitral regurgitation and 3+ tricuspid regurgitation.  Ultimately, in 02/2019, she underwent successful minimally invasive mitral valve replacement with a 31 mm Epic St. Ron valve at which time she also had a tricuspid valve repair with a 32 mm Oquendo ring.  She developed complete heart block postoperatively and underwent pacemaker implantation.      Unfortunately, shortly after surgery, she developed significant volume overload.  She was found to be back in atrial fibrillation.  She was evaluated in our [facility] by CHRISTIANO Weathers.  Her diuretics were adjusted and  she had a significant improvement in her volume status.  She was also reloaded with amiodarone which converted her back to sinus rhythm.  She underwent echocardiography which showed an LVEF in the 20%-25% range.  She was seen by EP Cardiology several times and is now off of amiodarone.  She also was seen in the [facility] several times and saw Dr. Stone most recently in the summer of 2019.  She did have a repeat echocardiogram in mid July which again showed her EF was in the 20%-25% range.  At that time, she underwent a stress test which did not show any evidence of infarction or ischemia.  Lisinopril was added to her regimen but she developed a cough so was changed to losartan. I have been working with her to up titrate her HF medications. We planned for follow up echocardiography this spring, but things were delayed due to the pandemic.     She recently did have her echo and it shows an improvement in her EF to ~35% (visually) (40-45% by biplane), valve function is stable.     She is feeling well. Continues to walk on a daily basis without CV limitation. Very mild LE swelling occasionally. Weight is stable. Denies lightheadedness or dizziness.     CURRENT CARDIAC MEDICATIONS:   Current Outpatient Medications   Medication Sig Dispense Refill     amoxicillin (AMOXIL) 500 MG capsule Take 500 mg by mouth as needed       aspirin 81 MG EC tablet Take 81 mg by mouth every evening       calcium carbonate-vitamin D (CALCIUM 600+D) 600-200 MG-UNIT TABS Take 1 tablet by mouth 2 times daily       Cetirizine HCl (ZYRTEC PO)        levothyroxine (SYNTHROID/LEVOTHROID) 50 MCG tablet Take 1 tablet (50 mcg) by mouth daily 90 tablet 3     losartan (COZAAR) 100 MG tablet Take 1 tablet (100 mg) by mouth daily 90 tablet 3     metoprolol succinate ER (TOPROL-XL) 100 MG 24 hr tablet Take 1 tablet (100 mg) by mouth daily 90 tablet 3     Multiple Vitamins-Minerals (MULTIVITAMIN ADULT) TABS Take 1 tablet by mouth daily        omeprazole (PRILOSEC) 10 MG DR capsule Take 1 capsule (10 mg) by mouth every morning (before breakfast) 90 capsule 3     potassium chloride ER (K-DUR/KLOR-CON M) 20 MEQ CR tablet Take 1 tablet (20 mEq) by mouth daily 90 tablet 3     rivaroxaban ANTICOAGULANT (XARELTO) 20 MG TABS tablet Take 1 tablet (20 mg) by mouth daily (with dinner) 90 tablet 3     torsemide (DEMADEX) 10 MG tablet Take 1 tablet (10 mg) by mouth daily 90 tablet 3     The remainder of the medications, allergies and review of systems were reviewed and as are documented.      PHYSICAL EXAMINATION:   GENERAL: Healthy, alert and no distress  EYES: Eyes grossly normal to inspection.  No discharge or erythema, or obvious scleral/conjunctival abnormalities.  RESP: No audible wheeze, cough, or visible cyanosis.  No visible retractions or increased work of breathing.    SKIN: Visible skin clear. No significant rash, abnormal pigmentation or lesions.  PSYCH: Mentation appears normal, affect normal/bright, judgement and insight intact, normal speech and appearance well-groomed.     Echo:  1. The left ventricle is normal in size. The visual ejection fraction is estimated at 35%. EF 40-45% by biplane, but visually appears lower. There is moderate global hypokinesia of the left ventricle.  2. The right ventricle is normal size. Mildly decreased right ventricular systolic function  3. s/p MVR with 31mm St Ron Epic tissue, implanted 2/2019. Mean 5mmHg, trace MR.  4. An annuloplasty ring is noted in the tricuspid position. No TR.     Echo from 7/2019 showed EF 20-25%, MV with mean 5mmHg; when directly compared  there has been some improvement in EF.    Last Comprehensive Metabolic Panel:  Sodium   Date Value Ref Range Status   06/02/2020 132 (L) 133 - 144 mmol/L Final     Potassium   Date Value Ref Range Status   06/02/2020 4.5 3.4 - 5.3 mmol/L Final     Chloride   Date Value Ref Range Status   06/02/2020 100 94 - 109 mmol/L Final     Carbon Dioxide   Date Value  Ref Range Status   06/02/2020 26 20 - 32 mmol/L Final     Anion Gap   Date Value Ref Range Status   06/02/2020 6 3 - 14 mmol/L Final     Glucose   Date Value Ref Range Status   06/02/2020 98 70 - 99 mg/dL Final     Comment:     Non Fasting     Urea Nitrogen   Date Value Ref Range Status   06/02/2020 21 7 - 30 mg/dL Final     Creatinine   Date Value Ref Range Status   06/02/2020 0.90 0.52 - 1.04 mg/dL Final     GFR Estimate   Date Value Ref Range Status   06/02/2020 63 >60 mL/min/[1.73_m2] Final     Comment:     Non  GFR Calc  Starting 12/18/2018, serum creatinine based estimated GFR (eGFR) will be   calculated using the Chronic Kidney Disease Epidemiology Collaboration   (CKD-EPI) equation.       Calcium   Date Value Ref Range Status   06/02/2020 8.8 8.5 - 10.1 mg/dL Final     ASSESSMENT AND PLAN:  Taty Aguila is a pleasant 75 year old female with a history of severe mitral regurgitation secondary to mitral valve prolapse who is status post mitral valve replacement as well as tricuspid valve repair in 02/2019.  Postoperatively, she developed complete heart block and underwent permanent pacemaker implantation.  Unfortunately, she developed a cardiomyopathy/HF after surgery with an EF in the 20%-25% range.  She previously had symptoms of volume overload, but appears to be euvolemic at this time.  We have worked to optimize her cardiomyopathy medications.  Her EF has improved to ~35%. She is feeling well without CV complaints at this time. I did not make any med changes today. I will plan to see her back in 6 months with a repeat BMP prior.     An AVS will be mailed to the patient.      Thank you for allowing me to participate in the care of this pleasant patient.  Please do not hesitate to contact us with further questions or concerns.     Sincerely,     Marifer Mohamud PA-C     [facility]

## 2020-06-08 NOTE — PROGRESS NOTES
"Service Date: 6/8/2020     Taty Aguila is a 75 year old female who is being evaluated via a billable video visit.      The patient has been notified of following:     \"This video visit will be conducted via a call between you and your physician/provider. We have found that certain health care needs can be provided without the need for an in-person physical exam.  This service lets us provide the care you need with a video conversation.  If a prescription is necessary we can send it directly to your pharmacy.  If lab work is needed we can place an order for that and you can then stop by our lab to have the test done at a later time.    Video visits are billed at different rates depending on your insurance coverage.  Please reach out to your insurance provider with any questions.    If during the course of the call the physician/provider feels a video visit is not appropriate, you will not be charged for this service.\"    Patient has given verbal consent for Video visit? Yes    How would you like to obtain your AVS? Mail a copy    Patient would like the video invitation sent by: Text to cell phone: [phone]    Will anyone else be joining your video visit? No  {If patient encounters technical issues they should call [phone] :[id number]}    Patient reported vitals:  BP: 111/72  Heart rate: 63  Weight: 122lb    Review Of Systems  Skin: negative  Eyes: glasses  Ears/Nose/Throat: nasal congestion  Respiratory: No shortness of breath and No dyspnea on exertion  Cardiovascular: palpitations and lower extremity edema  Gastrointestinal: reflux  Genitourinary: negative  Musculoskeletal: negative  Neurologic: negative  Psychiatric: negative  Hematologic/Lymphatic/Immunologic: allergies and hay fever  Endocrine: thyroid disorder    Ileana Huynh CMA 6/8/2020    Video-Visit Details    Type of service:  Video Visit    Video Start Time: {video visit start/end time:025534}  Video End Time: {video visit start/end " "time:152948}    Originating Location (pt. Location): {video visit patient location:820413::\"Home\"}    Distant Location (provider location):  [facility]     Platform used for Video Visit: {Virtual Visit Platforms:746475::\"AmWell\"}    {signature options:089038}           HISTORY OF PRESENTING COMPLAINT:  Taty Aguila is a pleasant 75 year old female who is being evaluated via a billable video visit in order to minimize the possibility of exposure due to the current COVID-19 pandemic. This is a HF follow up.     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  In 10/2018, she was found to be in AFib with RVR.  She did convert back to sinus rhythm; however, about 2 months later she had recurrent atrial fibrillation.  At that time she was sent back to Dr. Hanna with CV Surgery who felt that she indeed did have severe mitral regurgitation and in the setting of new onset atrial fibrillation valve surgery was recommended.  Her preoperative coronary angiogram did not show any significant coronary disease, LV gram showed slightly reduced systolic function.  She had a TIA which showed preserved LV systolic function with an EF of 65% with normal RV size and function.  This confirmed severe mitral regurgitation and 3+ tricuspid regurgitation.  Ultimately, in 02/2019, she underwent successful minimally invasive mitral valve replacement with a 31 mm Epic St. Ron valve at which time she also had a tricuspid valve repair with a 32 mm Oquendo ring.  She developed complete heart block postoperatively and underwent pacemaker implantation.      Unfortunately, shortly after surgery, she developed significant volume overload.  She was found to be back in atrial fibrillation.  She was evaluated in our [facility] by CHRISTIANO Weathers.  Her diuretics were adjusted and she had a " significant improvement in her volume status.  She was also reloaded with amiodarone which converted her back to sinus rhythm.  She underwent echocardiography which showed an LVEF in the 20%-25% range.  She was seen by EP Cardiology several times and is now off of amiodarone.  She also was seen in the [facility] several times and saw Dr. Stone most recently in the summer of 2019.  She did have a repeat echocardiogram in mid July which again showed her EF was in the 20%-25% range.  At that time, she underwent a stress test which did not show any evidence of infarction or ischemia.  Lisinopril was added to her regimen but she developed a cough so was changed to losartan. I have been working with her to up titrate her HF medications. We planned for follow up echocardiography this spring, but things were delayed due to the pandemic.     She recently did have her echo and it shows an improvement in her EF to ~35% (visually) (40-45% by biplane), valve function is stable.     ***     CURRENT CARDIAC MEDICATIONS:   ***    The remainder of the medications, allergies and review of systems were reviewed and as are documented.      PHYSICAL EXAMINATION:   GENERAL: Healthy, alert and no distress  EYES: Eyes grossly normal to inspection.  No discharge or erythema, or obvious scleral/conjunctival abnormalities.  RESP: No audible wheeze, cough, or visible cyanosis.  No visible retractions or increased work of breathing.    SKIN: Visible skin clear. No significant rash, abnormal pigmentation or lesions.  PSYCH: Mentation appears normal, affect normal/bright, judgement and insight intact, normal speech and appearance well-groomed.     Echo:  1. The left ventricle is normal in size. The visual ejection fraction is estimated at 35%. EF 40-45% by biplane, but visually appears lower. There is moderate global hypokinesia of the left ventricle.  2. The right ventricle is normal size. Mildly decreased right ventricular systolic  function  3. s/p MVR with 31mm St Ron Epic tissue, implanted 2/2019. Mean 5mmHg, trace MR.  4. An annuloplasty ring is noted in the tricuspid position. No TR.     Echo from 7/2019 showed EF 20-25%, MV with mean 5mmHg; when directly compared  there has been some improvement in EF.    ASSESSMENT AND PLAN:  Taty Aguila is a pleasant 75 year old female with a history of severe mitral regurgitation secondary to mitral valve prolapse who is status post mitral valve replacement as well as tricuspid valve repair in 02/2019.  Postoperatively, she developed complete heart block and underwent permanent pacemaker implantation.  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  She previously had symptoms of volume overload, but appears to be euvolemic at this time.  We have worked to optimize her cardiomyopathy medications.  Her EF has improved to ~35%. She is feeling well without CV complaints. ***    An AVS will be mailed to the patient.      Thank you for allowing me to participate in the care of this pleasant patient.  Please do not hesitate to contact us with further questions or concerns.         SANDRA AMADOR PA-C

## 2020-06-08 NOTE — PATIENT INSTRUCTIONS
Thank you for your [facility] visit today. Your provider has recommended the following:  Medication Changes:  No changes  Recommendations:  Nothing at this time  Follow-up:  See Marifer ULRICH for cardiology follow up in 6 months with repeat lab work prior (you do not need to be fasting).    Reminder:  Please bring in your current medication list or your medication, over the counter supplements and vitamin bottles as we will review these at each office visit.

## 2020-06-24 ENCOUNTER — CARE COORDINATION (OUTPATIENT)
Dept: CARDIOLOGY | Facility: CLINIC | Age: 75
End: 2020-06-24

## 2020-06-24 ENCOUNTER — ANCILLARY PROCEDURE (OUTPATIENT)
Dept: CARDIOLOGY | Facility: CLINIC | Age: 75
End: 2020-06-24
Attending: INTERNAL MEDICINE
Payer: COMMERCIAL

## 2020-06-24 DIAGNOSIS — Z95.0 CARDIAC PACEMAKER IN SITU: ICD-10-CM

## 2020-06-24 DIAGNOSIS — I44.2 CHB (COMPLETE HEART BLOCK) (H): ICD-10-CM

## 2020-06-24 DIAGNOSIS — I44.2 ATRIOVENTRICULAR BLOCK, COMPLETE (H): Primary | ICD-10-CM

## 2020-06-24 PROCEDURE — 93296 REM INTERROG EVL PM/IDS: CPT | Performed by: INTERNAL MEDICINE

## 2020-06-24 PROCEDURE — 93294 REM INTERROG EVL PM/LDLS PM: CPT | Performed by: INTERNAL MEDICINE

## 2020-06-24 NOTE — PROGRESS NOTES
15 beat run of VT was found on pacemaker interrogation.  Rate was 166 bpm.  Patient was asleep at the time, she denied any symptoms.  Her EF is 35% by echo on 6/5/2020.  Will review with Dr. Stone.    Dr. Stone has reviewed the above msg regarding run of VT.  Due to patient's EF of 35% he would like patient to be seen by EP doctor.  I will give this msg to Rogerio Elmore who will get this patient set up to be seen either in office or by video.

## 2020-06-30 DIAGNOSIS — I47.29 NON-SUSTAINED VENTRICULAR TACHYCARDIA (H): Primary | ICD-10-CM

## 2020-07-02 LAB
MDC_IDC_EPISODE_DTM: NORMAL
MDC_IDC_EPISODE_DURATION: 16 S
MDC_IDC_EPISODE_ID: NORMAL
MDC_IDC_EPISODE_TYPE: NORMAL
MDC_IDC_LEAD_IMPLANT_DT: NORMAL
MDC_IDC_LEAD_IMPLANT_DT: NORMAL
MDC_IDC_LEAD_LOCATION: NORMAL
MDC_IDC_LEAD_LOCATION: NORMAL
MDC_IDC_LEAD_LOCATION_DETAIL_1: NORMAL
MDC_IDC_LEAD_LOCATION_DETAIL_1: NORMAL
MDC_IDC_LEAD_MFG: NORMAL
MDC_IDC_LEAD_MFG: NORMAL
MDC_IDC_LEAD_MODEL: NORMAL
MDC_IDC_LEAD_MODEL: NORMAL
MDC_IDC_LEAD_POLARITY_TYPE: NORMAL
MDC_IDC_LEAD_POLARITY_TYPE: NORMAL
MDC_IDC_LEAD_SERIAL: NORMAL
MDC_IDC_LEAD_SERIAL: NORMAL
MDC_IDC_MSMT_BATTERY_DTM: NORMAL
MDC_IDC_MSMT_BATTERY_REMAINING_LONGEVITY: 162 MO
MDC_IDC_MSMT_BATTERY_REMAINING_PERCENTAGE: 100 %
MDC_IDC_MSMT_BATTERY_STATUS: NORMAL
MDC_IDC_MSMT_LEADCHNL_RA_IMPEDANCE_VALUE: 776 OHM
MDC_IDC_MSMT_LEADCHNL_RA_PACING_THRESHOLD_AMPLITUDE: 0.6 V
MDC_IDC_MSMT_LEADCHNL_RA_PACING_THRESHOLD_PULSEWIDTH: 0.4 MS
MDC_IDC_MSMT_LEADCHNL_RV_IMPEDANCE_VALUE: 711 OHM
MDC_IDC_MSMT_LEADCHNL_RV_PACING_THRESHOLD_AMPLITUDE: 0.9 V
MDC_IDC_MSMT_LEADCHNL_RV_PACING_THRESHOLD_PULSEWIDTH: 0.4 MS
MDC_IDC_PG_IMPLANT_DTM: NORMAL
MDC_IDC_PG_MFG: NORMAL
MDC_IDC_PG_MODEL: NORMAL
MDC_IDC_PG_SERIAL: NORMAL
MDC_IDC_PG_TYPE: NORMAL
MDC_IDC_SESS_CLINIC_NAME: NORMAL
MDC_IDC_SESS_DTM: NORMAL
MDC_IDC_SESS_TYPE: NORMAL
MDC_IDC_SET_BRADY_AT_MODE_SWITCH_MODE: NORMAL
MDC_IDC_SET_BRADY_AT_MODE_SWITCH_RATE: 170 {BEATS}/MIN
MDC_IDC_SET_BRADY_LOWRATE: 60 {BEATS}/MIN
MDC_IDC_SET_BRADY_MAX_SENSOR_RATE: 130 {BEATS}/MIN
MDC_IDC_SET_BRADY_MAX_TRACKING_RATE: 130 {BEATS}/MIN
MDC_IDC_SET_BRADY_MODE: NORMAL
MDC_IDC_SET_BRADY_PAV_DELAY_HIGH: 150 MS
MDC_IDC_SET_BRADY_PAV_DELAY_LOW: 200 MS
MDC_IDC_SET_BRADY_SAV_DELAY_HIGH: 150 MS
MDC_IDC_SET_BRADY_SAV_DELAY_LOW: 200 MS
MDC_IDC_SET_LEADCHNL_RA_PACING_AMPLITUDE: 2 V
MDC_IDC_SET_LEADCHNL_RA_PACING_CAPTURE_MODE: NORMAL
MDC_IDC_SET_LEADCHNL_RA_PACING_POLARITY: NORMAL
MDC_IDC_SET_LEADCHNL_RA_PACING_PULSEWIDTH: 0.4 MS
MDC_IDC_SET_LEADCHNL_RA_SENSING_ADAPTATION_MODE: NORMAL
MDC_IDC_SET_LEADCHNL_RA_SENSING_POLARITY: NORMAL
MDC_IDC_SET_LEADCHNL_RA_SENSING_SENSITIVITY: 0.5 MV
MDC_IDC_SET_LEADCHNL_RV_PACING_AMPLITUDE: 1.5 V
MDC_IDC_SET_LEADCHNL_RV_PACING_CAPTURE_MODE: NORMAL
MDC_IDC_SET_LEADCHNL_RV_PACING_POLARITY: NORMAL
MDC_IDC_SET_LEADCHNL_RV_PACING_PULSEWIDTH: 0.4 MS
MDC_IDC_SET_LEADCHNL_RV_SENSING_ADAPTATION_MODE: NORMAL
MDC_IDC_SET_LEADCHNL_RV_SENSING_POLARITY: NORMAL
MDC_IDC_SET_LEADCHNL_RV_SENSING_SENSITIVITY: 1.5 MV
MDC_IDC_SET_ZONE_DETECTION_INTERVAL: 375 MS
MDC_IDC_SET_ZONE_TYPE: NORMAL
MDC_IDC_SET_ZONE_VENDOR_TYPE: NORMAL
MDC_IDC_STAT_AT_BURDEN_PERCENT: 1 %
MDC_IDC_STAT_AT_DTM_END: NORMAL
MDC_IDC_STAT_AT_DTM_START: NORMAL
MDC_IDC_STAT_BRADY_DTM_END: NORMAL
MDC_IDC_STAT_BRADY_DTM_START: NORMAL
MDC_IDC_STAT_BRADY_RA_PERCENT_PACED: 7 %
MDC_IDC_STAT_BRADY_RV_PERCENT_PACED: 7 %
MDC_IDC_STAT_EPISODE_RECENT_COUNT: 0
MDC_IDC_STAT_EPISODE_RECENT_COUNT: 2
MDC_IDC_STAT_EPISODE_RECENT_COUNT: 5
MDC_IDC_STAT_EPISODE_RECENT_COUNT_DTM_END: NORMAL
MDC_IDC_STAT_EPISODE_RECENT_COUNT_DTM_START: NORMAL
MDC_IDC_STAT_EPISODE_TYPE: NORMAL
MDC_IDC_STAT_EPISODE_VENDOR_TYPE: NORMAL

## 2020-07-08 ENCOUNTER — VIRTUAL VISIT (OUTPATIENT)
Dept: CARDIOLOGY | Facility: CLINIC | Age: 75
End: 2020-07-08
Attending: INTERNAL MEDICINE
Payer: COMMERCIAL

## 2020-07-08 DIAGNOSIS — I47.29 NON-SUSTAINED VENTRICULAR TACHYCARDIA (H): ICD-10-CM

## 2020-07-08 PROCEDURE — 99215 OFFICE O/P EST HI 40 MIN: CPT | Mod: 95 | Performed by: INTERNAL MEDICINE

## 2020-07-08 NOTE — LETTER
7/8/2020    Anyi Olmos MD  [address] [facility] [address]  [facility] [address]    RE: Taty De La Rosa Ayla       Dear Colleague,    I had the pleasure of seeing Taty De La Rosa Ayla in the [facility].    Electrophysiology/ Virtual Clinic Note         H&P and Plan:   Patient opted to have a virtual visit due to ongoing issues related to ongoing COVID-19 virus pandemic and to minimize social interaction (based on CDC guidelines).      REASON FOR VISIT: Electrophysiology evaluation.      HISTORY OF PRESENT ILLNESS: 75-year-old lady with a history of paroxysmal atrial fibrillation, severe mitral regurgitation, pacemaker implantation for paroxysmal AV block (post surgery) and heart failure secondary to nonischemic cardiomyopathy, who is here for consideration for device upgrade to a defibrillator.    Patient was found to have severe mitral regurgitation and underwent mitral valve replacement and tricuspid valve repair on 02/2019.  Prior to surgery she had normal LV function.  However, after surgical was found to have severe LV dysfunction and developed heart failure symptoms.  A nuclear stress test was negative for ischemia and she was placed on guideline directed therapy for heart failure.  Device interrogation showed episodes of nonsustained VT (up to 7 seconds) and cardiac function remained depressed despite of maximal tolerated pharmacotherapy for heart failure.  She was then referred here for evaluation for device upgrade to a defibrillator.    At the moment, she is doing well and denies any symptoms during this visit chest pain, shortness of breath, lightheadedness, near-syncope or syncope.    Device was interrogated on 06/24/2020.  Lead parameters were stable.  She is atrially paced and ventricular paced about 7% of the time.    Previous studies:  -Echocardiogram (03/2019): Severe V dysfunction.  EF estimated 20-25%.  -Echocardiogram (07/2019):   Severe LV dysfunction EF estimated at 20-25%.  Normally functioning prosthetic mitral valve.  -Echocardiogram (06/05/2020): Severe V dysfunction.  EF estimated at 35%.  Prosthetic mitral valve was functioning normally. An annuloplasty ring is noted in the tricuspid position. No TR.  -Nuclear medicine stress testing (07/2019): Normal perfusion.  No evidence of ischemia.    ASSESSMENT AND PLAN:   1.    Chronic systolic heart failure (class II/IV) secondary to nonischemic cardiomyopathy and nonsustained VT.  Patient continues to have severe LV dysfunction despite of maximal guideline directed pharmacotherapy for heart failure.  She is also exhibiting episodes of mostly VT on device irrigation.  I agree with recommendation for device upgrade.    I explained the procedure in details.  She understand that is a 1-2% risk associated procedure and the risk infection may be higher.  She agrees with plan.  We will make arrangements to have device upgrade to a dual-chamber defibrillator.       Lucian Tolliver MD    Physical Exam:  Vitals: There were no vitals taken for this visit.    Constitutional:  Pt is in no acute distress.  Normal body habitus, upright.  HEAD: Normocephalic. No evidence of injury or laceration.   SKIN: Skin normal color with no lesions or eruptions. No xanthelasma.  ENT/Mouth:  Membranes moist. No nasal discharge or bleeding gums.   Neck:  Supple, normal JVP, no evidence of thyromegaly.  Chest/Lungs: No audible wheezing or labored breathing. Normal chest wall expansion. No cough.   Cardiovascular: Normal jugular venous pressure. No evidence of pitting edema bilaterally.   Abdomen: No evidence of abdominal distention. No observed jaundice.  Eyes: PERRL, EOMI. No scleral icterus, normal conjunctivae.  Extremities:  No lower extremity edema noticed.  No cyanosis or clubbing noted.   Neurologic: Normal arm motion bilateral.  No tremors. No evidence of focal defect.   Psychiatric: Alert and oriented x3, calm.         CURRENT MEDICATIONS:  Current Outpatient  Medications   Medication Sig Dispense Refill     aspirin 81 MG EC tablet Take 81 mg by mouth every evening       calcium carbonate-vitamin D (CALCIUM 600+D) 600-200 MG-UNIT TABS Take 1 tablet by mouth 2 times daily       Cetirizine HCl (ZYRTEC PO)        levothyroxine (SYNTHROID/LEVOTHROID) 50 MCG tablet Take 1 tablet (50 mcg) by mouth daily 90 tablet 3     losartan (COZAAR) 100 MG tablet Take 1 tablet (100 mg) by mouth daily 90 tablet 3     metoprolol succinate ER (TOPROL-XL) 100 MG 24 hr tablet Take 1 tablet (100 mg) by mouth daily 90 tablet 3     Multiple Vitamins-Minerals (MULTIVITAMIN ADULT) TABS Take 1 tablet by mouth daily       omeprazole (PRILOSEC) 10 MG DR capsule Take 1 capsule (10 mg) by mouth every morning (before breakfast) 90 capsule 3     potassium chloride ER (K-DUR/KLOR-CON M) 20 MEQ CR tablet Take 1 tablet (20 mEq) by mouth daily 90 tablet 3     rivaroxaban ANTICOAGULANT (XARELTO) 20 MG TABS tablet Take 1 tablet (20 mg) by mouth daily (with dinner) 90 tablet 3     torsemide (DEMADEX) 10 MG tablet Take 1 tablet (10 mg) by mouth daily 90 tablet 3     amoxicillin (AMOXIL) 500 MG capsule Take 500 mg by mouth as needed         ALLERGIES     Allergies   Allergen Reactions     Chlorpheniramine Other (See Comments)     LOC and fainting      Lisinopril Cough     Phenylephrine Other (See Comments)     fainting       PAST MEDICAL HISTORY:  Past Medical History:   Diagnosis Date     Allergic rhinitis, cause unspecified     dust mites, ragweed     Complete AV block (H)     BSX DDD PM 2-     History of tricuspid valve repair      Mitral valve prolapse     bioprosthetic MVR 2-     Paroxysmal atrial fibrillation (H)        PAST SURGICAL HISTORY:  Past Surgical History:   Procedure Laterality Date     COLONOSCOPY N/A 9/15/2015    Procedure: COLONOSCOPY;  Surgeon: Anson Llanos MD;  Location:  GI     CV HEART CATHETERIZATION WITH POSSIBLE INTERVENTION N/A 1/9/2019    Procedure: Heart  Catheterization with Possible Intervention;  Surgeon: Ritesh Whittaker MD;  Location:  HEART CARDIAC CATH LAB     CV RIGHT AND LEFT HEART CATH N/A 1/9/2019    Procedure: Right and Left Heart Catherization;  Surgeon: Ritesh Whittaker MD;  Location:  HEART CARDIAC CATH LAB     ENT SURGERY      T&A     EP PACEMAKER N/A 2/18/2019    Procedure: EP Pacemaker;  Surgeon: Inocencia Guillen MD;  Location:  HEART CARDIAC CATH LAB     REPAIR VALVE TRICUSPID MINIMALLY INVASIVE N/A 2/15/2019    Procedure: MINIMALLY INVASIVE TRICUSPID VALVE REPAIR WITH 32MM SULLIVAN RING;  Surgeon: Andrea Hanna MD;  Location:  OR     REPLACE VALVE MITRAL MINIMALLY INVASIVE N/A 2/15/2019    Procedure: MINIMALLY INVASIVE MITRAL VALVE REPLACEMENT WITH EPIC ST KEYUR 31MM VALVE; ON PUMP WITH TIA.  CARDIOLOGIST DR CARDENAS;  Surgeon: Andrea Hanna MD;  Location:  OR       FAMILY HISTORY:  Family History   Problem Relation Age of Onset     Osteoporosis Mother      Alzheimer Disease Mother      Family History Negative Father      Heart Disease Maternal Grandfather      Family History Negative Paternal Grandmother      Family History Negative Paternal Grandfather        SOCIAL HISTORY:  Social History     Socioeconomic History     Marital status:      Spouse name: None     Number of children: 3     Years of education: None     Highest education level: None   Occupational History     None   Social Needs     Financial resource strain: None     Food insecurity     Worry: None     Inability: None     Transportation needs     Medical: None     Non-medical: None   Tobacco Use     Smoking status: Never Smoker     Smokeless tobacco: Never Used   Substance and Sexual Activity     Alcohol use: No     Drug use: No     Sexual activity: Not Currently   Lifestyle     Physical activity     Days per week: None     Minutes per session: None     Stress: None   Relationships     Social connections     Talks on phone: None      Gets together: None     Attends Christian service: None     Active member of club or organization: None     Attends meetings of clubs or organizations: None     Relationship status: None     Intimate partner violence     Fear of current or ex partner: None     Emotionally abused: None     Physically abused: None     Forced sexual activity: None   Other Topics Concern      Service Not Asked     Blood Transfusions Not Asked     Caffeine Concern No     Comment: 1 cup of coffee and 1 can diet coke per day     Occupational Exposure Not Asked     Hobby Hazards Not Asked     Sleep Concern Yes     Comment: takes Doxylamine succinate 1/2 tab every night     Stress Concern No     Weight Concern No     Special Diet No     Comment: healthy      Back Care Not Asked     Exercise Yes     Comment: walking 45min x7 a wk. Very active, [employer], Golf, Bowling, Cardio     Bike Helmet Not Asked     Seat Belt Yes     Self-Exams Yes     Parent/sibling w/ CABG, MI or angioplasty before 65F 55M? No   Social History Narrative     None       Review of Systems:  Skin:        Eyes:       ENT:       Respiratory:       Cardiovascular:       Gastroenterology:      Genitourinary:       Musculoskeletal:       Neurologic:       Psychiatric:       Heme/Lymph/Imm:       Endocrine:           Recent Lab Results:  LIPID RESULTS:  Lab Results   Component Value Date    CHOL 202 (H) 07/23/2015    HDL 96 07/23/2015    LDL 83 07/23/2015    TRIG 116 07/23/2015    CHOLHDLRATIO 2.1 07/23/2015       LIVER ENZYME RESULTS:  Lab Results   Component Value Date    AST 29 07/11/2019    ALT 26 07/11/2019       CBC RESULTS:  Lab Results   Component Value Date    WBC 13.7 (H) 02/22/2019    RBC 2.64 (L) 02/22/2019    HGB 10.0 (L) 03/07/2019    HCT 23.4 (L) 02/22/2019    MCV 89 02/22/2019    MCH 30.7 02/22/2019    MCHC 34.6 02/22/2019    RDW 14.8 02/22/2019     02/22/2019       BMP RESULTS:  Lab Results   Component Value Date     (L) 06/02/2020     POTASSIUM 4.5 06/02/2020    CHLORIDE 100 06/02/2020    CO2 26 06/02/2020    ANIONGAP 6 06/02/2020    GLC 98 06/02/2020    BUN 21 06/02/2020    CR 0.90 06/02/2020    GFRESTIMATED 63 06/02/2020    GFRESTBLACK 73 06/02/2020    ARIELLE 8.8 06/02/2020        A1C RESULTS:  Lab Results   Component Value Date    A1C 5.9 (H) 01/09/2019       INR RESULTS:  Lab Results   Component Value Date    INR 1.21 (H) 02/18/2019    INR 1.24 (H) 02/16/2019         ECHOCARDIOGRAM  No results found for this or any previous visit (from the past 8760 hour(s)).      No orders of the defined types were placed in this encounter.    No orders of the defined types were placed in this encounter.    There are no discontinued medications.      Encounter Diagnosis   Name Primary?     Non-sustained ventricular tachycardia (H)        Thank you for allowing me to participate in the care of your patient.    Sincerely,     Lucian Tolliver MD     [facility]

## 2020-07-08 NOTE — PROGRESS NOTES
Electrophysiology/ Virtual Clinic Note         H&P and Plan:   Patient opted to have a virtual visit due to ongoing issues related to ongoing COVID-19 virus pandemic and to minimize social interaction (based on CDC guidelines).      Visit details:  Patient has given verbal consent for this visit.    Interview was done talking and seeing patient via Internet.    Mode of transmission: [employer], [employer].  Visit start time: 2:52 PM  Visit stop time: 3:11 PM  Provider location: Office.  Patient location: Home.       REASON FOR VISIT: Electrophysiology evaluation.      HISTORY OF PRESENT ILLNESS: 75-year-old lady with a history of paroxysmal atrial fibrillation, severe mitral regurgitation, pacemaker implantation for paroxysmal AV block (post surgery) and heart failure secondary to nonischemic cardiomyopathy, who is here for consideration for device upgrade to a defibrillator.    Patient was found to have severe mitral regurgitation and underwent mitral valve replacement and tricuspid valve repair on 02/2019.  Prior to surgery she had normal LV function.  However, after surgical was found to have severe LV dysfunction and developed heart failure symptoms.  A nuclear stress test was negative for ischemia and she was placed on guideline directed therapy for heart failure.  Device interrogation showed episodes of nonsustained VT (up to 7 seconds) and cardiac function remained depressed despite of maximal tolerated pharmacotherapy for heart failure.  She was then referred here for evaluation for device upgrade to a defibrillator.    At the moment, she is doing well and denies any symptoms during this visit chest pain, shortness of breath, lightheadedness, near-syncope or syncope.    Device was interrogated on 06/24/2020.  Lead parameters were stable.  She is atrially paced and ventricular paced about 7% of the time.    Previous studies:  -Echocardiogram (03/2019): Severe V dysfunction.  EF estimated 20-25%.  -Echocardiogram  (07/2019):   Severe LV dysfunction EF estimated at 20-25%. Normally functioning prosthetic mitral valve.  -Echocardiogram (06/05/2020): Severe V dysfunction.  EF estimated at 35%.  Prosthetic mitral valve was functioning normally. An annuloplasty ring is noted in the tricuspid position. No TR.  -Nuclear medicine stress testing (07/2019): Normal perfusion.  No evidence of ischemia.    ASSESSMENT AND PLAN:   1.    Chronic systolic heart failure (class II/IV) secondary to nonischemic cardiomyopathy and nonsustained VT.  Patient continues to have severe LV dysfunction despite of maximal guideline directed pharmacotherapy for heart failure.  She is also exhibiting episodes of mostly VT on device irrigation.  I agree with recommendation for device upgrade.    Today, I directly discussed with the patient  the rationale for device upgrade to ICD, alternative therapies, technical aspects of the surgical procedure, risks/benefits of therapy and need for long-term follow-up in the Device Clinic.    An educational handout regarding ICD therapy was reviewed face-to-face.  The main points addressed in the handout were further discussed today.  All questions/concerns were addressed.     After a good discussion, the patient verbalized understanding and has agreed to proceed with ICD implantation.      [place] Decision Making Aid       Lucian Tolliver MD    Physical Exam:  Vitals: There were no vitals taken for this visit.    Constitutional:  Pt is in no acute distress.  Normal body habitus, upright.  HEAD: Normocephalic. No evidence of injury or laceration.   SKIN: Skin normal color with no lesions or eruptions. No xanthelasma.  ENT/Mouth:  Membranes moist. No nasal discharge or bleeding gums.   Neck:  Supple, normal JVP, no evidence of thyromegaly.  Chest/Lungs: No audible wheezing or labored breathing. Normal chest wall expansion. No cough.   Cardiovascular: Normal jugular venous pressure. No evidence of pitting edema  bilaterally.   Abdomen: No evidence of abdominal distention. No observed jaundice.  Eyes: PERRL, EOMI. No scleral icterus, normal conjunctivae.  Extremities:  No lower extremity edema noticed.  No cyanosis or clubbing noted.   Neurologic: Normal arm motion bilateral.  No tremors. No evidence of focal defect.   Psychiatric: Alert and oriented x3, calm.         CURRENT MEDICATIONS:  Current Outpatient Medications   Medication Sig Dispense Refill     aspirin 81 MG EC tablet Take 81 mg by mouth every evening       calcium carbonate-vitamin D (CALCIUM 600+D) 600-200 MG-UNIT TABS Take 1 tablet by mouth 2 times daily       Cetirizine HCl (ZYRTEC PO)        levothyroxine (SYNTHROID/LEVOTHROID) 50 MCG tablet Take 1 tablet (50 mcg) by mouth daily 90 tablet 3     losartan (COZAAR) 100 MG tablet Take 1 tablet (100 mg) by mouth daily 90 tablet 3     metoprolol succinate ER (TOPROL-XL) 100 MG 24 hr tablet Take 1 tablet (100 mg) by mouth daily 90 tablet 3     Multiple Vitamins-Minerals (MULTIVITAMIN ADULT) TABS Take 1 tablet by mouth daily       omeprazole (PRILOSEC) 10 MG DR capsule Take 1 capsule (10 mg) by mouth every morning (before breakfast) 90 capsule 3     potassium chloride ER (K-DUR/KLOR-CON M) 20 MEQ CR tablet Take 1 tablet (20 mEq) by mouth daily 90 tablet 3     rivaroxaban ANTICOAGULANT (XARELTO) 20 MG TABS tablet Take 1 tablet (20 mg) by mouth daily (with dinner) 90 tablet 3     torsemide (DEMADEX) 10 MG tablet Take 1 tablet (10 mg) by mouth daily 90 tablet 3     amoxicillin (AMOXIL) 500 MG capsule Take 500 mg by mouth as needed         ALLERGIES     Allergies   Allergen Reactions     Chlorpheniramine Other (See Comments)     LOC and fainting      Lisinopril Cough     Phenylephrine Other (See Comments)     fainting       PAST MEDICAL HISTORY:  Past Medical History:   Diagnosis Date     Allergic rhinitis, cause unspecified     dust mites, ragweed     Complete AV block (H)     BSX DDD PM 2-     History of  tricuspid valve repair      Mitral valve prolapse     bioprosthetic MVR 2-     Paroxysmal atrial fibrillation (H)        PAST SURGICAL HISTORY:  Past Surgical History:   Procedure Laterality Date     COLONOSCOPY N/A 9/15/2015    Procedure: COLONOSCOPY;  Surgeon: Anson Llanos MD;  Location:  GI     CV HEART CATHETERIZATION WITH POSSIBLE INTERVENTION N/A 1/9/2019    Procedure: Heart Catheterization with Possible Intervention;  Surgeon: Ritesh Whittaker MD;  Location:  HEART CARDIAC CATH LAB     CV RIGHT AND LEFT HEART CATH N/A 1/9/2019    Procedure: Right and Left Heart Catherization;  Surgeon: Ritesh Whittaker MD;  Location:  HEART CARDIAC CATH LAB     ENT SURGERY      T&A     EP PACEMAKER N/A 2/18/2019    Procedure: EP Pacemaker;  Surgeon: Inocencia Guillen MD;  Location:  HEART CARDIAC CATH LAB     REPAIR VALVE TRICUSPID MINIMALLY INVASIVE N/A 2/15/2019    Procedure: MINIMALLY INVASIVE TRICUSPID VALVE REPAIR WITH 32MM SULLIVAN RING;  Surgeon: Andrea Hanna MD;  Location:  OR     REPLACE VALVE MITRAL MINIMALLY INVASIVE N/A 2/15/2019    Procedure: MINIMALLY INVASIVE MITRAL VALVE REPLACEMENT WITH EPIC ST KEYUR 31MM VALVE; ON PUMP WITH TIA.  CARDIOLOGIST DR CARDENAS;  Surgeon: Andrea Hanna MD;  Location:  OR       FAMILY HISTORY:  Family History   Problem Relation Age of Onset     Osteoporosis Mother      Alzheimer Disease Mother      Family History Negative Father      Heart Disease Maternal Grandfather      Family History Negative Paternal Grandmother      Family History Negative Paternal Grandfather        SOCIAL HISTORY:  Social History     Socioeconomic History     Marital status:      Spouse name: None     Number of children: 3     Years of education: None     Highest education level: None   Occupational History     None   Social Needs     Financial resource strain: None     Food insecurity     Worry: None     Inability: None     Transportation  needs     Medical: None     Non-medical: None   Tobacco Use     Smoking status: Never Smoker     Smokeless tobacco: Never Used   Substance and Sexual Activity     Alcohol use: No     Drug use: No     Sexual activity: Not Currently   Lifestyle     Physical activity     Days per week: None     Minutes per session: None     Stress: None   Relationships     Social connections     Talks on phone: None     Gets together: None     Attends Evangelical service: None     Active member of club or organization: None     Attends meetings of clubs or organizations: None     Relationship status: None     Intimate partner violence     Fear of current or ex partner: None     Emotionally abused: None     Physically abused: None     Forced sexual activity: None   Other Topics Concern      Service Not Asked     Blood Transfusions Not Asked     Caffeine Concern No     Comment: 1 cup of coffee and 1 can diet coke per day     Occupational Exposure Not Asked     Hobby Hazards Not Asked     Sleep Concern Yes     Comment: takes Doxylamine succinate 1/2 tab every night     Stress Concern No     Weight Concern No     Special Diet No     Comment: healthy      Back Care Not Asked     Exercise Yes     Comment: walking 45min x7 a wk. Very active, [employer], Golf, Bowling, Cardio     Bike Helmet Not Asked     Seat Belt Yes     Self-Exams Yes     Parent/sibling w/ CABG, MI or angioplasty before 65F 55M? No   Social History Narrative     None       Review of Systems:  Skin:        Eyes:       ENT:       Respiratory:       Cardiovascular:       Gastroenterology:      Genitourinary:       Musculoskeletal:       Neurologic:       Psychiatric:       Heme/Lymph/Imm:       Endocrine:           Recent Lab Results:  LIPID RESULTS:  Lab Results   Component Value Date    CHOL 202 (H) 07/23/2015    HDL 96 07/23/2015    LDL 83 07/23/2015    TRIG 116 07/23/2015    CHOLHDLRATIO 2.1 07/23/2015       LIVER ENZYME RESULTS:  Lab Results   Component Value Date    AST  "29 07/11/2019    ALT 26 07/11/2019       CBC RESULTS:  Lab Results   Component Value Date    WBC 13.7 (H) 02/22/2019    RBC 2.64 (L) 02/22/2019    HGB 10.0 (L) 03/07/2019    HCT 23.4 (L) 02/22/2019    MCV 89 02/22/2019    MCH 30.7 02/22/2019    MCHC 34.6 02/22/2019    RDW 14.8 02/22/2019     02/22/2019       BMP RESULTS:  Lab Results   Component Value Date     (L) 06/02/2020    POTASSIUM 4.5 06/02/2020    CHLORIDE 100 06/02/2020    CO2 26 06/02/2020    ANIONGAP 6 06/02/2020    GLC 98 06/02/2020    BUN 21 06/02/2020    CR 0.90 06/02/2020    GFRESTIMATED 63 06/02/2020    GFRESTBLACK 73 06/02/2020    ARIELLE 8.8 06/02/2020        A1C RESULTS:  Lab Results   Component Value Date    A1C 5.9 (H) 01/09/2019       INR RESULTS:  Lab Results   Component Value Date    INR 1.21 (H) 02/18/2019    INR 1.24 (H) 02/16/2019         ECHOCARDIOGRAM  No results found for this or any previous visit (from the past 8760 hour(s)).      No orders of the defined types were placed in this encounter.    No orders of the defined types were placed in this encounter.    There are no discontinued medications.      Encounter Diagnosis   Name Primary?     Non-sustained ventricular tachycardia (H)          CC  Jesus Stone MD  [address]  [place] [address]                Taty Aguila is a 75 year old female who is being evaluated via a billable video visit.      The patient has been notified of following:     \"This video visit will be conducted via a call between you and your physician/provider. We have found that certain health care needs can be provided without the need for an in-person physical exam.  This service lets us provide the care you need with a video conversation.  If a prescription is necessary we can send it directly to your pharmacy.  If lab work is needed we can place an order for that and you can then stop by our lab to have the test done at a later time.    Video visits are billed at different " "rates depending on your insurance coverage.  Please reach out to your insurance provider with any questions.    If during the course of the call the physician/provider feels a video visit is not appropriate, you will not be charged for this service.\"    Patient has given verbal consent for Video visit? Yes  How would you like to obtain your AVS? Mail a copy      Video though My chart    Patient would like the video invitation sent by:   Will anyone else be joining your video visit?     Video-Visit Details    Type of service:  Video Visit    Video Start Time:   Video End Time:     Originating Location (pt. Location):     Distant Location (provider location):  [facility]     Platform used for Video Visit:               Patient reported vitals:  BP:107/61  Heart rate:68  Weight:122    Review Of Systems  Skin: negative  Eyes: glasses  Ears/Nose/Throat: negative  Respiratory: No shortness of breath, dyspnea on exertion, cough, or hemoptysis  Cardiovascular: palpitations and lower extremity edema lightheaded  Gastrointestinal: negative  Genitourinary: negative  Musculoskeletal: back pain  Neurologic: negative  Psychiatric: negative  Hematologic/Lymphatic/Immunologic: allergies  Endocrine: thyroid disorder    DHEERAJ Simeon  "

## 2020-07-14 DIAGNOSIS — Z11.59 ENCOUNTER FOR SCREENING FOR OTHER VIRAL DISEASES: Primary | ICD-10-CM

## 2020-08-10 DIAGNOSIS — Z11.59 ENCOUNTER FOR SCREENING FOR OTHER VIRAL DISEASES: ICD-10-CM

## 2020-08-10 PROCEDURE — U0003 INFECTIOUS AGENT DETECTION BY NUCLEIC ACID (DNA OR RNA); SEVERE ACUTE RESPIRATORY SYNDROME CORONAVIRUS 2 (SARS-COV-2) (CORONAVIRUS DISEASE [COVID-19]), AMPLIFIED PROBE TECHNIQUE, MAKING USE OF HIGH THROUGHPUT TECHNOLOGIES AS DESCRIBED BY CMS-2020-01-R: HCPCS | Performed by: INTERNAL MEDICINE

## 2020-08-11 DIAGNOSIS — I42.8 NONISCHEMIC CARDIOMYOPATHY (H): Primary | ICD-10-CM

## 2020-08-11 LAB
SARS-COV-2 RNA SPEC QL NAA+PROBE: NOT DETECTED
SPECIMEN SOURCE: NORMAL

## 2020-08-11 RX ORDER — CEFAZOLIN SODIUM 2 G/100ML
2 INJECTION, SOLUTION INTRAVENOUS
Status: CANCELLED | OUTPATIENT
Start: 2020-08-11

## 2020-08-11 RX ORDER — LIDOCAINE 40 MG/G
CREAM TOPICAL
Status: CANCELLED | OUTPATIENT
Start: 2020-08-11

## 2020-08-11 RX ORDER — SODIUM CHLORIDE 450 MG/100ML
INJECTION, SOLUTION INTRAVENOUS CONTINUOUS
Status: CANCELLED | OUTPATIENT
Start: 2020-08-11

## 2020-08-11 NOTE — PROGRESS NOTES
Pt scheduled for PPM upgrade to ICD on 8/14/2020. Called pt to review pre-procedure instructions, no answer, left VM for pt to call back. Will review the following:    _____________________________________________________________    ADDENDUM 12:00PM. Called and spoke with pt. Reviewed the following information.     Anticoagulation: Xarelto, hold 2 days prior and resume the next day   Oral diabetes meds: NA  Insulin: NA  Diuretic: torsemide, hold AM of procedure  Contrast allergy: no  Pt informed to be NPO at midnight  Procedure scheduled for 1pm, may have clear liquid breakfast before 8am    Instructed pt to shower the morning of the procedure, and then put on a clean shirt in order to help prevent infection.     Pt has post-procedure transportation and 24 hours monitoring set up.   Pt aware of no driving for 24 hours post procedure due to sedation.     Pt aware of arrival time and location. Pt verbalized understanding of instructions.     Pt had her COVID test yesterday, results still pending.       Wellness Screening Tool    Symptom Screening:    Do you have one of the following NEW symptoms:      Fever (subjective or >100.0)?  No    New cough? No    Shortness of breath? No    Chills? No    New loss of taste or smell? No    Generalized body aches? No    New persistent headache? No    New sore throat? No    Nausea, vomiting or diarrhea? No    Within the past 3 weeks, have you been exposed to someone with a known positive illness below?      COVID - 19 (known or suspected) No    Chicken pox?  No    Measles? No    Pertussis? No    Patient notified of visitor restriction: Yes  Patient informed to wear a mask: Yes    Patient's appointment status: Patient will be seen as scheduled on 8/14/2020

## 2020-08-13 ENCOUNTER — VIRTUAL VISIT (OUTPATIENT)
Dept: CARDIOLOGY | Facility: CLINIC | Age: 75
End: 2020-08-13
Payer: COMMERCIAL

## 2020-08-13 DIAGNOSIS — I42.8 NONISCHEMIC CARDIOMYOPATHY (H): Primary | ICD-10-CM

## 2020-08-13 PROCEDURE — 99214 OFFICE O/P EST MOD 30 MIN: CPT | Mod: 95 | Performed by: PHYSICIAN ASSISTANT

## 2020-08-13 NOTE — PATIENT INSTRUCTIONS
Taty - it was so nice to speak with you today!    1. Discussed upcoming upgrade from dual chamber pacemaker to dual chamber ICD implant due to funny rhythms from bottom of heart as well as low ejection fraction scheduled for tomorrow 8/14/2020    PLAN:  1. ICD upgrade tomorrow. Expect home same day  2. Follow-up will be arranged by Device RNs following procedure    CALL if issues!  Device Nurses: [phone].  After hours/weekends: [phone] #2

## 2020-08-13 NOTE — PROGRESS NOTES
"Taty Aguila is a 75 year old female who is being evaluated via a billable video visit.      The patient has been notified of following:     \"This video visit will be conducted via a call between you and your physician/provider. We have found that certain health care needs can be provided without the need for an in-person physical exam.  This service lets us provide the care you need with a video conversation.  If a prescription is necessary we can send it directly to your pharmacy.  If lab work is needed we can place an order for that and you can then stop by our lab to have the test done at a later time.    Video visits are billed at different rates depending on your insurance coverage.  Please reach out to your insurance provider with any questions.    If during the course of the call the physician/provider feels a video visit is not appropriate, you will not be charged for this service.\"    Patient has given verbal consent for Video visit? Yes  How would you like to obtain your AVS? MyChart  If you are dropped from the video visit, the video invite should be resent to: Text to cell phone: [phone]  Will anyone else be joining your video visit? No      CC:  Pre-op eval prior to planned ICD implant    Vitals - Patient Reported  Systolic (Patient Reported): 101  Diastolic (Patient Reported): 55  Weight (Patient Reported): 55.3 kg (122 lb)  Height (Patient Reported): 167.6 cm (5' 6\")  BMI (Based on Pt Reported Ht/Wt): 19.69  Pulse (Patient Reported): 66      K.Munira, RMA    BRIEF HPI:  Taty is a 75 year old yo F who sees Dr. Tolliver for h/o:    1. Valvular disease with bivalvular mitral valve prolapse and tricuspid regurgitation s/p 31 mm St Ron Epic porcine mitral valve replacement and 32 mm O[employer] MC 3 tricuspid annuloplasty ring 2/15/19 via right mini-thoracotomy with Dr. Hanna   2. Post op CHB s/p dual-chamber [employer] pacemaker 2/18/19  3. Paroxysmal AFib first diagnosed 10/2018, with " spontaneous conversion. Required amiodarone  4. CM - EF 65% pre-op, to 20-25% 3/2019 post-operatively. Despite GDMT directed by [facility], EF remained low and device interrogation showed episodes of NSVT    Saw Dr. Tolliver 7/8/2020 at which time they reviewed her EF 35% on echo 6/2020. Device interrogation showing episodes of VT. They discussed upgrade from dual chamber PPM to ICD.  EKG from 3/2019 showed narrow no significant widening of QRS. Her device has been set up for 8/14 @ 1 PM    INTERVAL HISTORY:  Since Dr. Tolliver saw Taty, she's not really had any changes. Still has some mild LE edema but no sig changes. No c/o CP, SOB, orthopnea, PND.  Weight is stable.    No palpitations. No recurrent AFib x >1 year. Notes SBPs sometimes <100 mmHg. With this, can feel tired but denies any lightheadedness/dizziness. No falls.     DIAGNOSTICS:  Echocardiogram (06/05/2020): Severe LV dysfunction.  EF estimated at 35%.  Prosthetic mitral valve was functioning normally. An annuloplasty ring is noted in the tricuspid position. No TR.  Device interrogation 6/24/2020 with 7% AP and 7% . 1 episode NSVT x7s, avg rate 165 bpm.   Echocardiogram (07/2019):   Severe LV dysfunction EF estimated at 20-25%. Normally functioning prosthetic mitral valve.  Nuclear medicine stress testing (07/2019): Normal perfusion. No ischemia.  Echocardiogram (03/2019): Severe LV dysfunction.  EF estimated 20-25%.      REVIEW OF SYSTEMS:  Skin: NEGATIVE  Eyes:Ears/Nose/Throat: NEGATIVE  Respiratory: NEGATIVE  Cardiovascular: Occasional light headed, slight ankle edema  Gastrointestinal: NEGATIVE  Genitourinary:NEGATIVE  Musculoskeletal: NEGATIVE  Neurologic: NEGATIVE  Psychiatric: NEGATIVE  Hematologic/Lymphatic/Immunologic: NEGATIVE  Endocrine:  Hypothyroid      PHYSICAL EXAM:  GEN: NAD. Upright. Normal body habitus  ENT/Mouth: Atraumatic and normocephalic  Eyes: No scleral icterus; normal conjunctivae  Neck: No observed thyromegaly  Chest/Lungs: No  audible wheezing or cough; equal chest wall expansion. Non-labored breathing  CV: No evidence of elevated JVP. No evidence of pitting edema  Abdomen: No observed jaundice. No evidence of abdominal distention  Extremities: No cyanosis or clubbing observed  Skin: No visible rashes. Normal skin color  Neuro: Normal Arm motion bilaterally. No tremors. No visible evidence of focal defects  Psychiatric: A/O. Calm demeanor      ASSESSMENT/PLAN:    1. Non-ischemic CM with episodes of VT    As above, has not had improvement in EF following valve surgery despite good medications    No evidence of HF. EKG 3/2019 without significant widening    Plan for upgrade to dual chamber ICD 8/14/2020.   o Arrive 11 AM for 1 PM procedure  o Hold torsemide AM of procedure  o Xarelto held since Tuesday night  Dr. Tolliver discussed  the rationale for ICD placement, alternative therapies, technical aspects of the surgical procedure, risks/benefits of therapy and need for long-term follow-up in the Device Clinic. The [place] Decision Making Aid was reviewed with her by Dr. Tolliver     PLAN:    We discussed the risks, benefits and indications of upgrading to dual chamber ICD implant, including but not limited to use of conscious sedation including need for a , discomfort, peripheral vessel injury, pneumothorax, cardiac puncture and/or tamponade, device malfunction and/or recall, and infection requiring explantation of the device and long term antibiotics. We also briefly discussed follow up expectations and restrictions following the procedure. The patient voiced understanding and is willing to proceed.  A consent form will be signed by the procedural physician.        2. Valvular disease    S/p MVR and TV repair for severe MR d/t MVP and TR 2/2019    Echo showed normal valve function     PLAN:    Continue current medications and routine follow-up          CURRENT MEDICATIONS:  Current Outpatient Medications   Medication Sig Dispense Refill      amoxicillin (AMOXIL) 500 MG capsule Take 500 mg by mouth as needed       aspirin 81 MG EC tablet Take 81 mg by mouth every evening       calcium carbonate-vitamin D (CALCIUM 600+D) 600-200 MG-UNIT TABS Take 1 tablet by mouth 2 times daily       Cetirizine HCl (ZYRTEC PO)        levothyroxine (SYNTHROID/LEVOTHROID) 50 MCG tablet Take 1 tablet (50 mcg) by mouth daily 90 tablet 3     losartan (COZAAR) 100 MG tablet Take 1 tablet (100 mg) by mouth daily 90 tablet 3     metoprolol succinate ER (TOPROL-XL) 100 MG 24 hr tablet Take 1 tablet (100 mg) by mouth daily 90 tablet 3     Multiple Vitamins-Minerals (MULTIVITAMIN ADULT) TABS Take 1 tablet by mouth daily       omeprazole (PRILOSEC) 10 MG DR capsule Take 1 capsule (10 mg) by mouth every morning (before breakfast) 90 capsule 3     potassium chloride ER (K-DUR/KLOR-CON M) 20 MEQ CR tablet Take 1 tablet (20 mEq) by mouth daily 90 tablet 3     rivaroxaban ANTICOAGULANT (XARELTO) 20 MG TABS tablet Take 1 tablet (20 mg) by mouth daily (with dinner) 90 tablet 3     torsemide (DEMADEX) 10 MG tablet Take 1 tablet (10 mg) by mouth daily 90 tablet 3         ORDERS PLACED:  No orders of the defined types were placed in this encounter.    No orders of the defined types were placed in this encounter.    There are no discontinued medications.      No diagnosis found.      ALLERGIES     Allergies   Allergen Reactions     Chlorpheniramine Other (See Comments)     LOC and fainting      Lisinopril Cough     Phenylephrine Other (See Comments)     fainting       PAST MEDICAL HISTORY:  Past Medical History:   Diagnosis Date     Allergic rhinitis, cause unspecified     dust mites, ragweed     Complete AV block (H)     BSX DDD PM 2-     History of tricuspid valve repair      Mitral valve prolapse     bioprosthetic MVR 2-     Paroxysmal atrial fibrillation (H)        PAST SURGICAL HISTORY:  Past Surgical History:   Procedure Laterality Date     COLONOSCOPY N/A 9/15/2015     Procedure: COLONOSCOPY;  Surgeon: Anson Llanos MD;  Location:  GI     CV HEART CATHETERIZATION WITH POSSIBLE INTERVENTION N/A 1/9/2019    Procedure: Heart Catheterization with Possible Intervention;  Surgeon: Ritesh Whittaker MD;  Location:  HEART CARDIAC CATH LAB     CV RIGHT AND LEFT HEART CATH N/A 1/9/2019    Procedure: Right and Left Heart Catherization;  Surgeon: Ritesh Whittaker MD;  Location:  HEART CARDIAC CATH LAB     ENT SURGERY      T&A     EP PACEMAKER N/A 2/18/2019    Procedure: EP Pacemaker;  Surgeon: Inocencia Guillen MD;  Location:  HEART CARDIAC CATH LAB     REPAIR VALVE TRICUSPID MINIMALLY INVASIVE N/A 2/15/2019    Procedure: MINIMALLY INVASIVE TRICUSPID VALVE REPAIR WITH 32MM SULLIVAN RING;  Surgeon: Andrea Hanna MD;  Location:  OR     REPLACE VALVE MITRAL MINIMALLY INVASIVE N/A 2/15/2019    Procedure: MINIMALLY INVASIVE MITRAL VALVE REPLACEMENT WITH EPIC ST KEYUR 31MM VALVE; ON PUMP WITH TIA.  CARDIOLOGIST DR CARDENAS;  Surgeon: Andrea Hanna MD;  Location:  OR       FAMILY HISTORY:  Family History   Problem Relation Age of Onset     Osteoporosis Mother      Alzheimer Disease Mother      Family History Negative Father      Heart Disease Maternal Grandfather      Family History Negative Paternal Grandmother      Family History Negative Paternal Grandfather        SOCIAL HISTORY:  Social History     Socioeconomic History     Marital status:      Spouse name: Not on file     Number of children: 3     Years of education: Not on file     Highest education level: Not on file   Occupational History     Not on file   Social Needs     Financial resource strain: Not on file     Food insecurity     Worry: Not on file     Inability: Not on file     Transportation needs     Medical: Not on file     Non-medical: Not on file   Tobacco Use     Smoking status: Never Smoker     Smokeless tobacco: Never Used   Substance and Sexual Activity     Alcohol use:  No     Drug use: No     Sexual activity: Not Currently   Lifestyle     Physical activity     Days per week: Not on file     Minutes per session: Not on file     Stress: Not on file   Relationships     Social connections     Talks on phone: Not on file     Gets together: Not on file     Attends Quaker service: Not on file     Active member of club or organization: Not on file     Attends meetings of clubs or organizations: Not on file     Relationship status: Not on file     Intimate partner violence     Fear of current or ex partner: Not on file     Emotionally abused: Not on file     Physically abused: Not on file     Forced sexual activity: Not on file   Other Topics Concern      Service Not Asked     Blood Transfusions Not Asked     Caffeine Concern No     Comment: 1 cup of coffee and 1 can diet coke per day     Occupational Exposure Not Asked     Hobby Hazards Not Asked     Sleep Concern Yes     Comment: takes Doxylamine succinate 1/2 tab every night     Stress Concern No     Weight Concern No     Special Diet No     Comment: healthy      Back Care Not Asked     Exercise Yes     Comment: walking 45min x7 a wk. Very active, [employer], Golf, Bowling, Cardio     Bike Helmet Not Asked     Seat Belt Yes     Self-Exams Yes     Parent/sibling w/ CABG, MI or angioplasty before 65F 55M? No   Social History Narrative     Not on file         Video-Visit Details    Type of service:  Video Visit    Video Start Time: 0755  Video End Time: 0820  Duration 25 minutes    Originating Location (pt. Location): Home    Distant Location (provider location):  [facility]     Platform used for Video Visit: Fish Neal PA-C

## 2020-08-13 NOTE — LETTER
8/13/2020    Anyi Olmos MD  [address]  [facility] [address]    RE: Taty Aguila       Dear Colleague,    I had the pleasure of seeing Taty Aguila in the [facility].    Taty Aguila is a 75 year old female who is being evaluated via a billable video visit.        BRIEF HPI:  Taty is a 75 year old yo F who sees Dr. Tolliver for h/o:    1. Valvular disease with bivalvular mitral valve prolapse and tricuspid regurgitation s/p 31 mm St Ron Epic porcine mitral valve replacement and 32 mm [employer] MC 3 tricuspid annuloplasty ring 2/15/19 via right mini-thoracotomy with Dr. Hanna   2. Post op CHB s/p dual-chamber [employer] pacemaker 2/18/19  3. Paroxysmal AFib first diagnosed 10/2018, with spontaneous conversion. Required amiodarone  4. CM - EF 65% pre-op, to 20-25% 3/2019 post-operatively. Despite GDMT directed by [facility], EF remained low and device interrogation showed episodes of NSVT    Saw Dr. Tolliver 7/8/2020 at which time they reviewed her EF 35% on echo 6/2020. Device interrogation showing episodes of VT. They discussed upgrade from dual chamber PPM to ICD.  EKG from 3/2019 showed narrow no significant widening of QRS. Her device has been set up for 8/14 @ 1 PM    INTERVAL HISTORY:  Since Dr. Tolliver saw Taty, she's not really had any changes. Still has some mild LE edema but no sig changes. No c/o CP, SOB, orthopnea, PND.  Weight is stable.    No palpitations. No recurrent AFib x >1 year. Notes SBPs sometimes <100 mmHg. With this, can feel tired but denies any lightheadedness/dizziness. No falls.     DIAGNOSTICS:  Echocardiogram (06/05/2020): Severe LV dysfunction.  EF estimated at 35%.  Prosthetic mitral valve was functioning normally. An annuloplasty ring is noted in the tricuspid position. No TR.  Device interrogation 6/24/2020 with 7% AP and 7% . 1 episode NSVT x7s, avg rate 165 bpm.   Echocardiogram (07/2019):   Severe LV dysfunction EF  estimated at 20-25%. Normally functioning prosthetic mitral valve.  Nuclear medicine stress testing (07/2019): Normal perfusion. No ischemia.  Echocardiogram (03/2019): Severe LV dysfunction.  EF estimated 20-25%.      REVIEW OF SYSTEMS:  Skin: NEGATIVE  Eyes:Ears/Nose/Throat: NEGATIVE  Respiratory: NEGATIVE  Cardiovascular: Occasional light headed, slight ankle edema  Gastrointestinal: NEGATIVE  Genitourinary:NEGATIVE  Musculoskeletal: NEGATIVE  Neurologic: NEGATIVE  Psychiatric: NEGATIVE  Hematologic/Lymphatic/Immunologic: NEGATIVE  Endocrine:  Hypothyroid      PHYSICAL EXAM:  GEN: NAD. Upright. Normal body habitus  ENT/Mouth: Atraumatic and normocephalic  Eyes: No scleral icterus; normal conjunctivae  Neck: No observed thyromegaly  Chest/Lungs: No audible wheezing or cough; equal chest wall expansion. Non-labored breathing  CV: No evidence of elevated JVP. No evidence of pitting edema  Abdomen: No observed jaundice. No evidence of abdominal distention  Extremities: No cyanosis or clubbing observed  Skin: No visible rashes. Normal skin color  Neuro: Normal Arm motion bilaterally. No tremors. No visible evidence of focal defects  Psychiatric: A/O. Calm demeanor      ASSESSMENT/PLAN:    1. Non-ischemic CM with episodes of VT    As above, has not had improvement in EF following valve surgery despite good medications    No evidence of HF. EKG 3/2019 without significant widening    Plan for upgrade to dual chamber ICD 8/14/2020.   o Arrive 11 AM for 1 PM procedure  o Hold torsemide AM of procedure  o Xarelto held since Tuesday night  Dr. Tolliver discussed  the rationale for ICD placement, alternative therapies, technical aspects of the surgical procedure, risks/benefits of therapy and need for long-term follow-up in the Device Clinic. The [place] Decision Making Aid was reviewed with her by Dr. Tolliver     PLAN:    We discussed the risks, benefits and indications of upgrading to dual chamber ICD implant, including but not  limited to use of conscious sedation including need for a , discomfort, peripheral vessel injury, pneumothorax, cardiac puncture and/or tamponade, device malfunction and/or recall, and infection requiring explantation of the device and long term antibiotics. We also briefly discussed follow up expectations and restrictions following the procedure. The patient voiced understanding and is willing to proceed.  A consent form will be signed by the procedural physician.        2. Valvular disease    S/p MVR and TV repair for severe MR d/t MVP and TR 2/2019    Echo showed normal valve function     PLAN:    Continue current medications and routine follow-up          CURRENT MEDICATIONS:  Current Outpatient Medications   Medication Sig Dispense Refill     amoxicillin (AMOXIL) 500 MG capsule Take 500 mg by mouth as needed       aspirin 81 MG EC tablet Take 81 mg by mouth every evening       calcium carbonate-vitamin D (CALCIUM 600+D) 600-200 MG-UNIT TABS Take 1 tablet by mouth 2 times daily       Cetirizine HCl (ZYRTEC PO)        levothyroxine (SYNTHROID/LEVOTHROID) 50 MCG tablet Take 1 tablet (50 mcg) by mouth daily 90 tablet 3     losartan (COZAAR) 100 MG tablet Take 1 tablet (100 mg) by mouth daily 90 tablet 3     metoprolol succinate ER (TOPROL-XL) 100 MG 24 hr tablet Take 1 tablet (100 mg) by mouth daily 90 tablet 3     Multiple Vitamins-Minerals (MULTIVITAMIN ADULT) TABS Take 1 tablet by mouth daily       omeprazole (PRILOSEC) 10 MG DR capsule Take 1 capsule (10 mg) by mouth every morning (before breakfast) 90 capsule 3     potassium chloride ER (K-DUR/KLOR-CON M) 20 MEQ CR tablet Take 1 tablet (20 mEq) by mouth daily 90 tablet 3     rivaroxaban ANTICOAGULANT (XARELTO) 20 MG TABS tablet Take 1 tablet (20 mg) by mouth daily (with dinner) 90 tablet 3     torsemide (DEMADEX) 10 MG tablet Take 1 tablet (10 mg) by mouth daily 90 tablet 3         ORDERS PLACED:  No orders of the defined types were placed in this  encounter.    No orders of the defined types were placed in this encounter.    There are no discontinued medications.      No diagnosis found.      ALLERGIES     Allergies   Allergen Reactions     Chlorpheniramine Other (See Comments)     LOC and fainting      Lisinopril Cough     Phenylephrine Other (See Comments)     fainting       PAST MEDICAL HISTORY:  Past Medical History:   Diagnosis Date     Allergic rhinitis, cause unspecified     dust mites, ragweed     Complete AV block (H)     BSX DDD PM 2-     History of tricuspid valve repair      Mitral valve prolapse     bioprosthetic MVR 2-     Paroxysmal atrial fibrillation (H)        PAST SURGICAL HISTORY:  Past Surgical History:   Procedure Laterality Date     COLONOSCOPY N/A 9/15/2015    Procedure: COLONOSCOPY;  Surgeon: Anson Llanos MD;  Location:  GI     CV HEART CATHETERIZATION WITH POSSIBLE INTERVENTION N/A 1/9/2019    Procedure: Heart Catheterization with Possible Intervention;  Surgeon: Ritesh Whittaker MD;  Location:  HEART CARDIAC CATH LAB     CV RIGHT AND LEFT HEART CATH N/A 1/9/2019    Procedure: Right and Left Heart Catherization;  Surgeon: Ritesh Whittaker MD;  Location:  HEART CARDIAC CATH LAB     ENT SURGERY      T&A     EP PACEMAKER N/A 2/18/2019    Procedure: EP Pacemaker;  Surgeon: Inocencia Guillen MD;  Location:  HEART CARDIAC CATH LAB     REPAIR VALVE TRICUSPID MINIMALLY INVASIVE N/A 2/15/2019    Procedure: MINIMALLY INVASIVE TRICUSPID VALVE REPAIR WITH 32MM SULLIVAN RING;  Surgeon: Andrea Hanna MD;  Location:  OR     REPLACE VALVE MITRAL MINIMALLY INVASIVE N/A 2/15/2019    Procedure: MINIMALLY INVASIVE MITRAL VALVE REPLACEMENT WITH EPIC ST KEYUR 31MM VALVE; ON PUMP WITH TIA.  CARDIOLOGIST DR CARDENAS;  Surgeon: Andrea Hanna MD;  Location:  OR       FAMILY HISTORY:  Family History   Problem Relation Age of Onset     Osteoporosis Mother      Alzheimer Disease Mother      Family  History Negative Father      Heart Disease Maternal Grandfather      Family History Negative Paternal Grandmother      Family History Negative Paternal Grandfather        SOCIAL HISTORY:  Social History     Socioeconomic History     Marital status:      Spouse name: Not on file     Number of children: 3     Years of education: Not on file     Highest education level: Not on file   Occupational History     Not on file   Social Needs     Financial resource strain: Not on file     Food insecurity     Worry: Not on file     Inability: Not on file     Transportation needs     Medical: Not on file     Non-medical: Not on file   Tobacco Use     Smoking status: Never Smoker     Smokeless tobacco: Never Used   Substance and Sexual Activity     Alcohol use: No     Drug use: No     Sexual activity: Not Currently   Lifestyle     Physical activity     Days per week: Not on file     Minutes per session: Not on file     Stress: Not on file   Relationships     Social connections     Talks on phone: Not on file     Gets together: Not on file     Attends Gnosticist service: Not on file     Active member of club or organization: Not on file     Attends meetings of clubs or organizations: Not on file     Relationship status: Not on file     Intimate partner violence     Fear of current or ex partner: Not on file     Emotionally abused: Not on file     Physically abused: Not on file     Forced sexual activity: Not on file   Other Topics Concern      Service Not Asked     Blood Transfusions Not Asked     Caffeine Concern No     Comment: 1 cup of coffee and 1 can diet coke per day     Occupational Exposure Not Asked     Hobby Hazards Not Asked     Sleep Concern Yes     Comment: takes Doxylamine succinate 1/2 tab every night     Stress Concern No     Weight Concern No     Special Diet No     Comment: healthy      Back Care Not Asked     Exercise Yes     Comment: walking 45min x7 a wk. Very active, [employer], Golf, Bowling, Cardio      Bike Helmet Not Asked     Seat Belt Yes     Self-Exams Yes     Parent/sibling w/ CABG, MI or angioplasty before 65F 55M? No   Social History Narrative     Not on file         Thank you for allowing me to participate in the care of your patient.    Sincerely,     Jo-Ann Neal PA-C     [facility]

## 2020-08-14 ENCOUNTER — HOSPITAL ENCOUNTER (OUTPATIENT)
Facility: CLINIC | Age: 75
Discharge: HOME | End: 2020-08-14
Admitting: INTERNAL MEDICINE
Payer: COMMERCIAL

## 2020-08-14 ENCOUNTER — APPOINTMENT (OUTPATIENT)
Dept: GENERAL RADIOLOGY | Facility: CLINIC | Age: 75
End: 2020-08-14
Attending: INTERNAL MEDICINE
Payer: COMMERCIAL

## 2020-08-14 VITALS
OXYGEN SATURATION: 99 % | HEIGHT: 66 IN | WEIGHT: 124.5 LBS | TEMPERATURE: 97.3 F | HEART RATE: 69 BPM | SYSTOLIC BLOOD PRESSURE: 114 MMHG | RESPIRATION RATE: 16 BRPM | DIASTOLIC BLOOD PRESSURE: 80 MMHG | BODY MASS INDEX: 20.01 KG/M2

## 2020-08-14 DIAGNOSIS — I42.8 NONISCHEMIC CARDIOMYOPATHY (H): ICD-10-CM

## 2020-08-14 DIAGNOSIS — I47.29 NON-SUSTAINED VENTRICULAR TACHYCARDIA (H): ICD-10-CM

## 2020-08-14 LAB
ANION GAP SERPL CALCULATED.3IONS-SCNC: 6 MMOL/L (ref 3–14)
BUN SERPL-MCNC: 11 MG/DL (ref 7–30)
CALCIUM SERPL-MCNC: 9.3 MG/DL (ref 8.5–10.1)
CHLORIDE SERPL-SCNC: 100 MMOL/L (ref 94–109)
CO2 SERPL-SCNC: 27 MMOL/L (ref 20–32)
CREAT SERPL-MCNC: 0.75 MG/DL (ref 0.52–1.04)
ERYTHROCYTE [DISTWIDTH] IN BLOOD BY AUTOMATED COUNT: 13.2 % (ref 10–15)
GFR SERPL CREATININE-BSD FRML MDRD: 79 ML/MIN/{1.73_M2}
GLUCOSE SERPL-MCNC: 115 MG/DL (ref 70–99)
HCT VFR BLD AUTO: 38.2 % (ref 35–47)
HGB BLD-MCNC: 12.8 G/DL (ref 11.7–15.7)
MCH RBC QN AUTO: 30.8 PG (ref 26.5–33)
MCHC RBC AUTO-ENTMCNC: 33.5 G/DL (ref 31.5–36.5)
MCV RBC AUTO: 92 FL (ref 78–100)
PLATELET # BLD AUTO: 257 10E9/L (ref 150–450)
POTASSIUM SERPL-SCNC: 3.8 MMOL/L (ref 3.4–5.3)
RBC # BLD AUTO: 4.15 10E12/L (ref 3.8–5.2)
SODIUM SERPL-SCNC: 133 MMOL/L (ref 133–144)
WBC # BLD AUTO: 5.6 10E9/L (ref 4–11)

## 2020-08-14 PROCEDURE — 80048 BASIC METABOLIC PNL TOTAL CA: CPT

## 2020-08-14 PROCEDURE — 93005 ELECTROCARDIOGRAM TRACING: CPT

## 2020-08-14 PROCEDURE — 40000065 ZZH STATISTIC EKG NON-CHARGEABLE

## 2020-08-14 PROCEDURE — 33233 REMOVAL OF PM GENERATOR: CPT

## 2020-08-14 PROCEDURE — C1769 GUIDE WIRE: HCPCS | Performed by: INTERNAL MEDICINE

## 2020-08-14 PROCEDURE — C1894 INTRO/SHEATH, NON-LASER: HCPCS | Performed by: INTERNAL MEDICINE

## 2020-08-14 PROCEDURE — 36415 COLL VENOUS BLD VENIPUNCTURE: CPT

## 2020-08-14 PROCEDURE — 33249 INSJ/RPLCMT DEFIB W/LEAD(S): CPT | Performed by: INTERNAL MEDICINE

## 2020-08-14 PROCEDURE — C1777 LEAD, AICD, ENDO SINGLE COIL: HCPCS | Performed by: INTERNAL MEDICINE

## 2020-08-14 PROCEDURE — 40000852 ZZH STATISTIC HEART CATH LAB OR EP LAB

## 2020-08-14 PROCEDURE — 99153 MOD SED SAME PHYS/QHP EA: CPT | Performed by: INTERNAL MEDICINE

## 2020-08-14 PROCEDURE — 25000132 ZZH RX MED GY IP 250 OP 250 PS 637: Performed by: INTERNAL MEDICINE

## 2020-08-14 PROCEDURE — 40000235 ZZH STATISTIC TELEMETRY

## 2020-08-14 PROCEDURE — 25000128 H RX IP 250 OP 636: Performed by: INTERNAL MEDICINE

## 2020-08-14 PROCEDURE — 99152 MOD SED SAME PHYS/QHP 5/>YRS: CPT | Performed by: INTERNAL MEDICINE

## 2020-08-14 PROCEDURE — 93010 ELECTROCARDIOGRAM REPORT: CPT | Mod: 76 | Performed by: INTERNAL MEDICINE

## 2020-08-14 PROCEDURE — 27210794 ZZH OR GENERAL SUPPLY STERILE: Performed by: INTERNAL MEDICINE

## 2020-08-14 PROCEDURE — 25800029 ZZH RX IP 258 OP 250: Performed by: INTERNAL MEDICINE

## 2020-08-14 PROCEDURE — 40000986 XR CHEST 2 VW

## 2020-08-14 PROCEDURE — 25000125 ZZHC RX 250: Performed by: INTERNAL MEDICINE

## 2020-08-14 PROCEDURE — C1721 AICD, DUAL CHAMBER: HCPCS | Performed by: INTERNAL MEDICINE

## 2020-08-14 PROCEDURE — 85027 COMPLETE CBC AUTOMATED: CPT

## 2020-08-14 DEVICE — ICD RESONATE EL DR DF4 D433: Type: IMPLANTABLE DEVICE | Status: FUNCTIONAL

## 2020-08-14 RX ORDER — NALOXONE HYDROCHLORIDE 0.4 MG/ML
.1-.4 INJECTION, SOLUTION INTRAMUSCULAR; INTRAVENOUS; SUBCUTANEOUS
Status: DISCONTINUED | OUTPATIENT
Start: 2020-08-14 | End: 2020-08-14 | Stop reason: HOSPADM

## 2020-08-14 RX ORDER — OXYCODONE AND ACETAMINOPHEN 5; 325 MG/1; MG/1
1 TABLET ORAL EVERY 4 HOURS PRN
Status: DISCONTINUED | OUTPATIENT
Start: 2020-08-14 | End: 2020-08-14 | Stop reason: HOSPADM

## 2020-08-14 RX ORDER — DOBUTAMINE HYDROCHLORIDE 200 MG/100ML
5-40 INJECTION INTRAVENOUS CONTINUOUS PRN
Status: DISCONTINUED | OUTPATIENT
Start: 2020-08-14 | End: 2020-08-14 | Stop reason: HOSPADM

## 2020-08-14 RX ORDER — FENTANYL CITRATE 50 UG/ML
INJECTION, SOLUTION INTRAMUSCULAR; INTRAVENOUS
Status: DISCONTINUED | OUTPATIENT
Start: 2020-08-14 | End: 2020-08-14 | Stop reason: HOSPADM

## 2020-08-14 RX ORDER — LIDOCAINE 40 MG/G
CREAM TOPICAL
Status: DISCONTINUED | OUTPATIENT
Start: 2020-08-14 | End: 2020-08-14 | Stop reason: HOSPADM

## 2020-08-14 RX ORDER — HEPARIN SODIUM 200 [USP'U]/100ML
100-600 INJECTION, SOLUTION INTRAVENOUS CONTINUOUS PRN
Status: DISCONTINUED | OUTPATIENT
Start: 2020-08-14 | End: 2020-08-14 | Stop reason: HOSPADM

## 2020-08-14 RX ORDER — CEFAZOLIN SODIUM 2 G/100ML
2 INJECTION, SOLUTION INTRAVENOUS
Status: COMPLETED | OUTPATIENT
Start: 2020-08-14 | End: 2020-08-14

## 2020-08-14 RX ORDER — ACETAMINOPHEN 325 MG/1
325 TABLET ORAL EVERY 4 HOURS PRN
Status: DISCONTINUED | OUTPATIENT
Start: 2020-08-14 | End: 2020-08-14 | Stop reason: HOSPADM

## 2020-08-14 RX ORDER — BUPIVACAINE HYDROCHLORIDE 2.5 MG/ML
INJECTION, SOLUTION EPIDURAL; INFILTRATION; INTRACAUDAL
Status: DISCONTINUED | OUTPATIENT
Start: 2020-08-14 | End: 2020-08-14 | Stop reason: HOSPADM

## 2020-08-14 RX ORDER — CEFAZOLIN SODIUM 1 G/3ML
1 INJECTION, POWDER, FOR SOLUTION INTRAMUSCULAR; INTRAVENOUS
Status: DISCONTINUED | OUTPATIENT
Start: 2020-08-14 | End: 2020-08-14 | Stop reason: HOSPADM

## 2020-08-14 RX ORDER — SODIUM CHLORIDE 450 MG/100ML
INJECTION, SOLUTION INTRAVENOUS CONTINUOUS
Status: DISCONTINUED | OUTPATIENT
Start: 2020-08-14 | End: 2020-08-14 | Stop reason: HOSPADM

## 2020-08-14 RX ORDER — HEPARIN SODIUM 200 [USP'U]/100ML
100-500 INJECTION, SOLUTION INTRAVENOUS CONTINUOUS PRN
Status: DISCONTINUED | OUTPATIENT
Start: 2020-08-14 | End: 2020-08-14 | Stop reason: HOSPADM

## 2020-08-14 RX ADMIN — SODIUM CHLORIDE: 4.5 INJECTION, SOLUTION INTRAVENOUS at 11:47

## 2020-08-14 RX ADMIN — OXYCODONE HYDROCHLORIDE AND ACETAMINOPHEN 1 TABLET: 5; 325 TABLET ORAL at 16:08

## 2020-08-14 RX ADMIN — CEFAZOLIN SODIUM 2 G: 2 INJECTION, SOLUTION INTRAVENOUS at 12:37

## 2020-08-14 ASSESSMENT — MIFFLIN-ST. JEOR: SCORE: 1086.48

## 2020-08-14 NOTE — DISCHARGE INSTRUCTIONS
ICD Implant Discharge Instructions    Dr. Tolliver    After you go home:      Have an adult stay with you until tomorrow.    You may resume your normal diet.       For 24 hours - due to the sedation you received:    Relax and take it easy.    Do NOT make any important or legal decisions.    Do NOT drive or operate machines at home or at work.    Do NOT drink alcohol.    Care of Chest Incision:      Keep the bandage on at least 3 days. You may remove the dressing on 8/17/2020. Change it only if it gets loose or soaked. If you need to change it, use 4x4-inch gauze and a large clear bandage.     If there is a pressure dressing (gauze & tape) - 24 hours after your procedure you may remove ONLY the top dressing. Leave the bottom dressing on.    Leave the strips of tape on. They will fall off on their own, or we will remove them at your first check-up.    Check your wound daily for signs of infection, such as increased redness, severe swelling or draining. Fever may also be a sign of infection. Call us if you see any of these signs.    If there are no signs of infection, you may shower after the bandage comes off in 3 days. If you take a tub bath, keep the wound dry.    No soaking the incision (swimming pool, bathtub, hot tub) for 2 weeks.    You may have mild to medium pain for 3 to 5 days. Take Acetaminophen (Tylenol) or Ibuprofen (Advil) for the pain. If the pain persists or is severe, call us.    Activity:      For at least 2 weeks: Do not raise your elbow above your shoulder. You can begin to use your arm as it feels comfortable to you.    Do not use arm on implant side to lift more than 10 pounds for 2 weeks.    In 6 to 8 weeks: You may begin to golf, play tennis, swim and do similar activities.    No driving for one day & limit to necessary driving for one week. Please talk to your doctor for specific recommendations.    Bleeding:      If you start bleeding from the incision site, sit down and press firmly on the site  for 10 minutes.     Once bleeding stops, call [facility] as soon as you can.    Call 911 right away if you have heavy bleeding or bleeding that does not stop.      Medicines:      Take your medications, including blood thinners, unless your provider tells you not to.    If you have stopped any medicines, check with your provider about when to restart them and/or see below.    Resume Xarelto on 8/16/2020.    Follow Up Appointments:      Follow up with Device Clinic at [facility] of patient preference in 7-10 days.    Call the clinic if:      You have a large or growing hard lump around the site.    The site is red, swollen, hot or tender.    Blood or fluid is draining from the site.    You have chills or a fever greater than 101 F (38 C).    You feel dizzy or light-headed.    Questions or concerns.    Telling others about your device:      Before you leave the hospital, you will receive a temporary ID card. A permanent card will be mailed to you about 6 to 8 weeks later. Always carry the ID card with you. It has important details about your device.    You may also get a Medical Alert bracelet or tag that says you have a defibrillator (ICD).  Go to [email or web address].     Always tell doctors, dentists and other care providers that you have a device implanted in you.    Let us know before you plan any surgeries. Your care team must take special steps to keep you safe during certain procedures. These steps will depend on the type of device you have. Your provider will need to see your ID card. They may need to call us for instructions.    Device Safety:      Please refer to device  s booklet for further information.          [facility] [place]:    [phone] [facility] (7 days a week)

## 2020-08-14 NOTE — PRE-PROCEDURE
GENERAL PRE-PROCEDURE:   Procedure:  Devcice upgrade to ICD    Written consent obtained?: Yes    Risks and benefits: Risks, benefits and alternatives were discussed    Consent given by:  Patient  Patient states understanding of procedure being performed: Yes    Patient's understanding of procedure matches consent: Yes    Procedure consent matches procedure scheduled: Yes    Expected level of sedation:  Moderate  Appropriately NPO:  Yes  ASA Class:  Class 3- Severe systemic disease, definite functional limitations  Mallampati  :  Grade 2- soft palate, base of uvula, tonsillar pillars, and portion of posterior pharyngeal wall visible  Lungs:  Lungs clear with good breath sounds bilaterally  Heart:  Normal heart sounds and rate  History & Physical reviewed:  History and physical reviewed and no updates needed  Statement of review:  I have reviewed the lab findings, diagnostic data, medications, and the plan for sedation

## 2020-08-14 NOTE — PROGRESS NOTES
PATIENT/VISITOR WELLNESS SCREENING    Step 1 Patient Screening    1. In the last month, have you been in contact with someone who was confirmed or suspected to have Coronavirus/COVID-19? No    2. Do you have the following symptoms?  Fever/Chills? No   Cough? No   Shortness of breath? No   New loss of taste or smell? No  Sore throat? No  Muscle or body aches? No  Headaches? No  Fatigue? No  Vomiting or diarrhea? No    Step 2 Visitor Screening    1. Name of Visitor (1 visitor per patient): none    If the visitor has positive symptoms, notify supervisor/manger  Per policy, the visitor will need to leave the facility     Step 3 Refer to logic grid below for actions    NO SYMPTOM(S)    ACTIONS:  1. Standard rooming process  2. Provider to assess per normal protocol  3. Implement precautions as needed and per guidelines     POSITIVE SYMPTOM(S)  If positive for ANY of the following symptoms: fever, cough, shortness of breath, rash    ACTION:  1. Continue to have the patient wear a mask   2. Room patient as soon as possible  3. Don appropriate PPE when entering room  4. Provider evaluation    Care Suites Admission Nursing Note    Patient Information  Name: Taty Aguila  Age: 75 year old  Reason for admission: ICD implant/upgrade from [facility]  Care Suites arrival time: 1118    Visitor Information  Name:  none    Patient Admission/Assessment   Pre-procedure assessment complete: Yes  If abnormal assessment/labs, provider notified: N/A  NPO: Yes  Medications held per instructions/orders: Yes  Consent: obtained  If applicable, pregnancy test status: n/a  Patient oriented to room: Yes  Education/questions answered: Yes  Plan/other:  Proceed with ICD implant    Discharge Planning  Discharge name/phone number: Tiago Aguila, / [phone] H   Overnight post sedation caregiver: Tiago  Discharge location: home    Maggie Conner RN     Care Suites Post Procedure Note    Patient Information  Name: Taty De La Rosa  Ayla  Age: 75 year old    Post Procedure  Time patient returned to Care Suites: 1445  Concerns/abnormal assessment: no/no  If abnormal assessment, provider notified: N/A  Plan/Other: Recovery in [facility].    Patient eating lunch, has some pain with movement.      1536  Patient to Xray.  1608  Percocet given.  1639  Patient still having a lot of pain:  5/10.      Maggie Conner RN     Care Suites Discharge Nursing Note    Patient Information  Name: Taty Aguila  Age: 75 year old    Discharge Education:  Discharge instructions reviewed: Yes  Additional education/resources provided: yes:  ICD booklet/temporary ID given.    Device interrogation done.  Will use same home monitoring equipment.  Patient/patient representative verbalizes understanding: Yes  Patient discharging on new medications: No  Medication education completed: N/A    Discharge Plans:   Discharge location: home  Discharge ride contacted: Yes--by patient  Approximate discharge time: 1715    Discharge Criteria:  Discharge criteria met and vital signs stable: Yes  1700  Dr. Unruly dased discharge.    1704  I called/updated patient's .    Patient Belongs:  Patient belongings returned to patient: Yes  1731  Patient was discharged per W/C with .    Maggie Conner RN

## 2020-08-17 ENCOUNTER — TELEPHONE (OUTPATIENT)
Dept: CARDIOLOGY | Facility: CLINIC | Age: 75
End: 2020-08-17

## 2020-08-17 DIAGNOSIS — I44.2 COMPLETE AV BLOCK (H): Primary | ICD-10-CM

## 2020-08-17 DIAGNOSIS — Z95.0 CARDIAC PACEMAKER IN SITU: ICD-10-CM

## 2020-08-17 NOTE — TELEPHONE ENCOUNTER
Post  ICD upgrade and RV defib lead implant discharge phone call.    Reviewed the following:  - No raising arm above shoulder on the side of implant for 3 weeks  - Remove outer dressing 3 days after implant. May shower after outer dressing removed.   - Leave steri-strips in place, will be removed at 1 week device check  - Limit driving for: 1 week (PPM)  - Watch for redness, drainage, warmth, or fever. Call device clinic if any signs of infection.     1 week device check scheduled: 8/24/20 at 1300  6 week device check scheduled: 10/6/20 at 1100    Pt states understanding of all instructions and will call the clinic with any questions.    CHADD Nicole

## 2020-08-17 NOTE — TELEPHONE ENCOUNTER
Called and updated patient on Marifer's recommendation to take one more extra Torsemide and to follow-up with Marifer if it is not better by Wednesday.  Patient verbalized understanding and stated she would do so.    CHADD Nicole

## 2020-08-17 NOTE — TELEPHONE ENCOUNTER
Called and reviewed post implant discharge instructions with patient (see previous telephone encounter).  While speaking with patient, she stated that shortly after she woke up Saturday morning, she had an episode of nausea, distorted vision, and profuse sweating.  She said this felt similar to before she passed out years ago.  She sat down and the sensation passed after a short period.      Requested patient send a remote transmission to check for any arrhythmias.  No arrhythmias were logged and lead function appears stable.  Stated this may be a delayed reaction to anesthesia, but recommended patient also call her PCP and see if any further follow-up is recommended.      Patient also stated she noted that her left arm was very puffy and swollen on Saturday morning.  She said since her valve surgery last year her left arm will tend to swell more than her right, but that this was noteably different.  Patient states her arm is soft with no redness or warmth.  She said that she took an extra Torsemide and potassium pill and that this seemed to help with the swelling, but her left arm is still slightly swollen today (Monday, 8/17).  Patient is wondering if she should continue taking extra diuretics to help with swelling.    Patient is followed by Marifer Mohamud PA-C.  Will route message to her for notification and review.    CHADD Nicole       ADDENDUM: Noted. She can take 1 more extra torsemide. If it is not better by Wednesday have her make an appt with me. Thanks! Marifer Mohamud PA-C

## 2020-08-18 LAB
INTERPRETATION ECG - MUSE: NORMAL
INTERPRETATION ECG - MUSE: NORMAL

## 2020-08-21 ENCOUNTER — TELEPHONE (OUTPATIENT)
Dept: CARDIOLOGY | Facility: CLINIC | Age: 75
End: 2020-08-21

## 2020-08-21 NOTE — TELEPHONE ENCOUNTER
Wellness Screening Tool    Symptom Screening:    Do you have one of the following NEW symptoms:      Fever (subjective or >100.0)?  No    New cough? No    Shortness of breath? No    Chills? No    New loss of taste or smell? No    Generalized body aches? No    New persistent headache? No    New sore throat? No    Nausea, vomiting or diarrhea? No    Within the past 3 weeks, have you been exposed to someone with a known positive illness below?      COVID - 19 (known or suspected) No    Chicken pox?  No    Measles? No    Pertussis? No    Have you had a positive COVID-19 diagnostic test (nasal swab test) in the last 14 days or are you currently   on self-quarantine restrictions (i.e.travel restriction, exposure, etc?) No        Patient notified of visitor restriction: YES  Patient informed to wear a mask: Yes    Patient's appointment status: Patient will be seen in clinic as scheduled on 8/24/20

## 2020-08-24 ENCOUNTER — ANCILLARY PROCEDURE (OUTPATIENT)
Dept: CARDIOLOGY | Facility: CLINIC | Age: 75
End: 2020-08-24
Attending: INTERNAL MEDICINE
Payer: COMMERCIAL

## 2020-08-24 DIAGNOSIS — I44.2 COMPLETE AV BLOCK (H): ICD-10-CM

## 2020-08-24 DIAGNOSIS — Z95.0 CARDIAC PACEMAKER IN SITU: ICD-10-CM

## 2020-08-24 LAB
MDC_IDC_LEAD_IMPLANT_DT: NORMAL
MDC_IDC_LEAD_IMPLANT_DT: NORMAL
MDC_IDC_LEAD_LOCATION: NORMAL
MDC_IDC_LEAD_LOCATION: NORMAL
MDC_IDC_LEAD_LOCATION_DETAIL_1: NORMAL
MDC_IDC_LEAD_LOCATION_DETAIL_1: NORMAL
MDC_IDC_LEAD_MFG: NORMAL
MDC_IDC_LEAD_MFG: NORMAL
MDC_IDC_LEAD_MODEL: NORMAL
MDC_IDC_LEAD_MODEL: NORMAL
MDC_IDC_LEAD_POLARITY_TYPE: NORMAL
MDC_IDC_LEAD_POLARITY_TYPE: NORMAL
MDC_IDC_LEAD_SERIAL: NORMAL
MDC_IDC_LEAD_SERIAL: NORMAL
MDC_IDC_MSMT_BATTERY_REMAINING_LONGEVITY: 168 MO
MDC_IDC_MSMT_BATTERY_STATUS: NORMAL
MDC_IDC_MSMT_CAP_CHARGE_DTM: NORMAL
MDC_IDC_MSMT_CAP_CHARGE_ENERGY: 0 J
MDC_IDC_MSMT_CAP_CHARGE_TIME: 0 S
MDC_IDC_MSMT_CAP_CHARGE_TIME: 8.78
MDC_IDC_MSMT_CAP_CHARGE_TYPE: NORMAL
MDC_IDC_MSMT_CAP_CHARGE_TYPE: NORMAL
MDC_IDC_MSMT_LEADCHNL_RA_IMPEDANCE_VALUE: 708 OHM
MDC_IDC_MSMT_LEADCHNL_RA_PACING_THRESHOLD_AMPLITUDE: 0.9 V
MDC_IDC_MSMT_LEADCHNL_RA_PACING_THRESHOLD_PULSEWIDTH: 0.4 MS
MDC_IDC_MSMT_LEADCHNL_RA_SENSING_INTR_AMPL: 2.3 MV
MDC_IDC_MSMT_LEADCHNL_RV_IMPEDANCE_VALUE: 508 OHM
MDC_IDC_MSMT_LEADCHNL_RV_PACING_THRESHOLD_AMPLITUDE: 0.6 V
MDC_IDC_MSMT_LEADCHNL_RV_PACING_THRESHOLD_PULSEWIDTH: 0.4 MS
MDC_IDC_MSMT_LEADCHNL_RV_SENSING_INTR_AMPL: 10 MV
MDC_IDC_PG_IMPLANT_DTM: NORMAL
MDC_IDC_PG_MFG: NORMAL
MDC_IDC_PG_MODEL: NORMAL
MDC_IDC_PG_SERIAL: NORMAL
MDC_IDC_PG_TYPE: NORMAL
MDC_IDC_SESS_CLINIC_NAME: NORMAL
MDC_IDC_SESS_DTM: NORMAL
MDC_IDC_SESS_TYPE: NORMAL
MDC_IDC_SET_BRADY_AT_MODE_SWITCH_MODE: NORMAL
MDC_IDC_SET_BRADY_AT_MODE_SWITCH_RATE: 170 {BEATS}/MIN
MDC_IDC_SET_BRADY_HYSTRATE: NORMAL
MDC_IDC_SET_BRADY_LOWRATE: 60 {BEATS}/MIN
MDC_IDC_SET_BRADY_MAX_SENSOR_RATE: 130 {BEATS}/MIN
MDC_IDC_SET_BRADY_MAX_TRACKING_RATE: 130 {BEATS}/MIN
MDC_IDC_SET_BRADY_MODE: NORMAL
MDC_IDC_SET_BRADY_PAV_DELAY_HIGH: 150 MS
MDC_IDC_SET_BRADY_PAV_DELAY_LOW: 200 MS
MDC_IDC_SET_BRADY_SAV_DELAY_HIGH: 150 MS
MDC_IDC_SET_BRADY_SAV_DELAY_LOW: 200 MS
MDC_IDC_SET_LEADCHNL_RA_PACING_AMPLITUDE: 2 V
MDC_IDC_SET_LEADCHNL_RA_PACING_CAPTURE_MODE: NORMAL
MDC_IDC_SET_LEADCHNL_RA_PACING_POLARITY: NORMAL
MDC_IDC_SET_LEADCHNL_RA_PACING_PULSEWIDTH: 0.4 MS
MDC_IDC_SET_LEADCHNL_RA_SENSING_ADAPTATION_MODE: NORMAL
MDC_IDC_SET_LEADCHNL_RA_SENSING_POLARITY: NORMAL
MDC_IDC_SET_LEADCHNL_RA_SENSING_SENSITIVITY: 0.25 MV
MDC_IDC_SET_LEADCHNL_RV_PACING_AMPLITUDE: 2 V
MDC_IDC_SET_LEADCHNL_RV_PACING_CAPTURE_MODE: NORMAL
MDC_IDC_SET_LEADCHNL_RV_PACING_POLARITY: NORMAL
MDC_IDC_SET_LEADCHNL_RV_PACING_PULSEWIDTH: 0.4 MS
MDC_IDC_SET_LEADCHNL_RV_SENSING_ADAPTATION_MODE: NORMAL
MDC_IDC_SET_LEADCHNL_RV_SENSING_POLARITY: NORMAL
MDC_IDC_SET_LEADCHNL_RV_SENSING_SENSITIVITY: 0.6 MV
MDC_IDC_SET_ZONE_DETECTION_INTERVAL: 250 MS
MDC_IDC_SET_ZONE_DETECTION_INTERVAL: 300 MS
MDC_IDC_SET_ZONE_DETECTION_INTERVAL: 353 MS
MDC_IDC_SET_ZONE_TYPE: NORMAL
MDC_IDC_SET_ZONE_VENDOR_TYPE: NORMAL
MDC_IDC_STAT_BRADY_RA_PERCENT_PACED: 14 %
MDC_IDC_STAT_BRADY_RV_PERCENT_PACED: 1 %
MDC_IDC_STAT_EPISODE_RECENT_COUNT: 0
MDC_IDC_STAT_EPISODE_RECENT_COUNT_DTM_END: NORMAL
MDC_IDC_STAT_EPISODE_RECENT_COUNT_DTM_START: NORMAL
MDC_IDC_STAT_EPISODE_TOTAL_COUNT: 0
MDC_IDC_STAT_EPISODE_TOTAL_COUNT_DTM_END: NORMAL
MDC_IDC_STAT_EPISODE_TYPE: NORMAL
MDC_IDC_STAT_EPISODE_VENDOR_TYPE: NORMAL
MDC_IDC_STAT_TACHYTHERAPY_ATP_DELIVERED_RECENT: 0
MDC_IDC_STAT_TACHYTHERAPY_ATP_DELIVERED_TOTAL: 0
MDC_IDC_STAT_TACHYTHERAPY_RECENT_DTM_END: NORMAL
MDC_IDC_STAT_TACHYTHERAPY_RECENT_DTM_START: NORMAL
MDC_IDC_STAT_TACHYTHERAPY_SHOCKS_ABORTED_RECENT: 0
MDC_IDC_STAT_TACHYTHERAPY_SHOCKS_ABORTED_TOTAL: 0
MDC_IDC_STAT_TACHYTHERAPY_SHOCKS_DELIVERED_RECENT: 0
MDC_IDC_STAT_TACHYTHERAPY_SHOCKS_DELIVERED_TOTAL: 0
MDC_IDC_STAT_TACHYTHERAPY_TOTAL_DTM_END: NORMAL

## 2020-08-24 PROCEDURE — 93283 PRGRMG EVAL IMPLANTABLE DFB: CPT | Performed by: INTERNAL MEDICINE

## 2020-10-05 ENCOUNTER — DOCUMENTATION ONLY (OUTPATIENT)
Dept: CARDIOLOGY | Facility: CLINIC | Age: 75
End: 2020-10-05

## 2020-10-05 NOTE — PROGRESS NOTES
Wellness Screening Tool    Symptom Screening:    Do you have one of the following NEW symptoms:      Fever (subjective or >100.0)?  No    New cough? No    Shortness of breath? No    Chills? No    New loss of taste or smell? No    Generalized body aches? No    New persistent headache? No    New sore throat? No    Nausea, vomiting or diarrhea? No    Within the past 2 weeks, have you been exposed to someone with a known positive illness below?      COVID - 19 (known or suspected) No    Chicken pox?  No    Measles? No    Pertussis? No    Have you had a positive COVID-19 diagnostic test (nasal swab test) in the last 14 days or are you currently   on self-quarantine restrictions (i.e.travel restriction, exposure, etc?) No        Patient notified of visitor restriction: Yes  Patient informed to wear a mask: Yes    Patient's appointment status: Patient will be seen in clinic as scheduled on 10/6/20 @ 11:00am. Ced COURTNEY

## 2020-10-06 ENCOUNTER — ANCILLARY PROCEDURE (OUTPATIENT)
Dept: CARDIOLOGY | Facility: CLINIC | Age: 75
End: 2020-10-06
Attending: INTERNAL MEDICINE
Payer: COMMERCIAL

## 2020-10-06 DIAGNOSIS — Z95.0 CARDIAC PACEMAKER IN SITU: Primary | ICD-10-CM

## 2020-10-06 DIAGNOSIS — I44.2 ATRIOVENTRICULAR BLOCK, COMPLETE (H): ICD-10-CM

## 2020-10-06 PROCEDURE — 93283 PRGRMG EVAL IMPLANTABLE DFB: CPT | Performed by: INTERNAL MEDICINE

## 2020-10-07 LAB
MDC_IDC_LEAD_IMPLANT_DT: NORMAL
MDC_IDC_LEAD_IMPLANT_DT: NORMAL
MDC_IDC_LEAD_LOCATION: NORMAL
MDC_IDC_LEAD_LOCATION: NORMAL
MDC_IDC_LEAD_LOCATION_DETAIL_1: NORMAL
MDC_IDC_LEAD_LOCATION_DETAIL_1: NORMAL
MDC_IDC_LEAD_MFG: NORMAL
MDC_IDC_LEAD_MFG: NORMAL
MDC_IDC_LEAD_MODEL: NORMAL
MDC_IDC_LEAD_MODEL: NORMAL
MDC_IDC_LEAD_POLARITY_TYPE: NORMAL
MDC_IDC_LEAD_POLARITY_TYPE: NORMAL
MDC_IDC_LEAD_SERIAL: NORMAL
MDC_IDC_LEAD_SERIAL: NORMAL
MDC_IDC_PG_IMPLANT_DTM: NORMAL
MDC_IDC_PG_MFG: NORMAL
MDC_IDC_PG_MODEL: NORMAL
MDC_IDC_PG_SERIAL: NORMAL
MDC_IDC_PG_TYPE: NORMAL
MDC_IDC_SESS_CLINIC_NAME: NORMAL
MDC_IDC_SESS_DTM: NORMAL
MDC_IDC_SESS_TYPE: NORMAL

## 2020-10-09 ENCOUNTER — TELEPHONE (OUTPATIENT)
Dept: CARDIOLOGY | Facility: CLINIC | Age: 75
End: 2020-10-09

## 2020-10-09 DIAGNOSIS — I48.0 PAF (PAROXYSMAL ATRIAL FIBRILLATION) (H): ICD-10-CM

## 2020-10-09 DIAGNOSIS — I48.0 PAROXYSMAL ATRIAL FIBRILLATION (H): ICD-10-CM

## 2020-10-09 DIAGNOSIS — I50.22 CHRONIC SYSTOLIC HEART FAILURE (H): ICD-10-CM

## 2020-10-09 RX ORDER — TORSEMIDE 10 MG/1
10 TABLET ORAL DAILY
Qty: 90 TABLET | Refills: 0 | Status: SHIPPED | OUTPATIENT
Start: 2020-10-09 | End: 2020-11-19

## 2020-10-09 RX ORDER — LOSARTAN POTASSIUM 100 MG/1
100 TABLET ORAL DAILY
Qty: 90 TABLET | Refills: 0 | Status: SHIPPED | OUTPATIENT
Start: 2020-10-09 | End: 2020-11-19

## 2020-10-09 RX ORDER — POTASSIUM CHLORIDE 1500 MG/1
20 TABLET, EXTENDED RELEASE ORAL DAILY
Qty: 90 TABLET | Refills: 0 | Status: SHIPPED | OUTPATIENT
Start: 2020-10-09 | End: 2020-11-19

## 2020-10-09 RX ORDER — METOPROLOL SUCCINATE 100 MG/1
100 TABLET, EXTENDED RELEASE ORAL DAILY
Qty: 90 TABLET | Refills: 0 | Status: SHIPPED | OUTPATIENT
Start: 2020-10-09 | End: 2020-11-19

## 2020-10-09 NOTE — TELEPHONE ENCOUNTER
Received refill request for:   Metoprolol, Xarelto, torsesmide, losartan and KCl                             Last OV was:   20 with Faye Neal, PAC  Labs/EK/14 BMP, CBC and EKG  F/U scheduled:    with Marifer Mohamud, PAC  New script sent to:    [facility]  Milagro Velazquez RN 2:21 PM 10/09/20

## 2020-10-14 DIAGNOSIS — I48.0 PAROXYSMAL ATRIAL FIBRILLATION (H): ICD-10-CM

## 2020-10-14 RX ORDER — OMEPRAZOLE 10 MG/1
10 CAPSULE, DELAYED RELEASE ORAL
Qty: 90 CAPSULE | Refills: 3 | Status: SHIPPED | OUTPATIENT
Start: 2020-10-14 | End: 2021-08-31

## 2020-11-16 DIAGNOSIS — Z95.2 S/P MVR (MITRAL VALVE REPLACEMENT): ICD-10-CM

## 2020-11-16 DIAGNOSIS — I50.22 CHRONIC SYSTOLIC HEART FAILURE (H): ICD-10-CM

## 2020-11-16 DIAGNOSIS — Z98.890 S/P TVR (TRICUSPID VALVE REPAIR): ICD-10-CM

## 2020-11-16 LAB
ANION GAP SERPL CALCULATED.3IONS-SCNC: 4 MMOL/L (ref 3–14)
BUN SERPL-MCNC: 13 MG/DL (ref 7–30)
CALCIUM SERPL-MCNC: 8.7 MG/DL (ref 8.5–10.1)
CHLORIDE SERPL-SCNC: 99 MMOL/L (ref 94–109)
CO2 SERPL-SCNC: 30 MMOL/L (ref 20–32)
CREAT SERPL-MCNC: 0.79 MG/DL (ref 0.52–1.04)
GFR SERPL CREATININE-BSD FRML MDRD: 74 ML/MIN/{1.73_M2}
GLUCOSE SERPL-MCNC: 93 MG/DL (ref 70–99)
POTASSIUM SERPL-SCNC: 3.7 MMOL/L (ref 3.4–5.3)
SODIUM SERPL-SCNC: 133 MMOL/L (ref 133–144)

## 2020-11-16 PROCEDURE — 80048 BASIC METABOLIC PNL TOTAL CA: CPT | Performed by: PHYSICIAN ASSISTANT

## 2020-11-16 PROCEDURE — 36415 COLL VENOUS BLD VENIPUNCTURE: CPT | Performed by: PHYSICIAN ASSISTANT

## 2020-11-19 ENCOUNTER — OFFICE VISIT (OUTPATIENT)
Dept: CARDIOLOGY | Facility: CLINIC | Age: 75
End: 2020-11-19
Attending: PHYSICIAN ASSISTANT
Payer: COMMERCIAL

## 2020-11-19 VITALS
HEART RATE: 69 BPM | WEIGHT: 125 LBS | BODY MASS INDEX: 20.09 KG/M2 | SYSTOLIC BLOOD PRESSURE: 113 MMHG | HEIGHT: 66 IN | DIASTOLIC BLOOD PRESSURE: 72 MMHG

## 2020-11-19 DIAGNOSIS — Z98.890 S/P TVR (TRICUSPID VALVE REPAIR): ICD-10-CM

## 2020-11-19 DIAGNOSIS — Z95.2 S/P MVR (MITRAL VALVE REPLACEMENT): ICD-10-CM

## 2020-11-19 DIAGNOSIS — I48.0 PAROXYSMAL ATRIAL FIBRILLATION (H): ICD-10-CM

## 2020-11-19 DIAGNOSIS — I50.22 CHRONIC SYSTOLIC HEART FAILURE (H): Primary | ICD-10-CM

## 2020-11-19 PROCEDURE — 99213 OFFICE O/P EST LOW 20 MIN: CPT | Performed by: PHYSICIAN ASSISTANT

## 2020-11-19 RX ORDER — POTASSIUM CHLORIDE 1500 MG/1
20 TABLET, EXTENDED RELEASE ORAL DAILY
Qty: 90 TABLET | Refills: 3 | Status: SHIPPED | OUTPATIENT
Start: 2020-11-19 | End: 2021-11-18

## 2020-11-19 RX ORDER — LOSARTAN POTASSIUM 100 MG/1
100 TABLET ORAL DAILY
Qty: 90 TABLET | Refills: 3 | Status: SHIPPED | OUTPATIENT
Start: 2020-11-19 | End: 2021-11-18

## 2020-11-19 RX ORDER — TORSEMIDE 10 MG/1
10 TABLET ORAL DAILY
Qty: 90 TABLET | Refills: 3 | Status: SHIPPED | OUTPATIENT
Start: 2020-11-19 | End: 2021-11-18

## 2020-11-19 RX ORDER — METOPROLOL SUCCINATE 100 MG/1
100 TABLET, EXTENDED RELEASE ORAL DAILY
Qty: 90 TABLET | Refills: 3 | Status: SHIPPED | OUTPATIENT
Start: 2020-11-19 | End: 2021-11-18

## 2020-11-19 ASSESSMENT — MIFFLIN-ST. JEOR: SCORE: 1088.75

## 2020-11-19 NOTE — LETTER
11/19/2020      Anyi Olmos MD  [address] [facility] [address]  [facility] [address]      RE: Taty Aguila       Dear Colleague,    I had the pleasure of seeing Taty Aguila in the [facility].    Service Date: 11/19/2020      HISTORY OF PRESENT ILLNESS:  Taty is a pleasant 75-year-old female who presents to the office today for a 6-month followup.      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  In 10/2018, she was found to be in atrial fibrillation with RVR.  She did convert back to sinus rhythm; however, about 2 months later, she had recurrent atrial fibrillation.  At that time, she underwent a repeat cardiac evaluation which confirmed severe mitral regurgitation along with 3+ tricuspid valve regurgitation.  Ultimately, in 02/2019, she underwent successful minimally invasive mitral valve replacement with a 31 mm Epic [place] valve and a tricuspid valve repair with a 32 mm [place] ring.  She did not require coronary artery bypass grafting.  Following her procedure, she developed complete heart block and underwent permanent pacemaker implantation.      Unfortunately, shortly after her surgery, she developed significant volume overload.  She was found to be in atrial fibrillation and also underwent repeat echocardiography which showed an EF in the 20%-25% range.  Sinus rhythm was restored.  She was started on appropriate heart failure medical therapy and this was up titrated.  This spring, we did repeat an echocardiogram.  Her EF visually improved to approximately 35% (40%- 45% by biplane).  Her valve function was stable.  No changes were made at that time.        A subsequent device check showed some short runs of nonsustained VT.  Dr. Stone was notified and recommended an EP evaluation.  She saw Dr. Tolliver, who recommended an upgrade in her device to an ICD.  This was  performed in August.      Overall, she has been doing well from a cardiac standpoint.  She continues to be able to walk 9000 steps a day without any significant dyspnea on exertion.  She denies any significant lower extremity edema, no orthopnea or PND.  No significant lightheadedness or dizziness.  Her weight is overall stable.  No cardiac concerns today.      CURRENT CARDIAC MEDICATIONS:   1.  Aspirin 81 mg daily.   2.  Losartan 100 mg daily.   3.  Toprol- mg daily.   4.  Potassium chloride 20 mEq daily.   5.  Xarelto 20 mg daily.   6.  Torsemide 10 mg daily.      The remainder of her medications, allergies and review of systems were reviewed and as are documented separately.      PHYSICAL EXAMINATION:   GENERAL:  Taty is a pleasant 75-year-old female who appears her stated age.  She is in no apparent distress.   VITAL SIGNS:  Blood pressure is 113/72, pulse is 69.  Weight is 125 pounds.   PULMONARY:  Breathing is nonlabored.  Lungs are clear to auscultation.   CARDIAC:  Reveals a regular rate and rhythm.  I do not appreciate any significant murmur.   EXTREMITIES:  No lower extremity edema.   NEUROLOGIC:  Alert and oriented.      Her recent basic metabolic panel showed a sodium 133, potassium of 3.7, BUN 13, creatinine 0.79.      ASSESSMENT AND PLAN:  Taty is a pleasant 75-year-old female with a history of severe mitral regurgitation secondary to mitral valve prolapse and severe tricuspid regurgitation who is status post mitral valve replacement as well as tricuspid valve repair in 02/2019.  Postoperatively, she developed complete heart block and underwent permanent pacemaker implantation.  Unfortunately, she developed a cardiomyopathy/heart failure after surgery with an EF initially in the 20%-25% range.  With appropriate medical therapy, her EF did improve to approximately 35%.  Despite this, she was noted to have some runs of nonsustained VT on a device check and ultimately underwent an upgrade to an ICD  in 2020.        At this point, she is doing well from cardiovascular standpoint.  Did not make any medication changes.  We will plan to see her back in the office in 6 months.  She will contact sooner with any questions or concerns.      Thank you for allowing us to participate in the care of this pleasant patient.         SANDRA AMADOR PA-C             D: 2020   T: 2020   MT:       Name:     GERONIMO PAYTON   MRN:      [id number]        Account:      [id number]   :      1945           Service Date: 2020      Document: [id number]        Outpatient Encounter Medications as of 2020   Medication Sig Dispense Refill     amoxicillin (AMOXIL) 500 MG capsule Take 500 mg by mouth as needed       aspirin 81 MG EC tablet Take 81 mg by mouth every evening       calcium carbonate-vitamin D (CALCIUM 600+D) 600-200 MG-UNIT TABS Take 1 tablet by mouth 2 times daily       Cetirizine HCl (ZYRTEC PO) Take 10 mg by mouth daily        levothyroxine (SYNTHROID/LEVOTHROID) 50 MCG tablet Take 1 tablet (50 mcg) by mouth daily 90 tablet 3     losartan (COZAAR) 100 MG tablet Take 1 tablet (100 mg) by mouth daily 90 tablet 3     metoprolol succinate ER (TOPROL-XL) 100 MG 24 hr tablet Take 1 tablet (100 mg) by mouth daily 90 tablet 3     Multiple Vitamins-Minerals (MULTIVITAMIN ADULT) TABS Take 1 tablet by mouth daily       omeprazole (PRILOSEC) 10 MG DR capsule Take 1 capsule (10 mg) by mouth every morning (before breakfast) 90 capsule 3     potassium chloride ER (KLOR-CON M) 20 MEQ CR tablet Take 1 tablet (20 mEq) by mouth daily 90 tablet 3     rivaroxaban ANTICOAGULANT (XARELTO) 20 MG TABS tablet Take 1 tablet (20 mg) by mouth daily (with dinner) 90 tablet 3     torsemide (DEMADEX) 10 MG tablet Take 1 tablet (10 mg) by mouth daily 90 tablet 3     [DISCONTINUED] losartan (COZAAR) 100 MG tablet Take 1 tablet (100 mg) by mouth daily 90 tablet 0     [DISCONTINUED] metoprolol succinate ER  (TOPROL-XL) 100 MG 24 hr tablet Take 1 tablet (100 mg) by mouth daily 90 tablet 0     [DISCONTINUED] potassium chloride ER (KLOR-CON M) 20 MEQ CR tablet Take 1 tablet (20 mEq) by mouth daily 90 tablet 0     [DISCONTINUED] rivaroxaban ANTICOAGULANT (XARELTO) 20 MG TABS tablet Take 1 tablet (20 mg) by mouth daily (with dinner) 90 tablet 0     [DISCONTINUED] torsemide (DEMADEX) 10 MG tablet Take 1 tablet (10 mg) by mouth daily 90 tablet 0     No facility-administered encounter medications on file as of 11/19/2020.        Again, thank you for allowing me to participate in the care of your patient.      Sincerely,    Marifer Mohamud PA-C     [facility]

## 2020-11-19 NOTE — LETTER
11/19/2020    Anyi Olmos MD  [address]  [facility] [address]    RE: Taty Aguila       Dear Colleague,    I had the pleasure of seeing Taty Rj Aguila in the [facility].    Please see separate dictation for HPI, PHYSICAL EXAM AND IMPRESSION/PLAN.    CURRENT MEDICATIONS:  Current Outpatient Medications   Medication Sig Dispense Refill     amoxicillin (AMOXIL) 500 MG capsule Take 500 mg by mouth as needed       aspirin 81 MG EC tablet Take 81 mg by mouth every evening       calcium carbonate-vitamin D (CALCIUM 600+D) 600-200 MG-UNIT TABS Take 1 tablet by mouth 2 times daily       Cetirizine HCl (ZYRTEC PO) Take 10 mg by mouth daily        levothyroxine (SYNTHROID/LEVOTHROID) 50 MCG tablet Take 1 tablet (50 mcg) by mouth daily 90 tablet 3     losartan (COZAAR) 100 MG tablet Take 1 tablet (100 mg) by mouth daily 90 tablet 0     metoprolol succinate ER (TOPROL-XL) 100 MG 24 hr tablet Take 1 tablet (100 mg) by mouth daily 90 tablet 0     Multiple Vitamins-Minerals (MULTIVITAMIN ADULT) TABS Take 1 tablet by mouth daily       omeprazole (PRILOSEC) 10 MG DR capsule Take 1 capsule (10 mg) by mouth every morning (before breakfast) 90 capsule 3     potassium chloride ER (KLOR-CON M) 20 MEQ CR tablet Take 1 tablet (20 mEq) by mouth daily 90 tablet 0     rivaroxaban ANTICOAGULANT (XARELTO) 20 MG TABS tablet Take 1 tablet (20 mg) by mouth daily (with dinner) 90 tablet 0     torsemide (DEMADEX) 10 MG tablet Take 1 tablet (10 mg) by mouth daily 90 tablet 0       ALLERGIES:     Allergies   Allergen Reactions     Chlorpheniramine Other (See Comments)     LOC and fainting      Lisinopril Cough     Phenylephrine Other (See Comments)     fainting       PAST MEDICAL HISTORY:  Past Medical History:   Diagnosis Date     Allergic rhinitis, cause unspecified     dust mites, ragweed     Complete AV block (H)     BSX DDD PM 2-     History of tricuspid valve repair      Mitral valve  prolapse     bioprosthetic MVR 2-     Paroxysmal atrial fibrillation (H)        PAST SURGICAL HISTORY:  Past Surgical History:   Procedure Laterality Date     COLONOSCOPY N/A 9/15/2015    Procedure: COLONOSCOPY;  Surgeon: Anson Llanos MD;  Location:  GI     CV HEART CATHETERIZATION WITH POSSIBLE INTERVENTION N/A 1/9/2019    Procedure: Heart Catheterization with Possible Intervention;  Surgeon: Ritesh Whittaker MD;  Location:  HEART CARDIAC CATH LAB     CV RIGHT AND LEFT HEART CATH N/A 1/9/2019    Procedure: Right and Left Heart Catherization;  Surgeon: Ritesh Whittaker MD;  Location:  HEART CARDIAC CATH LAB     ENT SURGERY      T&A     EP INSERT ICD N/A 8/14/2020    Procedure: EP Insert ICD;  Surgeon: Lucian Tolliver MD;  Location:  HEART CARDIAC CATH LAB     EP PACEMAKER N/A 2/18/2019    Procedure: EP Pacemaker;  Surgeon: Inocencia Guillen MD;  Location:  HEART CARDIAC CATH LAB     REPAIR VALVE TRICUSPID MINIMALLY INVASIVE N/A 2/15/2019    Procedure: MINIMALLY INVASIVE TRICUSPID VALVE REPAIR WITH 32MM SULLIVAN RING;  Surgeon: Andrea Hanna MD;  Location:  OR     REPLACE VALVE MITRAL MINIMALLY INVASIVE N/A 2/15/2019    Procedure: MINIMALLY INVASIVE MITRAL VALVE REPLACEMENT WITH EPIC ST KEYUR 31MM VALVE; ON PUMP WITH TIA.  CARDIOLOGIST DR CARDENAS;  Surgeon: Andrea Hanna MD;  Location:  OR       SOCIAL HISTORY:  Social History     Socioeconomic History     Marital status:      Spouse name: None     Number of children: 3     Years of education: None     Highest education level: None   Occupational History     None   Social Needs     Financial resource strain: None     Food insecurity     Worry: None     Inability: None     Transportation needs     Medical: None     Non-medical: None   Tobacco Use     Smoking status: Never Smoker     Smokeless tobacco: Never Used   Substance and Sexual Activity     Alcohol use: No     Drug use: No     Sexual  activity: Not Currently   Lifestyle     Physical activity     Days per week: None     Minutes per session: None     Stress: None   Relationships     Social connections     Talks on phone: None     Gets together: None     Attends Confucianism service: None     Active member of club or organization: None     Attends meetings of clubs or organizations: None     Relationship status: None     Intimate partner violence     Fear of current or ex partner: None     Emotionally abused: None     Physically abused: None     Forced sexual activity: None   Other Topics Concern      Service Not Asked     Blood Transfusions Not Asked     Caffeine Concern No     Comment: 1 cup of coffee and 1 can diet coke per day     Occupational Exposure Not Asked     Hobby Hazards Not Asked     Sleep Concern Yes     Comment: takes Doxylamine succinate 1/2 tab every night     Stress Concern No     Weight Concern No     Special Diet No     Comment: healthy      Back Care Not Asked     Exercise Yes     Comment: walking 45min x7 a wk. Very active, [employer], Golf, Bowling, Cardio     Bike Helmet Not Asked     Seat Belt Yes     Self-Exams Yes     Parent/sibling w/ CABG, MI or angioplasty before 65F 55M? No   Social History Narrative     None       FAMILY HISTORY:  Family History   Problem Relation Age of Onset     Osteoporosis Mother      Alzheimer Disease Mother      Family History Negative Father      Heart Disease Maternal Grandfather      Family History Negative Paternal Grandmother      Family History Negative Paternal Grandfather        Review of Systems:  Skin:  Negative       Eyes:  Positive for glasses    ENT:  Negative      Respiratory:  Negative       Cardiovascular:    palpitations;Positive for    Gastroenterology: Positive for reflux controlled with meds  Genitourinary:  Negative      Musculoskeletal:  Positive for arthritis    Neurologic:  Negative      Psychiatric:  Negative      Heme/Lymph/Imm:  Negative      Endocrine:  Positive for  thyroid disorder       Reviewed. Remainder of the note dictated.    Marifer Mohamdu PA-C    Service Date: 11/19/2020      HISTORY OF PRESENT ILLNESS:  Taty is a pleasant 75-year-old female who presents to the office today for a 6-month followup.      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  In 10/2018, she was found to be in atrial fibrillation with RVR.  She did convert back to sinus rhythm; however, about 2 months later, she had recurrent atrial fibrillation.  At that time, she underwent a repeat cardiac evaluation which confirmed severe mitral regurgitation along with 3+ tricuspid valve regurgitation.  Ultimately, in 02/2019, she underwent successful minimally invasive mitral valve replacement with a 31 mm Epic St. Ron valve and a tricuspid valve repair with a 32 mm Oquendo ring.  She did not require coronary artery bypass grafting.  Following her procedure, she developed complete heart block and underwent permanent pacemaker implantation.      Unfortunately, shortly after her surgery, she developed significant volume overload.  She was found to be in atrial fibrillation and also underwent repeat echocardiography which showed an EF in the 20%-25% range.  Sinus rhythm was restored.  She was started on appropriate heart failure medical therapy and this was up titrated.  This spring, we did repeat an echocardiogram.  Her EF visually improved to approximately 35% (40%- 45% by biplane).  Her valve function was stable.  No changes were made at that time.        A subsequent device check showed some short runs of nonsustained VT.  Dr. Stone was notified and recommended an EP evaluation.  She saw Dr. Tolliver, who recommended an upgrade in her device to an ICD.  This was performed in August.      Overall, she has been doing well from a cardiac standpoint.  She continues to be able to walk 9000 steps a day  without any significant dyspnea on exertion.  She denies any significant lower extremity edema, no orthopnea or PND.  No significant lightheadedness or dizziness.  Her weight is overall stable.  No cardiac concerns today.      CURRENT CARDIAC MEDICATIONS:   1.  Aspirin 81 mg daily.   2.  Losartan 100 mg daily.   3.  Toprol- mg daily.   4.  Potassium chloride 20 mEq daily.   5.  Xarelto 20 mg daily.   6.  Torsemide 10 mg daily.      The remainder of her medications, allergies and review of systems were reviewed and as are documented separately.      PHYSICAL EXAMINATION:   GENERAL:  Taty is a pleasant 75-year-old female who appears her stated age.  She is in no apparent distress.   VITAL SIGNS:  Blood pressure is 113/72, pulse is 69.  Weight is 125 pounds.   PULMONARY:  Breathing is nonlabored.  Lungs are clear to auscultation.   CARDIAC:  Reveals a regular rate and rhythm.  I do not appreciate any significant murmur.   EXTREMITIES:  No lower extremity edema.   NEUROLOGIC:  Alert and oriented.      Her recent basic metabolic panel showed a sodium 133, potassium of 3.7, BUN 13, creatinine 0.79.      ASSESSMENT AND PLAN:  Taty is a pleasant 75-year-old female with a history of severe mitral regurgitation secondary to mitral valve prolapse and severe tricuspid regurgitation who is status post mitral valve replacement as well as tricuspid valve repair in 02/2019.  Postoperatively, she developed complete heart block and underwent permanent pacemaker implantation.  Unfortunately, she developed a cardiomyopathy/heart failure after surgery with an EF initially in the 20%-25% range.  With appropriate medical therapy, her EF did improve to approximately 35%.  Despite this, she was noted to have some runs of nonsustained VT on a device check and ultimately underwent an upgrade to an ICD in 08/2020.        At this point, she is doing well from cardiovascular standpoint.  Did not make any medication changes.  We will plan  to see her back in the office in 6 months.  She will contact sooner with any questions or concerns.      Thank you for allowing us to participate in the care of this pleasant patient.         SANDRA AMADOR PA-C             D: 2020   T: 2020   MT: CASSIE      Name:     GERONIMO PAYTON   MRN:      [id number]        Account:      [id number]   :      1945           Service Date: 2020      Document: [id number]          Thank you for allowing me to participate in the care of your patient.      Sincerely,     Sandra Amador PA-C     [facility]    cc:   Sandra Amador PA-C  [facility]  [address] [place] [address]

## 2020-11-19 NOTE — PROGRESS NOTES
Please see separate dictation for HPI, PHYSICAL EXAM AND IMPRESSION/PLAN.    CURRENT MEDICATIONS:  Current Outpatient Medications   Medication Sig Dispense Refill     amoxicillin (AMOXIL) 500 MG capsule Take 500 mg by mouth as needed       aspirin 81 MG EC tablet Take 81 mg by mouth every evening       calcium carbonate-vitamin D (CALCIUM 600+D) 600-200 MG-UNIT TABS Take 1 tablet by mouth 2 times daily       Cetirizine HCl (ZYRTEC PO) Take 10 mg by mouth daily        levothyroxine (SYNTHROID/LEVOTHROID) 50 MCG tablet Take 1 tablet (50 mcg) by mouth daily 90 tablet 3     losartan (COZAAR) 100 MG tablet Take 1 tablet (100 mg) by mouth daily 90 tablet 0     metoprolol succinate ER (TOPROL-XL) 100 MG 24 hr tablet Take 1 tablet (100 mg) by mouth daily 90 tablet 0     Multiple Vitamins-Minerals (MULTIVITAMIN ADULT) TABS Take 1 tablet by mouth daily       omeprazole (PRILOSEC) 10 MG DR capsule Take 1 capsule (10 mg) by mouth every morning (before breakfast) 90 capsule 3     potassium chloride ER (KLOR-CON M) 20 MEQ CR tablet Take 1 tablet (20 mEq) by mouth daily 90 tablet 0     rivaroxaban ANTICOAGULANT (XARELTO) 20 MG TABS tablet Take 1 tablet (20 mg) by mouth daily (with dinner) 90 tablet 0     torsemide (DEMADEX) 10 MG tablet Take 1 tablet (10 mg) by mouth daily 90 tablet 0       ALLERGIES:     Allergies   Allergen Reactions     Chlorpheniramine Other (See Comments)     LOC and fainting      Lisinopril Cough     Phenylephrine Other (See Comments)     fainting       PAST MEDICAL HISTORY:  Past Medical History:   Diagnosis Date     Allergic rhinitis, cause unspecified     dust mites, ragweed     Complete AV block (H)     BSX DDD PM 2-     History of tricuspid valve repair      Mitral valve prolapse     bioprosthetic MVR 2-     Paroxysmal atrial fibrillation (H)        PAST SURGICAL HISTORY:  Past Surgical History:   Procedure Laterality Date     COLONOSCOPY N/A 9/15/2015    Procedure: COLONOSCOPY;  Surgeon:  Anson Llanos MD;  Location:  GI     CV HEART CATHETERIZATION WITH POSSIBLE INTERVENTION N/A 1/9/2019    Procedure: Heart Catheterization with Possible Intervention;  Surgeon: Ritesh Whittaker MD;  Location:  HEART CARDIAC CATH LAB     CV RIGHT AND LEFT HEART CATH N/A 1/9/2019    Procedure: Right and Left Heart Catherization;  Surgeon: Ritesh Whittaker MD;  Location:  HEART CARDIAC CATH LAB     ENT SURGERY      T&A     EP INSERT ICD N/A 8/14/2020    Procedure: EP Insert ICD;  Surgeon: Lucian Tolliver MD;  Location:  HEART CARDIAC CATH LAB     EP PACEMAKER N/A 2/18/2019    Procedure: EP Pacemaker;  Surgeon: Inocencia Guillen MD;  Location:  HEART CARDIAC CATH LAB     REPAIR VALVE TRICUSPID MINIMALLY INVASIVE N/A 2/15/2019    Procedure: MINIMALLY INVASIVE TRICUSPID VALVE REPAIR WITH 32MM SULLIVAN RING;  Surgeon: Andrea Hanna MD;  Location:  OR     REPLACE VALVE MITRAL MINIMALLY INVASIVE N/A 2/15/2019    Procedure: MINIMALLY INVASIVE MITRAL VALVE REPLACEMENT WITH EPIC ST KEYUR 31MM VALVE; ON PUMP WITH TIA.  CARDIOLOGIST DR CARDENAS;  Surgeon: Andrea Hanna MD;  Location:  OR       SOCIAL HISTORY:  Social History     Socioeconomic History     Marital status:      Spouse name: None     Number of children: 3     Years of education: None     Highest education level: None   Occupational History     None   Social Needs     Financial resource strain: None     Food insecurity     Worry: None     Inability: None     Transportation needs     Medical: None     Non-medical: None   Tobacco Use     Smoking status: Never Smoker     Smokeless tobacco: Never Used   Substance and Sexual Activity     Alcohol use: No     Drug use: No     Sexual activity: Not Currently   Lifestyle     Physical activity     Days per week: None     Minutes per session: None     Stress: None   Relationships     Social connections     Talks on phone: None     Gets together: None     Attends  Latter-day service: None     Active member of club or organization: None     Attends meetings of clubs or organizations: None     Relationship status: None     Intimate partner violence     Fear of current or ex partner: None     Emotionally abused: None     Physically abused: None     Forced sexual activity: None   Other Topics Concern      Service Not Asked     Blood Transfusions Not Asked     Caffeine Concern No     Comment: 1 cup of coffee and 1 can diet coke per day     Occupational Exposure Not Asked     Hobby Hazards Not Asked     Sleep Concern Yes     Comment: takes Doxylamine succinate 1/2 tab every night     Stress Concern No     Weight Concern No     Special Diet No     Comment: healthy      Back Care Not Asked     Exercise Yes     Comment: walking 45min x7 a wk. Very active, [employer], Golf, Bowling, Cardio     Bike Helmet Not Asked     Seat Belt Yes     Self-Exams Yes     Parent/sibling w/ CABG, MI or angioplasty before 65F 55M? No   Social History Narrative     None       FAMILY HISTORY:  Family History   Problem Relation Age of Onset     Osteoporosis Mother      Alzheimer Disease Mother      Family History Negative Father      Heart Disease Maternal Grandfather      Family History Negative Paternal Grandmother      Family History Negative Paternal Grandfather        Review of Systems:  Skin:  Negative       Eyes:  Positive for glasses    ENT:  Negative      Respiratory:  Negative       Cardiovascular:    palpitations;Positive for    Gastroenterology: Positive for reflux controlled with meds  Genitourinary:  Negative      Musculoskeletal:  Positive for arthritis    Neurologic:  Negative      Psychiatric:  Negative      Heme/Lymph/Imm:  Negative      Endocrine:  Positive for thyroid disorder       Reviewed. Remainder of the note dictated.    Marifer Mohamud PA-C

## 2020-11-19 NOTE — PROGRESS NOTES
Service Date: 11/19/2020      HISTORY OF PRESENT ILLNESS:  Taty is a pleasant 75-year-old female who presents to the office today for a 6-month followup.      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  In 10/2018, she was found to be in atrial fibrillation with RVR.  She did convert back to sinus rhythm; however, about 2 months later, she had recurrent atrial fibrillation.  At that time, she underwent a repeat cardiac evaluation which confirmed severe mitral regurgitation along with 3+ tricuspid valve regurgitation.  Ultimately, in 02/2019, she underwent successful minimally invasive mitral valve replacement with a 31 mm Epic St. Ron valve and a tricuspid valve repair with a 32 mm Oquendo ring.  She did not require coronary artery bypass grafting.  Following her procedure, she developed complete heart block and underwent permanent pacemaker implantation.      Unfortunately, shortly after her surgery, she developed significant volume overload.  She was found to be in atrial fibrillation and also underwent repeat echocardiography which showed an EF in the 20%-25% range.  Sinus rhythm was restored.  She was started on appropriate heart failure medical therapy and this was up titrated.  This spring, we did repeat an echocardiogram.  Her EF visually improved to approximately 35% (40%- 45% by biplane).  Her valve function was stable.  No changes were made at that time.        A subsequent device check showed some short runs of nonsustained VT.  Dr. Stone was notified and recommended an EP evaluation.  She saw Dr. Tolliver, who recommended an upgrade in her device to an ICD.  This was performed in August.      Overall, she has been doing well from a cardiac standpoint.  She continues to be able to walk 9000 steps a day without any significant dyspnea on exertion.  She denies any significant lower extremity edema, no  orthopnea or PND.  No significant lightheadedness or dizziness.  Her weight is overall stable.  No cardiac concerns today.      CURRENT CARDIAC MEDICATIONS:   1.  Aspirin 81 mg daily.   2.  Losartan 100 mg daily.   3.  Toprol- mg daily.   4.  Potassium chloride 20 mEq daily.   5.  Xarelto 20 mg daily.   6.  Torsemide 10 mg daily.      The remainder of her medications, allergies and review of systems were reviewed and as are documented separately.      PHYSICAL EXAMINATION:   GENERAL:  Taty is a pleasant 75-year-old female who appears her stated age.  She is in no apparent distress.   VITAL SIGNS:  Blood pressure is 113/72, pulse is 69.  Weight is 125 pounds.   PULMONARY:  Breathing is nonlabored.  Lungs are clear to auscultation.   CARDIAC:  Reveals a regular rate and rhythm.  I do not appreciate any significant murmur.   EXTREMITIES:  No lower extremity edema.   NEUROLOGIC:  Alert and oriented.      Her recent basic metabolic panel showed a sodium 133, potassium of 3.7, BUN 13, creatinine 0.79.      ASSESSMENT AND PLAN:  Taty is a pleasant 75-year-old female with a history of severe mitral regurgitation secondary to mitral valve prolapse and severe tricuspid regurgitation who is status post mitral valve replacement as well as tricuspid valve repair in 02/2019.  Postoperatively, she developed complete heart block and underwent permanent pacemaker implantation.  Unfortunately, she developed a cardiomyopathy/heart failure after surgery with an EF initially in the 20%-25% range.  With appropriate medical therapy, her EF did improve to approximately 35%.  Despite this, she was noted to have some runs of nonsustained VT on a device check and ultimately underwent an upgrade to an ICD in 08/2020.        At this point, she is doing well from cardiovascular standpoint.  Did not make any medication changes.  We will plan to see her back in the office in 6 months.  She will contact sooner with any questions or concerns.       Thank you for allowing us to participate in the care of this pleasant patient.         SANDRA AMADOR PA-C             D: 2020   T: 2020   MT: CASSIE      Name:     GERONIMO PAYTON   MRN:      [id number]        Account:      [id number]   :      1945           Service Date: 2020      Document: [id number]

## 2020-11-29 ENCOUNTER — HEALTH MAINTENANCE LETTER (OUTPATIENT)
Age: 75
End: 2020-11-29

## 2020-12-30 DIAGNOSIS — Z95.2 S/P MVR (MITRAL VALVE REPLACEMENT): Primary | ICD-10-CM

## 2020-12-30 DIAGNOSIS — Z98.890 S/P TVR (TRICUSPID VALVE REPAIR): ICD-10-CM

## 2020-12-30 RX ORDER — AMOXICILLIN 500 MG/1
2000 TABLET, FILM COATED ORAL PRN
Qty: 4 TABLET | Refills: 2 | Status: SHIPPED | OUTPATIENT
Start: 2020-12-30 | End: 2021-05-19

## 2020-12-30 NOTE — TELEPHONE ENCOUNTER
Sent in prescription for Amoxicillin 2,000mg 30-60 mins prior to procedure/dental work. S/p valve replacement. CHADD Felix

## 2021-01-07 ENCOUNTER — ANCILLARY PROCEDURE (OUTPATIENT)
Dept: CARDIOLOGY | Facility: CLINIC | Age: 76
End: 2021-01-07
Attending: INTERNAL MEDICINE
Payer: COMMERCIAL

## 2021-01-07 DIAGNOSIS — Z95.0 CARDIAC PACEMAKER IN SITU: ICD-10-CM

## 2021-01-07 DIAGNOSIS — I44.2 ATRIOVENTRICULAR BLOCK, COMPLETE (H): ICD-10-CM

## 2021-01-07 PROCEDURE — 93295 DEV INTERROG REMOTE 1/2/MLT: CPT | Performed by: INTERNAL MEDICINE

## 2021-01-07 PROCEDURE — 93296 REM INTERROG EVL PM/IDS: CPT | Performed by: INTERNAL MEDICINE

## 2021-01-18 LAB
MDC_IDC_EPISODE_DTM: NORMAL
MDC_IDC_EPISODE_ID: NORMAL
MDC_IDC_EPISODE_TYPE: NORMAL
MDC_IDC_LEAD_IMPLANT_DT: NORMAL
MDC_IDC_LEAD_IMPLANT_DT: NORMAL
MDC_IDC_LEAD_LOCATION: NORMAL
MDC_IDC_LEAD_LOCATION: NORMAL
MDC_IDC_LEAD_LOCATION_DETAIL_1: NORMAL
MDC_IDC_LEAD_LOCATION_DETAIL_1: NORMAL
MDC_IDC_LEAD_MFG: NORMAL
MDC_IDC_LEAD_MFG: NORMAL
MDC_IDC_LEAD_MODEL: NORMAL
MDC_IDC_LEAD_MODEL: NORMAL
MDC_IDC_LEAD_POLARITY_TYPE: NORMAL
MDC_IDC_LEAD_POLARITY_TYPE: NORMAL
MDC_IDC_LEAD_SERIAL: NORMAL
MDC_IDC_LEAD_SERIAL: NORMAL
MDC_IDC_MSMT_BATTERY_DTM: NORMAL
MDC_IDC_MSMT_BATTERY_REMAINING_LONGEVITY: 156 MO
MDC_IDC_MSMT_BATTERY_REMAINING_PERCENTAGE: 100 %
MDC_IDC_MSMT_BATTERY_STATUS: NORMAL
MDC_IDC_MSMT_CAP_CHARGE_DTM: NORMAL
MDC_IDC_MSMT_CAP_CHARGE_TIME: 8.8 S
MDC_IDC_MSMT_CAP_CHARGE_TYPE: NORMAL
MDC_IDC_MSMT_LEADCHNL_RA_IMPEDANCE_VALUE: 652 OHM
MDC_IDC_MSMT_LEADCHNL_RA_PACING_THRESHOLD_AMPLITUDE: 0.6 V
MDC_IDC_MSMT_LEADCHNL_RA_PACING_THRESHOLD_PULSEWIDTH: 0.4 MS
MDC_IDC_MSMT_LEADCHNL_RV_IMPEDANCE_VALUE: 443 OHM
MDC_IDC_MSMT_LEADCHNL_RV_PACING_THRESHOLD_AMPLITUDE: 0.9 V
MDC_IDC_MSMT_LEADCHNL_RV_PACING_THRESHOLD_PULSEWIDTH: 0.4 MS
MDC_IDC_PG_IMPLANT_DTM: NORMAL
MDC_IDC_PG_MFG: NORMAL
MDC_IDC_PG_MODEL: NORMAL
MDC_IDC_PG_SERIAL: NORMAL
MDC_IDC_PG_TYPE: NORMAL
MDC_IDC_SESS_CLINIC_NAME: NORMAL
MDC_IDC_SESS_DTM: NORMAL
MDC_IDC_SESS_TYPE: NORMAL
MDC_IDC_SET_BRADY_AT_MODE_SWITCH_MODE: NORMAL
MDC_IDC_SET_BRADY_AT_MODE_SWITCH_RATE: 170 {BEATS}/MIN
MDC_IDC_SET_BRADY_LOWRATE: 60 {BEATS}/MIN
MDC_IDC_SET_BRADY_MAX_SENSOR_RATE: 130 {BEATS}/MIN
MDC_IDC_SET_BRADY_MAX_TRACKING_RATE: 130 {BEATS}/MIN
MDC_IDC_SET_BRADY_MODE: NORMAL
MDC_IDC_SET_BRADY_PAV_DELAY_HIGH: 150 MS
MDC_IDC_SET_BRADY_PAV_DELAY_LOW: 200 MS
MDC_IDC_SET_BRADY_SAV_DELAY_HIGH: 150 MS
MDC_IDC_SET_BRADY_SAV_DELAY_LOW: 200 MS
MDC_IDC_SET_LEADCHNL_RA_PACING_AMPLITUDE: 2 V
MDC_IDC_SET_LEADCHNL_RA_PACING_CAPTURE_MODE: NORMAL
MDC_IDC_SET_LEADCHNL_RA_PACING_POLARITY: NORMAL
MDC_IDC_SET_LEADCHNL_RA_PACING_PULSEWIDTH: 0.4 MS
MDC_IDC_SET_LEADCHNL_RA_SENSING_ADAPTATION_MODE: NORMAL
MDC_IDC_SET_LEADCHNL_RA_SENSING_POLARITY: NORMAL
MDC_IDC_SET_LEADCHNL_RA_SENSING_SENSITIVITY: 0.25 MV
MDC_IDC_SET_LEADCHNL_RV_PACING_AMPLITUDE: 2 V
MDC_IDC_SET_LEADCHNL_RV_PACING_CAPTURE_MODE: NORMAL
MDC_IDC_SET_LEADCHNL_RV_PACING_POLARITY: NORMAL
MDC_IDC_SET_LEADCHNL_RV_PACING_PULSEWIDTH: 0.4 MS
MDC_IDC_SET_LEADCHNL_RV_SENSING_ADAPTATION_MODE: NORMAL
MDC_IDC_SET_LEADCHNL_RV_SENSING_POLARITY: NORMAL
MDC_IDC_SET_LEADCHNL_RV_SENSING_SENSITIVITY: 0.6 MV
MDC_IDC_SET_ZONE_DETECTION_INTERVAL: 250 MS
MDC_IDC_SET_ZONE_DETECTION_INTERVAL: 300 MS
MDC_IDC_SET_ZONE_DETECTION_INTERVAL: 353 MS
MDC_IDC_SET_ZONE_TYPE: NORMAL
MDC_IDC_SET_ZONE_VENDOR_TYPE: NORMAL
MDC_IDC_STAT_AT_BURDEN_PERCENT: 1 %
MDC_IDC_STAT_AT_DTM_END: NORMAL
MDC_IDC_STAT_AT_DTM_START: NORMAL
MDC_IDC_STAT_BRADY_DTM_END: NORMAL
MDC_IDC_STAT_BRADY_DTM_START: NORMAL
MDC_IDC_STAT_BRADY_RA_PERCENT_PACED: 9 %
MDC_IDC_STAT_BRADY_RV_PERCENT_PACED: 0 %
MDC_IDC_STAT_EPISODE_RECENT_COUNT: 0
MDC_IDC_STAT_EPISODE_RECENT_COUNT: 1
MDC_IDC_STAT_EPISODE_RECENT_COUNT_DTM_END: NORMAL
MDC_IDC_STAT_EPISODE_RECENT_COUNT_DTM_START: NORMAL
MDC_IDC_STAT_EPISODE_TYPE: NORMAL
MDC_IDC_STAT_EPISODE_VENDOR_TYPE: NORMAL
MDC_IDC_STAT_TACHYTHERAPY_ATP_DELIVERED_RECENT: 0
MDC_IDC_STAT_TACHYTHERAPY_ATP_DELIVERED_TOTAL: 0
MDC_IDC_STAT_TACHYTHERAPY_RECENT_DTM_END: NORMAL
MDC_IDC_STAT_TACHYTHERAPY_RECENT_DTM_START: NORMAL
MDC_IDC_STAT_TACHYTHERAPY_SHOCKS_ABORTED_RECENT: 0
MDC_IDC_STAT_TACHYTHERAPY_SHOCKS_ABORTED_TOTAL: 0
MDC_IDC_STAT_TACHYTHERAPY_SHOCKS_DELIVERED_RECENT: 0
MDC_IDC_STAT_TACHYTHERAPY_SHOCKS_DELIVERED_TOTAL: 0
MDC_IDC_STAT_TACHYTHERAPY_TOTAL_DTM_END: NORMAL
MDC_IDC_STAT_TACHYTHERAPY_TOTAL_DTM_START: NORMAL

## 2021-03-01 ENCOUNTER — IMMUNIZATION (OUTPATIENT)
Dept: NURSING | Facility: CLINIC | Age: 76
End: 2021-03-01
Payer: COMMERCIAL

## 2021-03-01 PROCEDURE — 0011A PR COVID VAC MODERNA 100 MCG/0.5 ML IM: CPT

## 2021-03-01 PROCEDURE — 91301 PR COVID VAC MODERNA 100 MCG/0.5 ML IM: CPT

## 2021-03-17 ASSESSMENT — ENCOUNTER SYMPTOMS
NERVOUS/ANXIOUS: 0
CONSTIPATION: 0
BREAST MASS: 0
ARTHRALGIAS: 0
HEMATOCHEZIA: 0
HEMATURIA: 0
WEAKNESS: 1
DIZZINESS: 0
PALPITATIONS: 0
SHORTNESS OF BREATH: 0
COUGH: 0
ABDOMINAL PAIN: 0
FEVER: 0
HEADACHES: 0
EYE PAIN: 0
MYALGIAS: 0
DIARRHEA: 0
SORE THROAT: 0
NAUSEA: 0
HEARTBURN: 0
DYSURIA: 0
JOINT SWELLING: 0
FREQUENCY: 0
PARESTHESIAS: 0
CHILLS: 0

## 2021-03-17 ASSESSMENT — ACTIVITIES OF DAILY LIVING (ADL): CURRENT_FUNCTION: NO ASSISTANCE NEEDED

## 2021-03-22 ENCOUNTER — OFFICE VISIT (OUTPATIENT)
Dept: INTERNAL MEDICINE | Facility: CLINIC | Age: 76
End: 2021-03-22
Payer: COMMERCIAL

## 2021-03-22 VITALS
SYSTOLIC BLOOD PRESSURE: 118 MMHG | HEIGHT: 66 IN | DIASTOLIC BLOOD PRESSURE: 71 MMHG | OXYGEN SATURATION: 99 % | TEMPERATURE: 97.4 F | RESPIRATION RATE: 18 BRPM | WEIGHT: 128.4 LBS | BODY MASS INDEX: 20.63 KG/M2 | HEART RATE: 91 BPM

## 2021-03-22 DIAGNOSIS — Z00.00 ENCOUNTER FOR ANNUAL WELLNESS EXAM IN MEDICARE PATIENT: Primary | ICD-10-CM

## 2021-03-22 DIAGNOSIS — Z78.0 ASYMPTOMATIC POSTMENOPAUSAL STATUS: ICD-10-CM

## 2021-03-22 DIAGNOSIS — I77.810 THORACIC AORTIC ECTASIA (H): ICD-10-CM

## 2021-03-22 DIAGNOSIS — I48.0 PAROXYSMAL ATRIAL FIBRILLATION (H): ICD-10-CM

## 2021-03-22 DIAGNOSIS — I50.22 CHRONIC SYSTOLIC HEART FAILURE (H): ICD-10-CM

## 2021-03-22 DIAGNOSIS — E03.9 ACQUIRED HYPOTHYROIDISM: ICD-10-CM

## 2021-03-22 PROCEDURE — 99397 PER PM REEVAL EST PAT 65+ YR: CPT | Performed by: INTERNAL MEDICINE

## 2021-03-22 PROCEDURE — 36415 COLL VENOUS BLD VENIPUNCTURE: CPT | Performed by: INTERNAL MEDICINE

## 2021-03-22 PROCEDURE — 80048 BASIC METABOLIC PNL TOTAL CA: CPT | Performed by: INTERNAL MEDICINE

## 2021-03-22 PROCEDURE — 99213 OFFICE O/P EST LOW 20 MIN: CPT | Mod: 25 | Performed by: INTERNAL MEDICINE

## 2021-03-22 PROCEDURE — 84460 ALANINE AMINO (ALT) (SGPT): CPT | Performed by: INTERNAL MEDICINE

## 2021-03-22 PROCEDURE — 84443 ASSAY THYROID STIM HORMONE: CPT | Performed by: INTERNAL MEDICINE

## 2021-03-22 ASSESSMENT — ENCOUNTER SYMPTOMS
DIARRHEA: 0
PALPITATIONS: 0
HEARTBURN: 0
SORE THROAT: 0
FEVER: 0
FREQUENCY: 0
HEMATURIA: 0
HEADACHES: 0
BREAST MASS: 0
DYSURIA: 0
NAUSEA: 0
CONSTIPATION: 0
COUGH: 0
JOINT SWELLING: 0
MYALGIAS: 0
EYE PAIN: 0
NERVOUS/ANXIOUS: 0
ABDOMINAL PAIN: 0
PARESTHESIAS: 0
SHORTNESS OF BREATH: 0
CHILLS: 0
HEMATOCHEZIA: 0
DIZZINESS: 0
WEAKNESS: 1
ARTHRALGIAS: 0

## 2021-03-22 ASSESSMENT — MIFFLIN-ST. JEOR: SCORE: 1091.23

## 2021-03-22 ASSESSMENT — ACTIVITIES OF DAILY LIVING (ADL): CURRENT_FUNCTION: NO ASSISTANCE NEEDED

## 2021-03-22 NOTE — NURSING NOTE
"/71 (BP Location: Right arm, Patient Position: Sitting, Cuff Size: Adult Regular)   Pulse 91   Temp 97.4  F (36.3  C) (Oral)   Resp 18   Ht 1.664 m (5' 5.5\")   Wt 58.2 kg (128 lb 6.4 oz)   LMP  (LMP Unknown)   SpO2 99%   BMI 21.04 kg/m      "

## 2021-03-22 NOTE — PROGRESS NOTES
"SUBJECTIVE:   Taty Aguila is a 76 year old female who presents for Preventive Visit.      Patient has been advised of split billing requirements and indicates understanding: Yes   Are you in the first 12 months of your Medicare coverage?  No    Healthy Habits:     In general, how would you rate your overall health?  Good    Frequency of exercise:  6-7 days/week    Duration of exercise:  45-60 minutes    Do you usually eat at least 4 servings of fruit and vegetables a day, include whole grains    & fiber and avoid regularly eating high fat or \"junk\" foods?  Yes    Taking medications regularly:  Yes    Medication side effects:  Not applicable    Ability to successfully perform activities of daily living:  No assistance needed    Home Safety:  Lack of grab bars in the bathroom    Hearing Impairment:  No hearing concerns    In the past 6 months, have you been bothered by leaking of urine?  No    In general, how would you rate your overall mental or emotional health?  Good      PHQ-2 Total Score: 0    Additional concerns today:  Yes    Do you feel safe in your environment? Yes    Have you ever done Advance Care Planning? (For example, a Health Directive, POLST, or a discussion with a medical provider or your loved ones about your wishes): No, advance care planning information given to patient to review.  Patient plans to discuss their wishes with loved ones or provider.         Fall risk  Fallen 2 or more times in the past year?: No  Any fall with injury in the past year?: No  click delete button to remove this line now  Cognitive Screening   1) Repeat 3 items (Leader, Season, Table)    2) Clock draw: NORMAL  3) 3 item recall: Recalls 3 objects  Results: 3 items recalled: COGNITIVE IMPAIRMENT LESS LIKELY    Mini-CogTM Copyright MOJGAN Hannah. Licensed by the author for use in [facility] [employer]; reprinted with permission (sera@.[facility]). All rights reserved.      Do you have sleep apnea, excessive snoring or " daytime drowsiness?: no    Reviewed and updated as needed this visit by clinical staff               Problems:   1. Hypothyroidism: stable on dose, due for lab  2. CHF: no new dyspnea, edema. Some slight gradual increase abdominal girth over the past months, slight weight gain with the Covid pandemic so she thinks it may be true that change rather than fluid.  3.  V. tach/A. fib: She had a defibrillator placed last August with resolution of the A. fib.  The defibrillator has not been going off.      Reviewed and updated as needed this visit by Provider                Social History     Tobacco Use     Smoking status: Never Smoker     Smokeless tobacco: Never Used   Substance Use Topics     Alcohol use: No     If you drink alcohol do you typically have >3 drinks per day or >7 drinks per week? No    Alcohol Use 3/17/2021   Prescreen: >3 drinks/day or >7 drinks/week? No   Prescreen: >3 drinks/day or >7 drinks/week? -   No flowsheet data found.        Current providers sharing in care for this patient include:   Patient Care Team:  Anyi Olmos MD as PCP - General (Internal Medicine)  Anyi Olmos MD as Assigned PCP  Courtney Brian PA as Physician Assistant (Cardiology)  Jesus Stone MD as MD (Cardiology)  Milagro Velazquez, RN as Registered Nurse (Cardiology)  Jo-Ann Neal PA-C as Physician Assistant (Physician Assistant)  Lucian Tolliver MD as Assigned Heart and Vascular Provider    The following health maintenance items are reviewed in Epic and correct as of today:  Health Maintenance   Topic Date Due     HF ACTION PLAN  Never done     ZOSTER IMMUNIZATION (2 of 3) 10/07/2008     FALL RISK ASSESSMENT  02/07/2020     ALT  07/11/2020     INFLUENZA VACCINE (1) 09/01/2020     MAMMO SCREENING  09/16/2020     MEDICARE ANNUAL WELLNESS VISIT  03/12/2021     COVID-19 Vaccine (2 - Moderna 2-dose series) 03/29/2021     BMP  05/16/2021     CBC  08/14/2021     DTAP/TDAP/TD IMMUNIZATION (3 - Td)  "07/19/2023     ADVANCE CARE PLANNING  03/18/2025     COLORECTAL CANCER SCREENING  09/15/2025     DEXA  08/18/2031     TSH W/FREE T4 REFLEX  Completed     HEPATITIS C SCREENING  Completed     PHQ-2  Completed     Pneumococcal Vaccine: Pediatrics (0 to 5 Years) and At-Risk Patients (6 to 64 Years)  Completed     Pneumococcal Vaccine: 65+ Years  Completed     IPV IMMUNIZATION  Aged Out     MENINGITIS IMMUNIZATION  Aged Out     HEPATITIS B IMMUNIZATION  Aged Out     LIPID  Discontinued         Review of Systems   Constitutional: Negative for chills and fever.   HENT: Negative for congestion, ear pain, hearing loss and sore throat.    Eyes: Negative for pain and visual disturbance.   Respiratory: Negative for cough and shortness of breath.    Cardiovascular: Negative for chest pain, palpitations and peripheral edema.   Gastrointestinal: Negative for abdominal pain, constipation, diarrhea, heartburn, hematochezia and nausea.   Breasts:  Negative for tenderness, breast mass and discharge.   Genitourinary: Negative for dysuria, frequency, genital sores, hematuria, pelvic pain, urgency, vaginal bleeding and vaginal discharge.   Musculoskeletal: Negative for arthralgias, joint swelling and myalgias.   Skin: Negative for rash.   Neurological: Positive for weakness. Negative for dizziness, headaches and paresthesias.   Psychiatric/Behavioral: Negative for mood changes. The patient is not nervous/anxious.      The weakness is just mild generalized weakness related to her heart      OBJECTIVE:   /71 (BP Location: Right arm, Patient Position: Sitting, Cuff Size: Adult Regular)   Pulse 91   Temp 97.4  F (36.3  C) (Oral)   Resp 18   Ht 1.664 m (5' 5.5\")   Wt 58.2 kg (128 lb 6.4 oz)   LMP  (LMP Unknown)   SpO2 99%   BMI 21.04 kg/m   Estimated body mass index is 21.04 kg/m  as calculated from the following:    Height as of this encounter: 1.664 m (5' 5.5\").    Weight as of this encounter: 58.2 kg (128 lb 6.4 " "oz).  Physical Exam    HEENT: extraocular movements are intact, pupils equal and reactive to light and accommodation, TMs clear  NECK: Neck supple. No adenopathy. Thyroid symmetric, normal size,, Carotids without bruits.  PULMONARY: clear to auscultation  CARDIAC: Regular S1, S2 with soft murmur, no S3, S4  PULSES: 2/2 throughout  BACK: no spinal or CVAT  ABDOMINAL: Soft, nontender.  Normal bowel sounds.  No hepatosplenomegaly or abnormal masses  BREAST: No breast masses or tenderness, No axillary masses or tenderness and No galactorrhea        ASSESSMENT / PLAN:   1. Encounter for annual wellness exam in Medicare patient  Advised about shingles vaccine, she will consider.   Discussed mammogram, she may consider stopping this in the future due to her heart condition.    2. Chronic systolic heart failure (H)  Clinically stable, recheck lab, continue medications per cardiology  - Basic metabolic panel  (Ca, Cl, CO2, Creat, Gluc, K, Na, BUN)  - ALT    3. Thoracic aortic ectasia (H)  Monitored by cardiology, stable on last echo    4. Paroxysmal atrial fibrillation (H)  This has resolved after defibrillator placed    5. Acquired hypothyroidism  Clinically stable, will check lab and then reorder medication  - TSH with free T4 reflex    6. Asymptomatic postmenopausal status    - DX Hip/Pelvis/Spine; Future    Patient has been advised of split billing requirements and indicates understanding: Yes  COUNSELING:  Reviewed preventive health counseling, as reflected in patient instructions    Estimated body mass index is 20.18 kg/m  as calculated from the following:    Height as of 11/19/20: 1.676 m (5' 6\").    Weight as of 11/19/20: 56.7 kg (125 lb).        She reports that she has never smoked. She has never used smokeless tobacco.      Appropriate preventive services were discussed with this patient, including applicable screening as appropriate for cardiovascular disease, diabetes, osteopenia/osteoporosis, and glaucoma.  As " appropriate for age/gender, discussed screening for colorectal cancer, prostate cancer, breast cancer, and cervical cancer. Checklist reviewing preventive services available has been given to the patient.    Reviewed patients plan of care and provided an AVS. The Basic Care Plan (routine screening as documented in Health Maintenance) for Taty meets the Care Plan requirement. This Care Plan has been established and reviewed with the Patient.    Counseling Resources:  ATP IV Guidelines  Pooled Cohorts Equation Calculator  Breast Cancer Risk Calculator  Breast Cancer: Medication to Reduce Risk  FRAX Risk Assessment  ICSI Preventive Guidelines  Dietary Guidelines for Americans, 2010  USDA's MyPlate  ASA Prophylaxis  Lung CA Screening    Anyi Olmos MD  [facility]    Identified Health Risks:

## 2021-03-23 LAB
ALT SERPL W P-5'-P-CCNC: 27 U/L (ref 0–50)
ANION GAP SERPL CALCULATED.3IONS-SCNC: 3 MMOL/L (ref 3–14)
BUN SERPL-MCNC: 13 MG/DL (ref 7–30)
CALCIUM SERPL-MCNC: 9.1 MG/DL (ref 8.5–10.1)
CHLORIDE SERPL-SCNC: 100 MMOL/L (ref 94–109)
CO2 SERPL-SCNC: 29 MMOL/L (ref 20–32)
CREAT SERPL-MCNC: 0.84 MG/DL (ref 0.52–1.04)
GFR SERPL CREATININE-BSD FRML MDRD: 68 ML/MIN/{1.73_M2}
GLUCOSE SERPL-MCNC: 96 MG/DL (ref 70–99)
POTASSIUM SERPL-SCNC: 4.1 MMOL/L (ref 3.4–5.3)
SODIUM SERPL-SCNC: 132 MMOL/L (ref 133–144)
TSH SERPL DL<=0.005 MIU/L-ACNC: 0.79 MU/L (ref 0.4–4)

## 2021-03-23 RX ORDER — LEVOTHYROXINE SODIUM 50 UG/1
50 TABLET ORAL DAILY
Qty: 90 TABLET | Refills: 3 | Status: SHIPPED | OUTPATIENT
Start: 2021-03-23 | End: 2022-03-14

## 2021-03-29 ENCOUNTER — IMMUNIZATION (OUTPATIENT)
Dept: NURSING | Facility: CLINIC | Age: 76
End: 2021-03-29
Attending: INTERNAL MEDICINE
Payer: COMMERCIAL

## 2021-03-29 PROCEDURE — 91301 PR COVID VAC MODERNA 100 MCG/0.5 ML IM: CPT

## 2021-03-29 PROCEDURE — 0012A PR COVID VAC MODERNA 100 MCG/0.5 ML IM: CPT

## 2021-04-12 ENCOUNTER — ANCILLARY PROCEDURE (OUTPATIENT)
Dept: CARDIOLOGY | Facility: CLINIC | Age: 76
End: 2021-04-12
Attending: INTERNAL MEDICINE
Payer: COMMERCIAL

## 2021-04-12 DIAGNOSIS — Z95.0 CARDIAC PACEMAKER IN SITU: ICD-10-CM

## 2021-04-12 DIAGNOSIS — I42.9 CARDIOMYOPATHY, UNSPECIFIED TYPE (H): Primary | ICD-10-CM

## 2021-04-12 DIAGNOSIS — I44.2 ATRIOVENTRICULAR BLOCK, COMPLETE (H): ICD-10-CM

## 2021-04-12 DIAGNOSIS — Z95.810 ICD (IMPLANTABLE CARDIOVERTER-DEFIBRILLATOR) IN PLACE: ICD-10-CM

## 2021-04-12 PROCEDURE — 93295 DEV INTERROG REMOTE 1/2/MLT: CPT | Performed by: INTERNAL MEDICINE

## 2021-04-12 PROCEDURE — 93296 REM INTERROG EVL PM/IDS: CPT | Performed by: INTERNAL MEDICINE

## 2021-04-13 LAB
MDC_IDC_LEAD_IMPLANT_DT: NORMAL
MDC_IDC_LEAD_IMPLANT_DT: NORMAL
MDC_IDC_LEAD_LOCATION: NORMAL
MDC_IDC_LEAD_LOCATION: NORMAL
MDC_IDC_LEAD_LOCATION_DETAIL_1: NORMAL
MDC_IDC_LEAD_LOCATION_DETAIL_1: NORMAL
MDC_IDC_LEAD_MFG: NORMAL
MDC_IDC_LEAD_MFG: NORMAL
MDC_IDC_LEAD_MODEL: NORMAL
MDC_IDC_LEAD_MODEL: NORMAL
MDC_IDC_LEAD_POLARITY_TYPE: NORMAL
MDC_IDC_LEAD_POLARITY_TYPE: NORMAL
MDC_IDC_LEAD_SERIAL: NORMAL
MDC_IDC_LEAD_SERIAL: NORMAL
MDC_IDC_MSMT_BATTERY_DTM: NORMAL
MDC_IDC_MSMT_BATTERY_REMAINING_LONGEVITY: 156 MO
MDC_IDC_MSMT_BATTERY_REMAINING_PERCENTAGE: 100 %
MDC_IDC_MSMT_BATTERY_STATUS: NORMAL
MDC_IDC_MSMT_CAP_CHARGE_DTM: NORMAL
MDC_IDC_MSMT_CAP_CHARGE_TIME: 9.2 S
MDC_IDC_MSMT_CAP_CHARGE_TYPE: NORMAL
MDC_IDC_MSMT_LEADCHNL_RA_IMPEDANCE_VALUE: 711 OHM
MDC_IDC_MSMT_LEADCHNL_RA_PACING_THRESHOLD_AMPLITUDE: 0.5 V
MDC_IDC_MSMT_LEADCHNL_RA_PACING_THRESHOLD_PULSEWIDTH: 0.4 MS
MDC_IDC_MSMT_LEADCHNL_RV_IMPEDANCE_VALUE: 471 OHM
MDC_IDC_MSMT_LEADCHNL_RV_PACING_THRESHOLD_AMPLITUDE: 0.8 V
MDC_IDC_MSMT_LEADCHNL_RV_PACING_THRESHOLD_PULSEWIDTH: 0.4 MS
MDC_IDC_PG_IMPLANT_DTM: NORMAL
MDC_IDC_PG_MFG: NORMAL
MDC_IDC_PG_MODEL: NORMAL
MDC_IDC_PG_SERIAL: NORMAL
MDC_IDC_PG_TYPE: NORMAL
MDC_IDC_SESS_CLINIC_NAME: NORMAL
MDC_IDC_SESS_DTM: NORMAL
MDC_IDC_SESS_TYPE: NORMAL
MDC_IDC_SET_BRADY_AT_MODE_SWITCH_MODE: NORMAL
MDC_IDC_SET_BRADY_AT_MODE_SWITCH_RATE: 170 {BEATS}/MIN
MDC_IDC_SET_BRADY_LOWRATE: 60 {BEATS}/MIN
MDC_IDC_SET_BRADY_MAX_SENSOR_RATE: 130 {BEATS}/MIN
MDC_IDC_SET_BRADY_MAX_TRACKING_RATE: 130 {BEATS}/MIN
MDC_IDC_SET_BRADY_MODE: NORMAL
MDC_IDC_SET_BRADY_PAV_DELAY_HIGH: 150 MS
MDC_IDC_SET_BRADY_PAV_DELAY_LOW: 200 MS
MDC_IDC_SET_BRADY_SAV_DELAY_HIGH: 150 MS
MDC_IDC_SET_BRADY_SAV_DELAY_LOW: 200 MS
MDC_IDC_SET_LEADCHNL_RA_PACING_AMPLITUDE: 2 V
MDC_IDC_SET_LEADCHNL_RA_PACING_CAPTURE_MODE: NORMAL
MDC_IDC_SET_LEADCHNL_RA_PACING_POLARITY: NORMAL
MDC_IDC_SET_LEADCHNL_RA_PACING_PULSEWIDTH: 0.4 MS
MDC_IDC_SET_LEADCHNL_RA_SENSING_ADAPTATION_MODE: NORMAL
MDC_IDC_SET_LEADCHNL_RA_SENSING_POLARITY: NORMAL
MDC_IDC_SET_LEADCHNL_RA_SENSING_SENSITIVITY: 0.25 MV
MDC_IDC_SET_LEADCHNL_RV_PACING_AMPLITUDE: 2 V
MDC_IDC_SET_LEADCHNL_RV_PACING_CAPTURE_MODE: NORMAL
MDC_IDC_SET_LEADCHNL_RV_PACING_POLARITY: NORMAL
MDC_IDC_SET_LEADCHNL_RV_PACING_PULSEWIDTH: 0.4 MS
MDC_IDC_SET_LEADCHNL_RV_SENSING_ADAPTATION_MODE: NORMAL
MDC_IDC_SET_LEADCHNL_RV_SENSING_POLARITY: NORMAL
MDC_IDC_SET_LEADCHNL_RV_SENSING_SENSITIVITY: 0.6 MV
MDC_IDC_SET_ZONE_DETECTION_INTERVAL: 250 MS
MDC_IDC_SET_ZONE_DETECTION_INTERVAL: 300 MS
MDC_IDC_SET_ZONE_DETECTION_INTERVAL: 353 MS
MDC_IDC_SET_ZONE_TYPE: NORMAL
MDC_IDC_SET_ZONE_VENDOR_TYPE: NORMAL
MDC_IDC_STAT_AT_BURDEN_PERCENT: 1 %
MDC_IDC_STAT_AT_DTM_END: NORMAL
MDC_IDC_STAT_AT_DTM_START: NORMAL
MDC_IDC_STAT_BRADY_DTM_END: NORMAL
MDC_IDC_STAT_BRADY_DTM_START: NORMAL
MDC_IDC_STAT_BRADY_RA_PERCENT_PACED: 9 %
MDC_IDC_STAT_BRADY_RV_PERCENT_PACED: 0 %
MDC_IDC_STAT_EPISODE_RECENT_COUNT: 0
MDC_IDC_STAT_EPISODE_RECENT_COUNT: 1
MDC_IDC_STAT_EPISODE_RECENT_COUNT: 1
MDC_IDC_STAT_EPISODE_RECENT_COUNT_DTM_END: NORMAL
MDC_IDC_STAT_EPISODE_RECENT_COUNT_DTM_START: NORMAL
MDC_IDC_STAT_EPISODE_TYPE: NORMAL
MDC_IDC_STAT_EPISODE_VENDOR_TYPE: NORMAL
MDC_IDC_STAT_TACHYTHERAPY_ATP_DELIVERED_RECENT: 0
MDC_IDC_STAT_TACHYTHERAPY_ATP_DELIVERED_TOTAL: 0
MDC_IDC_STAT_TACHYTHERAPY_RECENT_DTM_END: NORMAL
MDC_IDC_STAT_TACHYTHERAPY_RECENT_DTM_START: NORMAL
MDC_IDC_STAT_TACHYTHERAPY_SHOCKS_ABORTED_RECENT: 0
MDC_IDC_STAT_TACHYTHERAPY_SHOCKS_ABORTED_TOTAL: 0
MDC_IDC_STAT_TACHYTHERAPY_SHOCKS_DELIVERED_RECENT: 0
MDC_IDC_STAT_TACHYTHERAPY_SHOCKS_DELIVERED_TOTAL: 0
MDC_IDC_STAT_TACHYTHERAPY_TOTAL_DTM_END: NORMAL
MDC_IDC_STAT_TACHYTHERAPY_TOTAL_DTM_START: NORMAL

## 2021-04-21 ENCOUNTER — ANCILLARY PROCEDURE (OUTPATIENT)
Dept: BONE DENSITY | Facility: CLINIC | Age: 76
End: 2021-04-21
Attending: INTERNAL MEDICINE
Payer: COMMERCIAL

## 2021-04-21 DIAGNOSIS — Z78.0 ASYMPTOMATIC POSTMENOPAUSAL STATUS: ICD-10-CM

## 2021-04-21 PROCEDURE — 77085 DXA BONE DENSITY AXL VRT FX: CPT | Performed by: INTERNAL MEDICINE

## 2021-05-19 ENCOUNTER — OFFICE VISIT (OUTPATIENT)
Dept: CARDIOLOGY | Facility: CLINIC | Age: 76
End: 2021-05-19
Attending: PHYSICIAN ASSISTANT
Payer: COMMERCIAL

## 2021-05-19 VITALS
SYSTOLIC BLOOD PRESSURE: 122 MMHG | HEART RATE: 68 BPM | BODY MASS INDEX: 21.16 KG/M2 | WEIGHT: 129.1 LBS | DIASTOLIC BLOOD PRESSURE: 74 MMHG

## 2021-05-19 DIAGNOSIS — I48.0 PAF (PAROXYSMAL ATRIAL FIBRILLATION) (H): ICD-10-CM

## 2021-05-19 DIAGNOSIS — Z98.890 S/P TVR (TRICUSPID VALVE REPAIR): ICD-10-CM

## 2021-05-19 DIAGNOSIS — Z95.2 S/P MVR (MITRAL VALVE REPLACEMENT): ICD-10-CM

## 2021-05-19 DIAGNOSIS — I50.22 CHRONIC SYSTOLIC HEART FAILURE (H): Primary | ICD-10-CM

## 2021-05-19 DIAGNOSIS — Z95.810 ICD (IMPLANTABLE CARDIOVERTER-DEFIBRILLATOR) IN PLACE: ICD-10-CM

## 2021-05-19 PROCEDURE — 99215 OFFICE O/P EST HI 40 MIN: CPT | Performed by: PHYSICIAN ASSISTANT

## 2021-05-19 RX ORDER — AMOXICILLIN 500 MG/1
2000 TABLET, FILM COATED ORAL PRN
Qty: 4 TABLET | Refills: 4 | Status: SHIPPED | OUTPATIENT
Start: 2021-05-19 | End: 2022-04-08

## 2021-05-19 NOTE — PATIENT INSTRUCTIONS
Thank you for your [facility] visit today. Your provider has recommended the following:  Medication Changes:  No changes. I sent in refills of the antibiotic for when you need.  Recommendations:  Repeat echo in the next month or so  Follow-up:  See Dr Stone for cardiology follow up in 6 months.    Reminder:  Please bring in your current medication list or your medication, over the counter supplements and vitamin bottles as we will review these at each office visit.

## 2021-05-19 NOTE — LETTER
5/19/2021    Anyi Olmos MD  [address] [facility] [address]  [facility] [address]    RE: Taty Aguila       Dear Colleague,    I had the pleasure of seeing Taty Aguila in the [facility].    Service Date: 5/19/2021       HISTORY OF PRESENT ILLNESS:  Taty is a pleasant 76-year-old female who presents to the office today for a 6-month followup.      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 She also had tricuspid regurgitation. In 10/2018, she was found to be in atrial fibrillation with RVR.  She did convert back to sinus rhythm; however, about 2 months later, she had recurrent atrial fibrillation.  At that time, she underwent a repeat cardiac evaluation which confirmed severe mitral regurgitation along with 3+ tricuspid valve regurgitation.  Ultimately, in 02/2019, she underwent successful minimally invasive mitral valve replacement with a 31 mm Epic St. Ron valve and a tricuspid valve repair with a 32 mm Oquendo ring.  She did not require coronary artery bypass grafting.  Following her procedure, she developed complete heart block and underwent permanent pacemaker implantation.      Unfortunately, shortly after her surgery, she developed significant volume overload.  She was found to be in atrial fibrillation and also underwent repeat echocardiography which showed an EF in the 20%-25% range.  Sinus rhythm was restored.  She was started on appropriate heart failure medical therapy and this was up titrated.  Spring 2020, we did repeat an echocardiogram.  Her EF visually improved to approximately 35% (40%- 45% by biplane).  Her valve function was stable.  No changes were made at that time.        A subsequent device check showed some short runs of nonsustained VT.  Dr. Stone was notified and recommended an EP evaluation.  She saw Dr. Tloliver, who recommended an  upgrade in her device to an ICD.  This was performed in August 2020.      Overall, she has been doing well from a cardiac standpoint.  She continues to be able to walk 2-5 miles a day without any significant dyspnea on exertion.  She denies any significant lower extremity edema, no orthopnea or PND.  No significant lightheadedness or dizziness.  Her weight is up a few pounds, but this was a gradual change seeming to be body weight not fluid.  Request SBE ABX refills.      CURRENT CARDIAC MEDICATIONS:   1.  Aspirin 81 mg daily.   2.  Losartan 100 mg daily.   3.  Toprol- mg daily.   4.  Potassium chloride 20 mEq daily.   5.  Xarelto 20 mg daily.   6.  Torsemide 10 mg daily.   7.  Amoxicillin 500mg 4 tabs PRN 30-60 prior to dental work      PHYSICAL EXAMINATION:   GENERAL:  Taty is a pleasant 76-year-old female who appears her stated age.  She is in no apparent distress.   VITAL SIGNS:  /74   Pulse 68   Wt 58.6 kg (129 lb 1.6 oz)   LMP  (LMP Unknown)   BMI 21.16 kg/m     PULMONARY:  Breathing is nonlabored.  Lungs are clear to auscultation.   CARDIAC:  Reveals a regular rate and rhythm.  I do not appreciate any significant murmur.   EXTREMITIES:  No lower extremity edema.   NEUROLOGIC:  Alert and oriented.      DATA:  Last Comprehensive Metabolic Panel:  Sodium   Date Value Ref Range Status   03/22/2021 132 (L) 133 - 144 mmol/L Final     Potassium   Date Value Ref Range Status   03/22/2021 4.1 3.4 - 5.3 mmol/L Final     Chloride   Date Value Ref Range Status   03/22/2021 100 94 - 109 mmol/L Final     Carbon Dioxide   Date Value Ref Range Status   03/22/2021 29 20 - 32 mmol/L Final     Anion Gap   Date Value Ref Range Status   03/22/2021 3 3 - 14 mmol/L Final     Glucose   Date Value Ref Range Status   03/22/2021 96 70 - 99 mg/dL Final     Comment:     Non Fasting     Urea Nitrogen   Date Value Ref Range Status   03/22/2021 13 7 - 30 mg/dL Final     Creatinine   Date Value Ref Range Status   03/22/2021  0.84 0.52 - 1.04 mg/dL Final     GFR Estimate   Date Value Ref Range Status   03/22/2021 68 >60 mL/min/[1.73_m2] Final     Comment:     Non  GFR Calc  Starting 12/18/2018, serum creatinine based estimated GFR (eGFR) will be   calculated using the Chronic Kidney Disease Epidemiology Collaboration   (CKD-EPI) equation.       Calcium   Date Value Ref Range Status   03/22/2021 9.1 8.5 - 10.1 mg/dL Final     Device checks: Normal function. No afib or NSVT. Occ short runs of PAT/SVT.      ASSESSMENT AND PLAN:  Taty is a pleasant 76-year-old female with a history of severe mitral regurgitation secondary to mitral valve prolapse and mod-severe tricuspid regurgitation who is status post mitral valve replacement as well as tricuspid valve repair in 02/2019.  Postoperatively, she developed complete heart block and underwent permanent pacemaker implantation.  Unfortunately, she developed a cardiomyopathy/heart failure after surgery with an EF initially in the 20%-25% range.  With appropriate medical therapy, her EF did improve to approximately 35%.  Despite this, she was noted to have some runs of nonsustained VT on a device check and ultimately underwent an upgrade to an ICD in 08/2020.       1. S/P minimally invasive mitral valve replacement with a 31 mm Epic St. Ron valve and a tricuspid valve repair with a 32 mm Oquendo ring.  -Will order a repeat echo for annual follow up.     2. HFrEF 2/2 NICM following MV repair. LVEF ~35%. No overt HF symptoms, appear euvolemic.  -Continue ASA, losartan, Toprol XL and torsemide/potassium    3. Complete HB following MV repair with PPM placement which was upgraded to an ICD in 8/2020 when she was noted to have NSVT on a device check.   -Continue to follow in the device clinic.    4. Hx of PAF prior to valve surgery and shortly after, none noted in the last year on her device checks.  -Continues on Xarelto     Thank you for allowing us to participate in the care of this  pleasant patient. Follow up with Dr Stone in ~6 months.     40 minutes spent on the date of the encounter doing chart review, review of test results, patient visit and documentation      SANDRA AMADOR PA-C     cc:   Sandra Amador PA-C  [facility]  [address] [place] [address]

## 2021-05-19 NOTE — PROGRESS NOTES
Service Date: 5/19/2021       HISTORY OF PRESENT ILLNESS:  Taty is a pleasant 76-year-old female who presents to the office today for a 6-month followup.      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 She also had tricuspid regurgitation. In 10/2018, she was found to be in atrial fibrillation with RVR.  She did convert back to sinus rhythm; however, about 2 months later, she had recurrent atrial fibrillation.  At that time, she underwent a repeat cardiac evaluation which confirmed severe mitral regurgitation along with 3+ tricuspid valve regurgitation.  Ultimately, in 02/2019, she underwent successful minimally invasive mitral valve replacement with a 31 mm Epic St. Ron valve and a tricuspid valve repair with a 32 mm Oquendo ring.  She did not require coronary artery bypass grafting.  Following her procedure, she developed complete heart block and underwent permanent pacemaker implantation.      Unfortunately, shortly after her surgery, she developed significant volume overload.  She was found to be in atrial fibrillation and also underwent repeat echocardiography which showed an EF in the 20%-25% range.  Sinus rhythm was restored.  She was started on appropriate heart failure medical therapy and this was up titrated.  Spring 2020, we did repeat an echocardiogram.  Her EF visually improved to approximately 35% (40%- 45% by biplane).  Her valve function was stable.  No changes were made at that time.        A subsequent device check showed some short runs of nonsustained VT.  Dr. Stone was notified and recommended an EP evaluation.  She saw Dr. Tolliver, who recommended an upgrade in her device to an ICD.  This was performed in August 2020.      Overall, she has been doing well from a cardiac standpoint.  She continues to be able to walk 2-5 miles a day without any significant dyspnea on exertion.  She denies any  significant lower extremity edema, no orthopnea or PND.  No significant lightheadedness or dizziness.  Her weight is up a few pounds, but this was a gradual change seeming to be body weight not fluid.  Request SBE ABX refills.      CURRENT CARDIAC MEDICATIONS:   1.  Aspirin 81 mg daily.   2.  Losartan 100 mg daily.   3.  Toprol- mg daily.   4.  Potassium chloride 20 mEq daily.   5.  Xarelto 20 mg daily.   6.  Torsemide 10 mg daily.   7.  Amoxicillin 500mg 4 tabs PRN 30-60 prior to dental work      PHYSICAL EXAMINATION:   GENERAL:  Taty is a pleasant 76-year-old female who appears her stated age.  She is in no apparent distress.   VITAL SIGNS:  /74   Pulse 68   Wt 58.6 kg (129 lb 1.6 oz)   LMP  (LMP Unknown)   BMI 21.16 kg/m     PULMONARY:  Breathing is nonlabored.  Lungs are clear to auscultation.   CARDIAC:  Reveals a regular rate and rhythm.  I do not appreciate any significant murmur.   EXTREMITIES:  No lower extremity edema.   NEUROLOGIC:  Alert and oriented.      DATA:  Last Comprehensive Metabolic Panel:  Sodium   Date Value Ref Range Status   03/22/2021 132 (L) 133 - 144 mmol/L Final     Potassium   Date Value Ref Range Status   03/22/2021 4.1 3.4 - 5.3 mmol/L Final     Chloride   Date Value Ref Range Status   03/22/2021 100 94 - 109 mmol/L Final     Carbon Dioxide   Date Value Ref Range Status   03/22/2021 29 20 - 32 mmol/L Final     Anion Gap   Date Value Ref Range Status   03/22/2021 3 3 - 14 mmol/L Final     Glucose   Date Value Ref Range Status   03/22/2021 96 70 - 99 mg/dL Final     Comment:     Non Fasting     Urea Nitrogen   Date Value Ref Range Status   03/22/2021 13 7 - 30 mg/dL Final     Creatinine   Date Value Ref Range Status   03/22/2021 0.84 0.52 - 1.04 mg/dL Final     GFR Estimate   Date Value Ref Range Status   03/22/2021 68 >60 mL/min/[1.73_m2] Final     Comment:     Non  GFR Calc  Starting 12/18/2018, serum creatinine based estimated GFR (eGFR) will be    calculated using the Chronic Kidney Disease Epidemiology Collaboration   (CKD-EPI) equation.       Calcium   Date Value Ref Range Status   03/22/2021 9.1 8.5 - 10.1 mg/dL Final     Device checks: Normal function. No afib or NSVT. Occ short runs of PAT/SVT.      ASSESSMENT AND PLAN:  Taty is a pleasant 76-year-old female with a history of severe mitral regurgitation secondary to mitral valve prolapse and mod-severe tricuspid regurgitation who is status post mitral valve replacement as well as tricuspid valve repair in 02/2019.  Postoperatively, she developed complete heart block and underwent permanent pacemaker implantation.  Unfortunately, she developed a cardiomyopathy/heart failure after surgery with an EF initially in the 20%-25% range.  With appropriate medical therapy, her EF did improve to approximately 35%.  Despite this, she was noted to have some runs of nonsustained VT on a device check and ultimately underwent an upgrade to an ICD in 08/2020.       1. S/P minimally invasive mitral valve replacement with a 31 mm Epic St. Ron valve and a tricuspid valve repair with a 32 mm Oquendo ring.  -Will order a repeat echo for annual follow up.     2. HFrEF 2/2 NICM following MV repair. LVEF ~35%. No overt HF symptoms, appear euvolemic.  -Continue ASA, losartan, Toprol XL and torsemide/potassium    3. Complete HB following MV repair with PPM placement which was upgraded to an ICD in 8/2020 when she was noted to have NSVT on a device check.   -Continue to follow in the device clinic.    4. Hx of PAF prior to valve surgery and shortly after, none noted in the last year on her device checks.  -Continues on Xarelto     Thank you for allowing us to participate in the care of this pleasant patient. Follow up with Dr Stone in ~6 months.     40 minutes spent on the date of the encounter doing chart review, review of test results, patient visit and documentation      SANDRA AMADOR PA-C

## 2021-06-03 ENCOUNTER — HOSPITAL ENCOUNTER (OUTPATIENT)
Dept: CARDIOLOGY | Facility: CLINIC | Age: 76
Discharge: HOME | End: 2021-06-03
Attending: PHYSICIAN ASSISTANT | Admitting: PHYSICIAN ASSISTANT
Payer: COMMERCIAL

## 2021-06-03 DIAGNOSIS — I50.22 CHRONIC SYSTOLIC HEART FAILURE (H): ICD-10-CM

## 2021-06-03 DIAGNOSIS — Z98.890 S/P TVR (TRICUSPID VALVE REPAIR): ICD-10-CM

## 2021-06-03 DIAGNOSIS — Z95.2 S/P MVR (MITRAL VALVE REPLACEMENT): ICD-10-CM

## 2021-06-03 PROCEDURE — 93306 TTE W/DOPPLER COMPLETE: CPT

## 2021-06-03 PROCEDURE — 93306 TTE W/DOPPLER COMPLETE: CPT | Mod: 26 | Performed by: INTERNAL MEDICINE

## 2021-06-08 ENCOUNTER — OFFICE VISIT (OUTPATIENT)
Dept: INTERNAL MEDICINE | Facility: CLINIC | Age: 76
End: 2021-06-08
Payer: COMMERCIAL

## 2021-06-08 VITALS
WEIGHT: 129.4 LBS | HEIGHT: 66 IN | RESPIRATION RATE: 16 BRPM | OXYGEN SATURATION: 98 % | HEART RATE: 78 BPM | DIASTOLIC BLOOD PRESSURE: 76 MMHG | TEMPERATURE: 98 F | SYSTOLIC BLOOD PRESSURE: 113 MMHG | BODY MASS INDEX: 20.79 KG/M2

## 2021-06-08 DIAGNOSIS — M85.80 OSTEOPENIA, UNSPECIFIED LOCATION: Primary | ICD-10-CM

## 2021-06-08 PROCEDURE — 99214 OFFICE O/P EST MOD 30 MIN: CPT | Performed by: INTERNAL MEDICINE

## 2021-06-08 RX ORDER — ALENDRONATE SODIUM 70 MG/1
70 TABLET ORAL
Qty: 30 TABLET | Refills: 1 | Status: SHIPPED | OUTPATIENT
Start: 2021-06-08 | End: 2021-08-16 | Stop reason: SINTOL

## 2021-06-08 ASSESSMENT — MIFFLIN-ST. JEOR: SCORE: 1095.76

## 2021-06-08 NOTE — NURSING NOTE
"Consult on bone density.  Vital signs:  Temp: 98  F (36.7  C) Temp src: Oral BP: 113/76 Pulse: 78   Resp: 16 SpO2: 98 %     Height: 166.4 cm (5' 5.5\") Weight: 58.7 kg (129 lb 6.4 oz)  Estimated body mass index is 21.21 kg/m  as calculated from the following:    Height as of this encounter: 1.664 m (5' 5.5\").    Weight as of this encounter: 58.7 kg (129 lb 6.4 oz).          "

## 2021-06-08 NOTE — PROGRESS NOTES
Assessment & Plan     Osteopenia, unspecified location  Discussed results, causes of osteoporosis, fractures, treatments. She agrees to try fosamax. Recheck 2 years.   - alendronate (FOSAMAX) 70 MG tablet; Take 1 tablet (70 mg) by mouth every 7 days      30 minutes spent on the date of the encounter doing chart review, history and exam, documentation and further activities per the note           Return in about 9 months (around 3/23/2022) for Wellness visit.    Anyi Olmos MD  [facility] is a 76 year old who presents for the following health issues     HPI     Follow up of abnormal DEXA. She is not postmenopausal.   Other risks :parent with hip fracture .    Other concerns:   none    Patient Active Problem List   Diagnosis     Allergic rhinitis     Thoracic aortic ectasia (H)     History of tricuspid valve repair-32 mm Oquendo Ring     Complete AV block (H)     S/P MVR (mitral valve replacement)-St junaid epic tissue 31 mm     Cardiomyopathy, unspecified type (H)     Acquired hypothyroidism     Gastroesophageal reflux disease without esophagitis     Non-sustained ventricular tachycardia (H)      Current Outpatient Medications   Medication Sig Dispense Refill     alendronate (FOSAMAX) 70 MG tablet Take 1 tablet (70 mg) by mouth every 7 days 30 tablet 1     amoxicillin (AMOXIL) 500 MG tablet Take 4 tablets (2,000 mg) by mouth as needed (30-60 mins prior to procedure/dental work) 4 tablet 4     aspirin 81 MG EC tablet Take 81 mg by mouth every evening       calcium carbonate-vitamin D (CALCIUM 600+D) 600-200 MG-UNIT TABS Take 1 tablet by mouth 2 times daily       Cetirizine HCl (ZYRTEC PO) Take 10 mg by mouth daily        levothyroxine (SYNTHROID/LEVOTHROID) 50 MCG tablet Take 1 tablet (50 mcg) by mouth daily 90 tablet 3     losartan (COZAAR) 100 MG tablet Take 1 tablet (100 mg) by mouth daily 90 tablet 3     metoprolol succinate ER (TOPROL-XL) 100 MG 24 hr tablet Take  "1 tablet (100 mg) by mouth daily 90 tablet 3     Multiple Vitamins-Minerals (MULTIVITAMIN ADULT) TABS Take 1 tablet by mouth daily       omeprazole (PRILOSEC) 10 MG DR capsule Take 1 capsule (10 mg) by mouth every morning (before breakfast) 90 capsule 3     potassium chloride ER (KLOR-CON M) 20 MEQ CR tablet Take 1 tablet (20 mEq) by mouth daily 90 tablet 3     rivaroxaban ANTICOAGULANT (XARELTO) 20 MG TABS tablet Take 1 tablet (20 mg) by mouth daily (with dinner) 90 tablet 3     torsemide (DEMADEX) 10 MG tablet Take 1 tablet (10 mg) by mouth daily 90 tablet 3      Social History     Tobacco Use     Smoking status: Never Smoker     Smokeless tobacco: Never Used   Substance Use Topics     Alcohol use: No     Drug use: No       ROS:   negative    Objective:  Patient alert in NAD  /76   Pulse 78   Temp 98  F (36.7  C) (Oral)   Resp 16   Ht 1.664 m (5' 5.5\")   Wt 58.7 kg (129 lb 6.4 oz)   LMP  (LMP Unknown)   SpO2 98%   BMI 21.21 kg/m        Not examined.    T-score -2.1  Total fracture risk: 21%  Hip fracture risk: 12%           "

## 2021-06-08 NOTE — PATIENT INSTRUCTIONS
Take one tablet once a week with only water. Wait at least 30 minutes before you eat or drink anything else.

## 2021-07-22 ENCOUNTER — ANCILLARY PROCEDURE (OUTPATIENT)
Dept: CARDIOLOGY | Facility: CLINIC | Age: 76
End: 2021-07-22
Attending: INTERNAL MEDICINE
Payer: COMMERCIAL

## 2021-07-22 DIAGNOSIS — Z95.810 ICD (IMPLANTABLE CARDIOVERTER-DEFIBRILLATOR) IN PLACE: ICD-10-CM

## 2021-07-22 DIAGNOSIS — I42.9 CARDIOMYOPATHY, UNSPECIFIED TYPE (H): ICD-10-CM

## 2021-07-22 PROCEDURE — 93295 DEV INTERROG REMOTE 1/2/MLT: CPT | Performed by: INTERNAL MEDICINE

## 2021-07-22 PROCEDURE — 93296 REM INTERROG EVL PM/IDS: CPT | Performed by: INTERNAL MEDICINE

## 2021-07-25 LAB
MDC_IDC_EPISODE_DTM: NORMAL
MDC_IDC_EPISODE_ID: NORMAL
MDC_IDC_EPISODE_TYPE: NORMAL
MDC_IDC_LEAD_IMPLANT_DT: NORMAL
MDC_IDC_LEAD_IMPLANT_DT: NORMAL
MDC_IDC_LEAD_LOCATION: NORMAL
MDC_IDC_LEAD_LOCATION: NORMAL
MDC_IDC_LEAD_LOCATION_DETAIL_1: NORMAL
MDC_IDC_LEAD_LOCATION_DETAIL_1: NORMAL
MDC_IDC_LEAD_MFG: NORMAL
MDC_IDC_LEAD_MFG: NORMAL
MDC_IDC_LEAD_MODEL: NORMAL
MDC_IDC_LEAD_MODEL: NORMAL
MDC_IDC_LEAD_POLARITY_TYPE: NORMAL
MDC_IDC_LEAD_POLARITY_TYPE: NORMAL
MDC_IDC_LEAD_SERIAL: NORMAL
MDC_IDC_LEAD_SERIAL: NORMAL
MDC_IDC_MSMT_BATTERY_DTM: NORMAL
MDC_IDC_MSMT_BATTERY_REMAINING_LONGEVITY: 156 MO
MDC_IDC_MSMT_BATTERY_REMAINING_PERCENTAGE: 100 %
MDC_IDC_MSMT_BATTERY_STATUS: NORMAL
MDC_IDC_MSMT_CAP_CHARGE_DTM: NORMAL
MDC_IDC_MSMT_CAP_CHARGE_TIME: 9.2 S
MDC_IDC_MSMT_CAP_CHARGE_TYPE: NORMAL
MDC_IDC_MSMT_LEADCHNL_RA_IMPEDANCE_VALUE: 648 OHM
MDC_IDC_MSMT_LEADCHNL_RA_PACING_THRESHOLD_AMPLITUDE: 0.6 V
MDC_IDC_MSMT_LEADCHNL_RA_PACING_THRESHOLD_PULSEWIDTH: 0.4 MS
MDC_IDC_MSMT_LEADCHNL_RV_IMPEDANCE_VALUE: 421 OHM
MDC_IDC_MSMT_LEADCHNL_RV_PACING_THRESHOLD_AMPLITUDE: 0.9 V
MDC_IDC_MSMT_LEADCHNL_RV_PACING_THRESHOLD_PULSEWIDTH: 0.4 MS
MDC_IDC_PG_IMPLANT_DTM: NORMAL
MDC_IDC_PG_MFG: NORMAL
MDC_IDC_PG_MODEL: NORMAL
MDC_IDC_PG_SERIAL: NORMAL
MDC_IDC_PG_TYPE: NORMAL
MDC_IDC_SESS_CLINIC_NAME: NORMAL
MDC_IDC_SESS_DTM: NORMAL
MDC_IDC_SESS_TYPE: NORMAL
MDC_IDC_SET_BRADY_AT_MODE_SWITCH_MODE: NORMAL
MDC_IDC_SET_BRADY_AT_MODE_SWITCH_RATE: 170 {BEATS}/MIN
MDC_IDC_SET_BRADY_LOWRATE: 60 {BEATS}/MIN
MDC_IDC_SET_BRADY_MAX_SENSOR_RATE: 130 {BEATS}/MIN
MDC_IDC_SET_BRADY_MAX_TRACKING_RATE: 130 {BEATS}/MIN
MDC_IDC_SET_BRADY_MODE: NORMAL
MDC_IDC_SET_BRADY_PAV_DELAY_HIGH: 150 MS
MDC_IDC_SET_BRADY_PAV_DELAY_LOW: 200 MS
MDC_IDC_SET_BRADY_SAV_DELAY_HIGH: 150 MS
MDC_IDC_SET_BRADY_SAV_DELAY_LOW: 200 MS
MDC_IDC_SET_LEADCHNL_RA_PACING_AMPLITUDE: 2 V
MDC_IDC_SET_LEADCHNL_RA_PACING_CAPTURE_MODE: NORMAL
MDC_IDC_SET_LEADCHNL_RA_PACING_POLARITY: NORMAL
MDC_IDC_SET_LEADCHNL_RA_PACING_PULSEWIDTH: 0.4 MS
MDC_IDC_SET_LEADCHNL_RA_SENSING_ADAPTATION_MODE: NORMAL
MDC_IDC_SET_LEADCHNL_RA_SENSING_POLARITY: NORMAL
MDC_IDC_SET_LEADCHNL_RA_SENSING_SENSITIVITY: 0.25 MV
MDC_IDC_SET_LEADCHNL_RV_PACING_AMPLITUDE: 2 V
MDC_IDC_SET_LEADCHNL_RV_PACING_CAPTURE_MODE: NORMAL
MDC_IDC_SET_LEADCHNL_RV_PACING_POLARITY: NORMAL
MDC_IDC_SET_LEADCHNL_RV_PACING_PULSEWIDTH: 0.4 MS
MDC_IDC_SET_LEADCHNL_RV_SENSING_ADAPTATION_MODE: NORMAL
MDC_IDC_SET_LEADCHNL_RV_SENSING_POLARITY: NORMAL
MDC_IDC_SET_LEADCHNL_RV_SENSING_SENSITIVITY: 0.6 MV
MDC_IDC_SET_ZONE_DETECTION_INTERVAL: 250 MS
MDC_IDC_SET_ZONE_DETECTION_INTERVAL: 300 MS
MDC_IDC_SET_ZONE_DETECTION_INTERVAL: 353 MS
MDC_IDC_SET_ZONE_TYPE: NORMAL
MDC_IDC_SET_ZONE_VENDOR_TYPE: NORMAL
MDC_IDC_STAT_AT_BURDEN_PERCENT: 1 %
MDC_IDC_STAT_AT_DTM_END: NORMAL
MDC_IDC_STAT_AT_DTM_START: NORMAL
MDC_IDC_STAT_BRADY_DTM_END: NORMAL
MDC_IDC_STAT_BRADY_DTM_START: NORMAL
MDC_IDC_STAT_BRADY_RA_PERCENT_PACED: 10 %
MDC_IDC_STAT_BRADY_RV_PERCENT_PACED: 0 %
MDC_IDC_STAT_EPISODE_RECENT_COUNT: 0
MDC_IDC_STAT_EPISODE_RECENT_COUNT: 1
MDC_IDC_STAT_EPISODE_RECENT_COUNT: 1
MDC_IDC_STAT_EPISODE_RECENT_COUNT_DTM_END: NORMAL
MDC_IDC_STAT_EPISODE_RECENT_COUNT_DTM_START: NORMAL
MDC_IDC_STAT_EPISODE_TYPE: NORMAL
MDC_IDC_STAT_EPISODE_VENDOR_TYPE: NORMAL
MDC_IDC_STAT_TACHYTHERAPY_ATP_DELIVERED_RECENT: 0
MDC_IDC_STAT_TACHYTHERAPY_ATP_DELIVERED_TOTAL: 0
MDC_IDC_STAT_TACHYTHERAPY_RECENT_DTM_END: NORMAL
MDC_IDC_STAT_TACHYTHERAPY_RECENT_DTM_START: NORMAL
MDC_IDC_STAT_TACHYTHERAPY_SHOCKS_ABORTED_RECENT: 0
MDC_IDC_STAT_TACHYTHERAPY_SHOCKS_ABORTED_TOTAL: 0
MDC_IDC_STAT_TACHYTHERAPY_SHOCKS_DELIVERED_RECENT: 0
MDC_IDC_STAT_TACHYTHERAPY_SHOCKS_DELIVERED_TOTAL: 0
MDC_IDC_STAT_TACHYTHERAPY_TOTAL_DTM_END: NORMAL
MDC_IDC_STAT_TACHYTHERAPY_TOTAL_DTM_START: NORMAL

## 2021-08-16 ENCOUNTER — MYC MEDICAL ADVICE (OUTPATIENT)
Dept: INTERNAL MEDICINE | Facility: CLINIC | Age: 76
End: 2021-08-16

## 2021-08-16 DIAGNOSIS — M85.80 OSTEOPENIA, UNSPECIFIED LOCATION: Primary | ICD-10-CM

## 2021-08-16 RX ORDER — IBANDRONATE SODIUM 150 MG/1
150 TABLET, FILM COATED ORAL
Qty: 3 TABLET | Refills: 3 | Status: SHIPPED | OUTPATIENT
Start: 2021-08-16 | End: 2022-08-22

## 2021-08-31 DIAGNOSIS — I48.0 PAROXYSMAL ATRIAL FIBRILLATION (H): ICD-10-CM

## 2021-08-31 RX ORDER — OMEPRAZOLE 10 MG/1
10 CAPSULE, DELAYED RELEASE ORAL
Qty: 90 CAPSULE | Refills: 0 | Status: SHIPPED | OUTPATIENT
Start: 2021-08-31 | End: 2021-11-18

## 2021-08-31 NOTE — TELEPHONE ENCOUNTER
Received refill request for:  Omeprazole   Last OV was: 5/19/21 with Marifer Mohamud PA-C  F/U scheduled: orders in Epic for 6 month follow up  New script sent to: [facility]    Reviewed with Marifer MINAYA. Ok to send script this time, further refills to be deferred to PMD. CHADD Felix

## 2021-09-19 ENCOUNTER — HEALTH MAINTENANCE LETTER (OUTPATIENT)
Age: 76
End: 2021-09-19

## 2021-10-27 ENCOUNTER — ANCILLARY PROCEDURE (OUTPATIENT)
Dept: CARDIOLOGY | Facility: CLINIC | Age: 76
End: 2021-10-27
Attending: INTERNAL MEDICINE
Payer: COMMERCIAL

## 2021-10-27 ENCOUNTER — MYC MEDICAL ADVICE (OUTPATIENT)
Dept: CARDIOLOGY | Facility: CLINIC | Age: 76
End: 2021-10-27

## 2021-10-27 DIAGNOSIS — I42.9 CARDIOMYOPATHY, UNSPECIFIED TYPE (H): ICD-10-CM

## 2021-10-27 DIAGNOSIS — Z95.810 ICD (IMPLANTABLE CARDIOVERTER-DEFIBRILLATOR) IN PLACE: ICD-10-CM

## 2021-10-27 PROCEDURE — 93283 PRGRMG EVAL IMPLANTABLE DFB: CPT | Performed by: INTERNAL MEDICINE

## 2021-11-01 LAB
MDC_IDC_LEAD_IMPLANT_DT: NORMAL
MDC_IDC_LEAD_IMPLANT_DT: NORMAL
MDC_IDC_LEAD_LOCATION: NORMAL
MDC_IDC_LEAD_LOCATION: NORMAL
MDC_IDC_LEAD_LOCATION_DETAIL_1: NORMAL
MDC_IDC_LEAD_LOCATION_DETAIL_1: NORMAL
MDC_IDC_LEAD_MFG: NORMAL
MDC_IDC_LEAD_MFG: NORMAL
MDC_IDC_LEAD_MODEL: NORMAL
MDC_IDC_LEAD_MODEL: NORMAL
MDC_IDC_LEAD_POLARITY_TYPE: NORMAL
MDC_IDC_LEAD_POLARITY_TYPE: NORMAL
MDC_IDC_LEAD_SERIAL: NORMAL
MDC_IDC_LEAD_SERIAL: NORMAL
MDC_IDC_MSMT_BATTERY_DTM: NORMAL
MDC_IDC_MSMT_BATTERY_REMAINING_LONGEVITY: 150 MO
MDC_IDC_MSMT_BATTERY_REMAINING_PERCENTAGE: 100 %
MDC_IDC_MSMT_BATTERY_STATUS: NORMAL
MDC_IDC_MSMT_CAP_CHARGE_DTM: NORMAL
MDC_IDC_MSMT_CAP_CHARGE_TIME: 9.2 S
MDC_IDC_MSMT_CAP_CHARGE_TYPE: NORMAL
MDC_IDC_MSMT_LEADCHNL_RA_IMPEDANCE_VALUE: 686 OHM
MDC_IDC_MSMT_LEADCHNL_RA_PACING_THRESHOLD_AMPLITUDE: 0.8 V
MDC_IDC_MSMT_LEADCHNL_RA_PACING_THRESHOLD_PULSEWIDTH: 0.4 MS
MDC_IDC_MSMT_LEADCHNL_RV_IMPEDANCE_VALUE: 449 OHM
MDC_IDC_MSMT_LEADCHNL_RV_LEAD_CHANNEL_STATUS: NORMAL
MDC_IDC_MSMT_LEADCHNL_RV_PACING_THRESHOLD_AMPLITUDE: 0.8 V
MDC_IDC_MSMT_LEADCHNL_RV_PACING_THRESHOLD_PULSEWIDTH: 0.4 MS
MDC_IDC_PG_IMPLANT_DTM: NORMAL
MDC_IDC_PG_MFG: NORMAL
MDC_IDC_PG_MODEL: NORMAL
MDC_IDC_PG_SERIAL: NORMAL
MDC_IDC_PG_TYPE: NORMAL
MDC_IDC_SESS_CLINIC_NAME: NORMAL
MDC_IDC_SESS_DTM: NORMAL
MDC_IDC_SESS_TYPE: NORMAL
MDC_IDC_SET_BRADY_AT_MODE_SWITCH_MODE: NORMAL
MDC_IDC_SET_BRADY_AT_MODE_SWITCH_RATE: 170 {BEATS}/MIN
MDC_IDC_SET_BRADY_LOWRATE: 60 {BEATS}/MIN
MDC_IDC_SET_BRADY_MAX_SENSOR_RATE: 130 {BEATS}/MIN
MDC_IDC_SET_BRADY_MAX_TRACKING_RATE: 130 {BEATS}/MIN
MDC_IDC_SET_BRADY_MODE: NORMAL
MDC_IDC_SET_BRADY_PAV_DELAY_HIGH: 150 MS
MDC_IDC_SET_BRADY_PAV_DELAY_LOW: 200 MS
MDC_IDC_SET_BRADY_SAV_DELAY_HIGH: 150 MS
MDC_IDC_SET_BRADY_SAV_DELAY_LOW: 200 MS
MDC_IDC_SET_LEADCHNL_RA_PACING_AMPLITUDE: 2 V
MDC_IDC_SET_LEADCHNL_RA_PACING_CAPTURE_MODE: NORMAL
MDC_IDC_SET_LEADCHNL_RA_PACING_POLARITY: NORMAL
MDC_IDC_SET_LEADCHNL_RA_PACING_PULSEWIDTH: 0.4 MS
MDC_IDC_SET_LEADCHNL_RA_SENSING_ADAPTATION_MODE: NORMAL
MDC_IDC_SET_LEADCHNL_RA_SENSING_POLARITY: NORMAL
MDC_IDC_SET_LEADCHNL_RA_SENSING_SENSITIVITY: 0.25 MV
MDC_IDC_SET_LEADCHNL_RV_PACING_AMPLITUDE: 2 V
MDC_IDC_SET_LEADCHNL_RV_PACING_CAPTURE_MODE: NORMAL
MDC_IDC_SET_LEADCHNL_RV_PACING_POLARITY: NORMAL
MDC_IDC_SET_LEADCHNL_RV_PACING_PULSEWIDTH: 0.4 MS
MDC_IDC_SET_LEADCHNL_RV_SENSING_ADAPTATION_MODE: NORMAL
MDC_IDC_SET_LEADCHNL_RV_SENSING_POLARITY: NORMAL
MDC_IDC_SET_LEADCHNL_RV_SENSING_SENSITIVITY: 0.6 MV
MDC_IDC_SET_ZONE_DETECTION_INTERVAL: 250 MS
MDC_IDC_SET_ZONE_DETECTION_INTERVAL: 300 MS
MDC_IDC_SET_ZONE_DETECTION_INTERVAL: 353 MS
MDC_IDC_SET_ZONE_TYPE: NORMAL
MDC_IDC_SET_ZONE_VENDOR_TYPE: NORMAL
MDC_IDC_STAT_AT_BURDEN_PERCENT: 1 %
MDC_IDC_STAT_AT_DTM_END: NORMAL
MDC_IDC_STAT_AT_DTM_START: NORMAL
MDC_IDC_STAT_BRADY_DTM_END: NORMAL
MDC_IDC_STAT_BRADY_DTM_START: NORMAL
MDC_IDC_STAT_BRADY_RA_PERCENT_PACED: 11 %
MDC_IDC_STAT_BRADY_RV_PERCENT_PACED: 0 %
MDC_IDC_STAT_EPISODE_RECENT_COUNT: 0
MDC_IDC_STAT_EPISODE_RECENT_COUNT: 1
MDC_IDC_STAT_EPISODE_RECENT_COUNT: 2
MDC_IDC_STAT_EPISODE_RECENT_COUNT_DTM_END: NORMAL
MDC_IDC_STAT_EPISODE_RECENT_COUNT_DTM_START: NORMAL
MDC_IDC_STAT_EPISODE_TYPE: NORMAL
MDC_IDC_STAT_EPISODE_VENDOR_TYPE: NORMAL
MDC_IDC_STAT_TACHYTHERAPY_ATP_DELIVERED_RECENT: 0
MDC_IDC_STAT_TACHYTHERAPY_ATP_DELIVERED_TOTAL: 0
MDC_IDC_STAT_TACHYTHERAPY_RECENT_DTM_END: NORMAL
MDC_IDC_STAT_TACHYTHERAPY_RECENT_DTM_START: NORMAL
MDC_IDC_STAT_TACHYTHERAPY_SHOCKS_ABORTED_RECENT: 0
MDC_IDC_STAT_TACHYTHERAPY_SHOCKS_ABORTED_TOTAL: 0
MDC_IDC_STAT_TACHYTHERAPY_SHOCKS_DELIVERED_RECENT: 0
MDC_IDC_STAT_TACHYTHERAPY_SHOCKS_DELIVERED_TOTAL: 0
MDC_IDC_STAT_TACHYTHERAPY_TOTAL_DTM_END: NORMAL
MDC_IDC_STAT_TACHYTHERAPY_TOTAL_DTM_START: NORMAL

## 2021-11-18 ENCOUNTER — OFFICE VISIT (OUTPATIENT)
Dept: CARDIOLOGY | Facility: CLINIC | Age: 76
End: 2021-11-18
Attending: PHYSICIAN ASSISTANT
Payer: COMMERCIAL

## 2021-11-18 VITALS
HEART RATE: 66 BPM | SYSTOLIC BLOOD PRESSURE: 137 MMHG | BODY MASS INDEX: 20.57 KG/M2 | WEIGHT: 128 LBS | HEIGHT: 66 IN | DIASTOLIC BLOOD PRESSURE: 81 MMHG | OXYGEN SATURATION: 100 %

## 2021-11-18 DIAGNOSIS — I48.0 PAROXYSMAL ATRIAL FIBRILLATION (H): ICD-10-CM

## 2021-11-18 DIAGNOSIS — I42.9 CARDIOMYOPATHY, UNSPECIFIED TYPE (H): Primary | ICD-10-CM

## 2021-11-18 DIAGNOSIS — I50.22 CHRONIC SYSTOLIC HEART FAILURE (H): ICD-10-CM

## 2021-11-18 DIAGNOSIS — Z95.0 CARDIAC PACEMAKER IN SITU: ICD-10-CM

## 2021-11-18 DIAGNOSIS — Z98.890 S/P TVR (TRICUSPID VALVE REPAIR): ICD-10-CM

## 2021-11-18 DIAGNOSIS — Z95.2 S/P MVR (MITRAL VALVE REPLACEMENT): ICD-10-CM

## 2021-11-18 PROCEDURE — 99214 OFFICE O/P EST MOD 30 MIN: CPT | Performed by: INTERNAL MEDICINE

## 2021-11-18 RX ORDER — LOSARTAN POTASSIUM 100 MG/1
100 TABLET ORAL DAILY
Qty: 90 TABLET | Refills: 3 | Status: SHIPPED | OUTPATIENT
Start: 2021-11-18 | End: 2022-12-06

## 2021-11-18 RX ORDER — TORSEMIDE 10 MG/1
10 TABLET ORAL DAILY
Qty: 90 TABLET | Refills: 3 | Status: SHIPPED | OUTPATIENT
Start: 2021-11-18 | End: 2022-06-07

## 2021-11-18 RX ORDER — OMEPRAZOLE 10 MG/1
10 CAPSULE, DELAYED RELEASE ORAL
Qty: 90 CAPSULE | Refills: 0 | Status: SHIPPED | OUTPATIENT
Start: 2021-11-18 | End: 2022-04-08

## 2021-11-18 RX ORDER — METOPROLOL SUCCINATE 100 MG/1
100 TABLET, EXTENDED RELEASE ORAL DAILY
Qty: 90 TABLET | Refills: 3 | Status: SHIPPED | OUTPATIENT
Start: 2021-11-18 | End: 2022-12-06

## 2021-11-18 RX ORDER — AMOXICILLIN 500 MG/1
2000 TABLET, FILM COATED ORAL
Qty: 4 TABLET | Refills: 4 | Status: CANCELLED | OUTPATIENT
Start: 2021-11-18

## 2021-11-18 RX ORDER — POTASSIUM CHLORIDE 1500 MG/1
20 TABLET, EXTENDED RELEASE ORAL DAILY
Qty: 90 TABLET | Refills: 3 | Status: SHIPPED | OUTPATIENT
Start: 2021-11-18 | End: 2022-06-07

## 2021-11-18 ASSESSMENT — MIFFLIN-ST. JEOR: SCORE: 1089.41

## 2021-11-18 NOTE — PROGRESS NOTES
HPI and Plan:     Taty Aguila is a delightful 75 y/o female with history of valvular heart disease,  Having undergone mitral and triscupid valve repair/replacement by Dr. Hanna in 2019.  Postoperatively severe decline in LVEf 20%.  No comes in for follow up after maximize of medical therapy and placement of ICD.     Currently she goes for walks without difficulty.  She has been afraid to go back to [employer] or participating in the [employer] because that is where she had her heart racing palpitation fast atrial fibrillation.  We reviewed and I looked at her echocardiogram and agree with interpretation of ejection fraction near normal with a 55 to 5050% ejection fraction.  She has been taking her medications.  We reviewed her prescription medications.  She does not need amoxicillin.  She is to continue otherwise cardiovascular on occasion including torsemide but I will have her see Antelmo in 6 months time down in [place] where if she is stable without any evidence of heart failure or edema fluid overload we can start tapering off the diuretic therapy.  Her device interrogation shows no significant arrhythmias.  Continue medical therapy.    Today's clinic visit entailed:  Review of the result(s) of each unique test - echocardiogram  Discussion of management or test interpretation with external physician/other qualified healthcare professional/appropriate source - cardiomyopathy  Prescription drug management  No LOS data to display   Time spent doing chart review, history and exam, documentation and further activities per the note  Provider  Link to [facility] Help Grid     The level of medical decision making during this visit was of moderate complexity.      No orders of the defined types were placed in this encounter.    Orders Placed This Encounter   Medications     losartan (COZAAR) 100 MG tablet     Sig: Take 1 tablet (100 mg) by mouth daily     Dispense:  90 tablet     Refill:  3     metoprolol succinate ER (TOPROL-XL)  100 MG 24 hr tablet     Sig: Take 1 tablet (100 mg) by mouth daily     Dispense:  90 tablet     Refill:  3     omeprazole (PRILOSEC) 10 MG DR capsule     Sig: Take 1 capsule (10 mg) by mouth every morning (before breakfast)     Dispense:  90 capsule     Refill:  0     potassium chloride ER (KLOR-CON M) 20 MEQ CR tablet     Sig: Take 1 tablet (20 mEq) by mouth daily     Dispense:  90 tablet     Refill:  3     rivaroxaban ANTICOAGULANT (XARELTO) 20 MG TABS tablet     Sig: Take 1 tablet (20 mg) by mouth daily (with dinner)     Dispense:  90 tablet     Refill:  3     torsemide (DEMADEX) 10 MG tablet     Sig: Take 1 tablet (10 mg) by mouth daily     Dispense:  90 tablet     Refill:  3     Medications Discontinued During This Encounter   Medication Reason     torsemide (DEMADEX) 10 MG tablet Reorder     rivaroxaban ANTICOAGULANT (XARELTO) 20 MG TABS tablet Reorder     potassium chloride ER (KLOR-CON M) 20 MEQ CR tablet Reorder     metoprolol succinate ER (TOPROL-XL) 100 MG 24 hr tablet Reorder     losartan (COZAAR) 100 MG tablet Reorder     omeprazole (PRILOSEC) 10 MG DR capsule Reorder         Encounter Diagnoses   Name Primary?     Chronic systolic heart failure (H)      S/P MVR (mitral valve replacement)-St junaid epic tissue 31 mm      S/P TVR (tricuspid valve repair)      Paroxysmal atrial fibrillation (H)      Cardiomyopathy, unspecified type (H) Yes     Cardiac pacemaker in situ        CURRENT MEDICATIONS:  Current Outpatient Medications   Medication Sig Dispense Refill     losartan (COZAAR) 100 MG tablet Take 1 tablet (100 mg) by mouth daily 90 tablet 3     metoprolol succinate ER (TOPROL-XL) 100 MG 24 hr tablet Take 1 tablet (100 mg) by mouth daily 90 tablet 3     omeprazole (PRILOSEC) 10 MG DR capsule Take 1 capsule (10 mg) by mouth every morning (before breakfast) 90 capsule 0     potassium chloride ER (KLOR-CON M) 20 MEQ CR tablet Take 1 tablet (20 mEq) by mouth daily 90 tablet 3     rivaroxaban ANTICOAGULANT  (XARELTO) 20 MG TABS tablet Take 1 tablet (20 mg) by mouth daily (with dinner) 90 tablet 3     torsemide (DEMADEX) 10 MG tablet Take 1 tablet (10 mg) by mouth daily 90 tablet 3     amoxicillin (AMOXIL) 500 MG tablet Take 4 tablets (2,000 mg) by mouth as needed (30-60 mins prior to procedure/dental work) 4 tablet 4     aspirin 81 MG EC tablet Take 81 mg by mouth every evening       calcium carbonate-vitamin D (CALCIUM 600+D) 600-200 MG-UNIT TABS Take 1 tablet by mouth 2 times daily       Cetirizine HCl (ZYRTEC PO) Take 10 mg by mouth daily        IBANdronate (BONIVA) 150 MG tablet Take 1 tablet (150 mg) by mouth every 30 days 3 tablet 3     levothyroxine (SYNTHROID/LEVOTHROID) 50 MCG tablet Take 1 tablet (50 mcg) by mouth daily 90 tablet 3     Multiple Vitamins-Minerals (MULTIVITAMIN ADULT) TABS Take 1 tablet by mouth daily         ALLERGIES     Allergies   Allergen Reactions     Chlorpheniramine Other (See Comments)     LOC and fainting      Lisinopril Cough     Phenylephrine Other (See Comments)     fainting       PAST MEDICAL HISTORY:  Past Medical History:   Diagnosis Date     Allergic rhinitis, cause unspecified     dust mites, ragweed     Complete AV block (H)     BSX DDD PM 2-     History of tricuspid valve repair      Mitral valve prolapse     bioprosthetic MVR 2-     Paroxysmal atrial fibrillation (H)        PAST SURGICAL HISTORY:  Past Surgical History:   Procedure Laterality Date     COLONOSCOPY N/A 9/15/2015    Procedure: COLONOSCOPY;  Surgeon: Anson Llanos MD;  Location:  GI     CV HEART CATHETERIZATION WITH POSSIBLE INTERVENTION N/A 1/9/2019    Procedure: Heart Catheterization with Possible Intervention;  Surgeon: Ritesh Whittaker MD;  Location:  HEART CARDIAC CATH LAB     CV RIGHT AND LEFT HEART CATH N/A 1/9/2019    Procedure: Right and Left Heart Catherization;  Surgeon: Ritesh Whittaker MD;  Location:  HEART CARDIAC CATH LAB     ENT SURGERY      T&A     EP INSERT ICD N/A  8/14/2020    Procedure: EP Insert ICD;  Surgeon: Lucian Tolliver MD;  Location:  HEART CARDIAC CATH LAB     EP PACEMAKER N/A 2/18/2019    Procedure: EP Pacemaker;  Surgeon: Inocencia Guillen MD;  Location:  HEART CARDIAC CATH LAB     REPAIR VALVE TRICUSPID MINIMALLY INVASIVE N/A 2/15/2019    Procedure: MINIMALLY INVASIVE TRICUSPID VALVE REPAIR WITH 32MM SULLIVAN RING;  Surgeon: Andrea Hanna MD;  Location:  OR     REPLACE VALVE MITRAL MINIMALLY INVASIVE N/A 2/15/2019    Procedure: MINIMALLY INVASIVE MITRAL VALVE REPLACEMENT WITH EPIC ST KEYUR 31MM VALVE; ON PUMP WITH TIA.  CARDIOLOGIST DR CARDENAS;  Surgeon: Andrea Hanna MD;  Location:  OR       FAMILY HISTORY:  Family History   Problem Relation Age of Onset     Osteoporosis Mother      Alzheimer Disease Mother      Family History Negative Father      Heart Disease Maternal Grandfather      Family History Negative Paternal Grandmother      Family History Negative Paternal Grandfather        SOCIAL HISTORY:  Social History     Socioeconomic History     Marital status:      Spouse name: None     Number of children: 3     Years of education: None     Highest education level: None   Occupational History     None   Tobacco Use     Smoking status: Never Smoker     Smokeless tobacco: Never Used   Substance and Sexual Activity     Alcohol use: No     Drug use: No     Sexual activity: Not Currently   Other Topics Concern      Service Not Asked     Blood Transfusions Not Asked     Caffeine Concern No     Comment: 1 cup of coffee and 1 can diet coke per day     Occupational Exposure Not Asked     Hobby Hazards Not Asked     Sleep Concern Yes     Comment: takes Doxylamine succinate 1/2 tab every night     Stress Concern No     Weight Concern No     Special Diet No     Comment: healthy      Back Care Not Asked     Exercise Yes     Comment: walking 45min x7 a wk. Very active, [employer], Golf, Bowling, Cardio     Bike Helmet  "Not Asked     Seat Belt Yes     Self-Exams Yes     Parent/sibling w/ CABG, MI or angioplasty before 65F 55M? No   Social History Narrative     None     Social Determinants of Health     Financial Resource Strain: Not on file   Food Insecurity: Not on file   Transportation Needs: Not on file   Physical Activity: Not on file   Stress: Not on file   Social Connections: Not on file   Intimate Partner Violence: Not on file   Housing Stability: Not on file       Review of Systems:  Skin:  Negative     Eyes:  Positive for glasses  ENT:  Negative sinus trouble;nasal congestion  Respiratory:  Positive for cough  Cardiovascular:  Negative Positive for;palpitations  Gastroenterology: Positive for reflux  Genitourinary:  Positive for urinary frequency  Musculoskeletal:  Positive for arthritis  Neurologic:  Positive for numbness or tingling of hands  Psychiatric:  Positive for sleep disturbances  Heme/Lymph/Imm:  Positive for allergies;easy bruising  Endocrine:  Positive for thyroid disorder    Physical Exam:  Vitals: /81   Pulse 66   Ht 1.664 m (5' 5.5\")   Wt 58.1 kg (128 lb)   LMP  (LMP Unknown)   SpO2 100%   BMI 20.98 kg/m      Constitutional:           Skin:             Head:           Eyes:           Lymph:      ENT:           Neck:           Respiratory:            Cardiac:                                                           GI:           Extremities and Muscular Skeletal:                 Neurological:           Psych:         Recent Lab Results:  LIPID RESULTS:  Lab Results   Component Value Date    CHOL 202 (H) 07/23/2015    HDL 96 07/23/2015    LDL 83 07/23/2015    TRIG 116 07/23/2015    CHOLHDLRATIO 2.1 07/23/2015       LIVER ENZYME RESULTS:  Lab Results   Component Value Date    AST 29 07/11/2019    ALT 27 03/22/2021       CBC RESULTS:  Lab Results   Component Value Date    WBC 5.6 08/14/2020    RBC 4.15 08/14/2020    HGB 12.8 08/14/2020    HCT 38.2 08/14/2020    MCV 92 08/14/2020    MCH 30.8 " 08/14/2020    MCHC 33.5 08/14/2020    RDW 13.2 08/14/2020     08/14/2020       BMP RESULTS:  Lab Results   Component Value Date     (L) 03/22/2021    POTASSIUM 4.1 03/22/2021    CHLORIDE 100 03/22/2021    CO2 29 03/22/2021    ANIONGAP 3 03/22/2021    GLC 96 03/22/2021    BUN 13 03/22/2021    CR 0.84 03/22/2021    GFRESTIMATED 68 03/22/2021    GFRESTBLACK 79 03/22/2021    ARIELLE 9.1 03/22/2021        A1C RESULTS:  Lab Results   Component Value Date    A1C 5.9 (H) 01/09/2019       INR RESULTS:  Lab Results   Component Value Date    INR 1.21 (H) 02/18/2019    INR 1.24 (H) 02/16/2019           CC  SAMMI KrishnaC  [facility]  [address] [place] [address]

## 2021-11-18 NOTE — LETTER
11/18/2021    Anyi Olmos MD  [address]  [facility] [address]    RE: Taty Aguila       Dear Colleague,    I had the pleasure of seeing Taty Aguila in the [facility].    HPI and Plan:     Taty Aguila is a delightful 75 y/o female with history of valvular heart disease,  Having undergone mitral and triscupid valve repair/replacement by Dr. Hanna in 2019.  Postoperatively severe decline in LVEf 20%.  No comes in for follow up after maximize of medical therapy and placement of ICD.     Currently she goes for walks without difficulty.  She has been afraid to go back to [employer] or participating in the [employer] because that is where she had her heart racing palpitation fast atrial fibrillation.  We reviewed and I looked at her echocardiogram and agree with interpretation of ejection fraction near normal with a 55 to 5050% ejection fraction.  She has been taking her medications.  We reviewed her prescription medications.  She does not need amoxicillin.  She is to continue otherwise cardiovascular on occasion including torsemide but I will have her see Antelmo in 6 months time down in [place] where if she is stable without any evidence of heart failure or edema fluid overload we can start tapering off the diuretic therapy.  Her device interrogation shows no significant arrhythmias.  Continue medical therapy.    Today's clinic visit entailed:  Review of the result(s) of each unique test - echocardiogram  Discussion of management or test interpretation with external physician/other qualified healthcare professional/appropriate source - cardiomyopathy  Prescription drug management  No LOS data to display   Time spent doing chart review, history and exam, documentation and further activities per the note  Provider  Link to [facility] Help Grid     The level of medical decision making during this visit was of moderate complexity.      No orders of the  defined types were placed in this encounter.    Orders Placed This Encounter   Medications     losartan (COZAAR) 100 MG tablet     Sig: Take 1 tablet (100 mg) by mouth daily     Dispense:  90 tablet     Refill:  3     metoprolol succinate ER (TOPROL-XL) 100 MG 24 hr tablet     Sig: Take 1 tablet (100 mg) by mouth daily     Dispense:  90 tablet     Refill:  3     omeprazole (PRILOSEC) 10 MG DR capsule     Sig: Take 1 capsule (10 mg) by mouth every morning (before breakfast)     Dispense:  90 capsule     Refill:  0     potassium chloride ER (KLOR-CON M) 20 MEQ CR tablet     Sig: Take 1 tablet (20 mEq) by mouth daily     Dispense:  90 tablet     Refill:  3     rivaroxaban ANTICOAGULANT (XARELTO) 20 MG TABS tablet     Sig: Take 1 tablet (20 mg) by mouth daily (with dinner)     Dispense:  90 tablet     Refill:  3     torsemide (DEMADEX) 10 MG tablet     Sig: Take 1 tablet (10 mg) by mouth daily     Dispense:  90 tablet     Refill:  3     Medications Discontinued During This Encounter   Medication Reason     torsemide (DEMADEX) 10 MG tablet Reorder     rivaroxaban ANTICOAGULANT (XARELTO) 20 MG TABS tablet Reorder     potassium chloride ER (KLOR-CON M) 20 MEQ CR tablet Reorder     metoprolol succinate ER (TOPROL-XL) 100 MG 24 hr tablet Reorder     losartan (COZAAR) 100 MG tablet Reorder     omeprazole (PRILOSEC) 10 MG DR capsule Reorder         Encounter Diagnoses   Name Primary?     Chronic systolic heart failure (H)      S/P MVR (mitral valve replacement)-St junaid epic tissue 31 mm      S/P TVR (tricuspid valve repair)      Paroxysmal atrial fibrillation (H)      Cardiomyopathy, unspecified type (H) Yes     Cardiac pacemaker in situ        CURRENT MEDICATIONS:  Current Outpatient Medications   Medication Sig Dispense Refill     losartan (COZAAR) 100 MG tablet Take 1 tablet (100 mg) by mouth daily 90 tablet 3     metoprolol succinate ER (TOPROL-XL) 100 MG 24 hr tablet Take 1 tablet (100 mg) by mouth daily 90 tablet 3      omeprazole (PRILOSEC) 10 MG DR capsule Take 1 capsule (10 mg) by mouth every morning (before breakfast) 90 capsule 0     potassium chloride ER (KLOR-CON M) 20 MEQ CR tablet Take 1 tablet (20 mEq) by mouth daily 90 tablet 3     rivaroxaban ANTICOAGULANT (XARELTO) 20 MG TABS tablet Take 1 tablet (20 mg) by mouth daily (with dinner) 90 tablet 3     torsemide (DEMADEX) 10 MG tablet Take 1 tablet (10 mg) by mouth daily 90 tablet 3     amoxicillin (AMOXIL) 500 MG tablet Take 4 tablets (2,000 mg) by mouth as needed (30-60 mins prior to procedure/dental work) 4 tablet 4     aspirin 81 MG EC tablet Take 81 mg by mouth every evening       calcium carbonate-vitamin D (CALCIUM 600+D) 600-200 MG-UNIT TABS Take 1 tablet by mouth 2 times daily       Cetirizine HCl (ZYRTEC PO) Take 10 mg by mouth daily        IBANdronate (BONIVA) 150 MG tablet Take 1 tablet (150 mg) by mouth every 30 days 3 tablet 3     levothyroxine (SYNTHROID/LEVOTHROID) 50 MCG tablet Take 1 tablet (50 mcg) by mouth daily 90 tablet 3     Multiple Vitamins-Minerals (MULTIVITAMIN ADULT) TABS Take 1 tablet by mouth daily         ALLERGIES     Allergies   Allergen Reactions     Chlorpheniramine Other (See Comments)     LOC and fainting      Lisinopril Cough     Phenylephrine Other (See Comments)     fainting       PAST MEDICAL HISTORY:  Past Medical History:   Diagnosis Date     Allergic rhinitis, cause unspecified     dust mites, ragweed     Complete AV block (H)     BSX DDD PM 2-     History of tricuspid valve repair      Mitral valve prolapse     bioprosthetic MVR 2-     Paroxysmal atrial fibrillation (H)        PAST SURGICAL HISTORY:  Past Surgical History:   Procedure Laterality Date     COLONOSCOPY N/A 9/15/2015    Procedure: COLONOSCOPY;  Surgeon: Anson Llanos MD;  Location:  GI     CV HEART CATHETERIZATION WITH POSSIBLE INTERVENTION N/A 1/9/2019    Procedure: Heart Catheterization with Possible Intervention;  Surgeon: Ritesh Whittaker  MD;  Location:  HEART CARDIAC CATH LAB     CV RIGHT AND LEFT HEART CATH N/A 1/9/2019    Procedure: Right and Left Heart Catherization;  Surgeon: Ritesh Whittaker MD;  Location:  HEART CARDIAC CATH LAB     ENT SURGERY      T&A     EP INSERT ICD N/A 8/14/2020    Procedure: EP Insert ICD;  Surgeon: Lucian Tolliver MD;  Location:  HEART CARDIAC CATH LAB     EP PACEMAKER N/A 2/18/2019    Procedure: EP Pacemaker;  Surgeon: Inocencia Guillen MD;  Location:  HEART CARDIAC CATH LAB     REPAIR VALVE TRICUSPID MINIMALLY INVASIVE N/A 2/15/2019    Procedure: MINIMALLY INVASIVE TRICUSPID VALVE REPAIR WITH 32MM SULLIVAN RING;  Surgeon: Andrea Hanna MD;  Location:  OR     REPLACE VALVE MITRAL MINIMALLY INVASIVE N/A 2/15/2019    Procedure: MINIMALLY INVASIVE MITRAL VALVE REPLACEMENT WITH EPIC ST KEYUR 31MM VALVE; ON PUMP WITH TIA.  CARDIOLOGIST DR CARDENAS;  Surgeon: Andrea Hanna MD;  Location:  OR       FAMILY HISTORY:  Family History   Problem Relation Age of Onset     Osteoporosis Mother      Alzheimer Disease Mother      Family History Negative Father      Heart Disease Maternal Grandfather      Family History Negative Paternal Grandmother      Family History Negative Paternal Grandfather        SOCIAL HISTORY:  Social History     Socioeconomic History     Marital status:      Spouse name: None     Number of children: 3     Years of education: None     Highest education level: None   Occupational History     None   Tobacco Use     Smoking status: Never Smoker     Smokeless tobacco: Never Used   Substance and Sexual Activity     Alcohol use: No     Drug use: No     Sexual activity: Not Currently   Other Topics Concern      Service Not Asked     Blood Transfusions Not Asked     Caffeine Concern No     Comment: 1 cup of coffee and 1 can diet coke per day     Occupational Exposure Not Asked     Hobby Hazards Not Asked     Sleep Concern Yes     Comment: takes  "Doxylamine succinate 1/2 tab every night     Stress Concern No     Weight Concern No     Special Diet No     Comment: healthy      Back Care Not Asked     Exercise Yes     Comment: walking 45min x7 a wk. Very active, [employer], Golf, Bowling, Cardio     Bike Helmet Not Asked     Seat Belt Yes     Self-Exams Yes     Parent/sibling w/ CABG, MI or angioplasty before 65F 55M? No   Social History Narrative     None     Social Determinants of Health     Financial Resource Strain: Not on file   Food Insecurity: Not on file   Transportation Needs: Not on file   Physical Activity: Not on file   Stress: Not on file   Social Connections: Not on file   Intimate Partner Violence: Not on file   Housing Stability: Not on file       Review of Systems:  Skin:  Negative     Eyes:  Positive for glasses  ENT:  Negative sinus trouble;nasal congestion  Respiratory:  Positive for cough  Cardiovascular:  Negative Positive for;palpitations  Gastroenterology: Positive for reflux  Genitourinary:  Positive for urinary frequency  Musculoskeletal:  Positive for arthritis  Neurologic:  Positive for numbness or tingling of hands  Psychiatric:  Positive for sleep disturbances  Heme/Lymph/Imm:  Positive for allergies;easy bruising  Endocrine:  Positive for thyroid disorder    Physical Exam:  Vitals: /81   Pulse 66   Ht 1.664 m (5' 5.5\")   Wt 58.1 kg (128 lb)   LMP  (LMP Unknown)   SpO2 100%   BMI 20.98 kg/m      Constitutional:           Skin:             Head:           Eyes:           Lymph:      ENT:           Neck:           Respiratory:            Cardiac:                                                           GI:           Extremities and Muscular Skeletal:                 Neurological:           Psych:         Recent Lab Results:  LIPID RESULTS:  Lab Results   Component Value Date    CHOL 202 (H) 07/23/2015    HDL 96 07/23/2015    LDL 83 07/23/2015    TRIG 116 07/23/2015    CHOLHDLRATIO 2.1 07/23/2015       LIVER ENZYME " RESULTS:  Lab Results   Component Value Date    AST 29 07/11/2019    ALT 27 03/22/2021       CBC RESULTS:  Lab Results   Component Value Date    WBC 5.6 08/14/2020    RBC 4.15 08/14/2020    HGB 12.8 08/14/2020    HCT 38.2 08/14/2020    MCV 92 08/14/2020    MCH 30.8 08/14/2020    MCHC 33.5 08/14/2020    RDW 13.2 08/14/2020     08/14/2020       BMP RESULTS:  Lab Results   Component Value Date     (L) 03/22/2021    POTASSIUM 4.1 03/22/2021    CHLORIDE 100 03/22/2021    CO2 29 03/22/2021    ANIONGAP 3 03/22/2021    GLC 96 03/22/2021    BUN 13 03/22/2021    CR 0.84 03/22/2021    GFRESTIMATED 68 03/22/2021    GFRESTBLACK 79 03/22/2021    ARIELLE 9.1 03/22/2021        A1C RESULTS:  Lab Results   Component Value Date    A1C 5.9 (H) 01/09/2019       INR RESULTS:  Lab Results   Component Value Date    INR 1.21 (H) 02/18/2019    INR 1.24 (H) 02/16/2019           CC  Marifer Mohamud PA-C  [address]  [facility]    Thank you for allowing me to participate in the care of your patient.      Sincerely,     GILLIAN CARDENAS MD     [facility]  cc:   Marifer Mohamud PA-C  [facility]  [address] [place] [address] [address]  [place] [address]

## 2022-01-09 ENCOUNTER — HEALTH MAINTENANCE LETTER (OUTPATIENT)
Age: 77
End: 2022-01-09

## 2022-02-03 ENCOUNTER — ANCILLARY PROCEDURE (OUTPATIENT)
Dept: CARDIOLOGY | Facility: CLINIC | Age: 77
End: 2022-02-03
Attending: INTERNAL MEDICINE
Payer: COMMERCIAL

## 2022-02-03 DIAGNOSIS — I42.9 CARDIOMYOPATHY, UNSPECIFIED TYPE (H): ICD-10-CM

## 2022-02-03 DIAGNOSIS — Z95.810 ICD (IMPLANTABLE CARDIOVERTER-DEFIBRILLATOR) IN PLACE: ICD-10-CM

## 2022-02-03 PROCEDURE — 93295 DEV INTERROG REMOTE 1/2/MLT: CPT | Performed by: INTERNAL MEDICINE

## 2022-02-03 PROCEDURE — 93296 REM INTERROG EVL PM/IDS: CPT | Performed by: INTERNAL MEDICINE

## 2022-02-14 LAB
MDC_IDC_EPISODE_DTM: NORMAL
MDC_IDC_EPISODE_ID: NORMAL
MDC_IDC_EPISODE_TYPE: NORMAL
MDC_IDC_LEAD_IMPLANT_DT: NORMAL
MDC_IDC_LEAD_IMPLANT_DT: NORMAL
MDC_IDC_LEAD_LOCATION: NORMAL
MDC_IDC_LEAD_LOCATION: NORMAL
MDC_IDC_LEAD_LOCATION_DETAIL_1: NORMAL
MDC_IDC_LEAD_LOCATION_DETAIL_1: NORMAL
MDC_IDC_LEAD_MFG: NORMAL
MDC_IDC_LEAD_MFG: NORMAL
MDC_IDC_LEAD_MODEL: NORMAL
MDC_IDC_LEAD_MODEL: NORMAL
MDC_IDC_LEAD_POLARITY_TYPE: NORMAL
MDC_IDC_LEAD_POLARITY_TYPE: NORMAL
MDC_IDC_LEAD_SERIAL: NORMAL
MDC_IDC_LEAD_SERIAL: NORMAL
MDC_IDC_MSMT_BATTERY_DTM: NORMAL
MDC_IDC_MSMT_BATTERY_REMAINING_LONGEVITY: 156 MO
MDC_IDC_MSMT_BATTERY_REMAINING_PERCENTAGE: 100 %
MDC_IDC_MSMT_BATTERY_STATUS: NORMAL
MDC_IDC_MSMT_CAP_CHARGE_DTM: NORMAL
MDC_IDC_MSMT_CAP_CHARGE_TIME: 9.2 S
MDC_IDC_MSMT_CAP_CHARGE_TYPE: NORMAL
MDC_IDC_MSMT_LEADCHNL_RA_IMPEDANCE_VALUE: 670 OHM
MDC_IDC_MSMT_LEADCHNL_RA_PACING_THRESHOLD_AMPLITUDE: 0.5 V
MDC_IDC_MSMT_LEADCHNL_RA_PACING_THRESHOLD_PULSEWIDTH: 0.4 MS
MDC_IDC_MSMT_LEADCHNL_RV_IMPEDANCE_VALUE: 416 OHM
MDC_IDC_MSMT_LEADCHNL_RV_PACING_THRESHOLD_AMPLITUDE: 0.9 V
MDC_IDC_MSMT_LEADCHNL_RV_PACING_THRESHOLD_PULSEWIDTH: 0.4 MS
MDC_IDC_PG_IMPLANT_DTM: NORMAL
MDC_IDC_PG_MFG: NORMAL
MDC_IDC_PG_MODEL: NORMAL
MDC_IDC_PG_SERIAL: NORMAL
MDC_IDC_PG_TYPE: NORMAL
MDC_IDC_SESS_CLINIC_NAME: NORMAL
MDC_IDC_SESS_DTM: NORMAL
MDC_IDC_SESS_TYPE: NORMAL
MDC_IDC_SET_BRADY_AT_MODE_SWITCH_MODE: NORMAL
MDC_IDC_SET_BRADY_AT_MODE_SWITCH_RATE: 170 {BEATS}/MIN
MDC_IDC_SET_BRADY_LOWRATE: 60 {BEATS}/MIN
MDC_IDC_SET_BRADY_MAX_SENSOR_RATE: 130 {BEATS}/MIN
MDC_IDC_SET_BRADY_MAX_TRACKING_RATE: 130 {BEATS}/MIN
MDC_IDC_SET_BRADY_MODE: NORMAL
MDC_IDC_SET_BRADY_PAV_DELAY_HIGH: 150 MS
MDC_IDC_SET_BRADY_PAV_DELAY_LOW: 200 MS
MDC_IDC_SET_BRADY_SAV_DELAY_HIGH: 150 MS
MDC_IDC_SET_BRADY_SAV_DELAY_LOW: 200 MS
MDC_IDC_SET_LEADCHNL_RA_PACING_AMPLITUDE: 2 V
MDC_IDC_SET_LEADCHNL_RA_PACING_CAPTURE_MODE: NORMAL
MDC_IDC_SET_LEADCHNL_RA_PACING_POLARITY: NORMAL
MDC_IDC_SET_LEADCHNL_RA_PACING_PULSEWIDTH: 0.4 MS
MDC_IDC_SET_LEADCHNL_RA_SENSING_ADAPTATION_MODE: NORMAL
MDC_IDC_SET_LEADCHNL_RA_SENSING_POLARITY: NORMAL
MDC_IDC_SET_LEADCHNL_RA_SENSING_SENSITIVITY: 0.25 MV
MDC_IDC_SET_LEADCHNL_RV_PACING_AMPLITUDE: 2 V
MDC_IDC_SET_LEADCHNL_RV_PACING_CAPTURE_MODE: NORMAL
MDC_IDC_SET_LEADCHNL_RV_PACING_POLARITY: NORMAL
MDC_IDC_SET_LEADCHNL_RV_PACING_PULSEWIDTH: 0.4 MS
MDC_IDC_SET_LEADCHNL_RV_SENSING_ADAPTATION_MODE: NORMAL
MDC_IDC_SET_LEADCHNL_RV_SENSING_POLARITY: NORMAL
MDC_IDC_SET_LEADCHNL_RV_SENSING_SENSITIVITY: 0.6 MV
MDC_IDC_SET_ZONE_DETECTION_INTERVAL: 250 MS
MDC_IDC_SET_ZONE_DETECTION_INTERVAL: 300 MS
MDC_IDC_SET_ZONE_DETECTION_INTERVAL: 353 MS
MDC_IDC_SET_ZONE_TYPE: NORMAL
MDC_IDC_SET_ZONE_VENDOR_TYPE: NORMAL
MDC_IDC_STAT_AT_BURDEN_PERCENT: 0 %
MDC_IDC_STAT_AT_DTM_END: NORMAL
MDC_IDC_STAT_AT_DTM_START: NORMAL
MDC_IDC_STAT_BRADY_DTM_END: NORMAL
MDC_IDC_STAT_BRADY_DTM_START: NORMAL
MDC_IDC_STAT_BRADY_RA_PERCENT_PACED: 15 %
MDC_IDC_STAT_BRADY_RV_PERCENT_PACED: 0 %
MDC_IDC_STAT_EPISODE_RECENT_COUNT: 0
MDC_IDC_STAT_EPISODE_RECENT_COUNT_DTM_END: NORMAL
MDC_IDC_STAT_EPISODE_RECENT_COUNT_DTM_START: NORMAL
MDC_IDC_STAT_EPISODE_TYPE: NORMAL
MDC_IDC_STAT_EPISODE_VENDOR_TYPE: NORMAL
MDC_IDC_STAT_TACHYTHERAPY_ATP_DELIVERED_RECENT: 0
MDC_IDC_STAT_TACHYTHERAPY_ATP_DELIVERED_TOTAL: 0
MDC_IDC_STAT_TACHYTHERAPY_RECENT_DTM_END: NORMAL
MDC_IDC_STAT_TACHYTHERAPY_RECENT_DTM_START: NORMAL
MDC_IDC_STAT_TACHYTHERAPY_SHOCKS_ABORTED_RECENT: 0
MDC_IDC_STAT_TACHYTHERAPY_SHOCKS_ABORTED_TOTAL: 0
MDC_IDC_STAT_TACHYTHERAPY_SHOCKS_DELIVERED_RECENT: 0
MDC_IDC_STAT_TACHYTHERAPY_SHOCKS_DELIVERED_TOTAL: 0
MDC_IDC_STAT_TACHYTHERAPY_TOTAL_DTM_END: NORMAL
MDC_IDC_STAT_TACHYTHERAPY_TOTAL_DTM_START: NORMAL

## 2022-03-12 DIAGNOSIS — E03.9 ACQUIRED HYPOTHYROIDISM: ICD-10-CM

## 2022-03-14 RX ORDER — LEVOTHYROXINE SODIUM 50 UG/1
TABLET ORAL
Qty: 90 TABLET | Refills: 0 | Status: SHIPPED | OUTPATIENT
Start: 2022-03-14 | End: 2022-04-08

## 2022-03-14 NOTE — TELEPHONE ENCOUNTER
Pending Prescriptions:                       Disp   Refills    levothyroxine (SYNTHROID/LEVOTHROID) 50 M*90 tab*0            Sig: TAKE 1 TABLET DAILY      Medication is being filled for 1 time refill only due to:  pt has a future appt scheduled.

## 2022-04-06 ASSESSMENT — ENCOUNTER SYMPTOMS
SORE THROAT: 0
FEVER: 0
HEMATOCHEZIA: 0
MYALGIAS: 0
PARESTHESIAS: 0
EYE PAIN: 0
SHORTNESS OF BREATH: 0
HEADACHES: 0
COUGH: 0
WEAKNESS: 0
JOINT SWELLING: 0
CHILLS: 0
ABDOMINAL PAIN: 0
NAUSEA: 0
DIZZINESS: 0
HEMATURIA: 0
DIARRHEA: 0
BREAST MASS: 0
ARTHRALGIAS: 0
DYSURIA: 0
HEARTBURN: 0
FREQUENCY: 0
NERVOUS/ANXIOUS: 1
PALPITATIONS: 0
CONSTIPATION: 0

## 2022-04-06 ASSESSMENT — ACTIVITIES OF DAILY LIVING (ADL): CURRENT_FUNCTION: NO ASSISTANCE NEEDED

## 2022-04-08 ENCOUNTER — OFFICE VISIT (OUTPATIENT)
Dept: INTERNAL MEDICINE | Facility: CLINIC | Age: 77
End: 2022-04-08
Payer: COMMERCIAL

## 2022-04-08 VITALS
DIASTOLIC BLOOD PRESSURE: 71 MMHG | TEMPERATURE: 97.7 F | WEIGHT: 128.9 LBS | HEIGHT: 65 IN | SYSTOLIC BLOOD PRESSURE: 113 MMHG | HEART RATE: 90 BPM | BODY MASS INDEX: 21.48 KG/M2 | OXYGEN SATURATION: 99 % | RESPIRATION RATE: 16 BRPM

## 2022-04-08 DIAGNOSIS — I42.9 CARDIOMYOPATHY, UNSPECIFIED TYPE (H): ICD-10-CM

## 2022-04-08 DIAGNOSIS — K21.9 GASTROESOPHAGEAL REFLUX DISEASE WITHOUT ESOPHAGITIS: ICD-10-CM

## 2022-04-08 DIAGNOSIS — Z12.31 ENCOUNTER FOR SCREENING MAMMOGRAM FOR BREAST CANCER: ICD-10-CM

## 2022-04-08 DIAGNOSIS — Z00.00 ENCOUNTER FOR ANNUAL WELLNESS EXAM IN MEDICARE PATIENT: Primary | ICD-10-CM

## 2022-04-08 DIAGNOSIS — I77.810 THORACIC AORTIC ECTASIA (H): ICD-10-CM

## 2022-04-08 DIAGNOSIS — I48.0 PAROXYSMAL ATRIAL FIBRILLATION (H): ICD-10-CM

## 2022-04-08 DIAGNOSIS — E03.9 ACQUIRED HYPOTHYROIDISM: ICD-10-CM

## 2022-04-08 PROCEDURE — 99214 OFFICE O/P EST MOD 30 MIN: CPT | Mod: 25 | Performed by: INTERNAL MEDICINE

## 2022-04-08 PROCEDURE — 91301 PR COVID VAC MODERNA 100 MCG/0.5 ML IM: CPT | Performed by: INTERNAL MEDICINE

## 2022-04-08 PROCEDURE — 0013A PR COVID VAC MODERNA 100 MCG/0.5 ML IM: CPT | Performed by: INTERNAL MEDICINE

## 2022-04-08 PROCEDURE — 99397 PER PM REEVAL EST PAT 65+ YR: CPT | Performed by: INTERNAL MEDICINE

## 2022-04-08 RX ORDER — OMEPRAZOLE 10 MG/1
10 CAPSULE, DELAYED RELEASE ORAL
Qty: 90 CAPSULE | Refills: 3 | Status: SHIPPED | OUTPATIENT
Start: 2022-04-08 | End: 2023-04-11

## 2022-04-08 RX ORDER — LEVOTHYROXINE SODIUM 50 UG/1
50 TABLET ORAL DAILY
Qty: 90 TABLET | Refills: 3 | Status: SHIPPED | OUTPATIENT
Start: 2022-04-08 | End: 2023-04-11

## 2022-04-08 ASSESSMENT — ENCOUNTER SYMPTOMS
WEAKNESS: 0
ARTHRALGIAS: 0
HEARTBURN: 0
SHORTNESS OF BREATH: 0
MYALGIAS: 0
PARESTHESIAS: 0
JOINT SWELLING: 0
COUGH: 0
PALPITATIONS: 0
DIARRHEA: 0
EYE PAIN: 0
ABDOMINAL PAIN: 0
SORE THROAT: 0
HEADACHES: 0
HEMATURIA: 0
NERVOUS/ANXIOUS: 1
CONSTIPATION: 0
NAUSEA: 0
HEMATOCHEZIA: 0
DIZZINESS: 0
BREAST MASS: 0
FEVER: 0
DYSURIA: 0
CHILLS: 0
FREQUENCY: 0

## 2022-04-08 ASSESSMENT — ACTIVITIES OF DAILY LIVING (ADL): CURRENT_FUNCTION: NO ASSISTANCE NEEDED

## 2022-04-08 NOTE — PROGRESS NOTES
"SUBJECTIVE:   Taty Aguila is a 77 year old female who presents for Preventive Visit.      Patient has been advised of split billing requirements and indicates understanding: Yes  Are you in the first 12 months of your Medicare coverage?  No    Healthy Habits:     In general, how would you rate your overall health?  Good    Frequency of exercise:  6-7 days/week    Duration of exercise:  45-60 minutes    Do you usually eat at least 4 servings of fruit and vegetables a day, include whole grains    & fiber and avoid regularly eating high fat or \"junk\" foods?  Yes    Taking medications regularly:  Yes    Medication side effects:  Not applicable    Ability to successfully perform activities of daily living:  No assistance needed    Home Safety:  No safety concerns identified    Hearing Impairment:  No hearing concerns    In the past 6 months, have you been bothered by leaking of urine? Yes    In general, how would you rate your overall mental or emotional health?  Good      PHQ-2 Total Score: 0    Additional concerns today:  No    Problems:   1. Cardiomyopathy: has improved  2.Thoracic aortic ectasia, mild and stable   3. Hypothyroidism: stable  4. Afib; no symptoms, on anticoagulation      Current concerns:   none      Patient Active Problem List   Diagnosis     Allergic rhinitis     Thoracic aortic ectasia (H)     History of tricuspid valve repair-32 mm Oquendo Ring     Complete AV block (H)     S/P MVR (mitral valve replacement)-St junaid epic tissue 31 mm     Cardiomyopathy, unspecified type (H)     Acquired hypothyroidism     Gastroesophageal reflux disease without esophagitis     Non-sustained ventricular tachycardia (H)     Current Outpatient Medications   Medication Sig Dispense Refill     aspirin 81 MG EC tablet Take 81 mg by mouth every evening       calcium carbonate-vitamin D (CALCIUM 600+D) 600-200 MG-UNIT TABS Take 2 tablets by mouth 2 times daily        Cetirizine HCl (ZYRTEC PO) Take 10 mg by mouth " daily        IBANdronate (BONIVA) 150 MG tablet Take 1 tablet (150 mg) by mouth every 30 days 3 tablet 3     levothyroxine (SYNTHROID/LEVOTHROID) 50 MCG tablet Take 1 tablet (50 mcg) by mouth daily 90 tablet 3     losartan (COZAAR) 100 MG tablet Take 1 tablet (100 mg) by mouth daily 90 tablet 3     metoprolol succinate ER (TOPROL-XL) 100 MG 24 hr tablet Take 1 tablet (100 mg) by mouth daily 90 tablet 3     Multiple Vitamins-Minerals (MULTIVITAMIN ADULT) TABS Take 1 tablet by mouth daily       omeprazole (PRILOSEC) 10 MG DR capsule Take 1 capsule (10 mg) by mouth every morning (before breakfast) 90 capsule 3     potassium chloride ER (KLOR-CON M) 20 MEQ CR tablet Take 1 tablet (20 mEq) by mouth daily 90 tablet 3     rivaroxaban ANTICOAGULANT (XARELTO) 20 MG TABS tablet Take 1 tablet (20 mg) by mouth daily (with dinner) 90 tablet 3     torsemide (DEMADEX) 10 MG tablet Take 1 tablet (10 mg) by mouth daily 90 tablet 3        Do you feel safe in your environment? Yes    Have you ever done Advance Care Planning? (For example, a Health Directive, POLST, or a discussion with a medical provider or your loved ones about your wishes): No, advance care planning information given to patient to review.  Patient plans to discuss their wishes with loved ones or provider.         Fall risk  Fallen 2 or more times in the past year?: No  Any fall with injury in the past year?: No  click delete button to remove this line now  Cognitive Screening   1) Repeat 3 items (Leader, Season, Table)    2) Clock draw: NORMAL  3) 3 item recall: Recalls 3 objects  Results: 3 items recalled: COGNITIVE IMPAIRMENT LESS LIKELY    Mini-CogTM Copyright [employer] [place]. Licensed by the author for use in [facility]; reprinted with permission (sera@.[email or web address]). All rights reserved.      Do you have sleep apnea, excessive snoring or daytime drowsiness?: no    Reviewed and updated as needed this visit by clinical staff    Reviewed and updated as needed this  visit by Provider                 Social History     Tobacco Use     Smoking status: Never Smoker     Smokeless tobacco: Never Used   Substance Use Topics     Alcohol use: No     If you drink alcohol do you typically have >3 drinks per day or >7 drinks per week? No    Alcohol Use 4/6/2022   Prescreen: >3 drinks/day or >7 drinks/week? No   Prescreen: >3 drinks/day or >7 drinks/week? -   No flowsheet data found.            Current providers sharing in care for this patient include:   Patient Care Team:  Anyi Olmos MD as PCP - General (Internal Medicine)  Anyi Olmos MD as Assigned PCP  Courtney Brian PA as Physician Assistant (Cardiology)  Jesus Stone MD as MD (Cardiology)  Milagro Velazquez, RN as Registered Nurse (Cardiology)  Jo-Ann Neal PA-C as Physician Assistant (Physician Assistant)  Jesus Stone MD as Assigned Heart and Vascular Provider    The following health maintenance items are reviewed in Epic and correct as of today:  Health Maintenance Due   Topic Date Due     HF ACTION PLAN  Never done     CBC  08/14/2021     MAMMO SCREENING  09/16/2021     BMP  09/22/2021     ALT  03/22/2022     FALL RISK ASSESSMENT  03/22/2022     ZOSTER IMMUNIZATION (3 of 3) 05/17/2021     MEDICARE ANNUAL WELLNESS VISIT  03/22/2022           Mammogram Screening: Recommended mammography every 1-2 years with patient discussion and risk factor consideration  Pertinent mammograms are reviewed under the imaging tab.    Review of Systems   Constitutional: Negative for chills and fever.   HENT: Negative for congestion, ear pain, hearing loss and sore throat.    Eyes: Negative for pain and visual disturbance.   Respiratory: Negative for cough and shortness of breath.    Cardiovascular: Negative for chest pain, palpitations and peripheral edema.   Gastrointestinal: Negative for abdominal pain, constipation, diarrhea, heartburn, hematochezia and nausea.   Breasts:  Negative for tenderness, breast  "mass and discharge.   Genitourinary: Negative for dysuria, frequency, genital sores, hematuria, pelvic pain, urgency, vaginal bleeding and vaginal discharge.   Musculoskeletal: Negative for arthralgias, joint swelling and myalgias.   Skin: Negative for rash.   Neurological: Negative for dizziness, weakness, headaches and paresthesias.   Psychiatric/Behavioral: Negative for mood changes. The patient is nervous/anxious.      She is not concerned about mood    OBJECTIVE:   /71 (BP Location: Right arm, Patient Position: Sitting, Cuff Size: Adult Regular)   Pulse 90   Temp 97.7  F (36.5  C) (Oral)   Resp 16   Ht 1.651 m (5' 5\")   Wt 58.5 kg (128 lb 14.4 oz)   LMP  (LMP Unknown)   SpO2 99%   BMI 21.45 kg/m   Estimated body mass index is 20.98 kg/m  as calculated from the following:    Height as of 11/18/21: 1.664 m (5' 5.5\").    Weight as of 11/18/21: 58.1 kg (128 lb).  Physical Exam      HEENT: extraocular movements are intact, pupils equal and reactive to light and accommodation, TMs clear  NECK: Neck supple. No adenopathy. Thyroid symmetric, normal size,, Carotids without bruits.  PULMONARY: clear to auscultation  CARDIAC: irregular S1, S2 with soft murmur  PULSES: 2/2 throughout  BACK: no spinal or CVAT  ABDOMINAL: Soft, nontender.  Normal bowel sounds.  No hepatosplenomegaly or abnormal masses  BREAST: No breast masses or tenderness, No axillary masses or tenderness and No galactorrhea        ASSESSMENT / PLAN:   (Z00.00) Encounter for annual wellness exam in Medicare patient  (primary encounter diagnosis)  Comment: up to date   Plan:     (I48.0) Paroxysmal atrial fibrillation (H)  Comment: stable  Plan: continue beta blocker            (I42.9) Cardiomyopathy, unspecified type (H)  Comment: improved   Plan: Basic metabolic panel  (Ca, Cl, CO2, Creat,         Gluc, K, Na, BUN), ALT, CBC with platelets and         differential            (I77.810) Thoracic aortic ectasia (H)  Comment: stable   Plan: " "    (E03.9) Acquired hypothyroidism  Comment: recheck lab   Plan: levothyroxine (SYNTHROID/LEVOTHROID) 50 MCG         tablet, TSH with free T4 reflex            (Z12.31) Encounter for screening mammogram for breast cancer  Comment:   Plan: MA Screen Bilateral w/Boni, CANCELED: *MA         Screening Digital Bilateral              Patient has been advised of split billing requirements and indicates understanding: Yes    COUNSELING:  Reviewed preventive health counseling, as reflected in patient instructions    Estimated body mass index is 20.98 kg/m  as calculated from the following:    Height as of 11/18/21: 1.664 m (5' 5.5\").    Weight as of 11/18/21: 58.1 kg (128 lb).        She reports that she has never smoked. She has never used smokeless tobacco.      Appropriate preventive services were discussed with this patient, including applicable screening as appropriate for cardiovascular disease, diabetes, osteopenia/osteoporosis, and glaucoma.  As appropriate for age/gender, discussed screening for colorectal cancer, prostate cancer, breast cancer, and cervical cancer. Checklist reviewing preventive services available has been given to the patient.    Reviewed patients plan of care and provided an AVS. The Basic Care Plan (routine screening as documented in Health Maintenance) for Taty meets the Care Plan requirement. This Care Plan has been established and reviewed with the Patient.    Counseling Resources:  ATP IV Guidelines  Pooled Cohorts Equation Calculator  Breast Cancer Risk Calculator  Breast Cancer: Medication to Reduce Risk  FRAX Risk Assessment  ICSI Preventive Guidelines  Dietary Guidelines for Americans, 2010  [employer] MyPlate  ASA Prophylaxis  Lung CA Screening    Anyi Olmos MD  [facility]    Identified Health Risks:  "

## 2022-04-14 ENCOUNTER — HOSPITAL ENCOUNTER (OUTPATIENT)
Dept: MAMMOGRAPHY | Facility: CLINIC | Age: 77
Discharge: HOME | End: 2022-04-14
Attending: INTERNAL MEDICINE | Admitting: INTERNAL MEDICINE
Payer: COMMERCIAL

## 2022-04-14 DIAGNOSIS — Z12.31 ENCOUNTER FOR SCREENING MAMMOGRAM FOR BREAST CANCER: ICD-10-CM

## 2022-04-14 PROCEDURE — 77067 SCR MAMMO BI INCL CAD: CPT

## 2022-04-15 ENCOUNTER — LAB (OUTPATIENT)
Dept: LAB | Facility: CLINIC | Age: 77
End: 2022-04-15
Payer: COMMERCIAL

## 2022-04-15 DIAGNOSIS — I42.9 CARDIOMYOPATHY, UNSPECIFIED TYPE (H): ICD-10-CM

## 2022-04-15 DIAGNOSIS — E03.9 ACQUIRED HYPOTHYROIDISM: ICD-10-CM

## 2022-04-15 LAB
BASOPHILS # BLD AUTO: 0 10E3/UL (ref 0–0.2)
BASOPHILS NFR BLD AUTO: 0 %
EOSINOPHIL # BLD AUTO: 0.1 10E3/UL (ref 0–0.7)
EOSINOPHIL NFR BLD AUTO: 3 %
ERYTHROCYTE [DISTWIDTH] IN BLOOD BY AUTOMATED COUNT: 13.3 % (ref 10–15)
HCT VFR BLD AUTO: 39.9 % (ref 35–47)
HGB BLD-MCNC: 13.1 G/DL (ref 11.7–15.7)
LYMPHOCYTES # BLD AUTO: 1.5 10E3/UL (ref 0.8–5.3)
LYMPHOCYTES NFR BLD AUTO: 32 %
MCH RBC QN AUTO: 31.1 PG (ref 26.5–33)
MCHC RBC AUTO-ENTMCNC: 32.8 G/DL (ref 31.5–36.5)
MCV RBC AUTO: 95 FL (ref 78–100)
MONOCYTES # BLD AUTO: 0.6 10E3/UL (ref 0–1.3)
MONOCYTES NFR BLD AUTO: 13 %
NEUTROPHILS # BLD AUTO: 2.4 10E3/UL (ref 1.6–8.3)
NEUTROPHILS NFR BLD AUTO: 52 %
PLATELET # BLD AUTO: 251 10E3/UL (ref 150–450)
RBC # BLD AUTO: 4.21 10E6/UL (ref 3.8–5.2)
WBC # BLD AUTO: 4.7 10E3/UL (ref 4–11)

## 2022-04-15 PROCEDURE — 84460 ALANINE AMINO (ALT) (SGPT): CPT

## 2022-04-15 PROCEDURE — 84443 ASSAY THYROID STIM HORMONE: CPT

## 2022-04-15 PROCEDURE — 80048 BASIC METABOLIC PNL TOTAL CA: CPT

## 2022-04-15 PROCEDURE — 36415 COLL VENOUS BLD VENIPUNCTURE: CPT

## 2022-04-15 PROCEDURE — 85025 COMPLETE CBC W/AUTO DIFF WBC: CPT

## 2022-04-16 LAB
ALT SERPL W P-5'-P-CCNC: 32 U/L (ref 0–50)
ANION GAP SERPL CALCULATED.3IONS-SCNC: 3 MMOL/L (ref 3–14)
BUN SERPL-MCNC: 11 MG/DL (ref 7–30)
CALCIUM SERPL-MCNC: 9.5 MG/DL (ref 8.5–10.1)
CHLORIDE BLD-SCNC: 102 MMOL/L (ref 94–109)
CO2 SERPL-SCNC: 29 MMOL/L (ref 20–32)
CREAT SERPL-MCNC: 0.88 MG/DL (ref 0.52–1.04)
GFR SERPL CREATININE-BSD FRML MDRD: 68 ML/MIN/1.73M2
GLUCOSE BLD-MCNC: 100 MG/DL (ref 70–99)
POTASSIUM BLD-SCNC: 4.1 MMOL/L (ref 3.4–5.3)
SODIUM SERPL-SCNC: 134 MMOL/L (ref 133–144)
TSH SERPL DL<=0.005 MIU/L-ACNC: 1.05 MU/L (ref 0.4–4)

## 2022-04-17 PROBLEM — Z95.0 CARDIAC PACEMAKER IN SITU: Status: ACTIVE | Noted: 2022-04-17

## 2022-04-17 PROBLEM — I48.0 PAROXYSMAL ATRIAL FIBRILLATION (H): Status: ACTIVE | Noted: 2018-11-10

## 2022-05-05 ENCOUNTER — ANCILLARY PROCEDURE (OUTPATIENT)
Dept: CARDIOLOGY | Facility: CLINIC | Age: 77
End: 2022-05-05
Attending: INTERNAL MEDICINE
Payer: COMMERCIAL

## 2022-05-05 DIAGNOSIS — I42.9 CARDIOMYOPATHY, UNSPECIFIED TYPE (H): ICD-10-CM

## 2022-05-05 DIAGNOSIS — Z95.810 ICD (IMPLANTABLE CARDIOVERTER-DEFIBRILLATOR) IN PLACE: ICD-10-CM

## 2022-05-05 PROCEDURE — 93296 REM INTERROG EVL PM/IDS: CPT | Performed by: INTERNAL MEDICINE

## 2022-05-05 PROCEDURE — 93295 DEV INTERROG REMOTE 1/2/MLT: CPT | Performed by: INTERNAL MEDICINE

## 2022-05-17 ENCOUNTER — TELEPHONE (OUTPATIENT)
Dept: CARDIOLOGY | Facility: CLINIC | Age: 77
End: 2022-05-17
Payer: COMMERCIAL

## 2022-05-17 NOTE — TELEPHONE ENCOUNTER
Called pt to review.  Due to Courtney's availability in [place], pt would like to establish with another PAMELLA. Pt scheduled for 6/7 with MONTRELL Pedersen.  KMortimer RN

## 2022-05-17 NOTE — TELEPHONE ENCOUNTER
[facility] Call Center    Phone Message    May a detailed message be left on voicemail: yes     Reason for Call: Other: Taty called to make an appointment with WILL Brian per request from Dr. Stone. No openings. Please assist pt. Thank you.      Action Taken: Message routed to:  Other: [place]    Travel Screening: Not Applicable

## 2022-05-20 LAB
MDC_IDC_EPISODE_DTM: NORMAL
MDC_IDC_EPISODE_ID: NORMAL
MDC_IDC_EPISODE_TYPE: NORMAL
MDC_IDC_LEAD_IMPLANT_DT: NORMAL
MDC_IDC_LEAD_IMPLANT_DT: NORMAL
MDC_IDC_LEAD_LOCATION: NORMAL
MDC_IDC_LEAD_LOCATION: NORMAL
MDC_IDC_LEAD_LOCATION_DETAIL_1: NORMAL
MDC_IDC_LEAD_LOCATION_DETAIL_1: NORMAL
MDC_IDC_LEAD_MFG: NORMAL
MDC_IDC_LEAD_MFG: NORMAL
MDC_IDC_LEAD_MODEL: NORMAL
MDC_IDC_LEAD_MODEL: NORMAL
MDC_IDC_LEAD_POLARITY_TYPE: NORMAL
MDC_IDC_LEAD_POLARITY_TYPE: NORMAL
MDC_IDC_LEAD_SERIAL: NORMAL
MDC_IDC_LEAD_SERIAL: NORMAL
MDC_IDC_MSMT_BATTERY_DTM: NORMAL
MDC_IDC_MSMT_BATTERY_REMAINING_LONGEVITY: 156 MO
MDC_IDC_MSMT_BATTERY_REMAINING_PERCENTAGE: 100 %
MDC_IDC_MSMT_BATTERY_STATUS: NORMAL
MDC_IDC_MSMT_CAP_CHARGE_DTM: NORMAL
MDC_IDC_MSMT_CAP_CHARGE_TIME: 9.3 S
MDC_IDC_MSMT_CAP_CHARGE_TYPE: NORMAL
MDC_IDC_MSMT_LEADCHNL_RA_IMPEDANCE_VALUE: 645 OHM
MDC_IDC_MSMT_LEADCHNL_RA_PACING_THRESHOLD_AMPLITUDE: 0.6 V
MDC_IDC_MSMT_LEADCHNL_RA_PACING_THRESHOLD_PULSEWIDTH: 0.4 MS
MDC_IDC_MSMT_LEADCHNL_RV_IMPEDANCE_VALUE: 409 OHM
MDC_IDC_MSMT_LEADCHNL_RV_PACING_THRESHOLD_AMPLITUDE: 0.8 V
MDC_IDC_MSMT_LEADCHNL_RV_PACING_THRESHOLD_PULSEWIDTH: 0.4 MS
MDC_IDC_PG_IMPLANT_DTM: NORMAL
MDC_IDC_PG_MFG: NORMAL
MDC_IDC_PG_MODEL: NORMAL
MDC_IDC_PG_SERIAL: NORMAL
MDC_IDC_PG_TYPE: NORMAL
MDC_IDC_SESS_CLINIC_NAME: NORMAL
MDC_IDC_SESS_DTM: NORMAL
MDC_IDC_SESS_TYPE: NORMAL
MDC_IDC_SET_BRADY_AT_MODE_SWITCH_MODE: NORMAL
MDC_IDC_SET_BRADY_AT_MODE_SWITCH_RATE: 170 {BEATS}/MIN
MDC_IDC_SET_BRADY_LOWRATE: 60 {BEATS}/MIN
MDC_IDC_SET_BRADY_MAX_SENSOR_RATE: 130 {BEATS}/MIN
MDC_IDC_SET_BRADY_MAX_TRACKING_RATE: 130 {BEATS}/MIN
MDC_IDC_SET_BRADY_MODE: NORMAL
MDC_IDC_SET_BRADY_PAV_DELAY_HIGH: 150 MS
MDC_IDC_SET_BRADY_PAV_DELAY_LOW: 200 MS
MDC_IDC_SET_BRADY_SAV_DELAY_HIGH: 150 MS
MDC_IDC_SET_BRADY_SAV_DELAY_LOW: 200 MS
MDC_IDC_SET_LEADCHNL_RA_PACING_AMPLITUDE: 2 V
MDC_IDC_SET_LEADCHNL_RA_PACING_CAPTURE_MODE: NORMAL
MDC_IDC_SET_LEADCHNL_RA_PACING_POLARITY: NORMAL
MDC_IDC_SET_LEADCHNL_RA_PACING_PULSEWIDTH: 0.4 MS
MDC_IDC_SET_LEADCHNL_RA_SENSING_ADAPTATION_MODE: NORMAL
MDC_IDC_SET_LEADCHNL_RA_SENSING_POLARITY: NORMAL
MDC_IDC_SET_LEADCHNL_RA_SENSING_SENSITIVITY: 0.25 MV
MDC_IDC_SET_LEADCHNL_RV_PACING_AMPLITUDE: 2 V
MDC_IDC_SET_LEADCHNL_RV_PACING_CAPTURE_MODE: NORMAL
MDC_IDC_SET_LEADCHNL_RV_PACING_POLARITY: NORMAL
MDC_IDC_SET_LEADCHNL_RV_PACING_PULSEWIDTH: 0.4 MS
MDC_IDC_SET_LEADCHNL_RV_SENSING_ADAPTATION_MODE: NORMAL
MDC_IDC_SET_LEADCHNL_RV_SENSING_POLARITY: NORMAL
MDC_IDC_SET_LEADCHNL_RV_SENSING_SENSITIVITY: 0.6 MV
MDC_IDC_SET_ZONE_DETECTION_INTERVAL: 250 MS
MDC_IDC_SET_ZONE_DETECTION_INTERVAL: 300 MS
MDC_IDC_SET_ZONE_DETECTION_INTERVAL: 353 MS
MDC_IDC_SET_ZONE_TYPE: NORMAL
MDC_IDC_SET_ZONE_VENDOR_TYPE: NORMAL
MDC_IDC_STAT_AT_BURDEN_PERCENT: 0 %
MDC_IDC_STAT_AT_DTM_END: NORMAL
MDC_IDC_STAT_AT_DTM_START: NORMAL
MDC_IDC_STAT_BRADY_DTM_END: NORMAL
MDC_IDC_STAT_BRADY_DTM_START: NORMAL
MDC_IDC_STAT_BRADY_RA_PERCENT_PACED: 16 %
MDC_IDC_STAT_BRADY_RV_PERCENT_PACED: 0 %
MDC_IDC_STAT_EPISODE_RECENT_COUNT: 0
MDC_IDC_STAT_EPISODE_RECENT_COUNT_DTM_END: NORMAL
MDC_IDC_STAT_EPISODE_RECENT_COUNT_DTM_START: NORMAL
MDC_IDC_STAT_EPISODE_TYPE: NORMAL
MDC_IDC_STAT_EPISODE_VENDOR_TYPE: NORMAL
MDC_IDC_STAT_TACHYTHERAPY_ATP_DELIVERED_RECENT: 0
MDC_IDC_STAT_TACHYTHERAPY_ATP_DELIVERED_TOTAL: 0
MDC_IDC_STAT_TACHYTHERAPY_RECENT_DTM_END: NORMAL
MDC_IDC_STAT_TACHYTHERAPY_RECENT_DTM_START: NORMAL
MDC_IDC_STAT_TACHYTHERAPY_SHOCKS_ABORTED_RECENT: 0
MDC_IDC_STAT_TACHYTHERAPY_SHOCKS_ABORTED_TOTAL: 0
MDC_IDC_STAT_TACHYTHERAPY_SHOCKS_DELIVERED_RECENT: 0
MDC_IDC_STAT_TACHYTHERAPY_SHOCKS_DELIVERED_TOTAL: 0
MDC_IDC_STAT_TACHYTHERAPY_TOTAL_DTM_END: NORMAL
MDC_IDC_STAT_TACHYTHERAPY_TOTAL_DTM_START: NORMAL

## 2022-06-06 NOTE — PROGRESS NOTES
HISTORY OF PRESENT ILLNESS:    This is a 77 year old female who follows with Dr Stone at [facility].  Her past medical history includes:  Nonischemic cardiomyopathy, mitral regurgitation/prolapse s/p MVR, tricuspid regurgitation s/p repair, paroxysmal atrial fibrillation, advanced heart block s/p ICD     Ms Aguila was found to have rapid atrial fibrillation (2018)  She underwent cardioversion, but her A-fib redeveloped shortly after    She was found to have severe tricuspid regurgitation and mitral regurgitation and eventually underwent a minimally invasive bioprosthetic mitral valve replacement and a tricuspid valve repair in 2019. She did not require coronary bypass at that time.  She did develop complete heart block following surgery and underwent permanent pacemaker implantation.  She also developed significant volume overload after surgery and redeveloped atrial fibrillation.  Her ECHO then showed LVEF 20-25%  She underwent repeat cardioversion restoring sinus rhythm    Maritza NUC (2019) showed normal perfusion    Her LVEF improved to 35-40% in 2020.  She was found to have short runs of nonsustained VT on device interrogation  and subsequently underwent a device upgrade to an ICD (8/2020)    ECHO (6/2021) showed LVEF 50-55%, normal RV function, normal MV prosthetic valve gradients, trace tricuspid regurgitation, 1+ pulmonic valvular regurgitation, mild ascending aorta dilatation    Device interrogation (5/6/22) showed 16% A-paced  0% V-paced  No arrhythmias or shocks    She returns today for reassessment    Ms Aguila is very active  She walks at least 2 miles a day and does all her own household chores  She denies any chest pain or significant shortness of breath.  She is fairly good about avoiding salty foods and weighs herself at least once a week.  She states that her weight has been fairly stable, and actually she has lost some weight.  She denies any palpitations, orthopnea or peripheral  edema.  She occasionally gets some lightheadedness if she arises quickly      VITAL SIGNS:  BP: 92/64  Pulse: 69  Weight:  128 (stable)  BMI: 20      IMPRESSION AND PLAN:    S/p Bioprosthetic MVR and Tricuspid Repair (2018)  -normal MV gradients and trace TR (6/2021)  -SBE prophylaxis    Paroxysmal Atrial Fibrillation  -hx of cardioversion (2019)  -none on recent device interrogation  -chronic Xarelto      Resolved Nonischemic Cardiomyopathy  -LVEF 55% (6/2021)  -denies any heart failure  -on Torsemide 10 mg/day  -weight stable  -will wean off Torsemide and K-dur but reducing both to 1/2 tab per day for 1-2 wks and then stop completely if her weight maintains  -I encouraged continued low salt diet    Hypertension:  -on Losartan 100 mg, Metoprolol  mg, Torsemide 10 mg  -BP well controlled    The total time for the visit today was 30 minutes which includes patient visit, reviewing of records, discussion, and placing of orders of the outpatient coordination of cardiovascular care as described.  The level of medical decision making during this visit was of moderate complexity.  Thank you for allowing me to participate in their care.    Orders Placed This Encounter   Procedures     Follow-Up with Cardiology PAMELLA       No orders of the defined types were placed in this encounter.      Medications Discontinued During This Encounter   Medication Reason     potassium chloride ER (KLOR-CON M) 20 MEQ CR tablet      torsemide (DEMADEX) 10 MG tablet          Encounter Diagnoses   Name Primary?     Chronic systolic heart failure (H)      S/P MVR (mitral valve replacement)-St junaid epic tissue 31 mm      S/P TVR (tricuspid valve repair)      Paroxysmal atrial fibrillation (H)      Cardiomyopathy, unspecified type (H)      Cardiac pacemaker in situ        CURRENT MEDICATIONS:  Current Outpatient Medications   Medication Sig Dispense Refill     aspirin 81 MG EC tablet Take 81 mg by mouth every evening       calcium  carbonate-vitamin D 600-200 MG-UNIT TABS Take 2 tablets by mouth 2 times daily        Cetirizine HCl (ZYRTEC PO) Take 10 mg by mouth daily        IBANdronate (BONIVA) 150 MG tablet Take 1 tablet (150 mg) by mouth every 30 days 3 tablet 3     levothyroxine (SYNTHROID/LEVOTHROID) 50 MCG tablet Take 1 tablet (50 mcg) by mouth daily 90 tablet 3     losartan (COZAAR) 100 MG tablet Take 1 tablet (100 mg) by mouth daily 90 tablet 3     metoprolol succinate ER (TOPROL-XL) 100 MG 24 hr tablet Take 1 tablet (100 mg) by mouth daily 90 tablet 3     Multiple Vitamins-Minerals (MULTIVITAMIN ADULT) TABS Take 1 tablet by mouth daily       omeprazole (PRILOSEC) 10 MG DR capsule Take 1 capsule (10 mg) by mouth every morning (before breakfast) 90 capsule 3     rivaroxaban ANTICOAGULANT (XARELTO) 20 MG TABS tablet Take 1 tablet (20 mg) by mouth daily (with dinner) 90 tablet 3       ALLERGIES     Allergies   Allergen Reactions     Chlorpheniramine Other (See Comments)     LOC and fainting      Lisinopril Cough     Phenylephrine Other (See Comments)     fainting       PAST MEDICAL HISTORY:  Past Medical History:   Diagnosis Date     Allergic rhinitis, cause unspecified     dust mites, ragweed     Complete AV block (H)     BSX DDD PM 2-     History of tricuspid valve repair      Mitral valve prolapse     bioprosthetic MVR 2-     Paroxysmal atrial fibrillation (H)        PAST SURGICAL HISTORY:  Past Surgical History:   Procedure Laterality Date     COLONOSCOPY N/A 9/15/2015    Procedure: COLONOSCOPY;  Surgeon: Anson Llanos MD;  Location:  GI     CV HEART CATHETERIZATION WITH POSSIBLE INTERVENTION N/A 1/9/2019    Procedure: Heart Catheterization with Possible Intervention;  Surgeon: Ritesh Whittaker MD;  Location:  HEART CARDIAC CATH LAB     CV RIGHT AND LEFT HEART CATH N/A 1/9/2019    Procedure: Right and Left Heart Catherization;  Surgeon: Ritesh Whittaker MD;  Location:  HEART CARDIAC CATH LAB     ENT  SURGERY      T&A     EP INSERT ICD N/A 8/14/2020    Procedure: EP Insert ICD;  Surgeon: Lucian Tolliver MD;  Location:  HEART CARDIAC CATH LAB     EP PACEMAKER N/A 2/18/2019    Procedure: EP Pacemaker;  Surgeon: Inocencia Guillen MD;  Location:  HEART CARDIAC CATH LAB     REPAIR VALVE TRICUSPID MINIMALLY INVASIVE N/A 2/15/2019    Procedure: MINIMALLY INVASIVE TRICUSPID VALVE REPAIR WITH 32MM SULLIVAN RING;  Surgeon: Andrea Hanna MD;  Location:  OR     REPLACE VALVE MITRAL MINIMALLY INVASIVE N/A 2/15/2019    Procedure: MINIMALLY INVASIVE MITRAL VALVE REPLACEMENT WITH EPIC ST KEYUR 31MM VALVE; ON PUMP WITH TIA.  CARDIOLOGIST DR CARDENAS;  Surgeon: Andrea Hanna MD;  Location:  OR       FAMILY HISTORY:  Family History   Problem Relation Age of Onset     Osteoporosis Mother      Alzheimer Disease Mother      Family History Negative Father      Heart Disease Maternal Grandfather      Family History Negative Paternal Grandmother      Family History Negative Paternal Grandfather      Breast Cancer No family hx of      Ovarian Cancer No family hx of        SOCIAL HISTORY:  Social History     Socioeconomic History     Marital status:      Spouse name: None     Number of children: 3     Years of education: None     Highest education level: None   Tobacco Use     Smoking status: Never Smoker     Smokeless tobacco: Never Used   Substance and Sexual Activity     Alcohol use: No     Drug use: No     Sexual activity: Not Currently   Other Topics Concern     Caffeine Concern No     Comment: 1 cup of coffee and 1 can diet coke per day     Sleep Concern Yes     Comment: takes Doxylamine succinate 1/2 tab every night     Stress Concern No     Weight Concern No     Special Diet No     Comment: healthy      Exercise Yes     Comment: walking 45min x7 a wk. Very active, [employer], Golf, Bowling, Cardio     Seat Belt Yes     Self-Exams Yes     Parent/sibling w/ CABG, MI or angioplasty before  "65F 55M? No       Review of Systems:  Skin:          Eyes:         ENT:         Respiratory:  Negative       Cardiovascular:    lightheadedness;Positive for    Gastroenterology:        Genitourinary:         Musculoskeletal:         Neurologic:         Psychiatric:         Heme/Lymph/Imm:         Endocrine:  Positive for        Physical Exam:  Vitals: BP 92/64   Pulse 69   Ht 1.664 m (5' 5.5\")   Wt 58.1 kg (128 lb)   LMP  (LMP Unknown)   BMI 20.98 kg/m      Constitutional:  cooperative        Skin:  warm and dry to the touch   pacemaker incision in the left infraclavicular area was well-healed      Head:  normocephalic        Eyes:  pupils equal and round        Lymph:      ENT:  no pallor or cyanosis        Neck:  no carotid bruit;JVP normal        Respiratory:  clear to auscultation;normal respiratory excursion         Cardiac: regular rhythm occasional premature beats   no presence of murmur          pulses full and equal                                        GI:  abdomen soft        Extremities and Muscular Skeletal:  no edema              Neurological:  no gross motor deficits        Psych:  Alert and Oriented x 3          CC  Jesus Stone MD  [address]  [place] [id number]                  "

## 2022-06-07 ENCOUNTER — OFFICE VISIT (OUTPATIENT)
Dept: CARDIOLOGY | Facility: CLINIC | Age: 77
End: 2022-06-07
Payer: COMMERCIAL

## 2022-06-07 VITALS
HEIGHT: 66 IN | SYSTOLIC BLOOD PRESSURE: 92 MMHG | WEIGHT: 128 LBS | HEART RATE: 69 BPM | DIASTOLIC BLOOD PRESSURE: 64 MMHG | BODY MASS INDEX: 20.57 KG/M2

## 2022-06-07 DIAGNOSIS — Z95.0 CARDIAC PACEMAKER IN SITU: ICD-10-CM

## 2022-06-07 DIAGNOSIS — I42.9 CARDIOMYOPATHY, UNSPECIFIED TYPE (H): ICD-10-CM

## 2022-06-07 DIAGNOSIS — I50.22 CHRONIC SYSTOLIC HEART FAILURE (H): ICD-10-CM

## 2022-06-07 DIAGNOSIS — Z98.890 S/P TVR (TRICUSPID VALVE REPAIR): ICD-10-CM

## 2022-06-07 DIAGNOSIS — I48.0 PAROXYSMAL ATRIAL FIBRILLATION (H): ICD-10-CM

## 2022-06-07 DIAGNOSIS — Z95.2 S/P MVR (MITRAL VALVE REPLACEMENT): ICD-10-CM

## 2022-06-07 PROCEDURE — 99214 OFFICE O/P EST MOD 30 MIN: CPT | Performed by: NURSE PRACTITIONER

## 2022-06-07 NOTE — LETTER
6/7/2022    Anyi Olmos MD  [address]  [facility] [address]    RE: Taty Aguila       Dear Colleague,     I had the pleasure of seeing Taty Aguila in the [facility].  HISTORY OF PRESENT ILLNESS:    This is a 77 year old female who follows with Dr Stone at [facility].  Her past medical history includes:  Nonischemic cardiomyopathy, mitral regurgitation/prolapse s/p MVR, tricuspid regurgitation s/p repair, paroxysmal atrial fibrillation, advanced heart block s/p ICD     Ms Aguila was found to have rapid atrial fibrillation (2018)  She underwent cardioversion, but her A-fib redeveloped shortly after    She was found to have severe tricuspid regurgitation and mitral regurgitation and eventually underwent a minimally invasive bioprosthetic mitral valve replacement and a tricuspid valve repair in 2019. She did not require coronary bypass at that time.  She did develop complete heart block following surgery and underwent permanent pacemaker implantation.  She also developed significant volume overload after surgery and redeveloped atrial fibrillation.  Her ECHO then showed LVEF 20-25%  She underwent repeat cardioversion restoring sinus rhythm    Maritza NUC (2019) showed normal perfusion    Her LVEF improved to 35-40% in 2020.  She was found to have short runs of nonsustained VT on device interrogation  and subsequently underwent a device upgrade to an ICD (8/2020)    ECHO (6/2021) showed LVEF 50-55%, normal RV function, normal MV prosthetic valve gradients, trace tricuspid regurgitation, 1+ pulmonic valvular regurgitation, mild ascending aorta dilatation    Device interrogation (5/6/22) showed 16% A-paced  0% V-paced  No arrhythmias or shocks    She returns today for reassessment    Ms Aguila is very active  She walks at least 2 miles a day and does all her own household chores  She denies any chest pain or significant shortness of breath.  She is fairly good  about avoiding salty foods and weighs herself at least once a week.  She states that her weight has been fairly stable, and actually she has lost some weight.  She denies any palpitations, orthopnea or peripheral edema.  She occasionally gets some lightheadedness if she arises quickly      VITAL SIGNS:  BP: 92/64  Pulse: 69  Weight:  128 (stable)  BMI: 20      IMPRESSION AND PLAN:    S/p Bioprosthetic MVR and Tricuspid Repair (2018)  -normal MV gradients and trace TR (6/2021)  -SBE prophylaxis    Paroxysmal Atrial Fibrillation  -hx of cardioversion (2019)  -none on recent device interrogation  -chronic Xarelto      Resolved Nonischemic Cardiomyopathy  -LVEF 55% (6/2021)  -denies any heart failure  -on Torsemide 10 mg/day  -weight stable  -will wean off Torsemide and K-dur but reducing both to 1/2 tab per day for 1-2 wks and then stop completely if her weight maintains  -I encouraged continued low salt diet    Hypertension:  -on Losartan 100 mg, Metoprolol  mg, Torsemide 10 mg  -BP well controlled    The total time for the visit today was 30 minutes which includes patient visit, reviewing of records, discussion, and placing of orders of the outpatient coordination of cardiovascular care as described.  The level of medical decision making during this visit was of moderate complexity.  Thank you for allowing me to participate in their care.    Orders Placed This Encounter   Procedures     Follow-Up with Cardiology PAMELLA       No orders of the defined types were placed in this encounter.      Medications Discontinued During This Encounter   Medication Reason     potassium chloride ER (KLOR-CON M) 20 MEQ CR tablet      torsemide (DEMADEX) 10 MG tablet          Encounter Diagnoses   Name Primary?     Chronic systolic heart failure (H)      S/P MVR (mitral valve replacement)-St junaid epic tissue 31 mm      S/P TVR (tricuspid valve repair)      Paroxysmal atrial fibrillation (H)      Cardiomyopathy, unspecified type (H)       Cardiac pacemaker in situ        CURRENT MEDICATIONS:  Current Outpatient Medications   Medication Sig Dispense Refill     aspirin 81 MG EC tablet Take 81 mg by mouth every evening       calcium carbonate-vitamin D 600-200 MG-UNIT TABS Take 2 tablets by mouth 2 times daily        Cetirizine HCl (ZYRTEC PO) Take 10 mg by mouth daily        IBANdronate (BONIVA) 150 MG tablet Take 1 tablet (150 mg) by mouth every 30 days 3 tablet 3     levothyroxine (SYNTHROID/LEVOTHROID) 50 MCG tablet Take 1 tablet (50 mcg) by mouth daily 90 tablet 3     losartan (COZAAR) 100 MG tablet Take 1 tablet (100 mg) by mouth daily 90 tablet 3     metoprolol succinate ER (TOPROL-XL) 100 MG 24 hr tablet Take 1 tablet (100 mg) by mouth daily 90 tablet 3     Multiple Vitamins-Minerals (MULTIVITAMIN ADULT) TABS Take 1 tablet by mouth daily       omeprazole (PRILOSEC) 10 MG DR capsule Take 1 capsule (10 mg) by mouth every morning (before breakfast) 90 capsule 3     rivaroxaban ANTICOAGULANT (XARELTO) 20 MG TABS tablet Take 1 tablet (20 mg) by mouth daily (with dinner) 90 tablet 3       ALLERGIES     Allergies   Allergen Reactions     Chlorpheniramine Other (See Comments)     LOC and fainting      Lisinopril Cough     Phenylephrine Other (See Comments)     fainting       PAST MEDICAL HISTORY:  Past Medical History:   Diagnosis Date     Allergic rhinitis, cause unspecified     dust mites, ragweed     Complete AV block (H)     BSX DDD PM 2-     History of tricuspid valve repair      Mitral valve prolapse     bioprosthetic MVR 2-     Paroxysmal atrial fibrillation (H)        PAST SURGICAL HISTORY:  Past Surgical History:   Procedure Laterality Date     COLONOSCOPY N/A 9/15/2015    Procedure: COLONOSCOPY;  Surgeon: Anson Llanos MD;  Location:  GI     CV HEART CATHETERIZATION WITH POSSIBLE INTERVENTION N/A 1/9/2019    Procedure: Heart Catheterization with Possible Intervention;  Surgeon: Ritesh Whittaker MD;  Location:   HEART CARDIAC CATH LAB     CV RIGHT AND LEFT HEART CATH N/A 1/9/2019    Procedure: Right and Left Heart Catherization;  Surgeon: Ritesh Whittaker MD;  Location:  HEART CARDIAC CATH LAB     ENT SURGERY      T&A     EP INSERT ICD N/A 8/14/2020    Procedure: EP Insert ICD;  Surgeon: Lucian Tolliver MD;  Location:  HEART CARDIAC CATH LAB     EP PACEMAKER N/A 2/18/2019    Procedure: EP Pacemaker;  Surgeon: Inocencia Guillen MD;  Location:  HEART CARDIAC CATH LAB     REPAIR VALVE TRICUSPID MINIMALLY INVASIVE N/A 2/15/2019    Procedure: MINIMALLY INVASIVE TRICUSPID VALVE REPAIR WITH 32MM SULLIVAN RING;  Surgeon: Andrea Hanna MD;  Location:  OR     REPLACE VALVE MITRAL MINIMALLY INVASIVE N/A 2/15/2019    Procedure: MINIMALLY INVASIVE MITRAL VALVE REPLACEMENT WITH EPIC ST KEYUR 31MM VALVE; ON PUMP WITH TIA.  CARDIOLOGIST DR CARDENAS;  Surgeon: Andrea Hanna MD;  Location:  OR       FAMILY HISTORY:  Family History   Problem Relation Age of Onset     Osteoporosis Mother      Alzheimer Disease Mother      Family History Negative Father      Heart Disease Maternal Grandfather      Family History Negative Paternal Grandmother      Family History Negative Paternal Grandfather      Breast Cancer No family hx of      Ovarian Cancer No family hx of        SOCIAL HISTORY:  Social History     Socioeconomic History     Marital status:      Spouse name: None     Number of children: 3     Years of education: None     Highest education level: None   Tobacco Use     Smoking status: Never Smoker     Smokeless tobacco: Never Used   Substance and Sexual Activity     Alcohol use: No     Drug use: No     Sexual activity: Not Currently   Other Topics Concern     Caffeine Concern No     Comment: 1 cup of coffee and 1 can diet coke per day     Sleep Concern Yes     Comment: takes Doxylamine succinate 1/2 tab every night     Stress Concern No     Weight Concern No     Special Diet No      "Comment: healthy      Exercise Yes     Comment: walking 45min x7 a wk. Very active, [employer], Golf, Bowling, Cardio     Seat Belt Yes     Self-Exams Yes     Parent/sibling w/ CABG, MI or angioplasty before 65F 55M? No       Review of Systems:  Skin:          Eyes:         ENT:         Respiratory:  Negative       Cardiovascular:    lightheadedness;Positive for    Gastroenterology:        Genitourinary:         Musculoskeletal:         Neurologic:         Psychiatric:         Heme/Lymph/Imm:         Endocrine:  Positive for        Physical Exam:  Vitals: BP 92/64   Pulse 69   Ht 1.664 m (5' 5.5\")   Wt 58.1 kg (128 lb)   LMP  (LMP Unknown)   BMI 20.98 kg/m      Constitutional:  cooperative        Skin:  warm and dry to the touch   pacemaker incision in the left infraclavicular area was well-healed      Head:  normocephalic        Eyes:  pupils equal and round        Lymph:      ENT:  no pallor or cyanosis        Neck:  no carotid bruit;JVP normal        Respiratory:  clear to auscultation;normal respiratory excursion         Cardiac: regular rhythm occasional premature beats   no presence of murmur          pulses full and equal                                        GI:  abdomen soft        Extremities and Muscular Skeletal:  no edema              Neurological:  no gross motor deficits        Psych:  Alert and Oriented x 3      CC  Jesus Stone MD  [address]  [place] [id number]    Thank you for allowing me to participate in the care of your patient.      Sincerely,     ROBINA Parra [facility]  "

## 2022-08-18 ENCOUNTER — TELEPHONE (OUTPATIENT)
Dept: CARDIOLOGY | Facility: CLINIC | Age: 77
End: 2022-08-18

## 2022-08-18 ENCOUNTER — ANCILLARY PROCEDURE (OUTPATIENT)
Dept: CARDIOLOGY | Facility: CLINIC | Age: 77
End: 2022-08-18
Attending: INTERNAL MEDICINE
Payer: COMMERCIAL

## 2022-08-18 DIAGNOSIS — I42.9 CARDIOMYOPATHY, UNSPECIFIED TYPE (H): ICD-10-CM

## 2022-08-18 DIAGNOSIS — I42.9 CARDIOMYOPATHY, UNSPECIFIED TYPE (H): Primary | ICD-10-CM

## 2022-08-18 DIAGNOSIS — Z95.810 ICD (IMPLANTABLE CARDIOVERTER-DEFIBRILLATOR) IN PLACE: ICD-10-CM

## 2022-08-18 PROCEDURE — 93295 DEV INTERROG REMOTE 1/2/MLT: CPT | Performed by: INTERNAL MEDICINE

## 2022-08-18 PROCEDURE — 93296 REM INTERROG EVL PM/IDS: CPT | Performed by: INTERNAL MEDICINE

## 2022-08-18 NOTE — TELEPHONE ENCOUNTER
Orders updated. Called patient and was able to schedule in clinic device check for 11/16/22.    Per patient, she is hoping to schedule PAMELLA appt with Aisha Schuster for 12/22. She has felt great since stopping her torsemide and has not experienced any weight gain or swelling. Patient was forwarded on to scheduling to assist with setting up that appointment.    CHADD Mercedes

## 2022-08-18 NOTE — TELEPHONE ENCOUNTER
[facility] Call Center    Phone Message    May a detailed message be left on voicemail: yes     Reason for Call: Order(s): Other:   Reason for requested: Device Check  Date needed: 8/18/22  Provider name: Aisha Schuster    PT said she just received a call to schedule this but there is no updated order     Please have in clinic staff call to schedule       Action Taken: Other: Cardiology    Travel Screening: Not Applicable

## 2022-08-19 DIAGNOSIS — M85.80 OSTEOPENIA, UNSPECIFIED LOCATION: ICD-10-CM

## 2022-08-22 RX ORDER — IBANDRONATE SODIUM 150 MG/1
150 TABLET, FILM COATED ORAL
Qty: 3 TABLET | Refills: 1 | Status: SHIPPED | OUTPATIENT
Start: 2022-08-22 | End: 2023-02-28

## 2022-08-23 NOTE — TELEPHONE ENCOUNTER
Pending Prescriptions:                       Disp   Refills    IBANdronate (BONIVA) 150 MG tablet [Pharm*3 tabl*1            Sig: Take 1 tablet (150 mg) by mouth every 30 days    Prescription approved per [facility] Refill Protocol.

## 2022-08-24 LAB
MDC_IDC_EPISODE_DTM: NORMAL
MDC_IDC_EPISODE_ID: NORMAL
MDC_IDC_EPISODE_TYPE: NORMAL
MDC_IDC_LEAD_IMPLANT_DT: NORMAL
MDC_IDC_LEAD_IMPLANT_DT: NORMAL
MDC_IDC_LEAD_LOCATION: NORMAL
MDC_IDC_LEAD_LOCATION: NORMAL
MDC_IDC_LEAD_LOCATION_DETAIL_1: NORMAL
MDC_IDC_LEAD_LOCATION_DETAIL_1: NORMAL
MDC_IDC_LEAD_MFG: NORMAL
MDC_IDC_LEAD_MFG: NORMAL
MDC_IDC_LEAD_MODEL: NORMAL
MDC_IDC_LEAD_MODEL: NORMAL
MDC_IDC_LEAD_POLARITY_TYPE: NORMAL
MDC_IDC_LEAD_POLARITY_TYPE: NORMAL
MDC_IDC_LEAD_SERIAL: NORMAL
MDC_IDC_LEAD_SERIAL: NORMAL
MDC_IDC_MSMT_BATTERY_DTM: NORMAL
MDC_IDC_MSMT_BATTERY_REMAINING_LONGEVITY: 156 MO
MDC_IDC_MSMT_BATTERY_REMAINING_PERCENTAGE: 100 %
MDC_IDC_MSMT_BATTERY_STATUS: NORMAL
MDC_IDC_MSMT_CAP_CHARGE_DTM: NORMAL
MDC_IDC_MSMT_CAP_CHARGE_TIME: 9.3 S
MDC_IDC_MSMT_CAP_CHARGE_TYPE: NORMAL
MDC_IDC_MSMT_LEADCHNL_RA_IMPEDANCE_VALUE: 649 OHM
MDC_IDC_MSMT_LEADCHNL_RA_PACING_THRESHOLD_AMPLITUDE: 0.5 V
MDC_IDC_MSMT_LEADCHNL_RA_PACING_THRESHOLD_PULSEWIDTH: 0.4 MS
MDC_IDC_MSMT_LEADCHNL_RV_IMPEDANCE_VALUE: 404 OHM
MDC_IDC_MSMT_LEADCHNL_RV_PACING_THRESHOLD_AMPLITUDE: 0.7 V
MDC_IDC_MSMT_LEADCHNL_RV_PACING_THRESHOLD_PULSEWIDTH: 0.4 MS
MDC_IDC_PG_IMPLANT_DTM: NORMAL
MDC_IDC_PG_MFG: NORMAL
MDC_IDC_PG_MODEL: NORMAL
MDC_IDC_PG_SERIAL: NORMAL
MDC_IDC_PG_TYPE: NORMAL
MDC_IDC_SESS_CLINIC_NAME: NORMAL
MDC_IDC_SESS_DTM: NORMAL
MDC_IDC_SESS_TYPE: NORMAL
MDC_IDC_SET_BRADY_AT_MODE_SWITCH_MODE: NORMAL
MDC_IDC_SET_BRADY_AT_MODE_SWITCH_RATE: 170 {BEATS}/MIN
MDC_IDC_SET_BRADY_LOWRATE: 60 {BEATS}/MIN
MDC_IDC_SET_BRADY_MAX_SENSOR_RATE: 130 {BEATS}/MIN
MDC_IDC_SET_BRADY_MAX_TRACKING_RATE: 130 {BEATS}/MIN
MDC_IDC_SET_BRADY_MODE: NORMAL
MDC_IDC_SET_BRADY_PAV_DELAY_HIGH: 150 MS
MDC_IDC_SET_BRADY_PAV_DELAY_LOW: 200 MS
MDC_IDC_SET_BRADY_SAV_DELAY_HIGH: 150 MS
MDC_IDC_SET_BRADY_SAV_DELAY_LOW: 200 MS
MDC_IDC_SET_LEADCHNL_RA_PACING_AMPLITUDE: 2 V
MDC_IDC_SET_LEADCHNL_RA_PACING_CAPTURE_MODE: NORMAL
MDC_IDC_SET_LEADCHNL_RA_PACING_POLARITY: NORMAL
MDC_IDC_SET_LEADCHNL_RA_PACING_PULSEWIDTH: 0.4 MS
MDC_IDC_SET_LEADCHNL_RA_SENSING_ADAPTATION_MODE: NORMAL
MDC_IDC_SET_LEADCHNL_RA_SENSING_POLARITY: NORMAL
MDC_IDC_SET_LEADCHNL_RA_SENSING_SENSITIVITY: 0.25 MV
MDC_IDC_SET_LEADCHNL_RV_PACING_AMPLITUDE: 2 V
MDC_IDC_SET_LEADCHNL_RV_PACING_CAPTURE_MODE: NORMAL
MDC_IDC_SET_LEADCHNL_RV_PACING_POLARITY: NORMAL
MDC_IDC_SET_LEADCHNL_RV_PACING_PULSEWIDTH: 0.4 MS
MDC_IDC_SET_LEADCHNL_RV_SENSING_ADAPTATION_MODE: NORMAL
MDC_IDC_SET_LEADCHNL_RV_SENSING_POLARITY: NORMAL
MDC_IDC_SET_LEADCHNL_RV_SENSING_SENSITIVITY: 0.6 MV
MDC_IDC_SET_ZONE_DETECTION_INTERVAL: 250 MS
MDC_IDC_SET_ZONE_DETECTION_INTERVAL: 300 MS
MDC_IDC_SET_ZONE_DETECTION_INTERVAL: 353 MS
MDC_IDC_SET_ZONE_TYPE: NORMAL
MDC_IDC_SET_ZONE_VENDOR_TYPE: NORMAL
MDC_IDC_STAT_AT_BURDEN_PERCENT: 0 %
MDC_IDC_STAT_AT_DTM_END: NORMAL
MDC_IDC_STAT_AT_DTM_START: NORMAL
MDC_IDC_STAT_BRADY_DTM_END: NORMAL
MDC_IDC_STAT_BRADY_DTM_START: NORMAL
MDC_IDC_STAT_BRADY_RA_PERCENT_PACED: 19 %
MDC_IDC_STAT_BRADY_RV_PERCENT_PACED: 0 %
MDC_IDC_STAT_EPISODE_RECENT_COUNT: 0
MDC_IDC_STAT_EPISODE_RECENT_COUNT_DTM_END: NORMAL
MDC_IDC_STAT_EPISODE_RECENT_COUNT_DTM_START: NORMAL
MDC_IDC_STAT_EPISODE_TYPE: NORMAL
MDC_IDC_STAT_EPISODE_VENDOR_TYPE: NORMAL
MDC_IDC_STAT_TACHYTHERAPY_ATP_DELIVERED_RECENT: 0
MDC_IDC_STAT_TACHYTHERAPY_ATP_DELIVERED_TOTAL: 0
MDC_IDC_STAT_TACHYTHERAPY_RECENT_DTM_END: NORMAL
MDC_IDC_STAT_TACHYTHERAPY_RECENT_DTM_START: NORMAL
MDC_IDC_STAT_TACHYTHERAPY_SHOCKS_ABORTED_RECENT: 0
MDC_IDC_STAT_TACHYTHERAPY_SHOCKS_ABORTED_TOTAL: 0
MDC_IDC_STAT_TACHYTHERAPY_SHOCKS_DELIVERED_RECENT: 0
MDC_IDC_STAT_TACHYTHERAPY_SHOCKS_DELIVERED_TOTAL: 0
MDC_IDC_STAT_TACHYTHERAPY_TOTAL_DTM_END: NORMAL
MDC_IDC_STAT_TACHYTHERAPY_TOTAL_DTM_START: NORMAL

## 2022-09-14 NOTE — Clinical Note
Grounding pads were placed on the left hip   
DDD 60 -130. 
Generator attached  
Generator attached  
Generator inserted into pocket.  
Incision made.  
Irrigated with antibiotic solution  
Lab results reviewed and abnormals discussed with physician.  
Lead advanced under fluoro to the right atrium. 
Lead advanced under fluoro to the right ventricle. 
Lead repositioned.   
Lead repositioned.   
Patient education provided.   
Pocket closure completed by scrub person.  
Pocket formed.  
Sheath prepped and inserted in the vein.  
Subclavian vein accessed.  X 2
Tested the atrial lead. See vendor printout/MD dictation.  
Tested the atrial lead. See vendor printout/MD dictation.  
Tested the right ventricular lead. See vendor printout/MD dictation.  
Tested the right ventricular lead. See vendor printout/MD dictation.  
To be given at bedside  
adhesive bandage applied to wound securely. 
dry and intact. 
FAMILY HISTORY:  Father  Still living? Unknown  FH: hypertension, Age at diagnosis: Age Unknown    Mother  Still living? Unknown  FH: hypertension, Age at diagnosis: Age Unknown

## 2022-10-17 NOTE — TELEPHONE ENCOUNTER
Call to pt and advised. She scheduled.    Please arrange follow-up appointment.  Depending on those results, she may require an MRI.    No labs needed.

## 2022-11-16 ENCOUNTER — ANCILLARY PROCEDURE (OUTPATIENT)
Dept: CARDIOLOGY | Facility: CLINIC | Age: 77
End: 2022-11-16
Attending: INTERNAL MEDICINE
Payer: COMMERCIAL

## 2022-11-16 DIAGNOSIS — Z95.810 ICD (IMPLANTABLE CARDIOVERTER-DEFIBRILLATOR) IN PLACE: ICD-10-CM

## 2022-11-16 DIAGNOSIS — I42.9 CARDIOMYOPATHY, UNSPECIFIED TYPE (H): ICD-10-CM

## 2022-11-16 PROCEDURE — 93283 PRGRMG EVAL IMPLANTABLE DFB: CPT | Performed by: INTERNAL MEDICINE

## 2022-11-21 ENCOUNTER — HEALTH MAINTENANCE LETTER (OUTPATIENT)
Age: 77
End: 2022-11-21

## 2022-11-23 LAB
MDC_IDC_LEAD_IMPLANT_DT: NORMAL
MDC_IDC_LEAD_IMPLANT_DT: NORMAL
MDC_IDC_LEAD_LOCATION: NORMAL
MDC_IDC_LEAD_LOCATION: NORMAL
MDC_IDC_LEAD_LOCATION_DETAIL_1: NORMAL
MDC_IDC_LEAD_LOCATION_DETAIL_1: NORMAL
MDC_IDC_LEAD_MFG: NORMAL
MDC_IDC_LEAD_MFG: NORMAL
MDC_IDC_LEAD_MODEL: NORMAL
MDC_IDC_LEAD_MODEL: NORMAL
MDC_IDC_LEAD_POLARITY_TYPE: NORMAL
MDC_IDC_LEAD_POLARITY_TYPE: NORMAL
MDC_IDC_LEAD_SERIAL: NORMAL
MDC_IDC_LEAD_SERIAL: NORMAL
MDC_IDC_MSMT_BATTERY_DTM: NORMAL
MDC_IDC_MSMT_BATTERY_REMAINING_LONGEVITY: 150 MO
MDC_IDC_MSMT_BATTERY_REMAINING_PERCENTAGE: 100 %
MDC_IDC_MSMT_BATTERY_STATUS: NORMAL
MDC_IDC_MSMT_CAP_CHARGE_DTM: NORMAL
MDC_IDC_MSMT_CAP_CHARGE_TIME: 9.3 S
MDC_IDC_MSMT_CAP_CHARGE_TYPE: NORMAL
MDC_IDC_MSMT_LEADCHNL_RA_IMPEDANCE_VALUE: 674 OHM
MDC_IDC_MSMT_LEADCHNL_RA_PACING_THRESHOLD_AMPLITUDE: 0.7 V
MDC_IDC_MSMT_LEADCHNL_RA_PACING_THRESHOLD_PULSEWIDTH: 0.4 MS
MDC_IDC_MSMT_LEADCHNL_RV_IMPEDANCE_VALUE: 442 OHM
MDC_IDC_MSMT_LEADCHNL_RV_LEAD_CHANNEL_STATUS: NORMAL
MDC_IDC_MSMT_LEADCHNL_RV_PACING_THRESHOLD_AMPLITUDE: 0.7 V
MDC_IDC_MSMT_LEADCHNL_RV_PACING_THRESHOLD_PULSEWIDTH: 0.4 MS
MDC_IDC_PG_IMPLANT_DTM: NORMAL
MDC_IDC_PG_MFG: NORMAL
MDC_IDC_PG_MODEL: NORMAL
MDC_IDC_PG_SERIAL: NORMAL
MDC_IDC_PG_TYPE: NORMAL
MDC_IDC_SESS_CLINIC_NAME: NORMAL
MDC_IDC_SESS_DTM: NORMAL
MDC_IDC_SESS_TYPE: NORMAL
MDC_IDC_SET_BRADY_AT_MODE_SWITCH_MODE: NORMAL
MDC_IDC_SET_BRADY_AT_MODE_SWITCH_RATE: 170 {BEATS}/MIN
MDC_IDC_SET_BRADY_LOWRATE: 60 {BEATS}/MIN
MDC_IDC_SET_BRADY_MAX_SENSOR_RATE: 130 {BEATS}/MIN
MDC_IDC_SET_BRADY_MAX_TRACKING_RATE: 130 {BEATS}/MIN
MDC_IDC_SET_BRADY_MODE: NORMAL
MDC_IDC_SET_BRADY_PAV_DELAY_HIGH: 150 MS
MDC_IDC_SET_BRADY_PAV_DELAY_LOW: 200 MS
MDC_IDC_SET_BRADY_SAV_DELAY_HIGH: 150 MS
MDC_IDC_SET_BRADY_SAV_DELAY_LOW: 200 MS
MDC_IDC_SET_LEADCHNL_RA_PACING_AMPLITUDE: 2 V
MDC_IDC_SET_LEADCHNL_RA_PACING_CAPTURE_MODE: NORMAL
MDC_IDC_SET_LEADCHNL_RA_PACING_POLARITY: NORMAL
MDC_IDC_SET_LEADCHNL_RA_PACING_PULSEWIDTH: 0.4 MS
MDC_IDC_SET_LEADCHNL_RA_SENSING_ADAPTATION_MODE: NORMAL
MDC_IDC_SET_LEADCHNL_RA_SENSING_POLARITY: NORMAL
MDC_IDC_SET_LEADCHNL_RA_SENSING_SENSITIVITY: 0.25 MV
MDC_IDC_SET_LEADCHNL_RV_PACING_AMPLITUDE: 2 V
MDC_IDC_SET_LEADCHNL_RV_PACING_CAPTURE_MODE: NORMAL
MDC_IDC_SET_LEADCHNL_RV_PACING_POLARITY: NORMAL
MDC_IDC_SET_LEADCHNL_RV_PACING_PULSEWIDTH: 0.4 MS
MDC_IDC_SET_LEADCHNL_RV_SENSING_ADAPTATION_MODE: NORMAL
MDC_IDC_SET_LEADCHNL_RV_SENSING_POLARITY: NORMAL
MDC_IDC_SET_LEADCHNL_RV_SENSING_SENSITIVITY: 0.6 MV
MDC_IDC_SET_ZONE_DETECTION_INTERVAL: 250 MS
MDC_IDC_SET_ZONE_DETECTION_INTERVAL: 300 MS
MDC_IDC_SET_ZONE_DETECTION_INTERVAL: 353 MS
MDC_IDC_SET_ZONE_TYPE: NORMAL
MDC_IDC_SET_ZONE_VENDOR_TYPE: NORMAL
MDC_IDC_STAT_AT_BURDEN_PERCENT: 1 %
MDC_IDC_STAT_AT_DTM_END: NORMAL
MDC_IDC_STAT_AT_DTM_START: NORMAL
MDC_IDC_STAT_BRADY_DTM_END: NORMAL
MDC_IDC_STAT_BRADY_DTM_START: NORMAL
MDC_IDC_STAT_BRADY_RA_PERCENT_PACED: 19 %
MDC_IDC_STAT_BRADY_RV_PERCENT_PACED: 0 %
MDC_IDC_STAT_EPISODE_RECENT_COUNT: 0
MDC_IDC_STAT_EPISODE_RECENT_COUNT: 1
MDC_IDC_STAT_EPISODE_RECENT_COUNT_DTM_END: NORMAL
MDC_IDC_STAT_EPISODE_RECENT_COUNT_DTM_START: NORMAL
MDC_IDC_STAT_EPISODE_TYPE: NORMAL
MDC_IDC_STAT_EPISODE_VENDOR_TYPE: NORMAL
MDC_IDC_STAT_TACHYTHERAPY_ATP_DELIVERED_RECENT: 0
MDC_IDC_STAT_TACHYTHERAPY_ATP_DELIVERED_TOTAL: 0
MDC_IDC_STAT_TACHYTHERAPY_RECENT_DTM_END: NORMAL
MDC_IDC_STAT_TACHYTHERAPY_RECENT_DTM_START: NORMAL
MDC_IDC_STAT_TACHYTHERAPY_SHOCKS_ABORTED_RECENT: 0
MDC_IDC_STAT_TACHYTHERAPY_SHOCKS_ABORTED_TOTAL: 0
MDC_IDC_STAT_TACHYTHERAPY_SHOCKS_DELIVERED_RECENT: 0
MDC_IDC_STAT_TACHYTHERAPY_SHOCKS_DELIVERED_TOTAL: 0
MDC_IDC_STAT_TACHYTHERAPY_TOTAL_DTM_END: NORMAL
MDC_IDC_STAT_TACHYTHERAPY_TOTAL_DTM_START: NORMAL

## 2022-12-05 NOTE — PROGRESS NOTES
HISTORY OF PRESENT ILLNESS:     This is a 77 year old female who follows with Dr Stone at [facility].  Her past medical history includes:  Nonischemic cardiomyopathy, mitral regurgitation/prolapse s/p MVR, tricuspid regurgitation s/p repair, paroxysmal atrial fibrillation, advanced heart block s/p ICD      Ms Aguila was found to have rapid atrial fibrillation (2018)  She underwent cardioversion, but her A-fib redeveloped shortly after     She developed severe tricuspid regurgitation and mitral regurgitation and eventually underwent a minimally invasive bioprosthetic mitral valve replacement and a tricuspid valve repair in 2019. She did not require coronary bypass at that time.  She required placement of a permanent pacemaker due to complete heart block following surgery.  She also developed significant volume overload after surgery and redeveloped atrial fibrillation.  Her ECHO then showed LVEF 20-25%  She underwent repeat cardioversion restoring sinus rhythm     Maritza NUC (2019) showed normal perfusion     Her LVEF improved to 35-40% in 2020.  She was found to have short runs of nonsustained VT on device interrogation and subsequently underwent a device upgrade to an ICD (8/2020)     ECHO (6/2021) showed LVEF 50-55%, normal RV function, normal MV prosthetic valve gradients, trace tricuspid regurgitation, 1+ pulmonic valvular regurgitation, mild ascending aorta dilatation    She was weaned off her Torsemide earlier this year, and has it on hand for PRN use      Device interrogation (11/16/22) showed 19% A-paced  < 1% V-paced  3 second PAT  No ventricular arrhythmias or shocks     She returns today for 6 month reassessment     Ms Aguila is very active  She walks at least 3 miles a day and does all her own household chores  She denies any chest pain or significant shortness of breath. She has not taken any torsemide now for 6 months and her weight has been stable  She denies any peripheral edema or  orthopnea.  She does notice her skipped beats, but denies any significant palpitations.  She has noted some intermittent positional lightheadedness which resolves with hydration.  She checks her BP at home and assures me that it is well controlled        VITAL SIGNS:  BP: 122/78  Pulse:  64  Weight:  124 lbs (BMI: 20)        IMPRESSION AND PLAN:     S/p Bioprosthetic MVR and Tricuspid Repair (2018)  -normal MV gradients and trace TR (6/2021)  -will arrange ECHO in 6 months    S/p dual-chamber PPM for CHB  -19% A-paced  -continue device cares     Paroxysmal Atrial Fibrillation  -hx of cardioversion (2018 & 2019)  -none on recent device interrogation  -chronic Xarelto       Resolved Nonischemic Cardiomyopathy  -LVEF 50-55% (6/2021)  -no signs or symptoms of heart failure  -weight stable       Hypertension:  -on Losartan 100 mg, Metoprolol  mg  -BP controlled  -will check labs today and review results over the phone    The total time for the visit today was 30 minutes which includes patient visit, reviewing of records, discussion, and placing of orders of the outpatient coordination of cardiovascular care as described.  The level of medical decision making during this visit was of moderate complexity.  Thank you for allowing me to participate in their care.    Orders Placed This Encounter   Procedures     Basic metabolic panel     Magnesium     Follow-Up with Cardiology     Echocardiogram Complete       Orders Placed This Encounter   Medications     losartan (COZAAR) 100 MG tablet     Sig: Take 1 tablet (100 mg) by mouth daily     Dispense:  90 tablet     Refill:  3     metoprolol succinate ER (TOPROL XL) 100 MG 24 hr tablet     Sig: Take 1 tablet (100 mg) by mouth daily     Dispense:  90 tablet     Refill:  3     rivaroxaban ANTICOAGULANT (XARELTO) 20 MG TABS tablet     Sig: Take 1 tablet (20 mg) by mouth daily (with dinner)     Dispense:  90 tablet     Refill:  3       Medications Discontinued During This  Encounter   Medication Reason     losartan (COZAAR) 100 MG tablet Reorder     metoprolol succinate ER (TOPROL-XL) 100 MG 24 hr tablet Reorder     rivaroxaban ANTICOAGULANT (XARELTO) 20 MG TABS tablet Reorder         Encounter Diagnoses   Name Primary?     Cardiomyopathy, unspecified type (H)      S/P MVR (mitral valve replacement)-St junaid epic tissue 31 mm Yes     Paroxysmal atrial fibrillation (H)      Benign essential hypertension      Chronic systolic heart failure (H)      S/P TVR (tricuspid valve repair)        CURRENT MEDICATIONS:  Current Outpatient Medications   Medication Sig Dispense Refill     aspirin 81 MG EC tablet Take 81 mg by mouth every evening       calcium carbonate-vitamin D 600-200 MG-UNIT TABS Take 2 tablets by mouth 2 times daily        Cetirizine HCl (ZYRTEC PO) Take 10 mg by mouth daily        IBANdronate (BONIVA) 150 MG tablet Take 1 tablet (150 mg) by mouth every 30 days 3 tablet 1     levothyroxine (SYNTHROID/LEVOTHROID) 50 MCG tablet Take 1 tablet (50 mcg) by mouth daily 90 tablet 3     losartan (COZAAR) 100 MG tablet Take 1 tablet (100 mg) by mouth daily 90 tablet 3     metoprolol succinate ER (TOPROL XL) 100 MG 24 hr tablet Take 1 tablet (100 mg) by mouth daily 90 tablet 3     Multiple Vitamins-Minerals (MULTIVITAMIN ADULT) TABS Take 1 tablet by mouth daily       omeprazole (PRILOSEC) 10 MG DR capsule Take 1 capsule (10 mg) by mouth every morning (before breakfast) 90 capsule 3     rivaroxaban ANTICOAGULANT (XARELTO) 20 MG TABS tablet Take 1 tablet (20 mg) by mouth daily (with dinner) 90 tablet 3       ALLERGIES     Allergies   Allergen Reactions     Chlorpheniramine Other (See Comments)     LOC and fainting      Lisinopril Cough     Phenylephrine Other (See Comments)     fainting       PAST MEDICAL HISTORY:  Past Medical History:   Diagnosis Date     Allergic rhinitis, cause unspecified     dust mites, ragweed     Complete AV block (H)     BSX DDD PM 2-     History of tricuspid  valve repair      Mitral valve prolapse     bioprosthetic MVR 2-     Paroxysmal atrial fibrillation (H)        PAST SURGICAL HISTORY:  Past Surgical History:   Procedure Laterality Date     COLONOSCOPY N/A 9/15/2015    Procedure: COLONOSCOPY;  Surgeon: Anson Llanos MD;  Location:  GI     CV HEART CATHETERIZATION WITH POSSIBLE INTERVENTION N/A 1/9/2019    Procedure: Heart Catheterization with Possible Intervention;  Surgeon: Ritesh Whittaker MD;  Location:  HEART CARDIAC CATH LAB     CV RIGHT AND LEFT HEART CATH N/A 1/9/2019    Procedure: Right and Left Heart Catherization;  Surgeon: Ritesh Whittaker MD;  Location:  HEART CARDIAC CATH LAB     ENT SURGERY      T&A     EP INSERT ICD N/A 8/14/2020    Procedure: EP Insert ICD;  Surgeon: Lucian Tolliver MD;  Location:  HEART CARDIAC CATH LAB     EP PACEMAKER N/A 2/18/2019    Procedure: EP Pacemaker;  Surgeon: Inocencia Guillen MD;  Location:  HEART CARDIAC CATH LAB     REPAIR VALVE TRICUSPID MINIMALLY INVASIVE N/A 2/15/2019    Procedure: MINIMALLY INVASIVE TRICUSPID VALVE REPAIR WITH 32MM SULLIVAN RING;  Surgeon: Andrea Hanna MD;  Location:  OR     REPLACE VALVE MITRAL MINIMALLY INVASIVE N/A 2/15/2019    Procedure: MINIMALLY INVASIVE MITRAL VALVE REPLACEMENT WITH EPIC ST KEYUR 31MM VALVE; ON PUMP WITH TIA.  CARDIOLOGIST DR CARDENAS;  Surgeon: Andrea Hanna MD;  Location:  OR       FAMILY HISTORY:  Family History   Problem Relation Age of Onset     Osteoporosis Mother      Alzheimer Disease Mother      Family History Negative Father      Heart Disease Maternal Grandfather      Family History Negative Paternal Grandmother      Family History Negative Paternal Grandfather      Breast Cancer No family hx of      Ovarian Cancer No family hx of        SOCIAL HISTORY:  Social History     Socioeconomic History     Marital status:      Spouse name: None     Number of children: 3     Years of education: None  "    Highest education level: None   Tobacco Use     Smoking status: Never     Smokeless tobacco: Never   Substance and Sexual Activity     Alcohol use: No     Drug use: No     Sexual activity: Not Currently   Other Topics Concern     Caffeine Concern No     Comment: 1 cup of coffee and 1 can diet coke per day     Sleep Concern Yes     Comment: takes Doxylamine succinate 1/2 tab every night     Stress Concern No     Weight Concern No     Special Diet No     Comment: healthy      Exercise Yes     Comment: walking 45min x7 a wk. Very active, [employer], Golf, Bowling, Cardio     Seat Belt Yes     Self-Exams Yes     Parent/sibling w/ CABG, MI or angioplasty before 65F 55M? No       Review of Systems:  Skin:  not assessed       Eyes:  not assessed      ENT:  not assessed      Respiratory:  Negative       Cardiovascular:    Positive for;syncope or near-syncope near syncope from dehydration per pt - has been pushing fluids and hes not experienced it since, last episode one month ago  Gastroenterology: not assessed      Genitourinary:  not assessed      Musculoskeletal:  not assessed      Neurologic:  not assessed      Psychiatric:  not assessed      Heme/Lymph/Imm:  not assessed      Endocrine:  not assessed        Physical Exam:  Vitals: /78   Pulse 64   Ht 1.664 m (5' 5.5\")   Wt 56.7 kg (124 lb 14.4 oz)   LMP  (LMP Unknown)   BMI 20.47 kg/m      Constitutional:  cooperative        Skin:  warm and dry to the touch   pacemaker incision in the left infraclavicular area was well-healed      Head:  normocephalic        Eyes:  pupils equal and round        Lymph:      ENT:  no pallor or cyanosis        Neck:  no carotid bruit;JVP normal        Respiratory:  clear to auscultation;normal respiratory excursion         Cardiac: regular rhythm occasional premature beats   no presence of murmur          pulses full and equal                                        GI:  abdomen soft        Extremities and Muscular Skeletal:  no " edema              Neurological:  no gross motor deficits        Psych:  Alert and Oriented x 3          CC  ROBINA Stallworth CNP  [address]  [place] [id number]

## 2022-12-06 ENCOUNTER — LAB (OUTPATIENT)
Dept: LAB | Facility: CLINIC | Age: 77
End: 2022-12-06
Payer: COMMERCIAL

## 2022-12-06 ENCOUNTER — OFFICE VISIT (OUTPATIENT)
Dept: CARDIOLOGY | Facility: CLINIC | Age: 77
End: 2022-12-06
Attending: NURSE PRACTITIONER
Payer: COMMERCIAL

## 2022-12-06 VITALS
SYSTOLIC BLOOD PRESSURE: 122 MMHG | WEIGHT: 124.9 LBS | HEIGHT: 66 IN | DIASTOLIC BLOOD PRESSURE: 78 MMHG | BODY MASS INDEX: 20.07 KG/M2 | HEART RATE: 64 BPM

## 2022-12-06 DIAGNOSIS — I50.22 CHRONIC SYSTOLIC HEART FAILURE (H): ICD-10-CM

## 2022-12-06 DIAGNOSIS — Z95.2 S/P MVR (MITRAL VALVE REPLACEMENT): Primary | ICD-10-CM

## 2022-12-06 DIAGNOSIS — I42.9 CARDIOMYOPATHY, UNSPECIFIED TYPE (H): ICD-10-CM

## 2022-12-06 DIAGNOSIS — Z98.890 S/P TVR (TRICUSPID VALVE REPAIR): ICD-10-CM

## 2022-12-06 DIAGNOSIS — I10 BENIGN ESSENTIAL HYPERTENSION: ICD-10-CM

## 2022-12-06 DIAGNOSIS — I48.0 PAROXYSMAL ATRIAL FIBRILLATION (H): ICD-10-CM

## 2022-12-06 LAB — MAGNESIUM SERPL-MCNC: 2 MG/DL (ref 1.7–2.3)

## 2022-12-06 PROCEDURE — 36415 COLL VENOUS BLD VENIPUNCTURE: CPT | Performed by: NURSE PRACTITIONER

## 2022-12-06 PROCEDURE — 80048 BASIC METABOLIC PNL TOTAL CA: CPT | Performed by: NURSE PRACTITIONER

## 2022-12-06 PROCEDURE — 83735 ASSAY OF MAGNESIUM: CPT | Performed by: NURSE PRACTITIONER

## 2022-12-06 PROCEDURE — 99214 OFFICE O/P EST MOD 30 MIN: CPT | Performed by: NURSE PRACTITIONER

## 2022-12-06 RX ORDER — METOPROLOL SUCCINATE 100 MG/1
100 TABLET, EXTENDED RELEASE ORAL DAILY
Qty: 90 TABLET | Refills: 3 | Status: SHIPPED | OUTPATIENT
Start: 2022-12-06 | End: 2023-12-13

## 2022-12-06 RX ORDER — LOSARTAN POTASSIUM 100 MG/1
100 TABLET ORAL DAILY
Qty: 90 TABLET | Refills: 3 | Status: SHIPPED | OUTPATIENT
Start: 2022-12-06 | End: 2023-12-13

## 2022-12-06 NOTE — LETTER
12/6/2022    Anyi Olmos MD  [address] [facility] [address]  [facility] [address]    RE: Taty Aguila       Dear Colleague,     I had the pleasure of seeing Taty Aguila in the [facility].  HISTORY OF PRESENT ILLNESS:     This is a 77 year old female who follows with Dr Stone at [facility].  Her past medical history includes:  Nonischemic cardiomyopathy, mitral regurgitation/prolapse s/p MVR, tricuspid regurgitation s/p repair, paroxysmal atrial fibrillation, advanced heart block s/p ICD      Ms Aguila was found to have rapid atrial fibrillation (2018)  She underwent cardioversion, but her A-fib redeveloped shortly after     She developed severe tricuspid regurgitation and mitral regurgitation and eventually underwent a minimally invasive bioprosthetic mitral valve replacement and a tricuspid valve repair in 2019. She did not require coronary bypass at that time.  She required placement of a permanent pacemaker due to complete heart block following surgery.  She also developed significant volume overload after surgery and redeveloped atrial fibrillation.  Her ECHO then showed LVEF 20-25%  She underwent repeat cardioversion restoring sinus rhythm     Maritza NUC (2019) showed normal perfusion     Her LVEF improved to 35-40% in 2020.  She was found to have short runs of nonsustained VT on device interrogation and subsequently underwent a device upgrade to an ICD (8/2020)     ECHO (6/2021) showed LVEF 50-55%, normal RV function, normal MV prosthetic valve gradients, trace tricuspid regurgitation, 1+ pulmonic valvular regurgitation, mild ascending aorta dilatation    She was weaned off her Torsemide earlier this year, and has it on hand for PRN use      Device interrogation (11/16/22) showed 19% A-paced  < 1% V-paced  3 second PAT  No ventricular arrhythmias or shocks     She returns today for 6 month reassessment     Ms Aguila is very active  She walks at least 3 miles a day and  does all her own household chores  She denies any chest pain or significant shortness of breath. She has not taken any torsemide now for 6 months and her weight has been stable  She denies any peripheral edema or orthopnea.  She does notice her skipped beats, but denies any significant palpitations.  She has noted some intermittent positional lightheadedness which resolves with hydration.  She checks her BP at home and assures me that it is well controlled        VITAL SIGNS:  BP: 122/78  Pulse:  64  Weight:  124 lbs (BMI: 20)        IMPRESSION AND PLAN:     S/p Bioprosthetic MVR and Tricuspid Repair (2018)  -normal MV gradients and trace TR (6/2021)  -will arrange ECHO in 6 months    S/p dual-chamber PPM for CHB  -19% A-paced  -continue device cares     Paroxysmal Atrial Fibrillation  -hx of cardioversion (2018 & 2019)  -none on recent device interrogation  -chronic Xarelto       Resolved Nonischemic Cardiomyopathy  -LVEF 50-55% (6/2021)  -no signs or symptoms of heart failure  -weight stable       Hypertension:  -on Losartan 100 mg, Metoprolol  mg  -BP controlled  -will check labs today and review results over the phone    The total time for the visit today was 30 minutes which includes patient visit, reviewing of records, discussion, and placing of orders of the outpatient coordination of cardiovascular care as described.  The level of medical decision making during this visit was of moderate complexity.  Thank you for allowing me to participate in their care.    Orders Placed This Encounter   Procedures     Basic metabolic panel     Magnesium     Follow-Up with Cardiology     Echocardiogram Complete       Orders Placed This Encounter   Medications     losartan (COZAAR) 100 MG tablet     Sig: Take 1 tablet (100 mg) by mouth daily     Dispense:  90 tablet     Refill:  3     metoprolol succinate ER (TOPROL XL) 100 MG 24 hr tablet     Sig: Take 1 tablet (100 mg) by mouth daily     Dispense:  90 tablet      Refill:  3     rivaroxaban ANTICOAGULANT (XARELTO) 20 MG TABS tablet     Sig: Take 1 tablet (20 mg) by mouth daily (with dinner)     Dispense:  90 tablet     Refill:  3       Medications Discontinued During This Encounter   Medication Reason     losartan (COZAAR) 100 MG tablet Reorder     metoprolol succinate ER (TOPROL-XL) 100 MG 24 hr tablet Reorder     rivaroxaban ANTICOAGULANT (XARELTO) 20 MG TABS tablet Reorder         Encounter Diagnoses   Name Primary?     Cardiomyopathy, unspecified type (H)      S/P MVR (mitral valve replacement)-St junaid epic tissue 31 mm Yes     Paroxysmal atrial fibrillation (H)      Benign essential hypertension      Chronic systolic heart failure (H)      S/P TVR (tricuspid valve repair)        CURRENT MEDICATIONS:  Current Outpatient Medications   Medication Sig Dispense Refill     aspirin 81 MG EC tablet Take 81 mg by mouth every evening       calcium carbonate-vitamin D 600-200 MG-UNIT TABS Take 2 tablets by mouth 2 times daily        Cetirizine HCl (ZYRTEC PO) Take 10 mg by mouth daily        IBANdronate (BONIVA) 150 MG tablet Take 1 tablet (150 mg) by mouth every 30 days 3 tablet 1     levothyroxine (SYNTHROID/LEVOTHROID) 50 MCG tablet Take 1 tablet (50 mcg) by mouth daily 90 tablet 3     losartan (COZAAR) 100 MG tablet Take 1 tablet (100 mg) by mouth daily 90 tablet 3     metoprolol succinate ER (TOPROL XL) 100 MG 24 hr tablet Take 1 tablet (100 mg) by mouth daily 90 tablet 3     Multiple Vitamins-Minerals (MULTIVITAMIN ADULT) TABS Take 1 tablet by mouth daily       omeprazole (PRILOSEC) 10 MG DR capsule Take 1 capsule (10 mg) by mouth every morning (before breakfast) 90 capsule 3     rivaroxaban ANTICOAGULANT (XARELTO) 20 MG TABS tablet Take 1 tablet (20 mg) by mouth daily (with dinner) 90 tablet 3       ALLERGIES     Allergies   Allergen Reactions     Chlorpheniramine Other (See Comments)     LOC and fainting      Lisinopril Cough     Phenylephrine Other (See Comments)      fainting       PAST MEDICAL HISTORY:  Past Medical History:   Diagnosis Date     Allergic rhinitis, cause unspecified     dust mites, ragweed     Complete AV block (H)     BSX DDD PM 2-     History of tricuspid valve repair      Mitral valve prolapse     bioprosthetic MVR 2-     Paroxysmal atrial fibrillation (H)        PAST SURGICAL HISTORY:  Past Surgical History:   Procedure Laterality Date     COLONOSCOPY N/A 9/15/2015    Procedure: COLONOSCOPY;  Surgeon: Anson Llanos MD;  Location:  GI     CV HEART CATHETERIZATION WITH POSSIBLE INTERVENTION N/A 1/9/2019    Procedure: Heart Catheterization with Possible Intervention;  Surgeon: Ritesh Whittaker MD;  Location:  HEART CARDIAC CATH LAB     CV RIGHT AND LEFT HEART CATH N/A 1/9/2019    Procedure: Right and Left Heart Catherization;  Surgeon: Ritesh Whittaker MD;  Location:  HEART CARDIAC CATH LAB     ENT SURGERY      T&A     EP INSERT ICD N/A 8/14/2020    Procedure: EP Insert ICD;  Surgeon: Lucian Tolliver MD;  Location:  HEART CARDIAC CATH LAB     EP PACEMAKER N/A 2/18/2019    Procedure: EP Pacemaker;  Surgeon: Inocencia Guillen MD;  Location:  HEART CARDIAC CATH LAB     REPAIR VALVE TRICUSPID MINIMALLY INVASIVE N/A 2/15/2019    Procedure: MINIMALLY INVASIVE TRICUSPID VALVE REPAIR WITH 32MM SULLIVAN RING;  Surgeon: Andrea Hanna MD;  Location:  OR     REPLACE VALVE MITRAL MINIMALLY INVASIVE N/A 2/15/2019    Procedure: MINIMALLY INVASIVE MITRAL VALVE REPLACEMENT WITH EPIC ST KEYUR 31MM VALVE; ON PUMP WITH TIA.  CARDIOLOGIST DR CARDENAS;  Surgeon: Andrea Hanna MD;  Location:  OR       FAMILY HISTORY:  Family History   Problem Relation Age of Onset     Osteoporosis Mother      Alzheimer Disease Mother      Family History Negative Father      Heart Disease Maternal Grandfather      Family History Negative Paternal Grandmother      Family History Negative Paternal Grandfather      Breast Cancer No  "family hx of      Ovarian Cancer No family hx of        SOCIAL HISTORY:  Social History     Socioeconomic History     Marital status:      Spouse name: None     Number of children: 3     Years of education: None     Highest education level: None   Tobacco Use     Smoking status: Never     Smokeless tobacco: Never   Substance and Sexual Activity     Alcohol use: No     Drug use: No     Sexual activity: Not Currently   Other Topics Concern     Caffeine Concern No     Comment: 1 cup of coffee and 1 can diet coke per day     Sleep Concern Yes     Comment: takes Doxylamine succinate 1/2 tab every night     Stress Concern No     Weight Concern No     Special Diet No     Comment: healthy      Exercise Yes     Comment: walking 45min x7 a wk. Very active, [employer], Golf, Bowling, Cardio     Seat Belt Yes     Self-Exams Yes     Parent/sibling w/ CABG, MI or angioplasty before 65F 55M? No       Review of Systems:  Skin:  not assessed       Eyes:  not assessed      ENT:  not assessed      Respiratory:  Negative       Cardiovascular:    Positive for;syncope or near-syncope near syncope from dehydration per pt - has been pushing fluids and hes not experienced it since, last episode one month ago  Gastroenterology: not assessed      Genitourinary:  not assessed      Musculoskeletal:  not assessed      Neurologic:  not assessed      Psychiatric:  not assessed      Heme/Lymph/Imm:  not assessed      Endocrine:  not assessed        Physical Exam:  Vitals: /78   Pulse 64   Ht 1.664 m (5' 5.5\")   Wt 56.7 kg (124 lb 14.4 oz)   LMP  (LMP Unknown)   BMI 20.47 kg/m      Constitutional:  cooperative        Skin:  warm and dry to the touch   pacemaker incision in the left infraclavicular area was well-healed      Head:  normocephalic        Eyes:  pupils equal and round        Lymph:      ENT:  no pallor or cyanosis        Neck:  no carotid bruit;JVP normal        Respiratory:  clear to auscultation;normal respiratory excursion "         Cardiac: regular rhythm occasional premature beats   no presence of murmur          pulses full and equal                                        GI:  abdomen soft        Extremities and Muscular Skeletal:  no edema              Neurological:  no gross motor deficits        Psych:  Alert and Oriented x 3      Thank you for allowing me to participate in the care of your patient.    Sincerely,     ROBINA Parra [facility]

## 2022-12-07 ENCOUNTER — TELEPHONE (OUTPATIENT)
Dept: CARDIOLOGY | Facility: CLINIC | Age: 77
End: 2022-12-07

## 2022-12-07 DIAGNOSIS — I42.9 CARDIOMYOPATHY, UNSPECIFIED TYPE (H): Primary | ICD-10-CM

## 2022-12-07 LAB
ANION GAP SERPL CALCULATED.3IONS-SCNC: 12 MMOL/L (ref 7–15)
BUN SERPL-MCNC: 11.5 MG/DL (ref 8–23)
CALCIUM SERPL-MCNC: 9.8 MG/DL (ref 8.8–10.2)
CHLORIDE SERPL-SCNC: 98 MMOL/L (ref 98–107)
CREAT SERPL-MCNC: 0.67 MG/DL (ref 0.51–0.95)
DEPRECATED HCO3 PLAS-SCNC: 24 MMOL/L (ref 22–29)
GFR SERPL CREATININE-BSD FRML MDRD: >90 ML/MIN/1.73M2
GLUCOSE SERPL-MCNC: 97 MG/DL (ref 70–99)
POTASSIUM SERPL-SCNC: 4.7 MMOL/L (ref 3.4–5.3)
SODIUM SERPL-SCNC: 134 MMOL/L (ref 136–145)

## 2023-01-23 NOTE — Clinical Note
Occupational Therapy Visit    Visit Type: Daily Treatment Note  Visit: 7  Referring Provider: Paulino Guerrero MD  Medical Diagnosis (from order): Diagnosis Information    Diagnosis  719.42 (ICD-9-CM) - M25.521 (ICD-10-CM) - Right elbow pain         SUBJECTIVE                                                                                                               \"It is back to where it was before.\"    Pain / Symptoms  - Pain rating (out of 10): Current: 3      OBJECTIVE                                                                                                                                       Treatment    Cryotherapy (59486): Ice Massage  - Location: triceps, biceps, and common extensor group  - Position: sitting  - Duration: 5 minutes each location    Results: decreased pain  Reaction: no adverse reaction to treatment      Therapeutic Exercise  Soft tissue work first:   Gentle within pain free range  AROM elbow flexion/extension (neutral) x10 reps  AROM elbow flexion/extension pronated x10 reps- (not full extension due to pain)  AROM elbow flexion/extension supinated x10 reps- (not full flexion due to pain)     AROM supination/pronation (not full range into supination due to pain)    Low nerve glides radial, ulnar, and median x10 reps    AROM wrist flexion/extension x10 reps with elbow flexed    Finished session with ice massage    Manual Therapy   SOFT TISSUE MOBILIZATION for extensor muscle mass )EXTENSOR CARPI RADIALIS BREVIS, EXTENSOR CARPI RADIALIS LONGUS, biceps brachii, triceps brachii, and brachioradialis. Using Rolator, cupping, and gusha tool for extensibility, increase blood flow and decreased pain. Pt demo improved ROM and decreased pain after manual therapy.               ASSESSMENT                                                                                                            Sweta came in this date with increased complaints of pain and inability to reach for her purse  Subclavian vein accessed.   with her right arm. She does report that she has \"good and bad\" days, so the pain is not consist ant. However, she was doing better with resistance using the isometrics, and now she is not able to tolerate resistance again. Continues to have tenderness noted in brachioradialis, biceps brachii, and triceps brachi as well as the common extensor group of muscles. Increased muscle tightness and knots at the proximal ECR and ECB. Focus this session was on decreasing pain and muscle tightness in right lateral elbow with functional activities. Pt appears to be having some increased pain proximal to the elbow this date, will continue to monitor, finished with ice massage to all the previous stated locations.  Pt reports decreased pain after ice massage and manual therapy. Encouraged pt to allow her elbow to rest. Pt would benefit from skilled OT services with a focus on pain management, strengthening, and ROM in order to return to daily activities without pain.   Pain/symptoms after session (out of 10): 2  Education:   - Present and ready to learn: patient  - Results of above outlined education: Verbalizes understanding and Demonstrates understanding    PLAN                                                                                                                           Suggestions for next session as indicated: Progress per plan of care, shoulder/postrue exercises, pain management, dry needling       Therapy procedure time and total treatment time can be found documented on the Time Entry flowsheet

## 2023-02-16 ENCOUNTER — ANCILLARY PROCEDURE (OUTPATIENT)
Dept: CARDIOLOGY | Facility: CLINIC | Age: 78
End: 2023-02-16
Attending: INTERNAL MEDICINE
Payer: COMMERCIAL

## 2023-02-16 DIAGNOSIS — I42.9 CARDIOMYOPATHY, UNSPECIFIED TYPE (H): ICD-10-CM

## 2023-02-16 DIAGNOSIS — Z95.810 ICD (IMPLANTABLE CARDIOVERTER-DEFIBRILLATOR) IN PLACE: ICD-10-CM

## 2023-02-16 PROCEDURE — 93295 DEV INTERROG REMOTE 1/2/MLT: CPT | Performed by: INTERNAL MEDICINE

## 2023-02-16 PROCEDURE — 93296 REM INTERROG EVL PM/IDS: CPT | Performed by: INTERNAL MEDICINE

## 2023-02-23 LAB
MDC_IDC_EPISODE_DTM: NORMAL
MDC_IDC_EPISODE_DURATION: 1 S
MDC_IDC_EPISODE_ID: NORMAL
MDC_IDC_EPISODE_TYPE: NORMAL
MDC_IDC_LEAD_IMPLANT_DT: NORMAL
MDC_IDC_LEAD_IMPLANT_DT: NORMAL
MDC_IDC_LEAD_LOCATION: NORMAL
MDC_IDC_LEAD_LOCATION: NORMAL
MDC_IDC_LEAD_LOCATION_DETAIL_1: NORMAL
MDC_IDC_LEAD_LOCATION_DETAIL_1: NORMAL
MDC_IDC_LEAD_MFG: NORMAL
MDC_IDC_LEAD_MFG: NORMAL
MDC_IDC_LEAD_MODEL: NORMAL
MDC_IDC_LEAD_MODEL: NORMAL
MDC_IDC_LEAD_POLARITY_TYPE: NORMAL
MDC_IDC_LEAD_POLARITY_TYPE: NORMAL
MDC_IDC_LEAD_SERIAL: NORMAL
MDC_IDC_LEAD_SERIAL: NORMAL
MDC_IDC_MSMT_BATTERY_DTM: NORMAL
MDC_IDC_MSMT_BATTERY_REMAINING_LONGEVITY: 144 MO
MDC_IDC_MSMT_BATTERY_REMAINING_PERCENTAGE: 100 %
MDC_IDC_MSMT_BATTERY_STATUS: NORMAL
MDC_IDC_MSMT_CAP_CHARGE_DTM: NORMAL
MDC_IDC_MSMT_CAP_CHARGE_TIME: 9.3 S
MDC_IDC_MSMT_CAP_CHARGE_TYPE: NORMAL
MDC_IDC_MSMT_LEADCHNL_RA_IMPEDANCE_VALUE: 631 OHM
MDC_IDC_MSMT_LEADCHNL_RA_PACING_THRESHOLD_AMPLITUDE: 0.6 V
MDC_IDC_MSMT_LEADCHNL_RA_PACING_THRESHOLD_PULSEWIDTH: 0.4 MS
MDC_IDC_MSMT_LEADCHNL_RV_IMPEDANCE_VALUE: 398 OHM
MDC_IDC_MSMT_LEADCHNL_RV_PACING_THRESHOLD_AMPLITUDE: 0.8 V
MDC_IDC_MSMT_LEADCHNL_RV_PACING_THRESHOLD_PULSEWIDTH: 0.4 MS
MDC_IDC_PG_IMPLANT_DTM: NORMAL
MDC_IDC_PG_MFG: NORMAL
MDC_IDC_PG_MODEL: NORMAL
MDC_IDC_PG_SERIAL: NORMAL
MDC_IDC_PG_TYPE: NORMAL
MDC_IDC_SESS_CLINIC_NAME: NORMAL
MDC_IDC_SESS_DTM: NORMAL
MDC_IDC_SESS_TYPE: NORMAL
MDC_IDC_SET_BRADY_AT_MODE_SWITCH_MODE: NORMAL
MDC_IDC_SET_BRADY_AT_MODE_SWITCH_RATE: 170 {BEATS}/MIN
MDC_IDC_SET_BRADY_LOWRATE: 60 {BEATS}/MIN
MDC_IDC_SET_BRADY_MAX_SENSOR_RATE: 130 {BEATS}/MIN
MDC_IDC_SET_BRADY_MAX_TRACKING_RATE: 130 {BEATS}/MIN
MDC_IDC_SET_BRADY_MODE: NORMAL
MDC_IDC_SET_BRADY_PAV_DELAY_HIGH: 150 MS
MDC_IDC_SET_BRADY_PAV_DELAY_LOW: 200 MS
MDC_IDC_SET_BRADY_SAV_DELAY_HIGH: 150 MS
MDC_IDC_SET_BRADY_SAV_DELAY_LOW: 200 MS
MDC_IDC_SET_LEADCHNL_RA_PACING_AMPLITUDE: 2 V
MDC_IDC_SET_LEADCHNL_RA_PACING_CAPTURE_MODE: NORMAL
MDC_IDC_SET_LEADCHNL_RA_PACING_POLARITY: NORMAL
MDC_IDC_SET_LEADCHNL_RA_PACING_PULSEWIDTH: 0.4 MS
MDC_IDC_SET_LEADCHNL_RA_SENSING_ADAPTATION_MODE: NORMAL
MDC_IDC_SET_LEADCHNL_RA_SENSING_POLARITY: NORMAL
MDC_IDC_SET_LEADCHNL_RA_SENSING_SENSITIVITY: 0.25 MV
MDC_IDC_SET_LEADCHNL_RV_PACING_AMPLITUDE: 4 V
MDC_IDC_SET_LEADCHNL_RV_PACING_CAPTURE_MODE: NORMAL
MDC_IDC_SET_LEADCHNL_RV_PACING_POLARITY: NORMAL
MDC_IDC_SET_LEADCHNL_RV_PACING_PULSEWIDTH: 0.4 MS
MDC_IDC_SET_LEADCHNL_RV_SENSING_ADAPTATION_MODE: NORMAL
MDC_IDC_SET_LEADCHNL_RV_SENSING_POLARITY: NORMAL
MDC_IDC_SET_LEADCHNL_RV_SENSING_SENSITIVITY: 0.6 MV
MDC_IDC_SET_ZONE_DETECTION_INTERVAL: 250 MS
MDC_IDC_SET_ZONE_DETECTION_INTERVAL: 300 MS
MDC_IDC_SET_ZONE_DETECTION_INTERVAL: 353 MS
MDC_IDC_SET_ZONE_TYPE: NORMAL
MDC_IDC_SET_ZONE_VENDOR_TYPE: NORMAL
MDC_IDC_STAT_AT_BURDEN_PERCENT: 1 %
MDC_IDC_STAT_AT_DTM_END: NORMAL
MDC_IDC_STAT_AT_DTM_START: NORMAL
MDC_IDC_STAT_BRADY_DTM_END: NORMAL
MDC_IDC_STAT_BRADY_DTM_START: NORMAL
MDC_IDC_STAT_BRADY_RA_PERCENT_PACED: 19 %
MDC_IDC_STAT_BRADY_RV_PERCENT_PACED: 1 %
MDC_IDC_STAT_EPISODE_RECENT_COUNT: 0
MDC_IDC_STAT_EPISODE_RECENT_COUNT: 1
MDC_IDC_STAT_EPISODE_RECENT_COUNT_DTM_END: NORMAL
MDC_IDC_STAT_EPISODE_RECENT_COUNT_DTM_START: NORMAL
MDC_IDC_STAT_EPISODE_TYPE: NORMAL
MDC_IDC_STAT_EPISODE_VENDOR_TYPE: NORMAL
MDC_IDC_STAT_TACHYTHERAPY_ATP_DELIVERED_RECENT: 0
MDC_IDC_STAT_TACHYTHERAPY_ATP_DELIVERED_TOTAL: 0
MDC_IDC_STAT_TACHYTHERAPY_RECENT_DTM_END: NORMAL
MDC_IDC_STAT_TACHYTHERAPY_RECENT_DTM_START: NORMAL
MDC_IDC_STAT_TACHYTHERAPY_SHOCKS_ABORTED_RECENT: 0
MDC_IDC_STAT_TACHYTHERAPY_SHOCKS_ABORTED_TOTAL: 0
MDC_IDC_STAT_TACHYTHERAPY_SHOCKS_DELIVERED_RECENT: 0
MDC_IDC_STAT_TACHYTHERAPY_SHOCKS_DELIVERED_TOTAL: 0
MDC_IDC_STAT_TACHYTHERAPY_TOTAL_DTM_END: NORMAL
MDC_IDC_STAT_TACHYTHERAPY_TOTAL_DTM_START: NORMAL

## 2023-02-26 DIAGNOSIS — M85.80 OSTEOPENIA, UNSPECIFIED LOCATION: ICD-10-CM

## 2023-02-28 RX ORDER — IBANDRONATE SODIUM 150 MG/1
150 TABLET, FILM COATED ORAL
Qty: 3 TABLET | Refills: 0 | Status: SHIPPED | OUTPATIENT
Start: 2023-02-28 | End: 2023-04-11

## 2023-03-31 ASSESSMENT — ENCOUNTER SYMPTOMS
DYSURIA: 0
PALPITATIONS: 0
PARESTHESIAS: 0
SORE THROAT: 0
COUGH: 0
WEAKNESS: 0
MYALGIAS: 0
BREAST MASS: 0
CONSTIPATION: 0
DIZZINESS: 0
HEARTBURN: 0
FREQUENCY: 0
CHILLS: 0
DIARRHEA: 0
NERVOUS/ANXIOUS: 1
HEMATOCHEZIA: 0
JOINT SWELLING: 0
FEVER: 0
ARTHRALGIAS: 1
NAUSEA: 0
ABDOMINAL PAIN: 0
HEMATURIA: 0
HEADACHES: 0
EYE PAIN: 0
SHORTNESS OF BREATH: 0

## 2023-03-31 ASSESSMENT — ACTIVITIES OF DAILY LIVING (ADL): CURRENT_FUNCTION: NO ASSISTANCE NEEDED

## 2023-04-04 ENCOUNTER — OFFICE VISIT (OUTPATIENT)
Dept: INTERNAL MEDICINE | Facility: CLINIC | Age: 78
End: 2023-04-04
Payer: COMMERCIAL

## 2023-04-04 VITALS
SYSTOLIC BLOOD PRESSURE: 128 MMHG | BODY MASS INDEX: 20.68 KG/M2 | WEIGHT: 128.7 LBS | HEART RATE: 82 BPM | DIASTOLIC BLOOD PRESSURE: 78 MMHG | RESPIRATION RATE: 18 BRPM | OXYGEN SATURATION: 99 % | HEIGHT: 66 IN | TEMPERATURE: 97.4 F

## 2023-04-04 DIAGNOSIS — Z78.0 ASYMPTOMATIC POSTMENOPAUSAL STATUS: ICD-10-CM

## 2023-04-04 DIAGNOSIS — E03.9 ACQUIRED HYPOTHYROIDISM: Primary | ICD-10-CM

## 2023-04-04 DIAGNOSIS — M72.2 PLANTAR FASCIITIS, LEFT: ICD-10-CM

## 2023-04-04 DIAGNOSIS — I50.22 CHRONIC SYSTOLIC HEART FAILURE (H): ICD-10-CM

## 2023-04-04 DIAGNOSIS — I48.0 PAROXYSMAL ATRIAL FIBRILLATION (H): ICD-10-CM

## 2023-04-04 DIAGNOSIS — M85.80 OSTEOPENIA, UNSPECIFIED LOCATION: ICD-10-CM

## 2023-04-04 DIAGNOSIS — K21.9 GASTROESOPHAGEAL REFLUX DISEASE WITHOUT ESOPHAGITIS: ICD-10-CM

## 2023-04-04 DIAGNOSIS — I77.810 THORACIC AORTIC ECTASIA (H): ICD-10-CM

## 2023-04-04 PROBLEM — Z00.00 ENCOUNTER FOR ANNUAL WELLNESS EXAM IN MEDICARE PATIENT: Status: RESOLVED | Noted: 2023-04-04 | Resolved: 2023-04-04

## 2023-04-04 PROBLEM — Z00.00 ENCOUNTER FOR ANNUAL WELLNESS EXAM IN MEDICARE PATIENT: Status: ACTIVE | Noted: 2023-04-04

## 2023-04-04 LAB — TSH SERPL DL<=0.005 MIU/L-ACNC: 1.55 UIU/ML (ref 0.3–4.2)

## 2023-04-04 PROCEDURE — 36415 COLL VENOUS BLD VENIPUNCTURE: CPT | Performed by: INTERNAL MEDICINE

## 2023-04-04 PROCEDURE — 84443 ASSAY THYROID STIM HORMONE: CPT | Performed by: INTERNAL MEDICINE

## 2023-04-04 PROCEDURE — 99214 OFFICE O/P EST MOD 30 MIN: CPT | Performed by: INTERNAL MEDICINE

## 2023-04-04 ASSESSMENT — PAIN SCALES - GENERAL: PAINLEVEL: NO PAIN (0)

## 2023-04-04 ASSESSMENT — ACTIVITIES OF DAILY LIVING (ADL): CURRENT_FUNCTION: NO ASSISTANCE NEEDED

## 2023-04-04 NOTE — PROGRESS NOTES
"  Assessment & Plan     Acquired hypothyroidism  Clinically stable, check lab  - TSH with free T4 reflex  - levothyroxine (SYNTHROID/LEVOTHROID) 50 MCG tablet; Take 1 tablet (50 mcg) by mouth daily    Chronic systolic heart failure (H)  Well-controlled, continue regular cardiac follow-up    Paroxysmal atrial fibrillation (H)  Rare episodes, none prolonged or too fast, follow-up cardiology    Thoracic aortic ectasia (H)  This has been stable, follow-up cardiology    Osteopenia, unspecified location  Tolerating medication, continue for total 5 years, bone density this year  - IBANdronate (BONIVA) 150 MG tablet; Take 1 tablet (150 mg) by mouth every 30 days  - DX Hip/Pelvis/Spine; Future    Gastroesophageal reflux disease without esophagitis  Stable  - omeprazole (PRILOSEC) 10 MG DR capsule; Take 1 capsule (10 mg) by mouth every morning (before breakfast)    Plantar fasciitis, left  Advised on icing, stretches, given a handout with some exercises, shoe insoles.  Refer to podiatry if not improved    Asymptomatic postmenopausal status    - DX Hip/Pelvis/Spine; Future        Anyi Olmos MD  [facility]    Carlton Posey is a 78 year old, presenting for the following health issues:  Recheck Medication        4/4/2023     2:12 PM   Additional Questions   Roomed by Jose Eduardo     Healthy Habits:     In general, how would you rate your overall health?  Good    Frequency of exercise:  6-7 days/week    Do you usually eat at least 4 servings of fruit and vegetables a day, include whole grains    & fiber and avoid regularly eating high fat or \"junk\" foods?  Yes    Medication side effects:  Not applicable    Ability to successfully perform activities of daily living:  No assistance needed    Home Safety:  Lack of grab bars in the bathroom    Hearing Impairment:  No hearing concerns    In the past 6 months, have you been bothered by leaking of urine?  No    In general, how would you rate your overall mental " or emotional health?  Good      PHQ-2 Total Score: 0    Additional concerns today:  Yes       Hypothyroidism Follow-up      Since last visit, patient describes the following symptoms: Weight stable, no hair loss, no skin changes, no constipation, no loose stools      How many servings of fruits and vegetables do you eat daily?  4 or more    On average, how many sweetened beverages do you drink each day (Examples: soda, juice, sweet tea, etc.  Do NOT count diet or artificially sweetened beverages)?   0    How many days per week do you exercise enough to make your heart beat faster? 7    How many minutes a day do you exercise enough to make your heart beat faster? 60 or more    How many days per week do you miss taking your medication? 0    Other problems:  1.  Cardiomyopathy, atrial fibrillation, valve disease, thoracic aortic ectasia: Overall her heart status is doing very well.  She had 1 episode of atrial fibrillation noted on her pacemaker, has occasional prematures.  These are not bothersome.  2. Osteopenia: She has been on Boniva for almost 2 years, due for bone density this year  3.  Acid reflux: Well-controlled on medication.    Other concerns  She has been having some left heel pain: This is plantar heel pain, worst in the morning or after sitting a while.  It seems to be slightly better recently.      Patient Active Problem List   Diagnosis     Allergic rhinitis     Paroxysmal atrial fibrillation (H)     Thoracic aortic ectasia (H)     History of tricuspid valve repair-32 mm Oquendo Ring     Complete AV block (H)     S/P MVR (mitral valve replacement)-St junaid epic tissue 31 mm     Cardiomyopathy, unspecified type (H)     Acquired hypothyroidism     Gastroesophageal reflux disease without esophagitis     Non-sustained ventricular tachycardia (H)     Cardiac pacemaker in situ     Current Outpatient Medications   Medication Sig Dispense Refill     aspirin 81 MG EC tablet Take 81 mg by mouth every evening        "calcium carbonate-vitamin D 600-200 MG-UNIT TABS Take 2 tablets by mouth 2 times daily        Cetirizine HCl (ZYRTEC PO) Take 10 mg by mouth daily        IBANdronate (BONIVA) 150 MG tablet Take 1 tablet (150 mg) by mouth every 30 days 3 tablet 0     levothyroxine (SYNTHROID/LEVOTHROID) 50 MCG tablet Take 1 tablet (50 mcg) by mouth daily 90 tablet 3     losartan (COZAAR) 100 MG tablet Take 1 tablet (100 mg) by mouth daily 90 tablet 3     metoprolol succinate ER (TOPROL XL) 100 MG 24 hr tablet Take 1 tablet (100 mg) by mouth daily 90 tablet 3     Multiple Vitamins-Minerals (MULTIVITAMIN ADULT) TABS Take 1 tablet by mouth daily       omeprazole (PRILOSEC) 10 MG DR capsule Take 1 capsule (10 mg) by mouth every morning (before breakfast) 90 capsule 3     rivaroxaban ANTICOAGULANT (XARELTO) 20 MG TABS tablet Take 1 tablet (20 mg) by mouth daily (with dinner) 90 tablet 3        ROS:   General: No fever, chills, weight changes  HEENT: She had some plugging of her left ear last week but better now  Respiratory: Negative  CV: As above  GI: Negative  Extremities: No edema, stable joint pains        Objective    /78   Pulse 82   Temp 97.4  F (36.3  C) (Tympanic)   Resp 18   Ht 1.664 m (5' 5.5\")   Wt 58.4 kg (128 lb 11.2 oz)   LMP  (LMP Unknown)   SpO2 99%   BMI 21.09 kg/m    Body mass index is 21.09 kg/m .  Physical Exam     TMs: Clear  Lungs: Clear  CV: Regular S1, S2 with soft murmur  Abdomen: Bowel sounds normal, soft, nontender. No hepatosplenomegaly. No masses.   Left foot: Tender at the plantar heel                  "

## 2023-04-11 RX ORDER — LEVOTHYROXINE SODIUM 50 UG/1
50 TABLET ORAL DAILY
Qty: 90 TABLET | Refills: 3 | Status: SHIPPED | OUTPATIENT
Start: 2023-04-11 | End: 2023-05-26

## 2023-04-11 RX ORDER — IBANDRONATE SODIUM 150 MG/1
150 TABLET, FILM COATED ORAL
Qty: 3 TABLET | Refills: 3 | Status: SHIPPED | OUTPATIENT
Start: 2023-04-11 | End: 2023-06-16

## 2023-04-11 RX ORDER — OMEPRAZOLE 10 MG/1
10 CAPSULE, DELAYED RELEASE ORAL
Qty: 90 CAPSULE | Refills: 3 | Status: SHIPPED | OUTPATIENT
Start: 2023-04-11 | End: 2023-05-26

## 2023-04-28 NOTE — PLAN OF CARE
"Discharge Planner PT   Patient plan for discharge: Home with spouse, OP CR phase II  Current status: SOB, states she feels her breathing is \"restricted,\" observed to have short shallow breaths. Edu on breathing in 1-2-3 and out 1-2-3 with improvement in RR. Soft pressures remain with 80s/40s in sitting and post short ambulation bout -pt adamant about walking a bit. However, after 150' with WW, admits \"That is about it for today.\" With return to supine BP increased to 90s systolic, see VSFS. Pt continues to need max verbal cues for safety in regard to CT and restrictions for avoiding push/pull and lift.   Barriers to return to prior living situation: None.  Recommendations for discharge: Home with spouse and OP CR phase II  Rationale for recommendations: Pt will benefit from continued skilled rehab services to progress independence and safety, continue to monitor exercise progression and education for optimal heart health.        Entered by: Jazzmine Summers 02/19/2019 11:54 AM       " Ok 5 fu per insurance.  Use at bedtime x 1 month  will be red and scaly

## 2023-05-25 ENCOUNTER — ANCILLARY PROCEDURE (OUTPATIENT)
Dept: CARDIOLOGY | Facility: CLINIC | Age: 78
End: 2023-05-25
Attending: INTERNAL MEDICINE
Payer: COMMERCIAL

## 2023-05-25 DIAGNOSIS — I42.9 CARDIOMYOPATHY, UNSPECIFIED TYPE (H): ICD-10-CM

## 2023-05-25 DIAGNOSIS — E03.9 ACQUIRED HYPOTHYROIDISM: ICD-10-CM

## 2023-05-25 DIAGNOSIS — Z95.810 ICD (IMPLANTABLE CARDIOVERTER-DEFIBRILLATOR) IN PLACE: ICD-10-CM

## 2023-05-25 DIAGNOSIS — K21.9 GASTROESOPHAGEAL REFLUX DISEASE WITHOUT ESOPHAGITIS: ICD-10-CM

## 2023-05-25 PROCEDURE — 93296 REM INTERROG EVL PM/IDS: CPT | Performed by: INTERNAL MEDICINE

## 2023-05-25 PROCEDURE — 93295 DEV INTERROG REMOTE 1/2/MLT: CPT | Performed by: INTERNAL MEDICINE

## 2023-05-26 RX ORDER — OMEPRAZOLE 10 MG/1
CAPSULE, DELAYED RELEASE ORAL
Qty: 90 CAPSULE | Refills: 3 | Status: SHIPPED | OUTPATIENT
Start: 2023-05-26 | End: 2024-03-25

## 2023-05-26 RX ORDER — LEVOTHYROXINE SODIUM 50 UG/1
TABLET ORAL
Qty: 90 TABLET | Refills: 3 | Status: SHIPPED | OUTPATIENT
Start: 2023-05-26 | End: 2024-03-25

## 2023-05-31 ENCOUNTER — ANCILLARY ORDERS (OUTPATIENT)
Dept: INTERNAL MEDICINE | Facility: CLINIC | Age: 78
End: 2023-05-31

## 2023-05-31 ENCOUNTER — ANCILLARY PROCEDURE (OUTPATIENT)
Dept: BONE DENSITY | Facility: CLINIC | Age: 78
End: 2023-05-31
Attending: INTERNAL MEDICINE
Payer: COMMERCIAL

## 2023-05-31 DIAGNOSIS — M85.80 OSTEOPENIA, UNSPECIFIED LOCATION: ICD-10-CM

## 2023-05-31 DIAGNOSIS — Z78.0 ASYMPTOMATIC POSTMENOPAUSAL STATUS: ICD-10-CM

## 2023-05-31 PROCEDURE — 77085 DXA BONE DENSITY AXL VRT FX: CPT | Performed by: INTERNAL MEDICINE

## 2023-06-01 ENCOUNTER — HOSPITAL ENCOUNTER (OUTPATIENT)
Dept: CARDIOLOGY | Facility: CLINIC | Age: 78
Discharge: HOME | End: 2023-06-01
Attending: NURSE PRACTITIONER | Admitting: NURSE PRACTITIONER
Payer: COMMERCIAL

## 2023-06-01 DIAGNOSIS — Z95.2 S/P MVR (MITRAL VALVE REPLACEMENT): ICD-10-CM

## 2023-06-01 LAB — LVEF ECHO: NORMAL

## 2023-06-01 PROCEDURE — 93306 TTE W/DOPPLER COMPLETE: CPT

## 2023-06-01 PROCEDURE — 93306 TTE W/DOPPLER COMPLETE: CPT | Mod: 26 | Performed by: INTERNAL MEDICINE

## 2023-06-02 ENCOUNTER — HEALTH MAINTENANCE LETTER (OUTPATIENT)
Age: 78
End: 2023-06-02

## 2023-06-02 ENCOUNTER — OFFICE VISIT (OUTPATIENT)
Dept: CARDIOLOGY | Facility: CLINIC | Age: 78
End: 2023-06-02
Attending: NURSE PRACTITIONER
Payer: COMMERCIAL

## 2023-06-02 VITALS
RESPIRATION RATE: 17 BRPM | OXYGEN SATURATION: 98 % | HEART RATE: 65 BPM | HEIGHT: 65 IN | SYSTOLIC BLOOD PRESSURE: 135 MMHG | BODY MASS INDEX: 21.49 KG/M2 | WEIGHT: 129 LBS | DIASTOLIC BLOOD PRESSURE: 80 MMHG

## 2023-06-02 DIAGNOSIS — I48.0 PAROXYSMAL ATRIAL FIBRILLATION (H): ICD-10-CM

## 2023-06-02 DIAGNOSIS — Z95.810 ICD (IMPLANTABLE CARDIOVERTER-DEFIBRILLATOR) IN PLACE: Primary | ICD-10-CM

## 2023-06-02 DIAGNOSIS — Z95.2 S/P MVR (MITRAL VALVE REPLACEMENT): ICD-10-CM

## 2023-06-02 DIAGNOSIS — I42.9 CARDIOMYOPATHY, UNSPECIFIED TYPE (H): ICD-10-CM

## 2023-06-02 DIAGNOSIS — Z98.890 S/P TVR (TRICUSPID VALVE REPAIR): ICD-10-CM

## 2023-06-02 LAB
MDC_IDC_EPISODE_DTM: NORMAL
MDC_IDC_EPISODE_ID: NORMAL
MDC_IDC_EPISODE_TYPE: NORMAL
MDC_IDC_LEAD_IMPLANT_DT: NORMAL
MDC_IDC_LEAD_IMPLANT_DT: NORMAL
MDC_IDC_LEAD_LOCATION: NORMAL
MDC_IDC_LEAD_LOCATION: NORMAL
MDC_IDC_LEAD_LOCATION_DETAIL_1: NORMAL
MDC_IDC_LEAD_LOCATION_DETAIL_1: NORMAL
MDC_IDC_LEAD_MFG: NORMAL
MDC_IDC_LEAD_MFG: NORMAL
MDC_IDC_LEAD_MODEL: NORMAL
MDC_IDC_LEAD_MODEL: NORMAL
MDC_IDC_LEAD_POLARITY_TYPE: NORMAL
MDC_IDC_LEAD_POLARITY_TYPE: NORMAL
MDC_IDC_LEAD_SERIAL: NORMAL
MDC_IDC_LEAD_SERIAL: NORMAL
MDC_IDC_MSMT_BATTERY_DTM: NORMAL
MDC_IDC_MSMT_BATTERY_REMAINING_LONGEVITY: 144 MO
MDC_IDC_MSMT_BATTERY_REMAINING_PERCENTAGE: 100 %
MDC_IDC_MSMT_BATTERY_STATUS: NORMAL
MDC_IDC_MSMT_CAP_CHARGE_DTM: NORMAL
MDC_IDC_MSMT_CAP_CHARGE_TIME: 9.3 S
MDC_IDC_MSMT_CAP_CHARGE_TYPE: NORMAL
MDC_IDC_MSMT_LEADCHNL_RA_IMPEDANCE_VALUE: 625 OHM
MDC_IDC_MSMT_LEADCHNL_RA_PACING_THRESHOLD_AMPLITUDE: 0.5 V
MDC_IDC_MSMT_LEADCHNL_RA_PACING_THRESHOLD_PULSEWIDTH: 0.4 MS
MDC_IDC_MSMT_LEADCHNL_RV_IMPEDANCE_VALUE: 399 OHM
MDC_IDC_MSMT_LEADCHNL_RV_PACING_THRESHOLD_AMPLITUDE: 0.7 V
MDC_IDC_MSMT_LEADCHNL_RV_PACING_THRESHOLD_PULSEWIDTH: 0.4 MS
MDC_IDC_PG_IMPLANT_DTM: NORMAL
MDC_IDC_PG_MFG: NORMAL
MDC_IDC_PG_MODEL: NORMAL
MDC_IDC_PG_SERIAL: NORMAL
MDC_IDC_PG_TYPE: NORMAL
MDC_IDC_SESS_CLINIC_NAME: NORMAL
MDC_IDC_SESS_DTM: NORMAL
MDC_IDC_SESS_TYPE: NORMAL
MDC_IDC_SET_BRADY_AT_MODE_SWITCH_MODE: NORMAL
MDC_IDC_SET_BRADY_AT_MODE_SWITCH_RATE: 170 {BEATS}/MIN
MDC_IDC_SET_BRADY_LOWRATE: 60 {BEATS}/MIN
MDC_IDC_SET_BRADY_MAX_SENSOR_RATE: 130 {BEATS}/MIN
MDC_IDC_SET_BRADY_MAX_TRACKING_RATE: 130 {BEATS}/MIN
MDC_IDC_SET_BRADY_MODE: NORMAL
MDC_IDC_SET_BRADY_PAV_DELAY_HIGH: 150 MS
MDC_IDC_SET_BRADY_PAV_DELAY_LOW: 200 MS
MDC_IDC_SET_BRADY_SAV_DELAY_HIGH: 150 MS
MDC_IDC_SET_BRADY_SAV_DELAY_LOW: 200 MS
MDC_IDC_SET_LEADCHNL_RA_PACING_AMPLITUDE: 2 V
MDC_IDC_SET_LEADCHNL_RA_PACING_CAPTURE_MODE: NORMAL
MDC_IDC_SET_LEADCHNL_RA_PACING_POLARITY: NORMAL
MDC_IDC_SET_LEADCHNL_RA_PACING_PULSEWIDTH: 0.4 MS
MDC_IDC_SET_LEADCHNL_RA_SENSING_ADAPTATION_MODE: NORMAL
MDC_IDC_SET_LEADCHNL_RA_SENSING_POLARITY: NORMAL
MDC_IDC_SET_LEADCHNL_RA_SENSING_SENSITIVITY: 0.25 MV
MDC_IDC_SET_LEADCHNL_RV_PACING_AMPLITUDE: 5 V
MDC_IDC_SET_LEADCHNL_RV_PACING_CAPTURE_MODE: NORMAL
MDC_IDC_SET_LEADCHNL_RV_PACING_POLARITY: NORMAL
MDC_IDC_SET_LEADCHNL_RV_PACING_PULSEWIDTH: 0.4 MS
MDC_IDC_SET_LEADCHNL_RV_SENSING_ADAPTATION_MODE: NORMAL
MDC_IDC_SET_LEADCHNL_RV_SENSING_POLARITY: NORMAL
MDC_IDC_SET_LEADCHNL_RV_SENSING_SENSITIVITY: 0.6 MV
MDC_IDC_SET_ZONE_DETECTION_INTERVAL: 250 MS
MDC_IDC_SET_ZONE_DETECTION_INTERVAL: 300 MS
MDC_IDC_SET_ZONE_DETECTION_INTERVAL: 353 MS
MDC_IDC_SET_ZONE_TYPE: NORMAL
MDC_IDC_SET_ZONE_VENDOR_TYPE: NORMAL
MDC_IDC_STAT_AT_BURDEN_PERCENT: 1 %
MDC_IDC_STAT_AT_DTM_END: NORMAL
MDC_IDC_STAT_AT_DTM_START: NORMAL
MDC_IDC_STAT_BRADY_DTM_END: NORMAL
MDC_IDC_STAT_BRADY_DTM_START: NORMAL
MDC_IDC_STAT_BRADY_RA_PERCENT_PACED: 20 %
MDC_IDC_STAT_BRADY_RV_PERCENT_PACED: 1 %
MDC_IDC_STAT_EPISODE_RECENT_COUNT: 0
MDC_IDC_STAT_EPISODE_RECENT_COUNT: 1
MDC_IDC_STAT_EPISODE_RECENT_COUNT_DTM_END: NORMAL
MDC_IDC_STAT_EPISODE_RECENT_COUNT_DTM_START: NORMAL
MDC_IDC_STAT_EPISODE_TYPE: NORMAL
MDC_IDC_STAT_EPISODE_VENDOR_TYPE: NORMAL
MDC_IDC_STAT_TACHYTHERAPY_ATP_DELIVERED_RECENT: 0
MDC_IDC_STAT_TACHYTHERAPY_ATP_DELIVERED_TOTAL: 0
MDC_IDC_STAT_TACHYTHERAPY_RECENT_DTM_END: NORMAL
MDC_IDC_STAT_TACHYTHERAPY_RECENT_DTM_START: NORMAL
MDC_IDC_STAT_TACHYTHERAPY_SHOCKS_ABORTED_RECENT: 0
MDC_IDC_STAT_TACHYTHERAPY_SHOCKS_ABORTED_TOTAL: 0
MDC_IDC_STAT_TACHYTHERAPY_SHOCKS_DELIVERED_RECENT: 0
MDC_IDC_STAT_TACHYTHERAPY_SHOCKS_DELIVERED_TOTAL: 0
MDC_IDC_STAT_TACHYTHERAPY_TOTAL_DTM_END: NORMAL
MDC_IDC_STAT_TACHYTHERAPY_TOTAL_DTM_START: NORMAL

## 2023-06-02 PROCEDURE — 99214 OFFICE O/P EST MOD 30 MIN: CPT | Performed by: INTERNAL MEDICINE

## 2023-06-02 NOTE — LETTER
6/2/2023    Anyi Olmos MD  [address]  [facility] [address]    RE: Taty Aguila       Dear Colleague,     I had the pleasure of seeing Taty Aguila in the [facility].  HPI and Plan:     Taty Aguila is a pleasant 77 y/o female with history of MVR, chronic systolic heart failure, CM, NSVT, here for CV follow up.    Echocardiogram showing LVEF 50-55%, good functioning MV prosthesis.  Trace to mild MR.  Device check good with a single episode of mode switching.     No ventricular tachycardia.  LVEF staying good.      Patient needs lifetime antibiotic prophylaxis.          Today's clinic visit entailed:  Review of the result(s) of each unique test - echocardiogram, device check  Prescription drug management  30 minutes spent by me on the date of the encounter doing chart review, history and exam, documentation and further activities per the note  Provider  Link to [employer] Help Grid     The level of medical decision making during this visit was of moderate complexity.      No orders of the defined types were placed in this encounter.    No orders of the defined types were placed in this encounter.    There are no discontinued medications.      Encounter Diagnoses   Name Primary?    Cardiomyopathy, unspecified type (H)     S/P MVR (mitral valve replacement)-St junaid epic tissue 31 mm        CURRENT MEDICATIONS:  Current Outpatient Medications   Medication Sig Dispense Refill    aspirin 81 MG EC tablet Take 81 mg by mouth every evening      calcium carbonate-vitamin D 600-200 MG-UNIT TABS Take 2 tablets by mouth 2 times daily       Cetirizine HCl (ZYRTEC PO) Take 10 mg by mouth daily       IBANdronate (BONIVA) 150 MG tablet Take 1 tablet (150 mg) by mouth every 30 days 3 tablet 3    levothyroxine (SYNTHROID/LEVOTHROID) 50 MCG tablet TAKE 1 TABLET DAILY 90 tablet 3    losartan (COZAAR) 100 MG tablet Take 1 tablet (100 mg) by mouth daily 90 tablet 3    metoprolol succinate ER (TOPROL  XL) 100 MG 24 hr tablet Take 1 tablet (100 mg) by mouth daily 90 tablet 3    Multiple Vitamins-Minerals (MULTIVITAMIN ADULT) TABS Take 1 tablet by mouth daily      omeprazole (PRILOSEC) 10 MG DR capsule TAKE 1 CAPSULE EVERY MORNING BEFORE BREAKFAST 90 capsule 3    rivaroxaban ANTICOAGULANT (XARELTO) 20 MG TABS tablet Take 1 tablet (20 mg) by mouth daily (with dinner) 90 tablet 3       ALLERGIES     Allergies   Allergen Reactions    Chlorpheniramine Other (See Comments)     LOC and fainting     Lisinopril Cough    Phenylephrine Other (See Comments)     fainting       PAST MEDICAL HISTORY:  Past Medical History:   Diagnosis Date    Allergic rhinitis, cause unspecified     dust mites, ragweed    Complete AV block (H)     BSX DDD PM 2-    History of tricuspid valve repair     Mitral valve prolapse     bioprosthetic MVR 2-    Paroxysmal atrial fibrillation (H)        PAST SURGICAL HISTORY:  Past Surgical History:   Procedure Laterality Date    COLONOSCOPY N/A 9/15/2015    Procedure: COLONOSCOPY;  Surgeon: Anson Llanos MD;  Location:  GI    CV HEART CATHETERIZATION WITH POSSIBLE INTERVENTION N/A 1/9/2019    Procedure: Heart Catheterization with Possible Intervention;  Surgeon: Ritesh Whittaker MD;  Location:  HEART CARDIAC CATH LAB    CV RIGHT AND LEFT HEART CATH N/A 1/9/2019    Procedure: Right and Left Heart Catherization;  Surgeon: Ritesh Whittaker MD;  Location:  HEART CARDIAC CATH LAB    ENT SURGERY      T&A    EP INSERT ICD N/A 8/14/2020    Procedure: EP Insert ICD;  Surgeon: Lucian Tolliver MD;  Location:  HEART CARDIAC CATH LAB    EP PACEMAKER N/A 2/18/2019    Procedure: EP Pacemaker;  Surgeon: Inocencia Guillen MD;  Location:  HEART CARDIAC CATH LAB    REPAIR VALVE TRICUSPID MINIMALLY INVASIVE N/A 2/15/2019    Procedure: MINIMALLY INVASIVE TRICUSPID VALVE REPAIR WITH 32MM SULLIVAN RING;  Surgeon: Andrea Hanna MD;  Location:  OR    REPLACE VALVE  "MITRAL MINIMALLY INVASIVE N/A 2/15/2019    Procedure: MINIMALLY INVASIVE MITRAL VALVE REPLACEMENT WITH EPIC ST KEYUR 31MM VALVE; ON PUMP WITH TIA.  CARDIOLOGIST DR CARDENAS;  Surgeon: Andrea Hanna MD;  Location:  OR       FAMILY HISTORY:  Family History   Problem Relation Age of Onset    Osteoporosis Mother     Alzheimer Disease Mother     Family History Negative Father     Heart Disease Maternal Grandfather     Family History Negative Paternal Grandmother     Family History Negative Paternal Grandfather     Breast Cancer No family hx of     Ovarian Cancer No family hx of        SOCIAL HISTORY:  Social History     Socioeconomic History    Marital status:     Number of children: 3   Tobacco Use    Smoking status: Never    Smokeless tobacco: Never   Vaping Use    Vaping status: Never Used   Substance and Sexual Activity    Alcohol use: No    Drug use: No    Sexual activity: Not Currently   Other Topics Concern    Caffeine Concern No     Comment: 1 cup of coffee and 1 can diet coke per day    Sleep Concern Yes     Comment: takes Doxylamine succinate 1/2 tab every night    Stress Concern No    Weight Concern No    Special Diet No     Comment: healthy     Exercise Yes     Comment: walking 45min x7 a wk. Very active, [employer], Golf, Bowling, Cardio    Seat Belt Yes    Self-Exams Yes    Parent/sibling w/ CABG, MI or angioplasty before 65F 55M? No       Review of Systems:  Skin:  Negative     Eyes:  Positive for glasses  ENT:  Negative    Respiratory:  Negative    Cardiovascular:  Negative    Gastroenterology: Negative    Genitourinary:  Negative    Musculoskeletal:  Negative    Neurologic:  Negative    Psychiatric:  Negative    Heme/Lymph/Imm:  Negative    Endocrine:  Positive for thyroid disorder    Physical Exam:  Vitals: /80   Pulse 65   Resp 17   Ht 1.651 m (5' 5\")   Wt 58.5 kg (129 lb)   LMP  (LMP Unknown)   SpO2 98%   BMI 21.47 kg/m      Constitutional:           Skin:             Head:    "        Eyes:           Lymph:      ENT:           Neck:           Respiratory:            Cardiac:                                                           GI:           Extremities and Muscular Skeletal:                 Neurological:           Psych:         Recent Lab Results:  LIPID RESULTS:  Lab Results   Component Value Date    CHOL 202 (H) 07/23/2015    HDL 96 07/23/2015    LDL 83 07/23/2015    TRIG 116 07/23/2015    CHOLHDLRATIO 2.1 07/23/2015       LIVER ENZYME RESULTS:  Lab Results   Component Value Date    AST 29 07/11/2019    ALT 32 04/15/2022    ALT 27 03/22/2021       CBC RESULTS:  Lab Results   Component Value Date    WBC 4.7 04/15/2022    WBC 5.6 08/14/2020    RBC 4.21 04/15/2022    RBC 4.15 08/14/2020    HGB 13.1 04/15/2022    HGB 12.8 08/14/2020    HCT 39.9 04/15/2022    HCT 38.2 08/14/2020    MCV 95 04/15/2022    MCV 92 08/14/2020    MCH 31.1 04/15/2022    MCH 30.8 08/14/2020    MCHC 32.8 04/15/2022    MCHC 33.5 08/14/2020    RDW 13.3 04/15/2022    RDW 13.2 08/14/2020     04/15/2022     08/14/2020       BMP RESULTS:  Lab Results   Component Value Date     (L) 12/06/2022     (L) 03/22/2021    POTASSIUM 4.7 12/06/2022    POTASSIUM 4.1 04/15/2022    POTASSIUM 4.1 03/22/2021    CHLORIDE 98 12/06/2022    CHLORIDE 102 04/15/2022    CHLORIDE 100 03/22/2021    CO2 24 12/06/2022    CO2 29 04/15/2022    CO2 29 03/22/2021    ANIONGAP 12 12/06/2022    ANIONGAP 3 04/15/2022    ANIONGAP 3 03/22/2021    GLC 97 12/06/2022     (H) 04/15/2022    GLC 96 03/22/2021    BUN 11.5 12/06/2022    BUN 11 04/15/2022    BUN 13 03/22/2021    CR 0.67 12/06/2022    CR 0.84 03/22/2021    GFRESTIMATED >90 12/06/2022    GFRESTIMATED 68 03/22/2021    GFRESTBLACK 79 03/22/2021    ARIELLE 9.8 12/06/2022    ARIELLE 9.1 03/22/2021        A1C RESULTS:  Lab Results   Component Value Date    A1C 5.9 (H) 01/09/2019       INR RESULTS:  Lab Results   Component Value Date    INR 1.21 (H) 02/18/2019    INR 1.24 (H)  02/16/2019           ROBINA Pack CNP  [address]  [place] [address]      Thank you for allowing me to participate in the care of your patient.      Sincerely,     GILLIAN CARDENAS MD     [facility]

## 2023-06-02 NOTE — PROGRESS NOTES
HPI and Plan:     Taty Aguila is a pleasant 79 y/o female with history of MVR, chronic systolic heart failure, CM, NSVT, here for CV follow up.    Echocardiogram showing LVEF 50-55%, good functioning MV prosthesis.  Trace to mild MR.  Device check good with a single episode of mode switching.     No ventricular tachycardia.  LVEF staying good.      Patient needs lifetime antibiotic prophylaxis.          Today's clinic visit entailed:  Review of the result(s) of each unique test - echocardiogram, device check  Prescription drug management  30 minutes spent by me on the date of the encounter doing chart review, history and exam, documentation and further activities per the note  Provider  Link to [employer] Help Grid     The level of medical decision making during this visit was of moderate complexity.      No orders of the defined types were placed in this encounter.    No orders of the defined types were placed in this encounter.    There are no discontinued medications.      Encounter Diagnoses   Name Primary?     Cardiomyopathy, unspecified type (H)      S/P MVR (mitral valve replacement)-St junaid epic tissue 31 mm        CURRENT MEDICATIONS:  Current Outpatient Medications   Medication Sig Dispense Refill     aspirin 81 MG EC tablet Take 81 mg by mouth every evening       calcium carbonate-vitamin D 600-200 MG-UNIT TABS Take 2 tablets by mouth 2 times daily        Cetirizine HCl (ZYRTEC PO) Take 10 mg by mouth daily        IBANdronate (BONIVA) 150 MG tablet Take 1 tablet (150 mg) by mouth every 30 days 3 tablet 3     levothyroxine (SYNTHROID/LEVOTHROID) 50 MCG tablet TAKE 1 TABLET DAILY 90 tablet 3     losartan (COZAAR) 100 MG tablet Take 1 tablet (100 mg) by mouth daily 90 tablet 3     metoprolol succinate ER (TOPROL XL) 100 MG 24 hr tablet Take 1 tablet (100 mg) by mouth daily 90 tablet 3     Multiple Vitamins-Minerals (MULTIVITAMIN ADULT) TABS Take 1 tablet by mouth daily       omeprazole (PRILOSEC) 10 MG DR capsule  TAKE 1 CAPSULE EVERY MORNING BEFORE BREAKFAST 90 capsule 3     rivaroxaban ANTICOAGULANT (XARELTO) 20 MG TABS tablet Take 1 tablet (20 mg) by mouth daily (with dinner) 90 tablet 3       ALLERGIES     Allergies   Allergen Reactions     Chlorpheniramine Other (See Comments)     LOC and fainting      Lisinopril Cough     Phenylephrine Other (See Comments)     fainting       PAST MEDICAL HISTORY:  Past Medical History:   Diagnosis Date     Allergic rhinitis, cause unspecified     dust mites, ragweed     Complete AV block (H)     BSX DDD PM 2-     History of tricuspid valve repair      Mitral valve prolapse     bioprosthetic MVR 2-     Paroxysmal atrial fibrillation (H)        PAST SURGICAL HISTORY:  Past Surgical History:   Procedure Laterality Date     COLONOSCOPY N/A 9/15/2015    Procedure: COLONOSCOPY;  Surgeon: Anson Llanos MD;  Location:  GI     CV HEART CATHETERIZATION WITH POSSIBLE INTERVENTION N/A 1/9/2019    Procedure: Heart Catheterization with Possible Intervention;  Surgeon: Ritesh Whittaker MD;  Location:  HEART CARDIAC CATH LAB     CV RIGHT AND LEFT HEART CATH N/A 1/9/2019    Procedure: Right and Left Heart Catherization;  Surgeon: Ritesh Whittaker MD;  Location:  HEART CARDIAC CATH LAB     ENT SURGERY      T&A     EP INSERT ICD N/A 8/14/2020    Procedure: EP Insert ICD;  Surgeon: Lucian Tolliver MD;  Location:  HEART CARDIAC CATH LAB     EP PACEMAKER N/A 2/18/2019    Procedure: EP Pacemaker;  Surgeon: Inocencia Guillen MD;  Location:  HEART CARDIAC CATH LAB     REPAIR VALVE TRICUSPID MINIMALLY INVASIVE N/A 2/15/2019    Procedure: MINIMALLY INVASIVE TRICUSPID VALVE REPAIR WITH 32MM SULLIVAN RING;  Surgeon: Andrea Hanna MD;  Location:  OR     REPLACE VALVE MITRAL MINIMALLY INVASIVE N/A 2/15/2019    Procedure: MINIMALLY INVASIVE MITRAL VALVE REPLACEMENT WITH EPIC ST KEYUR 31MM VALVE; ON PUMP WITH TIA.  CARDIOLOGIST DR CARDENAS;  Surgeon: Cyndi  "Andera Arora MD;  Location:  OR       FAMILY HISTORY:  Family History   Problem Relation Age of Onset     Osteoporosis Mother      Alzheimer Disease Mother      Family History Negative Father      Heart Disease Maternal Grandfather      Family History Negative Paternal Grandmother      Family History Negative Paternal Grandfather      Breast Cancer No family hx of      Ovarian Cancer No family hx of        SOCIAL HISTORY:  Social History     Socioeconomic History     Marital status:      Number of children: 3   Tobacco Use     Smoking status: Never     Smokeless tobacco: Never   Vaping Use     Vaping status: Never Used   Substance and Sexual Activity     Alcohol use: No     Drug use: No     Sexual activity: Not Currently   Other Topics Concern     Caffeine Concern No     Comment: 1 cup of coffee and 1 can diet coke per day     Sleep Concern Yes     Comment: takes Doxylamine succinate 1/2 tab every night     Stress Concern No     Weight Concern No     Special Diet No     Comment: healthy      Exercise Yes     Comment: walking 45min x7 a wk. Very active, [employer], Golf, Bowling, Cardio     Seat Belt Yes     Self-Exams Yes     Parent/sibling w/ CABG, MI or angioplasty before 65F 55M? No       Review of Systems:  Skin:  Negative     Eyes:  Positive for glasses  ENT:  Negative    Respiratory:  Negative    Cardiovascular:  Negative    Gastroenterology: Negative    Genitourinary:  Negative    Musculoskeletal:  Negative    Neurologic:  Negative    Psychiatric:  Negative    Heme/Lymph/Imm:  Negative    Endocrine:  Positive for thyroid disorder    Physical Exam:  Vitals: /80   Pulse 65   Resp 17   Ht 1.651 m (5' 5\")   Wt 58.5 kg (129 lb)   LMP  (LMP Unknown)   SpO2 98%   BMI 21.47 kg/m      Constitutional:           Skin:             Head:           Eyes:           Lymph:      ENT:           Neck:           Respiratory:            Cardiac:                                                           GI:      "      Extremities and Muscular Skeletal:                 Neurological:           Psych:         Recent Lab Results:  LIPID RESULTS:  Lab Results   Component Value Date    CHOL 202 (H) 07/23/2015    HDL 96 07/23/2015    LDL 83 07/23/2015    TRIG 116 07/23/2015    CHOLHDLRATIO 2.1 07/23/2015       LIVER ENZYME RESULTS:  Lab Results   Component Value Date    AST 29 07/11/2019    ALT 32 04/15/2022    ALT 27 03/22/2021       CBC RESULTS:  Lab Results   Component Value Date    WBC 4.7 04/15/2022    WBC 5.6 08/14/2020    RBC 4.21 04/15/2022    RBC 4.15 08/14/2020    HGB 13.1 04/15/2022    HGB 12.8 08/14/2020    HCT 39.9 04/15/2022    HCT 38.2 08/14/2020    MCV 95 04/15/2022    MCV 92 08/14/2020    MCH 31.1 04/15/2022    MCH 30.8 08/14/2020    MCHC 32.8 04/15/2022    MCHC 33.5 08/14/2020    RDW 13.3 04/15/2022    RDW 13.2 08/14/2020     04/15/2022     08/14/2020       BMP RESULTS:  Lab Results   Component Value Date     (L) 12/06/2022     (L) 03/22/2021    POTASSIUM 4.7 12/06/2022    POTASSIUM 4.1 04/15/2022    POTASSIUM 4.1 03/22/2021    CHLORIDE 98 12/06/2022    CHLORIDE 102 04/15/2022    CHLORIDE 100 03/22/2021    CO2 24 12/06/2022    CO2 29 04/15/2022    CO2 29 03/22/2021    ANIONGAP 12 12/06/2022    ANIONGAP 3 04/15/2022    ANIONGAP 3 03/22/2021    GLC 97 12/06/2022     (H) 04/15/2022    GLC 96 03/22/2021    BUN 11.5 12/06/2022    BUN 11 04/15/2022    BUN 13 03/22/2021    CR 0.67 12/06/2022    CR 0.84 03/22/2021    GFRESTIMATED >90 12/06/2022    GFRESTIMATED 68 03/22/2021    GFRESTBLACK 79 03/22/2021    ARIELLE 9.8 12/06/2022    ARIELLE 9.1 03/22/2021        A1C RESULTS:  Lab Results   Component Value Date    A1C 5.9 (H) 01/09/2019       INR RESULTS:  Lab Results   Component Value Date    INR 1.21 (H) 02/18/2019    INR 1.24 (H) 02/16/2019           ROBINA Pack CNP  [address]  [place] [address]

## 2023-06-16 ENCOUNTER — MYC MEDICAL ADVICE (OUTPATIENT)
Dept: INTERNAL MEDICINE | Facility: CLINIC | Age: 78
End: 2023-06-16
Payer: COMMERCIAL

## 2023-06-16 DIAGNOSIS — M85.80 OSTEOPENIA, UNSPECIFIED LOCATION: ICD-10-CM

## 2023-06-16 RX ORDER — IBANDRONATE SODIUM 150 MG/1
150 TABLET, FILM COATED ORAL
Qty: 3 TABLET | Refills: 3 | Status: SHIPPED | OUTPATIENT
Start: 2023-06-16 | End: 2024-03-25

## 2023-07-23 ASSESSMENT — ENCOUNTER SYMPTOMS
PARESTHESIAS: 0
HEMATOCHEZIA: 0
HEMATURIA: 0
HEARTBURN: 0
FREQUENCY: 0
DIARRHEA: 0
FEVER: 0
CHILLS: 0
DYSURIA: 0
EYE PAIN: 0
WEAKNESS: 0
BREAST MASS: 0
NERVOUS/ANXIOUS: 0
MYALGIAS: 0
DIZZINESS: 0
JOINT SWELLING: 0
ARTHRALGIAS: 0
HEADACHES: 0
SHORTNESS OF BREATH: 0
SORE THROAT: 0
CONSTIPATION: 0
ABDOMINAL PAIN: 0
PALPITATIONS: 0
COUGH: 0
NAUSEA: 0

## 2023-07-23 ASSESSMENT — ACTIVITIES OF DAILY LIVING (ADL): CURRENT_FUNCTION: NO ASSISTANCE NEEDED

## 2023-07-25 ENCOUNTER — OFFICE VISIT (OUTPATIENT)
Dept: INTERNAL MEDICINE | Facility: CLINIC | Age: 78
End: 2023-07-25
Payer: COMMERCIAL

## 2023-07-25 VITALS
SYSTOLIC BLOOD PRESSURE: 120 MMHG | BODY MASS INDEX: 21.51 KG/M2 | HEART RATE: 82 BPM | DIASTOLIC BLOOD PRESSURE: 74 MMHG | RESPIRATION RATE: 14 BRPM | OXYGEN SATURATION: 97 % | TEMPERATURE: 97.5 F | HEIGHT: 65 IN | WEIGHT: 129.1 LBS

## 2023-07-25 DIAGNOSIS — H61.23 BILATERAL IMPACTED CERUMEN: ICD-10-CM

## 2023-07-25 DIAGNOSIS — Z00.00 ENCOUNTER FOR ANNUAL WELLNESS EXAM IN MEDICARE PATIENT: Primary | ICD-10-CM

## 2023-07-25 DIAGNOSIS — Z23 NEED FOR VACCINE FOR TD (TETANUS-DIPHTHERIA): ICD-10-CM

## 2023-07-25 PROCEDURE — G0439 PPPS, SUBSEQ VISIT: HCPCS | Performed by: INTERNAL MEDICINE

## 2023-07-25 PROCEDURE — 90714 TD VACC NO PRESV 7 YRS+ IM: CPT | Performed by: INTERNAL MEDICINE

## 2023-07-25 PROCEDURE — 69210 REMOVE IMPACTED EAR WAX UNI: CPT | Performed by: INTERNAL MEDICINE

## 2023-07-25 PROCEDURE — 90471 IMMUNIZATION ADMIN: CPT | Performed by: INTERNAL MEDICINE

## 2023-07-25 ASSESSMENT — ENCOUNTER SYMPTOMS
CONSTIPATION: 0
DIZZINESS: 0
SHORTNESS OF BREATH: 0
WEAKNESS: 0
HEMATOCHEZIA: 0
FEVER: 0
EYE PAIN: 0
ARTHRALGIAS: 0
ABDOMINAL PAIN: 0
HEARTBURN: 0
MYALGIAS: 0
NAUSEA: 0
BREAST MASS: 0
DYSURIA: 0
NERVOUS/ANXIOUS: 0
JOINT SWELLING: 0
PALPITATIONS: 0
DIARRHEA: 0
SORE THROAT: 0
HEMATURIA: 0
COUGH: 0
FREQUENCY: 0
PARESTHESIAS: 0
HEADACHES: 0
CHILLS: 0

## 2023-07-25 ASSESSMENT — ACTIVITIES OF DAILY LIVING (ADL): CURRENT_FUNCTION: NO ASSISTANCE NEEDED

## 2023-07-25 ASSESSMENT — PAIN SCALES - GENERAL: PAINLEVEL: NO PAIN (0)

## 2023-07-25 NOTE — PROGRESS NOTES
"SUBJECTIVE:   Taty is a 78 year old who presents for Preventive Visit.      7/25/2023     9:14 AM   Additional Questions   Roomed by Sheryl RODNEY   Accompanied by n/a     Are you in the first 12 months of your Medicare coverage?  No    Healthy Habits:     In general, how would you rate your overall health?  Good    Frequency of exercise:  6-7 days/week    Duration of exercise:  45-60 minutes    Do you usually eat at least 4 servings of fruit and vegetables a day, include whole grains    & fiber and avoid regularly eating high fat or \"junk\" foods?  Yes    Medication side effects:  None    Ability to successfully perform activities of daily living:  No assistance needed    Home Safety:  Lack of grab bars in the bathroom    Hearing Impairment:  No hearing concerns    In the past 6 months, have you been bothered by leaking of urine?  No    In general, how would you rate your overall mental or emotional health?  Good    We had addressed her problems a few months ago.  She has no other changes in the problems or other acute concerns other than she wants to have her ears checked, tends to get wax in the left ear.      Patient Active Problem List   Diagnosis    Allergic rhinitis    Paroxysmal atrial fibrillation (H)    Thoracic aortic ectasia (H)    History of tricuspid valve repair-32 mm Oquendo Ring    S/P MVR (mitral valve replacement)-St junaid epic tissue 31 mm    Cardiomyopathy, unspecified type (H)    Acquired hypothyroidism    Gastroesophageal reflux disease without esophagitis    Non-sustained ventricular tachycardia (H)    Cardiac pacemaker in situ     Current Outpatient Medications   Medication Sig Dispense Refill    aspirin 81 MG EC tablet Take 81 mg by mouth every evening      calcium carbonate-vitamin D 600-200 MG-UNIT TABS Take 2 tablets by mouth 2 times daily       Cetirizine HCl (ZYRTEC PO) Take 10 mg by mouth daily       IBANdronate (BONIVA) 150 MG tablet Take 1 tablet (150 mg) by mouth every 30 days 3 tablet 3 "    levothyroxine (SYNTHROID/LEVOTHROID) 50 MCG tablet TAKE 1 TABLET DAILY 90 tablet 3    losartan (COZAAR) 100 MG tablet Take 1 tablet (100 mg) by mouth daily 90 tablet 3    metoprolol succinate ER (TOPROL XL) 100 MG 24 hr tablet Take 1 tablet (100 mg) by mouth daily 90 tablet 3    Multiple Vitamins-Minerals (MULTIVITAMIN ADULT) TABS Take 1 tablet by mouth daily      omeprazole (PRILOSEC) 10 MG DR capsule TAKE 1 CAPSULE EVERY MORNING BEFORE BREAKFAST 90 capsule 3    rivaroxaban ANTICOAGULANT (XARELTO) 20 MG TABS tablet Take 1 tablet (20 mg) by mouth daily (with dinner) 90 tablet 3        Have you ever done Advance Care Planning? (For example, a Health Directive, POLST, or a discussion with a medical provider or your loved ones about your wishes): No, advance care planning information given to patient to review.  Patient plans to discuss their wishes with loved ones or provider.         Fall risk  Fallen 2 or more times in the past year?: No  Any fall with injury in the past year?: No    Cognitive Screening   1) Repeat 3 items (Leader, Season, Table)    2) Clock draw: NORMAL  3) 3 item recall: Recalls 3 objects  Results: 3 items recalled: COGNITIVE IMPAIRMENT LESS LIKELY    Mini-CogTM Copyright [employer] [place]. Licensed by the author for use in [facility]; reprinted with permission (sera@.[facility]). All rights reserved.      Do you have sleep apnea, excessive snoring or daytime drowsiness? : no    Reviewed and updated as needed this visit by clinical staff   Tobacco  Allergies  Meds  Problems  Med Hx  Surg Hx  Fam Hx          Reviewed and updated as needed this visit by Provider                 Social History     Tobacco Use    Smoking status: Never    Smokeless tobacco: Never   Substance Use Topics    Alcohol use: Not Currently             7/23/2023     1:16 PM   Alcohol Use   Prescreen: >3 drinks/day or >7 drinks/week? No     Do you have a current opioid prescription? No  Do you use any other controlled  substances or medications that are not prescribed by a provider? None              Current providers sharing in care for this patient include:   Patient Care Team:  Anyi Olmos MD as PCP - General (Internal Medicine)  Anyi Olmos MD as Assigned PCP  Courtney Brian PA as Physician Assistant (Cardiology)  Jesus Stone MD as MD (Cardiology)  Milagro Velazquez, RN as Registered Nurse (Cardiology)  Jo-Ann Neal PA-C as Physician Assistant (Physician Assistant)  Aisha Schuster APRN CNP as Assigned Heart and Vascular Provider    The following health maintenance items are reviewed in Epic and correct as of today:  Health Maintenance   Topic Date Due    ZOSTER IMMUNIZATION (3 of 3) 05/17/2021    COVID-19 Vaccine (6 - Moderna series) 04/01/2023    MEDICARE ANNUAL WELLNESS VISIT  04/08/2023    DTAP/TDAP/TD IMMUNIZATION (2 - Td or Tdap) 07/19/2023    INFLUENZA VACCINE (1) 09/01/2023    TSH W/FREE T4 REFLEX  04/04/2024    ANNUAL REVIEW OF HM ORDERS  04/04/2024    MAMMO SCREENING  04/14/2024    FALL RISK ASSESSMENT  07/25/2024    DEXA  05/31/2025    COLORECTAL CANCER SCREENING  09/15/2025    ADVANCE CARE PLANNING  07/25/2028    HEPATITIS C SCREENING  Completed    PHQ-2 (once per calendar year)  Completed    Pneumococcal Vaccine: 65+ Years  Completed    IPV IMMUNIZATION  Aged Out    MENINGITIS IMMUNIZATION  Aged Out    LIPID  Discontinued           Mammogram Screening - Patient over age 75, has elected to continue with screening.  Pertinent mammograms are reviewed under the imaging tab.    Review of Systems   Constitutional:  Negative for chills and fever.   HENT:  Negative for congestion, ear pain, hearing loss and sore throat.    Eyes:  Negative for pain and visual disturbance.   Respiratory:  Negative for cough and shortness of breath.    Cardiovascular:  Negative for chest pain, palpitations and peripheral edema.   Gastrointestinal:  Negative for abdominal pain, constipation, diarrhea, heartburn,  "hematochezia and nausea.   Breasts:  Negative for tenderness, breast mass and discharge.   Genitourinary:  Negative for dysuria, frequency, genital sores, hematuria, pelvic pain, urgency, vaginal bleeding and vaginal discharge.   Musculoskeletal:  Negative for arthralgias, joint swelling and myalgias.   Skin:  Negative for rash.   Neurological:  Negative for dizziness, weakness, headaches and paresthesias.   Psychiatric/Behavioral:  Negative for mood changes. The patient is not nervous/anxious.          OBJECTIVE:   /74 (BP Location: Left arm, Cuff Size: Adult Regular)   Pulse 82   Temp 97.5  F (36.4  C) (Tympanic)   Resp 14   Ht 1.657 m (5' 5.25\")   Wt 58.6 kg (129 lb 1.6 oz)   LMP  (LMP Unknown)   SpO2 97%   BMI 21.32 kg/m   Estimated body mass index is 21.32 kg/m  as calculated from the following:    Height as of this encounter: 1.657 m (5' 5.25\").    Weight as of this encounter: 58.6 kg (129 lb 1.6 oz).  Physical Exam    HEENT: extraocular movements are intact, pupils equal and reactive to light and accommodation, there is significant wax in both canals, this was removed manually with a curette with excellent results on the left, left TM is normal.  The right ear still has small amount of residual wax, portion of the eardrum seen looks normal.  NECK: Neck supple. No adenopathy. Thyroid symmetric, normal size,, Carotids without bruits.  PULMONARY: clear to auscultation  CARDIAC: regular rate and rhythm and no murmurs, clicks, or gallops  PULSES: 2/2 throughout  BACK: no spinal or CVAT  ABDOMINAL: Soft, nontender.  Normal bowel sounds.  No hepatosplenomegaly or abnormal masses           ASSESSMENT / PLAN:   (Z00.00) Encounter for annual wellness exam in Medicare patient  (primary encounter diagnosis)  Comment: Advised about shingles vaccine and that it is covered at the pharmacy.  Encouraged her to get a COVID shot this fall if she does not want to get it today, get flu shot before end of " October  Plan:     (H61.23) Bilateral impacted cerumen  Comment: All the wax was removed manually from the left ear, moderate amount was removed from the right ear manually with a curette.  Recommend she use over-the-counter Debrox to clear the rest of the wax on the right ear  Plan: REMOVAL OF IMPACTED WAX MD            (Z23) Need for vaccine for Td (tetanus-diphtheria)  Comment:   Plan:     Patient has been advised of split billing requirements and indicates understanding: Yes      COUNSELING:  Reviewed preventive health counseling, as reflected in patient instructions        She reports that she has never smoked. She has never used smokeless tobacco.      Appropriate preventive services were discussed with this patient, including applicable screening as appropriate for cardiovascular disease, diabetes, osteopenia/osteoporosis, and glaucoma.  As appropriate for age/gender, discussed screening for colorectal cancer, prostate cancer, breast cancer, and cervical cancer. Checklist reviewing preventive services available has been given to the patient.    Reviewed patients plan of care and provided an AVS. The Basic Care Plan (routine screening as documented in Health Maintenance) for Taty meets the Care Plan requirement. This Care Plan has been established and reviewed with the Patient.          Anyi Olmos MD  [facility]

## 2023-08-31 ENCOUNTER — ANCILLARY PROCEDURE (OUTPATIENT)
Dept: CARDIOLOGY | Facility: CLINIC | Age: 78
End: 2023-08-31
Attending: INTERNAL MEDICINE
Payer: COMMERCIAL

## 2023-08-31 DIAGNOSIS — I42.9 CARDIOMYOPATHY, UNSPECIFIED TYPE (H): ICD-10-CM

## 2023-08-31 DIAGNOSIS — Z95.810 ICD (IMPLANTABLE CARDIOVERTER-DEFIBRILLATOR) IN PLACE: ICD-10-CM

## 2023-08-31 PROCEDURE — 93296 REM INTERROG EVL PM/IDS: CPT | Performed by: INTERNAL MEDICINE

## 2023-08-31 PROCEDURE — 93295 DEV INTERROG REMOTE 1/2/MLT: CPT | Performed by: INTERNAL MEDICINE

## 2023-09-01 DIAGNOSIS — Z95.810 ICD (IMPLANTABLE CARDIOVERTER-DEFIBRILLATOR) IN PLACE: Primary | ICD-10-CM

## 2023-09-08 LAB
MDC_IDC_EPISODE_DTM: NORMAL
MDC_IDC_EPISODE_ID: NORMAL
MDC_IDC_EPISODE_TYPE: NORMAL
MDC_IDC_LEAD_IMPLANT_DT: NORMAL
MDC_IDC_LEAD_IMPLANT_DT: NORMAL
MDC_IDC_LEAD_LOCATION: NORMAL
MDC_IDC_LEAD_LOCATION: NORMAL
MDC_IDC_LEAD_LOCATION_DETAIL_1: NORMAL
MDC_IDC_LEAD_LOCATION_DETAIL_1: NORMAL
MDC_IDC_LEAD_MFG: NORMAL
MDC_IDC_LEAD_MFG: NORMAL
MDC_IDC_LEAD_MODEL: NORMAL
MDC_IDC_LEAD_MODEL: NORMAL
MDC_IDC_LEAD_POLARITY_TYPE: NORMAL
MDC_IDC_LEAD_POLARITY_TYPE: NORMAL
MDC_IDC_LEAD_SERIAL: NORMAL
MDC_IDC_LEAD_SERIAL: NORMAL
MDC_IDC_MSMT_BATTERY_DTM: NORMAL
MDC_IDC_MSMT_BATTERY_REMAINING_LONGEVITY: 138 MO
MDC_IDC_MSMT_BATTERY_REMAINING_PERCENTAGE: 100 %
MDC_IDC_MSMT_BATTERY_STATUS: NORMAL
MDC_IDC_MSMT_CAP_CHARGE_DTM: NORMAL
MDC_IDC_MSMT_CAP_CHARGE_TIME: 9.3 S
MDC_IDC_MSMT_CAP_CHARGE_TYPE: NORMAL
MDC_IDC_MSMT_LEADCHNL_RA_IMPEDANCE_VALUE: 622 OHM
MDC_IDC_MSMT_LEADCHNL_RA_PACING_THRESHOLD_AMPLITUDE: 0.5 V
MDC_IDC_MSMT_LEADCHNL_RA_PACING_THRESHOLD_PULSEWIDTH: 0.4 MS
MDC_IDC_MSMT_LEADCHNL_RV_IMPEDANCE_VALUE: 391 OHM
MDC_IDC_MSMT_LEADCHNL_RV_PACING_THRESHOLD_AMPLITUDE: 0.7 V
MDC_IDC_MSMT_LEADCHNL_RV_PACING_THRESHOLD_PULSEWIDTH: 0.4 MS
MDC_IDC_PG_IMPLANT_DTM: NORMAL
MDC_IDC_PG_MFG: NORMAL
MDC_IDC_PG_MODEL: NORMAL
MDC_IDC_PG_SERIAL: NORMAL
MDC_IDC_PG_TYPE: NORMAL
MDC_IDC_SESS_CLINIC_NAME: NORMAL
MDC_IDC_SESS_DTM: NORMAL
MDC_IDC_SESS_TYPE: NORMAL
MDC_IDC_SET_BRADY_AT_MODE_SWITCH_MODE: NORMAL
MDC_IDC_SET_BRADY_AT_MODE_SWITCH_RATE: 170 {BEATS}/MIN
MDC_IDC_SET_BRADY_LOWRATE: 60 {BEATS}/MIN
MDC_IDC_SET_BRADY_MAX_SENSOR_RATE: 130 {BEATS}/MIN
MDC_IDC_SET_BRADY_MAX_TRACKING_RATE: 130 {BEATS}/MIN
MDC_IDC_SET_BRADY_MODE: NORMAL
MDC_IDC_SET_BRADY_PAV_DELAY_HIGH: 150 MS
MDC_IDC_SET_BRADY_PAV_DELAY_LOW: 200 MS
MDC_IDC_SET_BRADY_SAV_DELAY_HIGH: 150 MS
MDC_IDC_SET_BRADY_SAV_DELAY_LOW: 200 MS
MDC_IDC_SET_LEADCHNL_RA_PACING_AMPLITUDE: 2 V
MDC_IDC_SET_LEADCHNL_RA_PACING_CAPTURE_MODE: NORMAL
MDC_IDC_SET_LEADCHNL_RA_PACING_POLARITY: NORMAL
MDC_IDC_SET_LEADCHNL_RA_PACING_PULSEWIDTH: 0.4 MS
MDC_IDC_SET_LEADCHNL_RA_SENSING_ADAPTATION_MODE: NORMAL
MDC_IDC_SET_LEADCHNL_RA_SENSING_POLARITY: NORMAL
MDC_IDC_SET_LEADCHNL_RA_SENSING_SENSITIVITY: 0.25 MV
MDC_IDC_SET_LEADCHNL_RV_PACING_AMPLITUDE: 2 V
MDC_IDC_SET_LEADCHNL_RV_PACING_CAPTURE_MODE: NORMAL
MDC_IDC_SET_LEADCHNL_RV_PACING_POLARITY: NORMAL
MDC_IDC_SET_LEADCHNL_RV_PACING_PULSEWIDTH: 0.4 MS
MDC_IDC_SET_LEADCHNL_RV_SENSING_ADAPTATION_MODE: NORMAL
MDC_IDC_SET_LEADCHNL_RV_SENSING_POLARITY: NORMAL
MDC_IDC_SET_LEADCHNL_RV_SENSING_SENSITIVITY: 0.6 MV
MDC_IDC_SET_ZONE_DETECTION_INTERVAL: 250 MS
MDC_IDC_SET_ZONE_DETECTION_INTERVAL: 300 MS
MDC_IDC_SET_ZONE_DETECTION_INTERVAL: 353 MS
MDC_IDC_SET_ZONE_TYPE: NORMAL
MDC_IDC_SET_ZONE_VENDOR_TYPE: NORMAL
MDC_IDC_STAT_AT_BURDEN_PERCENT: 1 %
MDC_IDC_STAT_AT_DTM_END: NORMAL
MDC_IDC_STAT_AT_DTM_START: NORMAL
MDC_IDC_STAT_BRADY_DTM_END: NORMAL
MDC_IDC_STAT_BRADY_DTM_START: NORMAL
MDC_IDC_STAT_BRADY_RA_PERCENT_PACED: 20 %
MDC_IDC_STAT_BRADY_RV_PERCENT_PACED: 0 %
MDC_IDC_STAT_EPISODE_RECENT_COUNT: 0
MDC_IDC_STAT_EPISODE_RECENT_COUNT: 1
MDC_IDC_STAT_EPISODE_RECENT_COUNT_DTM_END: NORMAL
MDC_IDC_STAT_EPISODE_RECENT_COUNT_DTM_START: NORMAL
MDC_IDC_STAT_EPISODE_TYPE: NORMAL
MDC_IDC_STAT_EPISODE_VENDOR_TYPE: NORMAL
MDC_IDC_STAT_TACHYTHERAPY_ATP_DELIVERED_RECENT: 0
MDC_IDC_STAT_TACHYTHERAPY_ATP_DELIVERED_TOTAL: 0
MDC_IDC_STAT_TACHYTHERAPY_RECENT_DTM_END: NORMAL
MDC_IDC_STAT_TACHYTHERAPY_RECENT_DTM_START: NORMAL
MDC_IDC_STAT_TACHYTHERAPY_SHOCKS_ABORTED_RECENT: 0
MDC_IDC_STAT_TACHYTHERAPY_SHOCKS_ABORTED_TOTAL: 0
MDC_IDC_STAT_TACHYTHERAPY_SHOCKS_DELIVERED_RECENT: 0
MDC_IDC_STAT_TACHYTHERAPY_SHOCKS_DELIVERED_TOTAL: 0
MDC_IDC_STAT_TACHYTHERAPY_TOTAL_DTM_END: NORMAL
MDC_IDC_STAT_TACHYTHERAPY_TOTAL_DTM_START: NORMAL

## 2023-10-11 NOTE — PATIENT INSTRUCTIONS
PRIOR TO DISCHARGE        Comments:   -diagnostic tests and consults completed and resulted MET  -vital signs normal or at patient baseline NOT MET  Nurse to notify provider when observation goals have been met and patient is ready for discharge.            Try reducing your Torsemide to 5 mg/day for 1-2 wks, and if weight stays stable, then can stop Torsemide  Reduce potassium pill and stop when you stop Torsemide    Call with weight gain of 2 lbs/day and/or 5 lbs/week  Continue to avoid high salty foods    It was a pleasure seeing you today     Please do not hesitate to call my nurse team with any questions or concerns:  [phone]    Scheduling number:  [phone]    ROBINA Borges, CNP

## 2023-12-11 NOTE — PROGRESS NOTES
HISTORY OF PRESENT ILLNESS:     This is a 78 year old female who follows with Dr Stone at [facility].  Her past medical history includes:  Nonischemic cardiomyopathy, mitral regurgitation/prolapse s/p MVR, tricuspid regurgitation s/p repair, paroxysmal atrial fibrillation, advanced heart block s/p ICD      Ms Aguila was found to have rapid atrial fibrillation (2018)  She underwent cardioversion, but her A-fib redeveloped shortly after     She developed severe tricuspid regurgitation and mitral regurgitation and eventually underwent a minimally invasive bioprosthetic mitral valve replacement and a tricuspid valve repair in 2019. No coronary bypass was needed.  She required placement of a permanent pacemaker due to complete heart block following surgery.  She also developed significant volume overload after surgery and redeveloped atrial fibrillation.  Her ECHO then showed LVEF 20-25%  She underwent repeat cardioversion restoring sinus rhythm     Maritza NUC (2019) showed normal perfusion     Her LVEF improved to 35-40% in 2020.  She was found to have short runs of nonsustained VT on device interrogation and subsequently underwent a device upgrade to an ICD (8/2020)     ECHO (6/2021) showed LVEF 50-55%, normal RV function, normal MV prosthetic valve gradients, trace tricuspid regurgitation, 1+ pulmonic valvular regurgitation, mild ascending aorta dilatation    ECHO (6/2023) LVEF 50-55%, normal RV function, mild biatrial enlargement, normal prosthetic MV gradients with trace-mild MR, 1+ pulmonic valvular regurgitation    She returns today for 6 month reassessment and device interrogation    Ms Aguila is staying active by walking 3 miles every day  She denies any chest pain, shortness of breath, palpitations, orthopnea, or peripheral edema  Her energy is good  She is due for annual labs, but her PMD recently retired and she has yet established with new provider    She checks her BP at home and I reviewed  some of these recordings  Her BP is staying < 140/90 mmHg       Device interrogation today showed 19% A-paced  <1 % V-paced   No ventricular arrhythmias or shocks        VITAL SIGNS:  BP:  130/70  Pulse:  52 bpm  Weight:  133 lbs  (BMI: 22)  stable        IMPRESSION AND PLAN:     S/p Bioprosthetic MVR and Tricuspid Repair (2018)  -normal MV gradients and trace TR   -SBE prophylaxis     S/p dual-chamber PPM due to high-grade AV block (2019)  Upgrade to defibrillator (2020) due to nonsustained VT  -20% A-paced  -continue device cares     Paroxysmal Atrial Fibrillation  -hx of cardioversion (2018 & 2019)  -none on recent device interrogation  -chronic Xarelto       Resolved Nonischemic Cardiomyopathy  -LVEF now 50-55%  -no signs or symptoms of heart failure  -weight stable  -will draw courteous labs today and will review results over the phone        Hypertension:  -on Losartan 100 mg, Metoprolol  mg  -BP controlled    The total time for the visit today was 30 minutes which includes patient visit, reviewing of records, discussion, and placing of orders of the outpatient coordination of cardiovascular care as described.  The level of medical decision making during this visit was of moderate complexity.  Thank you for allowing me to participate in their care.  Follow up in 6 months    Orders Placed This Encounter   Procedures    Basic metabolic panel    Hemoglobin    ALT    Follow-Up with Cardiology       Orders Placed This Encounter   Medications    rivaroxaban ANTICOAGULANT (XARELTO) 20 MG TABS tablet     Sig: Take 1 tablet (20 mg) by mouth daily (with dinner)     Dispense:  90 tablet     Refill:  3    losartan (COZAAR) 100 MG tablet     Sig: Take 1 tablet (100 mg) by mouth daily     Dispense:  90 tablet     Refill:  3    metoprolol succinate ER (TOPROL XL) 100 MG 24 hr tablet     Sig: Take 1 tablet (100 mg) by mouth daily     Dispense:  90 tablet     Refill:  3    amoxicillin (AMOXIL) 500 MG capsule     Sig:  Take 4 capsules (2,000 mg) by mouth as needed (take four tabs 30-60 minutes prior to dental procedures)     Dispense:  8 capsule     Refill:  0       Medications Discontinued During This Encounter   Medication Reason    rivaroxaban ANTICOAGULANT (XARELTO) 20 MG TABS tablet Reorder (No AVS)    losartan (COZAAR) 100 MG tablet Reorder (No AVS)    metoprolol succinate ER (TOPROL XL) 100 MG 24 hr tablet Reorder (No AVS)         Encounter Diagnoses   Name Primary?    Cardiomyopathy, unspecified type (H)     S/P MVR (mitral valve replacement)-St junaid epic tissue 31 mm     ICD (implantable cardioverter-defibrillator) in place     S/P TVR (tricuspid valve repair)     Paroxysmal atrial fibrillation (H)     Chronic systolic heart failure (H)        CURRENT MEDICATIONS:  Current Outpatient Medications   Medication Sig Dispense Refill    amoxicillin (AMOXIL) 500 MG capsule Take 4 capsules (2,000 mg) by mouth as needed (take four tabs 30-60 minutes prior to dental procedures) 8 capsule 0    losartan (COZAAR) 100 MG tablet Take 1 tablet (100 mg) by mouth daily 90 tablet 3    metoprolol succinate ER (TOPROL XL) 100 MG 24 hr tablet Take 1 tablet (100 mg) by mouth daily 90 tablet 3    rivaroxaban ANTICOAGULANT (XARELTO) 20 MG TABS tablet Take 1 tablet (20 mg) by mouth daily (with dinner) 90 tablet 3    aspirin 81 MG EC tablet Take 81 mg by mouth every evening      calcium carbonate-vitamin D 600-200 MG-UNIT TABS Take 2 tablets by mouth 2 times daily       Cetirizine HCl (ZYRTEC PO) Take 10 mg by mouth daily       IBANdronate (BONIVA) 150 MG tablet Take 1 tablet (150 mg) by mouth every 30 days 3 tablet 3    levothyroxine (SYNTHROID/LEVOTHROID) 50 MCG tablet TAKE 1 TABLET DAILY 90 tablet 3    Multiple Vitamins-Minerals (MULTIVITAMIN ADULT) TABS Take 1 tablet by mouth daily      omeprazole (PRILOSEC) 10 MG DR capsule TAKE 1 CAPSULE EVERY MORNING BEFORE BREAKFAST 90 capsule 3       ALLERGIES     Allergies   Allergen Reactions     Chlorpheniramine Other (See Comments)     LOC and fainting     Lisinopril Cough    Phenylephrine Other (See Comments)     fainting       PAST MEDICAL HISTORY:  Past Medical History:   Diagnosis Date    Allergic rhinitis, cause unspecified     dust mites, ragweed    Arthritis     Complete AV block (H)     BSX DDD PM 2-    Congestive heart failure (H) 2019    History of tricuspid valve repair     Mitral valve prolapse     bioprosthetic MVR 2-    Paroxysmal atrial fibrillation (H)        PAST SURGICAL HISTORY:  Past Surgical History:   Procedure Laterality Date    COLONOSCOPY N/A 9/15/2015    Procedure: COLONOSCOPY;  Surgeon: Anson Llanos MD;  Location:  GI    CV HEART CATHETERIZATION WITH POSSIBLE INTERVENTION N/A 1/9/2019    Procedure: Heart Catheterization with Possible Intervention;  Surgeon: Ritesh Whittaker MD;  Location:  HEART CARDIAC CATH LAB    CV RIGHT AND LEFT HEART CATH N/A 1/9/2019    Procedure: Right and Left Heart Catherization;  Surgeon: Ritesh Whittaker MD;  Location:  HEART CARDIAC CATH LAB    ENT SURGERY      T&A    EP INSERT ICD N/A 8/14/2020    Procedure: EP Insert ICD;  Surgeon: Lucian Tolliver MD;  Location:  HEART CARDIAC CATH LAB    EP PACEMAKER N/A 2/18/2019    Procedure: EP Pacemaker;  Surgeon: Inocencia Guillen MD;  Location:  HEART CARDIAC CATH LAB    REPAIR VALVE TRICUSPID MINIMALLY INVASIVE N/A 2/15/2019    Procedure: MINIMALLY INVASIVE TRICUSPID VALVE REPAIR WITH 32MM SULLIVAN RING;  Surgeon: Andrea Hanna MD;  Location:  OR    REPLACE VALVE MITRAL MINIMALLY INVASIVE N/A 2/15/2019    Procedure: MINIMALLY INVASIVE MITRAL VALVE REPLACEMENT WITH EPIC ST KEYUR 31MM VALVE; ON PUMP WITH TIA.  CARDIOLOGIST DR CARDENAS;  Surgeon: Andrea Hanna MD;  Location:  OR       FAMILY HISTORY:  Family History   Problem Relation Age of Onset    Osteoporosis Mother     Alzheimer Disease Mother     Family History Negative Father      "Heart Disease Maternal Grandfather     Family History Negative Paternal Grandmother     Family History Negative Paternal Grandfather     Breast Cancer No family hx of     Ovarian Cancer No family hx of        SOCIAL HISTORY:  Social History     Socioeconomic History    Marital status:      Spouse name: None    Number of children: 3    Years of education: None    Highest education level: None   Tobacco Use    Smoking status: Never    Smokeless tobacco: Never   Vaping Use    Vaping Use: Never used   Substance and Sexual Activity    Alcohol use: Not Currently     Comment: very little    Drug use: No    Sexual activity: Not Currently   Other Topics Concern    Caffeine Concern No     Comment: 1 cup of coffee and 1 can diet coke per day    Sleep Concern Yes     Comment: takes Doxylamine succinate 1/2 tab every night    Stress Concern No    Weight Concern No    Special Diet No     Comment: healthy     Exercise Yes     Comment: walking 45min x7 a wk. Very active, [employer], Golf, Bowling, Cardio    Seat Belt Yes    Self-Exams Yes    Parent/sibling w/ CABG, MI or angioplasty before 65F 55M? No       Review of Systems:  Skin:          Eyes:         ENT:         Respiratory:  Negative       Cardiovascular:    Positive for;palpitations    Gastroenterology:        Genitourinary:         Musculoskeletal:         Neurologic:         Psychiatric:         Heme/Lymph/Imm:         Endocrine:           Physical Exam:  Vitals: /70 (BP Location: Right arm, Patient Position: Sitting, Cuff Size: Adult Regular)   Pulse 52   Ht 1.651 m (5' 5\")   Wt 60.3 kg (133 lb)   LMP  (LMP Unknown)   SpO2 99%   BMI 22.13 kg/m      Constitutional:  cooperative        Skin:  warm and dry to the touch   pacemaker incision in the left infraclavicular area was well-healed      Head:  normocephalic        Eyes:  pupils equal and round        Lymph:      ENT:  no pallor or cyanosis        Neck:  no carotid bruit;JVP normal        Respiratory:  " clear to auscultation;normal respiratory excursion         Cardiac: regular rhythm     no presence of murmur          pulses full and equal                                        GI:  abdomen soft        Extremities and Muscular Skeletal:  no edema              Neurological:  no gross motor deficits        Psych:  Alert and Oriented x 3          CC  Jesus Stone MD  [address]  [place] [id number]

## 2023-12-13 ENCOUNTER — TELEPHONE (OUTPATIENT)
Dept: CARDIOLOGY | Facility: CLINIC | Age: 78
End: 2023-12-13

## 2023-12-13 ENCOUNTER — ANCILLARY PROCEDURE (OUTPATIENT)
Dept: CARDIOLOGY | Facility: CLINIC | Age: 78
End: 2023-12-13
Attending: INTERNAL MEDICINE
Payer: COMMERCIAL

## 2023-12-13 ENCOUNTER — LAB (OUTPATIENT)
Dept: LAB | Facility: CLINIC | Age: 78
End: 2023-12-13
Payer: COMMERCIAL

## 2023-12-13 ENCOUNTER — OFFICE VISIT (OUTPATIENT)
Dept: CARDIOLOGY | Facility: CLINIC | Age: 78
End: 2023-12-13
Payer: COMMERCIAL

## 2023-12-13 VITALS
SYSTOLIC BLOOD PRESSURE: 130 MMHG | BODY MASS INDEX: 22.16 KG/M2 | WEIGHT: 133 LBS | OXYGEN SATURATION: 99 % | HEART RATE: 52 BPM | HEIGHT: 65 IN | DIASTOLIC BLOOD PRESSURE: 70 MMHG

## 2023-12-13 DIAGNOSIS — Z98.890 S/P TVR (TRICUSPID VALVE REPAIR): ICD-10-CM

## 2023-12-13 DIAGNOSIS — I42.9 CARDIOMYOPATHY, UNSPECIFIED TYPE (H): Primary | ICD-10-CM

## 2023-12-13 DIAGNOSIS — Z95.2 S/P MVR (MITRAL VALVE REPLACEMENT): ICD-10-CM

## 2023-12-13 DIAGNOSIS — I42.9 CARDIOMYOPATHY, UNSPECIFIED TYPE (H): ICD-10-CM

## 2023-12-13 DIAGNOSIS — Z95.810 ICD (IMPLANTABLE CARDIOVERTER-DEFIBRILLATOR) IN PLACE: ICD-10-CM

## 2023-12-13 DIAGNOSIS — I48.0 PAROXYSMAL ATRIAL FIBRILLATION (H): ICD-10-CM

## 2023-12-13 DIAGNOSIS — I50.22 CHRONIC SYSTOLIC HEART FAILURE (H): ICD-10-CM

## 2023-12-13 LAB
ALT SERPL W P-5'-P-CCNC: 40 U/L (ref 0–50)
ANION GAP SERPL CALCULATED.3IONS-SCNC: 8 MMOL/L (ref 7–15)
BUN SERPL-MCNC: 16 MG/DL (ref 8–23)
CALCIUM SERPL-MCNC: 9 MG/DL (ref 8.8–10.2)
CHLORIDE SERPL-SCNC: 97 MMOL/L (ref 98–107)
CREAT SERPL-MCNC: 0.74 MG/DL (ref 0.51–0.95)
DEPRECATED HCO3 PLAS-SCNC: 26 MMOL/L (ref 22–29)
EGFRCR SERPLBLD CKD-EPI 2021: 83 ML/MIN/1.73M2
GLUCOSE SERPL-MCNC: 109 MG/DL (ref 70–99)
HGB BLD-MCNC: 12.8 G/DL (ref 11.7–15.7)
POTASSIUM SERPL-SCNC: 4.3 MMOL/L (ref 3.4–5.3)
SODIUM SERPL-SCNC: 131 MMOL/L (ref 135–145)

## 2023-12-13 PROCEDURE — 36415 COLL VENOUS BLD VENIPUNCTURE: CPT | Performed by: NURSE PRACTITIONER

## 2023-12-13 PROCEDURE — 84460 ALANINE AMINO (ALT) (SGPT): CPT | Performed by: NURSE PRACTITIONER

## 2023-12-13 PROCEDURE — 85018 HEMOGLOBIN: CPT | Performed by: NURSE PRACTITIONER

## 2023-12-13 PROCEDURE — 80048 BASIC METABOLIC PNL TOTAL CA: CPT | Performed by: NURSE PRACTITIONER

## 2023-12-13 PROCEDURE — 93283 PRGRMG EVAL IMPLANTABLE DFB: CPT | Performed by: INTERNAL MEDICINE

## 2023-12-13 PROCEDURE — 99214 OFFICE O/P EST MOD 30 MIN: CPT | Mod: 25 | Performed by: NURSE PRACTITIONER

## 2023-12-13 RX ORDER — METOPROLOL SUCCINATE 100 MG/1
100 TABLET, EXTENDED RELEASE ORAL DAILY
Qty: 90 TABLET | Refills: 3 | Status: SHIPPED | OUTPATIENT
Start: 2023-12-13

## 2023-12-13 RX ORDER — AMOXICILLIN 500 MG/1
2000 CAPSULE ORAL PRN
Qty: 8 CAPSULE | Refills: 0 | Status: SHIPPED | OUTPATIENT
Start: 2023-12-13 | End: 2024-09-03

## 2023-12-13 RX ORDER — LOSARTAN POTASSIUM 100 MG/1
100 TABLET ORAL DAILY
Qty: 90 TABLET | Refills: 3 | Status: SHIPPED | OUTPATIENT
Start: 2023-12-13

## 2023-12-13 NOTE — LETTER
12/13/2023    Anyi Olmos MD  [address] [facility] [address]  [facility] [address]    RE: Taty Montgomerytalon       Dear Colleague,     I had the pleasure of seeing Taty Aguila in the [facility].  HISTORY OF PRESENT ILLNESS:     This is a 78 year old female who follows with Dr Stone at [facility].  Her past medical history includes:  Nonischemic cardiomyopathy, mitral regurgitation/prolapse s/p MVR, tricuspid regurgitation s/p repair, paroxysmal atrial fibrillation, advanced heart block s/p ICD      Ms Aguila was found to have rapid atrial fibrillation (2018)  She underwent cardioversion, but her A-fib redeveloped shortly after     She developed severe tricuspid regurgitation and mitral regurgitation and eventually underwent a minimally invasive bioprosthetic mitral valve replacement and a tricuspid valve repair in 2019. No coronary bypass was needed.  She required placement of a permanent pacemaker due to complete heart block following surgery.  She also developed significant volume overload after surgery and redeveloped atrial fibrillation.  Her ECHO then showed LVEF 20-25%  She underwent repeat cardioversion restoring sinus rhythm     Maritza NUC (2019) showed normal perfusion     Her LVEF improved to 35-40% in 2020.  She was found to have short runs of nonsustained VT on device interrogation and subsequently underwent a device upgrade to an ICD (8/2020)     ECHO (6/2021) showed LVEF 50-55%, normal RV function, normal MV prosthetic valve gradients, trace tricuspid regurgitation, 1+ pulmonic valvular regurgitation, mild ascending aorta dilatation    ECHO (6/2023) LVEF 50-55%, normal RV function, mild biatrial enlargement, normal prosthetic MV gradients with trace-mild MR, 1+ pulmonic valvular regurgitation    She returns today for 6 month reassessment and device interrogation    Ms Aguila is staying active by walking 3 miles every day  She denies any chest pain, shortness of breath,  palpitations, orthopnea, or peripheral edema  Her energy is good  She is due for annual labs, but her PMD recently retired and she has yet established with new provider    She checks her BP at home and I reviewed some of these recordings  Her BP is staying < 140/90 mmHg       Device interrogation today showed 19% A-paced  <1 % V-paced   No ventricular arrhythmias or shocks        VITAL SIGNS:  BP:  130/70  Pulse:  52 bpm  Weight:  133 lbs  (BMI: 22)  stable        IMPRESSION AND PLAN:     S/p Bioprosthetic MVR and Tricuspid Repair (2018)  -normal MV gradients and trace TR   -SBE prophylaxis     S/p dual-chamber PPM due to high-grade AV block (2019)  Upgrade to defibrillator (2020) due to nonsustained VT  -20% A-paced  -continue device cares     Paroxysmal Atrial Fibrillation  -hx of cardioversion (2018 & 2019)  -none on recent device interrogation  -chronic Xarelto       Resolved Nonischemic Cardiomyopathy  -LVEF now 50-55%  -no signs or symptoms of heart failure  -weight stable  -will draw courteous labs today and will review results over the phone        Hypertension:  -on Losartan 100 mg, Metoprolol  mg  -BP controlled    The total time for the visit today was 30 minutes which includes patient visit, reviewing of records, discussion, and placing of orders of the outpatient coordination of cardiovascular care as described.  The level of medical decision making during this visit was of moderate complexity.  Thank you for allowing me to participate in their care.  Follow up in 6 months    Orders Placed This Encounter   Procedures    Basic metabolic panel    Hemoglobin    ALT    Follow-Up with Cardiology       Orders Placed This Encounter   Medications    rivaroxaban ANTICOAGULANT (XARELTO) 20 MG TABS tablet     Sig: Take 1 tablet (20 mg) by mouth daily (with dinner)     Dispense:  90 tablet     Refill:  3    losartan (COZAAR) 100 MG tablet     Sig: Take 1 tablet (100 mg) by mouth daily     Dispense:  90 tablet      Refill:  3    metoprolol succinate ER (TOPROL XL) 100 MG 24 hr tablet     Sig: Take 1 tablet (100 mg) by mouth daily     Dispense:  90 tablet     Refill:  3    amoxicillin (AMOXIL) 500 MG capsule     Sig: Take 4 capsules (2,000 mg) by mouth as needed (take four tabs 30-60 minutes prior to dental procedures)     Dispense:  8 capsule     Refill:  0       Medications Discontinued During This Encounter   Medication Reason    rivaroxaban ANTICOAGULANT (XARELTO) 20 MG TABS tablet Reorder (No AVS)    losartan (COZAAR) 100 MG tablet Reorder (No AVS)    metoprolol succinate ER (TOPROL XL) 100 MG 24 hr tablet Reorder (No AVS)         Encounter Diagnoses   Name Primary?    Cardiomyopathy, unspecified type (H)     S/P MVR (mitral valve replacement)-St junaid epic tissue 31 mm     ICD (implantable cardioverter-defibrillator) in place     S/P TVR (tricuspid valve repair)     Paroxysmal atrial fibrillation (H)     Chronic systolic heart failure (H)        CURRENT MEDICATIONS:  Current Outpatient Medications   Medication Sig Dispense Refill    amoxicillin (AMOXIL) 500 MG capsule Take 4 capsules (2,000 mg) by mouth as needed (take four tabs 30-60 minutes prior to dental procedures) 8 capsule 0    losartan (COZAAR) 100 MG tablet Take 1 tablet (100 mg) by mouth daily 90 tablet 3    metoprolol succinate ER (TOPROL XL) 100 MG 24 hr tablet Take 1 tablet (100 mg) by mouth daily 90 tablet 3    rivaroxaban ANTICOAGULANT (XARELTO) 20 MG TABS tablet Take 1 tablet (20 mg) by mouth daily (with dinner) 90 tablet 3    aspirin 81 MG EC tablet Take 81 mg by mouth every evening      calcium carbonate-vitamin D 600-200 MG-UNIT TABS Take 2 tablets by mouth 2 times daily       Cetirizine HCl (ZYRTEC PO) Take 10 mg by mouth daily       IBANdronate (BONIVA) 150 MG tablet Take 1 tablet (150 mg) by mouth every 30 days 3 tablet 3    levothyroxine (SYNTHROID/LEVOTHROID) 50 MCG tablet TAKE 1 TABLET DAILY 90 tablet 3    Multiple Vitamins-Minerals  (MULTIVITAMIN ADULT) TABS Take 1 tablet by mouth daily      omeprazole (PRILOSEC) 10 MG DR capsule TAKE 1 CAPSULE EVERY MORNING BEFORE BREAKFAST 90 capsule 3       ALLERGIES     Allergies   Allergen Reactions    Chlorpheniramine Other (See Comments)     LOC and fainting     Lisinopril Cough    Phenylephrine Other (See Comments)     fainting       PAST MEDICAL HISTORY:  Past Medical History:   Diagnosis Date    Allergic rhinitis, cause unspecified     dust mites, ragweed    Arthritis     Complete AV block (H)     BSX DDD PM 2-    Congestive heart failure (H) 2019    History of tricuspid valve repair     Mitral valve prolapse     bioprosthetic MVR 2-    Paroxysmal atrial fibrillation (H)        PAST SURGICAL HISTORY:  Past Surgical History:   Procedure Laterality Date    COLONOSCOPY N/A 9/15/2015    Procedure: COLONOSCOPY;  Surgeon: Anson Llanos MD;  Location:  GI    CV HEART CATHETERIZATION WITH POSSIBLE INTERVENTION N/A 1/9/2019    Procedure: Heart Catheterization with Possible Intervention;  Surgeon: Ritesh Whittaker MD;  Location:  HEART CARDIAC CATH LAB    CV RIGHT AND LEFT HEART CATH N/A 1/9/2019    Procedure: Right and Left Heart Catherization;  Surgeon: Ritesh Whittaker MD;  Location:  HEART CARDIAC CATH LAB    ENT SURGERY      T&A    EP INSERT ICD N/A 8/14/2020    Procedure: EP Insert ICD;  Surgeon: Lucian Tolliver MD;  Location:  HEART CARDIAC CATH LAB    EP PACEMAKER N/A 2/18/2019    Procedure: EP Pacemaker;  Surgeon: Inocencia Guillen MD;  Location:  HEART CARDIAC CATH LAB    REPAIR VALVE TRICUSPID MINIMALLY INVASIVE N/A 2/15/2019    Procedure: MINIMALLY INVASIVE TRICUSPID VALVE REPAIR WITH 32MM SULLIVAN RING;  Surgeon: Andrea Hanna MD;  Location:  OR    REPLACE VALVE MITRAL MINIMALLY INVASIVE N/A 2/15/2019    Procedure: MINIMALLY INVASIVE MITRAL VALVE REPLACEMENT WITH EPIC ST KEYUR 31MM VALVE; ON PUMP WITH TIA.  CARDIOLOGIST DR CARDENAS;   "Surgeon: Andrea Hanna MD;  Location:  OR       FAMILY HISTORY:  Family History   Problem Relation Age of Onset    Osteoporosis Mother     Alzheimer Disease Mother     Family History Negative Father     Heart Disease Maternal Grandfather     Family History Negative Paternal Grandmother     Family History Negative Paternal Grandfather     Breast Cancer No family hx of     Ovarian Cancer No family hx of        SOCIAL HISTORY:  Social History     Socioeconomic History    Marital status:      Spouse name: None    Number of children: 3    Years of education: None    Highest education level: None   Tobacco Use    Smoking status: Never    Smokeless tobacco: Never   Vaping Use    Vaping Use: Never used   Substance and Sexual Activity    Alcohol use: Not Currently     Comment: very little    Drug use: No    Sexual activity: Not Currently   Other Topics Concern    Caffeine Concern No     Comment: 1 cup of coffee and 1 can diet coke per day    Sleep Concern Yes     Comment: takes Doxylamine succinate 1/2 tab every night    Stress Concern No    Weight Concern No    Special Diet No     Comment: healthy     Exercise Yes     Comment: walking 45min x7 a wk. Very active, [employer], Golf, Bowling, Cardio    Seat Belt Yes    Self-Exams Yes    Parent/sibling w/ CABG, MI or angioplasty before 65F 55M? No       Review of Systems:  Skin:          Eyes:         ENT:         Respiratory:  Negative       Cardiovascular:    Positive for;palpitations    Gastroenterology:        Genitourinary:         Musculoskeletal:         Neurologic:         Psychiatric:         Heme/Lymph/Imm:         Endocrine:           Physical Exam:  Vitals: /70 (BP Location: Right arm, Patient Position: Sitting, Cuff Size: Adult Regular)   Pulse 52   Ht 1.651 m (5' 5\")   Wt 60.3 kg (133 lb)   LMP  (LMP Unknown)   SpO2 99%   BMI 22.13 kg/m      Constitutional:  cooperative        Skin:  warm and dry to the touch   pacemaker incision in the " left infraclavicular area was well-healed      Head:  normocephalic        Eyes:  pupils equal and round        Lymph:      ENT:  no pallor or cyanosis        Neck:  no carotid bruit;JVP normal        Respiratory:  clear to auscultation;normal respiratory excursion         Cardiac: regular rhythm     no presence of murmur          pulses full and equal                                        GI:  abdomen soft        Extremities and Muscular Skeletal:  no edema              Neurological:  no gross motor deficits        Psych:  Alert and Oriented x 3          CC  Jesus Stone MD  [address]  [place] [address]      Thank you for allowing me to participate in the care of your patient.      Sincerely,     ROBINA [facility]ra [facility]

## 2023-12-14 NOTE — TELEPHONE ENCOUNTER
Routing to heart scheduling,     Please reach out to schedule non fasting BMP for 1 month from now to recheck sodium level and kidney function.     Thank you!    Adalgisa Welch RN  December 14, 2023 8:00 AM

## 2023-12-26 LAB
MDC_IDC_LEAD_CONNECTION_STATUS: NORMAL
MDC_IDC_LEAD_CONNECTION_STATUS: NORMAL
MDC_IDC_LEAD_IMPLANT_DT: NORMAL
MDC_IDC_LEAD_IMPLANT_DT: NORMAL
MDC_IDC_LEAD_LOCATION: NORMAL
MDC_IDC_LEAD_LOCATION: NORMAL
MDC_IDC_LEAD_LOCATION_DETAIL_1: NORMAL
MDC_IDC_LEAD_LOCATION_DETAIL_1: NORMAL
MDC_IDC_LEAD_MFG: NORMAL
MDC_IDC_LEAD_MFG: NORMAL
MDC_IDC_LEAD_MODEL: NORMAL
MDC_IDC_LEAD_MODEL: NORMAL
MDC_IDC_LEAD_POLARITY_TYPE: NORMAL
MDC_IDC_LEAD_POLARITY_TYPE: NORMAL
MDC_IDC_LEAD_SERIAL: NORMAL
MDC_IDC_LEAD_SERIAL: NORMAL
MDC_IDC_MSMT_BATTERY_DTM: NORMAL
MDC_IDC_MSMT_BATTERY_REMAINING_LONGEVITY: 138 MO
MDC_IDC_MSMT_BATTERY_REMAINING_PERCENTAGE: 100 %
MDC_IDC_MSMT_BATTERY_STATUS: NORMAL
MDC_IDC_MSMT_CAP_CHARGE_DTM: NORMAL
MDC_IDC_MSMT_CAP_CHARGE_TIME: 9.3 S
MDC_IDC_MSMT_CAP_CHARGE_TYPE: NORMAL
MDC_IDC_MSMT_LEADCHNL_RA_IMPEDANCE_VALUE: 631 OHM
MDC_IDC_MSMT_LEADCHNL_RA_PACING_THRESHOLD_AMPLITUDE: 0.7 V
MDC_IDC_MSMT_LEADCHNL_RA_PACING_THRESHOLD_PULSEWIDTH: 0.4 MS
MDC_IDC_MSMT_LEADCHNL_RV_IMPEDANCE_VALUE: 427 OHM
MDC_IDC_MSMT_LEADCHNL_RV_LEAD_CHANNEL_STATUS: NORMAL
MDC_IDC_MSMT_LEADCHNL_RV_PACING_THRESHOLD_AMPLITUDE: 0.7 V
MDC_IDC_MSMT_LEADCHNL_RV_PACING_THRESHOLD_PULSEWIDTH: 0.4 MS
MDC_IDC_PG_IMPLANT_DTM: NORMAL
MDC_IDC_PG_MFG: NORMAL
MDC_IDC_PG_MODEL: NORMAL
MDC_IDC_PG_SERIAL: NORMAL
MDC_IDC_PG_TYPE: NORMAL
MDC_IDC_SESS_CLINIC_NAME: NORMAL
MDC_IDC_SESS_DTM: NORMAL
MDC_IDC_SESS_TYPE: NORMAL
MDC_IDC_SET_BRADY_AT_MODE_SWITCH_MODE: NORMAL
MDC_IDC_SET_BRADY_AT_MODE_SWITCH_RATE: 170 {BEATS}/MIN
MDC_IDC_SET_BRADY_LOWRATE: 60 {BEATS}/MIN
MDC_IDC_SET_BRADY_MAX_SENSOR_RATE: 130 {BEATS}/MIN
MDC_IDC_SET_BRADY_MAX_TRACKING_RATE: 130 {BEATS}/MIN
MDC_IDC_SET_BRADY_MODE: NORMAL
MDC_IDC_SET_BRADY_PAV_DELAY_HIGH: 150 MS
MDC_IDC_SET_BRADY_PAV_DELAY_LOW: 200 MS
MDC_IDC_SET_BRADY_SAV_DELAY_HIGH: 150 MS
MDC_IDC_SET_BRADY_SAV_DELAY_LOW: 200 MS
MDC_IDC_SET_LEADCHNL_RA_PACING_AMPLITUDE: 2 V
MDC_IDC_SET_LEADCHNL_RA_PACING_CAPTURE_MODE: NORMAL
MDC_IDC_SET_LEADCHNL_RA_PACING_POLARITY: NORMAL
MDC_IDC_SET_LEADCHNL_RA_PACING_PULSEWIDTH: 0.4 MS
MDC_IDC_SET_LEADCHNL_RA_SENSING_ADAPTATION_MODE: NORMAL
MDC_IDC_SET_LEADCHNL_RA_SENSING_POLARITY: NORMAL
MDC_IDC_SET_LEADCHNL_RA_SENSING_SENSITIVITY: 0.25 MV
MDC_IDC_SET_LEADCHNL_RV_PACING_AMPLITUDE: 2 V
MDC_IDC_SET_LEADCHNL_RV_PACING_CAPTURE_MODE: NORMAL
MDC_IDC_SET_LEADCHNL_RV_PACING_POLARITY: NORMAL
MDC_IDC_SET_LEADCHNL_RV_PACING_PULSEWIDTH: 0.4 MS
MDC_IDC_SET_LEADCHNL_RV_SENSING_ADAPTATION_MODE: NORMAL
MDC_IDC_SET_LEADCHNL_RV_SENSING_POLARITY: NORMAL
MDC_IDC_SET_LEADCHNL_RV_SENSING_SENSITIVITY: 0.6 MV
MDC_IDC_SET_ZONE_DETECTION_INTERVAL: 250 MS
MDC_IDC_SET_ZONE_DETECTION_INTERVAL: 300 MS
MDC_IDC_SET_ZONE_DETECTION_INTERVAL: 353 MS
MDC_IDC_SET_ZONE_STATUS: NORMAL
MDC_IDC_SET_ZONE_TYPE: NORMAL
MDC_IDC_SET_ZONE_VENDOR_TYPE: NORMAL
MDC_IDC_STAT_AT_BURDEN_PERCENT: 0 %
MDC_IDC_STAT_AT_DTM_END: NORMAL
MDC_IDC_STAT_AT_DTM_START: NORMAL
MDC_IDC_STAT_BRADY_DTM_END: NORMAL
MDC_IDC_STAT_BRADY_DTM_START: NORMAL
MDC_IDC_STAT_BRADY_RA_PERCENT_PACED: 19 %
MDC_IDC_STAT_BRADY_RV_PERCENT_PACED: 0 %
MDC_IDC_STAT_EPISODE_RECENT_COUNT: 0
MDC_IDC_STAT_EPISODE_RECENT_COUNT_DTM_END: NORMAL
MDC_IDC_STAT_EPISODE_RECENT_COUNT_DTM_START: NORMAL
MDC_IDC_STAT_EPISODE_TYPE: NORMAL
MDC_IDC_STAT_EPISODE_VENDOR_TYPE: NORMAL
MDC_IDC_STAT_TACHYTHERAPY_ATP_DELIVERED_RECENT: 0
MDC_IDC_STAT_TACHYTHERAPY_ATP_DELIVERED_TOTAL: 0
MDC_IDC_STAT_TACHYTHERAPY_RECENT_DTM_END: NORMAL
MDC_IDC_STAT_TACHYTHERAPY_RECENT_DTM_START: NORMAL
MDC_IDC_STAT_TACHYTHERAPY_SHOCKS_ABORTED_RECENT: 0
MDC_IDC_STAT_TACHYTHERAPY_SHOCKS_ABORTED_TOTAL: 0
MDC_IDC_STAT_TACHYTHERAPY_SHOCKS_DELIVERED_RECENT: 0
MDC_IDC_STAT_TACHYTHERAPY_SHOCKS_DELIVERED_TOTAL: 0
MDC_IDC_STAT_TACHYTHERAPY_TOTAL_DTM_END: NORMAL
MDC_IDC_STAT_TACHYTHERAPY_TOTAL_DTM_START: NORMAL

## 2024-01-15 ENCOUNTER — LAB (OUTPATIENT)
Dept: LAB | Facility: CLINIC | Age: 79
End: 2024-01-15
Payer: COMMERCIAL

## 2024-01-15 DIAGNOSIS — I42.9 CARDIOMYOPATHY, UNSPECIFIED TYPE (H): ICD-10-CM

## 2024-01-15 LAB
ANION GAP SERPL CALCULATED.3IONS-SCNC: 10 MMOL/L (ref 7–15)
BUN SERPL-MCNC: 13.6 MG/DL (ref 8–23)
CALCIUM SERPL-MCNC: 9.2 MG/DL (ref 8.8–10.2)
CHLORIDE SERPL-SCNC: 99 MMOL/L (ref 98–107)
CREAT SERPL-MCNC: 0.74 MG/DL (ref 0.51–0.95)
DEPRECATED HCO3 PLAS-SCNC: 26 MMOL/L (ref 22–29)
EGFRCR SERPLBLD CKD-EPI 2021: 83 ML/MIN/1.73M2
GLUCOSE SERPL-MCNC: 102 MG/DL (ref 70–99)
POTASSIUM SERPL-SCNC: 3.9 MMOL/L (ref 3.4–5.3)
SODIUM SERPL-SCNC: 135 MMOL/L (ref 135–145)

## 2024-01-15 PROCEDURE — 36415 COLL VENOUS BLD VENIPUNCTURE: CPT | Performed by: NURSE PRACTITIONER

## 2024-01-15 PROCEDURE — 80048 BASIC METABOLIC PNL TOTAL CA: CPT | Performed by: NURSE PRACTITIONER

## 2024-03-15 ENCOUNTER — PATIENT OUTREACH (OUTPATIENT)
Dept: CARE COORDINATION | Facility: CLINIC | Age: 79
End: 2024-03-15
Payer: COMMERCIAL

## 2024-03-25 ENCOUNTER — OFFICE VISIT (OUTPATIENT)
Dept: INTERNAL MEDICINE | Facility: CLINIC | Age: 79
End: 2024-03-25
Payer: COMMERCIAL

## 2024-03-25 ENCOUNTER — ANCILLARY PROCEDURE (OUTPATIENT)
Dept: CARDIOLOGY | Facility: CLINIC | Age: 79
End: 2024-03-25
Attending: INTERNAL MEDICINE
Payer: COMMERCIAL

## 2024-03-25 VITALS
HEIGHT: 65 IN | OXYGEN SATURATION: 99 % | TEMPERATURE: 96.6 F | RESPIRATION RATE: 18 BRPM | BODY MASS INDEX: 21.99 KG/M2 | WEIGHT: 132 LBS | HEART RATE: 87 BPM | SYSTOLIC BLOOD PRESSURE: 100 MMHG | DIASTOLIC BLOOD PRESSURE: 70 MMHG

## 2024-03-25 DIAGNOSIS — M85.80 OSTEOPENIA, UNSPECIFIED LOCATION: ICD-10-CM

## 2024-03-25 DIAGNOSIS — Z95.810 ICD (IMPLANTABLE CARDIOVERTER-DEFIBRILLATOR) IN PLACE: ICD-10-CM

## 2024-03-25 DIAGNOSIS — I42.9 CARDIOMYOPATHY, UNSPECIFIED TYPE (H): ICD-10-CM

## 2024-03-25 DIAGNOSIS — I48.0 PAROXYSMAL ATRIAL FIBRILLATION (H): Primary | ICD-10-CM

## 2024-03-25 DIAGNOSIS — E03.9 HYPOTHYROIDISM, UNSPECIFIED TYPE: ICD-10-CM

## 2024-03-25 DIAGNOSIS — E03.9 ACQUIRED HYPOTHYROIDISM: ICD-10-CM

## 2024-03-25 DIAGNOSIS — K21.9 GASTROESOPHAGEAL REFLUX DISEASE WITHOUT ESOPHAGITIS: ICD-10-CM

## 2024-03-25 LAB
CREAT UR-MCNC: 48.4 MG/DL
MDC_IDC_EPISODE_DTM: NORMAL
MDC_IDC_EPISODE_DURATION: 1 S
MDC_IDC_EPISODE_ID: NORMAL
MDC_IDC_EPISODE_TYPE: NORMAL
MDC_IDC_LEAD_CONNECTION_STATUS: NORMAL
MDC_IDC_LEAD_CONNECTION_STATUS: NORMAL
MDC_IDC_LEAD_IMPLANT_DT: NORMAL
MDC_IDC_LEAD_IMPLANT_DT: NORMAL
MDC_IDC_LEAD_LOCATION: NORMAL
MDC_IDC_LEAD_LOCATION: NORMAL
MDC_IDC_LEAD_LOCATION_DETAIL_1: NORMAL
MDC_IDC_LEAD_LOCATION_DETAIL_1: NORMAL
MDC_IDC_LEAD_MFG: NORMAL
MDC_IDC_LEAD_MFG: NORMAL
MDC_IDC_LEAD_MODEL: NORMAL
MDC_IDC_LEAD_MODEL: NORMAL
MDC_IDC_LEAD_POLARITY_TYPE: NORMAL
MDC_IDC_LEAD_POLARITY_TYPE: NORMAL
MDC_IDC_LEAD_SERIAL: NORMAL
MDC_IDC_LEAD_SERIAL: NORMAL
MDC_IDC_MSMT_BATTERY_DTM: NORMAL
MDC_IDC_MSMT_BATTERY_REMAINING_LONGEVITY: 132 MO
MDC_IDC_MSMT_BATTERY_REMAINING_PERCENTAGE: 100 %
MDC_IDC_MSMT_BATTERY_STATUS: NORMAL
MDC_IDC_MSMT_CAP_CHARGE_DTM: NORMAL
MDC_IDC_MSMT_CAP_CHARGE_TIME: 9.4 S
MDC_IDC_MSMT_CAP_CHARGE_TYPE: NORMAL
MDC_IDC_MSMT_LEADCHNL_RA_IMPEDANCE_VALUE: 610 OHM
MDC_IDC_MSMT_LEADCHNL_RA_PACING_THRESHOLD_AMPLITUDE: 0.5 V
MDC_IDC_MSMT_LEADCHNL_RA_PACING_THRESHOLD_PULSEWIDTH: 0.4 MS
MDC_IDC_MSMT_LEADCHNL_RV_IMPEDANCE_VALUE: 413 OHM
MDC_IDC_PG_IMPLANT_DTM: NORMAL
MDC_IDC_PG_MFG: NORMAL
MDC_IDC_PG_MODEL: NORMAL
MDC_IDC_PG_SERIAL: NORMAL
MDC_IDC_PG_TYPE: NORMAL
MDC_IDC_SESS_CLINIC_NAME: NORMAL
MDC_IDC_SESS_DTM: NORMAL
MDC_IDC_SESS_TYPE: NORMAL
MDC_IDC_SET_BRADY_AT_MODE_SWITCH_MODE: NORMAL
MDC_IDC_SET_BRADY_AT_MODE_SWITCH_RATE: 170 {BEATS}/MIN
MDC_IDC_SET_BRADY_LOWRATE: 60 {BEATS}/MIN
MDC_IDC_SET_BRADY_MAX_SENSOR_RATE: 130 {BEATS}/MIN
MDC_IDC_SET_BRADY_MAX_TRACKING_RATE: 130 {BEATS}/MIN
MDC_IDC_SET_BRADY_MODE: NORMAL
MDC_IDC_SET_BRADY_PAV_DELAY_HIGH: 150 MS
MDC_IDC_SET_BRADY_PAV_DELAY_LOW: 200 MS
MDC_IDC_SET_BRADY_SAV_DELAY_HIGH: 150 MS
MDC_IDC_SET_BRADY_SAV_DELAY_LOW: 200 MS
MDC_IDC_SET_LEADCHNL_RA_PACING_AMPLITUDE: 2 V
MDC_IDC_SET_LEADCHNL_RA_PACING_CAPTURE_MODE: NORMAL
MDC_IDC_SET_LEADCHNL_RA_PACING_POLARITY: NORMAL
MDC_IDC_SET_LEADCHNL_RA_PACING_PULSEWIDTH: 0.4 MS
MDC_IDC_SET_LEADCHNL_RA_SENSING_ADAPTATION_MODE: NORMAL
MDC_IDC_SET_LEADCHNL_RA_SENSING_POLARITY: NORMAL
MDC_IDC_SET_LEADCHNL_RA_SENSING_SENSITIVITY: 0.25 MV
MDC_IDC_SET_LEADCHNL_RV_PACING_AMPLITUDE: 2 V
MDC_IDC_SET_LEADCHNL_RV_PACING_CAPTURE_MODE: NORMAL
MDC_IDC_SET_LEADCHNL_RV_PACING_POLARITY: NORMAL
MDC_IDC_SET_LEADCHNL_RV_PACING_PULSEWIDTH: 0.4 MS
MDC_IDC_SET_LEADCHNL_RV_SENSING_ADAPTATION_MODE: NORMAL
MDC_IDC_SET_LEADCHNL_RV_SENSING_POLARITY: NORMAL
MDC_IDC_SET_LEADCHNL_RV_SENSING_SENSITIVITY: 0.6 MV
MDC_IDC_SET_ZONE_DETECTION_INTERVAL: 250 MS
MDC_IDC_SET_ZONE_DETECTION_INTERVAL: 300 MS
MDC_IDC_SET_ZONE_DETECTION_INTERVAL: 353 MS
MDC_IDC_SET_ZONE_STATUS: NORMAL
MDC_IDC_SET_ZONE_TYPE: NORMAL
MDC_IDC_SET_ZONE_VENDOR_TYPE: NORMAL
MDC_IDC_STAT_AT_BURDEN_PERCENT: 1 %
MDC_IDC_STAT_AT_DTM_END: NORMAL
MDC_IDC_STAT_AT_DTM_START: NORMAL
MDC_IDC_STAT_BRADY_DTM_END: NORMAL
MDC_IDC_STAT_BRADY_DTM_START: NORMAL
MDC_IDC_STAT_BRADY_RA_PERCENT_PACED: 16 %
MDC_IDC_STAT_BRADY_RV_PERCENT_PACED: 0 %
MDC_IDC_STAT_EPISODE_RECENT_COUNT: 0
MDC_IDC_STAT_EPISODE_RECENT_COUNT_DTM_END: NORMAL
MDC_IDC_STAT_EPISODE_RECENT_COUNT_DTM_START: NORMAL
MDC_IDC_STAT_EPISODE_TYPE: NORMAL
MDC_IDC_STAT_EPISODE_VENDOR_TYPE: NORMAL
MDC_IDC_STAT_TACHYTHERAPY_ATP_DELIVERED_RECENT: 0
MDC_IDC_STAT_TACHYTHERAPY_ATP_DELIVERED_TOTAL: 0
MDC_IDC_STAT_TACHYTHERAPY_RECENT_DTM_END: NORMAL
MDC_IDC_STAT_TACHYTHERAPY_RECENT_DTM_START: NORMAL
MDC_IDC_STAT_TACHYTHERAPY_SHOCKS_ABORTED_RECENT: 0
MDC_IDC_STAT_TACHYTHERAPY_SHOCKS_ABORTED_TOTAL: 0
MDC_IDC_STAT_TACHYTHERAPY_SHOCKS_DELIVERED_RECENT: 0
MDC_IDC_STAT_TACHYTHERAPY_SHOCKS_DELIVERED_TOTAL: 0
MDC_IDC_STAT_TACHYTHERAPY_TOTAL_DTM_END: NORMAL
MDC_IDC_STAT_TACHYTHERAPY_TOTAL_DTM_START: NORMAL
MICROALBUMIN UR-MCNC: <12 MG/L
MICROALBUMIN/CREAT UR: NORMAL MG/G{CREAT}

## 2024-03-25 PROCEDURE — 82570 ASSAY OF URINE CREATININE: CPT | Performed by: INTERNAL MEDICINE

## 2024-03-25 PROCEDURE — 93296 REM INTERROG EVL PM/IDS: CPT | Performed by: INTERNAL MEDICINE

## 2024-03-25 PROCEDURE — 99214 OFFICE O/P EST MOD 30 MIN: CPT | Performed by: INTERNAL MEDICINE

## 2024-03-25 PROCEDURE — 93295 DEV INTERROG REMOTE 1/2/MLT: CPT | Performed by: INTERNAL MEDICINE

## 2024-03-25 PROCEDURE — 82043 UR ALBUMIN QUANTITATIVE: CPT | Performed by: INTERNAL MEDICINE

## 2024-03-25 RX ORDER — OMEPRAZOLE 10 MG/1
10 CAPSULE, DELAYED RELEASE ORAL
Qty: 90 CAPSULE | Refills: 3 | Status: SHIPPED | OUTPATIENT
Start: 2024-03-25

## 2024-03-25 RX ORDER — IBANDRONATE SODIUM 150 MG/1
150 TABLET, FILM COATED ORAL
Qty: 3 TABLET | Refills: 3 | Status: SHIPPED | OUTPATIENT
Start: 2024-03-25

## 2024-03-25 RX ORDER — LEVOTHYROXINE SODIUM 50 UG/1
50 TABLET ORAL DAILY
Qty: 90 TABLET | Refills: 3 | Status: SHIPPED | OUTPATIENT
Start: 2024-03-25

## 2024-03-25 ASSESSMENT — PAIN SCALES - GENERAL: PAINLEVEL: NO PAIN (0)

## 2024-03-25 NOTE — PATIENT INSTRUCTIONS
Plan:  Continue same meds, same doses for now   2. Reschedule the labs May 31 for am for fasting labs ( labs ordered by Cardio + labs Ordered by dr Alicea)  3. Schedule ANNUAL EXAM - in Sept 2024

## 2024-03-25 NOTE — PROGRESS NOTES
Dr Alicea's note      Patient's instructions / PLAN:                                                        Plan:  Continue same meds, same doses for now   2. Reschedule the labs May 31 for am for fasting labs ( labs ordered by Cardio + labs Ordered by dr Alicea)  3. Schedule ANNUAL EXAM - in Sept 2024          ASSESSMENT & PLAN:                                                      (I48.0) Paroxysmal atrial fibrillation (H)  (primary encounter diagnosis)  Comment: rate Controlled    Plan: CBC with platelets, Lipid panel reflex to         direct LDL Fasting, TSH with free T4 reflex,         Albumin Random Urine Quantitative with Creat       Ratio            (M85.80) Osteopenia, Boniva started June 2021  Comment:   Plan: IBANdronate (BONIVA) 150 MG tablet, CBC with         platelets, Lipid panel reflex to direct LDL         Fasting, TSH with free T4 reflex, Albumin         Random Urine Quantitative with Creat Ratio            (E03.9) Acquired hypothyroidism  Comment:   Plan: levothyroxine (SYNTHROID/LEVOTHROID) 50 MCG         tablet, CBC with platelets, Lipid panel reflex         to direct LDL Fasting, TSH with free T4 reflex,        Albumin Random Urine Quantitative with Creat         Ratio            (K21.9) Gastroesophageal reflux disease without esophagitis  Comment:   Plan: omeprazole (PRILOSEC) 10 MG DR capsule, CBC         with platelets, Lipid panel reflex to direct         LDL Fasting, TSH with free T4 reflex, Albumin         Random Urine Quantitative with Creat Ratio            (E03.9) Hypothyroidism, unspecified type  Comment:   Plan: CBC with platelets, Lipid panel reflex to         direct LDL Fasting, TSH with free T4 reflex,         Albumin Random Urine Quantitative with Creat       Ratio                 Chief complaint:                                                      Follow up chronic medical problems      SUBJECTIVE:                                                    History of present  "illness:    Labs 2020 - 2024 reviewed     DEXA -- May 2023 -- Discussed      Subjective   Taty is a 79 year old, presenting for the following health issues:  Patient is being seen to establish care.      3/25/2024    12:31 PM   Additional Questions   Roomed by Kasandra Baez     Via the Health Maintenance questionnaire, the patient has reported the following services have been completed -Mammogram, this information has been sent to the abstraction team.  History of Present Illness       Reason for visit:  Get acquainted with new doctor    She eats 4 or more servings of fruits and vegetables daily.She consumes 0 sweetened beverage(s) daily.She exercises with enough effort to increase her heart rate 60 or more minutes per day.  She exercises with enough effort to increase her heart rate 7 days per week.   She is taking medications regularly.         Hypertension Follow-up    Do you check your blood pressure regularly outside of the clinic? Yes   Are you following a low salt diet? Yes  Are your blood pressures ever more than 140 on the top number (systolic) OR more   than 90 on the bottom number (diastolic), for example 140/90? No        Review of Systems:                                                      ROS: negative for fever, chills, cough, wheezes, chest pain, shortness of breath, vomiting, abdominal pain, leg swelling          OBJECTIVE:             Physical exam:  Blood pressure 100/70, pulse 87, temperature (!) 96.6  F (35.9  C), temperature source Tympanic, resp. rate 18, height 1.651 m (5' 5\"), weight 59.9 kg (132 lb), SpO2 99%, not currently breastfeeding.     NAD, appears comfortable  Skin: no rashes   Neck: supple, no JVD,  No thyroidmegaly. Lymph nodes nonpalpable cervical and supraclavicular.  Chest: clear to auscultation bilaterally, good respiratory effort  Heart: S1 S2, RRR, no mgr appreciated  Abdomen: soft, not tender, no hepatosplenomegaly or masses appreciated, no abdominal bruit, present bowel " sounds  Extremities: no edema,   Neurologic: A, Ox3, no focal signs appreciated    PMHx: reviewed  Past Medical History:   Diagnosis Date    Allergic rhinitis, cause unspecified     dust mites, ragweed    Arthritis     Complete AV block (H)     BSX DDD PM 2-    Congestive heart failure (H) 2019    History of tricuspid valve repair     Mitral valve prolapse     bioprosthetic MVR 2-    Paroxysmal atrial fibrillation (H)       PSHx: reviewed  Past Surgical History:   Procedure Laterality Date    COLONOSCOPY N/A 9/15/2015    Procedure: COLONOSCOPY;  Surgeon: Anson Llanos MD;  Location:  GI    CV HEART CATHETERIZATION WITH POSSIBLE INTERVENTION N/A 1/9/2019    Procedure: Heart Catheterization with Possible Intervention;  Surgeon: Ritesh Whittaker MD;  Location:  HEART CARDIAC CATH LAB    CV RIGHT AND LEFT HEART CATH N/A 1/9/2019    Procedure: Right and Left Heart Catherization;  Surgeon: Ritesh Whittaker MD;  Location:  HEART CARDIAC CATH LAB    ENT SURGERY      T&A    EP INSERT ICD N/A 8/14/2020    Procedure: EP Insert ICD;  Surgeon: Lucian Tolliver MD;  Location:  HEART CARDIAC CATH LAB    EP PACEMAKER N/A 2/18/2019    Procedure: EP Pacemaker;  Surgeon: Inocencia Guillen MD;  Location:  HEART CARDIAC CATH LAB    REPAIR VALVE TRICUSPID MINIMALLY INVASIVE N/A 2/15/2019    Procedure: MINIMALLY INVASIVE TRICUSPID VALVE REPAIR WITH 32MM SULLIVAN RING;  Surgeon: Andrea Hanna MD;  Location:  OR    REPLACE VALVE MITRAL MINIMALLY INVASIVE N/A 2/15/2019    Procedure: MINIMALLY INVASIVE MITRAL VALVE REPLACEMENT WITH EPIC ST KEYUR 31MM VALVE; ON PUMP WITH TIA.  CARDIOLOGIST DR CARDENAS;  Surgeon: Andrea Hanna MD;  Location:  OR        Meds: reviewed  Current Outpatient Medications   Medication Sig Dispense Refill    amoxicillin (AMOXIL) 500 MG capsule Take 4 capsules (2,000 mg) by mouth as needed (take four tabs 30-60 minutes prior to dental procedures) 8  capsule 0    aspirin 81 MG EC tablet Take 81 mg by mouth every evening      calcium carbonate-vitamin D 600-200 MG-UNIT TABS Take 2 tablets by mouth 2 times daily       Cetirizine HCl (ZYRTEC PO) Take 10 mg by mouth daily       IBANdronate (BONIVA) 150 MG tablet Take 1 tablet (150 mg) by mouth every 30 days 3 tablet 3    levothyroxine (SYNTHROID/LEVOTHROID) 50 MCG tablet TAKE 1 TABLET DAILY 90 tablet 3    losartan (COZAAR) 100 MG tablet Take 1 tablet (100 mg) by mouth daily 90 tablet 3    metoprolol succinate ER (TOPROL XL) 100 MG 24 hr tablet Take 1 tablet (100 mg) by mouth daily 90 tablet 3    Multiple Vitamins-Minerals (MULTIVITAMIN ADULT) TABS Take 1 tablet by mouth daily      omeprazole (PRILOSEC) 10 MG DR capsule TAKE 1 CAPSULE EVERY MORNING BEFORE BREAKFAST 90 capsule 3    rivaroxaban ANTICOAGULANT (XARELTO) 20 MG TABS tablet Take 1 tablet (20 mg) by mouth daily (with dinner) 90 tablet 3       Soc Hx: reviewed  Fam Hx: reviewed      Chart documentation was completed, in part, with [employer] voice-recognition software. Even though reviewed, some grammatical, spelling, and word errors may remain.      Jocelyne Alicea MD  Internal Medicine           Signed Electronically by: Jocelyne Sands MD

## 2024-03-27 ENCOUNTER — TELEPHONE (OUTPATIENT)
Dept: INTERNAL MEDICINE | Facility: CLINIC | Age: 79
End: 2024-03-27
Payer: COMMERCIAL

## 2024-03-27 NOTE — TELEPHONE ENCOUNTER
General Call      Reason for Call:  Patient had an appointment on 3/25 with Dr. Sands and was told to call the clinic to confirm that she wants Dr. Sands to be her primary care provider. Patient is confirming with the clinic that she wants her to be her primary care provider. Please put her down for her primary provider      Date of last appointment with provider: 03/25/24 Dr. Sands    Could we send this information to you in [facility] or would you prefer to receive a phone call?:   No preference   Okay to leave a detailed message?: Yes at Cell number on file:    Telephone Information:   Mobile [phone]     Jennifer De La Rosa   [facility]  Central Scheduler

## 2024-04-12 ENCOUNTER — PATIENT OUTREACH (OUTPATIENT)
Dept: CARE COORDINATION | Facility: CLINIC | Age: 79
End: 2024-04-12
Payer: COMMERCIAL

## 2024-04-17 ENCOUNTER — HOSPITAL ENCOUNTER (OUTPATIENT)
Dept: MAMMOGRAPHY | Facility: CLINIC | Age: 79
Discharge: HOME | End: 2024-04-17
Attending: INTERNAL MEDICINE | Admitting: INTERNAL MEDICINE
Payer: COMMERCIAL

## 2024-04-17 DIAGNOSIS — Z12.31 VISIT FOR SCREENING MAMMOGRAM: ICD-10-CM

## 2024-04-17 PROCEDURE — 77063 BREAST TOMOSYNTHESIS BI: CPT

## 2024-05-31 ENCOUNTER — LAB (OUTPATIENT)
Dept: LAB | Facility: CLINIC | Age: 79
End: 2024-05-31
Payer: COMMERCIAL

## 2024-05-31 ENCOUNTER — HOSPITAL ENCOUNTER (OUTPATIENT)
Dept: CARDIOLOGY | Facility: CLINIC | Age: 79
Discharge: HOME | End: 2024-05-31
Attending: NURSE PRACTITIONER | Admitting: NURSE PRACTITIONER
Payer: COMMERCIAL

## 2024-05-31 DIAGNOSIS — E03.9 HYPOTHYROIDISM, UNSPECIFIED TYPE: ICD-10-CM

## 2024-05-31 DIAGNOSIS — Z95.2 S/P MVR (MITRAL VALVE REPLACEMENT): ICD-10-CM

## 2024-05-31 DIAGNOSIS — I42.9 CARDIOMYOPATHY, UNSPECIFIED TYPE (H): ICD-10-CM

## 2024-05-31 DIAGNOSIS — K21.9 GASTROESOPHAGEAL REFLUX DISEASE WITHOUT ESOPHAGITIS: ICD-10-CM

## 2024-05-31 DIAGNOSIS — M85.80 OSTEOPENIA, UNSPECIFIED LOCATION: ICD-10-CM

## 2024-05-31 DIAGNOSIS — E03.9 ACQUIRED HYPOTHYROIDISM: ICD-10-CM

## 2024-05-31 DIAGNOSIS — I48.0 PAROXYSMAL ATRIAL FIBRILLATION (H): ICD-10-CM

## 2024-05-31 LAB
ANION GAP SERPL CALCULATED.3IONS-SCNC: 11 MMOL/L (ref 7–15)
BUN SERPL-MCNC: 13.4 MG/DL (ref 8–23)
CALCIUM SERPL-MCNC: 9.6 MG/DL (ref 8.8–10.2)
CHLORIDE SERPL-SCNC: 101 MMOL/L (ref 98–107)
CHOLEST SERPL-MCNC: 188 MG/DL
CREAT SERPL-MCNC: 0.87 MG/DL (ref 0.51–0.95)
DEPRECATED HCO3 PLAS-SCNC: 25 MMOL/L (ref 22–29)
EGFRCR SERPLBLD CKD-EPI 2021: 68 ML/MIN/1.73M2
ERYTHROCYTE [DISTWIDTH] IN BLOOD BY AUTOMATED COUNT: 13.2 % (ref 10–15)
FASTING STATUS PATIENT QL REPORTED: YES
FASTING STATUS PATIENT QL REPORTED: YES
GLUCOSE SERPL-MCNC: 108 MG/DL (ref 70–99)
HCT VFR BLD AUTO: 35.8 % (ref 35–47)
HDLC SERPL-MCNC: 71 MG/DL
HGB BLD-MCNC: 11.7 G/DL (ref 11.7–15.7)
LDLC SERPL CALC-MCNC: 92 MG/DL
LVEF ECHO: NORMAL
MCH RBC QN AUTO: 30.8 PG (ref 26.5–33)
MCHC RBC AUTO-ENTMCNC: 32.7 G/DL (ref 31.5–36.5)
MCV RBC AUTO: 94 FL (ref 78–100)
NONHDLC SERPL-MCNC: 117 MG/DL
PLATELET # BLD AUTO: 296 10E3/UL (ref 150–450)
POTASSIUM SERPL-SCNC: 4.6 MMOL/L (ref 3.4–5.3)
RBC # BLD AUTO: 3.8 10E6/UL (ref 3.8–5.2)
SODIUM SERPL-SCNC: 137 MMOL/L (ref 135–145)
TRIGL SERPL-MCNC: 125 MG/DL
TSH SERPL DL<=0.005 MIU/L-ACNC: 1.61 UIU/ML (ref 0.3–4.2)
WBC # BLD AUTO: 6.8 10E3/UL (ref 4–11)

## 2024-05-31 PROCEDURE — 36415 COLL VENOUS BLD VENIPUNCTURE: CPT

## 2024-05-31 PROCEDURE — 84443 ASSAY THYROID STIM HORMONE: CPT | Performed by: INTERNAL MEDICINE

## 2024-05-31 PROCEDURE — 80061 LIPID PANEL: CPT | Performed by: INTERNAL MEDICINE

## 2024-05-31 PROCEDURE — 93306 TTE W/DOPPLER COMPLETE: CPT

## 2024-05-31 PROCEDURE — 85027 COMPLETE CBC AUTOMATED: CPT | Performed by: INTERNAL MEDICINE

## 2024-05-31 PROCEDURE — 80048 BASIC METABOLIC PNL TOTAL CA: CPT

## 2024-05-31 PROCEDURE — 93306 TTE W/DOPPLER COMPLETE: CPT | Mod: 26 | Performed by: INTERNAL MEDICINE

## 2024-06-03 ENCOUNTER — OFFICE VISIT (OUTPATIENT)
Dept: CARDIOLOGY | Facility: CLINIC | Age: 79
End: 2024-06-03
Payer: COMMERCIAL

## 2024-06-03 VITALS
WEIGHT: 132.1 LBS | HEIGHT: 65 IN | DIASTOLIC BLOOD PRESSURE: 68 MMHG | BODY MASS INDEX: 22.01 KG/M2 | HEART RATE: 73 BPM | SYSTOLIC BLOOD PRESSURE: 112 MMHG | OXYGEN SATURATION: 95 %

## 2024-06-03 DIAGNOSIS — I42.9 CARDIOMYOPATHY, UNSPECIFIED TYPE (H): ICD-10-CM

## 2024-06-03 DIAGNOSIS — Z95.2 S/P MVR (MITRAL VALVE REPLACEMENT): ICD-10-CM

## 2024-06-03 PROCEDURE — 99214 OFFICE O/P EST MOD 30 MIN: CPT | Performed by: INTERNAL MEDICINE

## 2024-06-03 PROCEDURE — G2211 COMPLEX E/M VISIT ADD ON: HCPCS | Performed by: INTERNAL MEDICINE

## 2024-06-03 NOTE — LETTER
6/3/2024    Jocelyne Sands MD  [address] [facility] [place] [address]    RE: Taty Montgomerytalon       Dear Colleague,     I had the pleasure of seeing Taty Aguila in the [facility].  HPI and Plan:   Taty is here for longitudinal care and management of @ mitral regurgitation s/p repair, afib, s/p ICD, and cardiomyopathy.    Now recent echocardiogram done 5/24 showing normal LVEF.  Trace MR, mild TR.  55-60% LVEF which is improved.  MVR and TV repair with ring.       BP has been <100 systolic.  Feeling well without symptoms.  Will decrease Cozaar dose to 50 mg daily. HR 60-70 with 0% ventricular paced.  Battery life 11 years.      Doing fantastic at this point.  Recommend follow up with Aisha Schuster in 1 year and alternative with me following years.            The longitudinal plan of care for the diagnosis(es)/condition(s) as documented were addressed during this visit. Due to the added complexity in care, I will continue to support Taty in the subsequent management and with ongoing continuity of care.     Today's clinic visit entailed:  Review of the result(s) of each unique test - echocardiogram  Ordering of each unique test  Prescription drug management  30 minutes spent by me on the date of the encounter doing chart review, history and exam, documentation and further activities per the note  Provider  Link to [facility] Help Grid     The level of medical decision making during this visit was of moderate complexity.      No orders of the defined types were placed in this encounter.    No orders of the defined types were placed in this encounter.    There are no discontinued medications.      Encounter Diagnoses   Name Primary?    Cardiomyopathy, unspecified type (H)     S/P MVR (mitral valve replacement)-St junaid epic tissue 31 mm        CURRENT MEDICATIONS:  Current Outpatient Medications   Medication Sig Dispense Refill    amoxicillin (AMOXIL) 500 MG capsule Take 4 capsules  (2,000 mg) by mouth as needed (take four tabs 30-60 minutes prior to dental procedures) 8 capsule 0    aspirin 81 MG EC tablet Take 81 mg by mouth every evening OTC      calcium carbonate-vitamin D 600-200 MG-UNIT TABS Take 2 tablets by mouth 2 times daily       Cetirizine HCl (ZYRTEC PO) Take 10 mg by mouth daily       IBANdronate (BONIVA) 150 MG tablet Take 1 tablet (150 mg) by mouth every 30 days 3 tablet 3    levothyroxine (SYNTHROID/LEVOTHROID) 50 MCG tablet Take 1 tablet (50 mcg) by mouth daily 90 tablet 3    losartan (COZAAR) 100 MG tablet Take 1 tablet (100 mg) by mouth daily 90 tablet 3    metoprolol succinate ER (TOPROL XL) 100 MG 24 hr tablet Take 1 tablet (100 mg) by mouth daily 90 tablet 3    Multiple Vitamins-Minerals (MULTIVITAMIN ADULT) TABS Take 1 tablet by mouth daily      omeprazole (PRILOSEC) 10 MG DR capsule Take 1 capsule (10 mg) by mouth daily before breakfast 90 capsule 3    rivaroxaban ANTICOAGULANT (XARELTO) 20 MG TABS tablet Take 1 tablet (20 mg) by mouth daily (with dinner) 90 tablet 3       ALLERGIES     Allergies   Allergen Reactions    Chlorpheniramine Other (See Comments)     LOC and fainting     Lisinopril Cough    Phenylephrine Other (See Comments)     fainting    Seasonal Allergies        PAST MEDICAL HISTORY:  Past Medical History:   Diagnosis Date    Allergic rhinitis, cause unspecified     dust mites, ragweed    Arthritis     Complete AV block (H)     BSX DDD PM 2-    Congestive heart failure (H) 2019    History of tricuspid valve repair     Mitral valve prolapse     bioprosthetic MVR 2-    Paroxysmal atrial fibrillation (H)        PAST SURGICAL HISTORY:  Past Surgical History:   Procedure Laterality Date    COLONOSCOPY N/A 9/15/2015    Procedure: COLONOSCOPY;  Surgeon: Anson Llanos MD;  Location:  GI    CV HEART CATHETERIZATION WITH POSSIBLE INTERVENTION N/A 1/9/2019    Procedure: Heart Catheterization with Possible Intervention;  Surgeon: Ritesh Whittaker  MD RASHAUN;  Location:  HEART CARDIAC CATH LAB    CV RIGHT AND LEFT HEART CATH N/A 1/9/2019    Procedure: Right and Left Heart Catherization;  Surgeon: Ritesh Whittaker MD;  Location:  HEART CARDIAC CATH LAB    ENT SURGERY      T&A    EP INSERT ICD N/A 8/14/2020    Procedure: EP Insert ICD;  Surgeon: Lucian Tolliver MD;  Location:  HEART CARDIAC CATH LAB    EP PACEMAKER N/A 2/18/2019    Procedure: EP Pacemaker;  Surgeon: Inocencia Guillen MD;  Location:  HEART CARDIAC CATH LAB    REPAIR VALVE TRICUSPID MINIMALLY INVASIVE N/A 2/15/2019    Procedure: MINIMALLY INVASIVE TRICUSPID VALVE REPAIR WITH 32MM SULLIVAN RING;  Surgeon: Andrea Hanna MD;  Location:  OR    REPLACE VALVE MITRAL MINIMALLY INVASIVE N/A 2/15/2019    Procedure: MINIMALLY INVASIVE MITRAL VALVE REPLACEMENT WITH EPIC ST KEYUR 31MM VALVE; ON PUMP WITH TIA.  CARDIOLOGIST DR CARDENAS;  Surgeon: Andrea Hanna MD;  Location:  OR       FAMILY HISTORY:  Family History   Problem Relation Age of Onset    Osteoporosis Mother     Alzheimer Disease Mother     Family History Negative Father     Heart Disease Maternal Grandfather     Family History Negative Paternal Grandmother     Family History Negative Paternal Grandfather     Breast Cancer No family hx of     Ovarian Cancer No family hx of        SOCIAL HISTORY:  Social History     Socioeconomic History    Marital status:      Spouse name: None    Number of children: 3    Years of education: None    Highest education level: None   Tobacco Use    Smoking status: Never     Passive exposure: Never    Smokeless tobacco: Never   Vaping Use    Vaping status: Never Used   Substance and Sexual Activity    Alcohol use: Yes     Comment: very little    Drug use: No    Sexual activity: Not Currently   Other Topics Concern    Caffeine Concern No     Comment: 1 cup of coffee and 1 can diet coke per day    Sleep Concern Yes     Comment: takes Doxylamine succinate 1/2 tab every  night    Stress Concern No    Weight Concern No    Special Diet No     Comment: healthy     Exercise Yes     Comment: walking 45min x7 a wk. Very active, [employer], Golf, Bowling, Cardio    Seat Belt Yes    Self-Exams Yes    Parent/sibling w/ CABG, MI or angioplasty before 65F 55M? No     Social Determinants of Health     Financial Resource Strain: Low Risk  (3/18/2024)    Financial Resource Strain     Within the past 12 months, have you or your family members you live with been unable to get utilities (heat, electricity) when it was really needed?: No   Food Insecurity: Low Risk  (3/18/2024)    Food Insecurity     Within the past 12 months, did you worry that your food would run out before you got money to buy more?: No     Within the past 12 months, did the food you bought just not last and you didn t have money to get more?: No   Transportation Needs: Low Risk  (3/18/2024)    Transportation Needs     Within the past 12 months, has lack of transportation kept you from medical appointments, getting your medicines, non-medical meetings or appointments, work, or from getting things that you need?: No   Interpersonal Safety: Low Risk  (3/25/2024)    Interpersonal Safety     Do you feel physically and emotionally safe where you currently live?: Yes     Within the past 12 months, have you been hit, slapped, kicked or otherwise physically hurt by someone?: No     Within the past 12 months, have you been humiliated or emotionally abused in other ways by your partner or ex-partner?: No   Housing Stability: Low Risk  (3/18/2024)    Housing Stability     Do you have housing? : Yes     Are you worried about losing your housing?: No       Review of Systems:  Skin:        Eyes:  Positive for glasses  ENT:       Respiratory:  Negative shortness of breath;dyspnea on exertion;cough;wheezing;sleep apnea  Cardiovascular:  chest pain;Negative;edema;dizziness;lightheadedness;syncope or near-syncope;fatigue Positive  "for;palpitations  Gastroenterology:      Genitourinary:       Musculoskeletal:       Neurologic:       Psychiatric:       Heme/Lymph/Imm:  Positive for allergies  Endocrine:  Positive for thyroid disorder    Physical Exam:  Vitals: /68   Pulse 73   Ht 1.651 m (5' 5\")   Wt 59.9 kg (132 lb 1.6 oz)   LMP  (LMP Unknown)   SpO2 95%   BMI 21.98 kg/m      Constitutional:           Skin:             Head:           Eyes:           Lymph:      ENT:           Neck:           Respiratory:            Cardiac:                                                           GI:           Extremities and Muscular Skeletal:                 Neurological:           Psych:         Recent Lab Results:  LIPID RESULTS:  Lab Results   Component Value Date    CHOL 188 05/31/2024    CHOL 202 (H) 07/23/2015    HDL 71 05/31/2024    HDL 96 07/23/2015    LDL 92 05/31/2024    LDL 83 07/23/2015    TRIG 125 05/31/2024    TRIG 116 07/23/2015    CHOLHDLRATIO 2.1 07/23/2015       LIVER ENZYME RESULTS:  Lab Results   Component Value Date    AST 29 07/11/2019    ALT 40 12/13/2023    ALT 27 03/22/2021       CBC RESULTS:  Lab Results   Component Value Date    WBC 6.8 05/31/2024    WBC 5.6 08/14/2020    RBC 3.80 05/31/2024    RBC 4.15 08/14/2020    HGB 11.7 05/31/2024    HGB 12.8 08/14/2020    HCT 35.8 05/31/2024    HCT 38.2 08/14/2020    MCV 94 05/31/2024    MCV 92 08/14/2020    MCH 30.8 05/31/2024    MCH 30.8 08/14/2020    MCHC 32.7 05/31/2024    MCHC 33.5 08/14/2020    RDW 13.2 05/31/2024    RDW 13.2 08/14/2020     05/31/2024     08/14/2020       BMP RESULTS:  Lab Results   Component Value Date     05/31/2024     (L) 03/22/2021    POTASSIUM 4.6 05/31/2024    POTASSIUM 4.1 04/15/2022    POTASSIUM 4.1 03/22/2021    CHLORIDE 101 05/31/2024    CHLORIDE 102 04/15/2022    CHLORIDE 100 03/22/2021    CO2 25 05/31/2024    CO2 29 04/15/2022    CO2 29 03/22/2021    ANIONGAP 11 05/31/2024    ANIONGAP 3 04/15/2022    ANIONGAP 3 " 03/22/2021     (H) 05/31/2024     (H) 04/15/2022    GLC 96 03/22/2021    BUN 13.4 05/31/2024    BUN 11 04/15/2022    BUN 13 03/22/2021    CR 0.87 05/31/2024    CR 0.84 03/22/2021    GFRESTIMATED 68 05/31/2024    GFRESTIMATED 68 03/22/2021    GFRESTBLACK 79 03/22/2021    ARIELLE 9.6 05/31/2024    ARIELLE 9.1 03/22/2021        A1C RESULTS:  Lab Results   Component Value Date    A1C 5.9 (H) 01/09/2019       INR RESULTS:  Lab Results   Component Value Date    INR 1.21 (H) 02/18/2019    INR 1.24 (H) 02/16/2019           ROBINA Pack CNP  [id number]  [place] [id number]    Thank you for allowing me to participate in the care of your patient.      Sincerely,     GILLIAN CARDENAS MD     [facility]

## 2024-06-03 NOTE — PROGRESS NOTES
HPI and Plan:   Taty is here for longitudinal care and management of @ mitral regurgitation s/p repair, afib, s/p ICD, and cardiomyopathy.    Now recent echocardiogram done 5/24 showing normal LVEF.  Trace MR, mild TR.  55-60% LVEF which is improved.  MVR and TV repair with ring.       BP has been <100 systolic.  Feeling well without symptoms.  Will decrease Cozaar dose to 50 mg daily. HR 60-70 with 0% ventricular paced.  Battery life 11 years.      Doing fantastic at this point.  Recommend follow up with Aisha Schuster in 1 year and alternative with me following years.            The longitudinal plan of care for the diagnosis(es)/condition(s) as documented were addressed during this visit. Due to the added complexity in care, I will continue to support Taty in the subsequent management and with ongoing continuity of care.     Today's clinic visit entailed:  Review of the result(s) of each unique test - echocardiogram  Ordering of each unique test  Prescription drug management  30 minutes spent by me on the date of the encounter doing chart review, history and exam, documentation and further activities per the note  Provider  Link to [facility] Help Grid     The level of medical decision making during this visit was of moderate complexity.      No orders of the defined types were placed in this encounter.    No orders of the defined types were placed in this encounter.    There are no discontinued medications.      Encounter Diagnoses   Name Primary?    Cardiomyopathy, unspecified type (H)     S/P MVR (mitral valve replacement)-St junaid epic tissue 31 mm        CURRENT MEDICATIONS:  Current Outpatient Medications   Medication Sig Dispense Refill    amoxicillin (AMOXIL) 500 MG capsule Take 4 capsules (2,000 mg) by mouth as needed (take four tabs 30-60 minutes prior to dental procedures) 8 capsule 0    aspirin 81 MG EC tablet Take 81 mg by mouth every evening OTC      calcium carbonate-vitamin D 600-200 MG-UNIT TABS Take 2  tablets by mouth 2 times daily       Cetirizine HCl (ZYRTEC PO) Take 10 mg by mouth daily       IBANdronate (BONIVA) 150 MG tablet Take 1 tablet (150 mg) by mouth every 30 days 3 tablet 3    levothyroxine (SYNTHROID/LEVOTHROID) 50 MCG tablet Take 1 tablet (50 mcg) by mouth daily 90 tablet 3    losartan (COZAAR) 100 MG tablet Take 1 tablet (100 mg) by mouth daily 90 tablet 3    metoprolol succinate ER (TOPROL XL) 100 MG 24 hr tablet Take 1 tablet (100 mg) by mouth daily 90 tablet 3    Multiple Vitamins-Minerals (MULTIVITAMIN ADULT) TABS Take 1 tablet by mouth daily      omeprazole (PRILOSEC) 10 MG DR capsule Take 1 capsule (10 mg) by mouth daily before breakfast 90 capsule 3    rivaroxaban ANTICOAGULANT (XARELTO) 20 MG TABS tablet Take 1 tablet (20 mg) by mouth daily (with dinner) 90 tablet 3       ALLERGIES     Allergies   Allergen Reactions    Chlorpheniramine Other (See Comments)     LOC and fainting     Lisinopril Cough    Phenylephrine Other (See Comments)     fainting    Seasonal Allergies        PAST MEDICAL HISTORY:  Past Medical History:   Diagnosis Date    Allergic rhinitis, cause unspecified     dust mites, ragweed    Arthritis     Complete AV block (H)     BSX DDD PM 2-    Congestive heart failure (H) 2019    History of tricuspid valve repair     Mitral valve prolapse     bioprosthetic MVR 2-    Paroxysmal atrial fibrillation (H)        PAST SURGICAL HISTORY:  Past Surgical History:   Procedure Laterality Date    COLONOSCOPY N/A 9/15/2015    Procedure: COLONOSCOPY;  Surgeon: Anson Llanos MD;  Location:  GI    CV HEART CATHETERIZATION WITH POSSIBLE INTERVENTION N/A 1/9/2019    Procedure: Heart Catheterization with Possible Intervention;  Surgeon: Ritesh Whittaker MD;  Location:  HEART CARDIAC CATH LAB    CV RIGHT AND LEFT HEART CATH N/A 1/9/2019    Procedure: Right and Left Heart Catherization;  Surgeon: Ritesh Whittaker MD;  Location:  HEART CARDIAC CATH LAB    ENT SURGERY       T&A    EP INSERT ICD N/A 8/14/2020    Procedure: EP Insert ICD;  Surgeon: Lucian Tolliver MD;  Location:  HEART CARDIAC CATH LAB    EP PACEMAKER N/A 2/18/2019    Procedure: EP Pacemaker;  Surgeon: Inocencia Guillen MD;  Location:  HEART CARDIAC CATH LAB    REPAIR VALVE TRICUSPID MINIMALLY INVASIVE N/A 2/15/2019    Procedure: MINIMALLY INVASIVE TRICUSPID VALVE REPAIR WITH 32MM SULLIVAN RING;  Surgeon: Andrea Hanna MD;  Location:  OR    REPLACE VALVE MITRAL MINIMALLY INVASIVE N/A 2/15/2019    Procedure: MINIMALLY INVASIVE MITRAL VALVE REPLACEMENT WITH EPIC ST KEYUR 31MM VALVE; ON PUMP WITH TIA.  CARDIOLOGIST DR CARDENAS;  Surgeon: Andrea Hanna MD;  Location:  OR       FAMILY HISTORY:  Family History   Problem Relation Age of Onset    Osteoporosis Mother     Alzheimer Disease Mother     Family History Negative Father     Heart Disease Maternal Grandfather     Family History Negative Paternal Grandmother     Family History Negative Paternal Grandfather     Breast Cancer No family hx of     Ovarian Cancer No family hx of        SOCIAL HISTORY:  Social History     Socioeconomic History    Marital status:      Spouse name: None    Number of children: 3    Years of education: None    Highest education level: None   Tobacco Use    Smoking status: Never     Passive exposure: Never    Smokeless tobacco: Never   Vaping Use    Vaping status: Never Used   Substance and Sexual Activity    Alcohol use: Yes     Comment: very little    Drug use: No    Sexual activity: Not Currently   Other Topics Concern    Caffeine Concern No     Comment: 1 cup of coffee and 1 can diet coke per day    Sleep Concern Yes     Comment: takes Doxylamine succinate 1/2 tab every night    Stress Concern No    Weight Concern No    Special Diet No     Comment: healthy     Exercise Yes     Comment: walking 45min x7 a wk. Very active, [employer], Golf, Bowling, Cardio    Seat Belt Yes    Self-Exams Yes     Parent/sibling w/ CABG, MI or angioplasty before 65F 55M? No     Social Determinants of Health     Financial Resource Strain: Low Risk  (3/18/2024)    Financial Resource Strain     Within the past 12 months, have you or your family members you live with been unable to get utilities (heat, electricity) when it was really needed?: No   Food Insecurity: Low Risk  (3/18/2024)    Food Insecurity     Within the past 12 months, did you worry that your food would run out before you got money to buy more?: No     Within the past 12 months, did the food you bought just not last and you didn t have money to get more?: No   Transportation Needs: Low Risk  (3/18/2024)    Transportation Needs     Within the past 12 months, has lack of transportation kept you from medical appointments, getting your medicines, non-medical meetings or appointments, work, or from getting things that you need?: No   Interpersonal Safety: Low Risk  (3/25/2024)    Interpersonal Safety     Do you feel physically and emotionally safe where you currently live?: Yes     Within the past 12 months, have you been hit, slapped, kicked or otherwise physically hurt by someone?: No     Within the past 12 months, have you been humiliated or emotionally abused in other ways by your partner or ex-partner?: No   Housing Stability: Low Risk  (3/18/2024)    Housing Stability     Do you have housing? : Yes     Are you worried about losing your housing?: No       Review of Systems:  Skin:        Eyes:  Positive for glasses  ENT:       Respiratory:  Negative shortness of breath;dyspnea on exertion;cough;wheezing;sleep apnea  Cardiovascular:  chest pain;Negative;edema;dizziness;lightheadedness;syncope or near-syncope;fatigue Positive for;palpitations  Gastroenterology:      Genitourinary:       Musculoskeletal:       Neurologic:       Psychiatric:       Heme/Lymph/Imm:  Positive for allergies  Endocrine:  Positive for thyroid disorder    Physical Exam:  Vitals: /68    "Pulse 73   Ht 1.651 m (5' 5\")   Wt 59.9 kg (132 lb 1.6 oz)   LMP  (LMP Unknown)   SpO2 95%   BMI 21.98 kg/m      Constitutional:           Skin:             Head:           Eyes:           Lymph:      ENT:           Neck:           Respiratory:            Cardiac:                                                           GI:           Extremities and Muscular Skeletal:                 Neurological:           Psych:         Recent Lab Results:  LIPID RESULTS:  Lab Results   Component Value Date    CHOL 188 05/31/2024    CHOL 202 (H) 07/23/2015    HDL 71 05/31/2024    HDL 96 07/23/2015    LDL 92 05/31/2024    LDL 83 07/23/2015    TRIG 125 05/31/2024    TRIG 116 07/23/2015    CHOLHDLRATIO 2.1 07/23/2015       LIVER ENZYME RESULTS:  Lab Results   Component Value Date    AST 29 07/11/2019    ALT 40 12/13/2023    ALT 27 03/22/2021       CBC RESULTS:  Lab Results   Component Value Date    WBC 6.8 05/31/2024    WBC 5.6 08/14/2020    RBC 3.80 05/31/2024    RBC 4.15 08/14/2020    HGB 11.7 05/31/2024    HGB 12.8 08/14/2020    HCT 35.8 05/31/2024    HCT 38.2 08/14/2020    MCV 94 05/31/2024    MCV 92 08/14/2020    MCH 30.8 05/31/2024    MCH 30.8 08/14/2020    MCHC 32.7 05/31/2024    MCHC 33.5 08/14/2020    RDW 13.2 05/31/2024    RDW 13.2 08/14/2020     05/31/2024     08/14/2020       BMP RESULTS:  Lab Results   Component Value Date     05/31/2024     (L) 03/22/2021    POTASSIUM 4.6 05/31/2024    POTASSIUM 4.1 04/15/2022    POTASSIUM 4.1 03/22/2021    CHLORIDE 101 05/31/2024    CHLORIDE 102 04/15/2022    CHLORIDE 100 03/22/2021    CO2 25 05/31/2024    CO2 29 04/15/2022    CO2 29 03/22/2021    ANIONGAP 11 05/31/2024    ANIONGAP 3 04/15/2022    ANIONGAP 3 03/22/2021     (H) 05/31/2024     (H) 04/15/2022    GLC 96 03/22/2021    BUN 13.4 05/31/2024    BUN 11 04/15/2022    BUN 13 03/22/2021    CR 0.87 05/31/2024    CR 0.84 03/22/2021    GFRESTIMATED 68 05/31/2024    GFRESTIMATED 68 " 03/22/2021    GFRESTBLACK 79 03/22/2021    ARIELLE 9.6 05/31/2024    ARIELLE 9.1 03/22/2021        A1C RESULTS:  Lab Results   Component Value Date    A1C 5.9 (H) 01/09/2019       INR RESULTS:  Lab Results   Component Value Date    INR 1.21 (H) 02/18/2019    INR 1.24 (H) 02/16/2019           ROBINA Pack CNP  [address]  [place] [id number]

## 2024-06-20 NOTE — PROGRESS NOTES
"Pt left VM this morning stating she is down 6# from yesterday.    Called and spoke to pt. Wt today is 125#, was 131#. Pt states she feels \"lighter\". No dizziness, LH or feeling weak. She did have some leg cramps last night but resolved after she walked a little bit and took her KCL tablet.     Pt asking if she should continue with the increased dose of torsemide (3/7/19 torsemide 40mg daily x 3 days then 20mg)    Will review with Courtney  "
Sounds like she responded really well.   She can go ahead and drop down to the 20mg dose. Have her report back to you early next week please and ensure she's still doing well.    Thanks  Courtney    
Spoke to pt with instructions to decrease torsemide to 20 mg daily. Pt asked to call Monday with wt check.     
No

## 2024-06-24 ENCOUNTER — ANCILLARY PROCEDURE (OUTPATIENT)
Dept: CARDIOLOGY | Facility: CLINIC | Age: 79
End: 2024-06-24
Attending: INTERNAL MEDICINE
Payer: COMMERCIAL

## 2024-06-24 DIAGNOSIS — Z95.810 ICD (IMPLANTABLE CARDIOVERTER-DEFIBRILLATOR) IN PLACE: ICD-10-CM

## 2024-06-24 DIAGNOSIS — I42.9 CARDIOMYOPATHY, UNSPECIFIED TYPE (H): ICD-10-CM

## 2024-06-24 PROCEDURE — 93296 REM INTERROG EVL PM/IDS: CPT | Performed by: INTERNAL MEDICINE

## 2024-06-24 PROCEDURE — 93295 DEV INTERROG REMOTE 1/2/MLT: CPT | Performed by: INTERNAL MEDICINE

## 2024-06-25 LAB
MDC_IDC_EPISODE_DTM: NORMAL
MDC_IDC_EPISODE_ID: NORMAL
MDC_IDC_EPISODE_TYPE: NORMAL
MDC_IDC_LEAD_CONNECTION_STATUS: NORMAL
MDC_IDC_LEAD_CONNECTION_STATUS: NORMAL
MDC_IDC_LEAD_IMPLANT_DT: NORMAL
MDC_IDC_LEAD_IMPLANT_DT: NORMAL
MDC_IDC_LEAD_LOCATION: NORMAL
MDC_IDC_LEAD_LOCATION: NORMAL
MDC_IDC_LEAD_LOCATION_DETAIL_1: NORMAL
MDC_IDC_LEAD_LOCATION_DETAIL_1: NORMAL
MDC_IDC_LEAD_MFG: NORMAL
MDC_IDC_LEAD_MFG: NORMAL
MDC_IDC_LEAD_MODEL: NORMAL
MDC_IDC_LEAD_MODEL: NORMAL
MDC_IDC_LEAD_POLARITY_TYPE: NORMAL
MDC_IDC_LEAD_POLARITY_TYPE: NORMAL
MDC_IDC_LEAD_SERIAL: NORMAL
MDC_IDC_LEAD_SERIAL: NORMAL
MDC_IDC_MSMT_BATTERY_DTM: NORMAL
MDC_IDC_MSMT_BATTERY_REMAINING_LONGEVITY: 132 MO
MDC_IDC_MSMT_BATTERY_REMAINING_PERCENTAGE: 100 %
MDC_IDC_MSMT_BATTERY_STATUS: NORMAL
MDC_IDC_MSMT_CAP_CHARGE_DTM: NORMAL
MDC_IDC_MSMT_CAP_CHARGE_TIME: 9.4 S
MDC_IDC_MSMT_CAP_CHARGE_TYPE: NORMAL
MDC_IDC_MSMT_LEADCHNL_RA_IMPEDANCE_VALUE: 587 OHM
MDC_IDC_MSMT_LEADCHNL_RA_PACING_THRESHOLD_AMPLITUDE: 0.5 V
MDC_IDC_MSMT_LEADCHNL_RA_PACING_THRESHOLD_PULSEWIDTH: 0.4 MS
MDC_IDC_MSMT_LEADCHNL_RV_IMPEDANCE_VALUE: 392 OHM
MDC_IDC_PG_IMPLANT_DTM: NORMAL
MDC_IDC_PG_MFG: NORMAL
MDC_IDC_PG_MODEL: NORMAL
MDC_IDC_PG_SERIAL: NORMAL
MDC_IDC_PG_TYPE: NORMAL
MDC_IDC_SESS_CLINIC_NAME: NORMAL
MDC_IDC_SESS_DTM: NORMAL
MDC_IDC_SESS_TYPE: NORMAL
MDC_IDC_SET_BRADY_AT_MODE_SWITCH_MODE: NORMAL
MDC_IDC_SET_BRADY_AT_MODE_SWITCH_RATE: 170 {BEATS}/MIN
MDC_IDC_SET_BRADY_LOWRATE: 60 {BEATS}/MIN
MDC_IDC_SET_BRADY_MAX_SENSOR_RATE: 130 {BEATS}/MIN
MDC_IDC_SET_BRADY_MAX_TRACKING_RATE: 130 {BEATS}/MIN
MDC_IDC_SET_BRADY_MODE: NORMAL
MDC_IDC_SET_BRADY_PAV_DELAY_HIGH: 150 MS
MDC_IDC_SET_BRADY_PAV_DELAY_LOW: 200 MS
MDC_IDC_SET_BRADY_SAV_DELAY_HIGH: 150 MS
MDC_IDC_SET_BRADY_SAV_DELAY_LOW: 200 MS
MDC_IDC_SET_LEADCHNL_RA_PACING_AMPLITUDE: 2 V
MDC_IDC_SET_LEADCHNL_RA_PACING_CAPTURE_MODE: NORMAL
MDC_IDC_SET_LEADCHNL_RA_PACING_POLARITY: NORMAL
MDC_IDC_SET_LEADCHNL_RA_PACING_PULSEWIDTH: 0.4 MS
MDC_IDC_SET_LEADCHNL_RA_SENSING_ADAPTATION_MODE: NORMAL
MDC_IDC_SET_LEADCHNL_RA_SENSING_POLARITY: NORMAL
MDC_IDC_SET_LEADCHNL_RA_SENSING_SENSITIVITY: 0.25 MV
MDC_IDC_SET_LEADCHNL_RV_PACING_AMPLITUDE: 5 V
MDC_IDC_SET_LEADCHNL_RV_PACING_CAPTURE_MODE: NORMAL
MDC_IDC_SET_LEADCHNL_RV_PACING_POLARITY: NORMAL
MDC_IDC_SET_LEADCHNL_RV_PACING_PULSEWIDTH: 0.4 MS
MDC_IDC_SET_LEADCHNL_RV_SENSING_ADAPTATION_MODE: NORMAL
MDC_IDC_SET_LEADCHNL_RV_SENSING_POLARITY: NORMAL
MDC_IDC_SET_LEADCHNL_RV_SENSING_SENSITIVITY: 0.6 MV
MDC_IDC_SET_ZONE_DETECTION_INTERVAL: 250 MS
MDC_IDC_SET_ZONE_DETECTION_INTERVAL: 300 MS
MDC_IDC_SET_ZONE_DETECTION_INTERVAL: 353 MS
MDC_IDC_SET_ZONE_STATUS: NORMAL
MDC_IDC_SET_ZONE_TYPE: NORMAL
MDC_IDC_SET_ZONE_VENDOR_TYPE: NORMAL
MDC_IDC_STAT_AT_BURDEN_PERCENT: 1 %
MDC_IDC_STAT_AT_DTM_END: NORMAL
MDC_IDC_STAT_AT_DTM_START: NORMAL
MDC_IDC_STAT_BRADY_DTM_END: NORMAL
MDC_IDC_STAT_BRADY_DTM_START: NORMAL
MDC_IDC_STAT_BRADY_RA_PERCENT_PACED: 12 %
MDC_IDC_STAT_BRADY_RV_PERCENT_PACED: 0 %
MDC_IDC_STAT_EPISODE_RECENT_COUNT: 0
MDC_IDC_STAT_EPISODE_RECENT_COUNT_DTM_END: NORMAL
MDC_IDC_STAT_EPISODE_RECENT_COUNT_DTM_START: NORMAL
MDC_IDC_STAT_EPISODE_TYPE: NORMAL
MDC_IDC_STAT_EPISODE_VENDOR_TYPE: NORMAL
MDC_IDC_STAT_TACHYTHERAPY_ATP_DELIVERED_RECENT: 0
MDC_IDC_STAT_TACHYTHERAPY_ATP_DELIVERED_TOTAL: 0
MDC_IDC_STAT_TACHYTHERAPY_RECENT_DTM_END: NORMAL
MDC_IDC_STAT_TACHYTHERAPY_RECENT_DTM_START: NORMAL
MDC_IDC_STAT_TACHYTHERAPY_SHOCKS_ABORTED_RECENT: 0
MDC_IDC_STAT_TACHYTHERAPY_SHOCKS_ABORTED_TOTAL: 0
MDC_IDC_STAT_TACHYTHERAPY_SHOCKS_DELIVERED_RECENT: 0
MDC_IDC_STAT_TACHYTHERAPY_SHOCKS_DELIVERED_TOTAL: 0
MDC_IDC_STAT_TACHYTHERAPY_TOTAL_DTM_END: NORMAL
MDC_IDC_STAT_TACHYTHERAPY_TOTAL_DTM_START: NORMAL

## 2024-07-16 NOTE — LETTER
12/31/2018      RE: Taty Aguila  [address] [facility] [address]       Dear Colleague,    Thank you for the opportunity to participate in the care of your patient, Taty Aguila, at the [facility] CARD[facility] at [facility]. Please see a copy of my visit note below.    Service Date: 12/31/2018      REFERRING CARDIOLOGIST:  Jesus Stone MD       REASON FOR CONSULTATION:  Evaluation for severe mitral valve regurgitation and new onset paroxysmal atrial fibrillation.      HISTORY OF PRESENT ILLNESS:  Ms. Aguila is a very pleasant 73-year-old female, overall healthy, with known mitral valve regurgitation.  In fact, I met her 2 years ago for evaluation for mitral valve repair then.  Given the fact that she was asymptomatic at that time with preserved LV function, the decision was made to medically treat her at that time.  She has done well over the last 2 years.  However, this fall she developed new onset paroxysmal atrial fibrillation and was placed on amiodarone and Xarelto.  For this reason, she was referred back to me for evaluation for mitral valve surgery.  Of note, besides when she went into rapid AFib with RVR, she is still virtually asymptomatic and is fairly active, walking up to a couple miles a day with her dog without any problems.  She denies any chest pain, chest tightness or any swelling in her legs.      PAST MEDICAL HISTORY:     1.  Significant for a history of colitis   2.  Mitral valve prolapse/regurgitation   3.  Allergic rhinitis.      PAST SURGICAL HISTORY:  Unremarkable.      ALLERGIES:  No known drug allergies.      CURRENT MEDICATIONS:  Calcium and vitamin D supplements, Allegra, glucosamine, melatonin, metoprolol XL 12.5 mg p.o. daily, multivitamin, Prilosec and Xarelto 20 mg p.o. daily.      FAMILY HISTORY:  Noncontributory.      SOCIAL HISTORY:  She is , retired, has 3 children.  She has never been a smoker.  She does not drink alcohol.       REVIEW OF SYSTEMS:  As per HPI.  All other 10-point review of systems is completed and was otherwise negative unless stated above.      PHYSICAL EXAMINATION:   VITAL SIGNS:  Her blood pressure today is 152/74, pulse is 80.  She is 57 kg, 5 feet 6 inches.   GENERAL:  She appears well, in no acute distress.   HEENT:  Within normal limits.   NECK:  Supple, no lymphadenopathy.   CARDIOVASCULAR:  Regular rate and rhythm, normal S1, S2.  Grade 3/6 systolic murmur at the apex.   LUNGS:  Clear bilaterally.   ABDOMEN:  Soft, nontender, nondistended.   EXTREMITIES:  Negative for edema, cyanosis or clubbing.   NEUROLOGIC:  She is A&O x3, no focal deficits.      LABORATORY DATA:  Most recent transthoracic echo from 08/06/2018 demonstrated a normal LV systolic size and function with EF of 60%-65%, mild to moderate mitral valve regurgitation.  Myxomatous appearing valve, severely dilated left atrium, mild to moderate tricuspid valve regurgitation.  Normal aortic valve.      IMPRESSION AND PLAN:  Ms. Aguila is a very pleasant 73-year-old previously healthy lady with known mitral valve prolapse and mitral valve regurgitation who I saw 2 years ago for evaluation for possible mitral valve surgery, but at that time, the decision was made to medically manage her.  This fall she developed new onset paroxysmal atrial fibrillation.  For this reason, the patient was referred back to us for evaluation for mitral valve surgery.  Besides her atrial fibrillation episodes, she is virtually still asymptomatic.  Her most recent transthoracic echo from August demonstrates only mild to moderate mitral valve regurgitation.  I do think that she probably does have severe mitral valve regurgitation, and in the setting of new onset AFib, we should consider her for mitral valve repair.  I will definitely like to get a transesophageal echo at this point, however, to further look at her mitral valve and also the tricuspid valve which had moderate  regurgitation 2 years ago as well.  We also entertained the idea of adding pulmonary vein isolation to the procedure, although her left atrium is severely dilated.  We will start off by getting a TIA and then we will obtain a left heart catheterization and a right heart catheterization and then I will discuss with her our definitive surgical plan, which would most likely include a mitral valve repair.  I will wait for the TIA and the left and right heart catheterization to decide whether she would be an appropriate minimally invasive mitral valve surgery candidate versus a standard sternotomy approach.  I think, overall, she is an excellent surgical candidate and I quoted an operative mortality risk of under 1%, similar to 2 years ago.  I also went over the risks and benefits of the operation and potential complications associated with surgery.  It was a pleasure to see Ms. Aguila again today and we will follow up with her after all the preoperative studies are completed.         LEE STANLEY MD           no Active ROM deficits were identified

## 2024-07-30 ENCOUNTER — OFFICE VISIT (OUTPATIENT)
Dept: INTERNAL MEDICINE | Facility: CLINIC | Age: 79
End: 2024-07-30
Payer: COMMERCIAL

## 2024-07-30 VITALS
BODY MASS INDEX: 21.66 KG/M2 | TEMPERATURE: 98.4 F | HEIGHT: 65 IN | SYSTOLIC BLOOD PRESSURE: 98 MMHG | DIASTOLIC BLOOD PRESSURE: 61 MMHG | RESPIRATION RATE: 14 BRPM | WEIGHT: 130 LBS | OXYGEN SATURATION: 97 % | HEART RATE: 74 BPM

## 2024-07-30 DIAGNOSIS — N39.0 URINARY TRACT INFECTION WITHOUT HEMATURIA, SITE UNSPECIFIED: Primary | ICD-10-CM

## 2024-07-30 LAB
ALBUMIN UR-MCNC: NEGATIVE MG/DL
APPEARANCE UR: CLEAR
BACTERIA #/AREA URNS HPF: ABNORMAL /HPF
BILIRUB UR QL STRIP: NEGATIVE
COLOR UR AUTO: YELLOW
GLUCOSE UR STRIP-MCNC: NEGATIVE MG/DL
HGB UR QL STRIP: ABNORMAL
KETONES UR STRIP-MCNC: NEGATIVE MG/DL
LEUKOCYTE ESTERASE UR QL STRIP: ABNORMAL
MUCOUS THREADS #/AREA URNS LPF: PRESENT /LPF
NITRATE UR QL: NEGATIVE
PH UR STRIP: 6.5 [PH] (ref 5–7)
RBC #/AREA URNS AUTO: ABNORMAL /HPF
SP GR UR STRIP: 1.01 (ref 1–1.03)
SQUAMOUS #/AREA URNS AUTO: ABNORMAL /LPF
TRANS CELLS #/AREA URNS HPF: ABNORMAL /HPF
UROBILINOGEN UR STRIP-ACNC: 0.2 E.U./DL
WBC #/AREA URNS AUTO: ABNORMAL /HPF
WBC CLUMPS #/AREA URNS HPF: PRESENT /HPF

## 2024-07-30 PROCEDURE — 87086 URINE CULTURE/COLONY COUNT: CPT

## 2024-07-30 PROCEDURE — 99213 OFFICE O/P EST LOW 20 MIN: CPT

## 2024-07-30 PROCEDURE — 81001 URINALYSIS AUTO W/SCOPE: CPT

## 2024-07-30 RX ORDER — CEPHALEXIN 500 MG/1
500 CAPSULE ORAL 2 TIMES DAILY
Qty: 14 CAPSULE | Refills: 0 | Status: SHIPPED | OUTPATIENT
Start: 2024-07-30 | End: 2024-08-06

## 2024-07-30 NOTE — PATIENT INSTRUCTIONS
Genitourinary syndrome of menopause (GSM, previously referred to as vaginal atrophy, vulvovaginal atrophy, urogenital atrophy, or atrophic vaginitis) is defined as a collection of symptoms and signs caused by hypoestrogenic changes to the labia majora/minora, clitoris, vestibule/introitus, vagina, urethra, and bladder that occur in menopausal patients. The syndrome may include, but is not limited to, genital symptoms of dryness, burning, and irritation; sexual symptoms of lack of lubrication, discomfort or pain, and impaired function; and urinary symptoms of urgency, dysuria, and recurrent urinary tract infections. Patients may present with some or all of the signs and symptoms, which must be bothersome and should not be better accounted for by another diagnosis. The spectrum of adverse consequences requires long-term treatment in many patients. Treatment options include both hormonal and nonhormonal interventions.  Although vaginal atrophy typically occurs in menopausal patients, it can occur in females of any age who experience a decrease in estrogenic stimulation of the urogenital tissues. In premenopausal patients, a hypoestrogenic state may occur during the postpartum period or lactation or due to hypothalamic amenorrhea or antiestrogenic drugs. Not all patients with atrophic changes on examination are symptomatic.  Clinical manifestations and diagnosis of GSM are reviewed here. Treatment of symptomatic vaginal atrophy, as well as use of estrogen and other pharmacologic therapies for other menopausal symptoms, is discussed in detail separately.

## 2024-07-30 NOTE — PROGRESS NOTES
Assessment & Plan     (N39.0) Urinary tract infection without hematuria, site unspecified  (primary encounter diagnosis)  Comment: Patient reports having dysuria and increased urinary frequency in the night and increased sediment in her urine  Plan: Cephalexin 500 mg twice daily x 7 days                Subjective   Taty is a 79 year old, presenting for the following health issues: Pt reports having dysuria and increased urinary frequency in the night and increased sediment in her urine. Pt has chronic urge and stress incontinence. Pt denies any fevers, night sweats or chills. Pt denies any suprapubic pain and flank pain. Pt denies present constipation.    Provided information to patient about how she can manage UTIs and information about a bladder diet.  Provided information about cephalexin and explained that she needs to take it with food probiotic or yogurt to reduce stomach upset or diarrhea.  Discussed GSM and provided information about genitourinary syndrome of menopause and about estradiol cream for the treatment to reduce the chances of atrophic vaginitis if she persistently continues to have UTIs.  UTI (Frequency and cloudy urination x 3 days.)      7/30/2024     1:46 PM   Additional Questions   Roomed by Elisabeth CONNER     UTI    History of Present Illness       Reason for visit:  UTI ?  Symptom onset:  1-3 days ago  Symptoms include:  Frequent cloudy urination  Symptom intensity:  Moderate  Symptom progression:  Worsening  Had these symptoms before:  No  What makes it worse:  No  What makes it better:  No    She eats 4 or more servings of fruits and vegetables daily.She consumes 0 sweetened beverage(s) daily.She exercises with enough effort to increase her heart rate 60 or more minutes per day.  She exercises with enough effort to increase her heart rate 7 days per week.   She is taking medications regularly.                 Review of Systems  Constitutional, HEENT, cardiovascular, pulmonary, gi and gu  systems are negative, except as otherwise noted.      Objective    LMP  (LMP Unknown)   There is no height or weight on file to calculate BMI.  Physical Exam   GENERAL: alert and no distress  NECK: no adenopathy, no asymmetry, masses, or scars  RESP: lungs clear to auscultation - no rales, rhonchi or wheezes  CV: regular rate and rhythm, normal S1 S2, no S3 or S4, no murmur, click or rub, no peripheral edema  ABDOMEN: soft, nontender, no hepatosplenomegaly, no masses and bowel sounds normal  MS: no gross musculoskeletal defects noted, no edema  SKIN: no suspicious lesions or rashes  NEURO: Normal strength and tone, mentation intact and speech normal  PSYCH: mentation appears normal, affect normal/bright            Signed Electronically by: ROBINA Wall CNP

## 2024-07-30 NOTE — NURSING NOTE
"Chief Complaint   Patient presents with    UTI     Frequency and cloudy urination x 3 days.     initial BP 98/61   Pulse 74   Temp 98.4  F (36.9  C) (Tympanic)   Resp 14   Ht 1.651 m (5' 5\")   Wt 59 kg (130 lb)   LMP  (LMP Unknown)   SpO2 97%   BMI 21.63 kg/m   Estimated body mass index is 21.63 kg/m  as calculated from the following:    Height as of this encounter: 1.651 m (5' 5\").    Weight as of this encounter: 59 kg (130 lb)..  bp completed using cuff size regular  ANATOLY ANDREW LPN  "

## 2024-07-31 LAB — BACTERIA UR CULT: NORMAL

## 2024-08-12 ENCOUNTER — PATIENT OUTREACH (OUTPATIENT)
Dept: INTERNAL MEDICINE | Facility: CLINIC | Age: 79
End: 2024-08-12
Payer: COMMERCIAL

## 2024-08-12 NOTE — TELEPHONE ENCOUNTER
Patient Quality Outreach    Patient is due for the following:   Hypertension -  Hypertension follow-up visit  Physical Annual Wellness Visit      Topic Date Due    COVID-19 Vaccine (7 - 2023-24 season) 02/06/2024       Next Steps:   Patient has upcoming appointment, these items will be addressed at that time.    Type of outreach:    Chart review performed, no outreach needed.      Questions for provider review:               Nika Neville MA

## 2024-08-31 ENCOUNTER — HEALTH MAINTENANCE LETTER (OUTPATIENT)
Age: 79
End: 2024-08-31

## 2024-09-03 ENCOUNTER — OFFICE VISIT (OUTPATIENT)
Dept: INTERNAL MEDICINE | Facility: CLINIC | Age: 79
End: 2024-09-03
Attending: INTERNAL MEDICINE
Payer: COMMERCIAL

## 2024-09-03 VITALS
WEIGHT: 129 LBS | TEMPERATURE: 97.7 F | HEIGHT: 65 IN | BODY MASS INDEX: 21.49 KG/M2 | HEART RATE: 79 BPM | SYSTOLIC BLOOD PRESSURE: 116 MMHG | OXYGEN SATURATION: 99 % | RESPIRATION RATE: 18 BRPM | DIASTOLIC BLOOD PRESSURE: 70 MMHG

## 2024-09-03 DIAGNOSIS — I48.0 PAROXYSMAL ATRIAL FIBRILLATION (H): ICD-10-CM

## 2024-09-03 DIAGNOSIS — E03.9 ACQUIRED HYPOTHYROIDISM: ICD-10-CM

## 2024-09-03 DIAGNOSIS — Z00.00 ROUTINE GENERAL MEDICAL EXAMINATION AT A HEALTH CARE FACILITY: Primary | ICD-10-CM

## 2024-09-03 DIAGNOSIS — Z95.2 S/P MVR (MITRAL VALVE REPLACEMENT): ICD-10-CM

## 2024-09-03 PROCEDURE — G0439 PPPS, SUBSEQ VISIT: HCPCS | Performed by: INTERNAL MEDICINE

## 2024-09-03 RX ORDER — AMOXICILLIN 500 MG/1
2000 CAPSULE ORAL PRN
Qty: 20 CAPSULE | Refills: 0 | Status: SHIPPED | OUTPATIENT
Start: 2024-09-03

## 2024-09-03 NOTE — PROGRESS NOTES
Dr Alicea's note    Patient's instructions / PLAN:                                                        Plan:  Continue same meds, same doses for now   2. The following vaccines are recommended for you. Please check with your insurance about coverage.  Some insurances cover better if you have these vaccines at the pharmacy:  -- RSV, flu, Covid  -- Pneumonia 20   3. Next ANNUAL EXAM after Sept 4, 2025      ASSESSMENT & PLAN:                                                      (Z00.00) Routine general medical examination at a health care facility  (primary encounter diagnosis)  Comment:   Plan:     (Z95.2) S/P MVR (mitral valve replacement)-St junaid epic tissue 31 mm  Comment:   Plan: amoxicillin (AMOXIL) 500 MG capsule before dental work             (I48.0) Paroxysmal atrial fibrillation (H)  Comment: Controlled    Plan: Continue same meds, same doses for now     (E03.9) Acquired hypothyroidism  Comment: Controlled    Plan: Continue same meds, same doses for now            Chief Complaint:                                                      Annual exam  Follow up chronic medical problems      SUBJECTIVE:                                                    History of present illness     We reviewed the chronic medical problems as above.   I reviewed the recent tests results in Epic       Labs May 2024 -- Discussed      Stable ECHO  LVEF 55%  normal prosthetic mitral valve       ROS:                                                      ROS: negative for fever, chills, cough, wheezes, chest pain, shortness of breath, vomiting, abdominal pain, leg swelling     PMHx: - reviewed  Past Medical History:   Diagnosis Date    Allergic rhinitis, cause unspecified     dust mites, ragweed    Arthritis     Complete AV block (H)     BSX DDD PM 2-    Congestive heart failure (H) 2019    History of tricuspid valve repair     Mitral valve prolapse     bioprosthetic MVR 2-    Paroxysmal atrial fibrillation (H)           PSHx: reviewed  Past Surgical History:   Procedure Laterality Date    COLONOSCOPY N/A 9/15/2015    Procedure: COLONOSCOPY;  Surgeon: Anson Llanos MD;  Location:  GI    CV HEART CATHETERIZATION WITH POSSIBLE INTERVENTION N/A 1/9/2019    Procedure: Heart Catheterization with Possible Intervention;  Surgeon: Ritesh Whittaker MD;  Location:  HEART CARDIAC CATH LAB    CV RIGHT AND LEFT HEART CATH N/A 1/9/2019    Procedure: Right and Left Heart Catherization;  Surgeon: Ritesh Whittaker MD;  Location:  HEART CARDIAC CATH LAB    ENT SURGERY      T&A    EP INSERT ICD N/A 8/14/2020    Procedure: EP Insert ICD;  Surgeon: Lucian Tolliver MD;  Location:  HEART CARDIAC CATH LAB    EP PACEMAKER N/A 2/18/2019    Procedure: EP Pacemaker;  Surgeon: Inocencia Guillen MD;  Location:  HEART CARDIAC CATH LAB    REPAIR VALVE TRICUSPID MINIMALLY INVASIVE N/A 2/15/2019    Procedure: MINIMALLY INVASIVE TRICUSPID VALVE REPAIR WITH 32MM SULLIVAN RING;  Surgeon: Andrea Hanna MD;  Location:  OR    REPLACE VALVE MITRAL MINIMALLY INVASIVE N/A 2/15/2019    Procedure: MINIMALLY INVASIVE MITRAL VALVE REPLACEMENT WITH EPIC ST KEYUR 31MM VALVE; ON PUMP WITH TIA.  CARDIOLOGIST DR CARDENAS;  Surgeon: Andrea Hanna MD;  [place]        [facility] [place] No daily [place], no smoking  Social History     Socioeconomic History    Marital status:      Spouse name: Not on file    Number of children: 3    Years of education: Not on file    Highest education level: Not on file   Occupational History    Not on file   Tobacco Use    Smoking status: Never     Passive exposure: Never    Smokeless tobacco: Never   Vaping Use    Vaping status: Never Used   Substance and Sexual Activity    Alcohol use: Not Currently     Comment: very little    Drug use: No    Sexual activity: Not Currently   Other Topics Concern     Service Not Asked    Blood Transfusions Not Asked    Caffeine Concern No      Comment: 1 cup of coffee and 1 can diet coke per day    Occupational Exposure Not Asked    Hobby Hazards Not Asked    Sleep Concern Yes     Comment: takes Doxylamine succinate 1/2 tab every night    Stress Concern No    Weight Concern No    Special Diet No     Comment: healthy     Back Care Not Asked    Exercise Yes     Comment: walking 45min x7 a wk. Very active, [employer], Golf, Bowling, Cardio    Bike Helmet Not Asked    Seat Belt Yes    Self-Exams Yes    Parent/sibling w/ CABG, MI or angioplasty before 65F 55M? No   Social History Narrative    Not on file     Social Determinants of Health     Financial Resource Strain: Low Risk  (8/29/2024)    Financial Resource Strain     Within the past 12 months, have you or your family members you live with been unable to get utilities (heat, electricity) when it was really needed?: No   Food Insecurity: Low Risk  (8/29/2024)    Food Insecurity     Within the past 12 months, did you worry that your food would run out before you got money to buy more?: No     Within the past 12 months, did the food you bought just not last and you didn t have money to get more?: No   Transportation Needs: Low Risk  (8/29/2024)    Transportation Needs     Within the past 12 months, has lack of transportation kept you from medical appointments, getting your medicines, non-medical meetings or appointments, work, or from getting things that you need?: No   Physical Activity: Sufficiently Active (8/29/2024)    Exercise Vital Sign     Days of Exercise per Week: 7 days     Minutes of Exercise per Session: 60 min   Stress: No Stress Concern Present (8/29/2024)    [employer] [place] [employer] - Occupational Stress Questionnaire     Feeling of Stress : Not at all   Social Connections: Unknown (8/29/2024)    Social Connection and Isolation Panel [NHANES]     Frequency of Communication with Friends and Family: Not on file     Frequency of Social Gatherings with Friends and Family: Once a week      Attends [employer] Services: Not on file     Active Member of Clubs or Organizations: Not on file     Attends Club or Organization Meetings: Not on file     Marital Status: Not on file   Interpersonal Safety: Low Risk  (9/3/2024)    Interpersonal Safety     Do you feel physically and emotionally safe where you currently live?: Yes     Within the past 12 months, have you been hit, slapped, kicked or otherwise physically hurt by someone?: No     Within the past 12 months, have you been humiliated or emotionally abused in other ways by your partner or ex-partner?: No   Housing Stability: Low Risk  (8/29/2024)    Housing Stability     Do you have housing? : Yes     Are you worried about losing your housing?: No        Fam Hx: reviewed  Family History   Problem Relation Age of Onset    Osteoporosis Mother     Alzheimer Disease Mother     Family History Negative Father     Heart Disease Maternal Grandfather     Family History Negative Paternal Grandmother     Family History Negative Paternal Grandfather     Breast Cancer No family hx of     Ovarian Cancer No family hx of          Screening: reviewed    All: reviewed    Meds: reviewed  Current Outpatient Medications   Medication Sig Dispense Refill    amoxicillin (AMOXIL) 500 MG capsule Take 4 capsules (2,000 mg) by mouth as needed (take four tabs 30-60 minutes prior to dental procedures) 8 capsule 0    aspirin 81 MG EC tablet Take 81 mg by mouth every evening OTC      calcium carbonate-vitamin D 600-200 MG-UNIT TABS Take 2 tablets by mouth 2 times daily       Cetirizine HCl (ZYRTEC PO) Take 10 mg by mouth daily       IBANdronate (BONIVA) 150 MG tablet Take 1 tablet (150 mg) by mouth every 30 days 3 tablet 3    levothyroxine (SYNTHROID/LEVOTHROID) 50 MCG tablet Take 1 tablet (50 mcg) by mouth daily 90 tablet 3    losartan (COZAAR) 100 MG tablet Take 1 tablet (100 mg) by mouth daily (Patient taking differently: Take 50 mg by mouth daily.) 90 tablet 3    metoprolol succinate  "ER (TOPROL XL) 100 MG 24 hr tablet Take 1 tablet (100 mg) by mouth daily 90 tablet 3    Multiple Vitamins-Minerals (MULTIVITAMIN ADULT) TABS Take 1 tablet by mouth daily      omeprazole (PRILOSEC) 10 MG DR capsule Take 1 capsule (10 mg) by mouth daily before breakfast 90 capsule 3    rivaroxaban ANTICOAGULANT (XARELTO) 20 MG TABS tablet Take 1 tablet (20 mg) by mouth daily (with dinner) 90 tablet 3           OBJECTIVE:                                                    Physical Exam :    Blood pressure 116/70, pulse 79, temperature 97.7  F (36.5  C), temperature source Oral, resp. rate 18, height 1.645 m (5' 4.76\"), weight 58.5 kg (129 lb), SpO2 99%, not currently breastfeeding.     NAD, appears comfortable  Skin clear, no rashes  Neck: supple, no JVD,  no thyroidmegaly  Lymph nodes non palpable in the cervical, supraclavicular axillaries,   Chest: clear to auscultation with good respiratory effort  Cardiac: S1S2, RRR, no mgr appreciated  Abdomen: soft, not tender, not distended, audible bowel sound, no hepatosplenomegaly, no palpable masses, no abdominal bruits  Extremities: no cyanosis, clubbing or edema.   Neuro: A, Ox3, no focal signs.       Patient has been advised of split billing requirements and indicates understanding: Yes.  At the check in, at the      Jocelyne Alicea MD  Internal Medicine     ################################    Preventive Care Visit  [facility]  Jocelyne Sands MD, Internal Medicine  Sep 3, 2024          Carlton Posey is a 79 year old, presenting for the following:  Medicare Visit        9/3/2024    12:54 PM   Additional Questions   Roomed by Elisabeth CONNER         Health Care Directive  Patient does not have a Health Care Directive or Living Will: Patient states has Advance Directive and will bring in a copy to clinic.    HPI              8/29/2024   General Health   How would you rate your overall physical health? Good   Feel stress " (tense, anxious, or unable to sleep) Not at all            8/29/2024   Nutrition   Diet: Regular (no restrictions)            8/29/2024   Exercise   Days per week of moderate/strenous exercise 7 days   Average minutes spent exercising at this level 60 min            8/29/2024   Social Factors   Frequency of gathering with friends or relatives Once a week   Worry food won't last until get money to buy more No   Food not last or not have enough money for food? No   Do you have housing? (Housing is defined as stable permanent housing and does not include staying ouside in a car, in a tent, in an abandoned building, in an overnight shelter, or couch-surfing.) Yes   Are you worried about losing your housing? No   Lack of transportation? No   Unable to get utilities (heat,electricity)? No            8/29/2024   Fall Risk   Fallen 2 or more times in the past year? No    No   Trouble with walking or balance? No    No       Multiple values from one day are sorted in reverse-chronological order          8/29/2024   Activities of Daily Living- Home Safety   Needs help with the following daily activites None of the above   Safety concerns in the home None of the above            8/29/2024   Dental   Dentist two times every year? Yes            8/29/2024   Hearing Screening   Hearing concerns? None of the above            8/29/2024   Driving Risk Screening   Patient/family members have concerns about driving No            8/29/2024   General Alertness/Fatigue Screening   Have you been more tired than usual lately? No            8/29/2024   Urinary Incontinence Screening   Bothered by leaking urine in past 6 months Yes            8/29/2024   TB Screening   Were you born outside of the [place]? No            Today's PHQ-2 Score:       9/3/2024    10:28 AM   PHQ-2 ( 1999 Pfizer)   Q1: Little interest or pleasure in doing things 0   Q2: Feeling down, depressed or hopeless 0   PHQ-2 Score 0   Q1: Little interest or pleasure in doing  things Not at all   Q2: Feeling down, depressed or hopeless Not at all   PHQ-2 Score 0           8/29/2024   Substance Use   Alcohol more than 3/day or more than 7/wk No   Do you have a current opioid prescription? No   How severe/bad is pain from 1 to 10? 0/10 (No Pain)   Do you use any other substances recreationally? No        Social History     Tobacco Use    Smoking status: Never     Passive exposure: Never    Smokeless tobacco: Never   Vaping Use    Vaping status: Never Used   Substance Use Topics    Alcohol use: Not Currently     Comment: very little    Drug use: No           4/17/2024   LAST FHS-7 RESULTS   1st degree relative breast or ovarian cancer No   Any relative bilateral breast cancer No   Any male have breast cancer No   Any ONE woman have BOTH breast AND ovarian cancer No   Any woman with breast cancer before 50yrs No   2 or more relatives with breast AND/OR ovarian cancer No   2 or more relatives with breast AND/OR bowel cancer No               ASCVD Risk   The 10-year ASCVD risk score (Uzma HEIN, et al., 2019) is: 16.3%    Values used to calculate the score:      Age: 79 years      Sex: Female      Is Non- : No      Diabetic: No      Tobacco smoker: No      Systolic Blood Pressure: 98 mmHg      Is BP treated: Yes      HDL Cholesterol: 71 mg/dL      Total Cholesterol: 188 mg/dL            Reviewed and updated as needed this visit by Provider                      Current providers sharing in care for this patient include:  Patient Care Team:  Jocelyne Sands MD as PCP - General (Internal Medicine)  Courtney Brian PA as Physician Assistant (Cardiology)  Jesus Stone MD as MD (Cardiology)  Milagro Velazquez RN as Registered Nurse (Cardiology)  Jo-Ann Neal PA-C as Physician Assistant (Physician Assistant)  Jocelyne Sands MD as Assigned PCP  Jesus Stone MD as Assigned Heart and Vascular  "Provider    The following health maintenance items are reviewed in Epic and correct as of today:  Health Maintenance   Topic Date Due    HF ACTION PLAN  Never done    RSV VACCINE (1 - 1-dose 60+ series) Never done    ANNUAL REVIEW OF HM ORDERS  04/04/2024    MEDICARE ANNUAL WELLNESS VISIT  07/25/2024    INFLUENZA VACCINE (1) 09/01/2024    COVID-19 Vaccine (7 - 2023-24 season) 09/01/2024    BMP  11/30/2024    ALT  12/13/2024    DEXA  05/31/2025    CBC  05/31/2025    FALL RISK ASSESSMENT  09/03/2025    COLORECTAL CANCER SCREENING  09/15/2025    MAMMO SCREENING  04/17/2026    GLUCOSE  05/31/2027    ADVANCE CARE PLANNING  07/25/2028    DTAP/TDAP/TD IMMUNIZATION (3 - Td or Tdap) 07/25/2033    TSH W/FREE T4 REFLEX  Completed    HEPATITIS C SCREENING  Completed    PHQ-2 (once per calendar year)  Completed    Pneumococcal Vaccine: 65+ Years  Completed    ZOSTER IMMUNIZATION  Addressed    HPV IMMUNIZATION  Aged Out    MENINGITIS IMMUNIZATION  Aged Out    RSV MONOCLONAL ANTIBODY  Aged Out    LIPID  Discontinued            Objective    Exam  LMP  (LMP Unknown)    Estimated body mass index is 21.63 kg/m  as calculated from the following:    Height as of 7/30/24: 1.651 m (5' 5\").    Weight as of 7/30/24: 59 kg (130 lb).    Physical Exam           No data to display            Patient scored 5/5 on cognitive test today              Signed Electronically by: Jocelyne Sands MD    "

## 2024-09-03 NOTE — PATIENT INSTRUCTIONS
Plan:  Continue same meds, same doses for now   2. The following vaccines are recommended for you. Please check with your insurance about coverage.  Some insurances cover better if you have these vaccines at the pharmacy:  -- RSV, flu, Covid  -- Pneumonia 20   3. Next ANNUAL EXAM after Sept 4, 2025

## 2024-09-05 ENCOUNTER — TELEPHONE (OUTPATIENT)
Dept: ANTICOAGULATION | Facility: CLINIC | Age: 79
End: 2024-09-05
Payer: COMMERCIAL

## 2024-09-05 NOTE — TELEPHONE ENCOUNTER
ANTICOAGULATION DIRECT ORAL ANTICOAGULANT MONITORING    SUBJECTIVE     The [facility] is evaluating Taty Aguila's Rivaroxaban (Xarelto) as part of its Anticoagulation Monitoring Program.    Indication:Atrial Fibrillation  Current dose per medication list: Rivaroxaban 20 mg Daily  Recent hospitalizations/ED/Office Visits for bleeding/clotting concerns: No  Other bleeding or side effect concerns: No  Additional findings: bioprosthetic mitral valve 2019    OBJECTIVE     Age: 79 year old    Wt Readings from Last 2 Encounters:   09/03/24 58.5 kg (129 lb)   07/30/24 59 kg (130 lb)      Lab Results   Component Value Date    CR 0.87 05/31/2024    CR 0.74 01/15/2024    CR 0.74 12/13/2023     Creatinine Clearance (using actual bodyweight, mL/min): 50    Lab Results   Component Value Date    HGB 11.7 05/31/2024    HGB 12.8 08/14/2020     05/31/2024     08/14/2020     ASSESSMENT/PLAN     A chart review for Direct Oral Anticoagulant (DOAC) Stewardship has been completed for:     Pt has had a single CrCl less than 50 with a recommendation that additional testing is required to ensure persistence of CrCl less than 50 ml/min. Pt likely won't be seeing PCP until 9/2025, will close ivent status.     10:19 AM    Plan made per [facility] anticoagulation protocol    Damián Garcia RN  Anticoagulation Clinic

## 2024-09-26 ENCOUNTER — ANCILLARY PROCEDURE (OUTPATIENT)
Dept: CARDIOLOGY | Facility: CLINIC | Age: 79
End: 2024-09-26
Attending: INTERNAL MEDICINE
Payer: COMMERCIAL

## 2024-09-26 DIAGNOSIS — Z95.810 ICD (IMPLANTABLE CARDIOVERTER-DEFIBRILLATOR) IN PLACE: ICD-10-CM

## 2024-09-26 DIAGNOSIS — I42.9 CARDIOMYOPATHY, UNSPECIFIED TYPE (H): ICD-10-CM

## 2024-09-26 PROCEDURE — 93296 REM INTERROG EVL PM/IDS: CPT | Performed by: INTERNAL MEDICINE

## 2024-09-26 PROCEDURE — 93295 DEV INTERROG REMOTE 1/2/MLT: CPT | Performed by: INTERNAL MEDICINE

## 2024-09-30 LAB
MDC_IDC_EPISODE_DTM: NORMAL
MDC_IDC_EPISODE_ID: NORMAL
MDC_IDC_EPISODE_TYPE: NORMAL
MDC_IDC_LEAD_CONNECTION_STATUS: NORMAL
MDC_IDC_LEAD_CONNECTION_STATUS: NORMAL
MDC_IDC_LEAD_IMPLANT_DT: NORMAL
MDC_IDC_LEAD_IMPLANT_DT: NORMAL
MDC_IDC_LEAD_LOCATION: NORMAL
MDC_IDC_LEAD_LOCATION: NORMAL
MDC_IDC_LEAD_LOCATION_DETAIL_1: NORMAL
MDC_IDC_LEAD_LOCATION_DETAIL_1: NORMAL
MDC_IDC_LEAD_MFG: NORMAL
MDC_IDC_LEAD_MFG: NORMAL
MDC_IDC_LEAD_MODEL: NORMAL
MDC_IDC_LEAD_MODEL: NORMAL
MDC_IDC_LEAD_POLARITY_TYPE: NORMAL
MDC_IDC_LEAD_POLARITY_TYPE: NORMAL
MDC_IDC_LEAD_SERIAL: NORMAL
MDC_IDC_LEAD_SERIAL: NORMAL
MDC_IDC_MSMT_BATTERY_DTM: NORMAL
MDC_IDC_MSMT_BATTERY_REMAINING_LONGEVITY: 126 MO
MDC_IDC_MSMT_BATTERY_REMAINING_PERCENTAGE: 100 %
MDC_IDC_MSMT_BATTERY_STATUS: NORMAL
MDC_IDC_MSMT_CAP_CHARGE_DTM: NORMAL
MDC_IDC_MSMT_CAP_CHARGE_TIME: 9.4 S
MDC_IDC_MSMT_CAP_CHARGE_TYPE: NORMAL
MDC_IDC_MSMT_LEADCHNL_RA_IMPEDANCE_VALUE: 592 OHM
MDC_IDC_MSMT_LEADCHNL_RA_PACING_THRESHOLD_AMPLITUDE: 0.6 V
MDC_IDC_MSMT_LEADCHNL_RA_PACING_THRESHOLD_PULSEWIDTH: 0.4 MS
MDC_IDC_MSMT_LEADCHNL_RV_IMPEDANCE_VALUE: 383 OHM
MDC_IDC_MSMT_LEADCHNL_RV_PACING_THRESHOLD_AMPLITUDE: 0.6 V
MDC_IDC_MSMT_LEADCHNL_RV_PACING_THRESHOLD_PULSEWIDTH: 0.4 MS
MDC_IDC_PG_IMPLANT_DTM: NORMAL
MDC_IDC_PG_MFG: NORMAL
MDC_IDC_PG_MODEL: NORMAL
MDC_IDC_PG_SERIAL: NORMAL
MDC_IDC_PG_TYPE: NORMAL
MDC_IDC_SESS_CLINIC_NAME: NORMAL
MDC_IDC_SESS_DTM: NORMAL
MDC_IDC_SESS_TYPE: NORMAL
MDC_IDC_SET_BRADY_AT_MODE_SWITCH_MODE: NORMAL
MDC_IDC_SET_BRADY_AT_MODE_SWITCH_RATE: 170 {BEATS}/MIN
MDC_IDC_SET_BRADY_LOWRATE: 60 {BEATS}/MIN
MDC_IDC_SET_BRADY_MAX_SENSOR_RATE: 130 {BEATS}/MIN
MDC_IDC_SET_BRADY_MAX_TRACKING_RATE: 130 {BEATS}/MIN
MDC_IDC_SET_BRADY_MODE: NORMAL
MDC_IDC_SET_BRADY_PAV_DELAY_HIGH: 150 MS
MDC_IDC_SET_BRADY_PAV_DELAY_LOW: 200 MS
MDC_IDC_SET_BRADY_SAV_DELAY_HIGH: 150 MS
MDC_IDC_SET_BRADY_SAV_DELAY_LOW: 200 MS
MDC_IDC_SET_LEADCHNL_RA_PACING_AMPLITUDE: 2 V
MDC_IDC_SET_LEADCHNL_RA_PACING_CAPTURE_MODE: NORMAL
MDC_IDC_SET_LEADCHNL_RA_PACING_POLARITY: NORMAL
MDC_IDC_SET_LEADCHNL_RA_PACING_PULSEWIDTH: 0.4 MS
MDC_IDC_SET_LEADCHNL_RA_SENSING_ADAPTATION_MODE: NORMAL
MDC_IDC_SET_LEADCHNL_RA_SENSING_POLARITY: NORMAL
MDC_IDC_SET_LEADCHNL_RA_SENSING_SENSITIVITY: 0.25 MV
MDC_IDC_SET_LEADCHNL_RV_PACING_AMPLITUDE: 2 V
MDC_IDC_SET_LEADCHNL_RV_PACING_CAPTURE_MODE: NORMAL
MDC_IDC_SET_LEADCHNL_RV_PACING_POLARITY: NORMAL
MDC_IDC_SET_LEADCHNL_RV_PACING_PULSEWIDTH: 0.4 MS
MDC_IDC_SET_LEADCHNL_RV_SENSING_ADAPTATION_MODE: NORMAL
MDC_IDC_SET_LEADCHNL_RV_SENSING_POLARITY: NORMAL
MDC_IDC_SET_LEADCHNL_RV_SENSING_SENSITIVITY: 0.6 MV
MDC_IDC_SET_ZONE_DETECTION_INTERVAL: 250 MS
MDC_IDC_SET_ZONE_DETECTION_INTERVAL: 300 MS
MDC_IDC_SET_ZONE_DETECTION_INTERVAL: 353 MS
MDC_IDC_SET_ZONE_STATUS: NORMAL
MDC_IDC_SET_ZONE_TYPE: NORMAL
MDC_IDC_SET_ZONE_VENDOR_TYPE: NORMAL
MDC_IDC_STAT_AT_BURDEN_PERCENT: 1 %
MDC_IDC_STAT_AT_DTM_END: NORMAL
MDC_IDC_STAT_AT_DTM_START: NORMAL
MDC_IDC_STAT_BRADY_DTM_END: NORMAL
MDC_IDC_STAT_BRADY_DTM_START: NORMAL
MDC_IDC_STAT_BRADY_RA_PERCENT_PACED: 13 %
MDC_IDC_STAT_BRADY_RV_PERCENT_PACED: 0 %
MDC_IDC_STAT_EPISODE_RECENT_COUNT: 0
MDC_IDC_STAT_EPISODE_RECENT_COUNT_DTM_END: NORMAL
MDC_IDC_STAT_EPISODE_RECENT_COUNT_DTM_START: NORMAL
MDC_IDC_STAT_EPISODE_TYPE: NORMAL
MDC_IDC_STAT_EPISODE_VENDOR_TYPE: NORMAL
MDC_IDC_STAT_TACHYTHERAPY_ATP_DELIVERED_RECENT: 0
MDC_IDC_STAT_TACHYTHERAPY_ATP_DELIVERED_TOTAL: 0
MDC_IDC_STAT_TACHYTHERAPY_RECENT_DTM_END: NORMAL
MDC_IDC_STAT_TACHYTHERAPY_RECENT_DTM_START: NORMAL
MDC_IDC_STAT_TACHYTHERAPY_SHOCKS_ABORTED_RECENT: 0
MDC_IDC_STAT_TACHYTHERAPY_SHOCKS_ABORTED_TOTAL: 0
MDC_IDC_STAT_TACHYTHERAPY_SHOCKS_DELIVERED_RECENT: 0
MDC_IDC_STAT_TACHYTHERAPY_SHOCKS_DELIVERED_TOTAL: 0
MDC_IDC_STAT_TACHYTHERAPY_TOTAL_DTM_END: NORMAL
MDC_IDC_STAT_TACHYTHERAPY_TOTAL_DTM_START: NORMAL

## 2024-11-21 ENCOUNTER — MYC REFILL (OUTPATIENT)
Dept: CARDIOLOGY | Facility: CLINIC | Age: 79
End: 2024-11-21

## 2024-11-21 DIAGNOSIS — I50.22 CHRONIC SYSTOLIC HEART FAILURE (H): ICD-10-CM

## 2024-11-21 DIAGNOSIS — Z95.2 S/P MVR (MITRAL VALVE REPLACEMENT): ICD-10-CM

## 2024-11-21 DIAGNOSIS — I48.0 PAROXYSMAL ATRIAL FIBRILLATION (H): ICD-10-CM

## 2024-11-21 DIAGNOSIS — Z98.890 S/P TVR (TRICUSPID VALVE REPAIR): ICD-10-CM

## 2024-11-21 DIAGNOSIS — Z95.2 S/P MVR (MITRAL VALVE REPLACEMENT): Primary | ICD-10-CM

## 2024-11-21 RX ORDER — METOPROLOL SUCCINATE 100 MG/1
100 TABLET, EXTENDED RELEASE ORAL DAILY
Qty: 90 TABLET | Refills: 3 | Status: SHIPPED | OUTPATIENT
Start: 2024-11-21

## 2024-11-21 RX ORDER — LOSARTAN POTASSIUM 100 MG/1
50 TABLET ORAL DAILY
Qty: 45 TABLET | Refills: 2 | OUTPATIENT
Start: 2024-11-21

## 2024-11-21 RX ORDER — LOSARTAN POTASSIUM 50 MG/1
50 TABLET ORAL DAILY
Qty: 90 TABLET | Refills: 2 | Status: SHIPPED | OUTPATIENT
Start: 2024-11-21

## 2024-11-21 NOTE — TELEPHONE ENCOUNTER
Spoke with patient regarding medication refill requests. Per chart review, pt is to continue taking daily aspirin 81 mg related to history of MVR as well as Xarelto due to known hx of atrial fibrillation. Prescriptions refilled and sent to pharmacy of choice. Encouraged patient to call back with any further questions/concerns.

## 2024-11-21 NOTE — TELEPHONE ENCOUNTER
BP has been <100 systolic. Feeling well without symptoms. Will decrease Cozaar dose to 50 mg daily. HR 60-70 with 0% ventricular paced. Battery life 11 years.  6-3-2024 OV note. Will send in new RX changed from 100mg to 50mg daily and patient has tolerated medication well.  Carissa Espinal RN on 11/21/2024 at 1:04 PM

## 2025-01-30 ENCOUNTER — ANCILLARY PROCEDURE (OUTPATIENT)
Dept: CARDIOLOGY | Facility: CLINIC | Age: 80
End: 2025-01-30
Attending: INTERNAL MEDICINE
Payer: COMMERCIAL

## 2025-01-30 DIAGNOSIS — Z95.810 ICD (IMPLANTABLE CARDIOVERTER-DEFIBRILLATOR) IN PLACE: ICD-10-CM

## 2025-01-30 DIAGNOSIS — I42.9 CARDIOMYOPATHY, UNSPECIFIED TYPE (H): ICD-10-CM

## 2025-01-30 LAB
MDC_IDC_LEAD_CONNECTION_STATUS: NORMAL
MDC_IDC_LEAD_CONNECTION_STATUS: NORMAL
MDC_IDC_LEAD_IMPLANT_DT: NORMAL
MDC_IDC_LEAD_IMPLANT_DT: NORMAL
MDC_IDC_LEAD_LOCATION: NORMAL
MDC_IDC_LEAD_LOCATION: NORMAL
MDC_IDC_LEAD_LOCATION_DETAIL_1: NORMAL
MDC_IDC_LEAD_LOCATION_DETAIL_1: NORMAL
MDC_IDC_LEAD_MFG: NORMAL
MDC_IDC_LEAD_MFG: NORMAL
MDC_IDC_LEAD_MODEL: NORMAL
MDC_IDC_LEAD_MODEL: NORMAL
MDC_IDC_LEAD_POLARITY_TYPE: NORMAL
MDC_IDC_LEAD_POLARITY_TYPE: NORMAL
MDC_IDC_LEAD_SERIAL: NORMAL
MDC_IDC_LEAD_SERIAL: NORMAL
MDC_IDC_MSMT_BATTERY_DTM: NORMAL
MDC_IDC_MSMT_BATTERY_REMAINING_LONGEVITY: 114 MO
MDC_IDC_MSMT_BATTERY_REMAINING_PERCENTAGE: 100 %
MDC_IDC_MSMT_BATTERY_STATUS: NORMAL
MDC_IDC_MSMT_CAP_CHARGE_DTM: NORMAL
MDC_IDC_MSMT_CAP_CHARGE_TIME: 9.4 S
MDC_IDC_MSMT_CAP_CHARGE_TYPE: NORMAL
MDC_IDC_MSMT_LEADCHNL_RA_IMPEDANCE_VALUE: 604 OHM
MDC_IDC_MSMT_LEADCHNL_RA_PACING_THRESHOLD_AMPLITUDE: 0.7 V
MDC_IDC_MSMT_LEADCHNL_RA_PACING_THRESHOLD_PULSEWIDTH: 0.4 MS
MDC_IDC_MSMT_LEADCHNL_RV_IMPEDANCE_VALUE: 423 OHM
MDC_IDC_MSMT_LEADCHNL_RV_LEAD_CHANNEL_STATUS: NORMAL
MDC_IDC_MSMT_LEADCHNL_RV_PACING_THRESHOLD_AMPLITUDE: 0.7 V
MDC_IDC_MSMT_LEADCHNL_RV_PACING_THRESHOLD_PULSEWIDTH: 0.4 MS
MDC_IDC_PG_IMPLANT_DTM: NORMAL
MDC_IDC_PG_MFG: NORMAL
MDC_IDC_PG_MODEL: NORMAL
MDC_IDC_PG_SERIAL: NORMAL
MDC_IDC_PG_TYPE: NORMAL
MDC_IDC_SESS_CLINIC_NAME: NORMAL
MDC_IDC_SESS_DTM: NORMAL
MDC_IDC_SESS_TYPE: NORMAL
MDC_IDC_SET_BRADY_AT_MODE_SWITCH_MODE: NORMAL
MDC_IDC_SET_BRADY_AT_MODE_SWITCH_RATE: 170 {BEATS}/MIN
MDC_IDC_SET_BRADY_LOWRATE: 60 {BEATS}/MIN
MDC_IDC_SET_BRADY_MAX_SENSOR_RATE: 130 {BEATS}/MIN
MDC_IDC_SET_BRADY_MAX_TRACKING_RATE: 130 {BEATS}/MIN
MDC_IDC_SET_BRADY_MODE: NORMAL
MDC_IDC_SET_BRADY_PAV_DELAY_HIGH: 150 MS
MDC_IDC_SET_BRADY_PAV_DELAY_LOW: 200 MS
MDC_IDC_SET_BRADY_SAV_DELAY_HIGH: 150 MS
MDC_IDC_SET_BRADY_SAV_DELAY_LOW: 200 MS
MDC_IDC_SET_LEADCHNL_RA_PACING_AMPLITUDE: 2 V
MDC_IDC_SET_LEADCHNL_RA_PACING_CAPTURE_MODE: NORMAL
MDC_IDC_SET_LEADCHNL_RA_PACING_POLARITY: NORMAL
MDC_IDC_SET_LEADCHNL_RA_PACING_PULSEWIDTH: 0.4 MS
MDC_IDC_SET_LEADCHNL_RA_SENSING_ADAPTATION_MODE: NORMAL
MDC_IDC_SET_LEADCHNL_RA_SENSING_POLARITY: NORMAL
MDC_IDC_SET_LEADCHNL_RA_SENSING_SENSITIVITY: 0.25 MV
MDC_IDC_SET_LEADCHNL_RV_PACING_AMPLITUDE: 5 V
MDC_IDC_SET_LEADCHNL_RV_PACING_CAPTURE_MODE: NORMAL
MDC_IDC_SET_LEADCHNL_RV_PACING_POLARITY: NORMAL
MDC_IDC_SET_LEADCHNL_RV_PACING_PULSEWIDTH: 0.4 MS
MDC_IDC_SET_LEADCHNL_RV_SENSING_ADAPTATION_MODE: NORMAL
MDC_IDC_SET_LEADCHNL_RV_SENSING_POLARITY: NORMAL
MDC_IDC_SET_LEADCHNL_RV_SENSING_SENSITIVITY: 0.6 MV
MDC_IDC_SET_ZONE_DETECTION_INTERVAL: 250 MS
MDC_IDC_SET_ZONE_DETECTION_INTERVAL: 300 MS
MDC_IDC_SET_ZONE_DETECTION_INTERVAL: 353 MS
MDC_IDC_SET_ZONE_STATUS: NORMAL
MDC_IDC_SET_ZONE_TYPE: NORMAL
MDC_IDC_SET_ZONE_VENDOR_TYPE: NORMAL
MDC_IDC_STAT_AT_BURDEN_PERCENT: 0 %
MDC_IDC_STAT_AT_DTM_END: NORMAL
MDC_IDC_STAT_AT_DTM_START: NORMAL
MDC_IDC_STAT_BRADY_DTM_END: NORMAL
MDC_IDC_STAT_BRADY_DTM_START: NORMAL
MDC_IDC_STAT_BRADY_RA_PERCENT_PACED: 12 %
MDC_IDC_STAT_BRADY_RV_PERCENT_PACED: 0 %
MDC_IDC_STAT_EPISODE_RECENT_COUNT: 0
MDC_IDC_STAT_EPISODE_RECENT_COUNT_DTM_END: NORMAL
MDC_IDC_STAT_EPISODE_RECENT_COUNT_DTM_START: NORMAL
MDC_IDC_STAT_EPISODE_TYPE: NORMAL
MDC_IDC_STAT_EPISODE_VENDOR_TYPE: NORMAL
MDC_IDC_STAT_TACHYTHERAPY_ATP_DELIVERED_RECENT: 0
MDC_IDC_STAT_TACHYTHERAPY_ATP_DELIVERED_TOTAL: 0
MDC_IDC_STAT_TACHYTHERAPY_RECENT_DTM_END: NORMAL
MDC_IDC_STAT_TACHYTHERAPY_RECENT_DTM_START: NORMAL
MDC_IDC_STAT_TACHYTHERAPY_SHOCKS_ABORTED_RECENT: 0
MDC_IDC_STAT_TACHYTHERAPY_SHOCKS_ABORTED_TOTAL: 0
MDC_IDC_STAT_TACHYTHERAPY_SHOCKS_DELIVERED_RECENT: 0
MDC_IDC_STAT_TACHYTHERAPY_SHOCKS_DELIVERED_TOTAL: 0
MDC_IDC_STAT_TACHYTHERAPY_TOTAL_DTM_END: NORMAL
MDC_IDC_STAT_TACHYTHERAPY_TOTAL_DTM_START: NORMAL

## 2025-01-30 PROCEDURE — 93283 PRGRMG EVAL IMPLANTABLE DFB: CPT | Performed by: INTERNAL MEDICINE

## 2025-04-01 DIAGNOSIS — K21.9 GASTROESOPHAGEAL REFLUX DISEASE WITHOUT ESOPHAGITIS: ICD-10-CM

## 2025-04-01 DIAGNOSIS — E03.9 ACQUIRED HYPOTHYROIDISM: ICD-10-CM

## 2025-04-02 RX ORDER — OMEPRAZOLE 10 MG/1
10 CAPSULE, DELAYED RELEASE ORAL
Qty: 90 CAPSULE | Refills: 0 | Status: SHIPPED | OUTPATIENT
Start: 2025-04-02

## 2025-04-02 RX ORDER — LEVOTHYROXINE SODIUM 50 UG/1
50 TABLET ORAL DAILY
Qty: 90 TABLET | Refills: 0 | Status: SHIPPED | OUTPATIENT
Start: 2025-04-02

## 2025-04-20 DIAGNOSIS — M85.80 OSTEOPENIA, UNSPECIFIED LOCATION: ICD-10-CM

## 2025-04-23 RX ORDER — IBANDRONATE SODIUM 150 MG/1
TABLET, FILM COATED ORAL
Qty: 3 TABLET | Refills: 0 | Status: SHIPPED | OUTPATIENT
Start: 2025-04-23

## 2025-06-16 NOTE — TELEPHONE ENCOUNTER
Called pt. to discuss results and Dr. Odell's recommendations.  Pt. states that she has had multiple UTI's in the past and whatever she was given, has helped in the past. Pt. states that she isn't able to tell us which antibiotic works best because it is not her job to tell us this.    Pt. states \"this is your guys job and I should not have to be the one to fill in the gaps\". Pt. states this is the second time she's had this experience she has had to reach out to us to view lab results and to tell us what we should be doing. Pt. states we delay in responding to her results but are \"quick to text me with a bill\"    Pt. states she's \"extremely disappointed\" that Dr. Odell did not address the yeast infection.    Pt. states she needs a call back today with an order for an antibiotic for a UTI and for the yeast infection so she can  medications at the pharmacy today.    Routed to Dr. Odell and Dr. Bryant.    Medications pended for review.    Updated pt. with plan. Pt. agrees with plan.   Per task, call pt and schedule them for surgery with Dr. Hanna. Pt requests the week of 1/28. Talked with pt and scheduled her for 1/28 at 720. Will call if anything changes

## 2025-06-16 NOTE — PROGRESS NOTES
"HISTORY OF PRESENT ILLNESS:     This is a 80 year old female who follows with Dr Stone at [facility].  Her past medical history includes:  Nonischemic cardiomyopathy, mitral regurgitation/prolapse s/p MVR, tricuspid regurgitation s/p repair, paroxysmal atrial fibrillation, advanced heart block s/p ICD      Ms Aguila was found to have rapid atrial fibrillation (2018)  She underwent cardioversion     She developed severe tricuspid regurgitation and mitral regurgitation and eventually underwent a minimally invasive bioprosthetic mitral valve replacement and a tricuspid valve repair in 2019. No coronary bypass was needed. Postop, she developed complete heart block and received a PPM .  Her ECHO then showed LVEF 20-25% and was noted to be in A-fib She underwent repeat cardioversion restoring sinus rhythm     Maritza NUC (2019) showed normal perfusion     Her LVEF improved to 35-40% in 2020.  She was found to have short runs of nonsustained VT on device interrogation and subsequently underwent a device upgrade to an ICD      ECHO (2023) LVEF 50-55%, normal RV function, mild biatrial enlargement, normal prosthetic MV gradients with trace-mild MR, 1+ pulmonic valvular regurgitation     ECHO (5/2024) LVEF 55%, normal prosthetic mitral valve gradient, 1+ TR, normal gradients across the tricuspid annuloplasty ring,  RVSP 26 mmHg    Her losartan dose was lowered last year due to symptomatic hypotension        Device interrogation (5/2025) showed 14% A-paced  0 % V-paced   One 4 seconds of atrial arrhythmia      Ms Aguila is active by walking 4 miles a day  She denies any chest pain or significant shortness of breath  Her energy is good and she does all her own household chores  She denies palpitations, orthopnea, or peripheral edema        /80 (BP Location: Right arm, Patient Position: Sitting, Cuff Size: Adult Regular)   Pulse 61   Ht 1.651 m (5' 5\")   Wt 62 kg (136 lb 11.2 oz)   LMP  (LMP Unknown)   " SpO2 96%   BMI 22.75 kg/m        IMPRESSION AND PLAN:     S/p Bioprosthetic MVR and Tricuspid Repair (2018)  -normal MV and tricuspid valvular gradients and trace TR   -SBE prophylaxis  -repeat ECHO next year     S/p dual-chamber PPM due to high-grade AV block (2019) with upgrade to ICD for secondary prevention (2020)  -14% A-paced  -no significant arrhythmias/VT  -continue device cares     Paroxysmal Atrial Fibrillation  -hx of cardioversion (2018 & 2019)  -none on recent device interrogation  -chronic Xarelto       Resolved Nonischemic Cardiomyopathy  -LVEF now 50-55%  -no signs or symptoms of heart failure  -weight fairly stable          Hypertension:  -on Losartan 50 mg, Metoprolol  mg  -BP controlled    The total time for the visit today was 30 minutes which includes patient visit, reviewing of records, discussion, and placing of orders of the outpatient coordination of cardiovascular care as described.  The level of medical decision making during this visit was of moderate complexity.  Thank you for allowing me to participate in their care.    The longitudinal plan of care for the diagnosis(es)/condition(s) as documented were addressed during this visit. Due to the added complexity in care, I will continue to support Taty in the subsequent management and with ongoing continuity of care.      Orders Placed This Encounter   Procedures    Follow-Up with Cardiology    Echocardiogram Complete       Orders Placed This Encounter   Medications    losartan (COZAAR) 50 MG tablet     Sig: Take 1 tablet (50 mg) by mouth daily.     Dispense:  90 tablet     Refill:  3     Pt will reach out when ready for refills    metoprolol succinate ER (TOPROL XL) 100 MG 24 hr tablet     Sig: Take 1 tablet (100 mg) by mouth daily.     Dispense:  90 tablet     Refill:  3     Pt will reach out when ready for refills    rivaroxaban ANTICOAGULANT (XARELTO) 20 MG TABS tablet     Sig: Take 1 tablet (20 mg) by mouth daily (with dinner).      Dispense:  90 tablet     Refill:  3     Pt will reach out when ready for refills       Medications Discontinued During This Encounter   Medication Reason    losartan (COZAAR) 100 MG tablet Dose adjustment    rivaroxaban ANTICOAGULANT (XARELTO) 20 MG TABS tablet Reorder (No AVS)    metoprolol succinate ER (TOPROL XL) 100 MG 24 hr tablet Reorder (No AVS)    losartan (COZAAR) 50 MG tablet Reorder (No AVS)         Encounter Diagnoses   Name Primary?    S/P MVR (mitral valve replacement)     Paroxysmal atrial fibrillation (H)     Chronic systolic heart failure (H)     S/P MVR (mitral valve replacement)-St junaid epic tissue 31 mm     S/P TVR (tricuspid valve repair)     Gastroesophageal reflux disease without esophagitis        CURRENT MEDICATIONS:  Current Outpatient Medications   Medication Sig Dispense Refill    amoxicillin (AMOXIL) 500 MG capsule Take 4 capsules (2,000 mg) by mouth as needed (take four tabs 30-60 minutes prior to dental procedures). 20 capsule 0    aspirin 81 MG EC tablet Take 81 mg by mouth every evening OTC      calcium carbonate-vitamin D 600-200 MG-UNIT TABS Take 2 tablets by mouth 2 times daily       Cetirizine HCl (ZYRTEC PO) Take 10 mg by mouth daily       IBANdronate (BONIVA) 150 MG tablet TAKE 1 TABLET EVERY 30 DAYS 3 tablet 0    levothyroxine (SYNTHROID/LEVOTHROID) 50 MCG tablet Take 1 tablet (50 mcg) by mouth daily. 90 tablet 0    losartan (COZAAR) 50 MG tablet Take 1 tablet (50 mg) by mouth daily. 90 tablet 3    metoprolol succinate ER (TOPROL XL) 100 MG 24 hr tablet Take 1 tablet (100 mg) by mouth daily. 90 tablet 3    Multiple Vitamins-Minerals (MULTIVITAMIN ADULT) TABS Take 1 tablet by mouth daily      omeprazole (PRILOSEC) 10 MG DR capsule TAKE 1 CAPSULE DAILY BEFORE BREAKFAST 90 capsule 0    rivaroxaban ANTICOAGULANT (XARELTO) 20 MG TABS tablet Take 1 tablet (20 mg) by mouth daily (with dinner). 90 tablet 3       ALLERGIES     Allergies   Allergen Reactions    Chlorpheniramine  Other (See Comments)     LOC and fainting     Lisinopril Cough    Phenylephrine Other (See Comments)     fainting    Seasonal Allergies        PAST MEDICAL HISTORY:  Past Medical History:   Diagnosis Date    Allergic rhinitis, cause unspecified     dust mites, ragweed    Arthritis     Complete AV block (H)     BSX DDD PM 2-    Congestive heart failure (H) 2019    History of tricuspid valve repair     Mitral valve prolapse     bioprosthetic MVR 2-    Paroxysmal atrial fibrillation (H)        PAST SURGICAL HISTORY:  Past Surgical History:   Procedure Laterality Date    COLONOSCOPY N/A 9/15/2015    Procedure: COLONOSCOPY;  Surgeon: Anson Llanos MD;  Location:  GI    CV HEART CATHETERIZATION WITH POSSIBLE INTERVENTION N/A 1/9/2019    Procedure: Heart Catheterization with Possible Intervention;  Surgeon: Ritesh Whittaker MD;  Location:  HEART CARDIAC CATH LAB    CV RIGHT AND LEFT HEART CATH N/A 1/9/2019    Procedure: Right and Left Heart Catherization;  Surgeon: Ritesh Whittaker MD;  Location:  HEART CARDIAC CATH LAB    ENT SURGERY      T&A    EP INSERT ICD N/A 8/14/2020    Procedure: EP Insert ICD;  Surgeon: Lucian Tolliver MD;  Location:  HEART CARDIAC CATH LAB    EP PACEMAKER N/A 2/18/2019    Procedure: EP Pacemaker;  Surgeon: Inocencia Guillen MD;  Location:  HEART CARDIAC CATH LAB    REPAIR VALVE TRICUSPID MINIMALLY INVASIVE N/A 2/15/2019    Procedure: MINIMALLY INVASIVE TRICUSPID VALVE REPAIR WITH 32MM SULLIVAN RING;  Surgeon: Andrea Hanna MD;  Location:  OR    REPLACE VALVE MITRAL MINIMALLY INVASIVE N/A 2/15/2019    Procedure: MINIMALLY INVASIVE MITRAL VALVE REPLACEMENT WITH EPIC ST KEYUR 31MM VALVE; ON PUMP WITH TIA.  CARDIOLOGIST DR CARDENAS;  Surgeon: Andrea Hanna MD;  Location:  OR       FAMILY HISTORY:  Family History   Problem Relation Age of Onset    Osteoporosis Mother     Alzheimer Disease Mother     Family History Negative Father      Heart Disease Maternal Grandfather     Family History Negative Paternal Grandmother     Family History Negative Paternal Grandfather     Breast Cancer No family hx of     Ovarian Cancer No family hx of        SOCIAL HISTORY:  Social History     Socioeconomic History    Marital status:      Spouse name: None    Number of children: 3    Years of education: None    Highest education level: None   Tobacco Use    Smoking status: Never     Passive exposure: Never    Smokeless tobacco: Never   Vaping Use    Vaping status: Never Used   Substance and Sexual Activity    Alcohol use: Not Currently     Comment: very little    Drug use: No    Sexual activity: Not Currently   Other Topics Concern    Caffeine Concern No     Comment: 1 cup of coffee and 1 can diet coke per day    Sleep Concern Yes     Comment: takes Doxylamine succinate 1/2 tab every night    Stress Concern No    Weight Concern No    Special Diet No     Comment: healthy     Exercise Yes     Comment: walking 45min x7 a wk. Very active, [employer], Golf, Bowling, Cardio    Seat Belt Yes    Self-Exams Yes    Parent/sibling w/ CABG, MI or angioplasty before 65F 55M? No     Social Drivers of Health     Financial Resource Strain: Low Risk  (8/29/2024)    Financial Resource Strain     Within the past 12 months, have you or your family members you live with been unable to get utilities (heat, electricity) when it was really needed?: No   Food Insecurity: Low Risk  (8/29/2024)    Food Insecurity     Within the past 12 months, did you worry that your food would run out before you got money to buy more?: No     Within the past 12 months, did the food you bought just not last and you didn t have money to get more?: No   Transportation Needs: Low Risk  (8/29/2024)    Transportation Needs     Within the past 12 months, has lack of transportation kept you from medical appointments, getting your medicines, non-medical meetings or appointments, work, or from getting things  "that you need?: No   Physical Activity: Sufficiently Active (8/29/2024)    Exercise Vital Sign     Days of Exercise per Week: 7 days     Minutes of Exercise per Session: 60 min   Stress: No Stress Concern Present (8/29/2024)    [employer] [employer] - Occupational Stress Questionnaire     Feeling of Stress : Not at all   Social Connections: Unknown (8/29/2024)    Social Connection and Isolation Panel [NHANES]     Frequency of Social Gatherings with Friends and Family: Once a week   Interpersonal Safety: Low Risk  (9/3/2024)    Interpersonal Safety     Do you feel physically and emotionally safe where you currently live?: Yes     Within the past 12 months, have you been hit, slapped, kicked or otherwise physically hurt by someone?: No     Within the past 12 months, have you been humiliated or emotionally abused in other ways by your partner or ex-partner?: No   Housing Stability: Low Risk  (8/29/2024)    Housing Stability     Do you have housing? : Yes     Are you worried about losing your housing?: No       Review of Systems:  Skin:          Eyes:         ENT:         Respiratory:  Negative       Cardiovascular:  Negative      Gastroenterology:        Genitourinary:         Musculoskeletal:         Neurologic:         Psychiatric:         Heme/Lymph/Imm:         Endocrine:           Physical Exam:  Vitals: /80 (BP Location: Right arm, Patient Position: Sitting, Cuff Size: Adult Regular)   Pulse 61   Ht 1.651 m (5' 5\")   Wt 62 kg (136 lb 11.2 oz)   LMP  (LMP Unknown)   SpO2 96%   BMI 22.75 kg/m      Constitutional:  cooperative        Skin:  warm and dry to the touch   ICD incision in the left infraclavicular area was well-healed      Head:  normocephalic        Eyes:  pupils equal and round        Lymph:      ENT:  no pallor or cyanosis        Neck:  JVP normal        Respiratory:  clear to auscultation, normal respiratory excursion         Cardiac: regular rhythm occasional premature " beats     systolic murmur, grade 1        pulses full and equal                                        GI:           Extremities and Muscular Skeletal:  no edema              Neurological:  no gross motor deficits        Psych:  Alert and Oriented x 3          CC  Jesus Stone MD  [id number]  [place] [id number]

## 2025-06-17 ENCOUNTER — OFFICE VISIT (OUTPATIENT)
Dept: CARDIOLOGY | Facility: CLINIC | Age: 80
End: 2025-06-17
Payer: COMMERCIAL

## 2025-06-17 VITALS
SYSTOLIC BLOOD PRESSURE: 122 MMHG | WEIGHT: 136.7 LBS | HEIGHT: 65 IN | BODY MASS INDEX: 22.78 KG/M2 | OXYGEN SATURATION: 96 % | DIASTOLIC BLOOD PRESSURE: 80 MMHG | HEART RATE: 61 BPM

## 2025-06-17 DIAGNOSIS — I48.0 PAROXYSMAL ATRIAL FIBRILLATION (H): ICD-10-CM

## 2025-06-17 DIAGNOSIS — Z95.2 S/P MVR (MITRAL VALVE REPLACEMENT): ICD-10-CM

## 2025-06-17 DIAGNOSIS — K21.9 GASTROESOPHAGEAL REFLUX DISEASE WITHOUT ESOPHAGITIS: ICD-10-CM

## 2025-06-17 DIAGNOSIS — Z98.890 S/P TVR (TRICUSPID VALVE REPAIR): ICD-10-CM

## 2025-06-17 DIAGNOSIS — I50.22 CHRONIC SYSTOLIC HEART FAILURE (H): ICD-10-CM

## 2025-06-17 PROCEDURE — 99214 OFFICE O/P EST MOD 30 MIN: CPT | Performed by: NURSE PRACTITIONER

## 2025-06-17 PROCEDURE — 3074F SYST BP LT 130 MM HG: CPT | Performed by: NURSE PRACTITIONER

## 2025-06-17 PROCEDURE — 3079F DIAST BP 80-89 MM HG: CPT | Performed by: NURSE PRACTITIONER

## 2025-06-17 RX ORDER — OMEPRAZOLE 10 MG/1
10 CAPSULE, DELAYED RELEASE ORAL DAILY
Qty: 90 CAPSULE | Refills: 3 | Status: CANCELLED | OUTPATIENT
Start: 2025-06-17

## 2025-06-17 RX ORDER — METOPROLOL SUCCINATE 100 MG/1
100 TABLET, EXTENDED RELEASE ORAL DAILY
Qty: 90 TABLET | Refills: 3 | Status: SHIPPED | OUTPATIENT
Start: 2025-06-17

## 2025-06-17 RX ORDER — LOSARTAN POTASSIUM 50 MG/1
50 TABLET ORAL DAILY
Qty: 90 TABLET | Refills: 3 | Status: SHIPPED | OUTPATIENT
Start: 2025-06-17

## 2025-06-17 NOTE — LETTER
6/17/2025    Joceylne Sands MD  [address] [facility] [id number]    RE: Taty Aguila       Dear Colleague,     I had the pleasure of seeing Taty Aguila in the [facility].  HISTORY OF PRESENT ILLNESS:     This is a 80 year old female who follows with Dr Stone at [facility].  Her past medical history includes:  Nonischemic cardiomyopathy, mitral regurgitation/prolapse s/p MVR, tricuspid regurgitation s/p repair, paroxysmal atrial fibrillation, advanced heart block s/p ICD      Ms Aguila was found to have rapid atrial fibrillation (2018)  She underwent cardioversion     She developed severe tricuspid regurgitation and mitral regurgitation and eventually underwent a minimally invasive bioprosthetic mitral valve replacement and a tricuspid valve repair in 2019. No coronary bypass was needed. Postop, she developed complete heart block and received a PPM .  Her ECHO then showed LVEF 20-25% and was noted to be in A-fib She underwent repeat cardioversion restoring sinus rhythm     Maritza NUC (2019) showed normal perfusion     Her LVEF improved to 35-40% in 2020.  She was found to have short runs of nonsustained VT on device interrogation and subsequently underwent a device upgrade to an ICD      ECHO (2023) LVEF 50-55%, normal RV function, mild biatrial enlargement, normal prosthetic MV gradients with trace-mild MR, 1+ pulmonic valvular regurgitation     ECHO (5/2024) LVEF 55%, normal prosthetic mitral valve gradient, 1+ TR, normal gradients across the tricuspid annuloplasty ring,  RVSP 26 mmHg    Her losartan dose was lowered last year due to symptomatic hypotension        Device interrogation (5/2025) showed 14% A-paced  0 % V-paced   One 4 seconds of atrial arrhythmia      Ms Aguila is active by walking 4 miles a day  She denies any chest pain or significant shortness of breath  Her energy is good and she does all her own household chores  She denies  "palpitations, orthopnea, or peripheral edema        /80 (BP Location: Right arm, Patient Position: Sitting, Cuff Size: Adult Regular)   Pulse 61   Ht 1.651 m (5' 5\")   Wt 62 kg (136 lb 11.2 oz)   LMP  (LMP Unknown)   SpO2 96%   BMI 22.75 kg/m        IMPRESSION AND PLAN:     S/p Bioprosthetic MVR and Tricuspid Repair (2018)  -normal MV and tricuspid valvular gradients and trace TR   -SBE prophylaxis  -repeat ECHO next year     S/p dual-chamber PPM due to high-grade AV block (2019) with upgrade to ICD for secondary prevention (2020)  -14% A-paced  -no significant arrhythmias/VT  -continue device cares     Paroxysmal Atrial Fibrillation  -hx of cardioversion (2018 & 2019)  -none on recent device interrogation  -chronic Xarelto       Resolved Nonischemic Cardiomyopathy  -LVEF now 50-55%  -no signs or symptoms of heart failure  -weight fairly stable          Hypertension:  -on Losartan 50 mg, Metoprolol  mg  -BP controlled    The total time for the visit today was 30 minutes which includes patient visit, reviewing of records, discussion, and placing of orders of the outpatient coordination of cardiovascular care as described.  The level of medical decision making during this visit was of moderate complexity.  Thank you for allowing me to participate in their care.    The longitudinal plan of care for the diagnosis(es)/condition(s) as documented were addressed during this visit. Due to the added complexity in care, I will continue to support Taty in the subsequent management and with ongoing continuity of care.      Orders Placed This Encounter   Procedures     Follow-Up with Cardiology     Echocardiogram Complete       Orders Placed This Encounter   Medications     losartan (COZAAR) 50 MG tablet     Sig: Take 1 tablet (50 mg) by mouth daily.     Dispense:  90 tablet     Refill:  3     Pt will reach out when ready for refills     metoprolol succinate ER (TOPROL XL) 100 MG 24 hr tablet     Sig: Take 1 " tablet (100 mg) by mouth daily.     Dispense:  90 tablet     Refill:  3     Pt will reach out when ready for refills     rivaroxaban ANTICOAGULANT (XARELTO) 20 MG TABS tablet     Sig: Take 1 tablet (20 mg) by mouth daily (with dinner).     Dispense:  90 tablet     Refill:  3     Pt will reach out when ready for refills       Medications Discontinued During This Encounter   Medication Reason     losartan (COZAAR) 100 MG tablet Dose adjustment     rivaroxaban ANTICOAGULANT (XARELTO) 20 MG TABS tablet Reorder (No AVS)     metoprolol succinate ER (TOPROL XL) 100 MG 24 hr tablet Reorder (No AVS)     losartan (COZAAR) 50 MG tablet Reorder (No AVS)         Encounter Diagnoses   Name Primary?     S/P MVR (mitral valve replacement)      Paroxysmal atrial fibrillation (H)      Chronic systolic heart failure (H)      S/P MVR (mitral valve replacement)-St junaid epic tissue 31 mm      S/P TVR (tricuspid valve repair)      Gastroesophageal reflux disease without esophagitis        CURRENT MEDICATIONS:  Current Outpatient Medications   Medication Sig Dispense Refill     amoxicillin (AMOXIL) 500 MG capsule Take 4 capsules (2,000 mg) by mouth as needed (take four tabs 30-60 minutes prior to dental procedures). 20 capsule 0     aspirin 81 MG EC tablet Take 81 mg by mouth every evening OTC       calcium carbonate-vitamin D 600-200 MG-UNIT TABS Take 2 tablets by mouth 2 times daily        Cetirizine HCl (ZYRTEC PO) Take 10 mg by mouth daily        IBANdronate (BONIVA) 150 MG tablet TAKE 1 TABLET EVERY 30 DAYS 3 tablet 0     levothyroxine (SYNTHROID/LEVOTHROID) 50 MCG tablet Take 1 tablet (50 mcg) by mouth daily. 90 tablet 0     losartan (COZAAR) 50 MG tablet Take 1 tablet (50 mg) by mouth daily. 90 tablet 3     metoprolol succinate ER (TOPROL XL) 100 MG 24 hr tablet Take 1 tablet (100 mg) by mouth daily. 90 tablet 3     Multiple Vitamins-Minerals (MULTIVITAMIN ADULT) TABS Take 1 tablet by mouth daily       omeprazole (PRILOSEC) 10 MG DR  capsule TAKE 1 CAPSULE DAILY BEFORE BREAKFAST 90 capsule 0     rivaroxaban ANTICOAGULANT (XARELTO) 20 MG TABS tablet Take 1 tablet (20 mg) by mouth daily (with dinner). 90 tablet 3       ALLERGIES     Allergies   Allergen Reactions     Chlorpheniramine Other (See Comments)     LOC and fainting      Lisinopril Cough     Phenylephrine Other (See Comments)     fainting     Seasonal Allergies        PAST MEDICAL HISTORY:  Past Medical History:   Diagnosis Date     Allergic rhinitis, cause unspecified     dust mites, ragweed     Arthritis      Complete AV block (H)     BSX DDD PM 2-     Congestive heart failure (H) 2019     History of tricuspid valve repair      Mitral valve prolapse     bioprosthetic MVR 2-     Paroxysmal atrial fibrillation (H)        PAST SURGICAL HISTORY:  Past Surgical History:   Procedure Laterality Date     COLONOSCOPY N/A 9/15/2015    Procedure: COLONOSCOPY;  Surgeon: Anson Llanos MD;  Location:  GI     CV HEART CATHETERIZATION WITH POSSIBLE INTERVENTION N/A 1/9/2019    Procedure: Heart Catheterization with Possible Intervention;  Surgeon: Ritesh Whittaker MD;  Location:  HEART CARDIAC CATH LAB     CV RIGHT AND LEFT HEART CATH N/A 1/9/2019    Procedure: Right and Left Heart Catherization;  Surgeon: Ritesh Whittaker MD;  Location:  HEART CARDIAC CATH LAB     ENT SURGERY      T&A     EP INSERT ICD N/A 8/14/2020    Procedure: EP Insert ICD;  Surgeon: Lucian Tolliver MD;  Location:  HEART CARDIAC CATH LAB     EP PACEMAKER N/A 2/18/2019    Procedure: EP Pacemaker;  Surgeon: Inocencia Guillen MD;  Location:  HEART CARDIAC CATH LAB     REPAIR VALVE TRICUSPID MINIMALLY INVASIVE N/A 2/15/2019    Procedure: MINIMALLY INVASIVE TRICUSPID VALVE REPAIR WITH 32MM SULLIVAN RING;  Surgeon: Andrea Hanna MD;  Location:  OR     REPLACE VALVE MITRAL MINIMALLY INVASIVE N/A 2/15/2019    Procedure: MINIMALLY INVASIVE MITRAL VALVE REPLACEMENT WITH EPIC ST  KEYUR 31MM VALVE; ON PUMP WITH TIA.  CARDIOLOGIST DR CARDENAS;  Surgeon: Andrea Hanna MD;  Location: [facility] OR       FAMILY HISTORY:  Family History   Problem Relation Age of Onset     Osteoporosis Mother      Alzheimer Disease Mother      Family History Negative Father      Heart Disease Maternal Grandfather      Family History Negative Paternal Grandmother      Family History Negative Paternal Grandfather      Breast Cancer No family hx of      Ovarian Cancer No family hx of        SOCIAL HISTORY:  Social History     Socioeconomic History     Marital status:      Spouse name: None     Number of children: 3     Years of education: None     Highest education level: None   Tobacco Use     Smoking status: Never     Passive exposure: Never     Smokeless tobacco: Never   Vaping Use     Vaping status: Never Used   Substance and Sexual Activity     Alcohol use: Not Currently     Comment: very little     Drug use: No     Sexual activity: Not Currently   Other Topics Concern     Caffeine Concern No     Comment: 1 cup of coffee and 1 can diet coke per day     Sleep Concern Yes     Comment: takes Doxylamine succinate 1/2 tab every night     Stress Concern No     Weight Concern No     Special Diet No     Comment: healthy      Exercise Yes     Comment: walking 45min x7 a wk. Very active, [employer], Golf, Bowling, Cardio     Seat Belt Yes     Self-Exams Yes     Parent/sibling w/ CABG, MI or angioplasty before 65F 55M? No     Social Drivers of Health     Financial Resource Strain: Low Risk  (8/29/2024)    Financial Resource Strain      Within the past 12 months, have you or your family members you live with been unable to get utilities (heat, electricity) when it was really needed?: No   Food Insecurity: Low Risk  (8/29/2024)    Food Insecurity      Within the past 12 months, did you worry that your food would run out before you got money to buy more?: No      Within the past 12 months, did the food you bought just not  "last and you didn t have money to get more?: No   Transportation Needs: Low Risk  (8/29/2024)    Transportation Needs      Within the past 12 months, has lack of transportation kept you from medical appointments, getting your medicines, non-medical meetings or appointments, work, or from getting things that you need?: No   Physical Activity: Sufficiently Active (8/29/2024)    Exercise Vital Sign      Days of Exercise per Week: 7 days      Minutes of Exercise per Session: 60 min   Stress: No Stress Concern Present (8/29/2024)    [employer] - Occupational Stress Questionnaire      Feeling of Stress : Not at all   Social Connections: Unknown (8/29/2024)    Social Connection and Isolation Panel [NHANES]      Frequency of Social Gatherings with Friends and Family: Once a week   Interpersonal Safety: Low Risk  (9/3/2024)    Interpersonal Safety      Do you feel physically and emotionally safe where you currently live?: Yes      Within the past 12 months, have you been hit, slapped, kicked or otherwise physically hurt by someone?: No      Within the past 12 months, have you been humiliated or emotionally abused in other ways by your partner or ex-partner?: No   Housing Stability: Low Risk  (8/29/2024)    Housing Stability      Do you have housing? : Yes      Are you worried about losing your housing?: No       Review of Systems:  Skin:          Eyes:         ENT:         Respiratory:  Negative       Cardiovascular:  Negative      Gastroenterology:        Genitourinary:         Musculoskeletal:         Neurologic:         Psychiatric:         Heme/Lymph/Imm:         Endocrine:           Physical Exam:  Vitals: /80 (BP Location: Right arm, Patient Position: Sitting, Cuff Size: Adult Regular)   Pulse 61   Ht 1.651 m (5' 5\")   Wt 62 kg (136 lb 11.2 oz)   LMP  (LMP Unknown)   SpO2 96%   BMI 22.75 kg/m      Constitutional:  cooperative        Skin:  warm and dry to the touch   ICD " incision in the left infraclavicular area was well-healed      Head:  normocephalic        Eyes:  pupils equal and round        Lymph:      ENT:  no pallor or cyanosis        Neck:  JVP normal        Respiratory:  clear to auscultation, normal respiratory excursion         Cardiac: regular rhythm occasional premature beats     systolic murmur, grade 1        pulses full and equal                                        GI:           Extremities and Muscular Skeletal:  no edema              Neurological:  no gross motor deficits        Psych:  Alert and Oriented x 3          CC  Jesus Stone MD  [address]  [place] [id number]                      Thank you for allowing me to participate in the care of your patient.      Sincerely,     ROBINA Parra [facility]  cc:   Jesus Stone MD  [address]  [place] [id number]

## 2025-06-30 DIAGNOSIS — E03.9 ACQUIRED HYPOTHYROIDISM: ICD-10-CM

## 2025-06-30 DIAGNOSIS — K21.9 GASTROESOPHAGEAL REFLUX DISEASE WITHOUT ESOPHAGITIS: ICD-10-CM

## 2025-07-01 RX ORDER — LEVOTHYROXINE SODIUM 50 UG/1
50 TABLET ORAL
Qty: 90 TABLET | Refills: 0 | Status: SHIPPED | OUTPATIENT
Start: 2025-07-01

## 2025-07-01 RX ORDER — OMEPRAZOLE 10 MG/1
10 CAPSULE, DELAYED RELEASE ORAL
Qty: 90 CAPSULE | Refills: 0 | Status: SHIPPED | OUTPATIENT
Start: 2025-07-01

## 2025-07-20 DIAGNOSIS — M85.80 OSTEOPENIA, UNSPECIFIED LOCATION: ICD-10-CM

## 2025-07-22 RX ORDER — IBANDRONATE SODIUM 150 MG/1
TABLET, FILM COATED ORAL
Qty: 3 TABLET | Refills: 3 | Status: SHIPPED | OUTPATIENT
Start: 2025-07-22

## 2025-08-07 ENCOUNTER — ANCILLARY PROCEDURE (OUTPATIENT)
Dept: CARDIOLOGY | Facility: CLINIC | Age: 80
End: 2025-08-07
Attending: INTERNAL MEDICINE
Payer: COMMERCIAL

## 2025-08-07 DIAGNOSIS — Z95.810 ICD (IMPLANTABLE CARDIOVERTER-DEFIBRILLATOR) IN PLACE: ICD-10-CM

## 2025-08-07 DIAGNOSIS — I42.9 CARDIOMYOPATHY, UNSPECIFIED TYPE (H): ICD-10-CM

## 2025-08-07 LAB
MDC_IDC_EPISODE_DTM: NORMAL
MDC_IDC_EPISODE_DURATION: 15 S
MDC_IDC_EPISODE_ID: NORMAL
MDC_IDC_EPISODE_TYPE: NORMAL
MDC_IDC_EPISODE_TYPE_INDUCED: NO
MDC_IDC_LEAD_CONNECTION_STATUS: NORMAL
MDC_IDC_LEAD_CONNECTION_STATUS: NORMAL
MDC_IDC_LEAD_IMPLANT_DT: NORMAL
MDC_IDC_LEAD_IMPLANT_DT: NORMAL
MDC_IDC_LEAD_LOCATION: NORMAL
MDC_IDC_LEAD_LOCATION: NORMAL
MDC_IDC_LEAD_LOCATION_DETAIL_1: NORMAL
MDC_IDC_LEAD_LOCATION_DETAIL_1: NORMAL
MDC_IDC_LEAD_MFG: NORMAL
MDC_IDC_LEAD_MFG: NORMAL
MDC_IDC_LEAD_MODEL: NORMAL
MDC_IDC_LEAD_MODEL: NORMAL
MDC_IDC_LEAD_POLARITY_TYPE: NORMAL
MDC_IDC_LEAD_POLARITY_TYPE: NORMAL
MDC_IDC_LEAD_SERIAL: NORMAL
MDC_IDC_LEAD_SERIAL: NORMAL
MDC_IDC_MSMT_BATTERY_DTM: NORMAL
MDC_IDC_MSMT_BATTERY_REMAINING_LONGEVITY: 114 MO
MDC_IDC_MSMT_BATTERY_REMAINING_PERCENTAGE: 100 %
MDC_IDC_MSMT_BATTERY_STATUS: NORMAL
MDC_IDC_MSMT_CAP_CHARGE_DTM: NORMAL
MDC_IDC_MSMT_CAP_CHARGE_TIME: 9.4 S
MDC_IDC_MSMT_CAP_CHARGE_TYPE: NORMAL
MDC_IDC_MSMT_LEADCHNL_RA_IMPEDANCE_VALUE: 526 OHM
MDC_IDC_MSMT_LEADCHNL_RA_PACING_THRESHOLD_AMPLITUDE: 0.6 V
MDC_IDC_MSMT_LEADCHNL_RA_PACING_THRESHOLD_PULSEWIDTH: 0.4 MS
MDC_IDC_MSMT_LEADCHNL_RV_IMPEDANCE_VALUE: 391 OHM
MDC_IDC_MSMT_LEADCHNL_RV_PACING_THRESHOLD_AMPLITUDE: 0.7 V
MDC_IDC_MSMT_LEADCHNL_RV_PACING_THRESHOLD_PULSEWIDTH: 0.4 MS
MDC_IDC_PG_IMPLANT_DTM: NORMAL
MDC_IDC_PG_MFG: NORMAL
MDC_IDC_PG_MODEL: NORMAL
MDC_IDC_PG_SERIAL: NORMAL
MDC_IDC_PG_TYPE: NORMAL
MDC_IDC_SESS_CLINIC_NAME: NORMAL
MDC_IDC_SESS_DTM: NORMAL
MDC_IDC_SESS_TYPE: NORMAL
MDC_IDC_SET_BRADY_AT_MODE_SWITCH_MODE: NORMAL
MDC_IDC_SET_BRADY_AT_MODE_SWITCH_RATE: 170 {BEATS}/MIN
MDC_IDC_SET_BRADY_LOWRATE: 60 {BEATS}/MIN
MDC_IDC_SET_BRADY_MAX_SENSOR_RATE: 130 {BEATS}/MIN
MDC_IDC_SET_BRADY_MAX_TRACKING_RATE: 130 {BEATS}/MIN
MDC_IDC_SET_BRADY_MODE: NORMAL
MDC_IDC_SET_BRADY_PAV_DELAY_HIGH: 150 MS
MDC_IDC_SET_BRADY_PAV_DELAY_LOW: 200 MS
MDC_IDC_SET_BRADY_SAV_DELAY_HIGH: 150 MS
MDC_IDC_SET_BRADY_SAV_DELAY_LOW: 200 MS
MDC_IDC_SET_LEADCHNL_RA_PACING_AMPLITUDE: 2 V
MDC_IDC_SET_LEADCHNL_RA_PACING_CAPTURE_MODE: NORMAL
MDC_IDC_SET_LEADCHNL_RA_PACING_POLARITY: NORMAL
MDC_IDC_SET_LEADCHNL_RA_PACING_PULSEWIDTH: 0.4 MS
MDC_IDC_SET_LEADCHNL_RA_SENSING_ADAPTATION_MODE: NORMAL
MDC_IDC_SET_LEADCHNL_RA_SENSING_POLARITY: NORMAL
MDC_IDC_SET_LEADCHNL_RA_SENSING_SENSITIVITY: 0.25 MV
MDC_IDC_SET_LEADCHNL_RV_PACING_AMPLITUDE: 2 V
MDC_IDC_SET_LEADCHNL_RV_PACING_CAPTURE_MODE: NORMAL
MDC_IDC_SET_LEADCHNL_RV_PACING_POLARITY: NORMAL
MDC_IDC_SET_LEADCHNL_RV_PACING_PULSEWIDTH: 0.4 MS
MDC_IDC_SET_LEADCHNL_RV_SENSING_ADAPTATION_MODE: NORMAL
MDC_IDC_SET_LEADCHNL_RV_SENSING_POLARITY: NORMAL
MDC_IDC_SET_LEADCHNL_RV_SENSING_SENSITIVITY: 0.6 MV
MDC_IDC_SET_ZONE_DETECTION_INTERVAL: 250 MS
MDC_IDC_SET_ZONE_DETECTION_INTERVAL: 300 MS
MDC_IDC_SET_ZONE_DETECTION_INTERVAL: 353 MS
MDC_IDC_SET_ZONE_STATUS: NORMAL
MDC_IDC_SET_ZONE_TYPE: NORMAL
MDC_IDC_SET_ZONE_VENDOR_TYPE: NORMAL
MDC_IDC_STAT_AT_BURDEN_PERCENT: 1 %
MDC_IDC_STAT_AT_DTM_END: NORMAL
MDC_IDC_STAT_AT_DTM_START: NORMAL
MDC_IDC_STAT_BRADY_DTM_END: NORMAL
MDC_IDC_STAT_BRADY_DTM_START: NORMAL
MDC_IDC_STAT_BRADY_RA_PERCENT_PACED: 18 %
MDC_IDC_STAT_BRADY_RV_PERCENT_PACED: 0 %
MDC_IDC_STAT_EPISODE_RECENT_COUNT: 0
MDC_IDC_STAT_EPISODE_RECENT_COUNT: 1
MDC_IDC_STAT_EPISODE_RECENT_COUNT_DTM_END: NORMAL
MDC_IDC_STAT_EPISODE_RECENT_COUNT_DTM_START: NORMAL
MDC_IDC_STAT_EPISODE_TYPE: NORMAL
MDC_IDC_STAT_EPISODE_VENDOR_TYPE: NORMAL
MDC_IDC_STAT_TACHYTHERAPY_ATP_DELIVERED_RECENT: 0
MDC_IDC_STAT_TACHYTHERAPY_ATP_DELIVERED_TOTAL: 0
MDC_IDC_STAT_TACHYTHERAPY_RECENT_DTM_END: NORMAL
MDC_IDC_STAT_TACHYTHERAPY_RECENT_DTM_START: NORMAL
MDC_IDC_STAT_TACHYTHERAPY_SHOCKS_ABORTED_RECENT: 0
MDC_IDC_STAT_TACHYTHERAPY_SHOCKS_ABORTED_TOTAL: 0
MDC_IDC_STAT_TACHYTHERAPY_SHOCKS_DELIVERED_RECENT: 0
MDC_IDC_STAT_TACHYTHERAPY_SHOCKS_DELIVERED_TOTAL: 0
MDC_IDC_STAT_TACHYTHERAPY_TOTAL_DTM_END: NORMAL
MDC_IDC_STAT_TACHYTHERAPY_TOTAL_DTM_START: NORMAL

## 2025-08-07 PROCEDURE — 93296 REM INTERROG EVL PM/IDS: CPT | Performed by: INTERNAL MEDICINE

## 2025-08-07 PROCEDURE — 93295 DEV INTERROG REMOTE 1/2/MLT: CPT | Performed by: INTERNAL MEDICINE

## 2025-08-08 LAB
MDC_IDC_EPISODE_DTM: NORMAL
MDC_IDC_EPISODE_DURATION: 15 S
MDC_IDC_EPISODE_ID: NORMAL
MDC_IDC_EPISODE_TYPE: NORMAL
MDC_IDC_EPISODE_TYPE_INDUCED: NO
MDC_IDC_LEAD_CONNECTION_STATUS: NORMAL
MDC_IDC_LEAD_CONNECTION_STATUS: NORMAL
MDC_IDC_LEAD_IMPLANT_DT: NORMAL
MDC_IDC_LEAD_IMPLANT_DT: NORMAL
MDC_IDC_LEAD_LOCATION: NORMAL
MDC_IDC_LEAD_LOCATION: NORMAL
MDC_IDC_LEAD_LOCATION_DETAIL_1: NORMAL
MDC_IDC_LEAD_LOCATION_DETAIL_1: NORMAL
MDC_IDC_LEAD_MFG: NORMAL
MDC_IDC_LEAD_MFG: NORMAL
MDC_IDC_LEAD_MODEL: NORMAL
MDC_IDC_LEAD_MODEL: NORMAL
MDC_IDC_LEAD_POLARITY_TYPE: NORMAL
MDC_IDC_LEAD_POLARITY_TYPE: NORMAL
MDC_IDC_LEAD_SERIAL: NORMAL
MDC_IDC_LEAD_SERIAL: NORMAL
MDC_IDC_MSMT_BATTERY_DTM: NORMAL
MDC_IDC_MSMT_BATTERY_REMAINING_LONGEVITY: 114 MO
MDC_IDC_MSMT_BATTERY_REMAINING_PERCENTAGE: 100 %
MDC_IDC_MSMT_BATTERY_STATUS: NORMAL
MDC_IDC_MSMT_CAP_CHARGE_DTM: NORMAL
MDC_IDC_MSMT_CAP_CHARGE_TIME: 9.4 S
MDC_IDC_MSMT_CAP_CHARGE_TYPE: NORMAL
MDC_IDC_MSMT_LEADCHNL_RA_IMPEDANCE_VALUE: 526 OHM
MDC_IDC_MSMT_LEADCHNL_RA_PACING_THRESHOLD_AMPLITUDE: 0.6 V
MDC_IDC_MSMT_LEADCHNL_RA_PACING_THRESHOLD_PULSEWIDTH: 0.4 MS
MDC_IDC_MSMT_LEADCHNL_RV_IMPEDANCE_VALUE: 391 OHM
MDC_IDC_MSMT_LEADCHNL_RV_PACING_THRESHOLD_AMPLITUDE: 0.7 V
MDC_IDC_MSMT_LEADCHNL_RV_PACING_THRESHOLD_PULSEWIDTH: 0.4 MS
MDC_IDC_PG_IMPLANT_DTM: NORMAL
MDC_IDC_PG_MFG: NORMAL
MDC_IDC_PG_MODEL: NORMAL
MDC_IDC_PG_SERIAL: NORMAL
MDC_IDC_PG_TYPE: NORMAL
MDC_IDC_SESS_CLINIC_NAME: NORMAL
MDC_IDC_SESS_DTM: NORMAL
MDC_IDC_SESS_TYPE: NORMAL
MDC_IDC_SET_BRADY_AT_MODE_SWITCH_MODE: NORMAL
MDC_IDC_SET_BRADY_AT_MODE_SWITCH_RATE: 170 {BEATS}/MIN
MDC_IDC_SET_BRADY_LOWRATE: 60 {BEATS}/MIN
MDC_IDC_SET_BRADY_MAX_SENSOR_RATE: 130 {BEATS}/MIN
MDC_IDC_SET_BRADY_MAX_TRACKING_RATE: 130 {BEATS}/MIN
MDC_IDC_SET_BRADY_MODE: NORMAL
MDC_IDC_SET_BRADY_PAV_DELAY_HIGH: 150 MS
MDC_IDC_SET_BRADY_PAV_DELAY_LOW: 200 MS
MDC_IDC_SET_BRADY_SAV_DELAY_HIGH: 150 MS
MDC_IDC_SET_BRADY_SAV_DELAY_LOW: 200 MS
MDC_IDC_SET_LEADCHNL_RA_PACING_AMPLITUDE: 2 V
MDC_IDC_SET_LEADCHNL_RA_PACING_CAPTURE_MODE: NORMAL
MDC_IDC_SET_LEADCHNL_RA_PACING_POLARITY: NORMAL
MDC_IDC_SET_LEADCHNL_RA_PACING_PULSEWIDTH: 0.4 MS
MDC_IDC_SET_LEADCHNL_RA_SENSING_ADAPTATION_MODE: NORMAL
MDC_IDC_SET_LEADCHNL_RA_SENSING_POLARITY: NORMAL
MDC_IDC_SET_LEADCHNL_RA_SENSING_SENSITIVITY: 0.25 MV
MDC_IDC_SET_LEADCHNL_RV_PACING_AMPLITUDE: 2 V
MDC_IDC_SET_LEADCHNL_RV_PACING_CAPTURE_MODE: NORMAL
MDC_IDC_SET_LEADCHNL_RV_PACING_POLARITY: NORMAL
MDC_IDC_SET_LEADCHNL_RV_PACING_PULSEWIDTH: 0.4 MS
MDC_IDC_SET_LEADCHNL_RV_SENSING_ADAPTATION_MODE: NORMAL
MDC_IDC_SET_LEADCHNL_RV_SENSING_POLARITY: NORMAL
MDC_IDC_SET_LEADCHNL_RV_SENSING_SENSITIVITY: 0.6 MV
MDC_IDC_SET_ZONE_DETECTION_INTERVAL: 250 MS
MDC_IDC_SET_ZONE_DETECTION_INTERVAL: 300 MS
MDC_IDC_SET_ZONE_DETECTION_INTERVAL: 353 MS
MDC_IDC_SET_ZONE_STATUS: NORMAL
MDC_IDC_SET_ZONE_TYPE: NORMAL
MDC_IDC_SET_ZONE_VENDOR_TYPE: NORMAL
MDC_IDC_STAT_AT_BURDEN_PERCENT: 1 %
MDC_IDC_STAT_AT_DTM_END: NORMAL
MDC_IDC_STAT_AT_DTM_START: NORMAL
MDC_IDC_STAT_BRADY_DTM_END: NORMAL
MDC_IDC_STAT_BRADY_DTM_START: NORMAL
MDC_IDC_STAT_BRADY_RA_PERCENT_PACED: 18 %
MDC_IDC_STAT_BRADY_RV_PERCENT_PACED: 0 %
MDC_IDC_STAT_EPISODE_RECENT_COUNT: 0
MDC_IDC_STAT_EPISODE_RECENT_COUNT: 1
MDC_IDC_STAT_EPISODE_RECENT_COUNT_DTM_END: NORMAL
MDC_IDC_STAT_EPISODE_RECENT_COUNT_DTM_START: NORMAL
MDC_IDC_STAT_EPISODE_TYPE: NORMAL
MDC_IDC_STAT_EPISODE_VENDOR_TYPE: NORMAL
MDC_IDC_STAT_TACHYTHERAPY_ATP_DELIVERED_RECENT: 0
MDC_IDC_STAT_TACHYTHERAPY_ATP_DELIVERED_TOTAL: 0
MDC_IDC_STAT_TACHYTHERAPY_RECENT_DTM_END: NORMAL
MDC_IDC_STAT_TACHYTHERAPY_RECENT_DTM_START: NORMAL
MDC_IDC_STAT_TACHYTHERAPY_SHOCKS_ABORTED_RECENT: 0
MDC_IDC_STAT_TACHYTHERAPY_SHOCKS_ABORTED_TOTAL: 0
MDC_IDC_STAT_TACHYTHERAPY_SHOCKS_DELIVERED_RECENT: 0
MDC_IDC_STAT_TACHYTHERAPY_SHOCKS_DELIVERED_TOTAL: 0
MDC_IDC_STAT_TACHYTHERAPY_TOTAL_DTM_END: NORMAL
MDC_IDC_STAT_TACHYTHERAPY_TOTAL_DTM_START: NORMAL

## (undated) DEVICE — KIT VASC DILATOR ESTECH 200-120

## (undated) DEVICE — TUBING SUCTION 12"X1/4" N612

## (undated) DEVICE — CABLE MYO/LEAD PACING WHITE DISP 019-530

## (undated) DEVICE — Device

## (undated) DEVICE — LEAD ICD COIL  DF4 RELINC  59CM

## (undated) DEVICE — DRAIN CHEST TUBE 28FR STR 8028

## (undated) DEVICE — ADAPTER CARDIOPLG CLAMP 7.5" DLP .25" CON 10005

## (undated) DEVICE — CATH ANGIO INFINITI JL3.5 4FRX100CM 538418

## (undated) DEVICE — ENDO TROCAR FIRST ENTRY KII FIOS Z-THRD 05X100MM CTF03

## (undated) DEVICE — LINEN TOWEL PACK X30 5481

## (undated) DEVICE — MANIFOLD KIT ANGIO AUTOMATED 014613

## (undated) DEVICE — SOMASENSOR CEREBRAL OXIMETER ADULT SAFB-SM

## (undated) DEVICE — CANNULA 25FR MULIT-STAGE 96880-25

## (undated) DEVICE — RAD INTRODUCER KIT MICRO 5FRX10CM .018 NITINOL G/W

## (undated) DEVICE — PACK TUBING MINI VAC CUSTOM 3/8X3/8T BB7V94R1

## (undated) DEVICE — SU DEVICE COR-KNOT MINI 4X14MM 031350

## (undated) DEVICE — CATH DIAG 4FR ANG PIG 538453S

## (undated) DEVICE — CONNECTOR DRAIN CHEST Y EXTENSION SET 19909

## (undated) DEVICE — CANNULA PERFUSION 24FR SGL STAGE RT ANGLE 69324

## (undated) DEVICE — SURGICEL HEMOSTAT 2X14" 1951

## (undated) DEVICE — DEFIB PRO-PADZ LVP LQD GEL ADULT 8900-2105-01

## (undated) DEVICE — SU ETHIBOND 3-0 BBDA 36" X588H

## (undated) DEVICE — DILATOR VASC W/INTRO GW 8FRX19CM .038"

## (undated) DEVICE — VESSEL LOOPS RED MAXI

## (undated) DEVICE — SU VICRYL 3-0 FS-1 27" J442H

## (undated) DEVICE — SU ETHIBOND 2-0 V-7 DA 10X30" PXX77

## (undated) DEVICE — ENDO DISSECTOR BLUNT 10MM CHERRY BCD10

## (undated) DEVICE — CANNULA AORTIC ROOT 12GA 11012L

## (undated) DEVICE — LINEN TOWEL PACK X6 WHITE 5487

## (undated) DEVICE — GLOVE PROTEXIS W/NEU-THERA 7.5  2D73TE75

## (undated) DEVICE — SU PLEDGET SOFT TFE 3/8"X3/26"X1/16" PCP40

## (undated) DEVICE — SU VICRYL 2-0 CT-1 27" UND J259H

## (undated) DEVICE — SU ETHIBOND 2-0 V-7DA 10X30" 10X72

## (undated) DEVICE — SU PROLENE 4-0 SHDA 36" 8521H

## (undated) DEVICE — KIT WASH CELL SAVING ATL2001

## (undated) DEVICE — CONNECTOR BLAKE DRAIN SGL BCC1

## (undated) DEVICE — ENDO DISSECTOR BLUNT 05MM  BTD05

## (undated) DEVICE — SUCTION CANISTER MEDIVAC LINER 3000ML W/LID 65651-530

## (undated) DEVICE — SU VICRYL 0 CTX 36" J370H

## (undated) DEVICE — GLOVE PROTEXIS POWDER FREE 7.5 ORTHOPEDIC 2D73ET75

## (undated) DEVICE — SYR 10ML FINGER CONTROL W/O NDL 309695

## (undated) DEVICE — GUIDEWIRE 180CM .035IN VASC STR FLXB

## (undated) DEVICE — VALVE VRV 9156R2

## (undated) DEVICE — SU SILK 1 TIE 10X30" SA87G

## (undated) DEVICE — SUCTION IRR STRYKERFLOW II W/TIP 250-070-520

## (undated) DEVICE — PACK PCMKR PERM SRG PROC LF SAN32PC573

## (undated) DEVICE — SOL WATER IRRIG 1000ML BOTTLE 2F7114

## (undated) DEVICE — NDL PERC ENTRY THINWALL 18GA 7.0" G00166

## (undated) DEVICE — CATH ANGIO INFINITI JL5 4FRX100CM 538422

## (undated) DEVICE — SHEATH PRELUDE SNAP 13CM 9FR

## (undated) DEVICE — CATH DIAG 4FR JL 4.5 538417

## (undated) DEVICE — DILATOR VASCULAR 6FRX19CM 405504

## (undated) DEVICE — SU POLYESTER TAPE 30" 8618-00

## (undated) DEVICE — SYR ANGIOGRAPHY MULTIUSE KIT ACIST 014612

## (undated) DEVICE — TOURNIQUET VASCULAR

## (undated) DEVICE — LINEN TOWEL PACK X5 5464

## (undated) DEVICE — CATH ANGIO INFINITI JL4 4FRX100CM 538420

## (undated) DEVICE — SHEATH PRELUDE SNAP 13CM 6FR

## (undated) DEVICE — SOL NACL 0.9% IRRIG 1000ML BOTTLE 07138-09

## (undated) DEVICE — MEDITRACE MULTIFUNTION ADULT RADIOTRANSPARENT ELECTRODE FOR ZOLL

## (undated) DEVICE — TUBING PERFUSION 1/4X1/16X8FT

## (undated) DEVICE — SU PROLENE 5-0 RB-2DA 30" 8710H

## (undated) DEVICE — LEAD ELEC MYOCARDIO PACING TEMPORARY MEDTRONIC

## (undated) DEVICE — SU ETHIBOND 2-0 SHDA 30" X563H

## (undated) DEVICE — DRAIN SUMP INTRACARDIAC 20FR 12" POOL TIP 12112

## (undated) DEVICE — COVER TABLE POLY 65X90" 8186

## (undated) DEVICE — PACK OPEN HEART PV12OH524

## (undated) DEVICE — WIRE GLIDE 0.035"X150CM 3CM ANG REG UWR6035

## (undated) DEVICE — CANNULA PERFUSION 14FRX16" LEFT 12016

## (undated) DEVICE — ESU ELEC BLADE 6" COATED E1450-6

## (undated) DEVICE — KIT HAND CONTROL ANGIOTOUCH ACIST 65CM AT-P65

## (undated) DEVICE — ENDO INTRODUCER MINI PORT 2MM 171313

## (undated) DEVICE — DEVICE ASSEMBLY SUCTION/ANTI COAG BTC93

## (undated) DEVICE — BLADE ESU PLASMABLADE SPATULA TIP 4MM PS200-040

## (undated) DEVICE — CATH ANGIO INFINITI 3DRC 4FRX100CM 538476

## (undated) DEVICE — SU PROLENE 4-0 RB-1DA 36" 8557H

## (undated) DEVICE — SU ETHIBOND 0 CT-1 CR 8X18" CX21D

## (undated) DEVICE — SU ETHIBOND 5 V-37 4X30" MB66G

## (undated) DEVICE — INTRO SHEATH 4FRX10CM PINNACLE RSS402

## (undated) DEVICE — RESERVOIR CELL SAVING BLOOD COLLECTION EL2120

## (undated) DEVICE — PACK MINI VAC CUSTOM CARDOPULMONARY BB5Z97R15

## (undated) RX ORDER — FENTANYL CITRATE 50 UG/ML
INJECTION, SOLUTION INTRAMUSCULAR; INTRAVENOUS
Status: DISPENSED
Start: 2020-08-14

## (undated) RX ORDER — LIDOCAINE HYDROCHLORIDE 40 MG/ML
SOLUTION TOPICAL
Status: DISPENSED
Start: 2019-01-09

## (undated) RX ORDER — ALBUMIN, HUMAN INJ 5% 5 %
SOLUTION INTRAVENOUS
Status: DISPENSED
Start: 2019-02-15

## (undated) RX ORDER — ACETAMINOPHEN 325 MG/1
TABLET ORAL
Status: DISPENSED
Start: 2019-01-09

## (undated) RX ORDER — HEPARIN SODIUM 1000 [USP'U]/ML
INJECTION, SOLUTION INTRAVENOUS; SUBCUTANEOUS
Status: DISPENSED
Start: 2019-02-15

## (undated) RX ORDER — VANCOMYCIN HYDROCHLORIDE 1 G/200ML
INJECTION, SOLUTION INTRAVENOUS
Status: DISPENSED
Start: 2019-02-15

## (undated) RX ORDER — CEFAZOLIN SODIUM 2 G/100ML
INJECTION, SOLUTION INTRAVENOUS
Status: DISPENSED
Start: 2020-08-14

## (undated) RX ORDER — VECURONIUM BROMIDE 1 MG/ML
INJECTION, POWDER, LYOPHILIZED, FOR SOLUTION INTRAVENOUS
Status: DISPENSED
Start: 2019-02-15

## (undated) RX ORDER — LIDOCAINE HYDROCHLORIDE 10 MG/ML
INJECTION, SOLUTION EPIDURAL; INFILTRATION; INTRACAUDAL; PERINEURAL
Status: DISPENSED
Start: 2019-01-09

## (undated) RX ORDER — FENTANYL CITRATE 0.05 MG/ML
INJECTION, SOLUTION INTRAMUSCULAR; INTRAVENOUS
Status: DISPENSED
Start: 2019-02-15

## (undated) RX ORDER — BUPIVACAINE HYDROCHLORIDE 2.5 MG/ML
INJECTION, SOLUTION EPIDURAL; INFILTRATION; INTRACAUDAL
Status: DISPENSED
Start: 2019-02-18

## (undated) RX ORDER — CEFAZOLIN SODIUM 1 G/3ML
INJECTION, POWDER, FOR SOLUTION INTRAMUSCULAR; INTRAVENOUS
Status: DISPENSED
Start: 2019-02-15

## (undated) RX ORDER — FLUMAZENIL 0.1 MG/ML
INJECTION, SOLUTION INTRAVENOUS
Status: DISPENSED
Start: 2019-01-09

## (undated) RX ORDER — PROTAMINE SULFATE 10 MG/ML
INJECTION, SOLUTION INTRAVENOUS
Status: DISPENSED
Start: 2019-02-15

## (undated) RX ORDER — FENTANYL CITRATE 50 UG/ML
INJECTION, SOLUTION INTRAMUSCULAR; INTRAVENOUS
Status: DISPENSED
Start: 2019-01-09

## (undated) RX ORDER — OXYCODONE AND ACETAMINOPHEN 5; 325 MG/1; MG/1
TABLET ORAL
Status: DISPENSED
Start: 2020-08-14

## (undated) RX ORDER — GLYCOPYRROLATE 0.2 MG/ML
INJECTION, SOLUTION INTRAMUSCULAR; INTRAVENOUS
Status: DISPENSED
Start: 2019-01-09

## (undated) RX ORDER — CEFAZOLIN SODIUM 2 G/100ML
INJECTION, SOLUTION INTRAVENOUS
Status: DISPENSED
Start: 2019-02-15

## (undated) RX ORDER — BUPIVACAINE HYDROCHLORIDE 2.5 MG/ML
INJECTION, SOLUTION EPIDURAL; INFILTRATION; INTRACAUDAL
Status: DISPENSED
Start: 2020-08-14

## (undated) RX ORDER — NALOXONE HYDROCHLORIDE 0.4 MG/ML
INJECTION, SOLUTION INTRAMUSCULAR; INTRAVENOUS; SUBCUTANEOUS
Status: DISPENSED
Start: 2019-01-09

## (undated) RX ORDER — METOPROLOL TARTRATE 25 MG/1
TABLET, FILM COATED ORAL
Status: DISPENSED
Start: 2019-02-15

## (undated) RX ORDER — ASPIRIN 325 MG
TABLET ORAL
Status: DISPENSED
Start: 2019-01-09

## (undated) RX ORDER — MUPIROCIN 20 MG/G
OINTMENT TOPICAL
Status: DISPENSED
Start: 2019-02-15

## (undated) RX ORDER — DEXTROSE MONOHYDRATE 25 G/50ML
INJECTION, SOLUTION INTRAVENOUS
Status: DISPENSED
Start: 2019-01-09

## (undated) RX ORDER — LIDOCAINE HYDROCHLORIDE 20 MG/ML
INJECTION, SOLUTION EPIDURAL; INFILTRATION; INTRACAUDAL; PERINEURAL
Status: DISPENSED
Start: 2019-02-15

## (undated) RX ORDER — BUPIVACAINE HYDROCHLORIDE 5 MG/ML
INJECTION, SOLUTION EPIDURAL; INTRACAUDAL
Status: DISPENSED
Start: 2019-02-15

## (undated) RX ORDER — LIDOCAINE HYDROCHLORIDE 10 MG/ML
INJECTION, SOLUTION EPIDURAL; INFILTRATION; INTRACAUDAL; PERINEURAL
Status: DISPENSED
Start: 2019-02-18

## (undated) RX ORDER — REGADENOSON 0.08 MG/ML
INJECTION, SOLUTION INTRAVENOUS
Status: DISPENSED
Start: 2019-07-19

## (undated) RX ORDER — HEPARIN SODIUM 1000 [USP'U]/ML
INJECTION, SOLUTION INTRAVENOUS; SUBCUTANEOUS
Status: DISPENSED
Start: 2019-01-09

## (undated) RX ORDER — LIDOCAINE HYDROCHLORIDE 10 MG/ML
INJECTION, SOLUTION EPIDURAL; INFILTRATION; INTRACAUDAL; PERINEURAL
Status: DISPENSED
Start: 2020-08-14

## (undated) RX ORDER — PROPOFOL 10 MG/ML
INJECTION, EMULSION INTRAVENOUS
Status: DISPENSED
Start: 2019-02-15

## (undated) RX ORDER — FENTANYL CITRATE 50 UG/ML
INJECTION, SOLUTION INTRAMUSCULAR; INTRAVENOUS
Status: DISPENSED
Start: 2019-02-18